# Patient Record
Sex: FEMALE | Race: WHITE | NOT HISPANIC OR LATINO | Employment: OTHER | URBAN - METROPOLITAN AREA
[De-identification: names, ages, dates, MRNs, and addresses within clinical notes are randomized per-mention and may not be internally consistent; named-entity substitution may affect disease eponyms.]

---

## 2017-08-03 DIAGNOSIS — M65.331 TRIGGER MIDDLE FINGER OF RIGHT HAND: Primary | ICD-10-CM

## 2017-08-03 DIAGNOSIS — M65.341 TRIGGER RING FINGER OF RIGHT HAND: ICD-10-CM

## 2017-08-10 ENCOUNTER — CLINICAL SUPPORT (OUTPATIENT)
Dept: REHABILITATION | Facility: HOSPITAL | Age: 42
End: 2017-08-10
Attending: SURGERY
Payer: MEDICAID

## 2017-08-10 DIAGNOSIS — M65.341 TRIGGER RING FINGER OF RIGHT HAND: ICD-10-CM

## 2017-08-10 DIAGNOSIS — M65.331 TRIGGER MIDDLE FINGER OF RIGHT HAND: ICD-10-CM

## 2017-08-10 PROCEDURE — G8987 SELF CARE CURRENT STATUS: HCPCS | Mod: CK

## 2017-08-10 PROCEDURE — 97022 WHIRLPOOL THERAPY: CPT

## 2017-08-10 PROCEDURE — G8988 SELF CARE GOAL STATUS: HCPCS | Mod: CK

## 2017-08-10 PROCEDURE — 97165 OT EVAL LOW COMPLEX 30 MIN: CPT

## 2017-08-10 PROCEDURE — 97530 THERAPEUTIC ACTIVITIES: CPT

## 2017-08-10 NOTE — PROGRESS NOTES
OCCUPATIONAL THERAPY INITIAL EVALUATION       DATE : 08/10/2017    Name: Malathi Mcpherson  Referring Physician: Jayden Crawford III,*   Allergies: Review of patient's allergies indicates:  No Known Allergies  Medical history:   Past Medical History:   Diagnosis Date    Depression     Hepatitis B      Medication:    Current Outpatient Prescriptions on File Prior to Visit   Medication Sig Dispense Refill    albuterol 90 mcg/actuation inhaler Inhale 1-2 puffs into the lungs every 6 (six) hours as needed for Wheezing. 1 Inhaler 0    busPIRone (BUSPAR) 5 MG Tab Take 5 mg by mouth 2 (two) times daily.      citalopram (CELEXA) 40 MG tablet Take 40 mg by mouth once daily.      hydrocodone-acetaminophen 5-325mg (NORCO) 5-325 mg per tablet Take 1 tablet by mouth every 4 (four) hours as needed for Pain. 10 tablet 0    ibuprofen (ADVIL,MOTRIN) 800 MG tablet Take 1 tablet (800 mg total) by mouth every 6 (six) hours as needed for Pain. 20 tablet 0     No current facility-administered medications on file prior to visit.      Diagnosis: right middle and ring finger trigger finger release    Occupational Profile level: low     Orders: Occupational Therapy evaluate and treat    Precautions stated on order: none      Evaluation Date: 8/10/17  Visit #:1    Plan of care Expiration: 8/30/17    SUBJECTIVE   Patient states: Pt reports that she has had trigger finger release surgery 2 weeks ago right middle and ring finger .  Pts goals for therapy:  Regain full motion in hand   Pain Scale: Malathi rates pain on a scale of 0-10 to be 2 at worst; 2 currently; 2 at best .  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   Patients  structured exercise routine: gripping and cleaning   Exercise routine prior to onset: normal   ADLS: Pt has a decrease ability to perform ADL's such as pain with managing clothing , holding grocery bags and cleaning     Imaging:   MRI: none          Xray: yes         Hand Dominance: right                                   OBJECTIVE     Cognitive status : : no deficits     Posture Alignment :slouched posture  Joint integrity: normal   Skin integrity: dry scar , had stitches removed earlier this  Week   Edema: minimal palm of hand     Sensation:   Light Touch: Intact       Proprioception:   Intact      Upper Extremity Range of Motion   Joint Evaluation AROM PROM    Right  Left  / Right    Shoulder flex 0-180     Shoulder Abd 0-180     Shoulder ER 0-90     Shoulder IR 0-90     Shoulder Extension 0-80     Shoulder Horizontal adduction 0-90     Elbow flex/ext 0-150 WNL WNL   Wrist flex 0-80 40 WNL   Wrist ext 0-70 35 WNL   Supination 0-80 WNL WNL   Pronation 0-80 WNL WNL   UD 30 WNl   RD 15 WNL         AROM  Index  Middle  Ring  Small   MP  -30/85 -20/85    PIp  -10/90 -15/95    DIP   0/35 0/40           DEGROOT              SCAR: dry healed       Upper Extremity Strength                                 Strength - second setting nt #,nt#,nt # : nt #,nt#,nt #                                     Treatment    Treatment time: 60  OT Evaluation Completed? Yes  Discussed Plan of Care with patient: Yes    Malathi received 15 minutes of scar massage .  Malathi received 10 minutes of fluido .    Malathi received 1 eval    Written Home Exercises Provided:   Malathi arthur good understanding of the  education provided. Patient demo good return demo of skill of exercises.     Treatment Received :        1. Pt performed and was provided with a written copy of exercises to perform as tolerated, including: fluidotherapy X 10 minutes,TGE, wrist flexion / ext AROM , educated  Use of scar massage,     2. Pt was provided educational information, including range of motion, strengthening   3. Pt educated to use ice for 10- 20 minutes to decrease swelling  and/ or pain as needed.       FOTO - Outcomes  And G Codes     FOTO survey     FOTO:   Self Care DATE SCORE GCODE MODIFIER   INITIAL 8/10/17         /100  CK   5TH /100 10TH /100     Discharge           FOTO Goal : CK modifier 8994    Interpretation of FOTO Modifiers    TEST SCORE  Modifier  Impairment Limitation Restriction    0%  CH  0 % impaired, limited or restricted    1-19%  CI  @ least 1% but less than 20% impaired, limited or restricted    20-39%  CJ  @ least 20%<40% impaired, limited or restricted    40-59%  CK  @ least 40%<60% impaired, limited or restricted    60-79%  CL  @ least 60% <80% impaired, limited or restricted    80-99%  CM  @ least 80%<100% impaired limited or restricted    100%  CN  100% impaired, limited or restricted       ASSESSMENT    Pt present to occupational therapy with an   OT diagnosis : of right middle and ring finger trigger finger release  2 weeks ago ( end of July 2017 )     CLINICAL DECISION MAKING:    Analysis of data from eval:      Problem focused assessment, 1-3 performance deficits noted trouble grasping , making a fist , managing groceries and doing laundry         History Examination Decision Making Complexity Score   Occupational Profile: 42 year old , lives at home , pt has family that can assist pt was most task                             Medical and Therapy History:   Expanded                  Performance Deficits     Physical:  Decreased ability to perform task such as feeding/ cutting ,  use of fasteners , dressing         Cognitive:   WNL        Psychosocial:    WNl        Modification of task during  evaluation        Level of complexity is low ,     based on PMHX, co morbidities , data from assessments and functional level of assistance required with task and clinical presentation directly impacting  His/ her plan of care                Pt presents with the impairments as stated below in the impairments/problems list. Pt prognosis is Good    Malathi will benefit from skilled outpatient occupational therapy to address the above stated deficits, provide pt/family education and to maximize pt's level of independence in the home and community environment.     Discussed Plan of Care with patient: Yes    Medical necessity is demonstrated by the following IMPAIRMENTS/PROBLEMS:  1. Poor posture  2. pain in  joint / limb  Right / left   3. Decreased flexibility  4. Decreased ROM  5. Decreased muscle strength  6. Inability to work       Pt's spiritual, cultural and educational needs considered and pt agreeable to plan of care and goals as stated below:     Anticipated Barriers for Occupational  therapy: anticipated none    GOALS: 6 weeks. Pt agrees with goals set.  1. Independent with HEP.  2. Report decreased to pain  with adls such as reaching overhead and into the cabinets.  3. Decreased pain to   0/10    With gripping toothbrush or water bottle   4.      5. Increased arom  for  Right middle finger and ring finger increase flexion by 10 and extension of mp and PIp by 10 degrees .     PLAN   Outpatient occupational therapy 1-  2 times weekly to include:   pt ed, hep, therapeutic exercises, neuromuscular re-education, joint mobilizations,  Iontophoresis , ultrasound and other modalities prn  Treatment Certification Period:   8/10/17     To 9/30/17               .   Cont OT for  6  weeks.     I certify the need for these services furnished under this plan of treatment and while under my Care.     _________________________________,          _________________________  Physician        Date

## 2017-08-15 ENCOUNTER — CLINICAL SUPPORT (OUTPATIENT)
Dept: REHABILITATION | Facility: HOSPITAL | Age: 42
End: 2017-08-15
Attending: SURGERY
Payer: MEDICAID

## 2017-08-15 DIAGNOSIS — M65.331 TRIGGER MIDDLE FINGER OF RIGHT HAND: Primary | ICD-10-CM

## 2017-08-15 PROCEDURE — 97022 WHIRLPOOL THERAPY: CPT

## 2017-08-15 PROCEDURE — 97035 APP MDLTY 1+ULTRASOUND EA 15: CPT

## 2017-08-15 PROCEDURE — 97110 THERAPEUTIC EXERCISES: CPT

## 2017-08-15 NOTE — PROGRESS NOTES
OCCUPATIONAL THERAPY progress       DATE : 08/15/2017    Name: Malathi Mcpherson  Referring Physician: Jayden Crawford III,*   Allergies: Review of patient's allergies indicates:  No Known Allergies  Medical history:   Past Medical History:   Diagnosis Date    Depression     Hepatitis B      Medication:    Current Outpatient Prescriptions on File Prior to Visit   Medication Sig Dispense Refill    albuterol 90 mcg/actuation inhaler Inhale 1-2 puffs into the lungs every 6 (six) hours as needed for Wheezing. 1 Inhaler 0    busPIRone (BUSPAR) 5 MG Tab Take 5 mg by mouth 2 (two) times daily.      citalopram (CELEXA) 40 MG tablet Take 40 mg by mouth once daily.      hydrocodone-acetaminophen 5-325mg (NORCO) 5-325 mg per tablet Take 1 tablet by mouth every 4 (four) hours as needed for Pain. 10 tablet 0    ibuprofen (ADVIL,MOTRIN) 800 MG tablet Take 1 tablet (800 mg total) by mouth every 6 (six) hours as needed for Pain. 20 tablet 0     No current facility-administered medications on file prior to visit.      Diagnosis: right middle and ring finger trigger finger release    Occupational Profile level: low     Orders: Occupational Therapy evaluate and treat    Precautions stated on order: none      Evaluation Date: 8/10/17  Visit #:1    Plan of care Expiration: 8/30/17    SUBJECTIVE   Patient states: Pt reports that she has had right  trigger finger release surgery 2 weeks ago right middle and ring finger .  Pts goals for therapy:  Regain full motion in hand   Pain Scale: Malathi rates pain on a scale of 0-10 to be 2 at worst; 2 currently; 2 at best .  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   Patients  structured exercise routine: gripping and cleaning   Exercise routine prior to onset: normal   ADLS: Pt has a decrease ability to perform ADL's such as pain with managing clothing , holding grocery bags and cleaning     Imaging:   MRI: none          Xray: yes         Hand Dominance: right                                   OBJECTIVE     Cognitive status : : no deficits     Posture Alignment :slouched posture  Joint integrity: normal   Skin integrity: dry scar , had stitches removed earlier this  Week   Edema: minimal palm of hand     Sensation:   Light Touch: Intact       Proprioception:   Intact      Upper Extremity Range of Motion   Joint Evaluation AROM PROM    Right  Left  / Right    Shoulder flex 0-180     Shoulder Abd 0-180     Shoulder ER 0-90     Shoulder IR 0-90     Shoulder Extension 0-80     Shoulder Horizontal adduction 0-90     Elbow flex/ext 0-150 WNL WNL   Wrist flex 0-80 40 WNL   Wrist ext 0-70 35 WNL   Supination 0-80 WNL WNL   Pronation 0-80 WNL WNL   UD 30 WNl   RD 15 WNL         AROM  Index  Middle  Ring  Small   MP  -30/85 -20/85    PIp  -10/90 -15/95    DIP   0/35 0/40           DEGROOT              SCAR: dry healed                                Treatment    Treatment time: 60  OT Evaluation Completed? Yes  Discussed Plan of Care with patient: Yes    Malathi received 15 minutes of scar massage .  Malathi received 10 minutes of fluido .    Malathi received 1 eval    Written Home Exercises Provided:   Malathi demo good understanding of the education provided. Patient demo good return demo of skill of exercises.     Treatment Received :        1. Pt performed and was provided with  a written copy of exercises to perform as tolerated, including: fluidotherapy X 10 minutes, ultrasound .8  W/cm2 X 6 minutes over scar , TGE, wrist flexion / ext AROM , educated  Use of scar massage,     2. Pt was provided educational information, including range of motion, strengthening   3. Pt educated to use ice for 10- 20 minutes to decrease swelling  and/ or pain as needed.       FOTO - Outcomes  And G Codes     FOTO survey     FOTO:   Self Care DATE SCORE GCODE MODIFIER   INITIAL 8/10/17         /100  CK   5TH /100 10TH /100     Discharge           FOTO Goal : CK modifier 8994    Interpretation of FOTO Modifiers    TEST SCORE  Modifier  Impairment Limitation Restriction    0%  CH  0 % impaired, limited or restricted    1-19%  CI  @ least 1% but less than 20% impaired, limited or restricted    20-39%  CJ  @ least 20%<40% impaired, limited or restricted    40-59%  CK  @ least 40%<60% impaired, limited or restricted    60-79%  CL  @ least 60% <80% impaired, limited or restricted    80-99%  CM  @ least 80%<100% impaired limited or restricted    100%  CN  100% impaired, limited or restricted       ASSESSMENT    Pt present to occupational therapy with an   OT diagnosis : of right middle and ring finger trigger finger release  2 weeks ago ( end of July 2017 )     CLINICAL DECISION MAKING:    Analysis of data from eval:      Problem focused assessment, 1-3 performance deficits noted trouble grasping , making a fist , managing groceries and doing laundry         History Examination Decision Making Complexity Score   Occupational Profile: 42 year old , lives at home , pt has family that can assist pt was most task                             Medical and Therapy History:   Expanded                  Performance Deficits     Physical:  Decreased ability to perform task such as feeding/ cutting , use of fasteners , dressing         Cognitive:   WNL        Psychosocial:    WNl         Modification of task during  evaluation        Level of complexity is low ,     based on PMHX, co morbidities , data from assessments and functional level of assistance required with task and clinical presentation directly impacting  His/ her plan of care                Pt presents with the impairments as stated below in the impairments/problems list. Pt prognosis is Good    Maltahi will benefit from skilled outpatient occupational therapy to address the above stated deficits, provide pt/family education and to maximize pt's level of independence in the home and community environment.     Discussed Plan of Care with patient: Yes    Medical necessity is demonstrated by the following IMPAIRMENTS/PROBLEMS:  1. Poor posture  2. pain in  joint / limb  Right / left   3. Decreased flexibility  4. Decreased ROM  5. Decreased muscle strength  6. Inability to work       Pt's spiritual, cultural and educational needs considered and pt agreeable to plan of care and goals as stated below:     Anticipated Barriers for Occupational  therapy: anticipated none    GOALS: 6 weeks. Pt agrees with goals set.  1. Independent with HEP.  2. Report decreased to pain  with adls such as reaching overhead and into the cabinets.  3. Decreased pain to   0/10    With gripping toothbrush or water bottle   4.      5. Increased arom  for  Right middle finger and ring finger increase flexion by 10 and extension of mp and PIp by 10 degrees .     PLAN   Outpatient occupational therapy 1-  2 times weekly to include:   pt ed, hep, therapeutic exercises, neuromuscular re-education, joint mobilizations,  Iontophoresis , ultrasound and other modalities prn  Treatment Certification Period:   8/10/17     To 9/30/17               .   Cont OT for  6  weeks.     I certify the need for these services furnished under this plan of treatment and while under my Care.    _________________________________,          _________________________  Physician        Date

## 2017-12-12 ENCOUNTER — DOCUMENTATION ONLY (OUTPATIENT)
Dept: REHABILITATION | Facility: HOSPITAL | Age: 42
End: 2017-12-12

## 2017-12-12 NOTE — PROGRESS NOTES
OCCUPATIONAL THERAPY progress       DATE : 12/12/2017    Name: Malathi Mcpherson  Referring Physician: No ref. provider found   Allergies: Review of patient's allergies indicates:  No Known Allergies  Medical history:   Past Medical History:   Diagnosis Date    Depression     Hepatitis B      Medication:    Current Outpatient Prescriptions on File Prior to Visit   Medication Sig Dispense Refill    albuterol 90 mcg/actuation inhaler Inhale 1-2 puffs into the lungs every 6 (six) hours as needed for Wheezing. 1 Inhaler 0    busPIRone (BUSPAR) 5 MG Tab Take 5 mg by mouth 2 (two) times daily.      citalopram (CELEXA) 40 MG tablet Take 40 mg by mouth once daily.      hydrocodone-acetaminophen 5-325mg (NORCO) 5-325 mg per tablet Take 1 tablet by mouth every 4 (four) hours as needed for Pain. 10 tablet 0    ibuprofen (ADVIL,MOTRIN) 800 MG tablet Take 1 tablet (800 mg total) by mouth every 6 (six) hours as needed for Pain. 20 tablet 0     No current facility-administered medications on file prior to visit.      Diagnosis: right middle and ring finger trigger finger release    Occupational Profile level: low     Orders: Occupational Therapy evaluate and treat    Precautions stated on order: none      Evaluation Date: 8/10/17   total Visit #: 2   last seen 8/5/17   Plan of care Expiration: 8/30/17    SUBJECTIVE   Patient states: Pt reports that she has had right  trigger finger release surgery 2 weeks ago right middle and ring finger .  Pts goals for therapy:  Regain full motion in hand   Pain Scale: Malathi rates pain on a scale of 0-10 to be 2 at worst; 2 currently; 2 at best .  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   Patients  structured exercise routine: gripping and cleaning   Exercise routine prior to onset: normal   ADLS: Pt has a decrease ability to perform ADL's such as pain with managing clothing , holding grocery bags and cleaning     Imaging:   MRI: none          Xray: yes         Hand Dominance: right                                   OBJECTIVE     Cognitive status : : no deficits     Posture Alignment :slouched posture  Joint integrity: normal   Skin integrity: dry scar , had stitches removed earlier this  Week   Edema: minimal palm of hand     Sensation:   Light Touch: Intact       Proprioception:   Intact      Upper Extremity Range of Motion   Joint Evaluation AROM PROM    Right  Left  / Right    Shoulder flex 0-180     Shoulder Abd 0-180     Shoulder ER 0-90     Shoulder IR 0-90     Shoulder Extension 0-80     Shoulder Horizontal adduction 0-90     Elbow flex/ext 0-150 WNL WNL   Wrist flex 0-80 40 WNL   Wrist ext 0-70 35 WNL   Supination 0-80 WNL WNL   Pronation 0-80 WNL WNL   UD 30 WNl   RD 15 WNL         AROM  Index  Middle  Ring  Small   MP  -30/85 -20/85    PIp  -10/90 -15/95    DIP   0/35 0/40           DEGROOT              SCAR: dry healed                                Treatment    Written Home Exercises Provided:   Malathi arthur good understanding of the education provided. Patient demo good return demo of skill of exercises.     Treatment Received :    1. Pt performed and was provided with a written copy of exercises to perform as tolerated, including: fluidotherapy X 10 minutes, ultrasound .8  W/cm2 X 6 minutes over scar , TGE, wrist flexion / ext AROM , educated  Use of  scar massage,     2. Pt was provided educational information, including range of motion, strengthening   3. Pt educated to use ice for 10- 20 minutes to decrease swelling  and/ or pain as needed.       FOTO - Outcomes  And G Codes     FOTO survey     FOTO:   Self Care DATE SCORE GCODE MODIFIER   INITIAL 8/10/17         /100  CK   5TH /100 10TH /100     Discharge      CK       FOTO Goal : CK modifier 8994    Interpretation of FOTO Modifiers    TEST SCORE  Modifier  Impairment Limitation Restriction    0%  CH  0 % impaired, limited or restricted    1-19%  CI  @ least 1% but less than 20% impaired, limited or restricted    20-39%  CJ  @ least 20%<40% impaired, limited or restricted    40-59%  CK  @ least 40%<60% impaired, limited or restricted    60-79%  CL  @ least 60% <80% impaired, limited or restricted    80-99%  CM  @ least 80%<100% impaired limited or restricted    100%  CN  100% impaired, limited or restricted       ASSESSMENT    Pt present to occupational therapy with an   OT diagnosis : of right middle and ring finger trigger finger release  2 weeks ago ( end of July 2017 )     CLINICAL DECISION MAKING:    Analysis of data from eval:      Problem focused assessment, 1-3 performance deficits noted trouble grasping , making a fist , managing groceries and doing laundry      Pt presents with the impairments as stated below in the impairments/problems list. Pt prognosis is Good    Malathi will benefit from skilled outpatient occupational therapy to address the above stated deficits, provide pt/family education and to maximize pt's level of independence in the home and community environment.     Discussed Plan of Care with patient: Yes    Medical necessity is demonstrated by the following IMPAIRMENTS/PROBLEMS:  1. Poor posture  2. pain in  joint / limb  Right / left   3. Decreased flexibility  4. Decreased ROM  5. Decreased muscle strength  6. Inability to work       Pt's spiritual,  cultural and educational needs considered and pt agreeable to plan of care and goals as stated below:     Anticipated Barriers for Occupational  therapy: anticipated none    GOALS: no goals met  1. Independent with HEP.  2. Report decreased to pain  with adls such as reaching overhead and into the cabinets.  3. Decreased pain to   0/10   With gripping toothbrush or water bottle   4.    5. Increased arom  for  Right middle finger and ring finger increase flexion by 10 and extension of mp and PIp by 10 degrees .     PLAN   PT to be d/c from OT , pt only attended 2 appts. Never scheduled any follow ups         I certify the need for these services furnished under this plan of treatment and while under my Care.    _________________________________,          _________________________  Physician        Date

## 2018-06-27 ENCOUNTER — HOSPITAL ENCOUNTER (EMERGENCY)
Facility: HOSPITAL | Age: 43
Discharge: HOME | End: 2018-06-27
Attending: EMERGENCY MEDICINE
Payer: MEDICAID

## 2018-06-27 VITALS
HEIGHT: 66 IN | BODY MASS INDEX: 27.64 KG/M2 | WEIGHT: 172 LBS | TEMPERATURE: 99 F | HEART RATE: 69 BPM | DIASTOLIC BLOOD PRESSURE: 71 MMHG | SYSTOLIC BLOOD PRESSURE: 124 MMHG | RESPIRATION RATE: 16 BRPM | OXYGEN SATURATION: 98 %

## 2018-06-27 DIAGNOSIS — M25.562 CHRONIC PAIN OF LEFT KNEE: Primary | ICD-10-CM

## 2018-06-27 DIAGNOSIS — G89.29 CHRONIC PAIN OF LEFT KNEE: Primary | ICD-10-CM

## 2018-06-27 PROCEDURE — 63600175 PHARM REV CODE 636 W HCPCS: Performed by: NURSE PRACTITIONER

## 2018-06-27 PROCEDURE — 99284 EMERGENCY DEPT VISIT MOD MDM: CPT | Mod: ,,, | Performed by: NURSE PRACTITIONER

## 2018-06-27 PROCEDURE — 99283 EMERGENCY DEPT VISIT LOW MDM: CPT | Mod: 25

## 2018-06-27 PROCEDURE — 96372 THER/PROPH/DIAG INJ SC/IM: CPT

## 2018-06-27 RX ORDER — QUETIAPINE FUMARATE 200 MG/1
150 TABLET, FILM COATED ORAL
COMMUNITY
End: 2019-02-03

## 2018-06-27 RX ORDER — KETOROLAC TROMETHAMINE 30 MG/ML
15 INJECTION, SOLUTION INTRAMUSCULAR; INTRAVENOUS
Status: COMPLETED | OUTPATIENT
Start: 2018-06-27 | End: 2018-06-27

## 2018-06-27 RX ORDER — PROMETHAZINE HYDROCHLORIDE 12.5 MG/1
25 TABLET ORAL 4 TIMES DAILY
COMMUNITY

## 2018-06-27 RX ORDER — OMEPRAZOLE 20 MG/1
20 CAPSULE, DELAYED RELEASE ORAL DAILY
COMMUNITY
End: 2019-02-03

## 2018-06-27 RX ORDER — BUPRENORPHINE AND NALOXONE 8; 2 MG/1; MG/1
FILM, SOLUBLE BUCCAL; SUBLINGUAL
COMMUNITY
End: 2019-02-03

## 2018-06-27 RX ORDER — VENLAFAXINE HYDROCHLORIDE 150 MG/1
75 CAPSULE, EXTENDED RELEASE ORAL DAILY
COMMUNITY

## 2018-06-27 RX ORDER — ATENOLOL 25 MG/1
25 TABLET ORAL DAILY
COMMUNITY

## 2018-06-27 RX ADMIN — KETOROLAC TROMETHAMINE 15 MG: 30 INJECTION, SOLUTION INTRAMUSCULAR at 01:06

## 2018-06-27 NOTE — ED PROVIDER NOTES
Encounter Date: 6/27/2018       History     Chief Complaint   Patient presents with    Knee Pain     Pt c/o left knee pain & swelling.  Onset 6 months ago but states worsened in the past week.      Patient is a 43-year-old female with medical history of depression, hepatitis-B and chronic pain presenting to the ED for left knee pain. Patient states pain has been present for over the past week but worse the last 2 days.  Patient states pain worse throughout the day specially after working all day and standing.  Patient denies any trauma or falls recently.  Patient denies any weakness, numbness or difficulty ambulating.          Review of patient's allergies indicates:  No Known Allergies  Past Medical History:   Diagnosis Date    Depression     Hepatitis B      History reviewed. No pertinent surgical history.  History reviewed. No pertinent family history.  Social History   Substance Use Topics    Smoking status: Current Every Day Smoker     Packs/day: 0.50     Types: Cigarettes    Smokeless tobacco: Never Used    Alcohol use Yes      Comment: occaisionally     Review of Systems   Constitutional: Negative for fever.   HENT: Negative for sore throat.    Respiratory: Negative for shortness of breath.    Cardiovascular: Negative for chest pain.   Gastrointestinal: Negative for nausea.   Genitourinary: Negative for dysuria.   Musculoskeletal: Positive for arthralgias and myalgias. Negative for back pain.   Skin: Negative for rash.   Neurological: Negative for weakness.   Hematological: Does not bruise/bleed easily.       Physical Exam     Initial Vitals [06/27/18 1237]   BP Pulse Resp Temp SpO2   (!) 142/61 74 16 98.6 °F (37 °C) 97 %      MAP       --         Physical Exam    Nursing note and vitals reviewed.  Constitutional: She appears well-developed and well-nourished.   HENT:   Head: Normocephalic and atraumatic.   Eyes: EOM are normal. Pupils are equal, round, and reactive to light.   Neck: Normal range of  motion.   Cardiovascular: Normal rate, regular rhythm, normal heart sounds and intact distal pulses.   Pulmonary/Chest: Breath sounds normal.   Abdominal: Soft. Bowel sounds are normal.   Musculoskeletal: Normal range of motion.        Left knee: She exhibits normal range of motion, no swelling, no effusion, no ecchymosis, no deformity, no laceration, no erythema, normal alignment, no LCL laxity, normal patellar mobility, no bony tenderness, normal meniscus and no MCL laxity. Tenderness found.   Neurological: She is alert and oriented to person, place, and time. She has normal strength. No cranial nerve deficit or sensory deficit. GCS eye subscore is 4. GCS verbal subscore is 5. GCS motor subscore is 6.   Skin: Skin is warm, dry and intact. Capillary refill takes less than 2 seconds. No abrasion and no rash noted. No cyanosis. Nails show no clubbing.   Psychiatric: She has a normal mood and affect. Her speech is normal and behavior is normal. Judgment and thought content normal. Cognition and memory are normal.         ED Course   Procedures  Labs Reviewed - No data to display       Imaging Results    None                APC / Resident Notes:   Emergent evaluation of a 44 yo female patient presenting to the ER with chief complaint of left knee pain.  Patient states she has had pain in the left knee for the last week but has been worse over the last 2 days.  Patient denies any difficulty ambulating.  Patient denies increased pain with active or passive range of motion. Strength 5/5 in the left lower extremity.  I do not feel labs or imaging her prone to the care this patient.  I will medicate and reassess. Differential diagnoses include but are not limited to Meniscal tear, ACL tear, PCL tear, MCL tear, LCL tear, patellar dislocation, patellar tendinitis, patellar tendon rupture, septic joint, bursitis, Baker's cyst, Osgood-Schlatter disease, femur fracture, tibia fracture, gout, RA, OA, others. I discussed the care  of this patient with my Supervising Physician.    Patient states feeling better after IM medications.Patient is hemodynamically stable, vital signs are normal. Discharge instructions given. Referral for Ortho given.  Pt states she will discuss care with Arthritic doctor. Return to ED precautions discussed. Follow up as directed. Pt verbalized understanding of this plan. Pt is stable for discharge.                    Clinical Impression:   The encounter diagnosis was Chronic pain of left knee.      Disposition:   Disposition: Discharged  Condition: Stable                        Dariana Field NP  06/27/18 2101

## 2018-06-27 NOTE — ED TRIAGE NOTES
Pt c/o left knee pain, started about 6 months ago. Pt states worse in the AM and swollen after working all day. Pt states very stiff and making a popping noise, kathleen when walking. Pt ambulated into facility per self without assistance. Pt denies any fall or injury that she knows of. Pt A&O x4 during triage. Pt denies any chest pain, SOB, fevers, chills, vomiting, or diarrhea. Pt states chronically nausea, prescribed phenergan.

## 2018-06-27 NOTE — ED NOTES
LOC: The patient is awake, alert, and oriented to place, time, situation. Affect is appropriate.  Speech is appropriate and clear.     APPEARANCE: Patient resting comfortably in no acute distress.  Patient is clean and well groomed.    SKIN: The skin is warm and dry; color consistent with ethnicity.  Patient has normal skin turgor and moist mucus membranes.  Skin intact; no breakdown or bruising noted.     MUSCULOSKELETAL: Patient moving upper and lower extremities without difficulty.  Denies weakness. Pt c/o left knee pain, started about 6 months ago. Pt states worse in the AM and swollen after working all day. Pt states very stiff and making a popping noise, kathleen when walking. Pt ambulated into facility per self without assistance. Left knee tender to palpitation and swollen during assessment.     RESPIRATORY: Airway is open and patent. Respirations spontaneous, even, easy, and non-labored.  Patient has a normal effort and rate.  No accessory muscle use noted. Denies cough.     CARDIAC:  Normal rhythm and rate noted.  No peripheral edema noted. No complaints of chest pain.      ABDOMEN: Soft and non tender to palpation.  No distention noted.     NEUROLOGIC: Eyes open spontaneously.  Behavior appropriate to situation.  Follows commands; facial expression symmetrical.  Purposeful motor response noted; normal sensation in all extremities.

## 2019-02-03 ENCOUNTER — HOSPITAL ENCOUNTER (EMERGENCY)
Facility: HOSPITAL | Age: 44
Discharge: HOME | End: 2019-02-03
Attending: EMERGENCY MEDICINE
Payer: MEDICAID

## 2019-02-03 VITALS
RESPIRATION RATE: 16 BRPM | BODY MASS INDEX: 27.8 KG/M2 | TEMPERATURE: 98 F | WEIGHT: 173 LBS | HEIGHT: 66 IN | SYSTOLIC BLOOD PRESSURE: 136 MMHG | OXYGEN SATURATION: 97 % | HEART RATE: 88 BPM | DIASTOLIC BLOOD PRESSURE: 70 MMHG

## 2019-02-03 DIAGNOSIS — H66.90 OTITIS MEDIA, UNSPECIFIED LATERALITY, UNSPECIFIED OTITIS MEDIA TYPE: Primary | ICD-10-CM

## 2019-02-03 PROCEDURE — 99284 PR EMERGENCY DEPT VISIT,LEVEL IV: ICD-10-PCS | Mod: ,,, | Performed by: EMERGENCY MEDICINE

## 2019-02-03 PROCEDURE — 99284 EMERGENCY DEPT VISIT MOD MDM: CPT | Mod: ,,, | Performed by: EMERGENCY MEDICINE

## 2019-02-03 PROCEDURE — 99284 EMERGENCY DEPT VISIT MOD MDM: CPT

## 2019-02-03 RX ORDER — AMOXICILLIN 875 MG/1
875 TABLET, FILM COATED ORAL 2 TIMES DAILY
Qty: 14 TABLET | Refills: 0 | Status: SHIPPED | OUTPATIENT
Start: 2019-02-03

## 2019-02-03 RX ORDER — PANTOPRAZOLE SODIUM 40 MG/1
40 FOR SUSPENSION ORAL DAILY
COMMUNITY

## 2019-02-03 RX ORDER — FLUTICASONE PROPIONATE 50 MCG
1 SPRAY, SUSPENSION (ML) NASAL 2 TIMES DAILY PRN
Qty: 15 G | Refills: 0 | Status: SHIPPED | OUTPATIENT
Start: 2019-02-03

## 2019-02-03 NOTE — ED PROVIDER NOTES
Encounter Date: 2/3/2019    SCRIBE #1 NOTE: I, Mahogany Burden, am scribing for, and in the presence of,  Dr. Ovalle. I have scribed the entire note.       History     Chief Complaint   Patient presents with    Otalgia     Time seen by provider: 10:57 AM    43 y.o. female with medical conditions including depression and Hepatitis B , who presents with complaint of otalgia. Patient states she has been experiencing head congestion for approximately 5 days duration with associated otalgia, left greater than the right. Denies fever, chills, n/v/d. No chance of pregnancy.           The history is provided by the patient.     Review of patient's allergies indicates:  No Known Allergies  Past Medical History:   Diagnosis Date    Depression     Hepatitis B      History reviewed. No pertinent surgical history.  History reviewed. No pertinent family history.  Social History     Tobacco Use    Smoking status: Current Every Day Smoker     Packs/day: 0.50     Types: Cigarettes    Smokeless tobacco: Never Used   Substance Use Topics    Alcohol use: Yes     Comment: occaisionally    Drug use: No     Review of Systems   Constitutional: Negative for chills and fever.   HENT: Positive for ear pain. Negative for rhinorrhea and sore throat.    Eyes: Negative for visual disturbance.   Respiratory: Negative for cough and shortness of breath.    Cardiovascular: Negative for chest pain.   Gastrointestinal: Negative for nausea and vomiting.   Genitourinary: Negative for dysuria and flank pain.   Musculoskeletal: Negative for back pain and gait problem.   Skin: Negative for rash.   Neurological: Negative for weakness and numbness.       Physical Exam     Initial Vitals [02/03/19 1013]   BP Pulse Resp Temp SpO2   136/70 88 16 97.8 °F (36.6 °C) 97 %      MAP       --         Physical Exam    Nursing note and vitals reviewed.  Constitutional: She appears well-developed and well-nourished. No distress.   HENT:   Head: Normocephalic  and atraumatic.   Mouth/Throat: Oropharynx is clear and moist.   Fluid behind right and left TM. Erythema to the left TM   Neck: Neck supple.   Cardiovascular: Normal rate, regular rhythm, normal heart sounds and intact distal pulses.   Pulmonary/Chest: Breath sounds normal. No respiratory distress.   Abdominal: Soft. Bowel sounds are normal. There is no tenderness. There is no rebound and no guarding.   Musculoskeletal: She exhibits no edema or tenderness.   Neurological: She is alert and oriented to person, place, and time. She has normal strength.         ED Course   Procedures  Labs Reviewed - No data to display       Imaging Results    None          Medical Decision Making:   History:   Old Medical Records: I decided to obtain old medical records.  Initial Assessment:   43 y.o.female with otitis media.  ED Management:  Will prescribe Flonase nasal spray and amoxicillin.                      Clinical Impression:   The encounter diagnosis was Otitis media, unspecified laterality, unspecified otitis media type.      Disposition:   Disposition: Discharged  Condition: Stable                        Ritchie Ovalle MD  02/03/19 2633

## 2019-08-03 ENCOUNTER — HOSPITAL ENCOUNTER (EMERGENCY)
Facility: HOSPITAL | Age: 44
Discharge: HOME | End: 2019-08-03
Attending: EMERGENCY MEDICINE
Payer: MEDICAID

## 2019-08-03 VITALS
HEIGHT: 67 IN | HEART RATE: 85 BPM | WEIGHT: 160 LBS | SYSTOLIC BLOOD PRESSURE: 136 MMHG | RESPIRATION RATE: 17 BRPM | OXYGEN SATURATION: 98 % | DIASTOLIC BLOOD PRESSURE: 68 MMHG | TEMPERATURE: 98 F | BODY MASS INDEX: 25.11 KG/M2

## 2019-08-03 DIAGNOSIS — T14.8XXA SKIN ABRASION: ICD-10-CM

## 2019-08-03 DIAGNOSIS — L02.91 ABSCESS: Primary | ICD-10-CM

## 2019-08-03 LAB
ANISOCYTOSIS BLD QL SMEAR: SLIGHT
BASOPHILS # BLD AUTO: 0.03 K/UL (ref 0–0.2)
BASOPHILS NFR BLD: 0.5 % (ref 0–1.9)
DIFFERENTIAL METHOD: ABNORMAL
EOSINOPHIL # BLD AUTO: 0.1 K/UL (ref 0–0.5)
EOSINOPHIL NFR BLD: 1.9 % (ref 0–8)
ERYTHROCYTE [DISTWIDTH] IN BLOOD BY AUTOMATED COUNT: 13 % (ref 11.5–14.5)
HCT VFR BLD AUTO: 37.8 % (ref 37–48.5)
HGB BLD-MCNC: 12.5 G/DL (ref 12–16)
IMM GRANULOCYTES # BLD AUTO: 0.02 K/UL (ref 0–0.04)
IMM GRANULOCYTES NFR BLD AUTO: 0.3 % (ref 0–0.5)
LYMPHOCYTES # BLD AUTO: 2.7 K/UL (ref 1–4.8)
LYMPHOCYTES NFR BLD: 46.2 % (ref 18–48)
MCH RBC QN AUTO: 31.4 PG (ref 27–31)
MCHC RBC AUTO-ENTMCNC: 33.1 G/DL (ref 32–36)
MCV RBC AUTO: 95 FL (ref 82–98)
MONOCYTES # BLD AUTO: 0.3 K/UL (ref 0.3–1)
MONOCYTES NFR BLD: 5.4 % (ref 4–15)
NEUTROPHILS # BLD AUTO: 2.6 K/UL (ref 1.8–7.7)
NEUTROPHILS NFR BLD: 45.7 % (ref 38–73)
NRBC BLD-RTO: 0 /100 WBC
PLATELET # BLD AUTO: 231 K/UL (ref 150–350)
PLATELET BLD QL SMEAR: ABNORMAL
PMV BLD AUTO: 9.8 FL (ref 9.2–12.9)
RBC # BLD AUTO: 3.98 M/UL (ref 4–5.4)
TOXIC GRANULES BLD QL SMEAR: PRESENT
WBC # BLD AUTO: 5.78 K/UL (ref 3.9–12.7)

## 2019-08-03 PROCEDURE — 85025 COMPLETE CBC W/AUTO DIFF WBC: CPT

## 2019-08-03 PROCEDURE — 99284 EMERGENCY DEPT VISIT MOD MDM: CPT | Mod: ,,, | Performed by: PHYSICIAN ASSISTANT

## 2019-08-03 PROCEDURE — 99284 PR EMERGENCY DEPT VISIT,LEVEL IV: ICD-10-PCS | Mod: ,,, | Performed by: PHYSICIAN ASSISTANT

## 2019-08-03 PROCEDURE — 99283 EMERGENCY DEPT VISIT LOW MDM: CPT

## 2019-08-03 PROCEDURE — 25000003 PHARM REV CODE 250: Performed by: PHYSICIAN ASSISTANT

## 2019-08-03 RX ORDER — BACITRACIN ZINC 500 UNIT/G
OINTMENT IN PACKET (EA) TOPICAL
Status: COMPLETED | OUTPATIENT
Start: 2019-08-03 | End: 2019-08-03

## 2019-08-03 RX ORDER — SULFAMETHOXAZOLE AND TRIMETHOPRIM 800; 160 MG/1; MG/1
1 TABLET ORAL
COMMUNITY
End: 2020-01-02

## 2019-08-03 RX ORDER — BACITRACIN ZINC 500 UNIT/G
OINTMENT (GRAM) TOPICAL 2 TIMES DAILY
Qty: 30 G | Refills: 0 | Status: SHIPPED | OUTPATIENT
Start: 2019-08-03

## 2019-08-03 RX ADMIN — BACITRACIN 1 EACH: 500 OINTMENT TOPICAL at 07:08

## 2019-08-03 NOTE — ED TRIAGE NOTES
"Pt presents from home with abscess on groin that began 2 weeks ago. Pt was seen at  and prescribed antibiotics but states "they are not strong enough so it got worse." Pt also reports noticing a few more bumps appear over the last few days- to her lower leg and thigh area. Pt denies any fever, chills at home.   "

## 2019-08-03 NOTE — ED NOTES
LOC: Patient name and date of birth verified for Malathi Mcpherson. The patient is awake, alert and aware of environment with an appropriate affect, the patient is oriented x 3 and speaking appropriately.   APPEARANCE: Patient resting comfortably, patient is clean and well groomed, patient's clothing is properly fastened.  SKIN: The skin is warm and dry, color consistent with ethnicity, patient has normal skin turgor and moist mucus membranes, abscess to R groin, smaller red/raised area to R thigh.   MUSCULOSKELETAL: Patient moving all extremities well, no obvious swelling or deformities noted.   RESPIRATORY: Respirations are spontaneous, patient has a normal effort and rate, no accessory muscle use noted.  CARDIAC: Patient has a normal rate, no periphreal edema noted, capillary refill < 3 seconds.  ABDOMEN: Soft and non tender to palpation, no distention noted. Bowel sounds present in all four quadrants.  NEUROLOGIC: Eyes open spontaneously, behavior appropriate to situation, follows commands, facial expression symmetrical.

## 2019-08-04 ENCOUNTER — NURSE TRIAGE (OUTPATIENT)
Dept: ADMINISTRATIVE | Facility: CLINIC | Age: 44
End: 2019-08-04

## 2019-08-04 NOTE — DISCHARGE INSTRUCTIONS
Apply Bacitracin ointment to open area of skin twice daily.  Keep the area clean and dry and wash with mild soap (hibiclens) and water.  Avoiding soaking in bath.  Continue taking Bactrim until completely finished.  Follow-up with your primary care physician.  Return to the ED for any concerning symptoms.    Our goal in the emergency department is to always give you outstanding care and exceptional service. You may receive a survey by mail or e-mail in the next week regarding your experience in our ED. We would greatly appreciate your completing and returning the survey. Your feedback provides us with a way to recognize our staff who give very good care and it helps us learn how to improve when your experience was below our aspiration of excellence.

## 2019-08-04 NOTE — TELEPHONE ENCOUNTER
Reason for Disposition   Red streak from area of infection    Protocols used: BOIL (SKIN ABSCESS)-A-  ED 8/3  Pt with recurrent skin infection/mehrdad states she finished two abx. seen yest. Seen in ED yest. more sores that came up after draining the night before. ED put pt on Bactroban. on Bactrim DS from urgent care-one dose tonight and two to take antonia.  Afeb. Offered ready respsonders- pt declined stating she will call back antonia. Pt states her mom can take her back to ED antonia. Call back with questions

## 2019-08-04 NOTE — ED PROVIDER NOTES
Encounter Date: 8/3/2019       History     Chief Complaint   Patient presents with    Abscess     has been seen at , has been on abx, in groin area but has 'new spots popping up'     Patient is a 44-year-old white female with a PMH of depression and hepatitis B who presents to the ED for urgent evaluation of an abscess.  Patient reports a lesion located at the right inguinal fold 1st noticed 2 weeks ago with spontaneous drainage. She was evaluated at an urgent care a few days and was prescribed doxycycline.  She states symptoms were still there after she finished the 5 day prescription, so urgent care called in Bactrim BID x 10 days. She is on day 8 of 10. She feels that the abscess has gotten worse as it is still draining. She endorses bloody and purulent drainage. She also reports a fever of 101 several days ago. She denies HA, chest pain, SOB, abdominal pain, dysuria, hematuria, vaginal discharge, or vaginal pain.     The history is provided by the patient.     Review of patient's allergies indicates:  No Known Allergies  Past Medical History:   Diagnosis Date    Depression     Hepatitis B      History reviewed. No pertinent surgical history.  History reviewed. No pertinent family history.  Social History     Tobacco Use    Smoking status: Current Every Day Smoker     Packs/day: 0.50     Types: Cigarettes    Smokeless tobacco: Never Used   Substance Use Topics    Alcohol use: Yes     Comment: occaisionally    Drug use: No     Review of Systems   Constitutional: Positive for fever. Negative for chills.   HENT: Negative for sore throat.    Eyes: Negative for visual disturbance.   Respiratory: Negative for shortness of breath.    Cardiovascular: Negative for chest pain.   Gastrointestinal: Negative for nausea and vomiting.   Genitourinary: Negative for dysuria and vaginal discharge.   Musculoskeletal: Negative for myalgias.   Skin:        Abscess   Neurological: Negative for light-headedness.    Psychiatric/Behavioral: Negative for dysphoric mood.       Physical Exam     Initial Vitals [08/03/19 1729]   BP Pulse Resp Temp SpO2   136/68 85 17 98.2 °F (36.8 °C) 98 %      MAP       --         Physical Exam    Nursing note and vitals reviewed.  Constitutional: She appears well-developed and well-nourished. She is not diaphoretic. No distress.   HENT:   Head: Normocephalic and atraumatic.   Mouth/Throat: Oropharynx is clear and moist. No oropharyngeal exudate.   Eyes: Conjunctivae and EOM are normal. Pupils are equal, round, and reactive to light.   Neck: Normal range of motion. Neck supple.   Cardiovascular: Normal rate, regular rhythm, normal heart sounds and intact distal pulses.   Pulmonary/Chest: Breath sounds normal. No respiratory distress. She has no wheezes. She has no rales.   Skin: Skin is warm and dry.   There is a small area of ulcerated skin noted to the right inguinal fold without surrounding erythema, swelling, or tenderness. No active drainage from the wound.    Psychiatric: She has a normal mood and affect. Thought content normal.         ED Course   Procedures  Labs Reviewed   CBC W/ AUTO DIFFERENTIAL - Abnormal; Notable for the following components:       Result Value    RBC 3.98 (*)     Mean Corpuscular Hemoglobin 31.4 (*)     All other components within normal limits          Imaging Results    None                APC / Resident Notes:   Pt presenting 2/2 abscess. Patient did not have surrounding cellulitis. No systemic complaints or vital signs to think patient is septic. Performed bedside ultrasound of the area which was without evidence of hypoechoic focal collection. There is no significant swelling, redness, or tenderness to suggest infection. Will obtain CBC.    CBC without leukocytosis, normal H&H.     Applied bacitracin ointment to open skin lesion and provided Rx. Instructed patient on appropriate wound care and advised to continue Bactrim as prescribed as I believe the  antibiotic appears to be working well. No other complaints and Neurovascularly intact.     Cautious return precautions discussed with patient with understanding. Prompt f/u with primary care physician discussed. All questions answered. Patient comfortable with plan. I have discussed patient case with my supervisory physician, who is in agreement with my assessment and plan.                     Clinical Impression:       ICD-10-CM ICD-9-CM   1. Abscess L02.91 682.9   2. Skin abrasion T14.8XXA 919.0         Disposition:   Disposition: Discharged  Condition: Stable                        Marianne Lutz PA-C  08/03/19 2029

## 2020-01-02 ENCOUNTER — HOSPITAL ENCOUNTER (EMERGENCY)
Facility: HOSPITAL | Age: 45
Discharge: HOME | End: 2020-01-02
Attending: EMERGENCY MEDICINE
Payer: MEDICAID

## 2020-01-02 VITALS
OXYGEN SATURATION: 100 % | TEMPERATURE: 98 F | HEART RATE: 72 BPM | WEIGHT: 159 LBS | HEIGHT: 67 IN | SYSTOLIC BLOOD PRESSURE: 124 MMHG | BODY MASS INDEX: 24.96 KG/M2 | RESPIRATION RATE: 18 BRPM | DIASTOLIC BLOOD PRESSURE: 59 MMHG

## 2020-01-02 DIAGNOSIS — L03.113 CELLULITIS OF RIGHT SHOULDER: ICD-10-CM

## 2020-01-02 DIAGNOSIS — L03.818 CELLULITIS OF OTHER SPECIFIED SITE: Primary | ICD-10-CM

## 2020-01-02 LAB
B-HCG UR QL: NEGATIVE
CTP QC/QA: YES

## 2020-01-02 PROCEDURE — 25000003 PHARM REV CODE 250: Performed by: PHYSICIAN ASSISTANT

## 2020-01-02 PROCEDURE — 99283 EMERGENCY DEPT VISIT LOW MDM: CPT

## 2020-01-02 PROCEDURE — 81025 URINE PREGNANCY TEST: CPT | Performed by: PHYSICIAN ASSISTANT

## 2020-01-02 PROCEDURE — 99284 PR EMERGENCY DEPT VISIT,LEVEL IV: ICD-10-PCS | Mod: ,,, | Performed by: PHYSICIAN ASSISTANT

## 2020-01-02 PROCEDURE — 99284 EMERGENCY DEPT VISIT MOD MDM: CPT | Mod: ,,, | Performed by: PHYSICIAN ASSISTANT

## 2020-01-02 RX ORDER — SULFAMETHOXAZOLE AND TRIMETHOPRIM 800; 160 MG/1; MG/1
1 TABLET ORAL 2 TIMES DAILY
Qty: 14 TABLET | Refills: 0 | Status: SHIPPED | OUTPATIENT
Start: 2020-01-02 | End: 2020-01-09

## 2020-01-02 RX ORDER — SULFAMETHOXAZOLE AND TRIMETHOPRIM 800; 160 MG/1; MG/1
1 TABLET ORAL
Status: COMPLETED | OUTPATIENT
Start: 2020-01-02 | End: 2020-01-02

## 2020-01-02 RX ORDER — BACITRACIN ZINC 500 UNIT/G
1 OINTMENT IN PACKET (EA) TOPICAL
Status: COMPLETED | OUTPATIENT
Start: 2020-01-02 | End: 2020-01-02

## 2020-01-02 RX ADMIN — BACITRACIN 1 EACH: 500 OINTMENT TOPICAL at 09:01

## 2020-01-02 RX ADMIN — SULFAMETHOXAZOLE AND TRIMETHOPRIM 1 TABLET: 800; 160 TABLET ORAL at 09:01

## 2020-01-03 NOTE — ED TRIAGE NOTES
Pt presents to ed with right shoulder infection for 2 weeks that worsened today. Denies fever.      APPEARANCE: Patient not in acute distress.  NEURO: Awake, alert, appropriate for age, condition, and situation, pupils equal, round, and reactive.   HEENT: Head symmetrical. Eyes bilateral.  Bilateral ears without drainage. Bilateral nares patent, throat clear.  CARDIAC: Regular rate and rhythm  RESPIRATORY: Airway is open and patent. Respirations are normal and spontaneous on room air.  GI/: Abdomen soft and non-distended.   NEUROVASCULAR: All extremities are warm and pink. .  MUSCULOSKELETAL: Moves all extremities.   SKIN: Warm and dry, adequate turgor, mucus membranes moist and pink; no breakdown, lesions, or ecchymosis noted.     Will continue to monitor.

## 2020-01-04 NOTE — ED PROVIDER NOTES
"Encounter Date: 1/2/2020       History     Chief Complaint   Patient presents with    Shoulder Abscess     "I tried to go to urgent care but they said it was too serious." Pt reports previous shoulder abscess that has since turned into skin infection. -fever/chills     Ms Dubon is a 43 yo female patient that presents to the ED with cc of abscess to right shoulder. Onset of symptoms was two weeks ago with worsening of symptoms today. Reports associated tenderness to site. Denies any fevers, chills, body aches. Has been applying mupirocin that she had left over from previous abscess. Was seen at  who referred her to ED for further evaluation and management.         Review of patient's allergies indicates:  No Known Allergies  Past Medical History:   Diagnosis Date    Depression     Hepatitis B      History reviewed. No pertinent surgical history.  History reviewed. No pertinent family history.  Social History     Tobacco Use    Smoking status: Current Every Day Smoker     Packs/day: 0.50     Types: Cigarettes    Smokeless tobacco: Never Used   Substance Use Topics    Alcohol use: Yes     Comment: occaisionally    Drug use: No     Review of Systems   Constitutional: Negative for chills and fever.   HENT: Negative for congestion and rhinorrhea.    Eyes: Negative for pain and visual disturbance.   Respiratory: Negative for cough and shortness of breath.    Cardiovascular: Negative for chest pain and palpitations.   Gastrointestinal: Negative for abdominal pain, diarrhea, nausea and vomiting.   Genitourinary: Negative for frequency.   Musculoskeletal: Negative for back pain and neck pain.   Skin: Positive for wound.   Neurological: Negative for dizziness, light-headedness and headaches.   Hematological: Does not bruise/bleed easily.   Psychiatric/Behavioral: Negative for confusion.       Physical Exam     Initial Vitals [01/02/20 1958]   BP Pulse Resp Temp SpO2   133/70 75 16 97.6 °F (36.4 °C) 100 %      MAP    "    --         Physical Exam    Constitutional: She appears well-developed. She is cooperative.  Non-toxic appearance. No distress.   HENT:   Head: Normocephalic and atraumatic.   Eyes: EOM are normal.   Neck: Normal range of motion. Neck supple.   Cardiovascular: Normal rate and normal heart sounds.   Pulmonary/Chest: Effort normal and breath sounds normal. No respiratory distress.   Abdominal: Soft. There is no tenderness.   Musculoskeletal: Normal range of motion.   Neurological: She is alert and oriented to person, place, and time.   Skin: Skin is warm and dry. There is erythema.   4 cm x 3 cm area of erythema to right shoulder. No induration, fluctuance, or abscess noted.          ED Course   Procedures  Labs Reviewed   POCT URINE PREGNANCY          Imaging Results    None          Medical Decision Making:   Clinical Tests:   Lab Tests: Ordered and Reviewed  ED Management:  Hemodynamically stable. Non-toxic and in no acute distress. Overall well appearing, pleasant, conversational. Symptoms consistent with cellulitis. Will d/c home with prescription of bactrim and PCP f/u. Pt verbalizes understanding and is agreeable with plan. Return instructions given.                                 Clinical Impression:       ICD-10-CM ICD-9-CM   1. Cellulitis of other specified site L03.818 682.8   2. Cellulitis of right shoulder L03.113 682.3         Disposition:   Disposition: Discharged  Condition: Stable                     Epifanio Brooke PA-C  01/04/20 0318

## 2020-03-09 RX ORDER — PROMETHAZINE HYDROCHLORIDE 12.5 MG/1
25 TABLET ORAL 4 TIMES DAILY
OUTPATIENT
Start: 2020-03-09

## 2020-03-09 NOTE — TELEPHONE ENCOUNTER
Spoke to pt and informed her that Dr. Lyons denied her refill request due to her no longer being under his care. [facility] and Dr[facility] Lyons told her that she can follow him to his new practice, asked pt if she was a former GI pt of his pt said yes. Informed pt that Dr. Lyons is no longer seeing GI pt's, he is now only seeing IBD pt's. Also informed pt that she can contact her PCP to see if they can refill the prescription. Pt said she will ask one of [facility] GI providers if they can refill the prescription since she was seen by them before. Inform,d pt that if they do not denied to refill the prescription then she can contact her PCP. Pt expressed understanding

## 2020-06-22 ENCOUNTER — NURSE TRIAGE (OUTPATIENT)
Dept: ADMINISTRATIVE | Facility: CLINIC | Age: 45
End: 2020-06-22

## 2020-06-23 NOTE — TELEPHONE ENCOUNTER
Patient states she has had a long standing ear infection, states she has been on several abx, now dizzy and having to sit down she is so dizzy, ear pain is unrelieved by pain medication, states she is very scared regarding her ear and the way she feels, advice to see a pcp within 4 hours, will have to go to ED, advice given, verb understanding,   Reason for Disposition   [1] Recently diagnosed with otitis media AND [2] currently on oral antibiotics   [1] SEVERE pain and [2] not improved 2 hours after analgesic medication (e.g., ibuprofen or acetaminophen)    Additional Information   Negative: [1] Stiff neck (unable to touch chin to chest) AND [2] fever   Negative: [1] Bony area of skull behind the ear is pink or swollen AND [2] fever   Negative: Patient sounds very sick or weak to the triager   Negative: Fever > 104 F (40 C)    Protocols used: EARACHE-A-AH, EAR - OTITIS MEDIA FOLLOW-UP CALL-A-AH

## 2020-06-25 ENCOUNTER — HOSPITAL ENCOUNTER (EMERGENCY)
Facility: HOSPITAL | Age: 45
Discharge: HOME | End: 2020-06-25
Attending: EMERGENCY MEDICINE
Payer: MEDICAID

## 2020-06-25 VITALS
RESPIRATION RATE: 16 BRPM | TEMPERATURE: 98 F | HEART RATE: 77 BPM | DIASTOLIC BLOOD PRESSURE: 76 MMHG | SYSTOLIC BLOOD PRESSURE: 138 MMHG | OXYGEN SATURATION: 100 %

## 2020-06-25 DIAGNOSIS — H60.12 CELLULITIS OF AURICLE OF LEFT EAR: Primary | ICD-10-CM

## 2020-06-25 LAB
ALBUMIN SERPL BCP-MCNC: 4.4 G/DL (ref 3.5–5.2)
ALP SERPL-CCNC: 54 U/L (ref 55–135)
ALT SERPL W/O P-5'-P-CCNC: 10 U/L (ref 10–44)
ANION GAP SERPL CALC-SCNC: 9 MMOL/L (ref 8–16)
AST SERPL-CCNC: 14 U/L (ref 10–40)
BASOPHILS # BLD AUTO: 0.04 K/UL (ref 0–0.2)
BASOPHILS NFR BLD: 0.7 % (ref 0–1.9)
BILIRUB SERPL-MCNC: 0.2 MG/DL (ref 0.1–1)
BUN SERPL-MCNC: 17 MG/DL (ref 6–20)
BUN SERPL-MCNC: 18 MG/DL (ref 6–30)
CALCIUM SERPL-MCNC: 9.6 MG/DL (ref 8.7–10.5)
CHLORIDE SERPL-SCNC: 110 MMOL/L (ref 95–110)
CHLORIDE SERPL-SCNC: 110 MMOL/L (ref 95–110)
CO2 SERPL-SCNC: 22 MMOL/L (ref 23–29)
CREAT SERPL-MCNC: 0.6 MG/DL (ref 0.5–1.4)
CREAT SERPL-MCNC: 0.7 MG/DL (ref 0.5–1.4)
DIFFERENTIAL METHOD: ABNORMAL
EOSINOPHIL # BLD AUTO: 0.1 K/UL (ref 0–0.5)
EOSINOPHIL NFR BLD: 1.4 % (ref 0–8)
ERYTHROCYTE [DISTWIDTH] IN BLOOD BY AUTOMATED COUNT: 12.9 % (ref 11.5–14.5)
EST. GFR  (AFRICAN AMERICAN): >60 ML/MIN/1.73 M^2
EST. GFR  (NON AFRICAN AMERICAN): >60 ML/MIN/1.73 M^2
GLUCOSE SERPL-MCNC: 89 MG/DL (ref 70–110)
GLUCOSE SERPL-MCNC: 90 MG/DL (ref 70–110)
HCT VFR BLD AUTO: 41.7 % (ref 37–48.5)
HCT VFR BLD CALC: 40 %PCV (ref 36–54)
HGB BLD-MCNC: 13.2 G/DL (ref 12–16)
IMM GRANULOCYTES # BLD AUTO: 0.01 K/UL (ref 0–0.04)
IMM GRANULOCYTES NFR BLD AUTO: 0.2 % (ref 0–0.5)
LACTATE SERPL-SCNC: 0.6 MMOL/L (ref 0.5–2.2)
LYMPHOCYTES # BLD AUTO: 2.5 K/UL (ref 1–4.8)
LYMPHOCYTES NFR BLD: 44.8 % (ref 18–48)
MCH RBC QN AUTO: 30.4 PG (ref 27–31)
MCHC RBC AUTO-ENTMCNC: 31.7 G/DL (ref 32–36)
MCV RBC AUTO: 96 FL (ref 82–98)
MONOCYTES # BLD AUTO: 0.3 K/UL (ref 0.3–1)
MONOCYTES NFR BLD: 5 % (ref 4–15)
NEUTROPHILS # BLD AUTO: 2.7 K/UL (ref 1.8–7.7)
NEUTROPHILS NFR BLD: 47.9 % (ref 38–73)
NRBC BLD-RTO: 0 /100 WBC
PLATELET # BLD AUTO: 220 K/UL (ref 150–350)
PMV BLD AUTO: 10.1 FL (ref 9.2–12.9)
POC IONIZED CALCIUM: 1.15 MMOL/L (ref 1.06–1.42)
POC TCO2 (MEASURED): 21 MMOL/L (ref 23–29)
POTASSIUM BLD-SCNC: 3.6 MMOL/L (ref 3.5–5.1)
POTASSIUM SERPL-SCNC: 3.7 MMOL/L (ref 3.5–5.1)
PROT SERPL-MCNC: 7.6 G/DL (ref 6–8.4)
RBC # BLD AUTO: 4.34 M/UL (ref 4–5.4)
SAMPLE: ABNORMAL
SODIUM BLD-SCNC: 142 MMOL/L (ref 136–145)
SODIUM SERPL-SCNC: 141 MMOL/L (ref 136–145)
WBC # BLD AUTO: 5.62 K/UL (ref 3.9–12.7)

## 2020-06-25 PROCEDURE — 96365 THER/PROPH/DIAG IV INF INIT: CPT

## 2020-06-25 PROCEDURE — 63600175 PHARM REV CODE 636 W HCPCS: Performed by: EMERGENCY MEDICINE

## 2020-06-25 PROCEDURE — 99285 EMERGENCY DEPT VISIT HI MDM: CPT | Mod: ,,, | Performed by: EMERGENCY MEDICINE

## 2020-06-25 PROCEDURE — 96375 TX/PRO/DX INJ NEW DRUG ADDON: CPT

## 2020-06-25 PROCEDURE — 99284 EMERGENCY DEPT VISIT MOD MDM: CPT | Mod: 25

## 2020-06-25 PROCEDURE — 85025 COMPLETE CBC W/AUTO DIFF WBC: CPT

## 2020-06-25 PROCEDURE — 96367 TX/PROPH/DG ADDL SEQ IV INF: CPT

## 2020-06-25 PROCEDURE — 83605 ASSAY OF LACTIC ACID: CPT

## 2020-06-25 PROCEDURE — 80053 COMPREHEN METABOLIC PANEL: CPT

## 2020-06-25 PROCEDURE — 99285 PR EMERGENCY DEPT VISIT,LEVEL V: ICD-10-PCS | Mod: ,,, | Performed by: EMERGENCY MEDICINE

## 2020-06-25 PROCEDURE — 25000003 PHARM REV CODE 250: Performed by: EMERGENCY MEDICINE

## 2020-06-25 PROCEDURE — 80047 BASIC METABLC PNL IONIZED CA: CPT

## 2020-06-25 PROCEDURE — 82330 ASSAY OF CALCIUM: CPT

## 2020-06-25 RX ORDER — LACTULOSE 10 G/15ML
SOLUTION ORAL; RECTAL DAILY
COMMUNITY

## 2020-06-25 RX ORDER — CLINDAMYCIN PHOSPHATE 900 MG/50ML
900 INJECTION, SOLUTION INTRAVENOUS
Status: COMPLETED | OUTPATIENT
Start: 2020-06-25 | End: 2020-06-25

## 2020-06-25 RX ORDER — QUETIAPINE FUMARATE 25 MG/1
TABLET, FILM COATED ORAL
COMMUNITY

## 2020-06-25 RX ORDER — ALBUTEROL SULFATE 0.83 MG/ML
2.5 SOLUTION RESPIRATORY (INHALATION) EVERY 6 HOURS PRN
COMMUNITY

## 2020-06-25 RX ORDER — TRAZODONE HYDROCHLORIDE 50 MG/1
50 TABLET ORAL NIGHTLY
COMMUNITY

## 2020-06-25 RX ORDER — GABAPENTIN 300 MG/1
300 CAPSULE ORAL DAILY
COMMUNITY

## 2020-06-25 RX ORDER — HYDROXYZINE HYDROCHLORIDE 50 MG/1
50 TABLET, FILM COATED ORAL DAILY
COMMUNITY

## 2020-06-25 RX ORDER — KETOROLAC TROMETHAMINE 30 MG/ML
15 INJECTION, SOLUTION INTRAMUSCULAR; INTRAVENOUS
Status: COMPLETED | OUTPATIENT
Start: 2020-06-25 | End: 2020-06-25

## 2020-06-25 RX ADMIN — CLINDAMYCIN IN 5 PERCENT DEXTROSE 900 MG: 18 INJECTION, SOLUTION INTRAVENOUS at 07:06

## 2020-06-25 RX ADMIN — KETOROLAC TROMETHAMINE 15 MG: 30 INJECTION, SOLUTION INTRAMUSCULAR at 07:06

## 2020-06-25 RX ADMIN — SODIUM CHLORIDE 1000 ML: 0.9 INJECTION, SOLUTION INTRAVENOUS at 07:06

## 2020-06-25 NOTE — ED PROVIDER NOTES
Encounter Date: 6/25/2020    SCRIBE #1 NOTE: I, Deepika Mullins, am scribing for, and in the presence of,  Dr. Brock. I have scribed the following portions of the note - Other sections scribed: EZE ESTRADA.       History     Chief Complaint   Patient presents with    Otitis Media     x6 weeks     Time patient was seen by the provider: 6:19 PM      The patient is a 45 y.o. female with no significant past medical history, who presents to the ED with a complaint of persistent ear pain for 11 weeks. The patient reports 11 weeks ago she was diagnosed in the ED for an outer ear infection. Since then she has been seeing ENT and the infectious disease doctor where she was prescribed several courses of antibiotics. She has taken and completed the courses for amoxicillin, doxycycline, cefotaxime and Cipro. She states taking the antibiotics PO have not been showing any improvement to her ear and is asking for IV antibiotics today. She denies putting anything in her ears. She notes of associated dizziness and light-headedness. Denies fever, chills, nausea, vomiting, diarrhea, neck pain. She denies any direct contact with people positive with COVID-19.    The history is provided by the patient and medical records.     Review of patient's allergies indicates:  No Known Allergies  Past Medical History:   Diagnosis Date    Acute adjustment disorder with depressed mood     Cutaneous abscess 08/08/2019    Depression     Enlarged liver 04/27/2017    Hep C w/o coma, chronic     Hepatitis B     History of mental disorder 03/22/2017    History of substance abuse      No past surgical history on file.  No family history on file.  Social History     Tobacco Use    Smoking status: Current Every Day Smoker     Packs/day: 0.50     Types: Cigarettes    Smokeless tobacco: Never Used   Substance Use Topics    Alcohol use: Yes     Comment: occaisionally    Drug use: No     Review of Systems   Constitutional: Negative for chills  and fever.   HENT: Positive for ear pain. Negative for sore throat.    Respiratory: Negative for shortness of breath.    Cardiovascular: Negative for chest pain.   Gastrointestinal: Negative for diarrhea, nausea and vomiting.   Genitourinary: Negative for dysuria.   Musculoskeletal: Negative for back pain.   Skin: Negative for rash.   Neurological: Positive for dizziness and light-headedness. Negative for weakness.   Hematological: Does not bruise/bleed easily.       Physical Exam     Initial Vitals [06/25/20 1644]   BP Pulse Resp Temp SpO2   (!) 145/75 100 15 98.1 °F (36.7 °C) 99 %      MAP       --         Physical Exam    Nursing note and vitals reviewed.    Constitutional: Well-developed. Well-nourished. Moderate emotional distress.  HENT: NCAT. OP moist. Uvula midline. No OP masses. Posterior pharynx with no erythema. No tonsillar edema. No exudate. Floor of the mouth is soft, tongue is not elevated. No rhinorrhea. TMs clear bilaterally. Mastoid process nontender bilaterally.  There is a small amount of cellulitis along the [place]imm[place] of the left ear without extension to the mastoid, no crepitus, there is no significant swelling.  EYES: No conjunctival injection or discharge.  NECK: Full ROM. Supple. No rigidity. No cervical LAD. No stridor. Brudzinskis test negative.  CARDIAC: RRR. No murmurs or rubs. Strong distal pulses.  PULM: Normal effort. Breath sounds clear bilaterally. No wheezes. No crackles.  ABD: Soft, non-tender, non-distended, normal BS. No guarding. No rebound.  : No CVA tenderness.  MS: Warm and well perfused. No edema..  NEURO: Alert. Normal gate.  SKIN: Dry. No rashes.  PSYCH: Normal judgment. Normal affect.      ED Course   Procedures  Labs Reviewed   CBC W/ AUTO DIFFERENTIAL - Abnormal; Notable for the following components:       Result Value    Mean Corpuscular Hemoglobin Conc 31.7 (*)     All other components within normal limits   COMPREHENSIVE METABOLIC PANEL - Abnormal; Notable for the  following components:    CO2 22 (*)     Alkaline Phosphatase 54 (*)     All other components within normal limits   ISTAT PROCEDURE - Abnormal; Notable for the following components:    POC TCO2 (MEASURED) 21 (*)     All other components within normal limits   LACTIC ACID, PLASMA          Imaging Results    None          Medical Decision Making:   History:   Old Medical Records: I decided to obtain old medical records.  Old Records Summarized: records from clinic visits.  Initial Assessment:   The patient is a 45 y.o. female with no significant past medical history, who presents to the ED with a complaint of persistent ear pain for 11 weeks.  Differential Diagnosis:   Differential diagnosis includes but is not limited to:  Cellulitis, otitis media, otitis externa, malignant otitis externa, abscess.  Clinical Tests:   Lab Tests: Ordered and Reviewed    Urgent evaluation of patient presenting with left ear pain.  She is afebrile vital signs are stable.  Physical exam findings remarkable for a slight amount of erythema overlying the Lutts/pinna of the left ear with concern for cellulitis.  She has been treated for otitis media as an outpatient for several weeks and now has redness on her ear.  There is no mastoid tenderness, swelling, crepitus or extension on along the back of the ear or to the mastoid process.  Do not suspect malignant otitis externa, otitis externa at this time.  I am able to visualize the ear canal and healing of her inner ear without any evidence of discharge, or worsening symptoms.  Her hearing is intact.  Labs were obtained which do not show any lactic acidosis, leukocytosis or acute abnormalities.maging absence of lab findings, physical exam findings, afebrile do not suspect acute or worsening sepsis.  Patient was given IV clindamycin in the ED to treat her cellulitis, and she has a prescription for Bactrim which was given to her by her primary care.  I instructed her to complete this Bactrim  dose and follow up with PCP for repeat evaluation if symptoms do not improve or if they worsen. Patient agreeable to discharge plan. Strict ED precautions and return instructions discussed at length and patient verbalized understanding. All questions were answered and ample time was given for questions.      Complexity:  High risk          Scribe Attestation:   Scribe #1: I performed the above scribed service and the documentation accurately describes the services I performed. I attest to the accuracy of the note.    I, Dr. Kali Brokc, personally performed the services described in this documentation. All medical record entries made by the scribe were at my direction and in my presence.  I have reviewed the chart and agree that the record reflects my personal performance and is accurate and complete.                         Clinical Impression:       ICD-10-CM ICD-9-CM   1. Cellulitis of auricle of left ear  H60.12 380.10         Disposition:   Disposition: Discharged  Condition: Stable     ED Disposition Condition    Discharge Stable        ED Prescriptions     None        Follow-up Information     Follow up With Specialties Details Why Contact Info    Adrián Hahn MD Family Medicine Schedule an appointment as soon as possible for a visit in 3 days For recheck [address] [place] [id number]  [phone]                              Kali Brock DO, FAAEM  Emergency Staff Physician   Dept of Emergency Medicine   [facility]  Spectralink: [id number]           Kali Brock DO  06/27/20 0782

## 2020-06-25 NOTE — ED NOTES
LOC: The patient is awake, alert and aware of environment with an appropriate affect, the patient is oriented x 3 and speaking appropriately.  APPEARANCE: Patient resting comfortably and in no acute distress, patient is clean and well groomed, patient's clothing is properly fastened.  SKIN: The skin is warm and dry, color consistent with ethnicity, patient has normal skin turgor and moist mucus membranes, skin intact, no breakdown or bruising noted.  MUSCULOSKELETAL: Patient moving all extremities spontaneously, no obvious swelling or deformities noted.  RESPIRATORY: Airway is open and patent, respirations are spontaneous, patient has a normal effort and rate, no accessory muscle use noted  ABDOMEN: Soft and non tender to palpation, no distention noted  NEUROLOGIC:  facial expression is symmetrical, patient moving all extremities spontaneously, normal sensation in all extremities when touched with a finger.  Follows all commands appropriately.     Patient states 11 weeks ago she was diagnosis ed with an outer ear infection that was in the cartilage, patient states she has seen an ENT and infectious disease doctor as well and has been given 6 different antibiotics and completed them all, patient was given a 7th antibiotic today from the infectious disease doctor, patient states she does not think she can tolerate anymore po antibiotic medication and wants to be given something iv, no acute distress noted, will continue to monitor

## 2020-06-25 NOTE — PROVIDER PROGRESS NOTES - EMERGENCY DEPT.
Emergency Department TeleTRIAGE Encounter Note      CHIEF COMPLAINT    Chief Complaint   Patient presents with    Otitis Media     x6 weeks       VITAL SIGNS   Initial Vitals [06/25/20 1644]   BP Pulse Resp Temp SpO2   (!) 145/75 100 15 98.1 °F (36.7 °C) 99 %      MAP       --            ALLERGIES    Review of patient's allergies indicates:  No Known Allergies    PROVIDER TRIAGE NOTE  Pt is a 45 yr old female who presents to the ED requesting IV abx.  STates she has been dealing with infection in the left ear cartilage for 10 weeks.  States she saw Dr. Lam with infectious disease today who was going to start her on oral abx, but she did not feel comfortable with this plan so she is here for a second opinion.      ORDERS  Labs Reviewed - No data to display    ED Orders (720h ago, onward)    None            Virtual Visit Note: The provider triage portion of this emergency department evaluation and documentation was performed via [employer], a HIPAA-compliant telemedicine application, in concert with a tele-presenter in the room. A face to face patient evaluation with one of my colleagues will occur once the patient is placed in an emergency department room.      DISCLAIMER: This note was prepared with [employer] voice recognition transcription software. Garbled syntax, mangled pronouns, and other bizarre constructions may be attributed to that software system.

## 2020-06-26 NOTE — DISCHARGE INSTRUCTIONS
You have cellulitis of your left ear.  You have been given a large dose of antibiotics today in the emergency department.  You will continue taking her Bactrim as previously prescribed and follow up with your primary care Monday for repeat evaluation.  If you began having fevers, chills or any worsening symptoms follow up sooner.  Your labs today did not show any signs of infection, acidosis or acute abnormalities.    Our goal in the emergency department is to always give you outstanding care and exceptional service. You may receive a survey by mail or e-mail in the next week regarding your experience in our ED. We would greatly appreciate your completing and returning the survey. Your feedback provides us with a way to recognize our staff who give very good care and it helps us learn how to improve when your experience was below our aspiration of excellence.

## 2021-01-18 ENCOUNTER — HOSPITAL ENCOUNTER (EMERGENCY)
Facility: HOSPITAL | Age: 46
Discharge: HOME | End: 2021-01-19
Attending: EMERGENCY MEDICINE
Payer: MEDICAID

## 2021-01-18 DIAGNOSIS — R19.7 DIARRHEA OF PRESUMED INFECTIOUS ORIGIN: Primary | ICD-10-CM

## 2021-01-18 DIAGNOSIS — A04.72 C. DIFFICILE DIARRHEA: ICD-10-CM

## 2021-01-18 LAB
ALBUMIN SERPL BCP-MCNC: 4.3 G/DL (ref 3.5–5.2)
ALP SERPL-CCNC: 70 U/L (ref 55–135)
ALT SERPL W/O P-5'-P-CCNC: 10 U/L (ref 10–44)
ANION GAP SERPL CALC-SCNC: 12 MMOL/L (ref 8–16)
AST SERPL-CCNC: 13 U/L (ref 10–40)
BASOPHILS # BLD AUTO: 0.05 K/UL (ref 0–0.2)
BASOPHILS NFR BLD: 0.7 % (ref 0–1.9)
BILIRUB SERPL-MCNC: 0.2 MG/DL (ref 0.1–1)
BUN SERPL-MCNC: 10 MG/DL (ref 6–20)
CALCIUM SERPL-MCNC: 10 MG/DL (ref 8.7–10.5)
CHLORIDE SERPL-SCNC: 108 MMOL/L (ref 95–110)
CO2 SERPL-SCNC: 19 MMOL/L (ref 23–29)
CREAT SERPL-MCNC: 0.8 MG/DL (ref 0.5–1.4)
DIFFERENTIAL METHOD: ABNORMAL
EOSINOPHIL # BLD AUTO: 0.2 K/UL (ref 0–0.5)
EOSINOPHIL NFR BLD: 2.2 % (ref 0–8)
ERYTHROCYTE [DISTWIDTH] IN BLOOD BY AUTOMATED COUNT: 13.2 % (ref 11.5–14.5)
EST. GFR  (AFRICAN AMERICAN): >60 ML/MIN/1.73 M^2
EST. GFR  (NON AFRICAN AMERICAN): >60 ML/MIN/1.73 M^2
GLUCOSE SERPL-MCNC: 94 MG/DL (ref 70–110)
HCT VFR BLD AUTO: 43.3 % (ref 37–48.5)
HGB BLD-MCNC: 13.8 G/DL (ref 12–16)
IMM GRANULOCYTES # BLD AUTO: 0.04 K/UL (ref 0–0.04)
IMM GRANULOCYTES NFR BLD AUTO: 0.5 % (ref 0–0.5)
LACTATE SERPL-SCNC: 0.6 MMOL/L (ref 0.5–2.2)
LYMPHOCYTES # BLD AUTO: 3 K/UL (ref 1–4.8)
LYMPHOCYTES NFR BLD: 39.9 % (ref 18–48)
MCH RBC QN AUTO: 30.9 PG (ref 27–31)
MCHC RBC AUTO-ENTMCNC: 31.9 G/DL (ref 32–36)
MCV RBC AUTO: 97 FL (ref 82–98)
MONOCYTES # BLD AUTO: 0.5 K/UL (ref 0.3–1)
MONOCYTES NFR BLD: 6.2 % (ref 4–15)
NEUTROPHILS # BLD AUTO: 3.8 K/UL (ref 1.8–7.7)
NEUTROPHILS NFR BLD: 50.5 % (ref 38–73)
NRBC BLD-RTO: 0 /100 WBC
PLATELET # BLD AUTO: 309 K/UL (ref 150–350)
PMV BLD AUTO: 9.7 FL (ref 9.2–12.9)
POTASSIUM SERPL-SCNC: 4.1 MMOL/L (ref 3.5–5.1)
PROT SERPL-MCNC: 7.9 G/DL (ref 6–8.4)
RBC # BLD AUTO: 4.47 M/UL (ref 4–5.4)
SODIUM SERPL-SCNC: 139 MMOL/L (ref 136–145)
WBC # BLD AUTO: 7.6 K/UL (ref 3.9–12.7)

## 2021-01-18 PROCEDURE — 87427 SHIGA-LIKE TOXIN AG IA: CPT | Mod: 59

## 2021-01-18 PROCEDURE — 85025 COMPLETE CBC W/AUTO DIFF WBC: CPT

## 2021-01-18 PROCEDURE — 96374 THER/PROPH/DIAG INJ IV PUSH: CPT

## 2021-01-18 PROCEDURE — 87046 STOOL CULTR AEROBIC BACT EA: CPT | Mod: 59

## 2021-01-18 PROCEDURE — 87449 NOS EACH ORGANISM AG IA: CPT

## 2021-01-18 PROCEDURE — 99284 EMERGENCY DEPT VISIT MOD MDM: CPT | Mod: 25

## 2021-01-18 PROCEDURE — 89055 LEUKOCYTE ASSESSMENT FECAL: CPT

## 2021-01-18 PROCEDURE — 96361 HYDRATE IV INFUSION ADD-ON: CPT

## 2021-01-18 PROCEDURE — 87045 FECES CULTURE AEROBIC BACT: CPT

## 2021-01-18 PROCEDURE — 25000003 PHARM REV CODE 250: Performed by: PHYSICIAN ASSISTANT

## 2021-01-18 PROCEDURE — 80053 COMPREHEN METABOLIC PANEL: CPT

## 2021-01-18 PROCEDURE — 63600175 PHARM REV CODE 636 W HCPCS: Performed by: PHYSICIAN ASSISTANT

## 2021-01-18 PROCEDURE — 83605 ASSAY OF LACTIC ACID: CPT

## 2021-01-18 PROCEDURE — 87324 CLOSTRIDIUM AG IA: CPT

## 2021-01-18 PROCEDURE — 99285 EMERGENCY DEPT VISIT HI MDM: CPT | Mod: ,,, | Performed by: PHYSICIAN ASSISTANT

## 2021-01-18 PROCEDURE — 99285 PR EMERGENCY DEPT VISIT,LEVEL V: ICD-10-PCS | Mod: ,,, | Performed by: PHYSICIAN ASSISTANT

## 2021-01-18 RX ORDER — ONDANSETRON 2 MG/ML
4 INJECTION INTRAMUSCULAR; INTRAVENOUS
Status: COMPLETED | OUTPATIENT
Start: 2021-01-18 | End: 2021-01-18

## 2021-01-18 RX ADMIN — SODIUM CHLORIDE 1000 ML: 0.9 INJECTION, SOLUTION INTRAVENOUS at 09:01

## 2021-01-18 RX ADMIN — ONDANSETRON 4 MG: 2 INJECTION INTRAMUSCULAR; INTRAVENOUS at 09:01

## 2021-01-18 RX ADMIN — SODIUM CHLORIDE, SODIUM LACTATE, POTASSIUM CHLORIDE, AND CALCIUM CHLORIDE 1000 ML: .6; .31; .03; .02 INJECTION, SOLUTION INTRAVENOUS at 10:01

## 2021-01-19 VITALS
BODY MASS INDEX: 24.8 KG/M2 | SYSTOLIC BLOOD PRESSURE: 128 MMHG | HEART RATE: 61 BPM | WEIGHT: 158 LBS | RESPIRATION RATE: 16 BRPM | TEMPERATURE: 98 F | OXYGEN SATURATION: 97 % | DIASTOLIC BLOOD PRESSURE: 82 MMHG | HEIGHT: 67 IN

## 2021-01-19 LAB
C DIFF GDH STL QL: NEGATIVE
C DIFF TOX A+B STL QL IA: NEGATIVE
WBC #/AREA STL HPF: NORMAL /[HPF]

## 2021-01-19 PROCEDURE — 25000003 PHARM REV CODE 250: Performed by: PHYSICIAN ASSISTANT

## 2021-01-19 RX ORDER — ONDANSETRON 4 MG/1
4 TABLET, FILM COATED ORAL EVERY 6 HOURS PRN
Qty: 20 TABLET | Refills: 0 | Status: SHIPPED | OUTPATIENT
Start: 2021-01-19

## 2021-01-19 RX ORDER — VANCOMYCIN HYDROCHLORIDE 125 MG/1
125 CAPSULE ORAL EVERY 6 HOURS
Qty: 40 CAPSULE | Refills: 0 | Status: SHIPPED | OUTPATIENT
Start: 2021-01-19 | End: 2021-01-29

## 2021-01-19 RX ADMIN — VANCOMYCIN HYDROCHLORIDE 125 MG: KIT at 12:01

## 2021-01-20 LAB
E COLI SXT1 STL QL IA: NEGATIVE
E COLI SXT2 STL QL IA: NEGATIVE

## 2021-01-21 LAB — BACTERIA STL CULT: NORMAL

## 2021-02-16 ENCOUNTER — HOSPITAL ENCOUNTER (EMERGENCY)
Facility: HOSPITAL | Age: 46
Discharge: HOME | End: 2021-02-16
Attending: EMERGENCY MEDICINE
Payer: MEDICAID

## 2021-02-16 VITALS
DIASTOLIC BLOOD PRESSURE: 92 MMHG | TEMPERATURE: 98 F | OXYGEN SATURATION: 98 % | RESPIRATION RATE: 16 BRPM | HEART RATE: 75 BPM | SYSTOLIC BLOOD PRESSURE: 168 MMHG

## 2021-02-16 DIAGNOSIS — L02.214 ABSCESS OF GROIN: ICD-10-CM

## 2021-02-16 DIAGNOSIS — L73.9 FOLLICULITIS: Primary | ICD-10-CM

## 2021-02-16 LAB
B-HCG UR QL: NEGATIVE
CTP QC/QA: YES

## 2021-02-16 PROCEDURE — 99284 EMERGENCY DEPT VISIT MOD MDM: CPT | Mod: ,,, | Performed by: PHYSICIAN ASSISTANT

## 2021-02-16 PROCEDURE — 99284 PR EMERGENCY DEPT VISIT,LEVEL IV: ICD-10-PCS | Mod: ,,, | Performed by: PHYSICIAN ASSISTANT

## 2021-02-16 PROCEDURE — 25000003 PHARM REV CODE 250: Performed by: PHYSICIAN ASSISTANT

## 2021-02-16 PROCEDURE — 81025 URINE PREGNANCY TEST: CPT | Performed by: PHYSICIAN ASSISTANT

## 2021-02-16 PROCEDURE — 86703 HIV-1/HIV-2 1 RESULT ANTBDY: CPT

## 2021-02-16 PROCEDURE — 99284 EMERGENCY DEPT VISIT MOD MDM: CPT | Mod: 25

## 2021-02-16 RX ORDER — CEPHALEXIN 500 MG/1
500 CAPSULE ORAL
Status: COMPLETED | OUTPATIENT
Start: 2021-02-16 | End: 2021-02-16

## 2021-02-16 RX ORDER — SULFAMETHOXAZOLE AND TRIMETHOPRIM 800; 160 MG/1; MG/1
1 TABLET ORAL 2 TIMES DAILY
Qty: 14 TABLET | Refills: 0 | Status: SHIPPED | OUTPATIENT
Start: 2021-02-16 | End: 2021-02-23

## 2021-02-16 RX ORDER — SULFAMETHOXAZOLE AND TRIMETHOPRIM 800; 160 MG/1; MG/1
1 TABLET ORAL
Status: COMPLETED | OUTPATIENT
Start: 2021-02-16 | End: 2021-02-16

## 2021-02-16 RX ORDER — CEPHALEXIN 500 MG/1
500 CAPSULE ORAL EVERY 6 HOURS
Qty: 28 CAPSULE | Refills: 0 | Status: SHIPPED | OUTPATIENT
Start: 2021-02-16 | End: 2021-02-23

## 2021-02-16 RX ORDER — MUPIROCIN 20 MG/G
OINTMENT TOPICAL 3 TIMES DAILY
Qty: 22 G | Refills: 0 | OUTPATIENT
Start: 2021-02-16 | End: 2022-05-21

## 2021-02-16 RX ADMIN — CEPHALEXIN 500 MG: 500 CAPSULE ORAL at 01:02

## 2021-02-16 RX ADMIN — SULFAMETHOXAZOLE AND TRIMETHOPRIM 1 TABLET: 800; 160 TABLET ORAL at 01:02

## 2021-02-17 LAB — HIV 1+2 AB+HIV1 P24 AG SERPL QL IA: NEGATIVE

## 2021-04-16 ENCOUNTER — PATIENT MESSAGE (OUTPATIENT)
Dept: RESEARCH | Facility: HOSPITAL | Age: 46
End: 2021-04-16

## 2021-07-06 ENCOUNTER — HOSPITAL ENCOUNTER (EMERGENCY)
Facility: OTHER | Age: 46
Discharge: LEFT BEFORE DISCHARGE | End: 2021-07-06
Payer: MEDICAID

## 2021-07-06 VITALS
DIASTOLIC BLOOD PRESSURE: 64 MMHG | WEIGHT: 166 LBS | OXYGEN SATURATION: 97 % | BODY MASS INDEX: 25.16 KG/M2 | HEIGHT: 68 IN | HEART RATE: 82 BPM | SYSTOLIC BLOOD PRESSURE: 128 MMHG | TEMPERATURE: 98 F | RESPIRATION RATE: 18 BRPM

## 2021-07-06 PROCEDURE — 99281 EMR DPT VST MAYX REQ PHY/QHP: CPT

## 2022-05-21 ENCOUNTER — HOSPITAL ENCOUNTER (EMERGENCY)
Facility: HOSPITAL | Age: 47
Discharge: HOME | End: 2022-05-21
Attending: EMERGENCY MEDICINE
Payer: MEDICAID

## 2022-05-21 VITALS
HEART RATE: 69 BPM | SYSTOLIC BLOOD PRESSURE: 129 MMHG | WEIGHT: 138 LBS | BODY MASS INDEX: 20.92 KG/M2 | TEMPERATURE: 98 F | DIASTOLIC BLOOD PRESSURE: 60 MMHG | OXYGEN SATURATION: 100 % | HEIGHT: 68 IN | RESPIRATION RATE: 17 BRPM
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
## 2022-05-21 DIAGNOSIS — T07.XXXA ABRASIONS OF MULTIPLE SITES: Primary | ICD-10-CM

## 2022-05-21 DIAGNOSIS — R06.02 SHORTNESS OF BREATH: ICD-10-CM

## 2022-05-21 PROBLEM — I87.2 PERIPHERAL VENOUS INSUFFICIENCY: Status: ACTIVE | Noted: 2017-04-27

## 2022-05-21 PROBLEM — B18.2 CHRONIC HEPATITIS C: Status: ACTIVE | Noted: 2017-03-22

## 2022-05-21 PROBLEM — F19.11 HISTORY OF SUBSTANCE ABUSE: Status: ACTIVE | Noted: 2021-08-02

## 2022-05-21 PROBLEM — H61.001: Status: ACTIVE | Noted: 2021-01-13

## 2022-05-21 PROBLEM — H10.9 CONJUNCTIVITIS: Status: ACTIVE | Noted: 2020-10-15

## 2022-05-21 PROBLEM — K21.9 GASTROESOPHAGEAL REFLUX DISEASE WITHOUT ESOPHAGITIS: Status: ACTIVE | Noted: 2020-10-29

## 2022-05-21 PROBLEM — K22.4 JACKHAMMER ESOPHAGUS: Status: ACTIVE | Noted: 2020-10-29

## 2022-05-21 PROBLEM — L73.2 HIDRADENITIS SUPPURATIVA: Status: ACTIVE | Noted: 2017-03-22

## 2022-05-21 PROBLEM — F41.9 ANXIETY: Status: ACTIVE | Noted: 2021-08-02

## 2022-05-21 LAB
ALBUMIN SERPL BCP-MCNC: 3.7 G/DL (ref 3.5–5.2)
ALP SERPL-CCNC: 55 U/L (ref 55–135)
ALT SERPL W/O P-5'-P-CCNC: 15 U/L (ref 10–44)
AMPHET+METHAMPHET UR QL: NEGATIVE
ANION GAP SERPL CALC-SCNC: 11 MMOL/L (ref 8–16)
AST SERPL-CCNC: 18 U/L (ref 10–40)
B-HCG UR QL: NEGATIVE
BARBITURATES UR QL SCN>200 NG/ML: NEGATIVE
BASOPHILS # BLD AUTO: 0.03 K/UL (ref 0–0.2)
BASOPHILS NFR BLD: 0.5 % (ref 0–1.9)
BENZODIAZ UR QL SCN>200 NG/ML: NEGATIVE
BILIRUB SERPL-MCNC: 0.4 MG/DL (ref 0.1–1)
BNP SERPL-MCNC: 43 PG/ML (ref 0–99)
BUN SERPL-MCNC: 12 MG/DL (ref 6–20)
BZE UR QL SCN: ABNORMAL
CALCIUM SERPL-MCNC: 9.1 MG/DL (ref 8.7–10.5)
CANNABINOIDS UR QL SCN: ABNORMAL
CHLORIDE SERPL-SCNC: 107 MMOL/L (ref 95–110)
CO2 SERPL-SCNC: 22 MMOL/L (ref 23–29)
CREAT SERPL-MCNC: 0.6 MG/DL (ref 0.5–1.4)
CREAT UR-MCNC: 84 MG/DL (ref 15–325)
CRP SERPL-MCNC: 6.1 MG/L (ref 0–8.2)
CTP QC/QA: YES
CTP QC/QA: YES
DIFFERENTIAL METHOD: ABNORMAL
EOSINOPHIL # BLD AUTO: 0.1 K/UL (ref 0–0.5)
EOSINOPHIL NFR BLD: 1.3 % (ref 0–8)
ERYTHROCYTE [DISTWIDTH] IN BLOOD BY AUTOMATED COUNT: 13.2 % (ref 11.5–14.5)
ERYTHROCYTE [SEDIMENTATION RATE] IN BLOOD BY WESTERGREN METHOD: 32 MM/HR (ref 0–36)
EST. GFR  (AFRICAN AMERICAN): >60 ML/MIN/1.73 M^2
EST. GFR  (NON AFRICAN AMERICAN): >60 ML/MIN/1.73 M^2
GLUCOSE SERPL-MCNC: 99 MG/DL (ref 70–110)
HCT VFR BLD AUTO: 35.3 % (ref 37–48.5)
HGB BLD-MCNC: 11.8 G/DL (ref 12–16)
IMM GRANULOCYTES # BLD AUTO: 0.01 K/UL (ref 0–0.04)
IMM GRANULOCYTES NFR BLD AUTO: 0.2 % (ref 0–0.5)
LACTATE SERPL-SCNC: 2.2 MMOL/L (ref 0.5–2.2)
LYMPHOCYTES # BLD AUTO: 1.2 K/UL (ref 1–4.8)
LYMPHOCYTES NFR BLD: 18.4 % (ref 18–48)
MCH RBC QN AUTO: 30.3 PG (ref 27–31)
MCHC RBC AUTO-ENTMCNC: 33.4 G/DL (ref 32–36)
MCV RBC AUTO: 91 FL (ref 82–98)
METHADONE UR QL SCN>300 NG/ML: NEGATIVE
MONOCYTES # BLD AUTO: 0.6 K/UL (ref 0.3–1)
MONOCYTES NFR BLD: 9.1 % (ref 4–15)
NEUTROPHILS # BLD AUTO: 4.4 K/UL (ref 1.8–7.7)
NEUTROPHILS NFR BLD: 70.5 % (ref 38–73)
NRBC BLD-RTO: 0 /100 WBC
OPIATES UR QL SCN: NEGATIVE
PCP UR QL SCN>25 NG/ML: NEGATIVE
PLATELET # BLD AUTO: 235 K/UL (ref 150–450)
PMV BLD AUTO: 10.5 FL (ref 9.2–12.9)
POTASSIUM SERPL-SCNC: 3.1 MMOL/L (ref 3.5–5.1)
PROT SERPL-MCNC: 6.7 G/DL (ref 6–8.4)
RBC # BLD AUTO: 3.9 M/UL (ref 4–5.4)
SARS-COV-2 RDRP RESP QL NAA+PROBE: NEGATIVE
SODIUM SERPL-SCNC: 140 MMOL/L (ref 136–145)
TOXICOLOGY INFORMATION: ABNORMAL
TROPONIN I SERPL DL<=0.01 NG/ML-MCNC: 0.01 NG/ML (ref 0–0.03)
WBC # BLD AUTO: 6.26 K/UL (ref 3.9–12.7)

## 2022-05-21 PROCEDURE — 80307 DRUG TEST PRSMV CHEM ANLYZR: CPT | Performed by: PHYSICIAN ASSISTANT

## 2022-05-21 PROCEDURE — 85652 RBC SED RATE AUTOMATED: CPT | Performed by: PHYSICIAN ASSISTANT

## 2022-05-21 PROCEDURE — 83605 ASSAY OF LACTIC ACID: CPT | Performed by: PHYSICIAN ASSISTANT

## 2022-05-21 PROCEDURE — 99284 EMERGENCY DEPT VISIT MOD MDM: CPT | Mod: ,,, | Performed by: PHYSICIAN ASSISTANT

## 2022-05-21 PROCEDURE — 96361 HYDRATE IV INFUSION ADD-ON: CPT

## 2022-05-21 PROCEDURE — 83880 ASSAY OF NATRIURETIC PEPTIDE: CPT | Performed by: PHYSICIAN ASSISTANT

## 2022-05-21 PROCEDURE — 86140 C-REACTIVE PROTEIN: CPT | Performed by: PHYSICIAN ASSISTANT

## 2022-05-21 PROCEDURE — 81025 URINE PREGNANCY TEST: CPT | Performed by: PHYSICIAN ASSISTANT

## 2022-05-21 PROCEDURE — 25000003 PHARM REV CODE 250: Performed by: PHYSICIAN ASSISTANT

## 2022-05-21 PROCEDURE — 80053 COMPREHEN METABOLIC PANEL: CPT | Performed by: PHYSICIAN ASSISTANT

## 2022-05-21 PROCEDURE — 63600175 PHARM REV CODE 636 W HCPCS: Performed by: PHYSICIAN ASSISTANT

## 2022-05-21 PROCEDURE — 85025 COMPLETE CBC W/AUTO DIFF WBC: CPT | Performed by: PHYSICIAN ASSISTANT

## 2022-05-21 PROCEDURE — 99284 PR EMERGENCY DEPT VISIT,LEVEL IV: ICD-10-PCS | Mod: ,,, | Performed by: PHYSICIAN ASSISTANT

## 2022-05-21 PROCEDURE — 84484 ASSAY OF TROPONIN QUANT: CPT

## 2022-05-21 PROCEDURE — 96374 THER/PROPH/DIAG INJ IV PUSH: CPT

## 2022-05-21 PROCEDURE — U0002 COVID-19 LAB TEST NON-CDC: HCPCS | Performed by: PHYSICIAN ASSISTANT

## 2022-05-21 PROCEDURE — 99284 EMERGENCY DEPT VISIT MOD MDM: CPT | Mod: 25

## 2022-05-21 PROCEDURE — 84484 ASSAY OF TROPONIN QUANT: CPT | Performed by: PHYSICIAN ASSISTANT

## 2022-05-21 RX ORDER — KETOROLAC TROMETHAMINE 10 MG/1
10 TABLET, FILM COATED ORAL EVERY 6 HOURS PRN
Qty: 20 TABLET | Refills: 0 | Status: SHIPPED | OUTPATIENT
Start: 2022-05-21 | End: 2022-05-26

## 2022-05-21 RX ORDER — ACETAMINOPHEN 500 MG
1000 TABLET ORAL
Status: COMPLETED | OUTPATIENT
Start: 2022-05-21 | End: 2022-05-21

## 2022-05-21 RX ORDER — SULFAMETHOXAZOLE AND TRIMETHOPRIM 800; 160 MG/1; MG/1
2 TABLET ORAL 2 TIMES DAILY
Qty: 28 TABLET | Refills: 0 | Status: SHIPPED | OUTPATIENT
Start: 2022-05-21 | End: 2022-05-28

## 2022-05-21 RX ORDER — DILTIAZEM HYDROCHLORIDE 240 MG/1
240 CAPSULE, COATED, EXTENDED RELEASE ORAL DAILY
COMMUNITY
Start: 2022-05-16

## 2022-05-21 RX ORDER — CETIRIZINE HYDROCHLORIDE 10 MG/1
10 TABLET ORAL DAILY
COMMUNITY
Start: 2022-05-16

## 2022-05-21 RX ORDER — HYDROXYZINE PAMOATE 25 MG/1
25 CAPSULE ORAL 3 TIMES DAILY PRN
COMMUNITY
Start: 2022-05-12

## 2022-05-21 RX ORDER — CEPHALEXIN 500 MG/1
500 CAPSULE ORAL 4 TIMES DAILY
COMMUNITY
Start: 2022-05-12

## 2022-05-21 RX ORDER — MUPIROCIN 20 MG/G
OINTMENT TOPICAL 3 TIMES DAILY
Qty: 22 G | Refills: 0 | Status: SHIPPED | OUTPATIENT
Start: 2022-05-21

## 2022-05-21 RX ORDER — KETOROLAC TROMETHAMINE 30 MG/ML
10 INJECTION, SOLUTION INTRAMUSCULAR; INTRAVENOUS
Status: COMPLETED | OUTPATIENT
Start: 2022-05-21 | End: 2022-05-21

## 2022-05-21 RX ADMIN — ACETAMINOPHEN 1000 MG: 500 TABLET ORAL at 01:05

## 2022-05-21 RX ADMIN — SODIUM CHLORIDE 1000 ML: 0.9 INJECTION, SOLUTION INTRAVENOUS at 01:05

## 2022-05-21 RX ADMIN — KETOROLAC TROMETHAMINE 10 MG: 30 INJECTION, SOLUTION INTRAMUSCULAR at 01:05

## 2022-05-21 RX ADMIN — POTASSIUM BICARBONATE 40 MEQ: 391 TABLET, EFFERVESCENT ORAL at 02:05

## 2022-05-21 NOTE — ED PROVIDER NOTES
"Encounter Date: 5/21/2022       History     Chief Complaint   Patient presents with    Insect Bite     Bedbug bites, pt also report she has been in a hotel with lice. Was at [facility] but "didn't get seen". On Keflex and has one day left. Reports immune disorder     47-year-old female with history of hepatitis-C, hepatitis-B, substance abuse, adjustment disorder, cutaneous abscesses presents for multiple abrasions over her body after being bitten by bedbugs over  two weeks ago.  She was seen at [facility] and prescribed Keflex and hydroxyzine, her itching has improved but she is now having pain and she is concerned about worsening infection.  She also reports that she started to develop some shortness of breath today which prompted her to come to the ED for evaluation.  She has some subjective fever and fatigue but did not check her temperature.  She states that she has been previously diagnosed with and immune deficiency and has poor wound healing.  She denies discharge bleeding, abscesses, chills, chest pain or other complaints.  She denies IVDU or methamphetamine or cocaine use.  Her fiance also had some bites but they are improving.  Bedbugs were already treated.        Review of patient's allergies indicates:  No Known Allergies  Past Medical History:   Diagnosis Date    Acute adjustment disorder with depressed mood     Cutaneous abscess 08/08/2019    Depression     Enlarged liver 04/27/2017    Hep C w/o coma, chronic     Hepatitis B     History of mental disorder 03/22/2017    History of substance abuse      History reviewed. No pertinent surgical history.  History reviewed. No pertinent family history.  Social History     Tobacco Use    Smoking status: Current Every Day Smoker     Packs/day: 0.50     Types: Cigarettes    Smokeless tobacco: Never Used   Substance Use Topics    Alcohol use: Yes     Comment: occaisionally    Drug use: No     Review of Systems   Constitutional: Positive for fatigue and fever. " Negative for chills.   HENT: Negative for sore throat.    Respiratory: Positive for shortness of breath. Negative for cough.    Cardiovascular: Negative for chest pain.   Gastrointestinal: Negative for nausea.   Genitourinary: Negative for dysuria.   Musculoskeletal: Negative for back pain.   Skin: Positive for rash and wound. Negative for color change and pallor.   Neurological: Negative for weakness.   Hematological: Does not bruise/bleed easily.       Physical Exam     Initial Vitals [05/21/22 1116]   BP Pulse Resp Temp SpO2   (!) 155/76 90 17 98.4 °F (36.9 °C) 100 %      MAP       --         Physical Exam    Nursing note and vitals reviewed.  Constitutional: She appears well-developed and well-nourished.   HENT:   Head: Normocephalic and atraumatic.   Eyes: EOM are normal. Pupils are equal, round, and reactive to light.   Neck: Neck supple.   Normal range of motion.  Cardiovascular: Normal rate, regular rhythm, normal heart sounds and intact distal pulses. Exam reveals no gallop and no friction rub.    No murmur heard.  Pulmonary/Chest: Breath sounds normal. No respiratory distress. She has no wheezes. She has no rhonchi. She has no rales. She exhibits no tenderness.   Musculoskeletal:         General: Normal range of motion.      Cervical back: Normal range of motion and neck supple.     Neurological: She is alert and oriented to person, place, and time.   Skin: Skin is warm and dry. Rash noted.   Multiple excoriations and abrasions on arms, face, legs.  No active bleeding, no discharge, no obvious overlying cellulitis.  Please see photos below.   Psychiatric: She has a normal mood and affect.                 ED Course   Procedures  Labs Reviewed   CBC W/ AUTO DIFFERENTIAL - Abnormal; Notable for the following components:       Result Value    RBC 3.90 (*)     Hemoglobin 11.8 (*)     Hematocrit 35.3 (*)     All other components within normal limits   COMPREHENSIVE METABOLIC PANEL - Abnormal; Notable for the  following components:    Potassium 3.1 (*)     CO2 22 (*)     All other components within normal limits   DRUG SCREEN PANEL, URINE EMERGENCY - Abnormal; Notable for the following components:    Cocaine (Metab.) Presumptive Positive (*)     THC Presumptive Positive (*)     All other components within normal limits    Narrative:     Specimen Source->Urine   LACTIC ACID, PLASMA   SEDIMENTATION RATE   C-REACTIVE PROTEIN   B-TYPE NATRIURETIC PEPTIDE   TROPONIN I   SARS-COV-2 RDRP GENE   POCT URINE PREGNANCY          Imaging Results          X-Ray Chest PA And Lateral (Final result)  Result time 05/21/22 13:46:28    Final result by Geo Ambrocio MD (05/21/22 13:46:28)                 Impression:      No evidence of acute cardiopulmonary disease.      Electronically signed by: Geo Ambrocio MD  Date:    05/21/2022  Time:    13:46             Narrative:    EXAMINATION:  XR CHEST PA AND LATERAL    CLINICAL HISTORY:  Shortness of breath    TECHNIQUE:  PA and lateral views of the chest were performed.    COMPARISON:  09/08/2015, 01/28/2015    FINDINGS:  The cardiomediastinal silhouette is normal in size and midline. Pulmonary vascularity appears within normal limits.    The lungs appear clear without confluent pulmonary parenchymal opacity.  Resolution of the left lower lobe consolidation on study from 2015.  No pleural fluid or pneumothorax.    Osseous structures appear intact.                                 Medications   sodium chloride 0.9% bolus 1,000 mL (0 mLs Intravenous Stopped 5/21/22 1406)   ketorolac injection 9.999 mg (9.999 mg Intravenous Given 5/21/22 1306)   acetaminophen tablet 1,000 mg (1,000 mg Oral Given 5/21/22 1306)   potassium bicarbonate disintegrating tablet 40 mEq (40 mEq Oral Given 5/21/22 1421)     Medical Decision Making:   History:   Old Medical Records: I decided to obtain old medical records.  Old Records Summarized: records from another hospital.       <> Summary of Records: Patient seen at  [facility] 05/12/2022 for same complaint, prescribed Keflex  Initial Assessment:   47-year-old female presenting for multiple abrasions/excoriations on her skin after bedbug bites well as some vague shortness of breath.  She is hypertensive 155/76 with otherwise normal vitals.  She has multiple abrasions on her limbs, face, chest but they do not appear overtly infected  Differential Diagnosis:   I suspect pain is due more to irritation than true infection is no obvious cellulitis  I think sepsis is unlikely, although she reports some immune deficiency but has no listing of this in her chart  I am not sure what is causing her shortness of breath, differential includes COVID-19, pneumonia  She denies IVDU, has no obvious abscesses, I think bacteremia is unlikely  Independently Interpreted Test(s):   I have ordered and independently interpreted X-rays - see summary below.       <> Summary of X-Ray Reading(s): No consolidation or pulmonary edema  Clinical Tests:   Lab Tests: Ordered and Reviewed  Radiological Study: Ordered and Reviewed  ED Management:  Check labs, give fluids, analgesics and reassess.    Workup is notable for normal inflammatory markers, no leukocytosis.  Chest x-ray without any acute findings.  Suspect that her pain is due to poor wound healing rather than superinfection.  However, given history of MRSA infections will cover with Bactrim, prescribed mupirocin and discharge with instructions to follow up with PCP and return to the ED for worsening symptoms. Stressed the importance of follow-up, strict ED return precautions given.  Patient voiced understanding and is comfortable with discharge. I discussed this patient with my supervising physician.              Attending Attestation:     Physician Attestation Statement for NP/PA:   I discussed this assessment and plan of this patient with the NP/PA, but I did not personally examine the patient. The face to face encounter was performed by the NP/PA.               ED Course as of 05/21/22 1638   Sat May 21, 2022   1340 WBC: 6.26  No leukocytosis [CC]   1340 CRP: 6.1  Inflammatory markers are not elevated [CC]   1340 Potassium(!): 3.1  Will replete [CC]   1340 BNP: 43 [CC]   1340 Troponin I: 0.010 [CC]   1340 SARS-CoV-2 RNA, Amplification, Qual: Negative [CC]   1344 Lactate, Jorge Alberto: 2.2 [CC]      ED Course User Index  [CC] Stephanie Dean PA-C             Clinical Impression:   Final diagnoses:  [R06.02] Shortness of breath  [T07.XXXA] Abrasions of multiple sites (Primary)  [W57.XXXD] Bedbug bite, subsequent encounter          ED Disposition Condition    Discharge Stable        ED Prescriptions     Medication Sig Dispense Start Date End Date Auth. Provider    sulfamethoxazole-trimethoprim 800-160mg (BACTRIM DS) 800-160 mg Tab Take 2 tablets by mouth 2 (two) times daily. for 7 days 28 tablet 5/21/2022 5/28/2022 Stephanie Dean PA-C    mupirocin (BACTROBAN) 2 % ointment Apply topically 3 (three) times daily. 22 g 5/21/2022  Stephanie Dean PA-C    ketorolac (TORADOL) 10 mg tablet Take 1 tablet (10 mg total) by mouth every 6 (six) hours as needed for Pain. 20 tablet 5/21/2022 5/26/2022 Stephanie Dean PA-C        Follow-up Information     Follow up With Specialties Details Why Contact Info    Adrián Hahn MD Family Medicine Schedule an appointment as soon as possible for a visit in 1 week  [address] [facility] [address]  [phone]      [facility] - Allergy & Dermatology Allergy, Dermatology Schedule an appointment as soon as possible for a visit  As needed [address] [facility] [address]  [phone]      [facility] - Emergency Dept Emergency Medicine Go to  If symptoms worsen [address] [facility] [address] [phone]           Stephanie Dean PA-C  05/21/22 1638       Miri Gan Jr., MD  05/22/22 7839

## 2022-05-21 NOTE — ED TRIAGE NOTES
47 y.o. female arrived to the ED via personal transport from home for c/o bedbug bites. Pt reports hx of immune deficiency disorder, was dx'd w/ bedbug bites last week and was rx'd Keflex w/ one day left. Scattered scab lesions w/ erythema noted to epidermis. Denies pruritus, fever, chills.

## 2022-05-21 NOTE — DISCHARGE INSTRUCTIONS
Diagnosis:  Multiple skin abrasions, bedbug bites    You have abrasions on your skin from your bed bug bites.  They do not actually appear to be infected but seem irritated from scratching.  I am prescribing another antibiotic medicine called Bactrim to help cover for additional bacteria.  I am also prescribing antibiotic ointment and some medicine the can take as needed for pain.  Your lab tests and chest x-ray do not show any concerning findings.    Please schedule follow-up appoint with your primary care doctor.  If your symptoms persist you may need to be seen by a dermatologist.  If you start to have any worsening symptoms, please return to the emergency department.

## 2022-05-21 NOTE — ED NOTES
"Malathi Mcpherson, a 47 y.o. female presents to the ED via personal transport from home with CC bedbug bites x1 wk. Previously rx'd Keflex w/ one day remaining on abx rx.      Patient identifiers verified verbally with pt and correct for Malathi Mcpherson.    LOC/ APPEARANCE: Pt is AAOx4, repeatedly stating "I'm just miserable with all these bites." Pt is speaking and following commands appropriately. Pt appears mildly disheveled.  SKIN: Scattered scabbed lesions w/ erythema noted to epidermis of bue, ble, face, right lower posterior, and left side of neck - no visualization of bedbug or drainage at sites. Denies pruritus.   RESPIRATORY: Airway open and patent w/ c/o mild SOB. RR even, unlabored, equal bilaterally on inspiration and expiration. Sating 100% on RA.  CARDIAC: Pt has regular HR w/ no c/o chest pain.  +2 peripheral pulses. No peripheral edema.   ABDOMEN: Soft and non-tender to palpation with no distention noted. Endorses nausea. Denies vomiting or diarrhea.   NEUROLOGIC: Eyes open spontaneously and facial expression symmetrical. Pt reports sensation present in all extremities when touched with a finger, denies any numbness or tingling.   MUSCULOSKELETAL: Ambulatory w/o assist. No visible swelling or deformities noted to extremities.  : Pt voids independently; No c/o dysuria, urinary frequency, or blood in urine.     "

## 2022-09-25 ENCOUNTER — NURSE TRIAGE (OUTPATIENT)
Dept: ADMINISTRATIVE | Facility: CLINIC | Age: 47
End: 2022-09-25
Payer: MEDICAID

## 2022-09-25 NOTE — TELEPHONE ENCOUNTER
Need to call EMS #[id number]. Have infection all over body with pus coming out. Breathing difficulty and head feels funny.  Reason for Disposition   [1] Chest pain lasts > 5 minutes AND [2] age > 44    Additional Information   Breathing difficulty   Negative: SEVERE difficulty breathing (e.g., struggling for each breath, speaks in single words)   Negative: [1] Breathing stopped AND [2] hasn't returned   Negative: Choking on something   Negative: Bluish (or gray) lips or face now   Negative: Difficult to awaken or acting confused (e.g., disoriented, slurred speech)   Negative: Passed out (i.e., lost consciousness, collapsed and was not responding)   Negative: Wheezing started suddenly after medicine, an allergic food or bee sting   Negative: Stridor   Negative: Slow, shallow and weak breathing   Negative: Sounds like a life-threatening emergency to the triager   Chest pain   Negative: Passed out (i.e., lost consciousness, collapsed and was not responding)   Negative: Shock suspected (e.g., cold/pale/clammy skin, too weak to stand, low BP, rapid pulse)   Negative: SEVERE difficulty breathing (e.g., struggling for each breath, speaks in single words)   Negative: Difficult to awaken or acting confused (e.g., disoriented, slurred speech)    Protocols used: Respiratory Multiple Symptoms - Guideline [id number]-GA, Breathing Difficulty-A-, Chest Pain-A-AH

## 2022-09-25 NOTE — TELEPHONE ENCOUNTER
Caller c/o labored breathing that is moderate to severe at this time.  Pt advised per protocol and verbalized understanding.     Reason for Disposition   [1] MODERATE difficulty breathing (e.g., speaks in phrases, SOB even at rest, pulse 100-120) AND [2] NEW-onset or WORSE than normal    Additional Information   Negative: SEVERE difficulty breathing (e.g., struggling for each breath, speaks in single words)   Negative: [1] Breathing stopped AND [2] hasn't returned   Negative: Choking on something   Negative: Bluish (or gray) lips or face now   Negative: Difficult to awaken or acting confused (e.g., disoriented, slurred speech)   Negative: Passed out (i.e., lost consciousness, collapsed and was not responding)   Negative: Wheezing started suddenly after medicine, an allergic food or bee sting   Negative: Stridor   Negative: Slow, shallow and weak breathing   Negative: Sounds like a life-threatening emergency to the triager    Protocols used: Breathing Difficulty-A-AH

## 2022-09-27 ENCOUNTER — HOSPITAL ENCOUNTER (EMERGENCY)
Facility: HOSPITAL | Age: 47
Discharge: LEFT BEFORE DISCHARGE | End: 2022-09-27
Attending: EMERGENCY MEDICINE
Payer: MEDICAID

## 2022-09-27 VITALS
RESPIRATION RATE: 18 BRPM | SYSTOLIC BLOOD PRESSURE: 170 MMHG | WEIGHT: 130 LBS | HEIGHT: 68 IN | HEART RATE: 68 BPM | DIASTOLIC BLOOD PRESSURE: 95 MMHG | TEMPERATURE: 98 F | BODY MASS INDEX: 19.7 KG/M2 | OXYGEN SATURATION: 98 %

## 2022-09-27 DIAGNOSIS — Z53.21 ELOPED FROM EMERGENCY DEPARTMENT: Primary | ICD-10-CM

## 2022-09-27 DIAGNOSIS — R06.02 SHORTNESS OF BREATH: ICD-10-CM

## 2022-09-27 DIAGNOSIS — F14.90 CRACK COCAINE USE: ICD-10-CM

## 2022-09-27 LAB
ALBUMIN SERPL BCP-MCNC: 4.2 G/DL (ref 3.5–5.2)
ALP SERPL-CCNC: 55 U/L (ref 55–135)
ALT SERPL W/O P-5'-P-CCNC: 8 U/L (ref 10–44)
ANION GAP SERPL CALC-SCNC: 9 MMOL/L (ref 8–16)
AST SERPL-CCNC: 13 U/L (ref 10–40)
B-HCG UR QL: NEGATIVE
BASOPHILS # BLD AUTO: 0.03 K/UL (ref 0–0.2)
BASOPHILS NFR BLD: 0.5 % (ref 0–1.9)
BILIRUB SERPL-MCNC: 0.3 MG/DL (ref 0.1–1)
BILIRUB UR QL STRIP: NEGATIVE
BNP SERPL-MCNC: 80 PG/ML (ref 0–99)
BUN SERPL-MCNC: 11 MG/DL (ref 6–20)
CALCIUM SERPL-MCNC: 9.4 MG/DL (ref 8.7–10.5)
CHLORIDE SERPL-SCNC: 107 MMOL/L (ref 95–110)
CLARITY UR REFRACT.AUTO: CLEAR
CO2 SERPL-SCNC: 22 MMOL/L (ref 23–29)
COLOR UR AUTO: YELLOW
CREAT SERPL-MCNC: 0.7 MG/DL (ref 0.5–1.4)
CTP QC/QA: YES
DIFFERENTIAL METHOD: ABNORMAL
EOSINOPHIL # BLD AUTO: 0.1 K/UL (ref 0–0.5)
EOSINOPHIL NFR BLD: 0.9 % (ref 0–8)
ERYTHROCYTE [DISTWIDTH] IN BLOOD BY AUTOMATED COUNT: 14.3 % (ref 11.5–14.5)
EST. GFR  (NO RACE VARIABLE): >60 ML/MIN/1.73 M^2
GLUCOSE SERPL-MCNC: 96 MG/DL (ref 70–110)
GLUCOSE UR QL STRIP: NEGATIVE
HCT VFR BLD AUTO: 36.7 % (ref 37–48.5)
HGB BLD-MCNC: 12.3 G/DL (ref 12–16)
HGB UR QL STRIP: NEGATIVE
IMM GRANULOCYTES # BLD AUTO: 0.01 K/UL (ref 0–0.04)
IMM GRANULOCYTES NFR BLD AUTO: 0.2 % (ref 0–0.5)
INFLUENZA A, MOLECULAR: NEGATIVE
INFLUENZA B, MOLECULAR: NEGATIVE
KETONES UR QL STRIP: NEGATIVE
LEUKOCYTE ESTERASE UR QL STRIP: NEGATIVE
LYMPHOCYTES # BLD AUTO: 2.1 K/UL (ref 1–4.8)
LYMPHOCYTES NFR BLD: 37.9 % (ref 18–48)
MCH RBC QN AUTO: 30.6 PG (ref 27–31)
MCHC RBC AUTO-ENTMCNC: 33.5 G/DL (ref 32–36)
MCV RBC AUTO: 91 FL (ref 82–98)
MONOCYTES # BLD AUTO: 0.3 K/UL (ref 0.3–1)
MONOCYTES NFR BLD: 5.8 % (ref 4–15)
NEUTROPHILS # BLD AUTO: 3 K/UL (ref 1.8–7.7)
NEUTROPHILS NFR BLD: 54.7 % (ref 38–73)
NITRITE UR QL STRIP: NEGATIVE
NRBC BLD-RTO: 0 /100 WBC
PH UR STRIP: 7 [PH] (ref 5–8)
PLATELET # BLD AUTO: 267 K/UL (ref 150–450)
PMV BLD AUTO: 10.5 FL (ref 9.2–12.9)
POTASSIUM SERPL-SCNC: 3.4 MMOL/L (ref 3.5–5.1)
PROT SERPL-MCNC: 7.1 G/DL (ref 6–8.4)
PROT UR QL STRIP: NEGATIVE
RBC # BLD AUTO: 4.02 M/UL (ref 4–5.4)
SARS-COV-2 RDRP RESP QL NAA+PROBE: NEGATIVE
SODIUM SERPL-SCNC: 138 MMOL/L (ref 136–145)
SP GR UR STRIP: 1.02 (ref 1–1.03)
SPECIMEN SOURCE: NORMAL
TROPONIN I SERPL DL<=0.01 NG/ML-MCNC: <0.006 NG/ML (ref 0–0.03)
URN SPEC COLLECT METH UR: NORMAL
WBC # BLD AUTO: 5.56 K/UL (ref 3.9–12.7)

## 2022-09-27 PROCEDURE — 25000003 PHARM REV CODE 250: Performed by: PHYSICIAN ASSISTANT

## 2022-09-27 PROCEDURE — 99284 EMERGENCY DEPT VISIT MOD MDM: CPT | Mod: CS,,, | Performed by: EMERGENCY MEDICINE

## 2022-09-27 PROCEDURE — 85025 COMPLETE CBC W/AUTO DIFF WBC: CPT | Performed by: EMERGENCY MEDICINE

## 2022-09-27 PROCEDURE — 80053 COMPREHEN METABOLIC PANEL: CPT | Performed by: EMERGENCY MEDICINE

## 2022-09-27 PROCEDURE — 93005 ELECTROCARDIOGRAM TRACING: CPT

## 2022-09-27 PROCEDURE — 81025 URINE PREGNANCY TEST: CPT | Performed by: PHYSICIAN ASSISTANT

## 2022-09-27 PROCEDURE — 84484 ASSAY OF TROPONIN QUANT: CPT | Performed by: EMERGENCY MEDICINE

## 2022-09-27 PROCEDURE — 81003 URINALYSIS AUTO W/O SCOPE: CPT | Performed by: EMERGENCY MEDICINE

## 2022-09-27 PROCEDURE — U0002 COVID-19 LAB TEST NON-CDC: HCPCS | Performed by: PHYSICIAN ASSISTANT

## 2022-09-27 PROCEDURE — 99285 EMERGENCY DEPT VISIT HI MDM: CPT | Mod: 25

## 2022-09-27 PROCEDURE — 83880 ASSAY OF NATRIURETIC PEPTIDE: CPT | Performed by: EMERGENCY MEDICINE

## 2022-09-27 PROCEDURE — 93010 ELECTROCARDIOGRAM REPORT: CPT | Mod: ,,, | Performed by: INTERNAL MEDICINE

## 2022-09-27 PROCEDURE — 99284 PR EMERGENCY DEPT VISIT,LEVEL IV: ICD-10-PCS | Mod: CS,,, | Performed by: EMERGENCY MEDICINE

## 2022-09-27 PROCEDURE — 93010 EKG 12-LEAD: ICD-10-PCS | Mod: ,,, | Performed by: INTERNAL MEDICINE

## 2022-09-27 PROCEDURE — 87502 INFLUENZA DNA AMP PROBE: CPT | Performed by: PHYSICIAN ASSISTANT

## 2022-09-27 RX ORDER — ACETAMINOPHEN 500 MG
1000 TABLET ORAL
Status: COMPLETED | OUTPATIENT
Start: 2022-09-27 | End: 2022-09-27

## 2022-09-27 RX ORDER — PROPARACAINE HYDROCHLORIDE 5 MG/ML
1 SOLUTION/ DROPS OPHTHALMIC
Status: COMPLETED | OUTPATIENT
Start: 2022-09-27 | End: 2022-09-27

## 2022-09-27 RX ADMIN — PROPARACAINE HYDROCHLORIDE 1 DROP: 5 SOLUTION/ DROPS OPHTHALMIC at 01:09

## 2022-09-27 RX ADMIN — ACETAMINOPHEN 1000 MG: 500 TABLET ORAL at 02:09

## 2022-09-27 NOTE — ED PROVIDER NOTES
"Encounter Date: 9/27/2022       History     Chief Complaint   Patient presents with    Multiple complaints     Infection to whole body causing me to be sob, took amoxicillin from my mom     The history is provided by the patient and medical records. No  was used.     Malathi Mcpherson is a 47 y.o. female with medical history of Anxiety, Depression, GERD, Jackhammer esophagus, Hx of HBV, Lung Nodules, Hx of Cocaine use presenting to the ED with multiple complaints.     Patient has concerns that she has a "full body infection" beginning for a few weeks. Describes symptoms as a headache, R eye pain and redness, chest pain, SOB, reduced appetite, 2 episodes of vomiting last night, constipation. Last BM was 3 days ago. Reports +flatulence. Denies blurry vision, photophobia, vision loss. Reports being stung by a wasp on the left side of her face and has been picking at the wound. Reports applying OTC ointment over the region. Reports having other skin lesions that are chronic, but they have been improving. Reports she was previously homeless and currently staying with her mom and stepfather. Feels safe at home. Reports taking Amoxicillin 500mg today. No SI, HI, Hallucinations. Reports smoking crack cocaine this morning. Last used IV heroin 6 years ago.     Review of patient's allergies indicates:  No Known Allergies  Past Medical History:   Diagnosis Date    Acute adjustment disorder with depressed mood     Cutaneous abscess 08/08/2019    Depression     Enlarged liver 04/27/2017    Hep C w/o coma, chronic     Hepatitis B     History of mental disorder 03/22/2017    History of substance abuse      History reviewed. No pertinent surgical history.  History reviewed. No pertinent family history.  Social History     Tobacco Use    Smoking status: Every Day     Packs/day: 0.50     Types: Cigarettes    Smokeless tobacco: Never   Substance Use Topics    Alcohol use: Yes     Comment: occaisionally    Drug use: " No     Review of Systems   Constitutional:  Positive for unexpected weight change. Negative for fever.   HENT:  Negative for sore throat.    Eyes:  Positive for pain and redness.   Respiratory:  Positive for shortness of breath. Negative for cough.    Cardiovascular:  Positive for chest pain.   Gastrointestinal:  Positive for vomiting. Negative for nausea.   Genitourinary:  Negative for dysuria.   Musculoskeletal:  Negative for back pain.   Skin:  Positive for wound. Negative for rash.   Neurological:  Positive for headaches. Negative for weakness.   Hematological:  Does not bruise/bleed easily.     Physical Exam     Initial Vitals [09/27/22 1206]   BP Pulse Resp Temp SpO2   (!) 176/86 93 18 98.2 °F (36.8 °C) 100 %      MAP       --         Physical Exam    Constitutional: She appears well-developed and well-nourished. She is not diaphoretic. No distress.   HENT:   Head: Normocephalic and atraumatic.   Mouth/Throat: Oropharynx is clear and moist. No oropharyngeal exudate.   Superficial abrasion to L maxillary/temporal region with dried yellow scab.   Eyes: EOM are normal. Pupils are equal, round, and reactive to light. Right conjunctiva is injected. No scleral icterus.   Visual acuity - R 20/20, L 20/20, BL 20/20  IOP R eye 26  Small epithelial defect over R upper cornea. No fluorescin uptake.   Pain completely relieved with proparacaine drops   Neck: Neck supple.   Normal range of motion.  Cardiovascular:  Normal rate and regular rhythm.           Pulmonary/Chest: Breath sounds normal. No respiratory distress. She has no wheezes.   Abdominal: Abdomen is soft. She exhibits no distension. There is no abdominal tenderness. There is no rebound.   Musculoskeletal:         General: No tenderness or edema. Normal range of motion.      Cervical back: Normal range of motion and neck supple.     Neurological: She is alert and oriented to person, place, and time. She has normal strength. No sensory deficit.   Skin: Skin is  warm and dry. No rash noted. No erythema.   Small superficial ulcerations to distal L forearm and proximal L thigh   Psychiatric: She has a normal mood and affect.     Left thigh:    Left forearm:    L maxillary region:      ED Course   Procedures  Labs Reviewed   CBC W/ AUTO DIFFERENTIAL - Abnormal; Notable for the following components:       Result Value    Hematocrit 36.7 (*)     All other components within normal limits   COMPREHENSIVE METABOLIC PANEL - Abnormal; Notable for the following components:    Potassium 3.4 (*)     CO2 22 (*)     ALT 8 (*)     All other components within normal limits    Narrative:     ADD ON TROPONIN PER SILVANA GONZALES PA-C ORDER# [id number] @    09/27/2022  13:15    INFLUENZA A & B BY MOLECULAR   URINALYSIS, REFLEX TO URINE CULTURE    Narrative:     Specimen Source->Urine   B-TYPE NATRIURETIC PEPTIDE    Narrative:     ADD ON TROPONIN PER SILVANA GONZALES PA-C ORDER# [id number] @    09/27/2022  13:15    SARS-COV-2 RNA AMPLIFICATION, QUAL   TROPONIN I   TROPONIN I    Narrative:     ADD ON TROPONIN PER SILVANA GONZALES PA-C ORDER# [id number] @    09/27/2022  13:15    POCT URINE PREGNANCY        ECG Results              EKG 12-lead (Final result)  Result time 09/27/22 15:22:05      Final result by Interface, Lab In [facility] (09/27/22 15:22:05)                   Narrative:    Test Reason : R06.02,    Vent. Rate : 084 BPM     Atrial Rate : 084 BPM     P-R Int : 142 ms          QRS Dur : 076 ms      QT Int : 378 ms       P-R-T Axes : 077 063 064 degrees     QTc Int : 446 ms    Normal sinus rhythm  Normal ECG  No previous ECGs available  Confirmed by Bronson OSEGUERA MD ([id number]) on 9/27/2022 3:21:58 PM    Referred By: [email or web address]   SELF           Confirmed By:Bronson OSEGUERA MD                                  Imaging Results              X-Ray Chest AP Portable (Final result)  Result time 09/27/22 14:56:53      Final result by Hamzah Ortze MD (09/27/22 14:56:53)                   Impression:      No acute  "cardiopulmonary finding.      Electronically signed by: Hamzah Ortez MD  Date:    09/27/2022  Time:    14:56               Narrative:    EXAMINATION:  XR CHEST AP PORTABLE    CLINICAL HISTORY:  Provided history is "shortness of breath;  ".    TECHNIQUE:  One view of the chest.    COMPARISON:  05/21/2022.    FINDINGS:  Cardiac silhouette is not enlarged.  No focal consolidation.  There is probable biapical scarring.  No sizable pleural effusion.  No pneumothorax.                                       Medications   proparacaine 0.5 % ophthalmic solution 1 drop (1 drop Both Eyes Given by Provider 9/27/22 1315)   acetaminophen tablet 1,000 mg (1,000 mg Oral Given 9/27/22 1434)     Medical Decision Making:   History:   Old Medical Records: I decided to obtain old medical records.  Old Records Summarized: records from clinic visits.  Independently Interpreted Test(s):   I have ordered and independently interpreted EKG Reading(s) - see summary below       <> Summary of EKG Reading(s): NSR 84 bpm. No STEMI  Clinical Tests:   Lab Tests: Ordered and Reviewed  Radiological Study: Ordered and Reviewed  Medical Tests: Ordered and Reviewed     APC / Resident Notes:   47 y.o. female with medical history of Anxiety, Depression, GERD, Jackhammer esophagus, Hx of HBV, Lung Nodules, Hx of Cocaine use presenting to the ED with multiple complaints including headache, skin wounds, left eye redness and pain, reduced appetite, vomiting, chest pain. Concerned her "whole body is infected." Reports smoking crack cocaine today. Recently homeless, currently living with parents. No SI, HI, Hallucinations. Displays appropriate insight to her situation and do not feel patient is gravely disabled. DDx broad and includes but not limited to substance abuse, electrolyte disturbance, dehydration, JOSEPH, conjunctivitis, corneal abrasion, uveitis, migraine, viral syndrome, cellulitis, dermatillomania, ACS, COVID, Infuenza, PNA, cardiac " arrhythmia.    Notified by nurse that patient left the ED prior to her work-up and final evaluation being complete. Labs later reviewed without significant findings. COVID and Influenza negative. CXR without large consolidation, pleural effusion, or pneumothorax on my read. Patient charted as eloped from the ED. I have discussed the care of this patient with my supervising physician.                    Clinical Impression:   Final diagnoses:  [R06.02] Shortness of breath  [Z53.21] Eloped from emergency department (Primary)  [F14.90] Crack cocaine use        ED Disposition Condition    Eloped Stable                Nito Nicholson PA-C  09/27/22 6759

## 2022-09-27 NOTE — ED NOTES
Pt became irate, yelling about the care she is receiving. Requesting RN takes her IV out. RN explained the wait times and ER process, trying to keep patient calm. IV removed. PA made aware. Pt refusing revitalizing.

## 2022-09-27 NOTE — FIRST PROVIDER EVALUATION
"Medical screening examination initiated.  I have conducted a focused provider triage encounter, findings are as follows:    Brief history of present illness:      Feels like she is going to pass out. "I have no immune system. I'm just a sick person.I have 35 infections in my whole body. "  Has been taking amoxicillin that she got from a family member.  Says that she can't breath normal because"the infection has taken over her whole body"    Pain 20/10    Vitals:    09/27/22 1206   BP: (!) 176/86   Pulse: 93   Resp: 18   Temp: 98.2 °F (36.8 °C)   TempSrc: Oral   SpO2: 100%   Weight: 59 kg (130 lb)   Height: 5' 8" (1.727 m)       Pertinent physical exam:    Facial bruising and swelling noted     Brief workup plan:  labs ordered    Preliminary workup initiated; this workup will be continued and followed by the physician or advanced practice provider that is assigned to the patient when roomed.  "

## 2022-09-27 NOTE — ED TRIAGE NOTES
"Pt states "I was homeless for two months, I just moved in with my family a week ago but I wasn't bathing or taking care of myself, and I kept scratching my skin, and now I have these blisters that are oozing puss, and I feel like I got hit by a truck because I am not eating and I feel like I want to throw up." Last smoked crack this morning. Pt adds "my fiance has HIV so I might have it too," pt denies cp, dizziness, diarrhea at this time.   "

## 2023-07-28 ENCOUNTER — HOSPITAL ENCOUNTER (EMERGENCY)
Facility: HOSPITAL | Age: 48
Discharge: HOME | End: 2023-07-28
Attending: EMERGENCY MEDICINE
Payer: MEDICAID

## 2023-07-28 ENCOUNTER — NURSE TRIAGE (OUTPATIENT)
Dept: ADMINISTRATIVE | Facility: CLINIC | Age: 48
End: 2023-07-28
Payer: MEDICAID

## 2023-07-28 VITALS
OXYGEN SATURATION: 97 % | HEART RATE: 63 BPM | DIASTOLIC BLOOD PRESSURE: 69 MMHG | RESPIRATION RATE: 18 BRPM | SYSTOLIC BLOOD PRESSURE: 115 MMHG | TEMPERATURE: 98 F

## 2023-07-28 DIAGNOSIS — R07.9 CHEST PAIN: ICD-10-CM

## 2023-07-28 LAB
ALBUMIN SERPL BCP-MCNC: 3.9 G/DL (ref 3.5–5.2)
ALP SERPL-CCNC: 52 U/L (ref 55–135)
ALT SERPL W/O P-5'-P-CCNC: 6 U/L (ref 10–44)
AMPHET+METHAMPHET UR QL: NEGATIVE
ANION GAP SERPL CALC-SCNC: 11 MMOL/L (ref 8–16)
AST SERPL-CCNC: 11 U/L (ref 10–40)
B-HCG UR QL: NEGATIVE
BARBITURATES UR QL SCN>200 NG/ML: NEGATIVE
BASOPHILS # BLD AUTO: 0.03 K/UL (ref 0–0.2)
BASOPHILS NFR BLD: 0.5 % (ref 0–1.9)
BENZODIAZ UR QL SCN>200 NG/ML: NEGATIVE
BILIRUB SERPL-MCNC: 0.2 MG/DL (ref 0.1–1)
BNP SERPL-MCNC: 24 PG/ML (ref 0–99)
BUN SERPL-MCNC: 13 MG/DL (ref 6–20)
BZE UR QL SCN: ABNORMAL
CALCIUM SERPL-MCNC: 9.3 MG/DL (ref 8.7–10.5)
CANNABINOIDS UR QL SCN: ABNORMAL
CHLORIDE SERPL-SCNC: 107 MMOL/L (ref 95–110)
CK SERPL-CCNC: 49 U/L (ref 20–180)
CO2 SERPL-SCNC: 21 MMOL/L (ref 23–29)
CREAT SERPL-MCNC: 0.6 MG/DL (ref 0.5–1.4)
CREAT UR-MCNC: 244 MG/DL (ref 15–325)
CRP SERPL-MCNC: 1.1 MG/L (ref 0–8.2)
CTP QC/QA: YES
DIFFERENTIAL METHOD: ABNORMAL
EOSINOPHIL # BLD AUTO: 0 K/UL (ref 0–0.5)
EOSINOPHIL NFR BLD: 0.6 % (ref 0–8)
ERYTHROCYTE [DISTWIDTH] IN BLOOD BY AUTOMATED COUNT: 14.4 % (ref 11.5–14.5)
ERYTHROCYTE [SEDIMENTATION RATE] IN BLOOD BY PHOTOMETRIC METHOD: 12 MM/HR (ref 0–36)
EST. GFR  (NO RACE VARIABLE): >60 ML/MIN/1.73 M^2
GLUCOSE SERPL-MCNC: 90 MG/DL (ref 70–110)
HCT VFR BLD AUTO: 37.2 % (ref 37–48.5)
HGB BLD-MCNC: 12.2 G/DL (ref 12–16)
HIV 1+2 AB+HIV1 P24 AG SERPL QL IA: NORMAL
IMM GRANULOCYTES # BLD AUTO: 0.01 K/UL (ref 0–0.04)
IMM GRANULOCYTES NFR BLD AUTO: 0.2 % (ref 0–0.5)
LACTATE SERPL-SCNC: 0.6 MMOL/L (ref 0.5–2.2)
LYMPHOCYTES # BLD AUTO: 2.3 K/UL (ref 1–4.8)
LYMPHOCYTES NFR BLD: 34.7 % (ref 18–48)
MCH RBC QN AUTO: 31 PG (ref 27–31)
MCHC RBC AUTO-ENTMCNC: 32.8 G/DL (ref 32–36)
MCV RBC AUTO: 94 FL (ref 82–98)
METHADONE UR QL SCN>300 NG/ML: NEGATIVE
MONOCYTES # BLD AUTO: 0.4 K/UL (ref 0.3–1)
MONOCYTES NFR BLD: 5.6 % (ref 4–15)
NEUTROPHILS # BLD AUTO: 3.8 K/UL (ref 1.8–7.7)
NEUTROPHILS NFR BLD: 58.4 % (ref 38–73)
NRBC BLD-RTO: 0 /100 WBC
OPIATES UR QL SCN: NEGATIVE
PCP UR QL SCN>25 NG/ML: NEGATIVE
PLATELET # BLD AUTO: 281 K/UL (ref 150–450)
PLATELET BLD QL SMEAR: ABNORMAL
PMV BLD AUTO: 10.2 FL (ref 9.2–12.9)
POC CARDIAC TROPONIN I: 0 NG/ML (ref 0–0.08)
POC CARDIAC TROPONIN I: 0 NG/ML (ref 0–0.08)
POTASSIUM SERPL-SCNC: 3.4 MMOL/L (ref 3.5–5.1)
PROT SERPL-MCNC: 6.7 G/DL (ref 6–8.4)
RBC # BLD AUTO: 3.94 M/UL (ref 4–5.4)
SAMPLE: NORMAL
SAMPLE: NORMAL
SARS-COV-2 RDRP RESP QL NAA+PROBE: NEGATIVE
SODIUM SERPL-SCNC: 139 MMOL/L (ref 136–145)
TOXICOLOGY INFORMATION: ABNORMAL
TROPONIN I SERPL DL<=0.01 NG/ML-MCNC: 0.01 NG/ML (ref 0–0.03)
TROPONIN I SERPL DL<=0.01 NG/ML-MCNC: 0.01 NG/ML (ref 0–0.03)
WBC # BLD AUTO: 6.55 K/UL (ref 3.9–12.7)

## 2023-07-28 PROCEDURE — 96374 THER/PROPH/DIAG INJ IV PUSH: CPT

## 2023-07-28 PROCEDURE — U0002 COVID-19 LAB TEST NON-CDC: HCPCS

## 2023-07-28 PROCEDURE — 85652 RBC SED RATE AUTOMATED: CPT | Performed by: EMERGENCY MEDICINE

## 2023-07-28 PROCEDURE — 86140 C-REACTIVE PROTEIN: CPT

## 2023-07-28 PROCEDURE — 80307 DRUG TEST PRSMV CHEM ANLYZR: CPT

## 2023-07-28 PROCEDURE — 84484 ASSAY OF TROPONIN QUANT: CPT | Mod: 91

## 2023-07-28 PROCEDURE — 80053 COMPREHEN METABOLIC PANEL: CPT

## 2023-07-28 PROCEDURE — 84484 ASSAY OF TROPONIN QUANT: CPT

## 2023-07-28 PROCEDURE — 83880 ASSAY OF NATRIURETIC PEPTIDE: CPT

## 2023-07-28 PROCEDURE — 63600175 PHARM REV CODE 636 W HCPCS

## 2023-07-28 PROCEDURE — 93010 ELECTROCARDIOGRAM REPORT: CPT | Mod: ,,, | Performed by: INTERNAL MEDICINE

## 2023-07-28 PROCEDURE — 99285 EMERGENCY DEPT VISIT HI MDM: CPT | Mod: 25

## 2023-07-28 PROCEDURE — 83605 ASSAY OF LACTIC ACID: CPT

## 2023-07-28 PROCEDURE — 81025 URINE PREGNANCY TEST: CPT

## 2023-07-28 PROCEDURE — 93005 ELECTROCARDIOGRAM TRACING: CPT

## 2023-07-28 PROCEDURE — 87389 HIV-1 AG W/HIV-1&-2 AB AG IA: CPT | Performed by: PHYSICIAN ASSISTANT

## 2023-07-28 PROCEDURE — 93010 EKG 12-LEAD: ICD-10-PCS | Mod: ,,, | Performed by: INTERNAL MEDICINE

## 2023-07-28 PROCEDURE — 85025 COMPLETE CBC W/AUTO DIFF WBC: CPT

## 2023-07-28 PROCEDURE — 82550 ASSAY OF CK (CPK): CPT

## 2023-07-28 PROCEDURE — 87040 BLOOD CULTURE FOR BACTERIA: CPT | Mod: 59

## 2023-07-28 RX ORDER — KETOROLAC TROMETHAMINE 30 MG/ML
15 INJECTION, SOLUTION INTRAMUSCULAR; INTRAVENOUS
Status: COMPLETED | OUTPATIENT
Start: 2023-07-28 | End: 2023-07-28

## 2023-07-28 RX ORDER — IBUPROFEN 600 MG/1
600 TABLET ORAL EVERY 6 HOURS PRN
Qty: 20 TABLET | Refills: 0 | Status: SHIPPED | OUTPATIENT
Start: 2023-07-28

## 2023-07-28 RX ORDER — DOXYCYCLINE 100 MG/1
100 CAPSULE ORAL 2 TIMES DAILY
Qty: 14 CAPSULE | Refills: 0 | Status: SHIPPED | OUTPATIENT
Start: 2023-07-28 | End: 2023-08-04

## 2023-07-28 RX ADMIN — KETOROLAC TROMETHAMINE 15 MG: 30 INJECTION, SOLUTION INTRAMUSCULAR; INTRAVENOUS at 05:07

## 2023-07-28 NOTE — TELEPHONE ENCOUNTER
Got an infection and has sores to hand, ears and feet and is SOB, dizzy and lightheaded and changes in her vision.   Reason for Disposition   SEVERE difficulty breathing (e.g., struggling for each breath, speaks in single words)    Protocols used: Respiratory Multiple Symptoms - Guideline [id number]

## 2023-07-28 NOTE — ED NOTES
Pt remains on cardiac monitor,. ( NSR) Side rails up x2, call bell in reach, bed in low position with brake engaged.  Offers nio c/o's at this time. Waiting for repeat trop to result.

## 2023-07-28 NOTE — PROVIDER PROGRESS NOTES - EMERGENCY DEPT.
ED Resident HAND-OFF NOTE:  7/28/2023 Received Sign Out    Chief Complaint   Patient presents with    Chest Pain    Shortness of Breath     Pt arrives via EMS c/o SOB and chest pain.     Malathi Mcpherson is a 48 y.o. female who presented to the ED on 7/28/2023 with chief complaint of shortness of breath, chest pain, skin wounds. I assumed care of patient from off-going ED physician team pending repeat troponin and disposition.    On my evaluation, Malathi Mcpherson appears well, hemodynamically stable and in NAD. Thus far, Malathi Mcpherson has received:  Medications   ketorolac injection 15 mg (15 mg Intravenous Given 7/28/23 0502)       Vital Signs:  /69 (Patient Position: Sitting)   Pulse 64   Temp 98.2 °F (36.8 °C) (Oral)   Resp 16   SpO2 99%   Breastfeeding No     Laboratory Studies:  Labs Reviewed   CBC W/ AUTO DIFFERENTIAL - Abnormal; Notable for the following components:       Result Value    RBC 3.94 (*)     All other components within normal limits    Narrative:     Release to patient->Immediate   COMPREHENSIVE METABOLIC PANEL - Abnormal; Notable for the following components:    Potassium 3.4 (*)     CO2 21 (*)     Alkaline Phosphatase 52 (*)     ALT 6 (*)     All other components within normal limits    Narrative:     Release to patient->Immediate   DRUG SCREEN PANEL, URINE EMERGENCY - Abnormal; Notable for the following components:    Cocaine (Metab.) Presumptive Positive (*)     THC Presumptive Positive (*)     All other components within normal limits    Narrative:     Specimen Source->Urine   CULTURE, BLOOD   CULTURE, BLOOD   HIV 1 / 2 ANTIBODY    Narrative:     ADD ON CRP PER DR MARTINA PRINCE/ORDER# [id number] @ 5:04AM    Release to patient->Immediate   TROPONIN I    Narrative:     Release to patient->Immediate   TROPONIN I    Narrative:     add on cpk per Brodie Pan MD order# [id number] 07/28/2023 @ 07:45    B-TYPE NATRIURETIC PEPTIDE    Narrative:     ADD ON CRP PER DR MACK  NIKI/ORDER# [id number] @ 5:04AM    Release to patient->Immediate   SARS-COV-2 RNA AMPLIFICATION, QUAL   TROPONIN ISTAT   LACTIC ACID, PLASMA   C-REACTIVE PROTEIN   SEDIMENTATION RATE   C-REACTIVE PROTEIN    Narrative:     ADD ON CRP PER DR MACK NIKI/ORDER# [id number] @ 5:04AM    Release to patient->Immediate   SEDIMENTATION RATE   CK   TROPONIN ISTAT   CK    Narrative:     add on cpk per Brodie Pan MD order# [id number] 07/28/2023 @ 07:45    POCT URINE PREGNANCY   POCT TROPONIN   POCT TROPONIN       MDM:  Repeat troponin negative.  On re-evaluation patient has scattered ulcers that are beginning to appear infected, will prescribe short course of doxycycline for treatment of patient's mild skin infection.  Patient also given ibuprofen for pain control.    Discussed results, diagnosis, and treatment plan with patient; advised close follow-up with PCP. Reviewed strict return precautions. Patient confirms understanding and ability to comply. Patient was given the opportunity to ask questions prior to discharge and all questions answered.      ED Disposition Condition    Discharge Stable          ED Prescriptions       Medication Sig Dispense Start Date End Date Auth. Provider    doxycycline (VIBRAMYCIN) 100 MG Cap Take 1 capsule (100 mg total) by mouth 2 (two) times daily. for 7 days 14 capsule 7/28/2023 8/4/2023 Rico Rincon MD    ibuprofen (ADVIL,MOTRIN) 600 MG tablet Take 1 tablet (600 mg total) by mouth every 6 (six) hours as needed for Pain. 20 tablet 7/28/2023 -- Rico Rincon MD          Follow-up Information       Follow up With Specialties Details Why Contact Info    Adrián Hahn MD Family Medicine   [address] [place] [id number]  [phone]      [facility] - Emergency Dept Emergency Medicine Go to  As needed, If symptoms worsen [address] [facility] [address] [phone]          Rico Rincon MD  Emergency Medicine  Resident  7/28/2023    Attending Note:  Agree with above.  Denies active IVDU. Signed out pending repeat CTNI, ESR, all negative. Doubt ACS or niecy/myocarditis. No fever, not septic. Doubt PE, no tachycardia or hypoxia. Doubt aortic pathology, intact and equal distal pulses.  On reassessment, sleeping comfortably, pain resolve.  Added CPK, WNL. Advised close f/u with PCP for additional testing, such as stress/echo as indicated, will give oral abx for her skin wounds, do not believe admission indicated.

## 2023-07-28 NOTE — ED PROVIDER NOTES
"Encounter Date: 7/28/2023       History     Chief Complaint   Patient presents with    Chest Pain    Shortness of Breath     Pt arrives via EMS c/o SOB and chest pain.     47-year-old female with history of anxiety, depression, esophageal spasm, crack/cocaine use, distant history of IV drug use is presenting to the emergency department for "infection spreading all over".  She points to ulcers on her hands, left thigh, left ear that are excruciatingly painful.  She is also complaining of chest pain that started today.  States that she has a weak immune system.  States that this has happened before and it was an infection.  She denies any cough, fevers, nausea, vomiting, lower extremity swelling.    The history is provided by the patient. No  was used.     Review of patient's allergies indicates:  No Known Allergies  Past Medical History:   Diagnosis Date    Acute adjustment disorder with depressed mood     Cutaneous abscess 08/08/2019    Depression     Enlarged liver 04/27/2017    Hep C w/o coma, chronic     Hepatitis B     History of mental disorder 03/22/2017    History of substance abuse      History reviewed. No pertinent surgical history.  History reviewed. No pertinent family history.  Social History     Tobacco Use    Smoking status: Every Day     Current packs/day: 0.50     Types: Cigarettes    Smokeless tobacco: Never   Substance Use Topics    Alcohol use: Yes     Comment: occaisionally    Drug use: No     Review of Systems   Constitutional:         See HPI       Physical Exam     Initial Vitals [07/28/23 0349]   BP Pulse Resp Temp SpO2   128/61 98 16 97.9 °F (36.6 °C) 99 %      MAP       --         Physical Exam    Nursing note and vitals reviewed.  Constitutional: She appears well-developed and well-nourished.   HENT:   Head: Normocephalic and atraumatic.   No erythema or warmth to pinna bilaterally   Eyes: Conjunctivae and EOM are normal.   Neck: Neck supple.   Normal range of " motion.  Cardiovascular:  Normal rate, regular rhythm, normal heart sounds and intact distal pulses.           Pulmonary/Chest: No respiratory distress. She has no wheezes. She has no rhonchi. She has no rales.   Abdominal: Abdomen is soft. Bowel sounds are normal. She exhibits no distension. There is no abdominal tenderness. There is no rebound and no guarding.   Musculoskeletal:         General: No tenderness or edema. Normal range of motion.      Cervical back: Normal range of motion and neck supple.     Neurological: She is alert and oriented to person, place, and time. She has normal strength.   Skin: Skin is warm and dry.   Psychiatric: She has a normal mood and affect. Thought content normal.                   ED Course   Procedures  Labs Reviewed   CBC W/ AUTO DIFFERENTIAL - Abnormal; Notable for the following components:       Result Value    RBC 3.94 (*)     All other components within normal limits    Narrative:     Release to patient->Immediate   COMPREHENSIVE METABOLIC PANEL - Abnormal; Notable for the following components:    Potassium 3.4 (*)     CO2 21 (*)     Alkaline Phosphatase 52 (*)     ALT 6 (*)     All other components within normal limits    Narrative:     Release to patient->Immediate   DRUG SCREEN PANEL, URINE EMERGENCY - Abnormal; Notable for the following components:    Cocaine (Metab.) Presumptive Positive (*)     THC Presumptive Positive (*)     All other components within normal limits    Narrative:     Specimen Source->Urine   CULTURE, BLOOD   CULTURE, BLOOD   HIV 1 / 2 ANTIBODY    Narrative:     ADD ON CRP PER DR MARTINA PRINCE/ORDER# [id number] @ 5:04AM    Release to patient->Immediate   TROPONIN I    Narrative:     Release to patient->Immediate   TROPONIN I    Narrative:     add on cpk per Brodie Pan MD order# [id number] 07/28/2023 @ 07:45    B-TYPE NATRIURETIC PEPTIDE    Narrative:     ADD ON CRP PER DR MARTINA PRINCE/ORDER# [id number] @ 5:04AM    Release to patient->Immediate    SARS-COV-2 RNA AMPLIFICATION, QUAL   TROPONIN ISTAT   LACTIC ACID, PLASMA   C-REACTIVE PROTEIN   SEDIMENTATION RATE   C-REACTIVE PROTEIN    Narrative:     ADD ON CRP PER DR MARTINA PRINCE/ORDER# [id number] @ 5:04AM    Release to patient->Immediate   SEDIMENTATION RATE   CK   TROPONIN ISTAT   CK    Narrative:     add on cpk per Brodie Pan MD order# [id number] 07/28/2023 @ 07:45    POCT URINE PREGNANCY     EKG Readings: (Independently Interpreted)   Initial Reading: No STEMI. Previous EKG: Compared with most recent EKG Rhythm: Normal Sinus Rhythm. Heart Rate: 73. ST Segments: Normal ST Segments. T Waves: Normal.     ECG Results              EKG 12-lead (Final result)  Result time 07/28/23 14:55:33      Final result by Interface, Lab In [facility] (07/28/23 14:55:33)                   Narrative:    Test Reason : R07.9,    Vent. Rate : 073 BPM     Atrial Rate : 073 BPM     P-R Int : 136 ms          QRS Dur : 086 ms      QT Int : 422 ms       P-R-T Axes : 077 066 067 degrees     QTc Int : 464 ms    Normal sinus rhythm  Normal ECG  When compared with ECG of 27-SEP-2022 12:08,  No significant change was found  Confirmed by Yvrose Pantoja MD ([id number]) on 7/28/2023 2:55:19 PM    Referred By: [email or web address]   SELF           Confirmed By:Yvrose Pantoja MD                                  Imaging Results              X-Ray Chest PA And Lateral (Final result)  Result time 07/28/23 05:24:39      Final result by Raven Woo MD (07/28/23 05:24:39)                   Impression:      No radiographic evidence of acute intra-thoracic process.      Electronically signed by: Raven Woo MD  Date:    07/28/2023  Time:    05:24               Narrative:    EXAMINATION:  XR CHEST PA AND LATERAL    CLINICAL HISTORY:  Chest Pain;    TECHNIQUE:  PA and lateral views of the chest were performed.    COMPARISON:  09/27/2022    FINDINGS:  Cardiac monitoring leads overlie the chest.  The cardiomediastinal silhouette is unchanged in size and  "configuration.  Mediastinal structures are midline.  The lungs are symmetrically expanded without evidence of confluent airspace consolidation, substantial volume of pleural fluid or pneumothorax.  Biapical pleuroparenchymal scarring present.  Osseous structures are intact.                                       Medications   ketorolac injection 15 mg (15 mg Intravenous Given 7/28/23 0502)     Medical Decision Making:   History:   Old Medical Records: I decided to obtain old medical records.  Old Records Summarized: records from previous admission(s).  Initial Assessment:   47-year-old female with history of anxiety, depression, esophageal spasm, crack/cocaine use, distant history of IV drug use is presenting to the emergency department for "infection spreading all over".  Patient has ulcers of varying stages of healing which do not appear acutely infected.  She is also complaining of chest pain.  Labs ordered.  Differential Diagnosis:   Sepsis, doubt ACS, doubt cellulitis  Clinical Tests:   Lab Tests: Ordered  Radiological Study: Ordered  Medical Tests: Ordered  ED Management:  See ED course.  Inflammatory markers, CPK pending at the time of sign-out.  To be followed by oncoming team.  Overall workup so far has been reassuring and anticipate discharge home if remaining workup is within normal limits.  Patient is stable the time of sign-out.            Attending Attestation:   Physician Attestation Statement for Resident:  As the supervising MD   Physician Attestation Statement: I have personally seen and examined this patient.   I agree with the above history.  -:   As the supervising MD I agree with the above PE.     As the supervising MD I agree with the above treatment, course, plan, and disposition.    I have reviewed and agree with the residents interpretation of the following: lab data, x-rays and EKG.  I have reviewed the following: old records at this facility.                ED Course as of 07/29/23 0610 "   Fri Jul 28, 2023   0453 WBC: 6.55  No leukocytosis [KL]   0453 Hemoglobin: 12.2  Hemoglobin within normal limits [KL]   0453 Preg Test, Ur: Negative [KL]   0453 SARS-CoV-2 RNA, Amplification, Qual: Negative [KL]   0456 Creatinine: 0.6 [KL]   0456 BUN: 13  Kidney function normal [KL]   0456 POC Cardiac Troponin I: 0.00 [KL]   0456 BP: 128/61  Hemodynamically stable [KL]   0456 Temp: 97.9 °F (36.6 °C)  Afebrile and nontoxic appearing [KL]   0500 Troponin I: 0.006  Troponin within normal limits. [KL]   0509 HIV 1/2 Ag/Ab: Non-reactive [KL]   0533 Marijuana (THC) Metabolite(!): Presumptive Positive [KL]   0533 Cocaine (Metab.)(!): Presumptive Positive [KL]   0533 CRP: 1.1 [KL]   0533 BNP: 24 [KL]   0533 Troponin I: 0.006 [KL]   0656 Chest x-ray without any acute abnormality. [KL]      ED Course User Index  [KL] Mara Lopez MD                 Clinical Impression:   Final diagnoses:  [R07.9] Chest pain        ED Disposition Condition    Discharge Stable          ED Prescriptions       Medication Sig Dispense Start Date End Date Auth. Provider    doxycycline (VIBRAMYCIN) 100 MG Cap Take 1 capsule (100 mg total) by mouth 2 (two) times daily. for 7 days 14 capsule 7/28/2023 8/4/2023 Rico Rincon MD    ibuprofen (ADVIL,MOTRIN) 600 MG tablet Take 1 tablet (600 mg total) by mouth every 6 (six) hours as needed for Pain. 20 tablet 7/28/2023 -- Rico Rincon MD          Follow-up Information       Follow up With Specialties Details Why Contact Info    Adrián Hahn MD Family Medicine   [address] [facility] [address]  [phone]      [facility] - Emergency Dept Emergency Medicine Go to  As needed, If symptoms worsen [address] [facility] [address] [phone]             Mara Lopez MD  Resident  07/29/23 0650       Isabel Meng MD  07/29/23 6642

## 2023-07-28 NOTE — DISCHARGE INSTRUCTIONS
It was a pleasure taking care of you today!     Diagnosis: Chest Pain  -chest pain  -take ibuprofen 600 mg every 8 hours as needed for pain  -return to the emergency department for chest pain, shortness of breath, passing out, inability to walk, fevers    Follow-Up Plan:  - Follow-up with primary care doctor within 3 - 5 days to discuss your recent ER visit and any additional concerns that you may have.  - Additional testing and/or evaluation as directed by your primary doctor    Return to the Emergency Department for symptoms including but not limited to: persistence or worsening of symptoms, shortness of breath or chest pain, inability to drink without vomiting, passing out/fainting/ loss of consciousness, or if you have other concerns.

## 2023-07-28 NOTE — ED NOTES
Malathi Mcpherson, a 48 y.o. female presents to the ED w/ complaint of not being able to take deep breath's, chest pain that is in left shoulder that goes up the left side of neck. Pt states having an immune deficiency/immune infection throughout her body; pt did not know stated she didn't know what it was called. Pt states having body aches all over weakness and tenderness all over body and in her hands and legs.     Triage note:  Chief Complaint   Patient presents with    Chest Pain    Shortness of Breath     Pt arrives via EMS c/o SOB and chest pain.     Review of patient's allergies indicates:  No Known Allergies  Past Medical History:   Diagnosis Date    Acute adjustment disorder with depressed mood     Cutaneous abscess 08/08/2019    Depression     Enlarged liver 04/27/2017    Hep C w/o coma, chronic     Hepatitis B     History of mental disorder 03/22/2017    History of substance abuse

## 2023-08-02 LAB
BACTERIA BLD CULT: NORMAL
BACTERIA BLD CULT: NORMAL

## 2024-06-26 ENCOUNTER — HOSPITAL ENCOUNTER (EMERGENCY)
Facility: HOSPITAL | Age: 49
Discharge: HOME | End: 2024-06-26
Attending: EMERGENCY MEDICINE
Payer: MEDICAID

## 2024-06-26 VITALS
SYSTOLIC BLOOD PRESSURE: 129 MMHG | RESPIRATION RATE: 16 BRPM | HEART RATE: 69 BPM | DIASTOLIC BLOOD PRESSURE: 71 MMHG | OXYGEN SATURATION: 97 % | TEMPERATURE: 98 F | WEIGHT: 130 LBS | BODY MASS INDEX: 19.77 KG/M2

## 2024-06-26 DIAGNOSIS — L98.9 FACIAL SKIN LESION: Primary | ICD-10-CM

## 2024-06-26 DIAGNOSIS — N30.00 ACUTE CYSTITIS WITHOUT HEMATURIA: ICD-10-CM

## 2024-06-26 LAB
ALBUMIN SERPL BCP-MCNC: 3.5 G/DL (ref 3.5–5.2)
ALLENS TEST: NORMAL
ALP SERPL-CCNC: 69 U/L (ref 55–135)
ALT SERPL W/O P-5'-P-CCNC: 15 U/L (ref 10–44)
ANION GAP SERPL CALC-SCNC: 7 MMOL/L (ref 8–16)
AST SERPL-CCNC: 22 U/L (ref 10–40)
BACTERIA #/AREA URNS AUTO: ABNORMAL /HPF
BASOPHILS # BLD AUTO: 0.06 K/UL (ref 0–0.2)
BASOPHILS NFR BLD: 1.5 % (ref 0–1.9)
BILIRUB SERPL-MCNC: 0.2 MG/DL (ref 0.1–1)
BILIRUB UR QL STRIP: NEGATIVE
BUN SERPL-MCNC: 10 MG/DL (ref 6–20)
CALCIUM SERPL-MCNC: 9.1 MG/DL (ref 8.7–10.5)
CHLORIDE SERPL-SCNC: 111 MMOL/L (ref 95–110)
CLARITY UR REFRACT.AUTO: ABNORMAL
CO2 SERPL-SCNC: 23 MMOL/L (ref 23–29)
COLOR UR AUTO: YELLOW
CREAT SERPL-MCNC: 0.7 MG/DL (ref 0.5–1.4)
CRP SERPL-MCNC: 53.4 MG/L (ref 0–8.2)
DIFFERENTIAL METHOD BLD: ABNORMAL
EOSINOPHIL # BLD AUTO: 0.1 K/UL (ref 0–0.5)
EOSINOPHIL NFR BLD: 1.8 % (ref 0–8)
ERYTHROCYTE [DISTWIDTH] IN BLOOD BY AUTOMATED COUNT: 12.8 % (ref 11.5–14.5)
ERYTHROCYTE [SEDIMENTATION RATE] IN BLOOD BY PHOTOMETRIC METHOD: 50 MM/HR (ref 0–36)
EST. GFR  (NO RACE VARIABLE): >60 ML/MIN/1.73 M^2
GLUCOSE SERPL-MCNC: 93 MG/DL (ref 70–110)
GLUCOSE UR QL STRIP: NEGATIVE
HCT VFR BLD AUTO: 45.1 % (ref 37–48.5)
HGB BLD-MCNC: 14.1 G/DL (ref 12–16)
HGB UR QL STRIP: NEGATIVE
IMM GRANULOCYTES # BLD AUTO: 0.02 K/UL (ref 0–0.04)
IMM GRANULOCYTES NFR BLD AUTO: 0.5 % (ref 0–0.5)
KETONES UR QL STRIP: ABNORMAL
LDH SERPL L TO P-CCNC: 1.94 MMOL/L (ref 0.5–2.2)
LEUKOCYTE ESTERASE UR QL STRIP: ABNORMAL
LYMPHOCYTES # BLD AUTO: 1.1 K/UL (ref 1–4.8)
LYMPHOCYTES NFR BLD: 28 % (ref 18–48)
MCH RBC QN AUTO: 32.7 PG (ref 27–31)
MCHC RBC AUTO-ENTMCNC: 31.3 G/DL (ref 32–36)
MCV RBC AUTO: 105 FL (ref 82–98)
MICROSCOPIC COMMENT: ABNORMAL
MONOCYTES # BLD AUTO: 0.3 K/UL (ref 0.3–1)
MONOCYTES NFR BLD: 6.4 % (ref 4–15)
NEUTROPHILS # BLD AUTO: 2.4 K/UL (ref 1.8–7.7)
NEUTROPHILS NFR BLD: 61.8 % (ref 38–73)
NITRITE UR QL STRIP: POSITIVE
NRBC BLD-RTO: 0 /100 WBC
PH UR STRIP: 8 [PH] (ref 5–8)
PLATELET # BLD AUTO: 239 K/UL (ref 150–450)
PMV BLD AUTO: 12.3 FL (ref 9.2–12.9)
POTASSIUM SERPL-SCNC: 4.2 MMOL/L (ref 3.5–5.1)
PROCALCITONIN SERPL IA-MCNC: 0.02 NG/ML
PROT SERPL-MCNC: 7 G/DL (ref 6–8.4)
PROT UR QL STRIP: ABNORMAL
RBC # BLD AUTO: 4.31 M/UL (ref 4–5.4)
RBC #/AREA URNS AUTO: 2 /HPF (ref 0–4)
SAMPLE: NORMAL
SITE: NORMAL
SODIUM SERPL-SCNC: 141 MMOL/L (ref 136–145)
SP GR UR STRIP: 1.03 (ref 1–1.03)
SQUAMOUS #/AREA URNS AUTO: 8 /HPF
URN SPEC COLLECT METH UR: ABNORMAL
WBC # BLD AUTO: 3.89 K/UL (ref 3.9–12.7)
WBC #/AREA URNS AUTO: 15 /HPF (ref 0–5)

## 2024-06-26 PROCEDURE — 87088 URINE BACTERIA CULTURE: CPT | Performed by: EMERGENCY MEDICINE

## 2024-06-26 PROCEDURE — 84145 PROCALCITONIN (PCT): CPT | Performed by: EMERGENCY MEDICINE

## 2024-06-26 PROCEDURE — 83605 ASSAY OF LACTIC ACID: CPT

## 2024-06-26 PROCEDURE — 99284 EMERGENCY DEPT VISIT MOD MDM: CPT | Mod: 25

## 2024-06-26 PROCEDURE — 87077 CULTURE AEROBIC IDENTIFY: CPT | Performed by: EMERGENCY MEDICINE

## 2024-06-26 PROCEDURE — 96365 THER/PROPH/DIAG IV INF INIT: CPT

## 2024-06-26 PROCEDURE — 85652 RBC SED RATE AUTOMATED: CPT | Performed by: EMERGENCY MEDICINE

## 2024-06-26 PROCEDURE — 25000003 PHARM REV CODE 250: Performed by: EMERGENCY MEDICINE

## 2024-06-26 PROCEDURE — 87186 SC STD MICRODIL/AGAR DIL: CPT | Performed by: EMERGENCY MEDICINE

## 2024-06-26 PROCEDURE — 87086 URINE CULTURE/COLONY COUNT: CPT | Performed by: EMERGENCY MEDICINE

## 2024-06-26 PROCEDURE — 99900035 HC TECH TIME PER 15 MIN (STAT)

## 2024-06-26 PROCEDURE — 87040 BLOOD CULTURE FOR BACTERIA: CPT | Mod: 59 | Performed by: EMERGENCY MEDICINE

## 2024-06-26 PROCEDURE — 80053 COMPREHEN METABOLIC PANEL: CPT | Performed by: EMERGENCY MEDICINE

## 2024-06-26 PROCEDURE — 86140 C-REACTIVE PROTEIN: CPT | Performed by: EMERGENCY MEDICINE

## 2024-06-26 PROCEDURE — 81001 URINALYSIS AUTO W/SCOPE: CPT | Performed by: EMERGENCY MEDICINE

## 2024-06-26 PROCEDURE — 63600175 PHARM REV CODE 636 W HCPCS: Performed by: EMERGENCY MEDICINE

## 2024-06-26 PROCEDURE — 85025 COMPLETE CBC W/AUTO DIFF WBC: CPT | Performed by: EMERGENCY MEDICINE

## 2024-06-26 RX ORDER — DOXYCYCLINE 100 MG/1
100 CAPSULE ORAL 2 TIMES DAILY
Qty: 14 CAPSULE | Refills: 0 | Status: SHIPPED | OUTPATIENT
Start: 2024-06-26 | End: 2024-07-03

## 2024-06-26 RX ORDER — CEPHALEXIN 500 MG/1
500 CAPSULE ORAL EVERY 8 HOURS
Qty: 20 CAPSULE | Refills: 0 | Status: SHIPPED | OUTPATIENT
Start: 2024-06-26 | End: 2024-07-03

## 2024-06-26 RX ORDER — MUPIROCIN 20 MG/G
OINTMENT TOPICAL 3 TIMES DAILY
Qty: 22 G | Refills: 2 | Status: SHIPPED | OUTPATIENT
Start: 2024-06-26 | End: 2024-07-01

## 2024-06-26 RX ADMIN — CEFTRIAXONE 1 G: 1 INJECTION, POWDER, FOR SOLUTION INTRAMUSCULAR; INTRAVENOUS at 06:06

## 2024-06-26 NOTE — ED NOTES
Nurses Note -- 4 Eyes      6/26/2024   1:11 PM      Skin assessed during: Admit      [] No Altered Skin Integrity Present    []Prevention Measures Documented      [x] Yes- Altered Skin Integrity Present or Discovered   [x] LDA Added if Not in Epic (Describe Wound)   [x] New Altered Skin Integrity was Present on Admit and Documented in LDA   [x] Wound Image Taken    Wound Care Consulted? No    Attending Nurse:  Anni Martinez RN/Staff Member:   [place]

## 2024-06-26 NOTE — DISCHARGE INSTRUCTIONS
You were seen in the emergency department for concern for an infection on your face.  We are not fully sure what the cause of the infection is.  We have given you a prescription for an antibiotic ointment.  Additionally, we noted you have a urinary tract infection.  We have given you prescription for 2 different antibiotics.  Please take them as directed.  Please follow-up with the dermatologist.  Please return for any new or worsening fevers, chills, worsening skin infections, pain, severe headache, or you become concerned in any other way.  Please follow-up with a dermatologist

## 2024-06-26 NOTE — CONSULTS
[facility] - Emergency Dept  Dermatology  Consult Note    Patient Name: Malathi Mcpherson  MRN: [id number]  Admission Date: 6/26/2024  Hospital Length of Stay: 0 days  Attending Physician: Paul Gonzales MD  Primary Care Provider: Adrián Hahn MD     Inpatient consult to Dermatology  Consult performed by: Nehal Curry MD  Consult ordered by: Paul Gonzales MD  Reason for consult: spots on face        Subjective:     Principal Problem: spots on face    HPI:  49yoF presenting to the ED for spots on face that have been present for two days. Dermatology was consulted for spots on face.  This consult was completed virtually based on photographs.     Past Medical History:   Diagnosis Date    Acute adjustment disorder with depressed mood     Cutaneous abscess 08/08/2019    Depression     Enlarged liver 04/27/2017    Hep C w/o coma, chronic     Hepatitis B     History of mental disorder 03/22/2017    History of substance abuse        History reviewed. No pertinent surgical history.  Family History    None       Tobacco Use    Smoking status: Every Day     Current packs/day: 0.50     Types: Cigarettes    Smokeless tobacco: Never   Substance and Sexual Activity    Alcohol use: Yes     Comment: occaisionally    Drug use: No    Sexual activity: Yes     Partners: Male       Review of patient's allergies indicates:  No Known Allergies    Medications:  Continuous Infusions:  Scheduled Meds:  PRN Meds:    Review of Systems  Objective:     Vital Signs (Most Recent):  Temp: 98.3 °F (36.8 °C) (06/26/24 1430)  Pulse: 68 (06/26/24 1430)  Resp: 15 (06/26/24 1430)  BP: 129/77 (06/26/24 1430)  SpO2: 95 % (06/26/24 1430) Vital Signs (24h Range):  Temp:  [98.3 °F (36.8 °C)-98.5 °F (36.9 °C)] 98.3 °F (36.8 °C)  Pulse:  [68-82] 68  Resp:  [15-17] 15  SpO2:  [95 %-97 %] 95 %  BP: (129-155)/(68-87) 129/77     Weight: 59 kg (130 lb)  Body mass index is 19.77 kg/m².     Physical Exam   Skin:     -Angulated erosions present on BL  cheeks in shape of rectangle/square. Excoration on upper back.   -No evidence of ulcers with undermined borders            Significant Labs: CBC:   Recent Labs   Lab 06/26/24  1340   WBC 3.89*   HGB 14.1   HCT 45.1        CMP:   Recent Labs   Lab 06/26/24  1340      K 4.2   *   CO2 23   GLU 93   BUN 10   CREATININE 0.7   CALCIUM 9.1   PROT 7.0   ALBUMIN 3.5   BILITOT 0.2   ALKPHOS 69   AST 22   ALT 15   ANIONGAP 7*             Assessment/Plan:   #Dermatitis   Looks secondary to external agent.   -recommend muprocin application TID until it heals   -Gentle skin care    Thank you for your interesting consult.   Nehal Curry MD  Dermatology  [facility] - Emergency Dept

## 2024-06-26 NOTE — HPI
49yoF presenting to the ED for spots on face that have been present for two days. Dermatology was consulted for spots on face.  This consult was completed virtually based on photographs.

## 2024-06-26 NOTE — ED PROVIDER NOTES
"Encounter Date: 6/26/2024       History     Chief Complaint   Patient presents with    Facial Abrasions      Pt. Reporting 2 days of a "facial rash" pt. Has three large abrasions to her face. No drainage noted.      49-year-old female with a history hep C, substance abuse presenting with rash to her face.  Patient reports a small patch started on the left side of her cheek 2 days ago with 2 additional patches starting between yesterday around noon until presentation today.  Notes mild itching and discomfort.  Denies fevers, chills, chest pain, shortness of breath.  Denies any other skin lesions.  Reports history of similar unknown skin lesions.  Denies IV drug abuse.      Review of patient's allergies indicates:  No Known Allergies  Past Medical History:   Diagnosis Date    Acute adjustment disorder with depressed mood     Cutaneous abscess 08/08/2019    Depression     Enlarged liver 04/27/2017    Hep C w/o coma, chronic     Hepatitis B     History of mental disorder 03/22/2017    History of substance abuse      History reviewed. No pertinent surgical history.  No family history on file.  Social History     Tobacco Use    Smoking status: Every Day     Current packs/day: 0.50     Types: Cigarettes    Smokeless tobacco: Never   Substance Use Topics    Alcohol use: Yes     Comment: occaisionally    Drug use: No     Review of Systems   Constitutional:  Negative for chills and fever.   Respiratory:  Negative for shortness of breath.    Cardiovascular:  Negative for chest pain.   Gastrointestinal:  Negative for nausea and vomiting.   Genitourinary:  Negative for dysuria.   Musculoskeletal:  Negative for back pain.   Skin:  Positive for rash.   Neurological:  Negative for weakness.       Physical Exam     Initial Vitals [06/26/24 1238]   BP Pulse Resp Temp SpO2   (!) 155/87 82 16 98.5 °F (36.9 °C) 97 %      MAP       --         Physical Exam    Nursing note and vitals reviewed.  Constitutional: She appears well-developed " and well-nourished. She is not diaphoretic. No distress.   HENT:   Head: Normocephalic and atraumatic.   Nose: Nose normal.   Eyes: EOM are normal. Pupils are equal, round, and reactive to light. No scleral icterus.   Neck: Neck supple.   Normal range of motion.  Cardiovascular:  Normal rate, regular rhythm, normal heart sounds and intact distal pulses.     Exam reveals no gallop and no friction rub.       No murmur heard.  Pulmonary/Chest: Breath sounds normal. No stridor. No respiratory distress. She has no wheezes. She has no rhonchi. She has no rales.   Abdominal: Abdomen is soft. Bowel sounds are normal. She exhibits no distension. There is no abdominal tenderness. There is no rebound and no guarding.   Musculoskeletal:         General: No tenderness or edema. Normal range of motion.      Cervical back: Normal range of motion and neck supple.     Neurological: She is alert and oriented to person, place, and time. No cranial nerve deficit. GCS score is 15. GCS eye subscore is 4. GCS verbal subscore is 5. GCS motor subscore is 6.   Skin: Skin is warm and dry. No rash noted.   Psychiatric: She has a normal mood and affect. Her behavior is normal.         ED Course   Procedures  Labs Reviewed   CULTURE, URINE - Abnormal; Notable for the following components:       Result Value    Urine Culture, Routine   (*)     Value: GRAM NEGATIVE ROSARIO  >100,000 cfu/ml  Identification and susceptibility pending      All other components within normal limits    Narrative:     Specimen Source->Urine   CBC W/ AUTO DIFFERENTIAL - Abnormal; Notable for the following components:    WBC 3.89 (*)      (*)     MCH 32.7 (*)     MCHC 31.3 (*)     All other components within normal limits   COMPREHENSIVE METABOLIC PANEL - Abnormal; Notable for the following components:    Chloride 111 (*)     Anion Gap 7 (*)     All other components within normal limits   URINALYSIS, REFLEX TO URINE CULTURE - Abnormal; Notable for the following  components:    Appearance, UA Hazy (*)     Protein, UA Trace (*)     Ketones, UA 1+ (*)     Nitrite, UA Positive (*)     Leukocytes, UA 1+ (*)     All other components within normal limits    Narrative:     Specimen Source->Urine   C-REACTIVE PROTEIN - Abnormal; Notable for the following components:    CRP 53.4 (*)     All other components within normal limits   SEDIMENTATION RATE - Abnormal; Notable for the following components:    Sed Rate 50 (*)     All other components within normal limits   URINALYSIS MICROSCOPIC - Abnormal; Notable for the following components:    WBC, UA 15 (*)     Bacteria Moderate (*)     All other components within normal limits    Narrative:     Specimen Source->Urine   CULTURE, BLOOD    Narrative:     Aerobic and anaerobic   CULTURE, BLOOD    Narrative:     Aerobic and anaerobic   PROCALCITONIN   SEDIMENTATION RATE   ISTAT LACTATE          Imaging Results              X-Ray Chest AP Portable (Final result)  Result time 06/26/24 14:40:43      Final result by Chloe Ho MD (06/26/24 14:40:43)                   Impression:      No acute abnormality.      Electronically signed by: Chloe Ho MD  Date:    06/26/2024  Time:    14:40               Narrative:    EXAMINATION:  XR CHEST AP PORTABLE    CLINICAL HISTORY:  Sepsis, unspecified organism    TECHNIQUE:  Single frontal view of the chest was performed.    COMPARISON:  07/28/2023    FINDINGS:  The lungs are clear, with normal appearance of pulmonary vasculature and no pleural effusion or pneumothorax.    The cardiac silhouette is normal in size. The hilar and mediastinal contours are unremarkable.    Bones are intact.                                       Medications   cefTRIAXone (Rocephin) 1 g in D5W 100 mL IVPB (MB+) (0 g Intravenous Stopped 6/26/24 1908)     Medical Decision Making  Amount and/or Complexity of Data Reviewed  Labs: ordered.  Radiology: ordered.    Risk  Prescription drug management.                           Medical Decision Making:   Initial Assessment:   49-year-old female presenting with facial rash.  On exam she is well-appearing and in no acute distress.  Rash appears to have some degree of desquamation.  Mildly yellow and red bed.  Mildly dry.  No periwound cellulitis.  Small area on back which may be early lesion versus abrasion.  Discussed case with Dermatology who believes that these wounds are somehow due to external causes or self-inflicted.  Patient denies any significant trauma or injury.  Patient notes her urine has a foul odor but denies dysuria.  Denies abdominal pain.  No fevers or other systemic signs.  Urinalysis reveals significant UTI.  Discussed with patient.  Inflammatory markers elevated though could be related to UTI.  Patient given prescription for doxy and Keflex.  Given 1 dose of ceftriaxone.  Additionally given Bactroban ointment.  Discussed return precautions and need to follow-up with dermatology.             Clinical Impression:  Final diagnoses:  [L98.9] Facial skin lesion (Primary)  [N30.00] Acute cystitis without hematuria          ED Disposition Condition    Discharge Stable          ED Prescriptions       Medication Sig Dispense Start Date End Date Auth. Provider    cephALEXin (KEFLEX) 500 MG capsule Take 1 capsule (500 mg total) by mouth every 8 (eight) hours. for 7 days 20 capsule 6/26/2024 7/3/2024 Paul Gonzales MD    mupirocin (BACTROBAN) 2 % ointment Apply topically 3 (three) times daily. for 5 days 22 g 6/26/2024 7/1/2024 Paul Gonzales MD    doxycycline (VIBRAMYCIN) 100 MG Cap Take 1 capsule (100 mg total) by mouth 2 (two) times daily. for 7 days 14 capsule 6/26/2024 7/3/2024 Paul Gonzales MD          Follow-up Information       Follow up With Specialties Details Why Contact Info    Post, Adrián MALDONADO MD Family Medicine Schedule an appointment as soon as possible for a visit   [facility] [facility] [address] [phone]      [facility] - Emergency Dept Emergency Medicine  As needed, If symptoms worsen [address]  [facility] [id number] [phone]             Paul Gonzales MD  06/27/24 5209

## 2024-06-26 NOTE — SUBJECTIVE & OBJECTIVE
Past Medical History:   Diagnosis Date    Acute adjustment disorder with depressed mood     Cutaneous abscess 08/08/2019    Depression     Enlarged liver 04/27/2017    Hep C w/o coma, chronic     Hepatitis B     History of mental disorder 03/22/2017    History of substance abuse        History reviewed. No pertinent surgical history.  Family History    None       Tobacco Use    Smoking status: Every Day     Current packs/day: 0.50     Types: Cigarettes    Smokeless tobacco: Never   Substance and Sexual Activity    Alcohol use: Yes     Comment: occaisionally    Drug use: No    Sexual activity: Yes     Partners: Male       Review of patient's allergies indicates:  No Known Allergies    Medications:  Continuous Infusions:  Scheduled Meds:  PRN Meds:    Review of Systems  Objective:     Vital Signs (Most Recent):  Temp: 98.3 °F (36.8 °C) (06/26/24 1430)  Pulse: 68 (06/26/24 1430)  Resp: 15 (06/26/24 1430)  BP: 129/77 (06/26/24 1430)  SpO2: 95 % (06/26/24 1430) Vital Signs (24h Range):  Temp:  [98.3 °F (36.8 °C)-98.5 °F (36.9 °C)] 98.3 °F (36.8 °C)  Pulse:  [68-82] 68  Resp:  [15-17] 15  SpO2:  [95 %-97 %] 95 %  BP: (129-155)/(68-87) 129/77     Weight: 59 kg (130 lb)  Body mass index is 19.77 kg/m².     Physical Exam   Skin:     -Angulated erosions present on BL cheeks in shape of rectangle/square. Excoration on upper back.   -No evidence of ulcers with undermined borders            Significant Labs: CBC:   Recent Labs   Lab 06/26/24  1340   WBC 3.89*   HGB 14.1   HCT 45.1        CMP:   Recent Labs   Lab 06/26/24  1340      K 4.2   *   CO2 23   GLU 93   BUN 10   CREATININE 0.7   CALCIUM 9.1   PROT 7.0   ALBUMIN 3.5   BILITOT 0.2   ALKPHOS 69   AST 22   ALT 15   ANIONGAP 7*

## 2024-06-26 NOTE — ED TRIAGE NOTES
Pt presents to ED with c/o facial rash x2 days. Pt endorses worsening of rash x past 2 days along with swelling around right eye. Pt endorses dizziness and nausea starting around the same time. Pt denies injury or use of new chemicals/face wash.

## 2024-07-01 LAB
BACTERIA BLD CULT: NORMAL
BACTERIA BLD CULT: NORMAL
BACTERIA UR CULT: ABNORMAL
BACTERIA UR CULT: ABNORMAL

## 2024-11-06 ENCOUNTER — HOSPITAL ENCOUNTER (INPATIENT)
Facility: HOSPITAL | Age: 49
LOS: 20 days | Discharge: NURSING, REHAB OR LONG-TERM CARE | DRG: 064 | End: 2024-11-26
Attending: STUDENT IN AN ORGANIZED HEALTH CARE EDUCATION/TRAINING PROGRAM | Admitting: PSYCHIATRY & NEUROLOGY
Payer: MEDICAID

## 2024-11-06 DIAGNOSIS — R09.02 HYPOXIA: ICD-10-CM

## 2024-11-06 DIAGNOSIS — R13.10 DYSPHAGIA, UNSPECIFIED TYPE: ICD-10-CM

## 2024-11-06 DIAGNOSIS — R94.31 QT PROLONGATION: ICD-10-CM

## 2024-11-06 DIAGNOSIS — F19.20 DRUG DEPENDENCE: ICD-10-CM

## 2024-11-06 DIAGNOSIS — Z91.89 AT RISK FOR LONG QT SYNDROME: ICD-10-CM

## 2024-11-06 DIAGNOSIS — G93.41 ENCEPHALOPATHY, METABOLIC: Primary | ICD-10-CM

## 2024-11-06 DIAGNOSIS — Z91.89 AT RISK FOR PROLONGED QT INTERVAL SYNDROME: ICD-10-CM

## 2024-11-06 DIAGNOSIS — R41.0 DELIRIUM: ICD-10-CM

## 2024-11-06 DIAGNOSIS — I63.9 STROKE: ICD-10-CM

## 2024-11-06 DIAGNOSIS — R29.818 ACUTE FOCAL NEUROLOGICAL DEFICIT: ICD-10-CM

## 2024-11-06 DIAGNOSIS — R56.9 SEIZURES: ICD-10-CM

## 2024-11-06 PROBLEM — I63.539 ACUTE ARTERIAL ISCHEMIC STROKE, MULTIFOCAL, POSTERIOR CIRCULATION: Status: ACTIVE | Noted: 2024-11-06

## 2024-11-06 PROBLEM — G93.6 CYTOTOXIC CEREBRAL EDEMA: Status: ACTIVE | Noted: 2024-11-06

## 2024-11-06 LAB
ABO + RH BLD: NORMAL
ALBUMIN SERPL BCP-MCNC: 4.4 G/DL (ref 3.5–5.2)
ALLENS TEST: ABNORMAL
ALP SERPL-CCNC: 82 U/L (ref 40–150)
ALT SERPL W/O P-5'-P-CCNC: 20 U/L (ref 10–44)
ANION GAP SERPL CALC-SCNC: 12 MMOL/L (ref 8–16)
ANION GAP SERPL CALC-SCNC: 13 MMOL/L (ref 8–16)
AST SERPL-CCNC: 33 U/L (ref 10–40)
BACTERIA #/AREA URNS AUTO: ABNORMAL /HPF
BASOPHILS # BLD AUTO: 0.03 K/UL (ref 0–0.2)
BASOPHILS NFR BLD: 0.2 % (ref 0–1.9)
BILIRUB SERPL-MCNC: 0.4 MG/DL (ref 0.1–1)
BILIRUB UR QL STRIP: NEGATIVE
BLD GP AB SCN CELLS X3 SERPL QL: NORMAL
BUN SERPL-MCNC: 18 MG/DL (ref 6–20)
BUN SERPL-MCNC: 20 MG/DL (ref 6–20)
BUN SERPL-MCNC: 22 MG/DL (ref 6–30)
CALCIUM SERPL-MCNC: 9.3 MG/DL (ref 8.7–10.5)
CALCIUM SERPL-MCNC: 9.6 MG/DL (ref 8.7–10.5)
CHLORIDE SERPL-SCNC: 109 MMOL/L (ref 95–110)
CHLORIDE SERPL-SCNC: 109 MMOL/L (ref 95–110)
CHLORIDE SERPL-SCNC: 110 MMOL/L (ref 95–110)
CHOLEST SERPL-MCNC: 239 MG/DL (ref 120–199)
CHOLEST/HDLC SERPL: 5.7 {RATIO} (ref 2–5)
CLARITY UR REFRACT.AUTO: CLEAR
CO2 SERPL-SCNC: 16 MMOL/L (ref 23–29)
CO2 SERPL-SCNC: 17 MMOL/L (ref 23–29)
COLOR UR AUTO: YELLOW
CREAT SERPL-MCNC: 0.7 MG/DL (ref 0.5–1.4)
CREAT SERPL-MCNC: 0.8 MG/DL (ref 0.5–1.4)
CREAT SERPL-MCNC: 0.9 MG/DL (ref 0.5–1.4)
CREAT SERPL-MCNC: 1 MG/DL (ref 0.5–1.4)
DIFFERENTIAL METHOD BLD: ABNORMAL
EOSINOPHIL # BLD AUTO: 0 K/UL (ref 0–0.5)
EOSINOPHIL NFR BLD: 0 % (ref 0–8)
ERYTHROCYTE [DISTWIDTH] IN BLOOD BY AUTOMATED COUNT: 13.2 % (ref 11.5–14.5)
EST. GFR  (NO RACE VARIABLE): >60 ML/MIN/1.73 M^2
EST. GFR  (NO RACE VARIABLE): >60 ML/MIN/1.73 M^2
ESTIMATED AVG GLUCOSE: 94 MG/DL (ref 68–131)
GLUCOSE SERPL-MCNC: 136 MG/DL (ref 70–110)
GLUCOSE SERPL-MCNC: 158 MG/DL (ref 70–110)
GLUCOSE SERPL-MCNC: 164 MG/DL (ref 70–110)
GLUCOSE UR QL STRIP: NEGATIVE
HBA1C MFR BLD: 4.9 % (ref 4–5.6)
HCO3 UR-SCNC: 17.3 MMOL/L (ref 24–28)
HCT VFR BLD AUTO: 42.8 % (ref 37–48.5)
HCT VFR BLD CALC: 45 %PCV (ref 36–54)
HDLC SERPL-MCNC: 42 MG/DL (ref 40–75)
HDLC SERPL: 17.6 % (ref 20–50)
HGB BLD-MCNC: 14 G/DL (ref 12–16)
HGB UR QL STRIP: ABNORMAL
HYALINE CASTS UR QL AUTO: 0 /LPF
IMM GRANULOCYTES # BLD AUTO: 0.09 K/UL (ref 0–0.04)
IMM GRANULOCYTES NFR BLD AUTO: 0.6 % (ref 0–0.5)
INR PPP: 1 (ref 0.8–1.2)
KETONES UR QL STRIP: ABNORMAL
LDLC SERPL CALC-MCNC: 178.2 MG/DL (ref 63–159)
LEUKOCYTE ESTERASE UR QL STRIP: NEGATIVE
LITHIUM SERPL-SCNC: 1.6 MMOL/L (ref 0.6–1.2)
LYMPHOCYTES # BLD AUTO: 0.7 K/UL (ref 1–4.8)
LYMPHOCYTES NFR BLD: 4.9 % (ref 18–48)
MCH RBC QN AUTO: 29.5 PG (ref 27–31)
MCHC RBC AUTO-ENTMCNC: 32.7 G/DL (ref 32–36)
MCV RBC AUTO: 90 FL (ref 82–98)
MICROSCOPIC COMMENT: ABNORMAL
MONOCYTES # BLD AUTO: 0.7 K/UL (ref 0.3–1)
MONOCYTES NFR BLD: 4.8 % (ref 4–15)
NEUTROPHILS # BLD AUTO: 13.2 K/UL (ref 1.8–7.7)
NEUTROPHILS NFR BLD: 89.5 % (ref 38–73)
NITRITE UR QL STRIP: NEGATIVE
NONHDLC SERPL-MCNC: 197 MG/DL
NRBC BLD-RTO: 0 /100 WBC
OHS QRS DURATION: 76 MS
OHS QTC CALCULATION: 528 MS
PCO2 BLDA: 33 MMHG (ref 35–45)
PH SMN: 7.33 [PH] (ref 7.35–7.45)
PH UR STRIP: 6 [PH] (ref 5–8)
PLATELET # BLD AUTO: 312 K/UL (ref 150–450)
PMV BLD AUTO: 10.1 FL (ref 9.2–12.9)
PO2 BLDA: 47 MMHG (ref 40–60)
POC BE: -9 MMOL/L
POC IONIZED CALCIUM: 1.09 MMOL/L (ref 1.06–1.42)
POC PTINR: 1.3 (ref 0.9–1.2)
POC PTWBT: 13 SEC (ref 9.7–14.3)
POC SATURATED O2: 80 % (ref 95–100)
POC TCO2 (MEASURED): 17 MMOL/L (ref 23–27)
POC TCO2: 18 MMOL/L (ref 24–29)
POCT GLUCOSE: 138 MG/DL (ref 70–110)
POTASSIUM BLD-SCNC: 3.5 MMOL/L (ref 3.5–5.1)
POTASSIUM SERPL-SCNC: 3.5 MMOL/L (ref 3.5–5.1)
POTASSIUM SERPL-SCNC: 3.6 MMOL/L (ref 3.5–5.1)
PROT SERPL-MCNC: 8.1 G/DL (ref 6–8.4)
PROT UR QL STRIP: ABNORMAL
PROTHROMBIN TIME: 10.9 SEC (ref 9–12.5)
RBC # BLD AUTO: 4.75 M/UL (ref 4–5.4)
RBC #/AREA URNS AUTO: 5 /HPF (ref 0–4)
SAMPLE: ABNORMAL
SAMPLE: NORMAL
SITE: ABNORMAL
SODIUM BLD-SCNC: 140 MMOL/L (ref 136–145)
SODIUM SERPL-SCNC: 138 MMOL/L (ref 136–145)
SODIUM SERPL-SCNC: 138 MMOL/L (ref 136–145)
SP GR UR STRIP: >1.03 (ref 1–1.03)
SPECIMEN OUTDATE: NORMAL
SQUAMOUS #/AREA URNS AUTO: 1 /HPF
TRIGL SERPL-MCNC: 94 MG/DL (ref 30–150)
TSH SERPL DL<=0.005 MIU/L-ACNC: 0.53 UIU/ML (ref 0.4–4)
URN SPEC COLLECT METH UR: ABNORMAL
WBC # BLD AUTO: 14.77 K/UL (ref 3.9–12.7)
WBC #/AREA URNS AUTO: 2 /HPF (ref 0–5)

## 2024-11-06 PROCEDURE — 25500020 PHARM REV CODE 255: Performed by: STUDENT IN AN ORGANIZED HEALTH CARE EDUCATION/TRAINING PROGRAM

## 2024-11-06 PROCEDURE — 94761 N-INVAS EAR/PLS OXIMETRY MLT: CPT | Mod: XB

## 2024-11-06 PROCEDURE — 82565 ASSAY OF CREATININE: CPT

## 2024-11-06 PROCEDURE — 25000003 PHARM REV CODE 250

## 2024-11-06 PROCEDURE — 84443 ASSAY THYROID STIM HORMONE: CPT | Performed by: STUDENT IN AN ORGANIZED HEALTH CARE EDUCATION/TRAINING PROGRAM

## 2024-11-06 PROCEDURE — 51702 INSERT TEMP BLADDER CATH: CPT

## 2024-11-06 PROCEDURE — 99291 CRITICAL CARE FIRST HOUR: CPT | Mod: ,,, | Performed by: STUDENT IN AN ORGANIZED HEALTH CARE EDUCATION/TRAINING PROGRAM

## 2024-11-06 PROCEDURE — 36415 COLL VENOUS BLD VENIPUNCTURE: CPT

## 2024-11-06 PROCEDURE — 27200966 HC CLOSED SUCTION SYSTEM

## 2024-11-06 PROCEDURE — 99900026 HC AIRWAY MAINTENANCE (STAT)

## 2024-11-06 PROCEDURE — 94002 VENT MGMT INPAT INIT DAY: CPT

## 2024-11-06 PROCEDURE — 80061 LIPID PANEL: CPT | Performed by: STUDENT IN AN ORGANIZED HEALTH CARE EDUCATION/TRAINING PROGRAM

## 2024-11-06 PROCEDURE — 63600175 PHARM REV CODE 636 W HCPCS: Mod: JZ,JG | Performed by: STUDENT IN AN ORGANIZED HEALTH CARE EDUCATION/TRAINING PROGRAM

## 2024-11-06 PROCEDURE — 25000003 PHARM REV CODE 250: Performed by: STUDENT IN AN ORGANIZED HEALTH CARE EDUCATION/TRAINING PROGRAM

## 2024-11-06 PROCEDURE — 80047 BASIC METABLC PNL IONIZED CA: CPT

## 2024-11-06 PROCEDURE — 85025 COMPLETE CBC W/AUTO DIFF WBC: CPT | Performed by: STUDENT IN AN ORGANIZED HEALTH CARE EDUCATION/TRAINING PROGRAM

## 2024-11-06 PROCEDURE — 80048 BASIC METABOLIC PNL TOTAL CA: CPT | Mod: XB

## 2024-11-06 PROCEDURE — 31500 INSERT EMERGENCY AIRWAY: CPT

## 2024-11-06 PROCEDURE — 81001 URINALYSIS AUTO W/SCOPE: CPT

## 2024-11-06 PROCEDURE — 94799 UNLISTED PULMONARY SVC/PX: CPT

## 2024-11-06 PROCEDURE — 93010 ELECTROCARDIOGRAM REPORT: CPT | Mod: ,,, | Performed by: INTERNAL MEDICINE

## 2024-11-06 PROCEDURE — 85610 PROTHROMBIN TIME: CPT

## 2024-11-06 PROCEDURE — 83036 HEMOGLOBIN GLYCOSYLATED A1C: CPT

## 2024-11-06 PROCEDURE — 80178 ASSAY OF LITHIUM: CPT

## 2024-11-06 PROCEDURE — 86901 BLOOD TYPING SEROLOGIC RH(D): CPT

## 2024-11-06 PROCEDURE — 5A1955Z RESPIRATORY VENTILATION, GREATER THAN 96 CONSECUTIVE HOURS: ICD-10-PCS | Performed by: PSYCHIATRY & NEUROLOGY

## 2024-11-06 PROCEDURE — 96374 THER/PROPH/DIAG INJ IV PUSH: CPT

## 2024-11-06 PROCEDURE — 99285 EMERGENCY DEPT VISIT HI MDM: CPT | Mod: 25

## 2024-11-06 PROCEDURE — 99900035 HC TECH TIME PER 15 MIN (STAT)

## 2024-11-06 PROCEDURE — 99291 CRITICAL CARE FIRST HOUR: CPT | Mod: ,,, | Performed by: PSYCHIATRY & NEUROLOGY

## 2024-11-06 PROCEDURE — 99284 EMERGENCY DEPT VISIT MOD MDM: CPT | Mod: ,,, | Performed by: PHYSICIAN ASSISTANT

## 2024-11-06 PROCEDURE — 80053 COMPREHEN METABOLIC PANEL: CPT | Performed by: STUDENT IN AN ORGANIZED HEALTH CARE EDUCATION/TRAINING PROGRAM

## 2024-11-06 PROCEDURE — 93005 ELECTROCARDIOGRAM TRACING: CPT

## 2024-11-06 PROCEDURE — 20000000 HC ICU ROOM

## 2024-11-06 PROCEDURE — 27100171 HC OXYGEN HIGH FLOW UP TO 24 HOURS

## 2024-11-06 PROCEDURE — 85610 PROTHROMBIN TIME: CPT | Performed by: STUDENT IN AN ORGANIZED HEALTH CARE EDUCATION/TRAINING PROGRAM

## 2024-11-06 PROCEDURE — 82803 BLOOD GASES ANY COMBINATION: CPT

## 2024-11-06 RX ORDER — BUPROPION HYDROCHLORIDE 150 MG/1
150 TABLET ORAL DAILY
Status: ON HOLD | COMMUNITY
Start: 2024-10-28 | End: 2024-11-26 | Stop reason: HOSPADM

## 2024-11-06 RX ORDER — MAGNESIUM SULFATE HEPTAHYDRATE 40 MG/ML
4 INJECTION, SOLUTION INTRAVENOUS
Status: DISCONTINUED | OUTPATIENT
Start: 2024-11-06 | End: 2024-11-07

## 2024-11-06 RX ORDER — ATORVASTATIN CALCIUM 40 MG/1
80 TABLET, FILM COATED ORAL DAILY
Status: DISCONTINUED | OUTPATIENT
Start: 2024-11-06 | End: 2024-11-07

## 2024-11-06 RX ORDER — BISACODYL 10 MG/1
10 SUPPOSITORY RECTAL DAILY PRN
Status: DISCONTINUED | OUTPATIENT
Start: 2024-11-06 | End: 2024-11-09

## 2024-11-06 RX ORDER — ROCURONIUM BROMIDE 10 MG/ML
100 INJECTION, SOLUTION INTRAVENOUS ONCE
Status: COMPLETED | OUTPATIENT
Start: 2024-11-06 | End: 2024-11-06

## 2024-11-06 RX ORDER — POTASSIUM CHLORIDE 7.45 MG/ML
40 INJECTION INTRAVENOUS
Status: DISCONTINUED | OUTPATIENT
Start: 2024-11-06 | End: 2024-11-07

## 2024-11-06 RX ORDER — FLUOXETINE 20 MG/5ML
60 SOLUTION ORAL DAILY
Status: DISCONTINUED | OUTPATIENT
Start: 2024-11-06 | End: 2024-11-07

## 2024-11-06 RX ORDER — FLUOXETINE HYDROCHLORIDE 20 MG/1
60 CAPSULE ORAL DAILY
Status: DISCONTINUED | OUTPATIENT
Start: 2024-11-06 | End: 2024-11-06

## 2024-11-06 RX ORDER — QUETIAPINE FUMARATE 200 MG/1
200 TABLET, FILM COATED ORAL NIGHTLY
COMMUNITY
Start: 2024-10-28

## 2024-11-06 RX ORDER — ASPIRIN 325 MG
325 TABLET ORAL ONCE
Status: COMPLETED | OUTPATIENT
Start: 2024-11-06 | End: 2024-11-06

## 2024-11-06 RX ORDER — MIRTAZAPINE 15 MG/1
7.5 TABLET, FILM COATED ORAL NIGHTLY
COMMUNITY

## 2024-11-06 RX ORDER — SODIUM CHLORIDE 0.9 % (FLUSH) 0.9 %
10 SYRINGE (ML) INJECTION
Status: DISCONTINUED | OUTPATIENT
Start: 2024-11-06 | End: 2024-11-26 | Stop reason: HOSPADM

## 2024-11-06 RX ORDER — FENTANYL CITRATE-0.9 % NACL/PF 10 MCG/ML
0-150 PLASTIC BAG, INJECTION (ML) INTRAVENOUS CONTINUOUS
Status: DISCONTINUED | OUTPATIENT
Start: 2024-11-06 | End: 2024-11-10

## 2024-11-06 RX ORDER — FENTANYL CITRATE 50 UG/ML
100 INJECTION, SOLUTION INTRAMUSCULAR; INTRAVENOUS
Status: DISCONTINUED | OUTPATIENT
Start: 2024-11-06 | End: 2024-11-06

## 2024-11-06 RX ORDER — LABETALOL HCL 20 MG/4 ML
SYRINGE (ML) INTRAVENOUS CODE/TRAUMA/SEDATION MEDICATION
Status: DISCONTINUED | OUTPATIENT
Start: 2024-11-06 | End: 2024-11-06

## 2024-11-06 RX ORDER — CARVEDILOL 6.25 MG/1
6.25 TABLET ORAL 2 TIMES DAILY
COMMUNITY
Start: 2024-10-28

## 2024-11-06 RX ORDER — FLUOXETINE HYDROCHLORIDE 20 MG/1
60 CAPSULE ORAL DAILY
COMMUNITY

## 2024-11-06 RX ORDER — ETOMIDATE 2 MG/ML
20 INJECTION INTRAVENOUS
Status: COMPLETED | OUTPATIENT
Start: 2024-11-06 | End: 2024-11-06

## 2024-11-06 RX ORDER — CALCIUM GLUCONATE 20 MG/ML
2 INJECTION, SOLUTION INTRAVENOUS
Status: DISCONTINUED | OUTPATIENT
Start: 2024-11-06 | End: 2024-11-07

## 2024-11-06 RX ORDER — FAMOTIDINE 20 MG/1
20 TABLET, FILM COATED ORAL 2 TIMES DAILY
Status: DISCONTINUED | OUTPATIENT
Start: 2024-11-06 | End: 2024-11-10

## 2024-11-06 RX ORDER — CALCIUM GLUCONATE 20 MG/ML
1 INJECTION, SOLUTION INTRAVENOUS
Status: DISCONTINUED | OUTPATIENT
Start: 2024-11-06 | End: 2024-11-07

## 2024-11-06 RX ORDER — POTASSIUM CHLORIDE 7.45 MG/ML
80 INJECTION INTRAVENOUS
Status: DISCONTINUED | OUTPATIENT
Start: 2024-11-06 | End: 2024-11-07

## 2024-11-06 RX ORDER — CALCIUM GLUCONATE 20 MG/ML
3 INJECTION, SOLUTION INTRAVENOUS
Status: DISCONTINUED | OUTPATIENT
Start: 2024-11-06 | End: 2024-11-07

## 2024-11-06 RX ORDER — FOLIC ACID 1 MG/1
1 TABLET ORAL DAILY
Status: DISCONTINUED | OUTPATIENT
Start: 2024-11-07 | End: 2024-11-11

## 2024-11-06 RX ORDER — LABETALOL HYDROCHLORIDE 5 MG/ML
INJECTION, SOLUTION INTRAVENOUS
Status: DISCONTINUED
Start: 2024-11-06 | End: 2024-11-06

## 2024-11-06 RX ORDER — LITHIUM CARBONATE 300 MG/1
300 CAPSULE ORAL 3 TIMES DAILY
Status: ON HOLD | COMMUNITY
End: 2024-11-26 | Stop reason: HOSPADM

## 2024-11-06 RX ORDER — NICARDIPINE HYDROCHLORIDE 0.2 MG/ML
INJECTION INTRAVENOUS
Status: DISCONTINUED
Start: 2024-11-06 | End: 2024-11-06

## 2024-11-06 RX ORDER — ASPIRIN 325 MG
325 TABLET ORAL DAILY
Status: DISCONTINUED | OUTPATIENT
Start: 2024-11-06 | End: 2024-11-06

## 2024-11-06 RX ORDER — POTASSIUM CHLORIDE 7.45 MG/ML
60 INJECTION INTRAVENOUS
Status: DISCONTINUED | OUTPATIENT
Start: 2024-11-06 | End: 2024-11-07

## 2024-11-06 RX ORDER — THIAMINE HCL 100 MG
100 TABLET ORAL DAILY
Status: DISCONTINUED | OUTPATIENT
Start: 2024-11-07 | End: 2024-11-11

## 2024-11-06 RX ORDER — MAGNESIUM SULFATE HEPTAHYDRATE 40 MG/ML
2 INJECTION, SOLUTION INTRAVENOUS
Status: DISCONTINUED | OUTPATIENT
Start: 2024-11-06 | End: 2024-11-07

## 2024-11-06 RX ADMIN — ETOMIDATE 20 MG: 2 INJECTION INTRAVENOUS at 09:11

## 2024-11-06 RX ADMIN — IOHEXOL 75 ML: 350 INJECTION, SOLUTION INTRAVENOUS at 08:11

## 2024-11-06 RX ADMIN — Medication 20 MG: at 09:11

## 2024-11-06 RX ADMIN — ROCURONIUM BROMIDE 100 MG: 10 INJECTION INTRAVENOUS at 09:11

## 2024-11-06 RX ADMIN — ATORVASTATIN CALCIUM 80 MG: 40 TABLET, FILM COATED ORAL at 03:11

## 2024-11-06 RX ADMIN — FAMOTIDINE 20 MG: 20 TABLET ORAL at 10:11

## 2024-11-06 RX ADMIN — Medication 25 MCG/HR: at 10:11

## 2024-11-06 RX ADMIN — FLUOXETINE HYDROCHLORIDE 60 MG: 20 SOLUTION ORAL at 05:11

## 2024-11-06 RX ADMIN — ASPIRIN 325 MG ORAL TABLET 325 MG: 325 PILL ORAL at 03:11

## 2024-11-06 NOTE — SUBJECTIVE & OBJECTIVE
Medications Prior to Admission   Medication Sig Dispense Refill Last Dose/Taking    albuterol (PROVENTIL) 2.5 mg /3 mL (0.083 %) nebulizer solution Take 2.5 mg by nebulization every 6 (six) hours as needed for Wheezing. Rescue       amoxicillin (AMOXIL) 875 MG tablet Take 1 tablet (875 mg total) by mouth 2 (two) times daily. 14 tablet 0     atenolol (TENORMIN) 25 MG tablet Take 25 mg by mouth once daily.       bacitracin 500 unit/gram Oint Apply topically 2 (two) times daily. 30 g 0     busPIRone (BUSPAR) 5 MG Tab Take 5 mg by mouth 2 (two) times daily.       cephALEXin (KEFLEX) 500 MG capsule Take 500 mg by mouth 4 (four) times daily.       cetirizine (ZYRTEC) 10 MG tablet Take 10 mg by mouth once daily.       diltiaZEM (CARDIZEM CD) 240 MG 24 hr capsule Take 240 mg by mouth once daily.       fluticasone (FLONASE) 50 mcg/actuation nasal spray 1 spray (50 mcg total) by Each Nare route 2 (two) times daily as needed for Rhinitis. 15 g 0     furosemide (LASIX ORAL) Take by mouth.       gabapentin (NEURONTIN) 300 MG capsule Take 300 mg by mouth once daily.       hydrOXYzine (ATARAX) 50 MG tablet Take 50 mg by mouth once daily.       hydrOXYzine pamoate (VISTARIL) 25 MG Cap Take 25 mg by mouth 3 (three) times daily as needed.       ibuprofen (ADVIL,MOTRIN) 600 MG tablet Take 1 tablet (600 mg total) by mouth every 6 (six) hours as needed for Pain. 20 tablet 0     ibuprofen (ADVIL,MOTRIN) 800 MG tablet Take 1 tablet (800 mg total) by mouth every 6 (six) hours as needed for Pain. 20 tablet 0     lactulose (CHRONULAC) 10 gram/15 mL solution Take by mouth once daily.       linaclotide (LINZESS) 145 mcg Cap capsule Take 145 mcg by mouth once daily.       mupirocin (BACTROBAN) 2 % ointment Apply topically 3 (three) times daily. 22 g 0     ondansetron (ZOFRAN) 4 MG tablet Take 1 tablet (4 mg total) by mouth every 6 (six) hours as needed for Nausea. 20 tablet 0     pantoprazole (PROTONIX) 40 mg GrPS Take 40 mg by mouth once  daily.       promethazine (PHENERGAN) 12.5 MG Tab Take 25 mg by mouth 4 (four) times daily.       QUEtiapine (SEROQUEL) 25 MG Tab Take by mouth.       SPIRONOLACTONE ORAL Take by mouth.       traZODone (DESYREL) 50 MG tablet Take 50 mg by mouth every evening.       venlafaxine (EFFEXOR XR) 150 MG Cp24 Take 75 mg by mouth once daily.          Review of patient's allergies indicates:  No Known Allergies    Past Medical History:   Diagnosis Date    Acute adjustment disorder with depressed mood     Cutaneous abscess 08/08/2019    Depression     Enlarged liver 04/27/2017    Hep C w/o coma, chronic     Hepatitis B     History of mental disorder 03/22/2017    History of substance abuse      History reviewed. No pertinent surgical history.  Family History    None       Tobacco Use    Smoking status: Every Day     Current packs/day: 0.50     Types: Cigarettes    Smokeless tobacco: Never   Substance and Sexual Activity    Alcohol use: Yes     Comment: occaisionally    Drug use: No    Sexual activity: Yes     Partners: Male     Review of Systems  Objective:     Weight: 59 kg (130 lb)  Body mass index is 19.77 kg/m².  Vital Signs (Most Recent):  Temp: 97.7 °F (36.5 °C) (11/06/24 0836)  Pulse: 80 (11/06/24 1017)  Resp: 18 (11/06/24 1017)  BP: 136/83 (11/06/24 1017)  SpO2: 100 % (11/06/24 1146) Vital Signs (24h Range):  Temp:  [97.7 °F (36.5 °C)] 97.7 °F (36.5 °C)  Pulse:  [62-88] 80  Resp:  [10-18] 18  SpO2:  [75 %-100 %] 100 %  BP: (131-173)/(75-90) 136/83                Vent Mode: A/C  Oxygen Concentration (%):  [50] 50  Resp Rate Total:  [18 br/min-31 br/min] 31 br/min  Vt Set:  [450 mL] 450 mL  PEEP/CPAP:  [5 cmH20] 5 cmH20  Mean Airway Pressure:  [8.1 cmH20-8.6 cmH20] 8.1 cmH20             NG/OG Tube 11/06/24 1000 10 Fr. Right mouth (Active)            Urethral Catheter 11/06/24 1047 Double-lumen 16 Fr. (Active)          Physical Exam     Neurosurgery Physical Exam    General: well developed, well nourished, no acute  distress  Head: normocephalic  GCS: Motor: 5/Verbal: T/Eyes: 1 GCS Total: 7 T  Cranial nerves: positive cough, gag, corneal and oculocephalic reflex  Eyes: pupils 2 mm equal minimally reactive to light.   Motor Strength: not following commands. Localizes on left, w/d RLE, no movement to stimuli in RUE  Skin: Skin is warm, dry and intact.      Significant Labs:  Recent Labs   Lab 11/06/24  0854   *      K 3.5      CO2 16*   BUN 20   CREATININE 1.0   CALCIUM 9.3     Recent Labs   Lab 11/06/24  0854 11/06/24  0855   WBC 14.77*  --    HGB 14.0  --    HCT 42.8 45     --      Recent Labs   Lab 11/06/24  0851 11/06/24  0854   INR 1.3* 1.0     Microbiology Results (last 7 days)       ** No results found for the last 168 hours. **          Recent Lab Results  (Last 5 results in the past 24 hours)        11/06/24  1155   11/06/24  0857   11/06/24  0855   11/06/24  0854   11/06/24  0853        Albumin       4.4         ALP       82         Allens Test   N/A             ALT       20         Anion Gap       13         AST       33         Baso #       0.03         Basophil %       0.2         BILIRUBIN TOTAL       0.4  Comment: For infants and newborns, interpretation of results should be based  on gestational age, weight and in agreement with clinical  observations.    Premature Infant recommended reference ranges:  Up to 24 hours.............<8.0 mg/dL  Up to 48 hours............<12.0 mg/dL  3-5 days..................<15.0 mg/dL  6-29 days.................<15.0 mg/dL           Site   Other             BUN       20         Calcium       9.3         Chloride       109         Cholesterol Total       239  Comment: The National Cholesterol Education Program (NCEP) has set the  following guidelines (reference ranges) for Cholesterol:  Optimal.....................<200 mg/dL  Borderline High.............200-239 mg/dL  High........................> or = 240 mg/dL           CO2       16         Creatinine        1.0         Differential Method       Automated         eGFR       >60.0         Eos #       0.0         Eos %       0.0         Glucose       158         Gran # (ANC)       13.2         Gran %       89.5         HDL       42  Comment: The National Cholesterol Education Program (NCEP) has set the  following guidelines (reference values) for HDL Cholesterol:  Low...............<40 mg/dL  Optimal...........>60 mg/dL           HDL/Cholesterol Ratio       17.6         Hematocrit       42.8         Hemoglobin       14.0         Immature Grans (Abs)       0.09  Comment: Mild elevation in immature granulocytes is non specific and   can be seen in a variety of conditions including stress response,   acute inflammation, trauma and pregnancy. Correlation with other   laboratory and clinical findings is essential.           Immature Granulocytes       0.6         INR       1.0  Comment: Coumadin Therapy:  2.0 - 3.0 for INR for all indicators except mechanical heart valves  and antiphospholipid syndromes which should use 2.5 - 3.5.           LDL Cholesterol       178.2  Comment: The National Cholesterol Education Program (NCEP) has set the  following guidelines (reference values) for LDL Cholesterol:  Optimal.......................<130 mg/dL  Borderline High...............130-159 mg/dL  High..........................160-189 mg/dL  Very High.....................>190 mg/dL           Lymph #       0.7         Lymph %       4.9         MCH       29.5         MCHC       32.7         MCV       90         Mono #       0.7         Mono %       4.8         MPV       10.1         Non-HDL Cholesterol       197  Comment: Risk category and Non-HDL cholesterol goals:  Coronary heart disease (CHD)or equivalent (10-year risk of CHD >20%):  Non-HDL cholesterol goal     <130 mg/dL  Two or more CHD risk factors and 10-year risk of CHD <= 20%:  Non-HDL cholesterol goal     <160 mg/dL  0 to 1 CHD risk factor:  Non-HDL cholesterol goal     <190  mg/dL           nRBC       0         Platelet Count       312         POC BE   -9             POC BUN     22           POC Chloride     110           POC Creatinine     0.9     0.8       POC Glucose     164           POC HCO3   17.3             POC Hematocrit     45           POC Ionized Calcium     1.09           POC PCO2   33.0             POC PH   7.329             POC PO2   47             POC Potassium     3.5           POC SATURATED O2   80             POC Sodium     140           POC TCO2   18             POC TCO2 (MEASURED)     17           POCT Glucose 138               Potassium       3.5         PROTEIN TOTAL       8.1         PT       10.9         RBC       4.75         RDW       13.2         Sample   VENOUS   FILIBERTO     unknown       Sodium       138         Total Cholesterol/HDL Ratio       5.7         Triglycerides       94  Comment: The National Cholesterol Education Program (NCEP) has set the  following guidelines (reference values) for triglycerides:  Normal......................<150 mg/dL  Borderline High.............150-199 mg/dL  High........................200-499 mg/dL           TSH       0.525         WBC       14.77                              All pertinent labs from the last 24 hours have been reviewed.    Significant Diagnostics:  I have reviewed all pertinent imaging results/findings within the past 24 hours.

## 2024-11-06 NOTE — HPI
49-year-old female with a history of reported polysubstance use, reported withdrawal seizures in the past, hepatitis-C, depression who presents to the ED for altered mental status. She was found lying face down in her room at home today by family members. She was last seen normal sometime last night. EMS was called and upon their arrival she was found to be largely unresponsive though would wake up to pain. Blood sugar in the field was in the 100s. Vital signs were otherwise stable. A nasal trumpet was placed in the field. She was noted to respond to painful stimulus in the left arm, left leg and right leg but would not move her right arm. She was also noted to have an abrasion/contusion to the right temporal area. GCS 11 on admission. Patient intubated shortly after arrival for respiratory distress, O2 sats 70s. CTA head/neck with large left PCA territory infarcts, no hemorrhage. Thrombus and occlusion in right vertebral artery, moderate stenosis of left vertebral artery. No cervical spine fracture. NSGY consulted to consider early suboccipital craniotomy. Patient's mother at bedside.

## 2024-11-06 NOTE — SUBJECTIVE & OBJECTIVE
Past Medical History:   Diagnosis Date    Acute adjustment disorder with depressed mood     Cutaneous abscess 08/08/2019    Depression     Enlarged liver 04/27/2017    Hep C w/o coma, chronic     Hepatitis B     History of mental disorder 03/22/2017    History of substance abuse      History reviewed. No pertinent surgical history.   No current facility-administered medications on file prior to encounter.     Current Outpatient Medications on File Prior to Encounter   Medication Sig Dispense Refill    buPROPion (WELLBUTRIN XL) 150 MG TB24 tablet Take 150 mg by mouth once daily.      carvediloL (COREG) 6.25 MG tablet Take 6.25 mg by mouth 2 (two) times daily.      QUEtiapine (SEROQUEL) 200 MG Tab Take 200 mg by mouth every evening.      albuterol (PROVENTIL) 2.5 mg /3 mL (0.083 %) nebulizer solution Take 2.5 mg by nebulization every 6 (six) hours as needed for Wheezing. Rescue      FLUoxetine 20 MG capsule Take 60 mg by mouth once daily.      fluticasone (FLONASE) 50 mcg/actuation nasal spray 1 spray (50 mcg total) by Each Nare route 2 (two) times daily as needed for Rhinitis. 15 g 0    gabapentin (NEURONTIN) 300 MG capsule Take 300 mg by mouth 3 (three) times daily.      hydrOXYzine (ATARAX) 50 MG tablet Take 50 mg by mouth 3 (three) times daily as needed for Anxiety.      hydrOXYzine pamoate (VISTARIL) 25 MG Cap Take 25 mg by mouth 3 (three) times daily as needed.      lactulose (CHRONULAC) 10 gram/15 mL solution Take by mouth once daily.      lithium (ESKALITH) 300 MG capsule Take 300 mg by mouth 3 (three) times daily.      mirtazapine (REMERON) 15 MG tablet Take 7.5 mg by mouth every evening.      [DISCONTINUED] amoxicillin (AMOXIL) 875 MG tablet Take 1 tablet (875 mg total) by mouth 2 (two) times daily. 14 tablet 0    [DISCONTINUED] atenolol (TENORMIN) 25 MG tablet Take 25 mg by mouth once daily.      [DISCONTINUED] bacitracin 500 unit/gram Oint Apply topically 2 (two) times daily. 30 g 0    [DISCONTINUED]  busPIRone (BUSPAR) 5 MG Tab Take 5 mg by mouth 2 (two) times daily.      [DISCONTINUED] cephALEXin (KEFLEX) 500 MG capsule Take 500 mg by mouth 4 (four) times daily.      [DISCONTINUED] cetirizine (ZYRTEC) 10 MG tablet Take 10 mg by mouth once daily.      [DISCONTINUED] diltiaZEM (CARDIZEM CD) 240 MG 24 hr capsule Take 240 mg by mouth once daily.      [DISCONTINUED] furosemide (LASIX ORAL) Take by mouth.      [DISCONTINUED] ibuprofen (ADVIL,MOTRIN) 600 MG tablet Take 1 tablet (600 mg total) by mouth every 6 (six) hours as needed for Pain. 20 tablet 0    [DISCONTINUED] ibuprofen (ADVIL,MOTRIN) 800 MG tablet Take 1 tablet (800 mg total) by mouth every 6 (six) hours as needed for Pain. 20 tablet 0    [DISCONTINUED] linaclotide (LINZESS) 145 mcg Cap capsule Take 145 mcg by mouth once daily.      [DISCONTINUED] mupirocin (BACTROBAN) 2 % ointment Apply topically 3 (three) times daily. 22 g 0    [DISCONTINUED] ondansetron (ZOFRAN) 4 MG tablet Take 1 tablet (4 mg total) by mouth every 6 (six) hours as needed for Nausea. 20 tablet 0    [DISCONTINUED] pantoprazole (PROTONIX) 40 mg GrPS Take 40 mg by mouth once daily.      [DISCONTINUED] promethazine (PHENERGAN) 12.5 MG Tab Take 25 mg by mouth 4 (four) times daily.      [DISCONTINUED] QUEtiapine (SEROQUEL) 25 MG Tab Take 200 mg by mouth every evening.      [DISCONTINUED] SPIRONOLACTONE ORAL Take by mouth.      [DISCONTINUED] traZODone (DESYREL) 50 MG tablet Take 50 mg by mouth every evening.      [DISCONTINUED] venlafaxine (EFFEXOR XR) 150 MG Cp24 Take 75 mg by mouth once daily.        Allergies: Patient has no known allergies.  No family history on file.  Social History     Tobacco Use    Smoking status: Every Day     Current packs/day: 0.50     Types: Cigarettes    Smokeless tobacco: Never   Substance Use Topics    Alcohol use: Yes     Comment: occaisionally    Drug use: No     Review of Systems   Unable to perform ROS: Intubated     Objective:     Vitals:    Temp: 97.8 °F  (36.6 °C)  Pulse: 67  BP: 108/65  MAP (mmHg): 81  Resp: (!) 22  SpO2: 100 %  Oxygen Concentration (%): 50  Vent Mode: A/C  Set Rate: 18 BPM  Vt Set: 450 mL  PEEP/CPAP: 5 cmH20  Peak Airway Pressure: 17 cmH20  Mean Airway Pressure: 8.1 cmH20  Plateau Pressure: 0 cmH20    Temp  Min: 97.7 °F (36.5 °C)  Max: 97.8 °F (36.6 °C)  Pulse  Min: 62  Max: 88  BP  Min: 108/65  Max: 173/78  MAP (mmHg)  Min: 81  Max: 112  Resp  Min: 10  Max: 28  SpO2  Min: 75 %  Max: 100 %  Oxygen Concentration (%)  Min: 50  Max: 50    No intake/output data recorded.            Physical Exam  Vitals and nursing note reviewed.   Constitutional:       General: She is not in acute distress.     Comments: Intubated and sedated.   HENT:      Head: Normocephalic and atraumatic.   Cardiovascular:      Rate and Rhythm: Normal rate and regular rhythm.      Heart sounds: Normal heart sounds.   Pulmonary:      Effort: No respiratory distress.      Breath sounds: Normal breath sounds.   Abdominal:      General: Abdomen is flat.      Palpations: Abdomen is soft.   Musculoskeletal:      Right lower leg: No edema.      Left lower leg: No edema.   Skin:     General: Skin is warm and dry.   Neurological:      GCS: GCS eye subscore is 1. GCS verbal subscore is 1. GCS motor subscore is 4.      Comments: Pt intubated and sedated. Does not follow any commands or track with eyes. Pt does withdraw to painful stimuli; LLE with more movement than R extremities. Minimal cough response.

## 2024-11-06 NOTE — ED PROVIDER NOTES
Encounter Date: 11/6/2024       History     Chief Complaint   Patient presents with    Seizures     Arrives via EMS after being found on ground by family this morning. Pt has hx of seizure and drug use per EMS. Responsive to pain.      49-year-old female with a history of reported polysubstance use, reported withdrawal seizures in the past, hepatitis-C, depression who presents to the ED for altered mental status.  She was found lying face down in her room at home today by family members.  She was last seen normal sometime last evening.  EMS was called and upon their arrival she was found to be largely unresponsive though would wake up to pain.  Blood sugar in the field was in the 100s.  Vital signs were otherwise stable.  A nasal trumpet was placed in the field.  She was noted to respond to painful stimulus in the left arm, left leg and right leg but would not move her right arm.  She was also noted to have an abrasion/contusion to the right temporal area.            Review of patient's allergies indicates:  No Known Allergies  Past Medical History:   Diagnosis Date    Acute adjustment disorder with depressed mood     Cutaneous abscess 08/08/2019    Depression     Enlarged liver 04/27/2017    Hep C w/o coma, chronic     Hepatitis B     History of mental disorder 03/22/2017    History of substance abuse      History reviewed. No pertinent surgical history.  No family history on file.  Social History     Tobacco Use    Smoking status: Every Day     Current packs/day: 0.50     Types: Cigarettes    Smokeless tobacco: Never   Substance Use Topics    Alcohol use: Yes     Comment: occaisionally    Drug use: No     Review of Systems   Unable to perform ROS: Mental status change       Physical Exam     Initial Vitals   BP Pulse Resp Temp SpO2   11/06/24 0836 11/06/24 0836 11/06/24 0836 11/06/24 0836 11/06/24 0837   (!) 156/90 86 12 97.7 °F (36.5 °C) 98 %      MAP       --                Physical Exam    Nursing note and  vitals reviewed.  Constitutional: She appears distressed.   Sonorous respirations.   HENT:   Head: Normocephalic.   Contusion to right temporal area.   Eyes: EOM are normal. Pupils are equal, round, and reactive to light.   Neck: Neck supple.   Normal range of motion.  Cardiovascular:  Normal rate and regular rhythm.           Pulmonary/Chest: Breath sounds normal.   Abdominal: Abdomen is soft. Bowel sounds are normal. She exhibits no distension. There is no abdominal tenderness. There is no rebound.   Musculoskeletal:         General: No tenderness or edema. Normal range of motion.      Cervical back: Normal range of motion and neck supple.     Neurological:   Opens eyes to sternal rub and painful stimulus in the left arm, leg, and right leg.  Dysconjugate gaze when she opens her eyes.  No verbal stimulus other than occasional moaning.  Moves left arm to face.  Right arm held straight, minimal movement.   Skin: Skin is warm. Capillary refill takes less than 2 seconds.         ED Course   Intubation    Date/Time: 11/6/2024 11:35 AM  Location procedure was performed: [facility] EMERGENCY DEPARTMENT    Performed by: Epifanio Franco MD  Authorized by: Epifanio Franco MD  Consent Done: Yes  Consent: Verbal consent obtained.  Consent given by: mother  Patient identity confirmed: verbally with patient  Indications: hypoxemia and airway protection  Intubation method: video-assisted  Patient status: sedated  Preoxygenation: nonrebreather mask and BVM  Sedatives: etomidate  Paralytic: rocuronium  Laryngoscope size: Mac 3 (hyperangulated)  Tube size: 7.5 mm  Tube type: cuffed  Number of attempts: 1  Cricoid pressure: no  Cords visualized: yes  Post-procedure assessment: chest rise and ETCO2 monitor  Breath sounds: clear  Cuff inflated: yes  ETT to lip: 23 cm  Tube secured with: ETT mahoney  Chest x-ray findings: endotracheal tube in appropriate position  Patient tolerance: Patient tolerated the procedure well with no  immediate complications  Technical procedures used: Patient was C-collar in place, hyper angulated blade and rigid stylet used.  Upper dentures in place, removed prior to procedure.  Lower dentures absent.  Complications: No      Critical Care    Date/Time: 11/6/2024 11:40 AM    Performed by: Epifanio Franco MD  Authorized by: Valeriy Pineda MD  Total critical care time (exclusive of procedural time) : 0 minutes  Critical care time was exclusive of separately billable procedures and treating other patients and teaching time.  Critical care was necessary to treat or prevent imminent or life-threatening deterioration of the following conditions: CNS failure or compromise and respiratory failure.  Critical care was time spent personally by me on the following activities: blood draw for specimens, development of treatment plan with patient or surrogate, discussions with consultants, discussions with primary provider, evaluation of patient's response to treatment, examination of patient, obtaining history from patient or surrogate, ordering and performing treatments and interventions, ordering and review of laboratory studies, pulse oximetry, re-evaluation of patient's condition, review of old charts and ventilator management.        Labs Reviewed   CBC W/ AUTO DIFFERENTIAL - Abnormal       Result Value    WBC 14.77 (*)     RBC 4.75      Hemoglobin 14.0      Hematocrit 42.8      MCV 90      MCH 29.5      MCHC 32.7      RDW 13.2      Platelets 312      MPV 10.1      Immature Granulocytes 0.6 (*)     Gran # (ANC) 13.2 (*)     Immature Grans (Abs) 0.09 (*)     Lymph # 0.7 (*)     Mono # 0.7      Eos # 0.0      Baso # 0.03      nRBC 0      Gran % 89.5 (*)     Lymph % 4.9 (*)     Mono % 4.8      Eosinophil % 0.0      Basophil % 0.2      Differential Method Automated     COMPREHENSIVE METABOLIC PANEL - Abnormal    Sodium 138      Potassium 3.5      Chloride 109      CO2 16 (*)     Glucose 158 (*)     BUN 20       Creatinine 1.0      Calcium 9.3      Total Protein 8.1      Albumin 4.4      Total Bilirubin 0.4      Alkaline Phosphatase 82      AST 33      ALT 20      eGFR >60.0      Anion Gap 13     LIPID PANEL - Abnormal    Cholesterol 239 (*)     Triglycerides 94      HDL 42      LDL Cholesterol 178.2 (*)     HDL/Cholesterol Ratio 17.6 (*)     Total Cholesterol/HDL Ratio 5.7 (*)     Non-HDL Cholesterol 197     ISTAT PROCEDURE - Abnormal    POC PTWBT 13.0      POC PTINR 1.3 (*)     Sample unknown     ISTAT PROCEDURE - Abnormal    POC PH 7.329 (*)     POC PCO2 33.0 (*)     POC PO2 47      POC HCO3 17.3 (*)     POC BE -9 (*)     POC SATURATED O2 80      POC TCO2 18 (*)     Sample VENOUS      Site Other      Allens Test N/A     ISTAT PROCEDURE - Abnormal    POC Glucose 164 (*)     POC BUN 22      POC Creatinine 0.9      POC Sodium 140      POC Potassium 3.5      POC Chloride 110      POC TCO2 (MEASURED) 17 (*)     POC Ionized Calcium 1.09      POC Hematocrit 45      Sample FILIBERTO     PROTIME-INR    Prothrombin Time 10.9      INR 1.0     TSH    TSH 0.525     TOXICOLOGY SCREEN, URINE, RANDOM (COMPLIANCE)   POCT GLUCOSE, HAND-HELD DEVICE   ISTAT CREATININE    POC Creatinine 0.8      Sample unknown            Imaging Results              XR NG/OG tube placement check, non-radiologist performed (Final result)  Result time 11/06/24 10:25:49      Final result by Segundo Sheth MD (11/06/24 10:25:49)                   Impression:      Please see above.      Electronically signed by: Segundo Sheth  Date:    11/06/2024  Time:    10:25               Narrative:    EXAMINATION:  XR NG/OG TUBE PLACEMENT CHECK, NON-RADIOLOGIST PERFORMED    CLINICAL HISTORY:  placement;    TECHNIQUE:  Single overhead image of the abdomen was obtained.    COMPARISON:  Radiograph abdomen 01/19/2021    FINDINGS:  Enteric tube is visualized with tip just distal to the gastroesophageal junction.  However, side port is proximal to the expected region of the  gastroesophageal junction.  Correlation and advancement advised.    Cardiomediastinal silhouette and lungs are unchanged.    Nonspecific, nonobstructive bowel gas pattern.  No free intra-abdominal air.  No pneumatosis.    Osseous structures demonstrate no evidence for acute fracture or osseous destructive lesion.    Soft tissues are unremarkable.                                       X-Ray Chest AP Portable (Final result)  Result time 11/06/24 10:26:26      Final result by Kervin Castaneda MD (11/06/24 10:26:26)                   Impression:      Further advancement of the NG tube recommended      Electronically signed by: Kervin Castaneda MD  Date:    11/06/2024  Time:    10:26               Narrative:    EXAMINATION:  XR CHEST AP PORTABLE    CLINICAL HISTORY:  Hypoxemia    TECHNIQUE:  Single frontal view of the chest was performed.    COMPARISON:  No 06/26/2024 ne    FINDINGS:  ET tube at T4 level.  NG tube in the lower esophagus and further advancement recommended.  Heart size normal.  There is slight degree of diffuse accentuation interstitial markings.  No significant airspace consolidation or pleural effusion identified.                                        CTA STROKE MULTI-PHASE (Final result)  Result time 11/06/24 09:33:04      Final result by Geo Ambrocio MD (11/06/24 09:33:04)                   Impression:      Multifocal acute posterior circulation distribution infarcts as further discussed above, particularly involving essentially the entirety of the left PCA territory.  Modest mass effect without significant hemorrhage.    Focal thrombus with severe stenosis extending from the right subclavian artery into the proximal right vertebral artery.    Focal occlusion of the proximal intracranial segment of the right vertebral artery.    Focal occlusion of the proximal left PCA.    Moderate focal stenosis at the left vertebral artery origin.    Anterior circulation appears unremarkable.    No evidence of  fracture or traumatic malalignment in the cervical spine.    Emphysematous changes and patchy ground-glass opacity in the partially visualized lung apices.    This report was flagged in Epic as abnormal.      Electronically signed by: Geo Ambrocio MD  Date:    11/06/2024  Time:    09:33               Narrative:    EXAMINATION:  CTA STROKE MULTI-PHASE; CT CERVICAL SPINE WITHOUT CONTRAST    CLINICAL HISTORY:  Neuro deficit, acute, stroke suspected;; Neck trauma, intoxicated or obtunded (Age >= 16y);    TECHNIQUE:  Axial CT images obtained throughout the region of the head and cervical spine before the administration of intravenous contrast.    CT angiogram was performed through the cervical and intracranial vasculature during the IV bolus administration of 75mL of Omnipaque 350.  Two additional phases through the intracranial vasculature via multiphase technique.    Multiplanar MPR and MIP reformats were performed.    CT source data was analyzed using artificial intelligence software for detection of a large vessel occlusions (LVO) in order to enable computer assisted triage notification and aid clinical stroke decision making.    COMPARISON:  CTA head and cervical spine 10/25/2015    FINDINGS:  Large region of hypoattenuation loss of gray-white differentiation in left occipital lobe in medial temporal lobe in keeping with an acute PCA distribution infarct.  Modest localized mass effect with some sulcal crowding.  Additional involvement of the left thalamus.  Similar appearance in the medial right occipital lobe, but smaller area of infarction.  Small left superior cerebellar infarct, also likely acute.  No evidence of recent or remote major vascular distribution infarct in the anterior circulation.  No acute parenchymal hemorrhage.  No midline shift.    The ventricles are normal in size without evidence of hydrocephalus.    No extra-axial blood or fluid collections.    The cranium appears intact.    Mastoid air  cells and paranasal sinuses are essentially clear.    The vertebral bodies are normal in height and morphology without evidence of an acute displaced fracture or focal osseous destructive process.    Normal sagittal alignment is preserved.  No spondylolisthesis.    Intervertebral disc heights are well maintained.  No evidence of a bony spinal canal stenosis.    Evaluation intraspinal contents demonstrates no evidence of mass or hematoma.    No acute abnormalities in the paraspinal soft tissue structures.    Emphysematous changes patchy ground-glass opacity in the partially visualized lung apices.      CTA:    The aortic arch demonstrates no significant stenosis at the major branch vessel origins.    There is a focal thrombus in the right subclavian artery extending into the right vertebral artery origin with severe stenosis.  Thrombus extending over proximally 1 cm.  The V1 and V2 segments of right vertebral artery are otherwise normal in caliber of slightly decreased in attenuation in comparison to the other cervical vasculature.  Abrupt occlusion of distal right vertebral artery about the level of the PICA origin approximate 2 cm segment of non enhancement.    Moderate focal stenosis at the left vertebral artery origin.  Left vertebral artery is otherwise normal in caliber.  The basilar artery is normal in caliber.    The right common carotid artery is normal in caliber.  No significant stenosis at the carotid bifurcation.The right internal carotid artery is normal in caliber.    The left common carotid artery is normal in caliber. No significant stenosis at the carotid bifurcation.The left internal carotid artery is normal in caliber.    Abrupt non enhancement in the P1 segment of the left PCA.  The proximal right PCA appears within normal limits.    The proximal MCAs and ACAs are unremarkable.    Findings were immediately discussed upon identification via telephone with the stroke service ([facility]) at  approximately 09:20.                                        CT Cervical Spine Without Contrast (Final result)  Result time 11/06/24 09:33:04      Final result by Geo Ambrocio MD (11/06/24 09:33:04)                   Impression:      Multifocal acute posterior circulation distribution infarcts as further discussed above, particularly involving essentially the entirety of the left PCA territory.  Modest mass effect without significant hemorrhage.    Focal thrombus with severe stenosis extending from the right subclavian artery into the proximal right vertebral artery.    Focal occlusion of the proximal intracranial segment of the right vertebral artery.    Focal occlusion of the proximal left PCA.    Moderate focal stenosis at the left vertebral artery origin.    Anterior circulation appears unremarkable.    No evidence of fracture or traumatic malalignment in the cervical spine.    Emphysematous changes and patchy ground-glass opacity in the partially visualized lung apices.    This report was flagged in Epic as abnormal.      Electronically signed by: Geo Ambrocio MD  Date:    11/06/2024  Time:    09:33               Narrative:    EXAMINATION:  CTA STROKE MULTI-PHASE; CT CERVICAL SPINE WITHOUT CONTRAST    CLINICAL HISTORY:  Neuro deficit, acute, stroke suspected;; Neck trauma, intoxicated or obtunded (Age >= 16y);    TECHNIQUE:  Axial CT images obtained throughout the region of the head and cervical spine before the administration of intravenous contrast.    CT angiogram was performed through the cervical and intracranial vasculature during the IV bolus administration of 75mL of Omnipaque 350.  Two additional phases through the intracranial vasculature via multiphase technique.    Multiplanar MPR and MIP reformats were performed.    CT source data was analyzed using artificial intelligence software for detection of a large vessel occlusions (LVO) in order to enable computer assisted triage notification and  aid clinical stroke decision making.    COMPARISON:  CTA head and cervical spine 10/25/2015    FINDINGS:  Large region of hypoattenuation loss of gray-white differentiation in left occipital lobe in medial temporal lobe in keeping with an acute PCA distribution infarct.  Modest localized mass effect with some sulcal crowding.  Additional involvement of the left thalamus.  Similar appearance in the medial right occipital lobe, but smaller area of infarction.  Small left superior cerebellar infarct, also likely acute.  No evidence of recent or remote major vascular distribution infarct in the anterior circulation.  No acute parenchymal hemorrhage.  No midline shift.    The ventricles are normal in size without evidence of hydrocephalus.    No extra-axial blood or fluid collections.    The cranium appears intact.    Mastoid air cells and paranasal sinuses are essentially clear.    The vertebral bodies are normal in height and morphology without evidence of an acute displaced fracture or focal osseous destructive process.    Normal sagittal alignment is preserved.  No spondylolisthesis.    Intervertebral disc heights are well maintained.  No evidence of a bony spinal canal stenosis.    Evaluation intraspinal contents demonstrates no evidence of mass or hematoma.    No acute abnormalities in the paraspinal soft tissue structures.    Emphysematous changes patchy ground-glass opacity in the partially visualized lung apices.      CTA:    The aortic arch demonstrates no significant stenosis at the major branch vessel origins.    There is a focal thrombus in the right subclavian artery extending into the right vertebral artery origin with severe stenosis.  Thrombus extending over proximally 1 cm.  The V1 and V2 segments of right vertebral artery are otherwise normal in caliber of slightly decreased in attenuation in comparison to the other cervical vasculature.  Abrupt occlusion of distal right vertebral artery about the  level of the PICA origin approximate 2 cm segment of non enhancement.    Moderate focal stenosis at the left vertebral artery origin.  Left vertebral artery is otherwise normal in caliber.  The basilar artery is normal in caliber.    The right common carotid artery is normal in caliber.  No significant stenosis at the carotid bifurcation.The right internal carotid artery is normal in caliber.    The left common carotid artery is normal in caliber. No significant stenosis at the carotid bifurcation.The left internal carotid artery is normal in caliber.    Abrupt non enhancement in the P1 segment of the left PCA.  The proximal right PCA appears within normal limits.    The proximal MCAs and ACAs are unremarkable.    Findings were immediately discussed upon identification via telephone with the stroke service ([place]) at approximately 09:20.                                       Medications   labetaloL (NORMODYNE,TRANDATE) 5 mg/mL injection (has no administration in time range)   niCARdipine (CARDENE) 40 mg/200 mL (0.2 mg/mL) infusion (  Canceled Entry 11/6/24 0915)   fentaNYL 2500 mcg in 0.9% sodium chloride 250 mL infusion premix (25 mcg/hr Intravenous New Bag 11/6/24 1013)   fentaNYL 50 mcg/mL injection 100 mcg (0 mcg Intravenous Hold 11/6/24 0945)   aspirin tablet 325 mg (has no administration in time range)   iohexoL (OMNIPAQUE 350) injection 100 mL (75 mLs Intravenous Given 11/6/24 0854)   etomidate injection 20 mg (20 mg Intravenous Given 11/6/24 0933)   rocuronium injection 100 mg (100 mg Intravenous Given 11/6/24 0933)     Medical Decision Making  49-year-old female with a history of reported polysubstance use, reported withdrawal seizures in the past, hepatitis-C, depression who presents to the ED for altered mental status.  On arrival her vital signs are stable and she was afebrile.  On physical exam she is sonorous though we opens eyes to painful stimulus, and moans.  She can not follow commands  and does not appear to be moving her right arm much or at all.  Unclear etiology of the above, however given concern for acute neurologic deficit stroke code activated.  We will additionally scan through C-spine, place in c-collar.    Patient ultimately intubated due to worsening respirations and mentation.  Her blood work revealed an elevated white count 14, no obvious infectious signs or symptoms.  As below, ultimately no ability to intervene on findings as seen on initial CT and CTA of the head and neck.  Patient remained hemodynamically stable in the ED, and was admitted to the neuro critical care service for further evaluation and treatment.    Amount and/or Complexity of Data Reviewed  Labs: ordered.  Radiology: ordered.    Risk  Prescription drug management.  Decision regarding hospitalization.               ED Course as of 11/06/24 1150   Wed Nov 06, 2024   0840 Called to ambulance triage to evaluate the patient for possible stroke alert.  She is sonorous, opens eyes intermittently spontaneously, but also to painful stimulus.  She does not move her right arm.  Response to painful stimulus in all extremities.  Contusion to right temporal area.  Given report of the patient being found down, placed in C-collar emergently.  Code stroke activated. [MB]   0853 Patient in CT. C collar applied. Stroke team at bedside. [MB]   0901 POC O2 is low normal.  PH 7.32 [MB]   0942 After returning from CT, patient initially seemed a bit more awake and alert however became hypoxic and less responsive in room.  Mother at bedside discussed need for emergent intubation, and she was agreeable with this.  While drug overdose possible given patient's history of polysubstance use in the past, and paraphernalia fell and on her person when she was brought in, her pupils are equal and reactive, and her presentation seems more likely related to her acute neurologic insults rather than a toxic ingestion/overdose. [MB]   0946 Vascular  neurology has reviewed all imaging.  Given acute neurologic deficits, and extensive clot burden, no acute intervention per vascular neurology/IR.  Patient will be admitted to neuro critical Care, Neurosurgery consultation as needed. [MB]      ED Course User Index  [MB] Epifanio Franco MD                             Clinical Impression:  Final diagnoses:  [R56.9] Seizures  [R29.818] Acute focal neurological deficit  [R09.02] Hypoxia  [R09.02] Hypoxia - check ETT and OGT placement          ED Disposition Condition    Admit Stable                Epifanio Franco MD  11/06/24 [id number]

## 2024-11-06 NOTE — ED NOTES
Patient pulse ox down to 77% RA. Patient stimulated, when waking patient pulse ox up. Patient placed on NRB, pulse ox 97%.

## 2024-11-06 NOTE — NURSING
Patient arrived to [facility] [place] [facility] [place]    Report received from: ED RNJeanne    Type of stroke/diagnosis:      Current symptoms:     Skin Assessment done: Y  Wounds noted:Pubic area abscess   *If wounds noted, was Wound Care consulted? Y  *If wounds noted, LDA placed? Y  Skin Assessment Verified by: Emi. ABDOULAYE Preston Completed? N-intubated    Patient Belongings on Admit: family has    NCC notified: MAG Desai

## 2024-11-06 NOTE — ED NOTES
Code [facility] called overhead per neuro, verbalized to maintain BP <140 and to give IV labetalol. Neuro pharmacist at bedside.

## 2024-11-06 NOTE — ED NOTES
I-STAT Chem-8+ Results:   Value Reference Range   Sodium 140 136-145 mmol/L   Potassium  3.5 3.5-5.1 mmol/L   Chloride 110  mmol/L   Ionized Calcium 1.09 1.06-1.42 mmol/L   CO2 (measured) 17 23-29 mmol/L   Glucose 164  mg/dL   BUN 22 6-30 mg/dL   Creatinine 0.9 0.5-1.4 mg/dL   Hematocrit 45 36-54%

## 2024-11-06 NOTE — PT/OT/SLP PROGRESS
Speech Language Pathology  Discharge Summary     Malathi Mcpherson  MRN: [id number]    SLP evaluation orders received and reviewed. Patient not seen today and orders discontinued secondary to Patient intubated. Patient will require new orders when appropriate.     11/6/2024

## 2024-11-06 NOTE — HPI
Pt is a 49 year old with a hx of drug use, seizure, HTN, HLD  who [place] [facility] [place] and was stroke activated on 11/6/24.    Per EMS, she was found down by her mother today morning , the time of which is not clearly known. EMS saw her around 7: 50 am ( 11/6/24) when she was very lethargic and aphasic. She moved the Left sided extremities and Right leg to certain extent , but did not move her RUE at all. Per EMS, they have seen her before in the context of seizures.  Per conversation with mom, she found her face down in the morning( not sure of the timing), not moving and not responding to her.    After coming to the hospital, her exam worsened , with no movement noted on her RUE and RLE. She is aphasic and not following commands. She desated to O2 saturation of 70'S due to which she had to be intubated.       Her LKN is 10 pm yesterday ( 11/5/24) per mother.   She is not a TNK candidate as she is out of window and strokes in her CT. Images reviewed with stroke attending Dr Morris  Case reviewed with IR,deemed not a intervention candidate because of the extensive nature of infarcts and the risk of bleeding.

## 2024-11-06 NOTE — CONSULTS
Patient seen and examined at the ED on initial stroke code   Please  see teleconsult note by Dr. Araceli Marcial that was generated as telemedicine in error.     Rachel Soto MD   Vascular Neurology Fellow- PGY5  [facility]

## 2024-11-06 NOTE — CARE UPDATE
Pt transferred from the ED intubated. Placed pt on ventilator at documented settings. No respiratory distress note. Will continue to monitor.

## 2024-11-06 NOTE — ASSESSMENT & PLAN NOTE
49F with history of polysubstance abuse presents after being found down at home. CTA revealed multifocal infarctions with a R vertebral artery occlusion and a L PCA occlusion. Patient was OOW for TNK and stroke burden was felt to be too large to pursue IR intervention. Given location of infarctions, patient's age, and extent of injury, patient is at risk for herniation. NSGY consulted and following. Will move forward with maximum medical management.    Antithrombotics for secondary stroke prevention: Antiplatelets: Aspirin: 81 mg daily  Aspirin: 325 mg daily    Statins for secondary stroke prevention and hyperlipidemia, if present:   Statins: Atorvastatin- 80 mg daily    Aggressive risk factor modification: HTN, HLD     Rehab efforts: The patient has been evaluated by a stroke team provider and the therapy needs have been fully considered based off the presenting complaints and exam findings. The following therapy evaluations are needed: PT evaluate and treat, OT evaluate and treat, SLP evaluate and treat, PM&R evaluate for appropriate placement    Diagnostics ordered/pending: CT scan of head without contrast to asses brain parenchyma, TTE to assess cardiac function/status     VTE prophylaxis: Mechanical prophylaxis: Place SCDs    BP parameters: Infarct: No intervention, SBP <220    -Admit to NCC  -q1h neuro checks  -f/u MRI pending  -EKG, CXR, TTE  -A1c, Lipid panel, TSH  -Will load with aspirin and continue daily  -holding off on plavix given possibility of decompression  -SBP<220  -NSGY and vascular neurology consulted, appreciate recs  -f/u CTH @ 2100 per NSGY  -PT/OT/SLP to evaluate and treat

## 2024-11-06 NOTE — TELEMEDICINE CONSULT
[facility] - Neuro Critical Care  Vascular Neurology  [facility]  Consult Note    Consults  Assessment/Plan:     Patient is a 49 y.o. year old female with:    Cytotoxic cerebral edema  -secondary to stroke.  -q1 hr neuro checks    Acute arterial ischemic stroke, multifocal, posterior circulation  Pt is a 49 year old with a hx of drug use, seizure, HTN, HLD  who came to [facility] ED and was stroke activated on 11/6/24.    Per EMS, she was found down by her mother today morning , the time of which is not clearly known. EMS saw her around 7: 50 am ( 11/6/24) when she was very lethargic and aphasic. She moved the Left sided extremities and Right leg to certain extent , but did not move her RUE at all. Per EMS, they have seen her before in the context of seizures.  Per conversation with mom, she found her face down in the morning( not sure of the timing), not moving and not responding to her.    After coming to the hospital, her exam worsened , with no movement noted on her RUE and RLE. She is aphasic and not following commands. She desated to O2 saturation of 70'S due to which she had to be intubated.       Her LKN is 10 pm yesterday ( 11/5/24) per mother.   She is not a TNK candidate as she is out of window and strokes in her CT. Images reviewed with stroke attending Dr Morris  Case reviewed with IR,deemed not a intervention candidate because of the extensive nature of infarcts and the risk of bleeding.  Admit to NCC for further care.    Antithrombotics for secondary stroke prevention: Antiplatelets: Aspirin: 81 mg daily. She was loaded with aspirin 325 mg today.    Statins for secondary stroke prevention and hyperlipidemia, if present:   Statins: Atorvastatin- 80 mg daily    Aggressive risk factor modification: HTN, HLD, Substance use.     Rehab efforts: The patient has been evaluated by a stroke team provider and the therapy needs have been fully considered based off the presenting complaints and exam findings.  The following therapy evaluations are needed: PT evaluate and treat, OT evaluate and treat, SLP evaluate and treat,      Diagnostics ordered/pending: MRI head without contrast to assess brain parenchyma, TTE to assess cardiac function/status     VTE prophylaxis: Heparin 5000 units SQ every 8 hours    BP parameters: Infarct: No intervention, SBP <220, BP > 160 if able.            STROKE DOCUMENTATION     Acute Stroke Times   Last Known Normal Date: 11/05/24  Last Known Normal Time: 2200  Symptom Onset Date: 11/06/24 (unsure of timing)  Symptom Onset Time:  (sometime this morning.)  Unknown Symptom Onset Time: Unknown Time  Stroke Team Called Date: 11/06/24  Stroke Team Called Time: 0843  Stroke Team Arrival Date: 11/06/24  Stroke Team Arrival Time: 0845  CT Interpretation Time: 0859  Thrombolytic Therapy Recommended: No  CTA Interpretation Time: 0902  Thrombectomy Recommended: No    NIH Scale:  1a. Level of Consciousness: 2-->Not alert, requires repeated stimulation to attend, or is obtunded and requires strong or painful stimulation to make movements (not stereotyped)  1b. LOC Questions: 2-->Answers neither question correctly  1c. LOC Commands: 2-->Performs neither task correctly  2. Best Gaze: 0-->Normal (unable to test)  3. Visual: 0-->No visual loss (unable to test)  4. Facial Palsy: 0-->Normal symmetrical movements  5a. Motor Arm, Left: 2-->Some effort against gravity, limb cannot get to or maintain (if cued) 90 (or 45) degrees, drifts down to bed, but has some effort against gravity  5b. Motor Arm, Right: 4-->No movement  6a. Motor Leg, Left: 0-->No drift, leg holds 30 degree position for full 5 secs  6b. Motor Leg, Right: 4-->No movement  7. Limb Ataxia: 0-->Absent (unable to test)  8. Sensory: 2-->Severe to total sensory loss, patient is not aware of being touched in the face, arm, and leg  9. Best Language: 0-->No aphasia, normal  10. Dysarthria: 2-->Severe dysarthria, patients speech is so slurred as to be  unintelligible in the absence of or out of proportion to any dysphasia, or is mute/anarthric  11. Extinction and Inattention (formerly Neglect): 2-->Profound miky-inattention/extinction more than 1 modality  Total (NIH Stroke Scale): 22    Modified Sweetwater Score: 0  Coral Coma Scale:    ABCD2 Score:    [id number] Score:   HAS -BLED Score:   ICH Score:   Hunt & Argueta Classification:       Thrombolysis Candidate? No, Out of window - Symptom onset > 4.5 hours, CT findings (ICH, SAH, Large core infarct)     Delays to Thrombolysis?  No    Interventional Revascularization Candidate?   Is the patient eligible for mechanical endovascular reperfusion (MYRTLE)?  No; Large core infarct on imaging     Delays to Thrombectomy? No    Hemorrhagic change of an Ischemic Stroke: Does this patient have an ischemic stroke with hemorrhagic changes? No     Subjective:     History of Present Illness:  Pt is a 49 year old with a hx of drug use, seizure, HTN, HLD  who [place] [facility] [place] was stroke activated on 11/6/24.    Per EMS, she was found down by her mother today morning , the time of which is not clearly known. EMS saw her around 7: 50 am ( 11/6/24) when she was very lethargic and aphasic. She moved the Left sided extremities and Right leg to certain extent , but did not move her RUE at all. Per EMS, they have seen her before in the context of seizures.  Per conversation with mom, she found her face down in the morning( not sure of the timing), not moving and not responding to her.    After coming to the hospital, her exam worsened , with no movement noted on her RUE and RLE. She is aphasic and not following commands. She desated to O2 saturation of 70'S due to which she had to be intubated.       Her LKN is 10 pm yesterday ( 11/5/24) per mother.   She is not a TNK candidate as she is out of window and strokes in her CT. Images reviewed with stroke attending Dr Morris  Case reviewed with IR,deemed not a intervention candidate because of  the extensive nature of infarcts and the risk of bleeding.          Past Medical History:   Diagnosis Date    Acute adjustment disorder with depressed mood     Cutaneous abscess 08/08/2019    Depression     Enlarged liver 04/27/2017    Hep C w/o coma, chronic     Hepatitis B     History of mental disorder 03/22/2017    History of substance abuse      History reviewed. No pertinent surgical history.  Social History     Tobacco Use    Smoking status: Every Day     Current packs/day: 0.50     Types: Cigarettes    Smokeless tobacco: Never   Substance Use Topics    Alcohol use: Yes     Comment: occaisionally    Drug use: No     Review of patient's allergies indicates:  No Known Allergies    Medications: I have reviewed the current medication administration record.    (Not in a hospital admission)      Review of Systems  Unable to assess as pt is aphasic.  Objective:     Vital Signs (Most Recent):  Temp: 97.7 °F (36.5 °C) (11/06/24 0836)  Pulse: 88 (11/06/24 0947)  Resp: 18 (11/06/24 0947)  BP: 131/80 (11/06/24 0947)  SpO2: 100 % (11/06/24 0947)    Vital Signs Range (Last 24H):  Temp:  [97.7 °F (36.5 °C)]   Pulse:  [62-88]   Resp:  [10-18]   BP: (131-173)/(75-90)   SpO2:  [75 %-100 %]        Physical Exam  Constitutional:       Comments:  Not alert. Intermittently opens eyes on painful stimuli.   Eyes:      Comments: Pupils minimally reactive.   Musculoskeletal:      Comments: C collar present   Skin:     General: Skin is warm.   Neurological:      Sensory: Sensory deficit present.      Motor: Weakness present.              Neurological Exam:   Limited as pt is aphasic and not following commands.    LOC: opens eyes intermittently to repeated painful stimuli.  Attention Span: poor  Language: Global aphasia  Orientation: Person, Place, Time  and unable to test as pt is aphasic.  Visual Fields: Full and Unable to test as pt not following commands.  EOM (CN III, IV, VI): Full/intact  unable to test  Pupils (CN II, III):  "unable to test.  Motor: No movements noted on RUE and RLE on repeated painful stimuli. LUE, LLE briefly moves and lifts off bed on repeated painful stimuli.      Laboratory:  CMP:   Recent Labs   Lab 11/06/24  0854   CALCIUM 9.3   ALBUMIN 4.4   PROT 8.1      K 3.5   CO2 16*      BUN 20   CREATININE 1.0   ALKPHOS 82   ALT 20   AST 33   BILITOT 0.4     CBC:   Recent Labs   Lab 11/06/24  0854 11/06/24  0855   WBC 14.77*  --    RBC 4.75  --    HGB 14.0  --    HCT 42.8 45     --    MCV 90  --    MCH 29.5  --    MCHC 32.7  --      Lipid Panel:   Recent Labs   Lab 11/06/24  0854   CHOL 239*   LDLCALC 178.2*   HDL 42   TRIG 94     Coagulation:   Recent Labs   Lab 11/06/24  0854   INR 1.0     Hgb A1C: No results for input(s): "HGBA1C" in the last 168 hours.  TSH:   Recent Labs   Lab 11/06/24  0854   TSH 0.525       Diagnostic Results:      Brain imaging:    CT head  -Large region of hypoattenuation loss of gray-white differentiation in left occipital lobe in medial temporal lobe in keeping with an acute PCA distribution infarct. Modest localized mass effect with some sulcal crowding. Additional involvement of the left thalamus. Similar appearance in the medial right occipital lobe, but smaller area of infarction. Small left superior cerebellar infarct, also likely acute. No evidence of recent or remote major vascular distribution infarct in the anterior circulation. No acute parenchymal hemorrhage. No midline shift.     -Multifocal acute posterior circulation distribution infarcts , particularly involving essentially the entirety of the left PCA territory. Modest mass effect without significant hemorrhage.     Vessel Imaging:    CTA  Focal thrombus with severe stenosis extending from the right subclavian artery into the proximal right vertebral artery.     Focal occlusion of the proximal intracranial segment of the right vertebral artery.     Focal occlusion of the proximal left PCA.     Moderate focal " stenosis at the left vertebral artery origin.     Anterior circulation appears unremarkable.     No evidence of fracture or traumatic malalignment in the cervical spine.     Emphysematous changes and patchy ground-glass opacity in the partially visualized lung apices.    Cardiac Evaluation:   TTE pending    Case staffed with Dr Morris.    Araceli Marcial MD  [facility]roke [place]  Department of Vascular Neurology   [facility] - Neuro Critical Care

## 2024-11-06 NOTE — SUBJECTIVE & OBJECTIVE
Past Medical History:   Diagnosis Date    Acute adjustment disorder with depressed mood     Cutaneous abscess 08/08/2019    Depression     Enlarged liver 04/27/2017    Hep C w/o coma, chronic     Hepatitis B     History of mental disorder 03/22/2017    History of substance abuse      History reviewed. No pertinent surgical history.  Social History     Tobacco Use    Smoking status: Every Day     Current packs/day: 0.50     Types: Cigarettes    Smokeless tobacco: Never   Substance Use Topics    Alcohol use: Yes     Comment: occaisionally    Drug use: No     Review of patient's allergies indicates:  No Known Allergies    Medications: I have reviewed the current medication administration record.    (Not in a hospital admission)      Review of Systems  Unable to assess as pt is aphasic.  Objective:     Vital Signs (Most Recent):  Temp: 97.7 °F (36.5 °C) (11/06/24 0836)  Pulse: 88 (11/06/24 0947)  Resp: 18 (11/06/24 0947)  BP: 131/80 (11/06/24 0947)  SpO2: 100 % (11/06/24 0947)    Vital Signs Range (Last 24H):  Temp:  [97.7 °F (36.5 °C)]   Pulse:  [62-88]   Resp:  [10-18]   BP: (131-173)/(75-90)   SpO2:  [75 %-100 %]        Physical Exam  Constitutional:       Comments:  Not alert. Intermittently opens eyes on painful stimuli.   Eyes:      Comments: Pupils minimally reactive.   Musculoskeletal:      Comments: C collar present   Skin:     General: Skin is warm.   Neurological:      Sensory: Sensory deficit present.      Motor: Weakness present.              Neurological Exam:   Limited as pt is aphasic and not following commands.    LOC: opens eyes intermittently to repeated painful stimuli.  Attention Span: poor  Language: Global aphasia  Orientation: Person, Place, Time  and unable to test as pt is aphasic.  Visual Fields: Full and Unable to test as pt not following commands.  EOM (CN III, IV, VI): Full/intact  unable to test  Pupils (CN II, III): unable to test.  Motor: No movements noted on RUE and RLE on  "repeated painful stimuli. LUE, LLADOLFO briefly moves and lifts off bed on repeated painful stimuli.      Laboratory:  CMP:   Recent Labs   Lab 11/06/24  0854   CALCIUM 9.3   ALBUMIN 4.4   PROT 8.1      K 3.5   CO2 16*      BUN 20   CREATININE 1.0   ALKPHOS 82   ALT 20   AST 33   BILITOT 0.4     CBC:   Recent Labs   Lab 11/06/24  0854 11/06/24  0855   WBC 14.77*  --    RBC 4.75  --    HGB 14.0  --    HCT 42.8 45     --    MCV 90  --    MCH 29.5  --    MCHC 32.7  --      Lipid Panel:   Recent Labs   Lab 11/06/24  0854   CHOL 239*   LDLCALC 178.2*   HDL 42   TRIG 94     Coagulation:   Recent Labs   Lab 11/06/24  0854   INR 1.0     Hgb A1C: No results for input(s): "HGBA1C" in the last 168 hours.  TSH:   Recent Labs   Lab 11/06/24  0854   TSH 0.525       Diagnostic Results:      Brain imaging:    CT head  -Large region of hypoattenuation loss of gray-white differentiation in left occipital lobe in medial temporal lobe in keeping with an acute PCA distribution infarct. Modest localized mass effect with some sulcal crowding. Additional involvement of the left thalamus. Similar appearance in the medial right occipital lobe, but smaller area of infarction. Small left superior cerebellar infarct, also likely acute. No evidence of recent or remote major vascular distribution infarct in the anterior circulation. No acute parenchymal hemorrhage. No midline shift.     -Multifocal acute posterior circulation distribution infarcts , particularly involving essentially the entirety of the left PCA territory. Modest mass effect without significant hemorrhage.     Vessel Imaging:    CTA  Focal thrombus with severe stenosis extending from the right subclavian artery into the proximal right vertebral artery.     Focal occlusion of the proximal intracranial segment of the right vertebral artery.     Focal occlusion of the proximal left PCA.     Moderate focal stenosis at the left vertebral artery origin.     Anterior " circulation appears unremarkable.     No evidence of fracture or traumatic malalignment in the cervical spine.     Emphysematous changes and patchy ground-glass opacity in the partially visualized lung apices.    Cardiac Evaluation:   TTE pending

## 2024-11-06 NOTE — HPI
49 year old with a hx of drug use, seizure, HTN, HLD  who came to [facility] ED and was stroke activated on 11/6/24. Pt was found down by her mother this morning at an unknown time lying face down. Per EMS, she was very lethargic and aphasic and was able to move her left sided extremities and R leg but was not moving her RUE at all; pt known to EMS due to history of seizure. Pt's exam worsened on exam with no movement in her right sided extremities and desaturated to 70s and required intubation. She was also noted to have an abrasion/contusion to the right temporal area Her last known normal states was at 10 pm yesterday -- did not receive TNK due to being out of the window and strokes noted in her CT. She was admitted to [facility] for further care. Pt's case has been reviewed by Neuro IR -- not candidate for intervention due to the extensive nature of infarcts and the risk of bleeding.    Per pt's family, patient's drugs of choice are crack, heroine and ETOH. Pt's mother reports that the patient has been regularly reporting to drug court of the past several weeks and has passed all of her drug tests during this period of time -- her last drug test was two weeks ago. Pt's family also reports that the patient had been acting strange over the past few days -- she has been sleeping more and displayed erratic behavior that leads them to believe that patient may have relapsed. Of note, a crack pipe was found on the patient's person on presentation to [facility].

## 2024-11-06 NOTE — ASSESSMENT & PLAN NOTE
Pt is a 49 year old with a hx of drug use, seizure, HTN, HLD  who came to [facility] ED and was stroke activated on 11/6/24.    Per EMS, she was found down by her mother today morning , the time of which is not clearly known. EMS saw her around 7: 50 am ( 11/6/24) when she was very lethargic and aphasic. She moved the Left sided extremities and Right leg to certain extent , but did not move her RUE at all. Per EMS, they have seen her before in the context of seizures.  Per conversation with mom, she found her face down in the morning( not sure of the timing), not moving and not responding to her.    After coming to the hospital, her exam worsened , with no movement noted on her RUE and RLE. She is aphasic and not following commands. She desated to O2 saturation of 70'S due to which she had to be intubated.       Her LKN is 10 pm yesterday ( 11/5/24) per mother.   She is not a TNK candidate as she is out of window and strokes in her CT. Images reviewed with stroke attending Dr Morris  Case reviewed with IR,deemed not a intervention candidate because of the extensive nature of infarcts and the risk of bleeding.  Admit to NCC for further care.    Antithrombotics for secondary stroke prevention: Antiplatelets: Aspirin: 81 mg daily. She was loaded with aspirin 325 mg today.    Statins for secondary stroke prevention and hyperlipidemia, if present:   Statins: Atorvastatin- 80 mg daily    Aggressive risk factor modification: HTN, HLD, Substance use.     Rehab efforts: The patient has been evaluated by a stroke team provider and the therapy needs have been fully considered based off the presenting complaints and exam findings. The following therapy evaluations are needed: PT evaluate and treat, OT evaluate and treat, SLP evaluate and treat,      Diagnostics ordered/pending: MRI head without contrast to assess brain parenchyma, TTE to assess cardiac function/status     VTE prophylaxis: Heparin 5000 units SQ every 8 hours    BP  parameters: Infarct: No intervention, SBP <220, BP > 160 if able.

## 2024-11-06 NOTE — ED NOTES
Patient comes into the emergency department by EMS with complaints of possible seizure. EMS states that Family called and said they found the patient on the floor unconscious, hx of drug use. Arrives to ED only responsive to pain and a pipe found in her bra; unsure when patient last used.      LOC: The patient is unresponsive to everything besides pain.   APPEARANCE: Patient appears comfortable and in no acute distress, patient is clean and well groomed.  SKIN: The skin is warm and dry, color consistent with ethnicity, patient has normal skin turgor and moist mucus membranes, skin intact, no breakdown or bruising noted.   MUSCULOSKELETAL: Patient is not moving all extremities spontaneously, no swelling noted.  RESPIRATORY: Airway is open and patent, respirations are spontaneous, patient has a normal effort and rate, no accessory muscle. Pt is intubated.  CARDIAC: Pt placed on cardiac monitor. Patient has a normal rate and regular rhythm, no edema noted, capillary refill < 3 seconds.   GASTRO: Soft and non tender to palpation, no distention noted.  : Pt denies any pain or frequency with urination.  NEURO: Pt doesn't open eyes spontaneously, behavior not appropriate to situation,doesn't follow commands, facial expression symmetrical, no bilateral hand grasp equal and even, no purposeful motor response noted, no sensation in all extremities when touched with a finger.

## 2024-11-06 NOTE — ASSESSMENT & PLAN NOTE
49-year-old female with a history of reported polysubstance use, reported withdrawal seizures in the past, hepatitis-C, depression who presents to the ED for altered mental status. CTA head/neck with large left PCA territory infarcts, no hemorrhage. Thrombus and occlusion in right vertebral artery, moderate stenosis of left vertebral artery. No cervical spine fracture. NSGY consulted to consider early suboccipital craniotomy.     --Patient admitted to ICU on telemetry; NCC/stroke teams are primary  -q1h neurochecks in ICU  --All labs and diagnostics reviewed  -Recommend STAT MRI brain to assess if cerebellar infarcts present. At this time no indication for craniotomy based on recent CTA head. Recommend to repeat CTH 12 hrs after initial for ongoing monitoring.  --Patient may continue anti-plt/coag medications at this time; will need full reversal prior to the OR (INR <1.4, Plt >100k)  --SBP reccs and management per primary team  --HOB >30  --Follow-up full pre-op labs (CBC/CMP/PT-INR/PTT/T&S)  --Keep NPO  --Continue maximal medical therapy and stroke work-up per primary teams  --Continue to monitor clinically, notify NSGY immediately with any changes in neuro status  --Discussed with Dr. Augustin

## 2024-11-06 NOTE — CONSULTS
[facility]  Neurosurgery  Consult Note    Inpatient consult to Neurosurgery  Consult performed by: Alannah Gaytan PA-C  Consult ordered by: Giselle Nair NP        Subjective:     Chief Complaint/Reason for Admission: acute ischemic stroke, left occipital lobe    History of Present Illness: 49-year-old female with a history of reported polysubstance use, reported withdrawal seizures in the past, hepatitis-C, depression who presents to the ED for altered mental status. She was found lying face down in her room at home today by family members. She was last seen normal sometime last night. EMS was called and upon their arrival she was found to be largely unresponsive though would wake up to pain. Blood sugar in the field was in the 100s. Vital signs were otherwise stable. A nasal trumpet was placed in the field. She was noted to respond to painful stimulus in the left arm, left leg and right leg but would not move her right arm. She was also noted to have an abrasion/contusion to the right temporal area. GCS 11 on admission. Patient intubated shortly after arrival for respiratory distress, O2 sats 70s. CTA head/neck with large left PCA territory infarcts, no hemorrhage. Thrombus and occlusion in right vertebral artery, moderate stenosis of left vertebral artery. No cervical spine fracture. NSGY consulted to consider early suboccipital craniotomy. Patient's mother at bedside.    Medications Prior to Admission   Medication Sig Dispense Refill Last Dose/Taking    albuterol (PROVENTIL) 2.5 mg /3 mL (0.083 %) nebulizer solution Take 2.5 mg by nebulization every 6 (six) hours as needed for Wheezing. Rescue       amoxicillin (AMOXIL) 875 MG tablet Take 1 tablet (875 mg total) by mouth 2 (two) times daily. 14 tablet 0     atenolol (TENORMIN) 25 MG tablet Take 25 mg by mouth once daily.       bacitracin 500 unit/gram Oint Apply topically 2 (two) times daily. 30 g 0     busPIRone (BUSPAR) 5 MG Tab Take 5 mg  by mouth 2 (two) times daily.       cephALEXin (KEFLEX) 500 MG capsule Take 500 mg by mouth 4 (four) times daily.       cetirizine (ZYRTEC) 10 MG tablet Take 10 mg by mouth once daily.       diltiaZEM (CARDIZEM CD) 240 MG 24 hr capsule Take 240 mg by mouth once daily.       fluticasone (FLONASE) 50 mcg/actuation nasal spray 1 spray (50 mcg total) by Each Nare route 2 (two) times daily as needed for Rhinitis. 15 g 0     furosemide (LASIX ORAL) Take by mouth.       gabapentin (NEURONTIN) 300 MG capsule Take 300 mg by mouth once daily.       hydrOXYzine (ATARAX) 50 MG tablet Take 50 mg by mouth once daily.       hydrOXYzine pamoate (VISTARIL) 25 MG Cap Take 25 mg by mouth 3 (three) times daily as needed.       ibuprofen (ADVIL,MOTRIN) 600 MG tablet Take 1 tablet (600 mg total) by mouth every 6 (six) hours as needed for Pain. 20 tablet 0     ibuprofen (ADVIL,MOTRIN) 800 MG tablet Take 1 tablet (800 mg total) by mouth every 6 (six) hours as needed for Pain. 20 tablet 0     lactulose (CHRONULAC) 10 gram/15 mL solution Take by mouth once daily.       linaclotide (LINZESS) 145 mcg Cap capsule Take 145 mcg by mouth once daily.       mupirocin (BACTROBAN) 2 % ointment Apply topically 3 (three) times daily. 22 g 0     ondansetron (ZOFRAN) 4 MG tablet Take 1 tablet (4 mg total) by mouth every 6 (six) hours as needed for Nausea. 20 tablet 0     pantoprazole (PROTONIX) 40 mg GrPS Take 40 mg by mouth once daily.       promethazine (PHENERGAN) 12.5 MG Tab Take 25 mg by mouth 4 (four) times daily.       QUEtiapine (SEROQUEL) 25 MG Tab Take by mouth.       SPIRONOLACTONE ORAL Take by mouth.       traZODone (DESYREL) 50 MG tablet Take 50 mg by mouth every evening.       venlafaxine (EFFEXOR XR) 150 MG Cp24 Take 75 mg by mouth once daily.          Review of patient's allergies indicates:  No Known Allergies    Past Medical History:   Diagnosis Date    Acute adjustment disorder with depressed mood     Cutaneous abscess 08/08/2019     Depression     Enlarged liver 04/27/2017    Hep C w/o coma, chronic     Hepatitis B     History of mental disorder 03/22/2017    History of substance abuse      History reviewed. No pertinent surgical history.  Family History    None       Tobacco Use    Smoking status: Every Day     Current packs/day: 0.50     Types: Cigarettes    Smokeless tobacco: Never   Substance and Sexual Activity    Alcohol use: Yes     Comment: occaisionally    Drug use: No    Sexual activity: Yes     Partners: Male     Review of Systems  Objective:     Weight: 59 kg (130 lb)  Body mass index is 19.77 kg/m².  Vital Signs (Most Recent):  Temp: 97.7 °F (36.5 °C) (11/06/24 0836)  Pulse: 80 (11/06/24 1017)  Resp: 18 (11/06/24 1017)  BP: 136/83 (11/06/24 1017)  SpO2: 100 % (11/06/24 1146) Vital Signs (24h Range):  Temp:  [97.7 °F (36.5 °C)] 97.7 °F (36.5 °C)  Pulse:  [62-88] 80  Resp:  [10-18] 18  SpO2:  [75 %-100 %] 100 %  BP: (131-173)/(75-90) 136/83                Vent Mode: A/C  Oxygen Concentration (%):  [50] 50  Resp Rate Total:  [18 br/min-31 br/min] 31 br/min  Vt Set:  [450 mL] 450 mL  PEEP/CPAP:  [5 cmH20] 5 cmH20  Mean Airway Pressure:  [8.1 cmH20-8.6 cmH20] 8.1 cmH20             NG/OG Tube 11/06/24 1000 10 Fr. Right mouth (Active)            Urethral Catheter 11/06/24 1047 Double-lumen 16 Fr. (Active)          Physical Exam     Neurosurgery Physical Exam    General: well developed, well nourished, no acute distress  Head: normocephalic  GCS: Motor: 5/Verbal: T/Eyes: 1 GCS Total: 7 T  Cranial nerves: positive cough, gag, corneal and oculocephalic reflex  Eyes: pupils 2 mm equal minimally reactive to light.   Motor Strength: not following commands. Localizes on left, w/d RLE, no movement to stimuli in RUE  Skin: Skin is warm, dry and intact.      Significant Labs:  Recent Labs   Lab 11/06/24  0854   *      K 3.5      CO2 16*   BUN 20   CREATININE 1.0   CALCIUM 9.3     Recent Labs   Lab 11/06/24  0854 11/06/24  0855    WBC 14.77*  --    HGB 14.0  --    HCT 42.8 45     --      Recent Labs   Lab 11/06/24  0851 11/06/24  0854   INR 1.3* 1.0     Microbiology Results (last 7 days)       ** No results found for the last 168 hours. **          Recent Lab Results  (Last 5 results in the past 24 hours)        11/06/24  1155   11/06/24  0857   11/06/24  0855   11/06/24  0854   11/06/24  0853        Albumin       4.4         ALP       82         Allens Test   N/A             ALT       20         Anion Gap       13         AST       33         Baso #       0.03         Basophil %       0.2         BILIRUBIN TOTAL       0.4  Comment: For infants and newborns, interpretation of results should be based  on gestational age, weight and in agreement with clinical  observations.    Premature Infant recommended reference ranges:  Up to 24 hours.............<8.0 mg/dL  Up to 48 hours............<12.0 mg/dL  3-5 days..................<15.0 mg/dL  6-29 days.................<15.0 mg/dL           Site   Other             BUN       20         Calcium       9.3         Chloride       109         Cholesterol Total       239  Comment: The National Cholesterol Education Program (NCEP) has set the  following guidelines (reference ranges) for Cholesterol:  Optimal.....................<200 mg/dL  Borderline High.............200-239 mg/dL  High........................> or = 240 mg/dL           CO2       16         Creatinine       1.0         Differential Method       Automated         eGFR       >60.0         Eos #       0.0         Eos %       0.0         Glucose       158         Gran # (ANC)       13.2         Gran %       89.5         HDL       42  Comment: The National Cholesterol Education Program (NCEP) has set the  following guidelines (reference values) for HDL Cholesterol:  Low...............<40 mg/dL  Optimal...........>60 mg/dL           HDL/Cholesterol Ratio       17.6         Hematocrit       42.8         Hemoglobin       14.0          Immature Grans (Abs)       0.09  Comment: Mild elevation in immature granulocytes is non specific and   can be seen in a variety of conditions including stress response,   acute inflammation, trauma and pregnancy. Correlation with other   laboratory and clinical findings is essential.           Immature Granulocytes       0.6         INR       1.0  Comment: Coumadin Therapy:  2.0 - 3.0 for INR for all indicators except mechanical heart valves  and antiphospholipid syndromes which should use 2.5 - 3.5.           LDL Cholesterol       178.2  Comment: The National Cholesterol Education Program (NCEP) has set the  following guidelines (reference values) for LDL Cholesterol:  Optimal.......................<130 mg/dL  Borderline High...............130-159 mg/dL  High..........................160-189 mg/dL  Very High.....................>190 mg/dL           Lymph #       0.7         Lymph %       4.9         MCH       29.5         MCHC       32.7         MCV       90         Mono #       0.7         Mono %       4.8         MPV       10.1         Non-HDL Cholesterol       197  Comment: Risk category and Non-HDL cholesterol goals:  Coronary heart disease (CHD)or equivalent (10-year risk of CHD >20%):  Non-HDL cholesterol goal     <130 mg/dL  Two or more CHD risk factors and 10-year risk of CHD <= 20%:  Non-HDL cholesterol goal     <160 mg/dL  0 to 1 CHD risk factor:  Non-HDL cholesterol goal     <190 mg/dL           nRBC       0         Platelet Count       312         POC BE   -9             POC BUN     22           POC Chloride     110           POC Creatinine     0.9     0.8       POC Glucose     164           POC HCO3   17.3             POC Hematocrit     45           POC Ionized Calcium     1.09           POC PCO2   33.0             POC PH   7.329             POC PO2   47             POC Potassium     3.5           POC SATURATED O2   80             POC Sodium     140           POC TCO2   18             POC TCO2  (MEASURED)     17           POCT Glucose 138               Potassium       3.5         PROTEIN TOTAL       8.1         PT       10.9         RBC       4.75         RDW       13.2         Sample   VENOUS   FILIBERTO     unknown       Sodium       138         Total Cholesterol/HDL Ratio       5.7         Triglycerides       94  Comment: The National Cholesterol Education Program (NCEP) has set the  following guidelines (reference values) for triglycerides:  Normal......................<150 mg/dL  Borderline High.............150-199 mg/dL  High........................200-499 mg/dL           TSH       0.525         WBC       14.77                              All pertinent labs from the last 24 hours have been reviewed.    Significant Diagnostics:  I have reviewed all pertinent imaging results/findings within the past 24 hours.  Assessment/Plan:     Acute arterial ischemic stroke, multifocal, posterior circulation  49-year-old female with a history of reported polysubstance use, reported withdrawal seizures in the past, hepatitis-C, depression who presents to the ED for altered mental status. CTA head/neck with large left PCA territory infarcts, no hemorrhage. Thrombus and occlusion in right vertebral artery, moderate stenosis of left vertebral artery. No cervical spine fracture. NSGY consulted to consider early suboccipital craniotomy.     --Patient admitted to ICU on telemetry; NCC/stroke teams are primary  -q1h neurochecks in ICU  --All labs and diagnostics reviewed  -Recommend STAT MRI brain to assess if cerebellar infarcts present. At this time no indication for craniotomy based on recent CTA head. Recommend to repeat CTH 12 hrs after initial for ongoing monitoring.  --Patient may continue anti-plt/coag medications at this time; will need full reversal prior to the OR (INR <1.4, Plt >100k)  --SBP reccs and management per primary team  --HOB >30  --Follow-up full pre-op labs (CBC/CMP/PT-INR/PTT/T&S)  --Keep  NPO  --Continue maximal medical therapy and stroke work-up per primary teams  --Continue to monitor clinically, notify NSGY immediately with any changes in neuro status  --Discussed with Dr. Augustin        Thank you for your consult. I will follow-up with patient. Please contact us if you have any additional questions.    Alannah Gaytan PA-C  Neurosurgery  [facility]

## 2024-11-07 PROBLEM — R74.8 HYPERCKEMIA: Status: ACTIVE | Noted: 2024-11-07

## 2024-11-07 PROBLEM — Z99.11 ON MECHANICALLY ASSISTED VENTILATION: Status: ACTIVE | Noted: 2024-11-07

## 2024-11-07 PROBLEM — F19.10 POLYSUBSTANCE ABUSE: Status: ACTIVE | Noted: 2024-11-07

## 2024-11-07 PROBLEM — R53.81 DEBILITY: Status: ACTIVE | Noted: 2024-11-07

## 2024-11-07 PROBLEM — Z71.89 GOALS OF CARE, COUNSELING/DISCUSSION: Status: ACTIVE | Noted: 2024-11-07

## 2024-11-07 PROBLEM — T56.891A LITHIUM TOXICITY: Status: ACTIVE | Noted: 2024-11-07

## 2024-11-07 LAB
ALBUMIN SERPL BCP-MCNC: 4 G/DL (ref 3.5–5.2)
ALLENS TEST: ABNORMAL
ALP SERPL-CCNC: 81 U/L (ref 40–150)
ALT SERPL W/O P-5'-P-CCNC: 42 U/L (ref 10–44)
AMMONIA PLAS-SCNC: 55 UMOL/L (ref 10–50)
AMPHET+METHAMPHET UR QL: NEGATIVE
ANION GAP SERPL CALC-SCNC: 10 MMOL/L (ref 8–16)
ANION GAP SERPL CALC-SCNC: 8 MMOL/L (ref 8–16)
ANION GAP SERPL CALC-SCNC: 9 MMOL/L (ref 8–16)
ANION GAP SERPL CALC-SCNC: 9 MMOL/L (ref 8–16)
AST SERPL-CCNC: 83 U/L (ref 10–40)
AV AREA BY CONTINUOUS VTI: 2.3 CM2
AV INDEX (PROSTH): 0.74
AV LVOT MEAN GRADIENT: 2 MMHG
AV LVOT PEAK GRADIENT: 4 MMHG
AV MEAN GRADIENT: 3.8 MMHG
AV PEAK GRADIENT: 7.8 MMHG
AV VALVE AREA BY VELOCITY RATIO: 2.2 CM²
AV VALVE AREA: 2.3 CM2
AV VELOCITY RATIO: 0.71
BARBITURATES UR QL SCN>200 NG/ML: NEGATIVE
BASOPHILS # BLD AUTO: 0.04 K/UL (ref 0–0.2)
BASOPHILS NFR BLD: 0.3 % (ref 0–1.9)
BENZODIAZ UR QL SCN>200 NG/ML: NEGATIVE
BILIRUB SERPL-MCNC: 0.3 MG/DL (ref 0.1–1)
BSA FOR ECHO PROCEDURE: 1.68 M2
BUN SERPL-MCNC: 16 MG/DL (ref 6–20)
BUN SERPL-MCNC: 17 MG/DL (ref 6–20)
BZE UR QL SCN: ABNORMAL
CALCIUM SERPL-MCNC: 8.7 MG/DL (ref 8.7–10.5)
CALCIUM SERPL-MCNC: 8.7 MG/DL (ref 8.7–10.5)
CALCIUM SERPL-MCNC: 9.7 MG/DL (ref 8.7–10.5)
CALCIUM SERPL-MCNC: 9.7 MG/DL (ref 8.7–10.5)
CANNABINOIDS UR QL SCN: NEGATIVE
CHLORIDE SERPL-SCNC: 109 MMOL/L (ref 95–110)
CHLORIDE SERPL-SCNC: 110 MMOL/L (ref 95–110)
CHLORIDE SERPL-SCNC: 111 MMOL/L (ref 95–110)
CHLORIDE SERPL-SCNC: 112 MMOL/L (ref 95–110)
CK SERPL-CCNC: 3539 U/L (ref 20–180)
CK SERPL-CCNC: 3915 U/L (ref 20–180)
CK SERPL-CCNC: 6224 U/L (ref 20–180)
CO2 SERPL-SCNC: 19 MMOL/L (ref 23–29)
CO2 SERPL-SCNC: 19 MMOL/L (ref 23–29)
CO2 SERPL-SCNC: 20 MMOL/L (ref 23–29)
CO2 SERPL-SCNC: 23 MMOL/L (ref 23–29)
CREAT SERPL-MCNC: 0.6 MG/DL (ref 0.5–1.4)
CREAT SERPL-MCNC: 0.6 MG/DL (ref 0.5–1.4)
CREAT SERPL-MCNC: 0.7 MG/DL (ref 0.5–1.4)
CREAT SERPL-MCNC: 0.8 MG/DL (ref 0.5–1.4)
CREAT UR-MCNC: 224 MG/DL (ref 15–325)
CV ECHO LV RWT: 0.39 CM
DELSYS: ABNORMAL
DIFFERENTIAL METHOD BLD: ABNORMAL
DOP CALC AO PEAK VEL: 1.4 M/S
DOP CALC AO VTI: 25.6 CM
DOP CALC LVOT AREA: 3.1 CM2
DOP CALC LVOT DIAMETER: 2 CM
DOP CALC LVOT PEAK VEL: 1 M/S
DOP CALC LVOT STROKE VOLUME: 59.3 CM3
DOP CALCLVOT PEAK VEL VTI: 18.9 CM
E WAVE DECELERATION TIME: 259.86 MS
E/A RATIO: 1.05
E/E' RATIO: 7.26 M/S
ECHO EF ESTIMATED: 56 %
ECHO LV POSTERIOR WALL: 0.9 CM (ref 0.6–1.1)
EOSINOPHIL # BLD AUTO: 0 K/UL (ref 0–0.5)
EOSINOPHIL NFR BLD: 0.1 % (ref 0–8)
ERYTHROCYTE [DISTWIDTH] IN BLOOD BY AUTOMATED COUNT: 13.3 % (ref 11.5–14.5)
ERYTHROCYTE [SEDIMENTATION RATE] IN BLOOD BY WESTERGREN METHOD: 18 MM/H
EST. GFR  (NO RACE VARIABLE): >60 ML/MIN/1.73 M^2
ETHANOL UR-MCNC: <10 MG/DL
FIO2: 50
FRACTIONAL SHORTENING: 30.4 % (ref 28–44)
GLOBAL LONGITUIDAL STRAIN: 18 %
GLUCOSE SERPL-MCNC: 105 MG/DL (ref 70–110)
GLUCOSE SERPL-MCNC: 105 MG/DL (ref 70–110)
GLUCOSE SERPL-MCNC: 125 MG/DL (ref 70–110)
GLUCOSE SERPL-MCNC: 128 MG/DL (ref 70–110)
HCO3 UR-SCNC: 22.8 MMOL/L (ref 24–28)
HCT VFR BLD AUTO: 42.5 % (ref 37–48.5)
HGB BLD-MCNC: 13.8 G/DL (ref 12–16)
IMM GRANULOCYTES # BLD AUTO: 0.04 K/UL (ref 0–0.04)
IMM GRANULOCYTES NFR BLD AUTO: 0.3 % (ref 0–0.5)
INTERVENTRICULAR SEPTUM: 0.8 CM (ref 0.6–1.1)
LA MAJOR: 5.14 CM
LA MINOR: 5.31 CM
LA WIDTH: 3.59 CM
LEFT ATRIUM SIZE: 3.29 CM
LEFT ATRIUM VOLUME INDEX: 30.8 ML/M2
LEFT ATRIUM VOLUME: 52.44 CM3
LEFT INTERNAL DIMENSION IN SYSTOLE: 3.2 CM (ref 2.1–4)
LEFT VENTRICLE DIASTOLIC VOLUME INDEX: 57.06 ML/M2
LEFT VENTRICLE DIASTOLIC VOLUME: 97 ML
LEFT VENTRICLE MASS INDEX: 75.1 G/M2
LEFT VENTRICLE SYSTOLIC VOLUME INDEX: 24.9 ML/M2
LEFT VENTRICLE SYSTOLIC VOLUME: 42.25 ML
LEFT VENTRICULAR INTERNAL DIMENSION IN DIASTOLE: 4.6 CM (ref 3.5–6)
LEFT VENTRICULAR MASS: 127.7 G
LV LATERAL E/E' RATIO: 5.75 M/S
LV SEPTAL E/E' RATIO: 9.86 M/S
LYMPHOCYTES # BLD AUTO: 1.7 K/UL (ref 1–4.8)
LYMPHOCYTES NFR BLD: 13.1 % (ref 18–48)
MAGNESIUM SERPL-MCNC: 2.3 MG/DL (ref 1.6–2.6)
MCH RBC QN AUTO: 29.2 PG (ref 27–31)
MCHC RBC AUTO-ENTMCNC: 32.5 G/DL (ref 32–36)
MCV RBC AUTO: 90 FL (ref 82–98)
METHADONE UR QL SCN>300 NG/ML: NEGATIVE
MODE: ABNORMAL
MONOCYTES # BLD AUTO: 1 K/UL (ref 0.3–1)
MONOCYTES NFR BLD: 7.4 % (ref 4–15)
MV PEAK A VEL: 0.66 M/S
MV PEAK E VEL: 0.69 M/S
NEUTROPHILS # BLD AUTO: 10.2 K/UL (ref 1.8–7.7)
NEUTROPHILS NFR BLD: 78.8 % (ref 38–73)
NRBC BLD-RTO: 0 /100 WBC
OHS CV RV/LV RATIO: 0.63 CM
OHS LV EJECTION FRACTION SIMPSONS BIPLANE MOD: 56 %
OPIATES UR QL SCN: NEGATIVE
PCO2 BLDA: 41.6 MMHG (ref 35–45)
PCP UR QL SCN>25 NG/ML: NEGATIVE
PEEP: 5
PH SMN: 7.35 [PH] (ref 7.35–7.45)
PHOSPHATE SERPL-MCNC: 2.7 MG/DL (ref 2.7–4.5)
PISA TR MAX VEL: 2.5 M/S
PLATELET # BLD AUTO: 334 K/UL (ref 150–450)
PMV BLD AUTO: 9.7 FL (ref 9.2–12.9)
PO2 BLDA: 146 MMHG (ref 80–100)
POC BE: -3 MMOL/L
POC SATURATED O2: 99 % (ref 95–100)
POC TCO2: 24 MMOL/L (ref 23–27)
POCT GLUCOSE: 106 MG/DL (ref 70–110)
POCT GLUCOSE: 99 MG/DL (ref 70–110)
POTASSIUM SERPL-SCNC: 3.4 MMOL/L (ref 3.5–5.1)
POTASSIUM SERPL-SCNC: 3.8 MMOL/L (ref 3.5–5.1)
POTASSIUM SERPL-SCNC: 4 MMOL/L (ref 3.5–5.1)
POTASSIUM SERPL-SCNC: 4.1 MMOL/L (ref 3.5–5.1)
PROT SERPL-MCNC: 7.6 G/DL (ref 6–8.4)
RA MAJOR: 4.95 CM
RA WIDTH: 3.36 CM
RBC # BLD AUTO: 4.72 M/UL (ref 4–5.4)
RIGHT VENTRICLE DIASTOLIC BASEL DIMENSION: 2.9 CM
SAMPLE: ABNORMAL
SINUS: 2.29 CM
SITE: ABNORMAL
SODIUM SERPL-SCNC: 139 MMOL/L (ref 136–145)
SODIUM SERPL-SCNC: 139 MMOL/L (ref 136–145)
SODIUM SERPL-SCNC: 140 MMOL/L (ref 136–145)
SODIUM SERPL-SCNC: 141 MMOL/L (ref 136–145)
STJ: 2.43 CM
TDI LATERAL: 0.12 M/S
TDI SEPTAL: 0.07 M/S
TDI: 0.1 M/S
TOXICOLOGY INFORMATION: ABNORMAL
TR MAX PG: 25 MMHG
TRICUSPID ANNULAR PLANE SYSTOLIC EXCURSION: 1.64 CM
TV PEAK GRADIENT: 25 MMHG
VT: 450
WBC # BLD AUTO: 12.95 K/UL (ref 3.9–12.7)
Z-SCORE OF LEFT VENTRICULAR DIMENSION IN END DIASTOLE: -0.27
Z-SCORE OF LEFT VENTRICULAR DIMENSION IN END SYSTOLE: 0.7

## 2024-11-07 PROCEDURE — 82550 ASSAY OF CK (CPK): CPT

## 2024-11-07 PROCEDURE — 80053 COMPREHEN METABOLIC PANEL: CPT

## 2024-11-07 PROCEDURE — 63600175 PHARM REV CODE 636 W HCPCS

## 2024-11-07 PROCEDURE — 82800 BLOOD PH: CPT

## 2024-11-07 PROCEDURE — 36600 WITHDRAWAL OF ARTERIAL BLOOD: CPT

## 2024-11-07 PROCEDURE — 99900026 HC AIRWAY MAINTENANCE (STAT)

## 2024-11-07 PROCEDURE — 25000003 PHARM REV CODE 250: Performed by: PSYCHIATRY & NEUROLOGY

## 2024-11-07 PROCEDURE — 97167 OT EVAL HIGH COMPLEX 60 MIN: CPT

## 2024-11-07 PROCEDURE — 82803 BLOOD GASES ANY COMBINATION: CPT

## 2024-11-07 PROCEDURE — 25000003 PHARM REV CODE 250: Performed by: STUDENT IN AN ORGANIZED HEALTH CARE EDUCATION/TRAINING PROGRAM

## 2024-11-07 PROCEDURE — 99291 CRITICAL CARE FIRST HOUR: CPT | Mod: ,,, | Performed by: PSYCHIATRY & NEUROLOGY

## 2024-11-07 PROCEDURE — 94003 VENT MGMT INPAT SUBQ DAY: CPT

## 2024-11-07 PROCEDURE — 27100171 HC OXYGEN HIGH FLOW UP TO 24 HOURS

## 2024-11-07 PROCEDURE — 82550 ASSAY OF CK (CPK): CPT | Mod: 91 | Performed by: PSYCHIATRY & NEUROLOGY

## 2024-11-07 PROCEDURE — 20000000 HC ICU ROOM

## 2024-11-07 PROCEDURE — 25000003 PHARM REV CODE 250

## 2024-11-07 PROCEDURE — 97112 NEUROMUSCULAR REEDUCATION: CPT

## 2024-11-07 PROCEDURE — 80048 BASIC METABOLIC PNL TOTAL CA: CPT | Mod: 91,XB

## 2024-11-07 PROCEDURE — 82550 ASSAY OF CK (CPK): CPT | Mod: 91

## 2024-11-07 PROCEDURE — 99900035 HC TECH TIME PER 15 MIN (STAT)

## 2024-11-07 PROCEDURE — 82140 ASSAY OF AMMONIA: CPT

## 2024-11-07 PROCEDURE — 94761 N-INVAS EAR/PLS OXIMETRY MLT: CPT

## 2024-11-07 PROCEDURE — 80307 DRUG TEST PRSMV CHEM ANLYZR: CPT

## 2024-11-07 PROCEDURE — 83735 ASSAY OF MAGNESIUM: CPT

## 2024-11-07 PROCEDURE — 84100 ASSAY OF PHOSPHORUS: CPT

## 2024-11-07 PROCEDURE — 85025 COMPLETE CBC W/AUTO DIFF WBC: CPT

## 2024-11-07 RX ORDER — ATORVASTATIN CALCIUM 40 MG/1
80 TABLET, FILM COATED ORAL DAILY
Status: DISCONTINUED | OUTPATIENT
Start: 2024-11-07 | End: 2024-11-11

## 2024-11-07 RX ORDER — AMOXICILLIN 250 MG
2 CAPSULE ORAL DAILY
Status: DISCONTINUED | OUTPATIENT
Start: 2024-11-07 | End: 2024-11-09

## 2024-11-07 RX ORDER — SODIUM CHLORIDE 9 MG/ML
INJECTION, SOLUTION INTRAVENOUS CONTINUOUS
Status: DISCONTINUED | OUTPATIENT
Start: 2024-11-07 | End: 2024-11-12

## 2024-11-07 RX ORDER — ENOXAPARIN SODIUM 100 MG/ML
40 INJECTION SUBCUTANEOUS EVERY 24 HOURS
Status: DISCONTINUED | OUTPATIENT
Start: 2024-11-07 | End: 2024-11-07

## 2024-11-07 RX ORDER — LANOLIN ALCOHOL/MO/W.PET/CERES
800 CREAM (GRAM) TOPICAL
Status: DISCONTINUED | OUTPATIENT
Start: 2024-11-07 | End: 2024-11-11

## 2024-11-07 RX ORDER — FLUOXETINE 20 MG/5ML
60 SOLUTION ORAL DAILY
Status: DISCONTINUED | OUTPATIENT
Start: 2024-11-07 | End: 2024-11-11

## 2024-11-07 RX ORDER — SODIUM,POTASSIUM PHOSPHATES 280-250MG
2 POWDER IN PACKET (EA) ORAL
Status: DISCONTINUED | OUTPATIENT
Start: 2024-11-07 | End: 2024-11-11

## 2024-11-07 RX ORDER — HEPARIN SODIUM 5000 [USP'U]/ML
5000 INJECTION, SOLUTION INTRAVENOUS; SUBCUTANEOUS EVERY 8 HOURS
Status: DISCONTINUED | OUTPATIENT
Start: 2024-11-07 | End: 2024-11-16

## 2024-11-07 RX ORDER — DEXMEDETOMIDINE HYDROCHLORIDE 4 UG/ML
0-1.4 INJECTION, SOLUTION INTRAVENOUS CONTINUOUS
Status: DISCONTINUED | OUTPATIENT
Start: 2024-11-07 | End: 2024-11-07

## 2024-11-07 RX ORDER — NAPROXEN SODIUM 220 MG/1
81 TABLET, FILM COATED ORAL DAILY
Status: DISCONTINUED | OUTPATIENT
Start: 2024-11-07 | End: 2024-11-11

## 2024-11-07 RX ORDER — CLOPIDOGREL BISULFATE 75 MG/1
75 TABLET ORAL DAILY
Status: DISCONTINUED | OUTPATIENT
Start: 2024-11-07 | End: 2024-11-07

## 2024-11-07 RX ORDER — CLOPIDOGREL BISULFATE 75 MG/1
75 TABLET ORAL DAILY
Status: DISCONTINUED | OUTPATIENT
Start: 2024-11-07 | End: 2024-11-11

## 2024-11-07 RX ADMIN — Medication 187.5 MCG/HR: at 09:11

## 2024-11-07 RX ADMIN — ASPIRIN 81 MG CHEWABLE TABLET 81 MG: 81 TABLET CHEWABLE at 08:11

## 2024-11-07 RX ADMIN — POTASSIUM CHLORIDE 60 MEQ: 7.46 INJECTION, SOLUTION INTRAVENOUS at 05:11

## 2024-11-07 RX ADMIN — HEPARIN SODIUM 5000 UNITS: 5000 INJECTION INTRAVENOUS; SUBCUTANEOUS at 09:11

## 2024-11-07 RX ADMIN — Medication 175 MCG/HR: at 09:11

## 2024-11-07 RX ADMIN — SODIUM CHLORIDE: 9 INJECTION, SOLUTION INTRAVENOUS at 05:11

## 2024-11-07 RX ADMIN — THERA TABS 1 TABLET: TAB at 08:11

## 2024-11-07 RX ADMIN — ATORVASTATIN CALCIUM 80 MG: 40 TABLET, FILM COATED ORAL at 08:11

## 2024-11-07 RX ADMIN — FOLIC ACID 1 MG: 1 TABLET ORAL at 08:11

## 2024-11-07 RX ADMIN — FAMOTIDINE 20 MG: 20 TABLET ORAL at 08:11

## 2024-11-07 RX ADMIN — FLUOXETINE HYDROCHLORIDE 60 MG: 20 SOLUTION ORAL at 09:11

## 2024-11-07 RX ADMIN — DEXMEDETOMIDINE HYDROCHLORIDE 0.2 MCG/KG/HR: 4 INJECTION, SOLUTION INTRAVENOUS at 05:11

## 2024-11-07 RX ADMIN — Medication 100 MG: at 08:11

## 2024-11-07 RX ADMIN — CLOPIDOGREL BISULFATE 75 MG: 75 TABLET ORAL at 11:11

## 2024-11-07 RX ADMIN — FAMOTIDINE 20 MG: 20 TABLET ORAL at 09:11

## 2024-11-07 RX ADMIN — SENNOSIDES AND DOCUSATE SODIUM 2 TABLET: 50; 8.6 TABLET ORAL at 11:11

## 2024-11-07 RX ADMIN — HEPARIN SODIUM 5000 UNITS: 5000 INJECTION INTRAVENOUS; SUBCUTANEOUS at 01:11

## 2024-11-07 NOTE — PLAN OF CARE
[facility] Care Plan    POC reviewed with Malathi Mcpherson at 0000. Patient intubated and is unable to verbalize understanding. Questions and concerns addressed. No acute events today. Pt progressing toward goals. Will continue to monitor. See below and flowsheets for full assessment and VS info.     - CTH completed  - Fentanyl gtt titrated for agitation; gtt currently @ 250mcg  - Precedex gtt started for unrelieved agitation; gtt currently @ 0.3 mcg  - NS gtt initiated and going @ 100mL/hr  - K replacing  - Roman remains in place; no issues overnight  - PIV initiated x 1  - Pt still remains agitated despite medications  - Bath given, linens changed      Is this a stroke patient? yes- Stroke booklet reviewed with patient, risk factors identified for patient and stroke booklet remains at bedside for ongoing education.     Care individualization: intubated, maintain SBP <220    Neuro:  Shailesh Coma Scale  Best Eye Response: 1-->(E1) none  Best Motor Response: 5-->(M5) localizes pain  Best Verbal Response: 1-->(V1) none  Shailesh Coma Scale Score: 7  Assessment Qualifiers: patient chemically sedated or paralyzed, patient intubated  Pupil PERRLA: yes     24 hr Temp:  [97.7 °F (36.5 °C)-98.7 °F (37.1 °C)]     CV:   Rhythm: normal sinus rhythm  BP goals:   SBP < 220  MAP > 65    Resp:      Vent Mode: A/C  Set Rate: 18 BPM  Oxygen Concentration (%): 50  Vt Set: 450 mL  PEEP/CPAP: 5 cmH20    Plan: wean to extubate    GI/:     Diet/Nutrition Received: NPO  Last Bowel Movement: 11/05/24  Voiding Characteristics: urethral catheter (bladder)    Intake/Output Summary (Last 24 hours) at 11/7/2024 0709  Last data filed at 11/7/2024 0605  Gross per 24 hour   Intake 408.55 ml   Output 775 ml   Net -366.45 ml     Unmeasured Output  Stool Occurrence: 0    Labs/Accuchecks:  Recent Labs   Lab 11/07/24 0022   WBC 12.95*   RBC 4.72   HGB 13.8   HCT 42.5         Recent Labs   Lab 11/07/24 0022 11/07/24  0533    141   K 3.4* 3.8    CO2 19* 23    109   BUN 16 16   CREATININE 0.7 0.8   ALKPHOS 81  --    ALT 42  --    AST 83*  --    BILITOT 0.3  --       Recent Labs   Lab 24  0854   INR 1.0      Recent Labs   Lab 24  0022   CPK 6224*       Electrolytes: Electrolytes replaced  Accuchecks: Q6H    Gtts:   0.9% NaCl   Intravenous Continuous 100 mL/hr at 24 Rate Verify at 24    dexmedeTOMIDine (Precedex) infusion (titrating)  0-1.4 mcg/kg/hr Intravenous Continuous 2.95 mL/hr at 24 06 0.2 mcg/kg/hr at 24    fentanyl  0-250 mcg/hr Intravenous Continuous 25 mL/hr at 2405 250 mcg/hr at 24       LDA/Wounds:    Sree Risk Assessment  Sensory Perception: 2-->very limited  Moisture: 3-->occasionally moist  Activity: 1-->bedfast  Mobility: 3-->slightly limited  Nutrition: 2-->probably inadequate  Friction and Shear: 2-->potential problem  Sree Score: 13  Is your sree score 12 or less? no          Restraints:   Restraint Order  Length of Order: Order good for next 24 hours or when removed.  Date that the current order will : 24  Time that the current order will :   Order Upon Application: Yes    [facility]

## 2024-11-07 NOTE — HOSPITAL COURSE
11/07/2024 Pt was agitated overnight and is on fentanyl. Currently intubated.On repeated painful stimuli has movements on LUE,LLE, RLE> RUE.The movement on right side is an improvement from yesterday.  11/11/2024 NAEON, remains on precedex for agitation. Extubated yesterday (11/10), tolerating extubation thus far without complication. Moving extremities spontaneously although weakness noted in RUE>>RLE. Remains globally aphasic and agitated despite max precedex infusion. Respiratory cultures + H flu, on rocephin for abx. Also on bactrim for SSTI. Home lithium restarted today, continue seoquel and fluoxetine.  11/14/2024 NAEO, patient off precedex, BL wrist and torso restraints, NG tube with feeds running. Patient able to respond to questions and follow some commands. Oriented to self only. Stable for stepdown.   11/15/2024 Pt still in restraints. Per nurse tendency to pull at tubes and things.On restraints. Oriented to self. No other complaints in the am.  11/16/2024 remains in restraints this AM, reportedly agitated overnight with concerns from nursing. Psychiatry consulted for med rec given extensive psych history on lithium and multiple different psych meds on admission med rec/recent dispense history and concerns for worsening agitation. LFTs uptrending, tylenol updated to PRN. Consider decreased dose of statin if worsening.   11/17/2024 episodes of coughing and vomiting overnight with concern for aspiration event. TF held, repeat KUB unremarkable. Worsening leukocytosis today, possibly related to aspiration event although uncertain. Will test COVID/flu/RSV, collect blood cultures. Consider addition of vancomycin especially is patient becomes febrile or clinically unstable. LFT's slightly improved, elevated INR. Acute hepatitis panel ordered, consider liver ultrasound.   11/18/24 Viral panel negative for any respiratory infection. WBC trending up to 19.25 from 17.82. Currently on Vanc and cefepime. LFT  downtrending to normal values. Plan for barium swallow study today, to assess for the continuation of NG tube feedings. Plan to likely move to hospital medicine service.   11/19/2024 Pt failed barium swallow study yesterday. IR consulted for PEG tube placement. CT abdomen ordered. Hep C antigen reactive, pending RNA quant. WBC trending down to 16. 35 from 19.25. Blood cultures NGTD.   11/20/2024 NAOE, WBC stable at 16.36, Bcx NGTD. Ordering RIP. Currently on Vanc and cefepime. Scheduled for PEG placement today. LFT improving. Pending Hep C RNA quant.   11/21/2024 NAOE, NG tube removed. PEG tube in place and functioning. WBC improving now down to 14. 28. LFT wnl. RIP negative and all other infectious panel negative. Vanc d/pardeep. Finished course of cefepime today.   11/22/2024 NAOE, Peg tube functioning. Off restrains. WBC improving. Qtc elevated to 530's. Talked to Psych team, no change in medication. Pending rehab placement.   11/23/2024 NAOE, NG tube off, off restrains. Binders on. WBC trending down. Pending rehab placement.   11/24/2024 NAOE, VSS. WBC WNL. Plt 808, . Pending rehab placement.   11/25/2024 NAOE, VSS WBC WNL Plt downtrending to 774. . Pending rehab placement. Medically stable for discharge  11/26/2024 NAOE, VSS. Patient stable and ready for discharge to inpatient rehab with vascular neurology and primary care follow up outpatient.

## 2024-11-07 NOTE — SUBJECTIVE & OBJECTIVE
Interval History:  See hospital course    Review of Systems  Unable to obtain a complete ROS due to level of consciousness.    Objective:     Vitals:  Temp: 97.5 °F (36.4 °C)  Pulse: 66  Rhythm: normal sinus rhythm  BP: 110/76  MAP (mmHg): 87  Resp: 18  SpO2: 100 %  Oxygen Concentration (%): 50  Vent Mode: A/C  Set Rate: 18 BPM  Vt Set: 450 mL  PEEP/CPAP: 5 cmH20  Peak Airway Pressure: 22 cmH20  Mean Airway Pressure: 8.9 cmH20  Plateau Pressure: 0 cmH20    Temp  Min: 97.5 °F (36.4 °C)  Max: 98.7 °F (37.1 °C)  Pulse  Min: 59  Max: 76  BP  Min: 96/53  Max: 157/72  MAP (mmHg)  Min: 69  Max: 105  Resp  Min: 18  Max: 37  SpO2  Min: 97 %  Max: 100 %  Oxygen Concentration (%)  Min: 50  Max: 50    11/06 0701 - 11/07 0700  In: 408.6 [I.V.:278.3]  Out: 775 [Urine:775]   Unmeasured Output  Stool Occurrence: 0        Physical Exam  Vitals and nursing note reviewed.   Constitutional:       General: She is not in acute distress.     Comments: Intubated and sedated.   HENT:      Head: Normocephalic and atraumatic.   Cardiovascular:      Rate and Rhythm: Normal rate and regular rhythm.      Heart sounds: Normal heart sounds.   Pulmonary:      Effort: No respiratory distress.      Breath sounds: Normal breath sounds.   Abdominal:      General: Abdomen is flat.      Palpations: Abdomen is soft.   Musculoskeletal:      Right lower leg: No edema.      Left lower leg: No edema.   Skin:     General: Skin is warm and dry.   Neurological:      GCS: GCS eye subscore is 1. GCS verbal subscore is 1. GCS motor subscore is 4.      Comments: Intubated and sedated  Does not follow commands  +L corneal, no R corneal  +cough  +gag  +OCR  PERRL    Motor:  LUE spontaneous movement, withdraws from noxious stimuli, no localization  LLE spontaneous movement, withdraws from noxious stimuli, no localization  RUE minimal spontaneous movement, withdraws from noxious stimuli  RLE spontaneous movement, withdraws from noxious stimuli       Unable to test  orientation, language, memory, judgment, insight, fund of knowledge, hearing, shoulder shrug, tongue protrusion, coordination, gait due to level of consciousness.       Medications:  Continuous0.9% NaCl, Last Rate: 100 mL/hr at 11/07/24 1601  dexmedeTOMIDine (Precedex) infusion (titrating), Last Rate: Stopped (11/07/24 1042)  fentanyl, Last Rate: 175 mcg/hr (11/07/24 1601)    Scheduledaspirin, 81 mg, Daily  atorvastatin, 80 mg, Daily  clopidogreL, 75 mg, Daily  famotidine, 20 mg, BID  FLUoxetine, 60 mg, Daily  folic acid, 1 mg, Daily  heparin (porcine), 5,000 Units, Q8H  multivitamin, 1 tablet, Daily  senna-docusate 8.6-50 mg, 2 tablet, Daily  thiamine, 100 mg, Daily    PRNbisacodyL, 10 mg, Daily PRN  fentanyl, 75 mcg, Q1H PRN  magnesium oxide, 800 mg, PRN  magnesium oxide, 800 mg, PRN  potassium bicarbonate, 35 mEq, PRN  potassium bicarbonate, 50 mEq, PRN  potassium bicarbonate, 60 mEq, PRN  potassium, sodium phosphates, 2 packet, PRN  potassium, sodium phosphates, 2 packet, PRN  potassium, sodium phosphates, 2 packet, PRN  sodium chloride 0.9%, 10 mL, PRN      Today I personally reviewed pertinent medications, lines/drains/airways, imaging, cardiology results, laboratory results, microbiology results, notably:    Diet  Diet NPO  Diet NPO

## 2024-11-07 NOTE — SUBJECTIVE & OBJECTIVE
Past Medical History:   Diagnosis Date    Acute adjustment disorder with depressed mood     Cutaneous abscess 08/08/2019    Depression     Enlarged liver 04/27/2017    Hep C w/o coma, chronic     Hepatitis B     History of mental disorder 03/22/2017    History of substance abuse      History reviewed. No pertinent surgical history.  Social History     Tobacco Use    Smoking status: Every Day     Current packs/day: 0.50     Types: Cigarettes    Smokeless tobacco: Never   Substance Use Topics    Alcohol use: Yes     Comment: occaisionally    Drug use: No     Review of patient's allergies indicates:  No Known Allergies    Medications: I have reviewed the current medication administration record.    Medications Prior to Admission   Medication Sig Dispense Refill Last Dose/Taking    buPROPion (WELLBUTRIN XL) 150 MG TB24 tablet Take 150 mg by mouth once daily.   Taking    carvediloL (COREG) 6.25 MG tablet Take 6.25 mg by mouth 2 (two) times daily.   Taking    QUEtiapine (SEROQUEL) 200 MG Tab Take 200 mg by mouth every evening.   Taking    albuterol (PROVENTIL) 2.5 mg /3 mL (0.083 %) nebulizer solution Take 2.5 mg by nebulization every 6 (six) hours as needed for Wheezing. Rescue       FLUoxetine 20 MG capsule Take 60 mg by mouth once daily.       fluticasone (FLONASE) 50 mcg/actuation nasal spray 1 spray (50 mcg total) by Each Nare route 2 (two) times daily as needed for Rhinitis. 15 g 0     gabapentin (NEURONTIN) 300 MG capsule Take 300 mg by mouth 3 (three) times daily.       hydrOXYzine (ATARAX) 50 MG tablet Take 50 mg by mouth 3 (three) times daily as needed for Anxiety.       hydrOXYzine pamoate (VISTARIL) 25 MG Cap Take 25 mg by mouth 3 (three) times daily as needed.       lactulose (CHRONULAC) 10 gram/15 mL solution Take by mouth once daily.       lithium (ESKALITH) 300 MG capsule Take 300 mg by mouth 3 (three) times daily.       mirtazapine (REMERON) 15 MG tablet Take 7.5 mg by mouth every evening.           Review of Systems  Unable to assess as pt is intubated.  Objective:     Vital Signs (Most Recent):  Temp: 98.7 °F (37.1 °C) (11/07/24 0305)  Pulse: 61 (11/07/24 0800)  Resp: 18 (11/07/24 0800)  BP: 126/60 (11/07/24 0701)  SpO2: 100 % (11/07/24 0800)    Vital Signs Range (Last 24H):  Temp:  [97.8 °F (36.6 °C)-98.7 °F (37.1 °C)]   Pulse:  [61-88]   Resp:  [10-37]   BP: (108-173)/(60-87)   SpO2:  [75 %-100 %]        Physical Exam  Constitutional:       Comments: Intubated. Minimal eye opening on painful stimuli.   Eyes:      Comments: Pupils minimally reactive.   Skin:     General: Skin is warm.   Neurological:      Motor: Weakness present.              Neurological Exam:     LOC: minimal eye opening to painful stimuli.  Language: Global aphasia  Orientation: Unable to test as pt is intubated and mental status.  Visual Fields: Unable to test as pt is intubated and mental status.  EOM (CN III, IV, VI): Unable to test as pt is intubated and mental status.  Pupils (CN II, III): Minimally reactive  Motor: LUE, LLE briefly moves and lifts off bed on repeated painful stimuli.RLE moves more compared to RUE on painful stimuli compared to yesterday.      Laboratory:  CMP:   Recent Labs   Lab 11/07/24  0022 11/07/24  0533   CALCIUM 9.7 9.7   ALBUMIN 4.0  --    PROT 7.6  --     141   K 3.4* 3.8   CO2 19* 23    109   BUN 16 16   CREATININE 0.7 0.8   ALKPHOS 81  --    ALT 42  --    AST 83*  --    BILITOT 0.3  --      CBC:   Recent Labs   Lab 11/07/24  0022   WBC 12.95*   RBC 4.72   HGB 13.8   HCT 42.5      MCV 90   MCH 29.2   MCHC 32.5     Lipid Panel:   Recent Labs   Lab 11/06/24  0854   CHOL 239*   LDLCALC 178.2*   HDL 42   TRIG 94     Coagulation:   Recent Labs   Lab 11/06/24  0854   INR 1.0     Hgb A1C:   Recent Labs   Lab 11/06/24  1412   HGBA1C 4.9     TSH:   Recent Labs   Lab 11/06/24  0854   TSH 0.525       Diagnostic Results:      Brain imaging:    CT head  -Large region of hypoattenuation loss of  gray-white differentiation in left occipital lobe in medial temporal lobe in keeping with an acute PCA distribution infarct. Modest localized mass effect with some sulcal crowding. Additional involvement of the left thalamus. Similar appearance in the medial right occipital lobe, but smaller area of infarction. Small left superior cerebellar infarct, also likely acute. No evidence of recent or remote major vascular distribution infarct in the anterior circulation. No acute parenchymal hemorrhage. No midline shift.     -Multifocal acute posterior circulation distribution infarcts , particularly involving essentially the entirety of the left PCA territory. Modest mass effect without significant hemorrhage.     MRI Brain    Multiple posterior circulation infarcts are identified with involvement of the left greater than right occipital lobes and medial left temporal lobe, thalamus, right dorsal medulla, and minimally involving the cerebellum.  No midline shift with minimal mass effect on the 3rd ventricle.     Loss of flow void within the right intracranial vertebral artery in keeping with the findings on CTA.      Vessel Imaging:    CTA  Focal thrombus with severe stenosis extending from the right subclavian artery into the proximal right vertebral artery.     Focal occlusion of the proximal intracranial segment of the right vertebral artery.     Focal occlusion of the proximal left PCA.     Moderate focal stenosis at the left vertebral artery origin.     Anterior circulation appears unremarkable.     No evidence of fracture or traumatic malalignment in the cervical spine.     Emphysematous changes and patchy ground-glass opacity in the partially visualized lung apices.    Cardiac Evaluation:   TTE pending

## 2024-11-07 NOTE — PLAN OF CARE
"[facility] Care Plan    POC reviewed with Malathi Mcpherson and family at 1400. Pt verbalized understanding. Questions and concerns addressed. No acute events today. Pt progressing toward goals. Will continue to monitor. See below and flowsheets for full assessment and VS info.     New admit from ED  MRI done  Fentanyl infusing  Roman in place      Is this a stroke patient? yes- Stroke booklet reviewed with patient and family, risk factors identified for patient and stroke booklet remains at bedside for ongoing education.     Care individualization: HOB elevated; extra pillows    Neuro:  [place] Coma Scale  Best Eye Response: 1-->(E1) none  Best Motor Response: 5-->(M5) localizes pain  Best Verbal Response: 1-->(V1) none  [place] Coma Scale Score: 7  Pupil PERRLA: yes     24 hr Temp:  [97.7 °F (36.5 °C)-98 °F (36.7 °C)]     CV:   Rhythm: normal sinus rhythm  BP goals:   SBP < 220  MAP > 65    Resp:      Vent Mode: A/C  Set Rate: 18 BPM  Oxygen Concentration (%): 50  Vt Set: 450 mL  PEEP/CPAP: 5 cmH20    Plan:  Wean to extubate    GI/:     Diet/Nutrition Received: NPO  Last Bowel Movement: 11/05/24  Voiding Characteristics: urethral catheter (bladder)    Intake/Output Summary (Last 24 hours) at 11/6/2024 1808  Last data filed at 11/6/2024 1701  Gross per 24 hour   Intake 14.5 ml   Output 400 ml   Net -385.5 ml          Labs/Accuchecks:  Recent Labs   Lab 11/06/24  0854 11/06/24  0855   WBC 14.77*  --    RBC 4.75  --    HGB 14.0  --    HCT 42.8 45     --       Recent Labs   Lab 11/06/24  0854      K 3.5   CO2 16*      BUN 20   CREATININE 1.0   ALKPHOS 82   ALT 20   AST 33   BILITOT 0.4      Recent Labs   Lab 11/06/24  0854   INR 1.0    No results for input(s): "CPK", "CPKMB", "TROPONINI", "MB" in the last 168 hours.    Electrolytes: N/A - electrolytes WDL  Accuchecks: none    Gtts:   fentanyl  0-250 mcg/hr Intravenous Continuous   Paused at 11/06/24 1659       LDA/Wounds:  Lines/Drains/Airways       " Drain  Duration                  NG/OG Tube 11/06/24 1000 10 Fr. Right mouth <1 day         Urethral Catheter 11/06/24 1047 Double-lumen 16 Fr. <1 day              Airway  Duration                  Airway - Non-Surgical 11/06/24 0934 <1 day              Peripheral Intravenous Line  Duration                  Peripheral IV - Single Lumen 11/06/24 20 G 1 in Posterior;Right Hand <1 day         Peripheral IV - Single Lumen 11/06/24 20 G Left;Posterior Hand <1 day                  Wounds: Yes  Wound care consulted: Yes

## 2024-11-07 NOTE — ASSESSMENT & PLAN NOTE
49F with history of polysubstance abuse presents after being found down at home. CTA revealed multifocal infarctions with a R vertebral artery occlusion and a L PCA occlusion. Patient was OOW for TNK and stroke burden was felt to be too large to pursue IR intervention. MRI reveals multifocal posterior circulation infarctions involving bilateral occipital lobes, medial L temporal lobe, bilateral thalami, R dorsal medulla, and the cerebellum. Given the location and extent of her strokes, she is at risk of impaired consciousness and total vision loss. Risk of herniation is felt to be lower given the moderate extent of infarcted tissue and modest infratentorial infarctions.    Antithrombotics for secondary stroke prevention: Antiplatelets: Aspirin: 81 mg daily  Clopidogrel: 75 mg daily    Statins for secondary stroke prevention and hyperlipidemia, if present:   Statins: Atorvastatin- 80 mg daily    Aggressive risk factor modification: HTN, HLD     Rehab efforts: The patient has been evaluated by a stroke team provider and the therapy needs have been fully considered based off the presenting complaints and exam findings. The following therapy evaluations are needed: PT evaluate and treat, OT evaluate and treat, SLP evaluate and treat, PM&R evaluate for appropriate placement    Diagnostics ordered/pending: None     VTE prophylaxis: Mechanical prophylaxis: Place SCDs    BP parameters: Infarct: No intervention, SBP <220    -q1h neuro checks  -EKG, CXR, TTE  -A1c, Lipid panel, TSH  -ASA 81 QD  -Plavix 75mg QD  -SBP<220  -Na>135  -NSGY and vascular neurology consulted, appreciate recs  -Next imaging planned for [facility] on 11/9 unless patient has neurological decline  -PT/OT/SLP to evaluate and treat

## 2024-11-07 NOTE — PROGRESS NOTES
[facility]  Neurosurgery  Progress Note    Subjective:     History of Present Illness: 49-year-old female with a history of reported polysubstance use, reported withdrawal seizures in the past, hepatitis-C, depression who presents to the ED for altered mental status. She was found lying face down in her room at home today by family members. She was last seen normal sometime last night. EMS was called and upon their arrival she was found to be largely unresponsive though would wake up to pain. Blood sugar in the field was in the 100s. Vital signs were otherwise stable. A nasal trumpet was placed in the field. She was noted to respond to painful stimulus in the left arm, left leg and right leg but would not move her right arm. She was also noted to have an abrasion/contusion to the right temporal area. GCS 11 on admission. Patient intubated shortly after arrival for respiratory distress, O2 sats 70s. CTA head/neck with large left PCA territory infarcts, no hemorrhage. Thrombus and occlusion in right vertebral artery, moderate stenosis of left vertebral artery. No cervical spine fracture. NSGY consulted to consider early suboccipital craniotomy. Patient's mother at bedside.    Post-Op Info:  * No surgery found *       Interval History: 11/7/24: NAEON. AFVSS. Sedated. Neuro exam grossly stable.     Medications:  Continuous Infusions:   0.9% NaCl   Intravenous Continuous 100 mL/hr at 11/07/24 0701 Rate Verify at 11/07/24 0701    dexmedeTOMIDine (Precedex) infusion (titrating)  0-1.4 mcg/kg/hr Intravenous Continuous 4.43 mL/hr at 11/07/24 0701 0.3 mcg/kg/hr at 11/07/24 0701    fentanyl  0-250 mcg/hr Intravenous Continuous 25 mL/hr at 11/07/24 0701 250 mcg/hr at 11/07/24 0701     Scheduled Meds:   aspirin  81 mg Per OG tube Daily    atorvastatin  80 mg Per OG tube Daily    famotidine  20 mg Per OG tube BID    FLUoxetine  60 mg Per OG tube Daily    folic acid  1 mg Per OG tube Daily    multivitamin  1  tablet Per OG tube Daily    thiamine  100 mg Per OG tube Daily     PRN Meds:  Current Facility-Administered Medications:     bisacodyL, 10 mg, Rectal, Daily PRN    fentanyl, 75 mcg, Intravenous, Q1H PRN    magnesium oxide, 800 mg, Per OG tube, PRN    magnesium oxide, 800 mg, Per OG tube, PRN    potassium bicarbonate, 35 mEq, Per OG tube, PRN    potassium bicarbonate, 50 mEq, Per OG tube, PRN    potassium bicarbonate, 60 mEq, Per OG tube, PRN    potassium, sodium phosphates, 2 packet, Per OG tube, PRN    potassium, sodium phosphates, 2 packet, Per OG tube, PRN    potassium, sodium phosphates, 2 packet, Per OG tube, PRN    sodium chloride 0.9%, 10 mL, Intravenous, PRN     Review of Systems  Objective:     Weight: 59 kg (130 lb)  Body mass index is 19.77 kg/m².  Vital Signs (Most Recent):  Temp: 98.7 °F (37.1 °C) (11/07/24 0305)  Pulse: 61 (11/07/24 0800)  Resp: 18 (11/07/24 0800)  BP: 126/60 (11/07/24 0701)  SpO2: 100 % (11/07/24 0800) Vital Signs (24h Range):  Temp:  [97.8 °F (36.6 °C)-98.7 °F (37.1 °C)] 98.7 °F (37.1 °C)  Pulse:  [61-88] 61  Resp:  [10-37] 18  SpO2:  [75 %-100 %] 100 %  BP: (108-173)/(60-87) 126/60     Date 11/07/24 0700 - 11/08/24 0659   Shift 8217-1681 4149-5347 7259-4369 24 Hour Total   INTAKE   I.V.(mL/kg) 120.3(2)   120.3(2)   IV Piggyback 88.2   88.2   Shift Total(mL/kg) 208.5(3.5)   208.5(3.5)   OUTPUT   Urine(mL/kg/hr) 20   20   Shift Total(mL/kg) 20(0.3)   20(0.3)   Weight (kg) 59 59 59 59              Vent Mode: A/C  Oxygen Concentration (%):  [50] 50  Resp Rate Total:  [18 br/min-31 br/min] 18 br/min  Vt Set:  [450 mL] 450 mL  PEEP/CPAP:  [5 cmH20] 5 cmH20  Mean Airway Pressure:  [7.8 ovK59-15 cmH20] 9.2 cmH20             NG/OG Tube 11/06/24 1000 10 Fr. Right mouth (Active)   Placement Check placement verified by x-ray 11/07/24 0701   Securement secured to commercial device 11/07/24 0701   Clamp Status/Tolerance clamped 11/07/24 0701   Suction Setting/Drainage Method suction at the  bedside 11/07/24 0701   Intake (mL) 30 mL 11/06/24 2205            Urethral Catheter 11/06/24 1047 Double-lumen 16 Fr. (Active)   Site Assessment Clean;Intact 11/07/24 0701   Collection Container Standard drainage bag 11/07/24 0701   Securement Method secured to top of thigh w/ adhesive device 11/07/24 0701   Catheter Care Performed yes 11/07/24 0701   Reason for Continuing Urinary Catheterization Critically ill in ICU and requiring hourly monitoring of intake/output 11/07/24 0701   Output (mL) 20 mL 11/07/24 0701          Physical Exam       Neurosurgery Physical Exam    General: well developed, well nourished, no acute distress  Head: normocephalic  GCS: Motor: 5/Verbal: T/Eyes: 3 GCS Total: 7 T  Cranial nerves: positive cough, gag, corneal (except no R corneal reflex) and oculocephalic reflex  Eyes: pupils 2 mm equal minimally reactive to light.   Motor Strength: not following commands. Spont x4 flailing legs in air  Skin: Skin is warm, dry and intact.      Significant Labs:  Recent Labs   Lab 11/06/24  1804 11/07/24  0022 11/07/24  0533   * 128* 125*    139 141   K 3.6 3.4* 3.8    110 109   CO2 17* 19* 23   BUN 18 16 16   CREATININE 0.7 0.7 0.8   CALCIUM 9.6 9.7 9.7   MG  --  2.3  --      Recent Labs   Lab 11/06/24  0854 11/06/24  0855 11/07/24  0022   WBC 14.77*  --  12.95*   HGB 14.0  --  13.8   HCT 42.8 45 42.5     --  334     Recent Labs   Lab 11/06/24  0851 11/06/24  0854   INR 1.3* 1.0     Microbiology Results (last 7 days)       ** No results found for the last 168 hours. **          All pertinent labs from the last 24 hours have been reviewed.    Significant Diagnostics:  I have reviewed all pertinent imaging results/findings within the past 24 hours.  Assessment/Plan:     Acute arterial ischemic stroke, multifocal, posterior circulation  49-year-old female with a history of reported polysubstance use, reported withdrawal seizures in the past, hepatitis-C, depression who  presents to the ED for altered mental status. CTA head/neck with large left PCA territory infarcts, no hemorrhage. Thrombus and occlusion in right vertebral artery, moderate stenosis of left vertebral artery. No cervical spine fracture. NSGY consulted to consider early suboccipital craniotomy.     Imaging:  CTA head/neck 11/6: Acute left PCA territory infarct, focal thrombus with severe stenosis from right subclavian to prox right vert, focal occlusion of prox intracranial segment right vert, focal occlusion prox L PCA, moderate focal stenosis left vert at origin.  MRI 11/6: L>R occipital infarct, punctate b/l cerebellar infarct. basal cisterns open.  CTH 11/6: multifocal posterior circulation distribution infarcts.  CT Csp 11/6: No C spine fracture.    Plan:  --Patient admitted to ICU on telemetry; NCC/stroke teams are primary  -h neurochecks in ICU  --All labs and diagnostics reviewed  --No acute surgery is indicated at this time. NSGY will sign off.  --Patient may continue anti-plt/coag medications at this time; will need full reversal prior to the OR (INR <1.4, Plt >100k)  --SBP reccs and management per primary team  --HOB >30  --Follow-up full pre-op labs (CBC/CMP/PT-INR/PTT/T&S)  --Keep NPO  --Continue maximal medical therapy and stroke work-up per primary teams  --Continue to monitor clinically, notify NSGY immediately with any changes in neuro status  --Discussed with Dr. Charli Ludwig MD  Neurosurgery  [facility] - [facility]

## 2024-11-07 NOTE — ASSESSMENT & PLAN NOTE
Pt is a 49 year old with a hx of drug use, seizure, HTN, HLD  who [facility] and was stroke activated on 11/6/24.    Per EMS, she was found down by her mother today morning , the time of which is not clearly known. EMS saw her around 7: 50 am ( 11/6/24) when she was very lethargic and aphasic. She moved the Left sided extremities and Right leg to certain extent , but did not move her RUE at all. Per EMS, they have seen her before in the context of seizures.  Per conversation with mom, she found her face down in the morning( not sure of the timing), not moving and not responding to her.    After coming to the hospital, her exam worsened , with no movement noted on her RUE and RLE. She is aphasic and not following commands. She desated to O2 saturation of 70'S due to which she had to be intubated.       Her LKN is 10 pm on 11/5/24 per mother.   She is not a TNK candidate as she is out of window and strokes in her CT. Images reviewed with stroke attending Dr Morris  Case reviewed with IR,deemed not a intervention candidate because of the extensive nature of infarcts and the risk of bleeding.  Admit to NCC for further care.  NSGY was consulted for possible early suboccipital craniotomy . No interventions from their perspective at this time.  NSGY signed off.  11/07/2024 Pt was agitated overnight and is on fentanyl. Currently intubated.On repeated painful stimuli has movements on LUE,LLE, RLE> RUE.The movement on right side is an improvement from yesterday.    Antithrombotics for secondary stroke prevention: Antiplatelets: Aspirin: 81 mg daily  Clopidogrel: 75 mg daily.   Statins for secondary stroke prevention and hyperlipidemia, if present:   Statins: Atorvastatin- 80 mg daily    Aggressive risk factor modification: HTN, HLD, Substance use.     Rehab efforts: The patient has been evaluated by a stroke team provider and the therapy needs have been fully considered based off the presenting complaints and exam findings.  The following therapy evaluations are needed: PT evaluate and treat, OT evaluate and treat, SLP evaluate and treat,      Diagnostics ordered/pending: TTE to assess cardiac function/status     VTE prophylaxis: Heparin 5000 units SQ every 8 hours    BP parameters: Infarct: No intervention, SBP <220,

## 2024-11-07 NOTE — SUBJECTIVE & OBJECTIVE
Interval History: 11/7/24: NAEON. AFVSS. Sedated. Neuro exam grossly stable.     Medications:  Continuous Infusions:   0.9% NaCl   Intravenous Continuous 100 mL/hr at 11/07/24 0701 Rate Verify at 11/07/24 0701    dexmedeTOMIDine (Precedex) infusion (titrating)  0-1.4 mcg/kg/hr Intravenous Continuous 4.43 mL/hr at 11/07/24 0701 0.3 mcg/kg/hr at 11/07/24 0701    fentanyl  0-250 mcg/hr Intravenous Continuous 25 mL/hr at 11/07/24 0701 250 mcg/hr at 11/07/24 0701     Scheduled Meds:   aspirin  81 mg Per OG tube Daily    atorvastatin  80 mg Per OG tube Daily    famotidine  20 mg Per OG tube BID    FLUoxetine  60 mg Per OG tube Daily    folic acid  1 mg Per OG tube Daily    multivitamin  1 tablet Per OG tube Daily    thiamine  100 mg Per OG tube Daily     PRN Meds:  Current Facility-Administered Medications:     bisacodyL, 10 mg, Rectal, Daily PRN    fentanyl, 75 mcg, Intravenous, Q1H PRN    magnesium oxide, 800 mg, Per OG tube, PRN    magnesium oxide, 800 mg, Per OG tube, PRN    potassium bicarbonate, 35 mEq, Per OG tube, PRN    potassium bicarbonate, 50 mEq, Per OG tube, PRN    potassium bicarbonate, 60 mEq, Per OG tube, PRN    potassium, sodium phosphates, 2 packet, Per OG tube, PRN    potassium, sodium phosphates, 2 packet, Per OG tube, PRN    potassium, sodium phosphates, 2 packet, Per OG tube, PRN    sodium chloride 0.9%, 10 mL, Intravenous, PRN     Review of Systems  Objective:     Weight: 59 kg (130 lb)  Body mass index is 19.77 kg/m².  Vital Signs (Most Recent):  Temp: 98.7 °F (37.1 °C) (11/07/24 0305)  Pulse: 61 (11/07/24 0800)  Resp: 18 (11/07/24 0800)  BP: 126/60 (11/07/24 0701)  SpO2: 100 % (11/07/24 0800) Vital Signs (24h Range):  Temp:  [97.8 °F (36.6 °C)-98.7 °F (37.1 °C)] 98.7 °F (37.1 °C)  Pulse:  [61-88] 61  Resp:  [10-37] 18  SpO2:  [75 %-100 %] 100 %  BP: (108-173)/(60-87) 126/60     Date 11/07/24 0700 - 11/08/24 0659   Shift 7074-4965 7910-2196 4769-2039 24 Hour Total   INTAKE   I.V.(mL/kg) 120.3(2)    120.3(2)   IV Piggyback 88.2   88.2   Shift Total(mL/kg) 208.5(3.5)   208.5(3.5)   OUTPUT   Urine(mL/kg/hr) 20   20   Shift Total(mL/kg) 20(0.3)   20(0.3)   Weight (kg) 59 59 59 59              Vent Mode: A/C  Oxygen Concentration (%):  [50] 50  Resp Rate Total:  [18 br/min-31 br/min] 18 br/min  Vt Set:  [450 mL] 450 mL  PEEP/CPAP:  [5 cmH20] 5 cmH20  Mean Airway Pressure:  [7.8 epJ44-83 cmH20] 9.2 cmH20             NG/OG Tube 11/06/24 1000 10 Fr. Right mouth (Active)   Placement Check placement verified by x-ray 11/07/24 0701   Securement secured to commercial device 11/07/24 0701   Clamp Status/Tolerance clamped 11/07/24 0701   Suction Setting/Drainage Method suction at the bedside 11/07/24 0701   Intake (mL) 30 mL 11/06/24 2205            Urethral Catheter 11/06/24 1047 Double-lumen 16 Fr. (Active)   Site Assessment Clean;Intact 11/07/24 0701   Collection Container Standard drainage bag 11/07/24 0701   Securement Method secured to top of thigh w/ adhesive device 11/07/24 0701   Catheter Care Performed yes 11/07/24 0701   Reason for Continuing Urinary Catheterization Critically ill in ICU and requiring hourly monitoring of intake/output 11/07/24 0701   Output (mL) 20 mL 11/07/24 0701          Physical Exam       Neurosurgery Physical Exam    General: well developed, well nourished, no acute distress  Head: normocephalic  GCS: Motor: 5/Verbal: T/Eyes: 3 GCS Total: 7 T  Cranial nerves: positive cough, gag, corneal (except no R corneal reflex) and oculocephalic reflex  Eyes: pupils 2 mm equal minimally reactive to light.   Motor Strength: not following commands. Spont x4 flailing legs in air  Skin: Skin is warm, dry and intact.      Significant Labs:  Recent Labs   Lab 11/06/24  1804 11/07/24  0022 11/07/24  0533   * 128* 125*    139 141   K 3.6 3.4* 3.8    110 109   CO2 17* 19* 23   BUN 18 16 16   CREATININE 0.7 0.7 0.8   CALCIUM 9.6 9.7 9.7   MG  --  2.3  --      Recent Labs   Lab 11/06/24  0854  11/06/24  0855 11/07/24  0022   WBC 14.77*  --  12.95*   HGB 14.0  --  13.8   HCT 42.8 45 42.5     --  334     Recent Labs   Lab 11/06/24  0851 11/06/24  0854   INR 1.3* 1.0     Microbiology Results (last 7 days)       ** No results found for the last 168 hours. **          All pertinent labs from the last 24 hours have been reviewed.    Significant Diagnostics:  I have reviewed all pertinent imaging results/findings within the past 24 hours.

## 2024-11-07 NOTE — HOSPITAL COURSE
11/07/2024 Agitated overnight, requiring uptitration of fentanyl and addition of precedex. Weaning throughout the day. Neuro exam may be slightly improved from yesterday even despite sedation with more R sided movement. However, no purposeful movement or localization of noxious stimuli. MRI yesterday with multifocal infarcts including the cerebellum, bilateral occipital lobes, and bilateral thalami (R>L). CK found to be elevated overnight, fluids running and trending levels.   11/08/2024 More alert and developing some purposeful movement by reaching towards ETT, brushing hair behind ear, and localizing to LLE noxious stimuli with LUE. CK downtrending with fluids. Monitoring decreased UOP with stable BUN/Cr. Weaning fentanyl and initiated precedex. Plan to DC fluid tomorrow if CK continues to downtrend and remove fluid. Started tube feeds today. Lithium level now low, started half-dose home lithium.  11/10/2024 NAEON. Patient following commands this morning. Extubated to NC. Patient tolerated extubation well. Remains on precedex gtt for agitation, requiring PRNs. Resp culture positive for H flu. Started rocephin.  11/11/2024 Agitated overnight requiring uptitration of precedex and multiple PRN administrations. Reintitation of lithium, adding measures for withdrawal management. Respiratory culture also growing staph aureus; MRSA screen positive. Vanc started.  11/12/2024 Tolerated wean of supplemental oxygen until having to titrate up precedex overnight. Weaning oxygen and sedation as tolerated today. Qtc prolonged to 558; giving mag IV and recheck Qtc down to 535. Will have to avoid Qtc prolonging agents for agitation control. Increasing lithium and hydroxyzine dose. Stopping fluids given adequate UOP and downtrending CK.  11/13/2024 Patient's neurological exam improving. Answering questions appropriately and following some commands. Weaning precedex and uptitrating oral medications as tolerated, given improvement in  Qtc.   11/14/2024 NAEON. Off sedation today and patient is more cooperative and interactive on exam. Qtc continues to improve (469). Adding home coreg for blood pressure management. Transitioned to cefepime given beta-lactamase positive H. Flu. Consulting addiction psych. Planning for stepdown today.

## 2024-11-07 NOTE — PT/OT/SLP EVAL
"Occupational Therapy   Evaluation    Name: Malathi Mcpherson  MRN: [id number]  Admitting Diagnosis:L CVA  Recent Surgery: * No surgery found *      Recommendations:     Discharge Recommendations:  (pending extubation)  Discharge Equipment Recommendations:  lift device, hospital bed  Barriers to discharge:  None    Assessment:     Malathi Mcpherson is a 49 y.o. female with a medical diagnosis of stroke  She presents with performance deficits affecting function: impaired functional mobility, impaired self care skills, impaired endurance, impaired sensation, visual deficits, impaired balance, decreased upper extremity function, decreased lower extremity function.      Rehab Prognosis: Fair; patient would benefit from acute skilled OT services to address these deficits and reach maximum level of function.       Plan:     Patient to be seen 2 x/week to address the above listed problems via neuromuscular re-education, therapeutic exercises, therapeutic activities, self-care/home management, cognitive retraining  Plan of Care Expires: 12/05/24  Plan of Care Reviewed with: patient    Subjective     Patient:  Nonverbal; intubated  Sister:  "She's been fighting a drug addiction for years.  She has 5 grandkids and 4 kids that are all in her life; she was happy."  Occupational Profile:  Per sister:  Patient resides in [place] with her elderly parents in one story home with one step to enter.  Patient is right handed.  PTA patient independent with ADLs, not driving.  Works: cleaning houses.  Hobbies:  sameera art, puzzles, adult coloring.      Pain/Comfort:  Pain Rating 1: 0/10  Pain Rating Post-Intervention 1: 0/10    Patients cultural, spiritual, Jain conflicts given the current situation: no    Objective:     Communicated with: Nurse prior to session.  Patient found supine with restraints, ventilator, telemetry, peripheral IV, perez catheter, blood pressure cuff, pulse ox (continuous), bed alarm upon OT entry to " room.    General Precautions: Standard, aspiration, fall, NPO  Orthopedic Precautions: N/A  Braces: N/A  Respiratory Status: Ventilator    Occupational Performance:    Bed Mobility:    Patient completed Rolling/Turning to Left with  total assistance  Patient completed Rolling/Turning to Right with total assistance    Functional Mobility/Transfers:  Dependent drawsheet transfers    Activities of Daily Living:  Feeding:  NPO    Grooming: total assistance while supine    Cognitive/Visual Perceptual:  Cognitive/Psychosocial Skills:     -       Oriented to: Daily orientation provided   -       Follows Commands/attention:unable to follow one step commands  -       Communication: nonverbal; intubated    Physical Exam:  Postural examination/scapula alignment:    -       Rounded shoulders  Upper Extremity Range of Motion:     -       Right Upper Extremity: PROM WNL  -       Left Upper Extremity: AAROM WNL    AMPAC 6 Click ADL:  AMPAC Total Score: 6    Treatment & Education:  Daily orientation provided.  Manual lymph drainage techniques performed prior to ROM.  PROM performed right UE/LE, AAROM left UE/LE one set x 10 rep in all planes of motion with stretches provided at end range; sustained stretch provided for ankle dorsiflexion.  Assistance and facilitation provided for upward rotation of the scapula during shoulder flexion and abduction to promote orientation of glenoid fossa of scapula to humeral head for prevention of post-stroke hemiplegic pain.  Patient kept eyes closed.  Patient unable to follow commands.  Patient keeping head laterally flexed and rotated to the right; positioning provided in midline.  Assisted RN with midline head positioning while pupillometer used.  Provided multi-sensory stimulation to prevent sensory deprivation and improve clinical outcomes.  Positioning provided for midline orientation with bilateral UEs elevated and heels lifted off mattress; positioning provided to prevent flexor synergy  pattern in UE (internal rotation and adduction) to decrease risk of post-stroke hemiplegic pain.   Gentle cervical rotation provided.   Family not present during the session.    Modified Rains Scale:  5/6.    The Barthel Index of Activities of Daily Living Score:  Score: 0/20 (modified scoring for Barthel index)   lower scores indicating increased disability    Patient left supine with all lines intact, call button in reach, bed alarm on, and restraints reapplied at end of session    GOALS:   Multidisciplinary Problems       Occupational Therapy Goals          Problem: Occupational Therapy    Goal Priority Disciplines Outcome Interventions   Occupational Therapy Goal     OT, PT/OT Progressing    Description: Goals set 11/7 with an expiration date, 12/5:  Patient will increase functional independence with ADLs by performing:    Patient will demonstrate rolling to the right with max assist.  Not met   Patient will demonstrate rolling to the left with max assist.   Not met  Patient will demonstrate supine -sit with max assist.   Not met  Patient will demonstrate squat pivot transfers with max assist.   Not met  Patient will demonstrate grooming while seated with max assist.   Not met  Patient will demonstrate upper body dressing with max assist while seated EOB.   Not met  Patient will demonstrate lower body dressing with max assist while seated EOB.   Not met  Patient will demonstrate toileting with max assist.   Not met  Patient's family / caregiver will demonstrate independence and safety with assisting patient with self-care skills and functional mobility.     Not met  Patient's family / caregiver will demonstrate independence with providing ROM and changes in bed positioning.   Not met                                 History:     Past Medical History:   Diagnosis Date    Acute adjustment disorder with depressed mood     Cutaneous abscess 08/08/2019    Depression     Enlarged liver 04/27/2017    Hep C w/o coma,  chronic     Hepatitis B     History of mental disorder 03/22/2017    History of substance abuse        History reviewed. No pertinent surgical history.    Time Tracking:     OT Date of Treatment: 11/07/24  OT Start Time: 0355  OT Stop Time: 0415  OT Start Time:  5:00  OT Stop Time:  5:15  OT Total Time (min): 35 min    Billable Minutes:Evaluation 25  Neuromuscular Re-education 10    11/7/2024

## 2024-11-07 NOTE — PLAN OF CARE
[facility] Care Plan    POC reviewed with Malathi Mcpherson and family at 1400. Pt verbalized understanding. Questions and concerns addressed. No acute events today. Pt progressing toward goals. Will continue to monitor. See below and flowsheets for full assessment and VS info.     Fentanyl infusing    Roman in place      Is this a stroke patient? yes- Stroke booklet reviewed with patient and family, risk factors identified for patient and stroke booklet remains at bedside for ongoing education.     Care individualization: HOB elevated; extra pillows    Neuro:  Shailesh Coma Scale  Best Eye Response: 1-->(E1) none  Best Motor Response: 5-->(M5) localizes pain  Best Verbal Response: 1-->(V1) none  [place] Coma Scale Score: 7  Assessment Qualifiers: patient chemically sedated or paralyzed, patient intubated  Pupil PERRLA: yes     24 hr Temp:  [97.5 °F (36.4 °C)-98.7 °F (37.1 °C)]     CV:   Rhythm: normal sinus rhythm  BP goals:   SBP < 220  MAP > 65    Resp:      Vent Mode: A/C  Set Rate: 18 BPM  Oxygen Concentration (%): 50  Vt Set: 450 mL  PEEP/CPAP: 5 cmH20    Plan: wean to extubate    GI/:     Diet/Nutrition Received: NPO  Last Bowel Movement: 11/05/24  Voiding Characteristics: urethral catheter (bladder)    Intake/Output Summary (Last 24 hours) at 11/7/2024 1740  Last data filed at 11/7/2024 1701  Gross per 24 hour   Intake 2185.72 ml   Output 550 ml   Net 1635.72 ml     Unmeasured Output  Stool Occurrence: 0    Labs/Accuchecks:  Recent Labs   Lab 11/07/24  0022   WBC 12.95*   RBC 4.72   HGB 13.8   HCT 42.5         Recent Labs   Lab 11/07/24  0022 11/07/24  0533 11/07/24  1327      < > 139   K 3.4*   < > 4.1   CO2 19*   < > 20*      < > 111*   BUN 16   < > 17   CREATININE 0.7   < > 0.6   ALKPHOS 81  --   --    ALT 42  --   --    AST 83*  --   --    BILITOT 0.3  --   --     < > = values in this interval not displayed.      Recent Labs   Lab 11/06/24  0854   INR 1.0      Recent Labs   Lab  11/07/24  1327   CPK 3915*       Electrolytes: Electrolytes replaced  Accuchecks: Q6H    Gtts:   0.9% NaCl   Intravenous Continuous 100 mL/hr at 11/07/24 1701 Rate Verify at 11/07/24 1701    fentanyl  0-250 mcg/hr Intravenous Continuous 17.5 mL/hr at 11/07/24 1701 175 mcg/hr at 11/07/24 1701       LDA/Wounds:  Lines/Drains/Airways       Drain  Duration                  NG/OG Tube 11/06/24 1000 10 Fr. Right mouth 1 day         Urethral Catheter 11/06/24 1047 Double-lumen 16 Fr. 1 day              Airway  Duration                  Airway - Non-Surgical 11/06/24 0934 1 day              Peripheral Intravenous Line  Duration                  Peripheral IV - Single Lumen 11/06/24 20 G 1 in Posterior;Right Hand 1 day         Peripheral IV - Single Lumen 11/07/24 0000 20 G 1 3/4 in Anterior;Right Upper Arm <1 day                  Wounds: No  Wound care consulted: No

## 2024-11-07 NOTE — ASSESSMENT & PLAN NOTE
49-year-old female with a history of reported polysubstance use, reported withdrawal seizures in the past, hepatitis-C, depression who presents to the ED for altered mental status. CTA head/neck with large left PCA territory infarcts, no hemorrhage. Thrombus and occlusion in right vertebral artery, moderate stenosis of left vertebral artery. No cervical spine fracture. NSGY consulted to consider early suboccipital craniotomy.     Imaging:  CTA head/neck 11/6: Acute left PCA territory infarct, focal thrombus with severe stenosis from right subclavian to prox right vert, focal occlusion of prox intracranial segment right vert, focal occlusion prox L PCA, moderate focal stenosis left vert at origin.  MRI 11/6: L>R occipital infarct, punctate b/l cerebellar infarct. basal cisterns open.  CTH 11/6: multifocal posterior circulation distribution infarcts.  CT Csp 11/6: No C spine fracture.    Plan:  --Patient admitted to ICU on telemetry; NCC/stroke teams are primary  -q1h neurochecks in ICU  --All labs and diagnostics reviewed  --No acute surgery is indicated at this time. NSGY will sign off.  --Patient may continue anti-plt/coag medications at this time; will need full reversal prior to the OR (INR <1.4, Plt >100k)  --SBP reccs and management per primary team  --HOB >30  --Follow-up full pre-op labs (CBC/CMP/PT-INR/PTT/T&S)  --Keep NPO  --Continue maximal medical therapy and stroke work-up per primary teams  --Continue to monitor clinically, notify NSGY immediately with any changes in neuro status  --Discussed with Dr. Augustin

## 2024-11-07 NOTE — ASSESSMENT & PLAN NOTE
Patient with long history of polysubstance abuse including crack cocaine, alcohol, and opiates  Patient was found with drug paraphernalia on person upon admission

## 2024-11-07 NOTE — ASSESSMENT & PLAN NOTE
History of depression and anxiety  Continuing home fluoxetine 80mg QD  Lithium level supratherapeutic. Will likely need a dose decrease on re initiation.  Daily lithium level

## 2024-11-07 NOTE — CONSULTS
Inpatient consult to Physical Medicine Rehab  Consult performed by: Jud Joseph NP  Consult ordered by: Terese De La Rosa DO  Reason for consult: Rehab      Consult received.     SYED Brown, FNP-C  Physical Medicine & Rehabilitation   11/07/2024

## 2024-11-07 NOTE — ASSESSMENT & PLAN NOTE
History of depression and anxiety  Continuing home fluoxetine 80mg QD  Appears to also be prescribed lithium  F/u lithium level

## 2024-11-07 NOTE — ASSESSMENT & PLAN NOTE
Advance Care Planning     Today a voluntary meeting took place: ICU    Patient Participation: Patient is unable to participate     Attendees :  Mother and daughter of patient    Staff attendees (Name and  Role): Dr. Banegas, attending. Darius Echevarria MD resident    ACP Conversation (General): Understanding of current condition discussed patient's stroke burden and likely prognosis considering location of strokes within bilateral occipital lobes and thalami. Explained that the patient is at great risk of persistent decreased consciousness as well as bilateral blindness. Discussed process of weaning sedation for extubation and that she may not be able to protect her airway, requiring eventual surgical airway. Also discussed the high likelihood that she will require PEG. Will plan to discuss further GOC at a later date after delivering this difficult news.     Code Status: Full Code    ACP Documents: None    Goals of care: The family endorses that what is most important right now is to focus on curative/life-prolongation (regardless of treatment burdens)    Accordingly, we have decided that the best plan to meet the patient's goals includes continuing with treatment      Recommendations/  Follow-up tasks: Other (specify below) continued goals of care discussions       Length of ACP   conversation in minutes: A total of 20 min was spent on advance care planning, goals of care discussion, emotional support, formulating and communicating prognosis and exploring burden/benefit of various approaches of treatment. This discussion occurred on a fully voluntary basis with the verbal consent of the patient and/or family.

## 2024-11-07 NOTE — LOPA/MORA/SWTA/AOC/AEB
[facility] ([facility])  On-Site Evaluation  [facility] Contact # [phone]        Thank you for the referral of this patient to determine suitability for organ, tissue, and eye donation.  A chart review has been conducted   [facility] findings are as follows:    ? Potential candidate for organ donation - [facility] following patient. Any changes in patients condition, discussion of withdrawing the vent or brain death exams, or family mention of donation immediately call [phone].      [facility] Representative:  Emily Abadie BS, MS           [facility] Referral Number:   [id number]             NOTE is automatically hidden from patient's chart.

## 2024-11-07 NOTE — PROGRESS NOTES
[facility] - Neuro Critical Care  Vascular Neurology  Comprehensive Stroke Center  Progress Note    Assessment/Plan:     Cytotoxic cerebral edema  -secondary to stroke.  -q1 hr neuro checks    Acute arterial ischemic stroke, multifocal, posterior circulation  Pt is a 49 year old with a hx of drug use, seizure, HTN, HLD  who came to [facility] ED and was stroke activated on 11/6/24.    Per EMS, she was found down by her mother today morning , the time of which is not clearly known. EMS saw her around 7: 50 am ( 11/6/24) when she was very lethargic and aphasic. She moved the Left sided extremities and Right leg to certain extent , but did not move her RUE at all. Per EMS, they have seen her before in the context of seizures.  Per conversation with mom, she found her face down in the morning( not sure of the timing), not moving and not responding to her.    After coming to the hospital, her exam worsened , with no movement noted on her RUE and RLE. She is aphasic and not following commands. She desated to O2 saturation of 70'S due to which she had to be intubated.       Her LKN is 10 pm on 11/5/24 per mother.   She is not a TNK candidate as she is out of window and strokes in her CT. Images reviewed with stroke attending Dr Morris  Case reviewed with IR,deemed not a intervention candidate because of the extensive nature of infarcts and the risk of bleeding.  Admit to NCC for further care.  NSGY was consulted for possible early suboccipital craniotomy . No interventions from their perspective at this time.  NSGY signed off.  11/07/2024 Pt was agitated overnight and is on fentanyl. Currently intubated.On repeated painful stimuli has movements on LUE,LLE, RLE> RUE.The movement on right side is an improvement from yesterday.    Antithrombotics for secondary stroke prevention: Antiplatelets: Aspirin: 81 mg daily  Clopidogrel: 75 mg daily.   Statins for secondary stroke prevention and hyperlipidemia, if present:   Statins: Atorvastatin-  80 mg daily    Aggressive risk factor modification: HTN, HLD, Substance use.     Rehab efforts: The patient has been evaluated by a stroke team provider and the therapy needs have been fully considered based off the presenting complaints and exam findings. The following therapy evaluations are needed: PT evaluate and treat, OT evaluate and treat, SLP evaluate and treat,      Diagnostics ordered/pending: TTE to assess cardiac function/status     VTE prophylaxis: Heparin 5000 units SQ every 8 hours    BP parameters: Infarct: No intervention, SBP <220,              11/07/2024 Pt was agitated overnight and is on fentanyl. Currently intubated.On repeated painful stimuli has movements on LUE,LLE, RLE> RUE.The movement on right side is an improvement from yesterday.    STROKE DOCUMENTATION   Acute Stroke Times   Last Known Normal Date: 11/05/24  Last Known Normal Time: 2200  Symptom Onset Date: 11/06/24 (unsure of timing)  Symptom Onset Time:  (sometime this morning.)  Unknown Symptom Onset Time: Unknown Time  Stroke Team Called Date: 11/06/24  Stroke Team Called Time: 0843  Stroke Team Arrival Date: 11/06/24  Stroke Team Arrival Time: 0845  CT Interpretation Time: 0859  Thrombolytic Therapy Recommended: No  CTA Interpretation Time: 0902  Thrombectomy Recommended: No    NIH Scale:  1a. Level of Consciousness: 2-->Not alert, requires repeated stimulation to attend, or is obtunded and requires strong or painful stimulation to make movements (not stereotyped)  1b. LOC Questions: 2-->Answers neither question correctly  1c. LOC Commands: 2-->Performs neither task correctly  2. Best Gaze: 1-->Partial gaze palsy, gaze is abnormal in one or both eyes, but forced deviation or total gaze paresis is not present  3. Visual: 0-->No visual loss  4. Facial Palsy: 0-->Normal symmetrical movements  5a. Motor Arm, Left: 2-->Some effort against gravity, limb cannot get to or maintain (if cued) 90 (or 45) degrees, drifts down to bed, but has  some effort against gravity  5b. Motor Arm, Right: 2-->Some effort against gravity, limb cannot get to or maintain (if cued) 90 (or 45) degrees, drifts down to bed, but has some effort against gravity  6a. Motor Leg, Left: 2-->Some effort against gravity, leg falls to bed by 5 secs, but has some effort against gravity  6b. Motor Leg, Right: 2-->Some effort against gravity, leg falls to bed by 5 secs, but has some effort against gravity  7. Limb Ataxia: 0-->Absent  8. Sensory: 0-->Normal, no sensory loss  9. Best Language: 3-->Mute, global aphasia, no usable speech or auditory comprehension  10. Dysarthria: (UN) Intubated or other physical barrier  11. Extinction and Inattention (formerly Neglect): 0-->No abnormality  Total (NIH Stroke Scale): 18       Modified Republic Score: 0  Chidester Coma Scale:    ABCD2 Score:    [id number] Score:   HAS -BLED Score:   ICH Score:   Hunt & Argueta Classification:      Hemorrhagic change of an Ischemic Stroke: Does this patient have an ischemic stroke with hemorrhagic changes? No           Past Medical History:   Diagnosis Date    Acute adjustment disorder with depressed mood     Cutaneous abscess 08/08/2019    Depression     Enlarged liver 04/27/2017    Hep C w/o coma, chronic     Hepatitis B     History of mental disorder 03/22/2017    History of substance abuse      History reviewed. No pertinent surgical history.  Social History     Tobacco Use    Smoking status: Every Day     Current packs/day: 0.50     Types: Cigarettes    Smokeless tobacco: Never   Substance Use Topics    Alcohol use: Yes     Comment: occaisionally    Drug use: No     Review of patient's allergies indicates:  No Known Allergies    Medications: I have reviewed the current medication administration record.    Medications Prior to Admission   Medication Sig Dispense Refill Last Dose/Taking    buPROPion (WELLBUTRIN XL) 150 MG TB24 tablet Take 150 mg by mouth once daily.   Taking    carvediloL (COREG) 6.25 MG tablet  Take 6.25 mg by mouth 2 (two) times daily.   Taking    QUEtiapine (SEROQUEL) 200 MG Tab Take 200 mg by mouth every evening.   Taking    albuterol (PROVENTIL) 2.5 mg /3 mL (0.083 %) nebulizer solution Take 2.5 mg by nebulization every 6 (six) hours as needed for Wheezing. Rescue       FLUoxetine 20 MG capsule Take 60 mg by mouth once daily.       fluticasone (FLONASE) 50 mcg/actuation nasal spray 1 spray (50 mcg total) by Each Nare route 2 (two) times daily as needed for Rhinitis. 15 g 0     gabapentin (NEURONTIN) 300 MG capsule Take 300 mg by mouth 3 (three) times daily.       hydrOXYzine (ATARAX) 50 MG tablet Take 50 mg by mouth 3 (three) times daily as needed for Anxiety.       hydrOXYzine pamoate (VISTARIL) 25 MG Cap Take 25 mg by mouth 3 (three) times daily as needed.       lactulose (CHRONULAC) 10 gram/15 mL solution Take by mouth once daily.       lithium (ESKALITH) 300 MG capsule Take 300 mg by mouth 3 (three) times daily.       mirtazapine (REMERON) 15 MG tablet Take 7.5 mg by mouth every evening.          Review of Systems  Unable to assess as pt is intubated.  Objective:     Vital Signs (Most Recent):  Temp: 98.7 °F (37.1 °C) (11/07/24 0305)  Pulse: 61 (11/07/24 0800)  Resp: 18 (11/07/24 0800)  BP: 126/60 (11/07/24 0701)  SpO2: 100 % (11/07/24 0800)    Vital Signs Range (Last 24H):  Temp:  [97.8 °F (36.6 °C)-98.7 °F (37.1 °C)]   Pulse:  [61-88]   Resp:  [10-37]   BP: (108-173)/(60-87)   SpO2:  [75 %-100 %]        Physical Exam  Constitutional:       Comments: Intubated. Minimal eye opening on painful stimuli.   Eyes:      Comments: Pupils minimally reactive.   Skin:     General: Skin is warm.   Neurological:      Motor: Weakness present.              Neurological Exam:     LOC: minimal eye opening to painful stimuli.  Language: Global aphasia  Orientation: Unable to test as pt is intubated and mental status.  Visual Fields: Unable to test as pt is intubated and mental status.  EOM (CN III, IV, VI): Unable  to test as pt is intubated and mental status.  Pupils (CN II, III): Minimally reactive  Motor: LUE, LLE briefly moves and lifts off bed on repeated painful stimuli.RLE moves more compared to RUE on painful stimuli compared to yesterday.      Laboratory:  CMP:   Recent Labs   Lab 11/07/24  0022 11/07/24  0533   CALCIUM 9.7 9.7   ALBUMIN 4.0  --    PROT 7.6  --     141   K 3.4* 3.8   CO2 19* 23    109   BUN 16 16   CREATININE 0.7 0.8   ALKPHOS 81  --    ALT 42  --    AST 83*  --    BILITOT 0.3  --      CBC:   Recent Labs   Lab 11/07/24  0022   WBC 12.95*   RBC 4.72   HGB 13.8   HCT 42.5      MCV 90   MCH 29.2   MCHC 32.5     Lipid Panel:   Recent Labs   Lab 11/06/24  0854   CHOL 239*   LDLCALC 178.2*   HDL 42   TRIG 94     Coagulation:   Recent Labs   Lab 11/06/24  0854   INR 1.0     Hgb A1C:   Recent Labs   Lab 11/06/24  1412   HGBA1C 4.9     TSH:   Recent Labs   Lab 11/06/24  0854   TSH 0.525       Diagnostic Results:      Brain imaging:    CT head  -Large region of hypoattenuation loss of gray-white differentiation in left occipital lobe in medial temporal lobe in keeping with an acute PCA distribution infarct. Modest localized mass effect with some sulcal crowding. Additional involvement of the left thalamus. Similar appearance in the medial right occipital lobe, but smaller area of infarction. Small left superior cerebellar infarct, also likely acute. No evidence of recent or remote major vascular distribution infarct in the anterior circulation. No acute parenchymal hemorrhage. No midline shift.     -Multifocal acute posterior circulation distribution infarcts , particularly involving essentially the entirety of the left PCA territory. Modest mass effect without significant hemorrhage.     MRI Brain    Multiple posterior circulation infarcts are identified with involvement of the left greater than right occipital lobes and medial left temporal lobe, thalamus, right dorsal medulla, and  minimally involving the cerebellum.  No midline shift with minimal mass effect on the 3rd ventricle.     Loss of flow void within the right intracranial vertebral artery in keeping with the findings on CTA.      Vessel Imaging:    CTA  Focal thrombus with severe stenosis extending from the right subclavian artery into the proximal right vertebral artery.     Focal occlusion of the proximal intracranial segment of the right vertebral artery.     Focal occlusion of the proximal left PCA.     Moderate focal stenosis at the left vertebral artery origin.     Anterior circulation appears unremarkable.     No evidence of fracture or traumatic malalignment in the cervical spine.     Emphysematous changes and patchy ground-glass opacity in the partially visualized lung apices.    Cardiac Evaluation:   TTE pending      Araceli Marcial MD  Comprehensive Stroke Center  Department of Vascular Neurology   [facility] - Neuro Critical Care

## 2024-11-07 NOTE — PLAN OF CARE
[facility] - Neuro Critical Care  Initial Discharge Assessment       Primary Care Provider: Adrián Hahn MD    Admission Diagnosis: Seizures [R56.9]  Hypoxia [R09.02]  Acute focal neurological deficit [R29.818]    Admission Date: 11/6/2024  Expected Discharge Date:     Transition of Care Barriers: None    Payor: MEDICAID / Plan: [employer] [facility] / Product Type: Managed Medicaid /     Extended Emergency Contact Information  Primary Emergency Contact: India Sims   [place]  Mobile Phone: [phone]  Relation: Mother    Discharge Plan A: Home with family  Discharge Plan B: Home    No Pharmacies Listed    Initial Assessment (most recent)       Adult Discharge Assessment - 11/07/24 1336          Discharge Assessment    Assessment Type Discharge Planning Assessment     Confirmed/corrected address, phone number and insurance Yes     Confirmed Demographics Correct on Facesheet     Source of Information family     Communicated LOUIE with patient/caregiver Yes     Reason For Admission No active principal problem     People in Home parent(s)     Do you expect to return to your current living situation? Yes     Do you have help at home or someone to help you manage your care at home? Yes     Who are your caregiver(s) and their phone number(s)? India Sims  [phone]     Prior to hospitilization cognitive status: Unable to Assess     Current cognitive status: Unable to Assess     Walking or Climbing Stairs Difficulty no     Dressing/Bathing Difficulty no     Home Layout Able to live on 1st floor     Equipment Currently Used at Home none     Readmission within 30 days? No     Patient currently being followed by outpatient case management? No     Do you currently have service(s) that help you manage your care at home? No     Do you take prescription medications? Yes     Do you have prescription coverage? Yes     Coverage Payor: MEDICAID - [employer] [facility]     Do you have  any problems affording any of your prescribed medications? No     Is the patient taking medications as prescribed? no     If no, which medications is patient not taking? unsure according to family     Who is going to help you get home at discharge? India Sims     How do you get to doctors appointments? family or friend will provide     Are you on dialysis? No     Do you take coumadin? No     Discharge Plan A Home with family     Discharge Plan B Home     DME Needed Upon Discharge  none     Discharge Plan discussed with: Parent(s)     Name(s) and Number(s) India Levi  [phone]     Transition of Care Barriers None        Physical Activity    On average, how many days per week do you engage in moderate to strenuous exercise (like a brisk walk)? 0 days     On average, how many minutes do you engage in exercise at this level? 0 min        Financial Resource Strain    How hard is it for you to pay for the very basics like food, housing, medical care, and heating? Not hard at all        Housing Stability    In the last 12 months, was there a time when you were not able to pay the mortgage or rent on time? No     At any time in the past 12 months, were you homeless or living in a shelter (including now)? No        Transportation Needs    Has the lack of transportation kept you from medical appointments, meetings, work or from getting things needed for daily living? No        Food Insecurity    Within the past 12 months, you worried that your food would run out before you got the money to buy more. Never true     Within the past 12 months, the food you bought just didn't last and you didn't have money to get more. Never true        Stress    Do you feel stress - tense, restless, nervous, or anxious, or unable to sleep at night because your mind is troubled all the time - these days? Patient unable to answer        Social Isolation    How often do you feel lonely or isolated from those around you?  Patient unable to  answer        Alcohol Use    Q1: How often do you have a drink containing alcohol? Patient unable to answer     Q2: How many drinks containing alcohol do you have on a typical day when you are drinking? Patient unable to answer     Q3: How often do you have six or more drinks on one occasion? Patient unable to answer        Utilities    In the past 12 months has the electric, gas, oil, or water company threatened to shut off services in your home? No        Health Literacy    How often do you need to have someone help you when you read instructions, pamphlets, or other written material from your doctor or pharmacy? Patient unable to respond        OTHER    Name(s) of People in Home Candida Sims                      The SW met with the patient mother India Sims  [phone]  at bedside. The SW placed name and contact information on the blackboard in the patient's room. Use preferred pharmacy / bedside delivery for any necessary medications at the time of discharge. The patient is independent with all ADLs. The patient is not on Dialysis or Coumadin. The Patient does not use DME or home oxygen, The patient's mother  will provide assistance to the patient upon discharge. The patient's mother  will provide transportation upon discharge. SW will continue to follow for course of hospitalization.  A discharge planning booklet was left at bedside.        Discharge Plan A and Plan B have been determined by review of patient's clinical status, future medical and therapeutic needs, and coverage/benefits for post-acute care in coordination with multidisciplinary team members.    Bobbi Finch MSW, EMILYW  [facility]  Case Management Dept.

## 2024-11-07 NOTE — ASSESSMENT & PLAN NOTE
CK elevated after being found down at home  NS @ 100cc/hr  Rechecking CK this afternoon and daily  Will consider stopping fluids when CK <5000

## 2024-11-07 NOTE — PT/OT/SLP PROGRESS
Physical Therapy      Patient Name:  Malathi Mcpherson   MRN:  [id number]    Patient not seen today secondary to Therapist assessment. Pt intubated and on high levels of sedation and still agitated despite this, not appropriate to participate in early mobility at this time. Will follow-up as appropriate.  Sarah De La O, PT

## 2024-11-07 NOTE — PLAN OF CARE
OT evaluation initiated  Problem: Occupational Therapy  Goal: Occupational Therapy Goal  Description: Goals set 11/7 with an expiration date, 12/5:  Patient will increase functional independence with ADLs by performing:    Patient will demonstrate rolling to the right with max assist.  Not met   Patient will demonstrate rolling to the left with max assist.   Not met  Patient will demonstrate supine -sit with max assist.   Not met  Patient will demonstrate squat pivot transfers with max assist.   Not met  Patient will demonstrate grooming while seated with max assist.   Not met  Patient will demonstrate upper body dressing with max assist while seated EOB.   Not met  Patient will demonstrate lower body dressing with max assist while seated EOB.   Not met  Patient will demonstrate toileting with max assist.   Not met  Patient's family / caregiver will demonstrate independence and safety with assisting patient with self-care skills and functional mobility.     Not met  Patient's family / caregiver will demonstrate independence with providing ROM and changes in bed positioning.   Not met            Outcome: Progressing

## 2024-11-07 NOTE — PROGRESS NOTES
[facility] Neuro [facility]  Neurocritical Care  Progress Note    Admit Date: 11/6/2024  Service Date: 11/07/2024  Length of Stay: 1    Subjective:     Chief Complaint: <principal problem not specified>    History of Present Illness: 49 year old with a hx of drug use, seizure, HTN, HLD  who came to [facility] [place], [place] ED and was stroke activated on 11/6/24. Pt was found down by her mother this morning at an unknown time lying face down. Per EMS, she was very lethargic and aphasic and was able to move her left sided extremities and R leg but was not moving her RUE at all; pt known to EMS due to history of seizure. Pt's exam worsened on exam with no movement in her right sided extremities and desaturated to 70s and required intubation. She was also noted to have an abrasion/contusion to the right temporal area Her last known normal states was at 10 pm yesterday -- did not receive TNK due to being out of the window and strokes noted in her CT. She was admitted to [facility] for further care. Pt's case has been reviewed by Neuro IR -- not candidate for intervention due to the extensive nature of infarcts and the risk of bleeding.    Per pt's family, patient's drugs of choice are crack, heroine and ETOH. Pt's mother reports that the patient has been regularly reporting to drug court of the past several weeks and has passed all of her drug tests during this period of time -- her last drug test was two weeks ago. Pt's family also reports that the patient had been acting strange over the past few days -- she has been sleeping more and displayed erratic behavior that leads them to believe that patient may have relapsed. Of note, a crack pipe was found on the patient's person on presentation to [facility] [place], [place].    Hospital Course: 11/07/2024 Agitated overnight, requiring uptitration of fentanyl and addition of precedex. Weaning throughout the day. Neuro exam may be slightly improved from yesterday even despite sedation with more R sided movement. However,  no purposeful movement or localization of noxious stimuli. MRI yesterday with multifocal infarcts including the cerebellum, bilateral occipital lobes, and bilateral thalami (R>L). CK found to be elevated overnight, fluids running and trending levels.     Interval History:  See hospital course    Review of Systems  Unable to obtain a complete ROS due to level of consciousness.    Objective:     Vitals:  Temp: 97.5 °F (36.4 °C)  Pulse: 66  Rhythm: normal sinus rhythm  BP: 110/76  MAP (mmHg): 87  Resp: 18  SpO2: 100 %  Oxygen Concentration (%): 50  Vent Mode: A/C  Set Rate: 18 BPM  Vt Set: 450 mL  PEEP/CPAP: 5 cmH20  Peak Airway Pressure: 22 cmH20  Mean Airway Pressure: 8.9 cmH20  Plateau Pressure: 0 cmH20    Temp  Min: 97.5 °F (36.4 °C)  Max: 98.7 °F (37.1 °C)  Pulse  Min: 59  Max: 76  BP  Min: 96/53  Max: 157/72  MAP (mmHg)  Min: 69  Max: 105  Resp  Min: 18  Max: 37  SpO2  Min: 97 %  Max: 100 %  Oxygen Concentration (%)  Min: 50  Max: 50    11/06 0701 - 11/07 0700  In: 408.6 [I.V.:278.3]  Out: 775 [Urine:775]   Unmeasured Output  Stool Occurrence: 0        Physical Exam  Vitals and nursing note reviewed.   Constitutional:       General: She is not in acute distress.     Comments: Intubated and sedated.   HENT:      Head: Normocephalic and atraumatic.   Cardiovascular:      Rate and Rhythm: Normal rate and regular rhythm.      Heart sounds: Normal heart sounds.   Pulmonary:      Effort: No respiratory distress.      Breath sounds: Normal breath sounds.   Abdominal:      General: Abdomen is flat.      Palpations: Abdomen is soft.   Musculoskeletal:      Right lower leg: No edema.      Left lower leg: No edema.   Skin:     General: Skin is warm and dry.   Neurological:      GCS: GCS eye subscore is 1. GCS verbal subscore is 1. GCS motor subscore is 4.      Comments: Intubated and sedated  Does not follow commands  +L corneal, no R corneal  +cough  +gag  +OCR  PERRL    Motor:  LUE spontaneous movement, withdraws from  noxious stimuli, no localization  LLE spontaneous movement, withdraws from noxious stimuli, no localization  RUE minimal spontaneous movement, withdraws from noxious stimuli  RLE spontaneous movement, withdraws from noxious stimuli       Unable to test orientation, language, memory, judgment, insight, fund of knowledge, hearing, shoulder shrug, tongue protrusion, coordination, gait due to level of consciousness.       Medications:  Continuous0.9% NaCl, Last Rate: 100 mL/hr at 11/07/24 1601  dexmedeTOMIDine (Precedex) infusion (titrating), Last Rate: Stopped (11/07/24 1042)  fentanyl, Last Rate: 175 mcg/hr (11/07/24 1601)    Scheduledaspirin, 81 mg, Daily  atorvastatin, 80 mg, Daily  clopidogreL, 75 mg, Daily  famotidine, 20 mg, BID  FLUoxetine, 60 mg, Daily  folic acid, 1 mg, Daily  heparin (porcine), 5,000 Units, Q8H  multivitamin, 1 tablet, Daily  senna-docusate 8.6-50 mg, 2 tablet, Daily  thiamine, 100 mg, Daily    PRNbisacodyL, 10 mg, Daily PRN  fentanyl, 75 mcg, Q1H PRN  magnesium oxide, 800 mg, PRN  magnesium oxide, 800 mg, PRN  potassium bicarbonate, 35 mEq, PRN  potassium bicarbonate, 50 mEq, PRN  potassium bicarbonate, 60 mEq, PRN  potassium, sodium phosphates, 2 packet, PRN  potassium, sodium phosphates, 2 packet, PRN  potassium, sodium phosphates, 2 packet, PRN  sodium chloride 0.9%, 10 mL, PRN      Today I personally reviewed pertinent medications, lines/drains/airways, imaging, cardiology results, laboratory results, microbiology results, notably:    Diet  Diet NPO  Diet NPO    Assessment/Plan:     Neuro  Cytotoxic cerebral edema  See primary problem    Acute arterial ischemic stroke, multifocal, posterior circulation  49F with history of polysubstance abuse presents after being found down at home. CTA revealed multifocal infarctions with a R vertebral artery occlusion and a L PCA occlusion. Patient was OOW for TNK and stroke burden was felt to be too large to pursue IR intervention. MRI reveals  multifocal posterior circulation infarctions involving bilateral occipital lobes, medial L temporal lobe, bilateral thalami, R dorsal medulla, and the cerebellum. Given the location and extent of her strokes, she is at risk of impaired consciousness and total vision loss. Risk of herniation is felt to be lower given the moderate extent of infarcted tissue and modest infratentorial infarctions.    Antithrombotics for secondary stroke prevention: Antiplatelets: Aspirin: 81 mg daily  Clopidogrel: 75 mg daily    Statins for secondary stroke prevention and hyperlipidemia, if present:   Statins: Atorvastatin- 80 mg daily    Aggressive risk factor modification: HTN, HLD     Rehab efforts: The patient has been evaluated by a stroke team provider and the therapy needs have been fully considered based off the presenting complaints and exam findings. The following therapy evaluations are needed: PT evaluate and treat, OT evaluate and treat, SLP evaluate and treat, PM&R evaluate for appropriate placement    Diagnostics ordered/pending: None     VTE prophylaxis: Mechanical prophylaxis: Place SCDs    BP parameters: Infarct: No intervention, SBP <220    -q1h neuro checks  -EKG, CXR, TTE  -A1c, Lipid panel, TSH  -ASA 81 QD  -Plavix 75mg QD  -SBP<220  -Na>135  -NSGY and vascular neurology consulted, appreciate recs  -Next imaging planned for CTH on 11/9 unless patient has neurological decline  -PT/OT/SLP to evaluate and treat      Psychiatric  Polysubstance abuse  Patient with long history of polysubstance abuse including crack cocaine, alcohol, and opiates  Patient was found with drug paraphernalia on person upon admission    Anxiety and depression  History of depression and anxiety  Continuing home fluoxetine 80mg QD  Lithium level supratherapeutic. Will likely need a dose decrease on re initiation.  Daily lithium level    Pulmonary  On mechanically assisted ventilation  Due to stroke  Weaning sedation as able  Pepcid  ppx    Palliative Care  Goals of care, counseling/discussion  Advance Care Planning    Today a voluntary meeting took place: ICU    Patient Participation: Patient is unable to participate     Attendees :  Mother and daughter of patient    Staff attendees (Name and  Role): Dr. Banegas, attending. Darius Echevarria MD resident    ACP Conversation (General): Understanding of current condition discussed patient's stroke burden and likely prognosis considering location of strokes within bilateral occipital lobes and thalami. Explained that the patient is at great risk of persistent decreased consciousness as well as bilateral blindness. Discussed process of weaning sedation for extubation and that she may not be able to protect her airway, requiring eventual surgical airway. Also discussed the high likelihood that she will require PEG. Will plan to discuss further GOC at a later date after delivering this difficult news.     Code Status: Full Code    ACP Documents: None    Goals of care: The family endorses that what is most important right now is to focus on curative/life-prolongation (regardless of treatment burdens)    Accordingly, we have decided that the best plan to meet the patient's goals includes continuing with treatment      Recommendations/  Follow-up tasks: Other (specify below) continued goals of care discussions       Length of ACP   conversation in minutes: A total of 20 min was spent on advance care planning, goals of care discussion, emotional support, formulating and communicating prognosis and exploring burden/benefit of various approaches of treatment. This discussion occurred on a fully voluntary basis with the verbal consent of the patient and/or family.         Other  Lithium toxicity  See anxiety and depression    Debility  Due to stroke  PT/OT/SLP to evaluate and treat when appropriate    HyperCKemia  CK elevated after being found down at home  NS @ 100cc/hr  Rechecking CK this afternoon and  daily  Will consider stopping fluids when CK <5000          The patient is being Prophylaxed for:  Venous Thromboembolism with: Chemical  Stress Ulcer with: H2B  Ventilator Pneumonia with: chlorhexidine oral care    Activity Orders            Turn patient starting at 11/06 1400    Elevate HOB starting at 11/06 1325    Diet NPO: NPO starting at 11/06 1325          Full Code    Darius Echevarria MD  Neurocritical Care  [facility] - Neuro Critical Care

## 2024-11-07 NOTE — H&P
[facility] - Neuro Critical Care  Neurocritical Care  History & Physical    Admit Date: 11/6/2024  Service Date: 11/06/2024  Length of Stay: 0    Subjective:     Chief Complaint: <principal problem not specified>    History of Present Illness: 49 year old with a hx of drug use, seizure, HTN, HLD  who came to [facility] [place] ED and was stroke activated on 11/6/24. Pt was found down by her mother this morning at an unknown time lying face down. Per EMS, she was very lethargic and aphasic and was able to move her left sided extremities and R leg but was not moving her RUE at all; pt known to EMS due to history of seizure. Pt's exam worsened on exam with no movement in her right sided extremities and desaturated to 70s and required intubation. She was also noted to have an abrasion/contusion to the right temporal area Her last known normal states was at 10 pm yesterday -- did not receive TNK due to being out of the window and strokes noted in her CT. She was admitted to [facility] for further care. Pt's case has been reviewed by Neuro IR -- not candidate for intervention due to the extensive nature of infarcts and the risk of bleeding.    Per pt's family, patient's drugs of choice are crack, heroine and ETOH. Pt's mother reports that the patient has been regularly reporting to drug court of the past several weeks and has passed all of her drug tests during this period of time -- her last drug test was two weeks ago. Pt's family also reports that the patient had been acting strange over the past few days -- she has been sleeping more and displayed erratic behavior that leads them to believe that patient may have relapsed. Of note, a crack pipe was found on the patient's person on presentation to [facility].      Past Medical History:   Diagnosis Date    Acute adjustment disorder with depressed mood     Cutaneous abscess 08/08/2019    Depression     Enlarged liver 04/27/2017    Hep C w/o coma, chronic     Hepatitis B     History of mental disorder  03/22/2017    History of substance abuse      History reviewed. No pertinent surgical history.   No current facility-administered medications on file prior to encounter.     Current Outpatient Medications on File Prior to Encounter   Medication Sig Dispense Refill    buPROPion (WELLBUTRIN XL) 150 MG TB24 tablet Take 150 mg by mouth once daily.      carvediloL (COREG) 6.25 MG tablet Take 6.25 mg by mouth 2 (two) times daily.      QUEtiapine (SEROQUEL) 200 MG Tab Take 200 mg by mouth every evening.      albuterol (PROVENTIL) 2.5 mg /3 mL (0.083 %) nebulizer solution Take 2.5 mg by nebulization every 6 (six) hours as needed for Wheezing. Rescue      FLUoxetine 20 MG capsule Take 60 mg by mouth once daily.      fluticasone (FLONASE) 50 mcg/actuation nasal spray 1 spray (50 mcg total) by Each Nare route 2 (two) times daily as needed for Rhinitis. 15 g 0    gabapentin (NEURONTIN) 300 MG capsule Take 300 mg by mouth 3 (three) times daily.      hydrOXYzine (ATARAX) 50 MG tablet Take 50 mg by mouth 3 (three) times daily as needed for Anxiety.      hydrOXYzine pamoate (VISTARIL) 25 MG Cap Take 25 mg by mouth 3 (three) times daily as needed.      lactulose (CHRONULAC) 10 gram/15 mL solution Take by mouth once daily.      lithium (ESKALITH) 300 MG capsule Take 300 mg by mouth 3 (three) times daily.      mirtazapine (REMERON) 15 MG tablet Take 7.5 mg by mouth every evening.      [DISCONTINUED] amoxicillin (AMOXIL) 875 MG tablet Take 1 tablet (875 mg total) by mouth 2 (two) times daily. 14 tablet 0    [DISCONTINUED] atenolol (TENORMIN) 25 MG tablet Take 25 mg by mouth once daily.      [DISCONTINUED] bacitracin 500 unit/gram Oint Apply topically 2 (two) times daily. 30 g 0    [DISCONTINUED] busPIRone (BUSPAR) 5 MG Tab Take 5 mg by mouth 2 (two) times daily.      [DISCONTINUED] cephALEXin (KEFLEX) 500 MG capsule Take 500 mg by mouth 4 (four) times daily.      [DISCONTINUED] cetirizine (ZYRTEC) 10 MG tablet Take 10 mg by mouth once  daily.      [DISCONTINUED] diltiaZEM (CARDIZEM CD) 240 MG 24 hr capsule Take 240 mg by mouth once daily.      [DISCONTINUED] furosemide (LASIX ORAL) Take by mouth.      [DISCONTINUED] ibuprofen (ADVIL,MOTRIN) 600 MG tablet Take 1 tablet (600 mg total) by mouth every 6 (six) hours as needed for Pain. 20 tablet 0    [DISCONTINUED] ibuprofen (ADVIL,MOTRIN) 800 MG tablet Take 1 tablet (800 mg total) by mouth every 6 (six) hours as needed for Pain. 20 tablet 0    [DISCONTINUED] linaclotide (LINZESS) 145 mcg Cap capsule Take 145 mcg by mouth once daily.      [DISCONTINUED] mupirocin (BACTROBAN) 2 % ointment Apply topically 3 (three) times daily. 22 g 0    [DISCONTINUED] ondansetron (ZOFRAN) 4 MG tablet Take 1 tablet (4 mg total) by mouth every 6 (six) hours as needed for Nausea. 20 tablet 0    [DISCONTINUED] pantoprazole (PROTONIX) 40 mg GrPS Take 40 mg by mouth once daily.      [DISCONTINUED] promethazine (PHENERGAN) 12.5 MG Tab Take 25 mg by mouth 4 (four) times daily.      [DISCONTINUED] QUEtiapine (SEROQUEL) 25 MG Tab Take 200 mg by mouth every evening.      [DISCONTINUED] SPIRONOLACTONE ORAL Take by mouth.      [DISCONTINUED] traZODone (DESYREL) 50 MG tablet Take 50 mg by mouth every evening.      [DISCONTINUED] venlafaxine (EFFEXOR XR) 150 MG Cp24 Take 75 mg by mouth once daily.        Allergies: Patient has no known allergies.  No family history on file.  Social History     Tobacco Use    Smoking status: Every Day     Current packs/day: 0.50     Types: Cigarettes    Smokeless tobacco: Never   Substance Use Topics    Alcohol use: Yes     Comment: occaisionally    Drug use: No     Review of Systems   Unable to perform ROS: Intubated     Objective:     Vitals:    Temp: 97.8 °F (36.6 °C)  Pulse: 67  BP: 108/65  MAP (mmHg): 81  Resp: (!) 22  SpO2: 100 %  Oxygen Concentration (%): 50  Vent Mode: A/C  Set Rate: 18 BPM  Vt Set: 450 mL  PEEP/CPAP: 5 cmH20  Peak Airway Pressure: 17 cmH20  Mean Airway Pressure: 8.1  cmH20  Plateau Pressure: 0 cmH20    Temp  Min: 97.7 °F (36.5 °C)  Max: 97.8 °F (36.6 °C)  Pulse  Min: 62  Max: 88  BP  Min: 108/65  Max: 173/78  MAP (mmHg)  Min: 81  Max: 112  Resp  Min: 10  Max: 28  SpO2  Min: 75 %  Max: 100 %  Oxygen Concentration (%)  Min: 50  Max: 50    No intake/output data recorded.            Physical Exam  Vitals and nursing note reviewed.   Constitutional:       General: She is not in acute distress.     Comments: Intubated and sedated.   HENT:      Head: Normocephalic and atraumatic.   Cardiovascular:      Rate and Rhythm: Normal rate and regular rhythm.      Heart sounds: Normal heart sounds.   Pulmonary:      Effort: No respiratory distress.      Breath sounds: Normal breath sounds.   Abdominal:      General: Abdomen is flat.      Palpations: Abdomen is soft.   Musculoskeletal:      Right lower leg: No edema.      Left lower leg: No edema.   Skin:     General: Skin is warm and dry.   Neurological:      GCS: GCS eye subscore is 1. GCS verbal subscore is 1. GCS motor subscore is 4.      Comments: Pt intubated and sedated. Does not follow any commands or track with eyes. Pt does withdraw to painful stimuli; LLE with more movement than R extremities. Minimal cough response.               Assessment/Plan:     Neuro  Cytotoxic cerebral edema  See primary problem    Acute arterial ischemic stroke, multifocal, posterior circulation  49F with history of polysubstance abuse presents after being found down at home. CTA revealed multifocal infarctions with a R vertebral artery occlusion and a L PCA occlusion. Patient was OOW for TNK and stroke burden was felt to be too large to pursue IR intervention. Given location of infarctions, patient's age, and extent of injury, patient is at risk for herniation. NSGY consulted and following. Will move forward with maximum medical management.    Antithrombotics for secondary stroke prevention: Antiplatelets: Aspirin: 81 mg daily  Aspirin: 325 mg  daily    Statins for secondary stroke prevention and hyperlipidemia, if present:   Statins: Atorvastatin- 80 mg daily    Aggressive risk factor modification: HTN, HLD     Rehab efforts: The patient has been evaluated by a stroke team provider and the therapy needs have been fully considered based off the presenting complaints and exam findings. The following therapy evaluations are needed: PT evaluate and treat, OT evaluate and treat, SLP evaluate and treat, PM&R evaluate for appropriate placement    Diagnostics ordered/pending: CT scan of head without contrast to asses brain parenchyma, TTE to assess cardiac function/status     VTE prophylaxis: Mechanical prophylaxis: Place SCDs    BP parameters: Infarct: No intervention, SBP <220    -Admit to [facility]  -q1h neuro checks  -f/u MRI pending  -EKG, CXR, TTE  -A1c, Lipid panel, TSH  -Will load with aspirin and continue daily  -holding off on plavix given possibility of decompression  -SBP<220  -NSGY and vascular neurology consulted, appreciate recs  -f/u CTH @ 2100 per NSGY  -PT/OT/SLP to evaluate and treat      Psychiatric  Depression  History of depression and anxiety  Continuing home fluoxetine 80mg QD  Appears to also be prescribed lithium  F/u lithium level          The patient is being Prophylaxed for:  Venous Thromboembolism with: None  Stress Ulcer with: H2B  Ventilator Pneumonia with: chlorhexidine oral care    Activity Orders            Turn patient starting at 11/06 1400    Elevate HOB starting at 11/06 1325    Diet NPO: NPO starting at 11/06 1325          Full Code    Darius Echevarria MD  Neurocritical Care  [facility] - Neuro Critical Care

## 2024-11-08 LAB
ALBUMIN SERPL BCP-MCNC: 3.2 G/DL (ref 3.5–5.2)
ALLENS TEST: ABNORMAL
ALP SERPL-CCNC: 71 U/L (ref 40–150)
ALT SERPL W/O P-5'-P-CCNC: 37 U/L (ref 10–44)
ANION GAP SERPL CALC-SCNC: 6 MMOL/L (ref 8–16)
ANION GAP SERPL CALC-SCNC: 8 MMOL/L (ref 8–16)
ANION GAP SERPL CALC-SCNC: 9 MMOL/L (ref 8–16)
AST SERPL-CCNC: 61 U/L (ref 10–40)
BASOPHILS # BLD AUTO: 0.03 K/UL (ref 0–0.2)
BASOPHILS NFR BLD: 0.3 % (ref 0–1.9)
BILIRUB SERPL-MCNC: 0.3 MG/DL (ref 0.1–1)
BUN SERPL-MCNC: 13 MG/DL (ref 6–20)
BUN SERPL-MCNC: 15 MG/DL (ref 6–20)
BUN SERPL-MCNC: 15 MG/DL (ref 6–20)
CALCIUM SERPL-MCNC: 8.7 MG/DL (ref 8.7–10.5)
CALCIUM SERPL-MCNC: 8.9 MG/DL (ref 8.7–10.5)
CALCIUM SERPL-MCNC: 9.4 MG/DL (ref 8.7–10.5)
CHLORIDE SERPL-SCNC: 112 MMOL/L (ref 95–110)
CK SERPL-CCNC: 2844 U/L (ref 20–180)
CO2 SERPL-SCNC: 20 MMOL/L (ref 23–29)
CO2 SERPL-SCNC: 22 MMOL/L (ref 23–29)
CO2 SERPL-SCNC: 22 MMOL/L (ref 23–29)
CREAT SERPL-MCNC: 0.6 MG/DL (ref 0.5–1.4)
CREAT SERPL-MCNC: 0.6 MG/DL (ref 0.5–1.4)
CREAT SERPL-MCNC: 0.7 MG/DL (ref 0.5–1.4)
DELSYS: ABNORMAL
DIFFERENTIAL METHOD BLD: ABNORMAL
EOSINOPHIL # BLD AUTO: 0 K/UL (ref 0–0.5)
EOSINOPHIL NFR BLD: 0.3 % (ref 0–8)
ERYTHROCYTE [DISTWIDTH] IN BLOOD BY AUTOMATED COUNT: 13.4 % (ref 11.5–14.5)
ERYTHROCYTE [SEDIMENTATION RATE] IN BLOOD BY WESTERGREN METHOD: 18 MM/H
EST. GFR  (NO RACE VARIABLE): >60 ML/MIN/1.73 M^2
FIO2: 50
GLUCOSE SERPL-MCNC: 103 MG/DL (ref 70–110)
GLUCOSE SERPL-MCNC: 118 MG/DL (ref 70–110)
GLUCOSE SERPL-MCNC: 99 MG/DL (ref 70–110)
HCO3 UR-SCNC: 22 MMOL/L (ref 24–28)
HCT VFR BLD AUTO: 38.6 % (ref 37–48.5)
HGB BLD-MCNC: 11.5 G/DL (ref 12–16)
HIV 1+2 AB+HIV1 P24 AG SERPL QL IA: NORMAL
IMM GRANULOCYTES # BLD AUTO: 0.05 K/UL (ref 0–0.04)
IMM GRANULOCYTES NFR BLD AUTO: 0.6 % (ref 0–0.5)
LITHIUM SERPL-SCNC: 0.1 MMOL/L (ref 0.6–1.2)
LYMPHOCYTES # BLD AUTO: 1.4 K/UL (ref 1–4.8)
LYMPHOCYTES NFR BLD: 15.6 % (ref 18–48)
MAGNESIUM SERPL-MCNC: 1.9 MG/DL (ref 1.6–2.6)
MCH RBC QN AUTO: 29.2 PG (ref 27–31)
MCHC RBC AUTO-ENTMCNC: 29.8 G/DL (ref 32–36)
MCV RBC AUTO: 98 FL (ref 82–98)
MODE: ABNORMAL
MONOCYTES # BLD AUTO: 0.8 K/UL (ref 0.3–1)
MONOCYTES NFR BLD: 9.3 % (ref 4–15)
NEUTROPHILS # BLD AUTO: 6.7 K/UL (ref 1.8–7.7)
NEUTROPHILS NFR BLD: 73.9 % (ref 38–73)
NRBC BLD-RTO: 0 /100 WBC
PCO2 BLDA: 40.4 MMHG (ref 35–45)
PEEP: 5
PH SMN: 7.34 [PH] (ref 7.35–7.45)
PHOSPHATE SERPL-MCNC: 1.9 MG/DL (ref 2.7–4.5)
PLATELET # BLD AUTO: 216 K/UL (ref 150–450)
PMV BLD AUTO: 10.7 FL (ref 9.2–12.9)
PO2 BLDA: 115 MMHG (ref 80–100)
POC BE: -4 MMOL/L
POC SATURATED O2: 98 % (ref 95–100)
POC TCO2: 23 MMOL/L (ref 23–27)
POCT GLUCOSE: 102 MG/DL (ref 70–110)
POCT GLUCOSE: 107 MG/DL (ref 70–110)
POCT GLUCOSE: 110 MG/DL (ref 70–110)
POCT GLUCOSE: 117 MG/DL (ref 70–110)
POTASSIUM SERPL-SCNC: 3.8 MMOL/L (ref 3.5–5.1)
POTASSIUM SERPL-SCNC: 3.8 MMOL/L (ref 3.5–5.1)
POTASSIUM SERPL-SCNC: 4.5 MMOL/L (ref 3.5–5.1)
PROT SERPL-MCNC: 6.3 G/DL (ref 6–8.4)
RBC # BLD AUTO: 3.94 M/UL (ref 4–5.4)
SAMPLE: ABNORMAL
SITE: ABNORMAL
SODIUM SERPL-SCNC: 140 MMOL/L (ref 136–145)
SODIUM SERPL-SCNC: 140 MMOL/L (ref 136–145)
SODIUM SERPL-SCNC: 143 MMOL/L (ref 136–145)
VT: 450
WBC # BLD AUTO: 9.03 K/UL (ref 3.9–12.7)

## 2024-11-08 PROCEDURE — 99900026 HC AIRWAY MAINTENANCE (STAT)

## 2024-11-08 PROCEDURE — 20000000 HC ICU ROOM

## 2024-11-08 PROCEDURE — 99291 CRITICAL CARE FIRST HOUR: CPT | Mod: ,,, | Performed by: PSYCHIATRY & NEUROLOGY

## 2024-11-08 PROCEDURE — 83735 ASSAY OF MAGNESIUM: CPT

## 2024-11-08 PROCEDURE — 25000003 PHARM REV CODE 250: Performed by: PSYCHIATRY & NEUROLOGY

## 2024-11-08 PROCEDURE — 82550 ASSAY OF CK (CPK): CPT | Performed by: PSYCHIATRY & NEUROLOGY

## 2024-11-08 PROCEDURE — 80053 COMPREHEN METABOLIC PANEL: CPT

## 2024-11-08 PROCEDURE — 99900035 HC TECH TIME PER 15 MIN (STAT)

## 2024-11-08 PROCEDURE — 51798 US URINE CAPACITY MEASURE: CPT

## 2024-11-08 PROCEDURE — 87081 CULTURE SCREEN ONLY: CPT | Performed by: PSYCHIATRY & NEUROLOGY

## 2024-11-08 PROCEDURE — 63600175 PHARM REV CODE 636 W HCPCS

## 2024-11-08 PROCEDURE — 94003 VENT MGMT INPAT SUBQ DAY: CPT

## 2024-11-08 PROCEDURE — 85025 COMPLETE CBC W/AUTO DIFF WBC: CPT

## 2024-11-08 PROCEDURE — 87389 HIV-1 AG W/HIV-1&-2 AB AG IA: CPT | Performed by: PSYCHIATRY & NEUROLOGY

## 2024-11-08 PROCEDURE — 27100171 HC OXYGEN HIGH FLOW UP TO 24 HOURS

## 2024-11-08 PROCEDURE — 84100 ASSAY OF PHOSPHORUS: CPT

## 2024-11-08 PROCEDURE — 82803 BLOOD GASES ANY COMBINATION: CPT

## 2024-11-08 PROCEDURE — 25000003 PHARM REV CODE 250

## 2024-11-08 PROCEDURE — 36600 WITHDRAWAL OF ARTERIAL BLOOD: CPT

## 2024-11-08 PROCEDURE — 25000003 PHARM REV CODE 250: Performed by: STUDENT IN AN ORGANIZED HEALTH CARE EDUCATION/TRAINING PROGRAM

## 2024-11-08 PROCEDURE — 80048 BASIC METABOLIC PNL TOTAL CA: CPT | Mod: 91,XB

## 2024-11-08 PROCEDURE — 80178 ASSAY OF LITHIUM: CPT | Performed by: PSYCHIATRY & NEUROLOGY

## 2024-11-08 PROCEDURE — 82800 BLOOD PH: CPT

## 2024-11-08 PROCEDURE — 94761 N-INVAS EAR/PLS OXIMETRY MLT: CPT

## 2024-11-08 PROCEDURE — 86631 CHLAMYDIA ANTIBODY: CPT | Performed by: PSYCHIATRY & NEUROLOGY

## 2024-11-08 RX ORDER — LITHIUM 8 MEQ/5ML
150 SOLUTION ORAL 3 TIMES DAILY
Status: DISCONTINUED | OUTPATIENT
Start: 2024-11-08 | End: 2024-11-09

## 2024-11-08 RX ORDER — PROPOFOL 10 MG/ML
0-50 INJECTION, EMULSION INTRAVENOUS CONTINUOUS
Status: DISCONTINUED | OUTPATIENT
Start: 2024-11-08 | End: 2024-11-08

## 2024-11-08 RX ORDER — POLYETHYLENE GLYCOL 3350 17 G/17G
17 POWDER, FOR SOLUTION ORAL DAILY
Status: DISCONTINUED | OUTPATIENT
Start: 2024-11-08 | End: 2024-11-09

## 2024-11-08 RX ORDER — DEXMEDETOMIDINE HYDROCHLORIDE 4 UG/ML
0-.8 INJECTION, SOLUTION INTRAVENOUS CONTINUOUS
Status: DISCONTINUED | OUTPATIENT
Start: 2024-11-08 | End: 2024-11-14

## 2024-11-08 RX ORDER — QUETIAPINE FUMARATE 25 MG/1
100 TABLET, FILM COATED ORAL NIGHTLY
Status: DISCONTINUED | OUTPATIENT
Start: 2024-11-08 | End: 2024-11-12

## 2024-11-08 RX ADMIN — DEXMEDETOMIDINE HYDROCHLORIDE 0.2 MCG/KG/HR: 4 INJECTION INTRAVENOUS at 02:11

## 2024-11-08 RX ADMIN — DEXMEDETOMIDINE HYDROCHLORIDE 0.04 MCG/KG/HR: 4 INJECTION INTRAVENOUS at 11:11

## 2024-11-08 RX ADMIN — Medication 150 MCG/HR: at 10:11

## 2024-11-08 RX ADMIN — POLYETHYLENE GLYCOL 3350 17 G: 17 POWDER, FOR SOLUTION ORAL at 09:11

## 2024-11-08 RX ADMIN — POTASSIUM & SODIUM PHOSPHATES POWDER PACK 280-160-250 MG 2 PACKET: 280-160-250 PACK at 06:11

## 2024-11-08 RX ADMIN — POTASSIUM BICARBONATE 50 MEQ: 978 TABLET, EFFERVESCENT ORAL at 09:11

## 2024-11-08 RX ADMIN — ATORVASTATIN CALCIUM 80 MG: 40 TABLET, FILM COATED ORAL at 09:11

## 2024-11-08 RX ADMIN — QUETIAPINE FUMARATE 100 MG: 25 TABLET ORAL at 09:11

## 2024-11-08 RX ADMIN — HEPARIN SODIUM 5000 UNITS: 5000 INJECTION INTRAVENOUS; SUBCUTANEOUS at 09:11

## 2024-11-08 RX ADMIN — Medication 100 MG: at 09:11

## 2024-11-08 RX ADMIN — LITHIUM 150 MG: 8 SOLUTION ORAL at 10:11

## 2024-11-08 RX ADMIN — FAMOTIDINE 20 MG: 20 TABLET ORAL at 09:11

## 2024-11-08 RX ADMIN — SENNOSIDES AND DOCUSATE SODIUM 2 TABLET: 50; 8.6 TABLET ORAL at 09:11

## 2024-11-08 RX ADMIN — FLUOXETINE HYDROCHLORIDE 60 MG: 20 SOLUTION ORAL at 09:11

## 2024-11-08 RX ADMIN — FOLIC ACID 1 MG: 1 TABLET ORAL at 09:11

## 2024-11-08 RX ADMIN — HEPARIN SODIUM 5000 UNITS: 5000 INJECTION INTRAVENOUS; SUBCUTANEOUS at 02:11

## 2024-11-08 RX ADMIN — LITHIUM 150 MG: 8 SOLUTION ORAL at 06:11

## 2024-11-08 RX ADMIN — POTASSIUM & SODIUM PHOSPHATES POWDER PACK 280-160-250 MG 2 PACKET: 280-160-250 PACK at 09:11

## 2024-11-08 RX ADMIN — CLOPIDOGREL BISULFATE 75 MG: 75 TABLET ORAL at 09:11

## 2024-11-08 RX ADMIN — THERA TABS 1 TABLET: TAB at 09:11

## 2024-11-08 RX ADMIN — HEPARIN SODIUM 5000 UNITS: 5000 INJECTION INTRAVENOUS; SUBCUTANEOUS at 06:11

## 2024-11-08 RX ADMIN — POTASSIUM & SODIUM PHOSPHATES POWDER PACK 280-160-250 MG 2 PACKET: 280-160-250 PACK at 02:11

## 2024-11-08 RX ADMIN — ASPIRIN 81 MG CHEWABLE TABLET 81 MG: 81 TABLET CHEWABLE at 09:11

## 2024-11-08 NOTE — PLAN OF CARE
[facility] Care Plan    POC reviewed with Malathi Mcpherson at 0300. Patient unable to verbalize understanding. Questions and concerns addressed. No acute events today. Pt progressing toward goals. Will continue to monitor. See below and flowsheets for full assessment and VS info.     - Fent infusing at 200 mcg/hr  - PRN fent bolus x3  - NS infusing at 50cc/hr  - Irrigated perez x2 per PA orders  - CXR completed        Is this a stroke patient? yes- Stroke booklet reviewed with patient, risk factors identified for patient and stroke booklet remains at bedside for ongoing education.     Care individualization: Extra pillows provided    Neuro:  Shailesh Coma Scale  Best Eye Response: 3-->(E3) to speech  Best Motor Response: 5-->(M5) localizes pain  Best Verbal Response: 1-->(V1) none  Ages [place] Coma Scale Score: 9  Assessment Qualifiers: patient chemically sedated or paralyzed, patient intubated  Pupil PERRLA: yes     24 hr Temp:  [97.5 °F (36.4 °C)-98.9 °F (37.2 °C)]     CV:   Rhythm: normal sinus rhythm  BP goals:   SBP < 220  MAP > 65    Resp:      Vent Mode: A/C  Set Rate: 18 BPM  Oxygen Concentration (%): 50  Vt Set: 450 mL  PEEP/CPAP: 5 cmH20    Plan: trach/PEG discussions    GI/:     Diet/Nutrition Received: NPO  Last Bowel Movement: 11/05/24  Voiding Characteristics: urethral catheter (bladder)    Intake/Output Summary (Last 24 hours) at 11/8/2024 0524  Last data filed at 11/8/2024 0401  Gross per 24 hour   Intake 2826.61 ml   Output 674 ml   Net 2152.61 ml     Unmeasured Output  Stool Occurrence: 0    Labs/Accuchecks:  Recent Labs   Lab 11/08/24  0036   WBC 9.03   RBC 3.94*   HGB 11.5*   HCT 38.6         Recent Labs   Lab 11/08/24  0036      K 3.8   CO2 20*   *   BUN 15   CREATININE 0.7   ALKPHOS 71   ALT 37   AST 61*   BILITOT 0.3      Recent Labs   Lab 11/06/24  0854   INR 1.0      Recent Labs   Lab 11/08/24  0036   CPK 2844*       Electrolytes:   Accuchecks: Q6H    Gtts:   0.9% NaCl    Intravenous Continuous 50 mL/hr at 24 020 Rate Verify at 24    fentanyl  0-250 mcg/hr Intravenous Continuous 17.5 mL/hr at 241 175 mcg/hr at 24       LDA/Wounds:    Sree Risk Assessment  Sensory Perception: 2-->very limited  Moisture: 3-->occasionally moist  Activity: 1-->bedfast  Mobility: 3-->slightly limited  Nutrition: 2-->probably inadequate  Friction and Shear: 2-->potential problem  Sree Score: 13  Is your sree score 12 or less? no          Restraints:   Restraint Order  Length of Order: Order good for next 24 hours or when removed.  Date that the current order will : 24  Time that the current order will :   Order Upon Application: Yes    [facility]

## 2024-11-08 NOTE — PT/OT/SLP PROGRESS
Physical Therapy      Patient Name:  Malathi Mcpherson   MRN:  [id number]    Patient not seen today secondary to hold for therapist assessment 2/2 high sedation levels making pt inappropriate for EOB early mobility protocol at this time. Will follow up as appropriate for PT evaluation.     11/8/2024

## 2024-11-08 NOTE — PLAN OF CARE
[facility] Care Plan  POC reviewed with Malathi Mcpherson and family at 1400. Family verbalizes understanding. Patient is intubated and sedated.  No evidence of understanding noted.  Questions and concerns addressed. No acute events today. Pt progressing toward goals. See below and flowsheets for full assessment and VS info.     Failed SBT  Agitation managed with Precedex and Fentanyl gtt  Started tube feedings  Decreased urine output      Is this a stroke patient? yes- Stroke booklet reviewed with family, risk factors identified for patient and stroke booklet remains at bedside for ongoing education.     Care individualization: na    Neuro:  Shailesh Coma Scale  Best Eye Response: 2-->(E2) to pain  Best Motor Response: 5-->(M5) localizes pain  Best Verbal Response: 1-->(V1) none  Saunemin Coma Scale Score: 8  Assessment Qualifiers: patient chemically sedated or paralyzed, patient intubated, no eye obstruction present  Pupil PERRLA: yes     24 hr Temp:  [97.5 °F (36.4 °C)-99.6 °F (37.6 °C)]     CV:   Rhythm: normal sinus rhythm  BP goals:   SBP < 220  MAP > 65    Resp:      Vent Mode: SIMV  Set Rate: 16 BPM  Oxygen Concentration (%): 40  Vt Set: 450 mL  PEEP/CPAP: 5 cmH20  Pressure Support: 8 cmH20    Plan: wean to extubate    GI/:     Diet/Nutrition Received: NPO, tube feeding  Last Bowel Movement: 11/05/24  Voiding Characteristics: ureteral catheter    Intake/Output Summary (Last 24 hours) at 11/8/2024 1741  Last data filed at 11/8/2024 1700  Gross per 24 hour   Intake 2153.74 ml   Output 765 ml   Net 1388.74 ml     Unmeasured Output  Stool Occurrence: 0    Labs/Accuchecks:  Recent Labs   Lab 11/08/24  0036   WBC 9.03   RBC 3.94*   HGB 11.5*   HCT 38.6         Recent Labs   Lab 11/08/24  0036 11/08/24  0629 11/08/24  1206      < > 143   K 3.8   < > 4.5   CO2 20*   < > 22*   *   < > 112*   BUN 15   < > 13   CREATININE 0.7   < > 0.6   ALKPHOS 71  --   --    ALT 37  --   --    AST 61*  --   --     BILITOT 0.3  --   --     < > = values in this interval not displayed.      Recent Labs   Lab 24  0854   INR 1.0      Recent Labs   Lab 24  0036   CPK 2844*       Electrolytes: Electrolytes replaced  Accuchecks: Q6H    Gtts:   0.9% NaCl   Intravenous Continuous 50 mL/hr at 24 1700 Rate Verify at 24 1700    dexmedeTOMIDine (Precedex) infusion (titrating)  0-1.4 mcg/kg/hr Intravenous Continuous 5.9 mL/hr at 24 1700 0.4 mcg/kg/hr at 24 1700    fentanyl  0-250 mcg/hr Intravenous Continuous 15 mL/hr at 24 1700 150 mcg/hr at 24 1700       LDA/Wounds:    Sree Risk Assessment  Sensory Perception: 4-->no impairment  Moisture: 4-->rarely moist  Activity: 1-->bedfast  Mobility: 1-->completely immobile  Nutrition: 1-->very poor  Friction and Shear: 2-->potential problem  Sree Score: 13    Is your sree score 12 or less? no      Restraints:   Restraint Order  Length of Order: Order good for next 24 hours or when removed.  Date that the current order will : 24  Time that the current order will :   Order Upon Application: Yes

## 2024-11-08 NOTE — CONSULTS
[facility]  Adult Nutrition  Progress Note    SUMMARY       Recommendations    -- Recommend TF to goal  Isosource 1.5 @ 45 mL/hr to provide 1080 mL total volume, 1620 kcal, 73 g protein, 190 g CHO, 825 mL free water, 16 g fiber, 108% DRI  --Free water flushes 130 mL flush q 4 hours to provide additional 780 mL free water (total 1605 mL)  --Monitor for tolerance  --RD to monitor weight, rate, tolerance  --Nursing: Please document current rate and formula in flowsheet      Goals:   1. % nutritional needs met with EN   2. Maintain weight during admission    Nutrition Goal Status: new  Communication of RD Recs: other (comment) (POC)    Assessment and Plan  Nutrition Problem  Impaired nutrient utilization     Related to (etiology):   Alcohol or drug use    Signs and Symptoms (as evidenced by):   History ETOH abuse, polysubstance abuse, B1, folic acid, MVI ordered    Interventions/Recommendations (treatment strategy):  -- Recommend TF to goal  Isosource 1.5 @ 45 mL/hr to provide 1080 mL total volume, 1620 kcal, 73 g protein, 190 g CHO, 825 mL free water, 16 g fiber, 108% DRI  --Free water flushes 130 mL flush q 4 hours to provide additional 780 mL free water (total 1605 mL)  --Monitor for tolerance  --RD to monitor weight, rate, tolerance  --Nursing: Please document current rate and formula in flowsheet    Nutrition Diagnosis Status:   New      Reason for Assessment    Reason For Assessment: consult, new tube feeding  Diagnosis: stroke/CVA, seizures, cardiac disease  General Information Comments: Consult received for TF recommendations.  Patient admitted s/p stroke at home with pmhx: hx of drug use, seizure, HTN, HLD. Patient intubated and sedated with NG tube in place, clamped, placement verified. +BM 11/5. Weight stable x 5 months. Noted small abscess right anterior pelvis.  Wound care following. No concern for malnutrition at this time  Nutrition Discharge Planning: Pending Clinical  "Course    Nutrition Risk Screen    Nutrition Risk Screen: tube feeding or parenteral nutrition    Nutrition/Diet History    Patient Reported Diet/Restrictions/Preferences: other (see comments) (BETTYE)  Spiritual, Cultural Beliefs, Adventist Practices, Values that Affect Care: other (see comments) (Unable to assess)  Food Allergies: NKFA  Factors Affecting Nutritional Intake: on mechanical ventilation, NPO, impaired cognitive status/motor control    Nutrition Related Social Determinants of Health: SDOH: Unable to assess at this time.       Anthropometrics    Temp: 97.5 °F (36.4 °C)  Height Method: Estimated  Height: 5' 8" (172.7 cm)  Height (inches): 68 in  Weight Method: Estimated  Weight: 59 kg (130 lb 1.1 oz)  Weight (lb): 130.07 lb  Ideal Body Weight (IBW), Female: 140 lb  % Ideal Body Weight, Female (lb): 92.91 %  BMI (Calculated): 19.8  BMI Grade: 18.5-24.9 - normal  Usual Body Weight (UBW), k kg  % Usual Body Weight: 100.21  % Weight Change From Usual Weight: 0 %       Lab/Procedures/Meds    Pertinent Labs Reviewed: reviewed  Pertinent Medications Reviewed: reviewed  Pertinent Medications Comments: Aspirin, atorvastatin, folic acid, MVI, bowel reg., heparin, B1, fentanyl        Estimated/Assessed Needs    Weight Used For Calorie Calculations: 59 kg (130 lb 1.1 oz)  Energy Calorie Requirements (kcal): 4939-1696 kcal (25-30 kcal/kg)  Energy Need Method: Kcal/kg  Protein Requirements: 47-59 g (0.8-1.0 g/kg)  Weight Used For Protein Calculations: 59 kg (130 lb 1.1 oz)     Estimated Fluid Requirement Method: RDA Method  RDA Method (mL): 1475         Nutrition Prescription Ordered    Current Diet Order: NPO    Evaluation of Received Nutrient/Fluid Intake       % Intake of Estimated Energy Needs: 0 - 25 %  % Meal Intake: NPO    Nutrition Risk    Level of Risk/Frequency of Follow-up: high     Monitor and Evaluation    Food and Nutrient Intake: energy intake, food and beverage intake  Food and Nutrient " Adminstration: enteral and parenteral nutrition administration, diet order  Knowledge/Beliefs/Attitudes: food and nutrition knowledge/skill  Physical Activity and Function: nutrition-related ADLs and IADLs, factors affecting access to physical activity  Anthropometric Measurements: weight change, weight, body mass index  Biochemical Data, Medical Tests and Procedures: glucose/endocrine profile, gastrointestinal profile  Nutrition-Focused Physical Findings: overall appearance     Nutrition Follow-Up     Yes

## 2024-11-08 NOTE — CONSULTS
[facility]  Wound Care    Patient Name:  Malathi Mcpherson   MRN:  [id number]  Date: 11/8/2024  Diagnosis: <principal problem not specified>    History:     Past Medical History:   Diagnosis Date    Acute adjustment disorder with depressed mood     Cutaneous abscess 08/08/2019    Depression     Enlarged liver 04/27/2017    Hep C w/o coma, chronic     Hepatitis B     History of mental disorder 03/22/2017    History of substance abuse        Social History     Socioeconomic History    Marital status: Single   Tobacco Use    Smoking status: Every Day     Current packs/day: 0.50     Types: Cigarettes    Smokeless tobacco: Never   Substance and Sexual Activity    Alcohol use: Yes     Comment: occaisionally    Drug use: No    Sexual activity: Yes     Partners: Male     Social Drivers of Health     Financial Resource Strain: Low Risk  (11/7/2024)    Overall Financial Resource Strain (CARDIA)     Difficulty of Paying Living Expenses: Not hard at all   Food Insecurity: No Food Insecurity (11/7/2024)    Hunger Vital Sign     Worried About Running Out of Food in the Last Year: Never true     Ran Out of Food in the Last Year: Never true   Transportation Needs: No Transportation Needs (11/7/2024)    TRANSPORTATION NEEDS     Transportation : No   Physical Activity: Inactive (11/7/2024)    Exercise Vital Sign     Days of Exercise per Week: 0 days     Minutes of Exercise per Session: 0 min   Stress: Patient Unable To Answer (11/7/2024)    [employer] [place] [employer] - Occupational Stress Questionnaire     Feeling of Stress : Patient unable to answer   Housing Stability: Low Risk  (11/7/2024)    Housing Stability Vital Sign     Unable to Pay for Housing in the Last Year: No     Homeless in the Last Year: No       Precautions:     Allergies as of 11/06/2024    (No Known Allergies)       WOC Assessment Details/Treatment     Pt seen for wound care consult for R groin.  Chart reviewed for this  encounter.  See flowsheets for findings.    Pt found lying in bed, OK for care at this time. R groin MADHURI, small, well defined wound appreciated to R mons pubis, creamy tan drainage appreciated in wound bed. Sharon-wound CDI. Cleansed wound w/ NS, wound bed red and moist following removal of drainage. Woun'Dres Hydrogel applied to wound bed followed by Mepilex foam dressing.    RECOMMENDATIONS:  Q2 days - R mons pubis - cleanse gently w/ Vashe, pat dry and apply Woun'Dres Hydrogel to wound bed, cover w/ Mepilex foam dressing. (Vashe and Woun'Dres can be ordered through [facility]).     11/08/24 0941   WOCN Assessment   WOCN Total Time (mins) 15   Visit Date 11/08/24   Visit Time 0941   Consult Type New   WOCN Speciality Wound   Intervention assessed;changed;applied;chart review;coordination of care;orders   Teaching on-going        Wound 11/06/24 0922 Abscess Right anterior Pelvis   Date First Assessed/Time First Assessed: 11/06/24 0922   Primary Wound Type: Abscess  Side: Right  Orientation: anterior  Location: Pelvis   Wound Image    Dressing Appearance Open to air   Drainage Amount Small   Drainage Characteristics/Odor Creamy;Tan   Appearance Pink;Red;Moist   Periwound Area Intact;Dry;Pink   Wound Edges Defined   Care Cleansed with:;Antimicrobial agent   Dressing Applied;Hydrogel;Foam   Dressing Change Due 11/10/24

## 2024-11-08 NOTE — PLAN OF CARE
Recommendations    -- Recommend TF to goal  Isosource 1.5 @ 45 mL/hr to provide 1080 mL total volume, 1620 kcal, 73 g protein, 190 g CHO, 825 mL free water, 16 g fiber, 108% DRI  --Free water flushes 130 mL flush q 4 hours to provide additional 780 mL free water (total 1605 mL)  --Monitor for tolerance  --RD to monitor weight, rate, tolerance  --Nursing: Please document current rate and formula in flowsheet      Goals:   1. % nutritional needs met with EN   2. Maintain weight during admission    Nutrition Goal Status: new  Communication of RD Recs: other (comment) (POC)

## 2024-11-08 NOTE — PLAN OF CARE
Pt is not medically ready to discharge.   SW will continue to monitor pt needs.       Discharge Plan A and Plan B have been determined by review of patient's clinical status, future medical and therapeutic needs, and coverage/benefits for post-acute care in coordination with multidisciplinary team members.    Bobbi Finch MSW, EMILYW  [facility]  Case Management Dept.

## 2024-11-08 NOTE — PROGRESS NOTES
Patient's chart was reviewed by a stroke team provider.    Acute arterial ischemic stroke, multifocal, posterior circulation  Malathi Mcpherson is a 49 y.o. female with PMHx of drug use, seizure, HTN, HLD  who presented to ED via EMS after being found down by her mother. Sstroke activated on 11/6/24. Patient LKN 10 pm on 11/5/24 per mother. After arrival, exam worsened with RSW, aphasic, and hypoxia (O2 70s), patient subsequently intubated. Patient was not eligible for TNK due to out of treatment window and ischemia on CT. Case reviewed with IR,deemed not a intervention candidate because of the extensive nature of infarcts and the risk of bleeding. MRI confirmed posterior circulation infarcts. Utox positive for cocaine. TTE with EF 55-60%, no LAE. Etiology suspected to be substance abuse.     NSGY was consulted for possible early suboccipital craniotomy; no interventions from their perspective at this time and NSGY signed off. Patient remains intubated and sedated.       Antithrombotics for secondary stroke prevention: Antiplatelets: Aspirin: 81 mg daily  Clopidogrel: 75 mg daily    Statins for secondary stroke prevention and hyperlipidemia, if present:   Statins: Atorvastatin- 80 mg daily     Aggressive risk factor modification: HTN, HLD, Substance use.     Rehab efforts: The patient has been evaluated by a stroke team provider and the therapy needs have been fully considered based off the presenting complaints and exam findings. The following therapy evaluations are needed: PT evaluate and treat, OT evaluate and treat, SLP evaluate and treat,       Diagnostics ordered/pending: TTE to assess cardiac function/status      VTE prophylaxis: Heparin 5000 units SQ every 8 hours     BP parameters: Infarct: No intervention, SBP <220,         There are no new recommendations at this time. Will continue to follow. Discussed patient with staff. Please contact stroke team for any questions or concerns.            Jud MORALES  [facility]  Vascular Neurology  [phone]

## 2024-11-09 PROBLEM — L73.9 FOLLICULITIS: Status: ACTIVE | Noted: 2024-11-09

## 2024-11-09 LAB
ALBUMIN SERPL BCP-MCNC: 3 G/DL (ref 3.5–5.2)
ALLENS TEST: ABNORMAL
ALLENS TEST: ABNORMAL
ALP SERPL-CCNC: 79 U/L (ref 40–150)
ALT SERPL W/O P-5'-P-CCNC: 35 U/L (ref 10–44)
ANION GAP SERPL CALC-SCNC: 9 MMOL/L (ref 8–16)
AST SERPL-CCNC: 49 U/L (ref 10–40)
BASOPHILS # BLD AUTO: 0.03 K/UL (ref 0–0.2)
BASOPHILS NFR BLD: 0.4 % (ref 0–1.9)
BILIRUB SERPL-MCNC: 0.3 MG/DL (ref 0.1–1)
BUN SERPL-MCNC: 15 MG/DL (ref 6–20)
CALCIUM SERPL-MCNC: 9.5 MG/DL (ref 8.7–10.5)
CHLORIDE SERPL-SCNC: 109 MMOL/L (ref 95–110)
CK SERPL-CCNC: 1452 U/L (ref 20–180)
CO2 SERPL-SCNC: 24 MMOL/L (ref 23–29)
CREAT SERPL-MCNC: 0.7 MG/DL (ref 0.5–1.4)
DELSYS: ABNORMAL
DELSYS: ABNORMAL
DIFFERENTIAL METHOD BLD: ABNORMAL
EOSINOPHIL # BLD AUTO: 0 K/UL (ref 0–0.5)
EOSINOPHIL NFR BLD: 0.3 % (ref 0–8)
ERYTHROCYTE [DISTWIDTH] IN BLOOD BY AUTOMATED COUNT: 13.6 % (ref 11.5–14.5)
ERYTHROCYTE [SEDIMENTATION RATE] IN BLOOD BY WESTERGREN METHOD: 16 MM/H
ERYTHROCYTE [SEDIMENTATION RATE] IN BLOOD BY WESTERGREN METHOD: 16 MM/H
EST. GFR  (NO RACE VARIABLE): >60 ML/MIN/1.73 M^2
FIO2: 40
FIO2: 40
FLOW: 60
FLOW: 60
GLUCOSE SERPL-MCNC: 124 MG/DL (ref 70–110)
HCO3 UR-SCNC: 28.5 MMOL/L (ref 24–28)
HCO3 UR-SCNC: 28.6 MMOL/L (ref 24–28)
HCT VFR BLD AUTO: 37.7 % (ref 37–48.5)
HCT VFR BLD CALC: 34 %PCV (ref 36–54)
HCT VFR BLD CALC: 35 %PCV (ref 36–54)
HGB BLD-MCNC: 11.5 G/DL (ref 12–16)
IMM GRANULOCYTES # BLD AUTO: 0.02 K/UL (ref 0–0.04)
IMM GRANULOCYTES NFR BLD AUTO: 0.3 % (ref 0–0.5)
LITHIUM SERPL-SCNC: 0.2 MMOL/L (ref 0.6–1.2)
LYMPHOCYTES # BLD AUTO: 1.1 K/UL (ref 1–4.8)
LYMPHOCYTES NFR BLD: 14.5 % (ref 18–48)
MAGNESIUM SERPL-MCNC: 2 MG/DL (ref 1.6–2.6)
MCH RBC QN AUTO: 29.5 PG (ref 27–31)
MCHC RBC AUTO-ENTMCNC: 30.5 G/DL (ref 32–36)
MCV RBC AUTO: 97 FL (ref 82–98)
MODE: ABNORMAL
MODE: ABNORMAL
MONOCYTES # BLD AUTO: 0.7 K/UL (ref 0.3–1)
MONOCYTES NFR BLD: 8.9 % (ref 4–15)
NEUTROPHILS # BLD AUTO: 5.9 K/UL (ref 1.8–7.7)
NEUTROPHILS NFR BLD: 75.6 % (ref 38–73)
NRBC BLD-RTO: 0 /100 WBC
PCO2 BLDA: 45.2 MMHG (ref 35–45)
PCO2 BLDA: 48.4 MMHG (ref 35–45)
PEEP: 5
PEEP: 5
PH SMN: 7.38 [PH] (ref 7.35–7.45)
PH SMN: 7.41 [PH] (ref 7.35–7.45)
PHOSPHATE SERPL-MCNC: 2.4 MG/DL (ref 2.7–4.5)
PLATELET # BLD AUTO: 208 K/UL (ref 150–450)
PMV BLD AUTO: 10.2 FL (ref 9.2–12.9)
PO2 BLDA: 37 MMHG (ref 80–100)
PO2 BLDA: 53 MMHG (ref 80–100)
POC BE: 3 MMOL/L
POC BE: 4 MMOL/L
POC IONIZED CALCIUM: 1.27 MMOL/L (ref 1.06–1.42)
POC IONIZED CALCIUM: 1.28 MMOL/L (ref 1.06–1.42)
POC SATURATED O2: 69 % (ref 95–100)
POC SATURATED O2: 87 % (ref 95–100)
POC TCO2: 30 MMOL/L (ref 23–27)
POC TCO2: 30 MMOL/L (ref 23–27)
POCT GLUCOSE: 127 MG/DL (ref 70–110)
POCT GLUCOSE: 131 MG/DL (ref 70–110)
POCT GLUCOSE: 151 MG/DL (ref 70–110)
POCT GLUCOSE: 160 MG/DL (ref 70–110)
POTASSIUM BLD-SCNC: 3.9 MMOL/L (ref 3.5–5.1)
POTASSIUM BLD-SCNC: 4.2 MMOL/L (ref 3.5–5.1)
POTASSIUM SERPL-SCNC: 4.3 MMOL/L (ref 3.5–5.1)
PROT SERPL-MCNC: 6.7 G/DL (ref 6–8.4)
PS: 8
PS: 8
RBC # BLD AUTO: 3.9 M/UL (ref 4–5.4)
SAMPLE: ABNORMAL
SAMPLE: ABNORMAL
SITE: ABNORMAL
SITE: ABNORMAL
SODIUM BLD-SCNC: 144 MMOL/L (ref 136–145)
SODIUM BLD-SCNC: 144 MMOL/L (ref 136–145)
SODIUM SERPL-SCNC: 142 MMOL/L (ref 136–145)
VT: 450
VT: 450
WBC # BLD AUTO: 7.74 K/UL (ref 3.9–12.7)

## 2024-11-09 PROCEDURE — 25000003 PHARM REV CODE 250

## 2024-11-09 PROCEDURE — 99900026 HC AIRWAY MAINTENANCE (STAT)

## 2024-11-09 PROCEDURE — 99900035 HC TECH TIME PER 15 MIN (STAT)

## 2024-11-09 PROCEDURE — 36600 WITHDRAWAL OF ARTERIAL BLOOD: CPT

## 2024-11-09 PROCEDURE — 63600175 PHARM REV CODE 636 W HCPCS

## 2024-11-09 PROCEDURE — 80178 ASSAY OF LITHIUM: CPT | Performed by: PSYCHIATRY & NEUROLOGY

## 2024-11-09 PROCEDURE — 99291 CRITICAL CARE FIRST HOUR: CPT | Mod: ,,, | Performed by: PSYCHIATRY & NEUROLOGY

## 2024-11-09 PROCEDURE — 85025 COMPLETE CBC W/AUTO DIFF WBC: CPT

## 2024-11-09 PROCEDURE — 94640 AIRWAY INHALATION TREATMENT: CPT

## 2024-11-09 PROCEDURE — 82550 ASSAY OF CK (CPK): CPT | Performed by: PSYCHIATRY & NEUROLOGY

## 2024-11-09 PROCEDURE — 27200966 HC CLOSED SUCTION SYSTEM

## 2024-11-09 PROCEDURE — 94761 N-INVAS EAR/PLS OXIMETRY MLT: CPT

## 2024-11-09 PROCEDURE — 87070 CULTURE OTHR SPECIMN AEROBIC: CPT

## 2024-11-09 PROCEDURE — 20000000 HC ICU ROOM

## 2024-11-09 PROCEDURE — 25000003 PHARM REV CODE 250: Performed by: PSYCHIATRY & NEUROLOGY

## 2024-11-09 PROCEDURE — 87186 SC STD MICRODIL/AGAR DIL: CPT

## 2024-11-09 PROCEDURE — 25000242 PHARM REV CODE 250 ALT 637 W/ HCPCS

## 2024-11-09 PROCEDURE — 82803 BLOOD GASES ANY COMBINATION: CPT

## 2024-11-09 PROCEDURE — 94003 VENT MGMT INPAT SUBQ DAY: CPT

## 2024-11-09 PROCEDURE — 87185 SC STD ENZYME DETCJ PER NZM: CPT

## 2024-11-09 PROCEDURE — 87205 SMEAR GRAM STAIN: CPT

## 2024-11-09 PROCEDURE — 80053 COMPREHEN METABOLIC PANEL: CPT

## 2024-11-09 PROCEDURE — 27100171 HC OXYGEN HIGH FLOW UP TO 24 HOURS

## 2024-11-09 PROCEDURE — 51798 US URINE CAPACITY MEASURE: CPT

## 2024-11-09 PROCEDURE — 84100 ASSAY OF PHOSPHORUS: CPT

## 2024-11-09 PROCEDURE — 87077 CULTURE AEROBIC IDENTIFY: CPT | Mod: 59

## 2024-11-09 PROCEDURE — 83735 ASSAY OF MAGNESIUM: CPT

## 2024-11-09 RX ORDER — MIDAZOLAM HYDROCHLORIDE 1 MG/ML
2 INJECTION, SOLUTION INTRAMUSCULAR; INTRAVENOUS ONCE AS NEEDED
Status: DISCONTINUED | OUTPATIENT
Start: 2024-11-09 | End: 2024-11-14

## 2024-11-09 RX ORDER — POLYETHYLENE GLYCOL 3350 17 G/17G
17 POWDER, FOR SOLUTION ORAL DAILY PRN
Status: DISCONTINUED | OUTPATIENT
Start: 2024-11-09 | End: 2024-11-11

## 2024-11-09 RX ORDER — SODIUM CHLORIDE FOR INHALATION 3 %
4 VIAL, NEBULIZER (ML) INHALATION EVERY 6 HOURS
Status: DISCONTINUED | OUTPATIENT
Start: 2024-11-09 | End: 2024-11-13

## 2024-11-09 RX ORDER — SULFAMETHOXAZOLE AND TRIMETHOPRIM 800; 160 MG/1; MG/1
1 TABLET ORAL 2 TIMES DAILY
Status: DISCONTINUED | OUTPATIENT
Start: 2024-11-09 | End: 2024-11-11

## 2024-11-09 RX ORDER — BISACODYL 10 MG/1
10 SUPPOSITORY RECTAL DAILY PRN
Status: DISCONTINUED | OUTPATIENT
Start: 2024-11-09 | End: 2024-11-13

## 2024-11-09 RX ORDER — AMOXICILLIN 250 MG
1 CAPSULE ORAL DAILY
Status: DISCONTINUED | OUTPATIENT
Start: 2024-11-09 | End: 2024-11-11

## 2024-11-09 RX ADMIN — HEPARIN SODIUM 5000 UNITS: 5000 INJECTION INTRAVENOUS; SUBCUTANEOUS at 09:11

## 2024-11-09 RX ADMIN — SENNOSIDES AND DOCUSATE SODIUM 1 TABLET: 50; 8.6 TABLET ORAL at 09:11

## 2024-11-09 RX ADMIN — Medication 100 MG: at 09:11

## 2024-11-09 RX ADMIN — Medication 125 MCG/HR: at 04:11

## 2024-11-09 RX ADMIN — FAMOTIDINE 20 MG: 20 TABLET ORAL at 09:11

## 2024-11-09 RX ADMIN — SULFAMETHOXAZOLE AND TRIMETHOPRIM 1 TABLET: 800; 160 TABLET ORAL at 12:11

## 2024-11-09 RX ADMIN — POTASSIUM & SODIUM PHOSPHATES POWDER PACK 280-160-250 MG 2 PACKET: 280-160-250 PACK at 06:11

## 2024-11-09 RX ADMIN — FLUOXETINE HYDROCHLORIDE 60 MG: 20 SOLUTION ORAL at 09:11

## 2024-11-09 RX ADMIN — FOLIC ACID 1 MG: 1 TABLET ORAL at 09:11

## 2024-11-09 RX ADMIN — CLOPIDOGREL BISULFATE 75 MG: 75 TABLET ORAL at 09:11

## 2024-11-09 RX ADMIN — HEPARIN SODIUM 5000 UNITS: 5000 INJECTION INTRAVENOUS; SUBCUTANEOUS at 06:11

## 2024-11-09 RX ADMIN — SODIUM CHLORIDE: 9 INJECTION, SOLUTION INTRAVENOUS at 12:11

## 2024-11-09 RX ADMIN — SODIUM CHLORIDE: 9 INJECTION, SOLUTION INTRAVENOUS at 05:11

## 2024-11-09 RX ADMIN — LITHIUM 150 MG: 8 SOLUTION ORAL at 09:11

## 2024-11-09 RX ADMIN — SODIUM CHLORIDE: 9 INJECTION, SOLUTION INTRAVENOUS at 04:11

## 2024-11-09 RX ADMIN — ATORVASTATIN CALCIUM 80 MG: 40 TABLET, FILM COATED ORAL at 09:11

## 2024-11-09 RX ADMIN — SODIUM CHLORIDE SOLN NEBU 3% 4 ML: 3 NEBU SOLN at 12:11

## 2024-11-09 RX ADMIN — SULFAMETHOXAZOLE AND TRIMETHOPRIM 1 TABLET: 800; 160 TABLET ORAL at 09:11

## 2024-11-09 RX ADMIN — THERA TABS 1 TABLET: TAB at 09:11

## 2024-11-09 RX ADMIN — ASPIRIN 81 MG CHEWABLE TABLET 81 MG: 81 TABLET CHEWABLE at 09:11

## 2024-11-09 RX ADMIN — SODIUM CHLORIDE 500 ML: 9 INJECTION, SOLUTION INTRAVENOUS at 11:11

## 2024-11-09 RX ADMIN — HEPARIN SODIUM 5000 UNITS: 5000 INJECTION INTRAVENOUS; SUBCUTANEOUS at 02:11

## 2024-11-09 RX ADMIN — QUETIAPINE FUMARATE 100 MG: 25 TABLET ORAL at 09:11

## 2024-11-09 RX ADMIN — SODIUM CHLORIDE SOLN NEBU 3% 4 ML: 3 NEBU SOLN at 08:11

## 2024-11-09 NOTE — ASSESSMENT & PLAN NOTE
CK elevated after being found down at home    - Continue NS @ 75cc/hr for 24 hours    - Will discontinue if patient develops oxygenation issues  - Trending CK daily

## 2024-11-09 NOTE — PROGRESS NOTES
[facility]  Neurocritical Care  Progress Note    Admit Date: 11/6/2024  Service Date: 11/08/2024  Length of Stay: 2    Subjective:     Chief Complaint: Acute arterial ischemic stroke, multifocal, posterior circulation    History of Present Illness: 49 year old with a hx of drug use, seizure, HTN, HLD  who came to [facility] ED and was stroke activated on 11/6/24. Pt was found down by her mother this morning at an unknown time lying face down. Per EMS, she was very lethargic and aphasic and was able to move her left sided extremities and R leg but was not moving her RUE at all; pt known to EMS due to history of seizure. Pt's exam worsened on exam with no movement in her right sided extremities and desaturated to 70s and required intubation. She was also noted to have an abrasion/contusion to the right temporal area Her last known normal states was at 10 pm yesterday -- did not receive TNK due to being out of the window and strokes noted in her CT. She was admitted to [facility] for further care. Pt's case has been reviewed by Neuro IR -- not candidate for intervention due to the extensive nature of infarcts and the risk of bleeding.    Per pt's family, patient's drugs of choice are crack, heroine and ETOH. Pt's mother reports that the patient has been regularly reporting to drug court of the past several weeks and has passed all of her drug tests during this period of time -- her last drug test was two weeks ago. Pt's family also reports that the patient had been acting strange over the past few days -- she has been sleeping more and displayed erratic behavior that leads them to believe that patient may have relapsed. Of note, a crack pipe was found on the patient's person on presentation to [facility].    Hospital Course: 11/07/2024 Agitated overnight, requiring uptitration of fentanyl and addition of precedex. Weaning throughout the day. Neuro exam may be slightly improved from yesterday even despite sedation with  more R sided movement. However, no purposeful movement or localization of noxious stimuli. MRI yesterday with multifocal infarcts including the cerebellum, bilateral occipital lobes, and bilateral thalami (R>L). CK found to be elevated overnight, fluids running and trending levels.   11/08/2024 More alert and developing some purposeful movement by reaching towards ETT, brushing hair behind ear, and localizing to LLE noxious stimuli with LUE. CK downtrending with fluids. Monitoring decreased UOP with stable BUN/Cr. Weaning fentanyl and initiated precedex. Plan to DC fluid tomorrow if CK continues to downtrend and remove fluid. Started tube feeds today. Lithium level now low, started half-dose home lithium.    Interval History:  See hospital course    Review of Systems  Unable to obtain a complete ROS due to level of consciousness.    Objective:     Vitals:  Temp: 98 °F (36.7 °C)  Pulse: 68  Rhythm: normal sinus rhythm  BP: (!) 147/82  MAP (mmHg): 106  Resp: 15  SpO2: 96 %  Oxygen Concentration (%): 40  Vent Mode: SIMV  Set Rate: 16 BPM  Vt Set: 450 mL  Pressure Support: 8 cmH20  PEEP/CPAP: 5 cmH20  Peak Airway Pressure: 24 cmH20  Mean Airway Pressure: 9.7 cmH20  Plateau Pressure: 0 cmH20    Temp  Min: 97.5 °F (36.4 °C)  Max: 99.6 °F (37.6 °C)  Pulse  Min: 63  Max: 80  BP  Min: 114/56  Max: 191/88  MAP (mmHg)  Min: 80  Max: 126  Resp  Min: 15  Max: 24  SpO2  Min: 95 %  Max: 100 %  Oxygen Concentration (%)  Min: 40  Max: 50    11/07 0701 - 11/08 0700  In: 2905.5 [I.V.:2336]  Out: 692 [Urine:692]   Unmeasured Output  Stool Occurrence: 0        Physical Exam  Vitals and nursing note reviewed.   Constitutional:       General: She is not in acute distress.     Comments: Intubated and sedated.   HENT:      Head: Normocephalic and atraumatic.   Cardiovascular:      Rate and Rhythm: Normal rate and regular rhythm.      Heart sounds: Normal heart sounds.   Pulmonary:      Effort: No respiratory distress.      Breath sounds:  Normal breath sounds.   Abdominal:      General: Abdomen is flat.      Palpations: Abdomen is soft.   Musculoskeletal:      Right lower leg: No edema.      Left lower leg: No edema.   Skin:     General: Skin is warm and dry.   Neurological:      GCS: GCS eye subscore is 3. GCS motor subscore is 4.      Comments: Intubated and lightly sedated  Not following commands  Some purposeful movement with localization  +L corneal, no R corneal  +cough  +gag  +OCR  PERRL    Motor:  LUE spontaneous movement, localizes noxious stimuli  LLE spontaneous movement, localizes noxious stimuli  RUE spontaneous movement, withdraws from noxious stimuli  RLE spontaneous movement, withdraws from noxious stimuli       Unable to test orientation, language, memory, judgment, insight, fund of knowledge, hearing, shoulder shrug, tongue protrusion, coordination, gait due to level of consciousness.       Medications:  Continuous0.9% NaCl, Last Rate: 50 mL/hr at 11/08/24 1700  dexmedeTOMIDine (Precedex) infusion (titrating), Last Rate: 0.4 mcg/kg/hr (11/08/24 1700)  fentanyl, Last Rate: 150 mcg/hr (11/08/24 1700)    Scheduledaspirin, 81 mg, Daily  atorvastatin, 80 mg, Daily  clopidogreL, 75 mg, Daily  famotidine, 20 mg, BID  FLUoxetine, 60 mg, Daily  folic acid, 1 mg, Daily  heparin (porcine), 5,000 Units, Q8H  lithium citrate 8 meq/5 ml, 150 mg, TID  multivitamin, 1 tablet, Daily  polyethylene glycol, 17 g, Daily  QUEtiapine, 100 mg, QHS  senna-docusate 8.6-50 mg, 2 tablet, Daily  thiamine, 100 mg, Daily    PRNbisacodyL, 10 mg, Daily PRN  fentanyl, 75 mcg, Q1H PRN  magnesium oxide, 800 mg, PRN  magnesium oxide, 800 mg, PRN  potassium bicarbonate, 35 mEq, PRN  potassium bicarbonate, 50 mEq, PRN  potassium bicarbonate, 60 mEq, PRN  potassium, sodium phosphates, 2 packet, PRN  potassium, sodium phosphates, 2 packet, PRN  potassium, sodium phosphates, 2 packet, PRN  sodium chloride 0.9%, 10 mL, PRN      Today I personally reviewed pertinent  medications, lines/drains/airways, imaging, cardiology results, laboratory results, microbiology results, notably:    Diet  Diet NPO  Diet NPO    Assessment/Plan:     Neuro  * Acute arterial ischemic stroke, multifocal, posterior circulation  49F with history of polysubstance abuse presents after being found down at home. CTA revealed multifocal infarctions with a R vertebral artery occlusion and a L PCA occlusion. Patient was OOW for TNK and stroke burden was felt to be too large to pursue IR intervention. MRI reveals multifocal posterior circulation infarctions involving bilateral occipital lobes, medial L temporal lobe, bilateral thalami, R dorsal medulla, and the cerebellum. Given the location and extent of her strokes, she is at risk of impaired consciousness and total vision loss. Risk of herniation is felt to be lower given the moderate extent of infarcted tissue and modest infratentorial infarctions. Improving exam with some purposeful movements. Entering peak swelling window. Will trend Na more closely with goal Na >140.    Antithrombotics for secondary stroke prevention: Antiplatelets: Aspirin: 81 mg daily  Clopidogrel: 75 mg daily    Statins for secondary stroke prevention and hyperlipidemia, if present:   Statins: Atorvastatin- 80 mg daily    Aggressive risk factor modification: HTN, HLD     Rehab efforts: The patient has been evaluated by a stroke team provider and the therapy needs have been fully considered based off the presenting complaints and exam findings. The following therapy evaluations are needed: PT evaluate and treat, OT evaluate and treat, SLP evaluate and treat, PM&R evaluate for appropriate placement    Diagnostics ordered/pending: None     VTE prophylaxis: Mechanical prophylaxis: Place SCDs    BP parameters: Infarct: No intervention, SBP <220    -q1h neuro checks  -BMP BID  -A1c, Lipid panel, TSH  -ASA 81 QD  -Plavix 75mg QD  -SBP<220  -Na>140  -NSGY and vascular neurology consulted,  appreciate recs  -Next imaging planned for [facility] on 11/9 unless patient has neurological decline  -PT/OT/SLP to evaluate and treat      Cytotoxic cerebral edema  See primary problem    Psychiatric  Polysubstance abuse  Patient with long history of polysubstance abuse including crack cocaine, alcohol, and opiates  Patient was found with drug paraphernalia on person upon admission  Urine tox screen positive for cocaine    Anxiety and depression  History of depression and anxiety  Continuing home fluoxetine 80mg QD  Lithium level subtherapeutic today  Daily lithium level  Started lithium 150mg TID, halved from home dose    Pulmonary  On mechanically assisted ventilation  Due to stroke  Weaning sedation as able  Pepcid ppx    Other  Lithium toxicity  RESOLVED  See anxiety and depression    Debility  Due to stroke  PT/OT/SLP to evaluate and treat when appropriate    HyperCKemia  CK elevated after being found down at home  Continue NS @ 50cc/hr  Trending CK daily          The patient is being Prophylaxed for:  Venous Thromboembolism with: Chemical  Stress Ulcer with: H2B  Ventilator Pneumonia with: chlorhexidine oral care    Activity Orders            Elevate HOB Elevate (30-45 degrees) Elevate HOB to 30 - 45 degrees during feeding unless otherwise stated starting at 11/08 1236    Turn patient starting at 11/06 1400    Elevate HOB starting at 11/06 1325    Diet NPO: NPO starting at 11/06 1325          Full Code    Darius Echevarria MD  Neurocritical Care  [facility]

## 2024-11-09 NOTE — ASSESSMENT & PLAN NOTE
49F with history of polysubstance abuse presents after being found down at home. CTA revealed multifocal infarctions with a R vertebral artery occlusion and a L PCA occlusion. Patient was OOW for TNK and stroke burden was felt to be too large to pursue IR intervention. MRI reveals multifocal posterior circulation infarctions involving bilateral occipital lobes, medial L temporal lobe, bilateral thalami, R dorsal medulla, and the cerebellum. Given the location and extent of her strokes, she is at risk of impaired consciousness and total vision loss. Risk of herniation is felt to be lower given the moderate extent of infarcted tissue and modest infratentorial infarctions. Improving exam with some purposeful movements. Entering peak swelling window. Will trend Na more closely with goal Na >140.    Antithrombotics for secondary stroke prevention: Antiplatelets: Aspirin: 81 mg daily  Clopidogrel: 75 mg daily    Statins for secondary stroke prevention and hyperlipidemia, if present:   Statins: Atorvastatin- 80 mg daily    Aggressive risk factor modification: HTN, HLD     Rehab efforts: The patient has been evaluated by a stroke team provider and the therapy needs have been fully considered based off the presenting complaints and exam findings. The following therapy evaluations are needed: PT evaluate and treat, OT evaluate and treat, SLP evaluate and treat, PM&R evaluate for appropriate placement    Diagnostics ordered/pending: None     VTE prophylaxis: Mechanical prophylaxis: Place SCDs    BP parameters: Infarct: No intervention, SBP <220    -q1h neuro checks  -BMP BID  -A1c, Lipid panel, TSH  -ASA 81 QD  -Plavix 75mg QD  -SBP<220  -Na>140  -NSGY and vascular neurology consulted, appreciate recs  -Next imaging planned for [facility] on 11/9 unless patient has neurological decline  -PT/OT/SLP to evaluate and treat

## 2024-11-09 NOTE — ASSESSMENT & PLAN NOTE
History of depression and anxiety  Continuing home fluoxetine 80mg QD  Lithium level subtherapeutic today  Daily lithium level  Started lithium 150mg TID, halved from home dose

## 2024-11-09 NOTE — PLAN OF CARE
[facility] Care Plan  POC reviewed with Malathi LUGO Ky and family at 1400. Family verbalizes understanding. Questions and concerns addressed. No acute events today. Pt progressing toward goals.  See below and flowsheets for full assessment and VS info.     Tolerated SBT x 1 hr; unable to extubate due to thick secretions    Is this a stroke patient? yes- Stroke booklet reviewed with family, risk factors identified for patient and stroke booklet remains at bedside for ongoing education.     Care individualization: na    Neuro:  Shailesh Coma Scale  Best Eye Response: 2-->(E2) to pain  Best Motor Response: 5-->(M5) localizes pain  Best Verbal Response: 1-->(V1) none  Andrews Coma Scale Score: 8  Assessment Qualifiers: patient chemically sedated or paralyzed, patient intubated, no eye obstruction present  Pupil PERRLA: yes     24 hr Temp:  [98.3 °F (36.8 °C)-99.8 °F (37.7 °C)]     CV:   Rhythm: normal sinus rhythm  BP goals:   SBP < 220  MAP > 65    Resp:      Vent Mode: Spont  Set Rate: 16 BPM  Oxygen Concentration (%): 40  Vt Set: 450 mL  PEEP/CPAP: 5 cmH20  Pressure Support: 8 cmH20    Plan: wean to extubate    GI/:     Diet/Nutrition Received: NPO, tube feeding  Last Bowel Movement: 11/05/24  Voiding Characteristics: ureteral catheter    Intake/Output Summary (Last 24 hours) at 11/9/2024 1630  Last data filed at 11/9/2024 1600  Gross per 24 hour   Intake 3246.49 ml   Output 508 ml   Net 2738.49 ml     Unmeasured Output  Urine Occurrence: 1  Stool Occurrence: 1    Labs/Accuchecks:  Recent Labs   Lab 11/09/24  0056 11/09/24  0419 11/09/24  0429   WBC 7.74  --   --    RBC 3.90*  --   --    HGB 11.5*  --   --    HCT 37.7   < > 34*     --   --     < > = values in this interval not displayed.      Recent Labs   Lab 11/09/24  0056      K 4.3   CO2 24      BUN 15   CREATININE 0.7   ALKPHOS 79   ALT 35   AST 49*   BILITOT 0.3      Recent Labs   Lab 11/06/24  0854   INR 1.0      Recent Labs   Lab  24  0056   CPK 1452*       Electrolytes: Electrolytes replaced  Accuchecks: Q6H    Gtts:   0.9% NaCl   Intravenous Continuous 75 mL/hr at 24 1614 New Bag at 24 1614    dexmedeTOMIDine (Precedex) infusion (titrating)  0-1.4 mcg/kg/hr Intravenous Continuous 1 mL/hr at 24 1600 0.0678 mcg/kg/hr at 24 1600    fentanyl  0-150 mcg/hr Intravenous Continuous 15 mL/hr at 24 1600 150 mcg/hr at 24 1600       LDA/Wounds:    Sree Risk Assessment  Sensory Perception: 4-->no impairment  Moisture: 4-->rarely moist  Activity: 1-->bedfast  Mobility: 1-->completely immobile  Nutrition: 3-->adequate  Friction and Shear: 2-->potential problem  Sree Score: 15    Is your sree score 12 or less? no    Restraints:   Restraint Order  Length of Order: Order good for next 24 hours or when removed.  Date that the current order will : 24  Time that the current order will :   Order Upon Application: Yes

## 2024-11-09 NOTE — PLAN OF CARE
[facility] Care Plan    POC reviewed with Malathi Mcpherson at 0300. Patient unable to verbalize understanding. Questions and concerns addressed. No acute events today. Pt progressing toward goals. Will continue to monitor. See below and flowsheets for full assessment and VS info.     - Phos replaced (2.4)  - CT head completed  - CXR completed   - Fent infusing at 125  - Precedex infusing at 0.8  - PRN fent bolus x1  - TF at goal 40        Is this a stroke patient? yes- Stroke booklet reviewed with patient, risk factors identified for patient and stroke booklet remains at bedside for ongoing education.     Care individualization     Neuro:  Shailesh Coma Scale  Best Eye Response: 2-->(E2) to pain  Best Motor Response: 5-->(M5) localizes pain  Best Verbal Response: 1-->(V1) none  Shailesh Coma Scale Score: 8  Assessment Qualifiers: patient chemically sedated or paralyzed, patient intubated  Pupil PERRLA: yes     24 hr Temp:  [97.5 °F (36.4 °C)-99.8 °F (37.7 °C)]     CV:   Rhythm: normal sinus rhythm  BP goals:   SBP < 220  MAP > 65    Resp:      Vent Mode: SIMV  Set Rate: 16 BPM  Oxygen Concentration (%): 40  Vt Set: 450 mL  PEEP/CPAP: 5 cmH20  Pressure Support: 8 cmH20    Plan: wean to extubate    GI/:     Diet/Nutrition Received: NPO, tube feeding  Last Bowel Movement: 11/05/24  Voiding Characteristics: urethral catheter (bladder)    Intake/Output Summary (Last 24 hours) at 11/9/2024 0702  Last data filed at 11/9/2024 0601  Gross per 24 hour   Intake 2382.5 ml   Output 571 ml   Net 1811.5 ml     Unmeasured Output  Stool Occurrence: 0    Labs/Accuchecks:  Recent Labs   Lab 11/09/24  0056 11/09/24  0419 11/09/24  0429   WBC 7.74  --   --    RBC 3.90*  --   --    HGB 11.5*  --   --    HCT 37.7   < > 34*     --   --     < > = values in this interval not displayed.      Recent Labs   Lab 11/09/24  0056      K 4.3   CO2 24      BUN 15   CREATININE 0.7   ALKPHOS 79   ALT 35   AST 49*   BILITOT 0.3       Recent Labs   Lab 24  0854   INR 1.0      Recent Labs   Lab 24  0056   CPK 1452*       Electrolytes: Electrolytes replaced  Accuchecks: Q6H    Gtts:   0.9% NaCl   Intravenous Continuous 50 mL/hr at 24 Rate Verify at 24    dexmedeTOMIDine (Precedex) infusion (titrating)  0-1.4 mcg/kg/hr Intravenous Continuous 0.8 mL/hr at 24 0.0542 mcg/kg/hr at 24    fentanyl  0-150 mcg/hr Intravenous Continuous 12.5 mL/hr at 24 125 mcg/hr at 24       LDA/Wounds:    Sree Risk Assessment  Sensory Perception: 4-->no impairment  Moisture: 4-->rarely moist  Activity: 1-->bedfast  Mobility: 1-->completely immobile  Nutrition: 2-->probably inadequate  Friction and Shear: 2-->potential problem  Sree Score: 14  Is your sree score 12 or less? no          Restraints:   Restraint Order  Length of Order: Order good for next 24 hours or when removed.  Date that the current order will : 24  Time that the current order will :   Order Upon Application: Yes    [facility]

## 2024-11-09 NOTE — ASSESSMENT & PLAN NOTE
Patient with long history of polysubstance abuse including crack cocaine, alcohol, and opiates  Patient was found with drug paraphernalia on person upon admission  Urine tox screen positive for cocaine

## 2024-11-09 NOTE — PROGRESS NOTES
[facility]  Neurocritical Care  Progress Note    Admit Date: 11/6/2024  Service Date: 11/09/2024  Length of Stay: 3    Subjective:     Chief Complaint: Acute arterial ischemic stroke, multifocal, posterior circulation    History of Present Illness: 49 year old with a hx of drug use, seizure, HTN, HLD  who came [place] [facility] [place] and was stroke activated on 11/6/24. Pt was found down by her mother this morning at an unknown time lying face down. Per EMS, she was very lethargic and aphasic and was able to move her left sided extremities and R leg but was not moving her RUE at all; pt known to EMS due to history of seizure. Pt's exam worsened on exam with no movement in her right sided extremities and desaturated to 70s and required intubation. She was also noted to have an abrasion/contusion to the right temporal area Her last known normal states was at 10 pm yesterday -- did not receive TNK due to being out of the window and strokes noted in her CT. She was admitted to [facility] for further care. Pt's case has been reviewed by Neuro IR -- not candidate for intervention due to the extensive nature of infarcts and the risk of bleeding.    Per pt's family, patient's drugs of choice are crack, heroine and ETOH. Pt's mother reports that the patient has been regularly reporting to drug court of the past several weeks and has passed all of her drug tests during this period of time -- her last drug test was two weeks ago. Pt's family also reports that the patient had been acting strange over the past few days -- she has been sleeping more and displayed erratic behavior that leads them to believe that patient may have relapsed. Of note, a crack pipe was found on the patient's person on presentation to [facility] [place].    Hospital Course: 11/07/2024 Agitated overnight, requiring uptitration of fentanyl and addition of precedex. Weaning throughout the day. Neuro exam may be slightly improved from yesterday even despite sedation with  more R sided movement. However, no purposeful movement or localization of noxious stimuli. MRI yesterday with multifocal infarcts including the cerebellum, bilateral occipital lobes, and bilateral thalami (R>L). CK found to be elevated overnight, fluids running and trending levels.   11/08/2024 More alert and developing some purposeful movement by reaching towards ETT, brushing hair behind ear, and localizing to LLE noxious stimuli with LUE. CK downtrending with fluids. Monitoring decreased UOP with stable BUN/Cr. Weaning fentanyl and initiated precedex. Plan to DC fluid tomorrow if CK continues to downtrend and remove fluid. Started tube feeds today. Lithium level now low, started half-dose home lithium.    No new subjective & objective note has been filed under this hospital service since the last note was generated.    Assessment/Plan:     Neuro  * Acute arterial ischemic stroke, multifocal, posterior circulation  49F with history of polysubstance abuse presents after being found down at home. CTA revealed multifocal infarctions with a R vertebral artery occlusion and a L PCA occlusion. Patient was OOW for TNK and stroke burden was felt to be too large to pursue IR intervention. MRI reveals multifocal posterior circulation infarctions involving bilateral occipital lobes, medial L temporal lobe, bilateral thalami, R dorsal medulla, and the cerebellum. Given the location and extent of her strokes, she is at risk of impaired consciousness and total vision loss. Risk of herniation is felt to be lower given the moderate extent of infarcted tissue and modest infratentorial infarctions. Improving exam with some purposeful movements. Entering peak swelling window. Will trend Na more closely with goal Na >140.    Antithrombotics for secondary stroke prevention: Antiplatelets: Aspirin: 81 mg daily  Clopidogrel: 75 mg daily    Statins for secondary stroke prevention and hyperlipidemia, if present:   Statins: Atorvastatin-  80 mg daily    Aggressive risk factor modification: HTN, HLD     Rehab efforts: The patient has been evaluated by a stroke team provider and the therapy needs have been fully considered based off the presenting complaints and exam findings. The following therapy evaluations are needed: PT evaluate and treat, OT evaluate and treat, SLP evaluate and treat, PM&R evaluate for appropriate placement    Diagnostics ordered/pending: None     VTE prophylaxis: Mechanical prophylaxis: Place SCDs    BP parameters: Infarct: No intervention, SBP <220    -q1h neuro checks  -BMP QD  -A1c, Lipid panel, TSH  -ASA 81 QD  -Plavix 75mg QD  -SBP<220  -Na>140  -NSGY and vascular neurology consulted, appreciate recs  -Repeat CT Head on 11/09/2024: Expected evolutionary changes of the posterior circulation infarcts with no overt hemorrhagic conversion or significant midline shift/herniation.   -PT/OT/SLP to evaluate and treat      Cytotoxic cerebral edema  See primary problem    Psychiatric  Polysubstance abuse  Patient with long history of polysubstance abuse including crack cocaine, alcohol, and opiates  Patient was found with drug paraphernalia on person upon admission  Urine tox screen positive for cocaine    Anxiety and depression  History of depression and anxiety    - Continuing home fluoxetine 80mg QD  - Lithium discontinued    Derm  Folliculitis  - Bactrim x 7 days for folliculitis noted on mons pubis  - Wound care following     Pulmonary  On mechanically assisted ventilation  Due to stroke  Weaning sedation as able  Pepcid ppx  3% nebs    Palliative Care  Goals of care, counseling/discussion  Advance Care Planning    Today a voluntary meeting took place: ICU    Patient Participation: Patient is unable to participate     Attendees :  Mother and daughter of patient    Staff attendees (Name and  Role): Dr. Banegas, attending. Darius Echevarria MD resident    ACP Conversation (General): Understanding of current condition discussed  patient's stroke burden and likely prognosis considering location of strokes within bilateral occipital lobes and thalami. Explained that the patient is at great risk of persistent decreased consciousness as well as bilateral blindness. Discussed process of weaning sedation for extubation and that she may not be able to protect her airway, requiring eventual surgical airway. Also discussed the high likelihood that she will require PEG. Will plan to discuss further GOC at a later date after delivering this difficult news.     Code Status: Full Code    ACP Documents: None    Goals of care: The family endorses that what is most important right now is to focus on curative/life-prolongation (regardless of treatment burdens)    Accordingly, we have decided that the best plan to meet the patient's goals includes continuing with treatment      Recommendations/  Follow-up tasks: Other (specify below) continued goals of care discussions       Length of ACP   conversation in minutes: A total of 20 min was spent on advance care planning, goals of care discussion, emotional support, formulating and communicating prognosis and exploring burden/benefit of various approaches of treatment. This discussion occurred on a fully voluntary basis with the verbal consent of the patient and/or family.        Other  Lithium toxicity  RESOLVED  See anxiety and depression    Debility  Due to stroke  PT/OT/SLP to evaluate and treat when appropriate    HyperCKemia  CK elevated after being found down at home    - Continue NS @ 75cc/hr for 24 hours    - Will discontinue if patient develops oxygenation issues  - Trending CK daily              The patient is being Prophylaxed for:  Venous Thromboembolism with: Mechanical or Chemical  Stress Ulcer with: H2B  Ventilator Pneumonia with: chlorhexidine oral care    Activity Orders            Elevate HOB Elevate (30-45 degrees) Elevate HOB to 30 - 45 degrees during feeding unless otherwise stated starting  at 11/08 1236    Turn patient starting at 11/06 1400    Diet NPO: NPO starting at 11/06 1325          Full Code    Terese De La Rosa, DO  Neurocritical Care  [facility] Neuro Critical [facility]

## 2024-11-09 NOTE — SUBJECTIVE & OBJECTIVE
Interval History:  See hospital course    Review of Systems  Unable to obtain a complete ROS due to level of consciousness.    Objective:     Vitals:  Temp: 98 °F (36.7 °C)  Pulse: 68  Rhythm: normal sinus rhythm  BP: (!) 147/82  MAP (mmHg): 106  Resp: 15  SpO2: 96 %  Oxygen Concentration (%): 40  Vent Mode: SIMV  Set Rate: 16 BPM  Vt Set: 450 mL  Pressure Support: 8 cmH20  PEEP/CPAP: 5 cmH20  Peak Airway Pressure: 24 cmH20  Mean Airway Pressure: 9.7 cmH20  Plateau Pressure: 0 cmH20    Temp  Min: 97.5 °F (36.4 °C)  Max: 99.6 °F (37.6 °C)  Pulse  Min: 63  Max: 80  BP  Min: 114/56  Max: 191/88  MAP (mmHg)  Min: 80  Max: 126  Resp  Min: 15  Max: 24  SpO2  Min: 95 %  Max: 100 %  Oxygen Concentration (%)  Min: 40  Max: 50    11/07 0701 - 11/08 0700  In: 2905.5 [I.V.:2336]  Out: 692 [Urine:692]   Unmeasured Output  Stool Occurrence: 0        Physical Exam  Vitals and nursing note reviewed.   Constitutional:       General: She is not in acute distress.     Comments: Intubated and sedated.   HENT:      Head: Normocephalic and atraumatic.   Cardiovascular:      Rate and Rhythm: Normal rate and regular rhythm.      Heart sounds: Normal heart sounds.   Pulmonary:      Effort: No respiratory distress.      Breath sounds: Normal breath sounds.   Abdominal:      General: Abdomen is flat.      Palpations: Abdomen is soft.   Musculoskeletal:      Right lower leg: No edema.      Left lower leg: No edema.   Skin:     General: Skin is warm and dry.   Neurological:      GCS: GCS eye subscore is 3. GCS motor subscore is 4.      Comments: Intubated and lightly sedated  Not following commands  Some purposeful movement with localization  +L corneal, no R corneal  +cough  +gag  +OCR  PERRL    Motor:  LUE spontaneous movement, localizes noxious stimuli  LLE spontaneous movement, localizes noxious stimuli  RUE spontaneous movement, withdraws from noxious stimuli  RLE spontaneous movement, withdraws from noxious stimuli       Unable to test  orientation, language, memory, judgment, insight, fund of knowledge, hearing, shoulder shrug, tongue protrusion, coordination, gait due to level of consciousness.       Medications:  Continuous0.9% NaCl, Last Rate: 50 mL/hr at 11/08/24 1700  dexmedeTOMIDine (Precedex) infusion (titrating), Last Rate: 0.4 mcg/kg/hr (11/08/24 1700)  fentanyl, Last Rate: 150 mcg/hr (11/08/24 1700)    Scheduledaspirin, 81 mg, Daily  atorvastatin, 80 mg, Daily  clopidogreL, 75 mg, Daily  famotidine, 20 mg, BID  FLUoxetine, 60 mg, Daily  folic acid, 1 mg, Daily  heparin (porcine), 5,000 Units, Q8H  lithium citrate 8 meq/5 ml, 150 mg, TID  multivitamin, 1 tablet, Daily  polyethylene glycol, 17 g, Daily  QUEtiapine, 100 mg, QHS  senna-docusate 8.6-50 mg, 2 tablet, Daily  thiamine, 100 mg, Daily    PRNbisacodyL, 10 mg, Daily PRN  fentanyl, 75 mcg, Q1H PRN  magnesium oxide, 800 mg, PRN  magnesium oxide, 800 mg, PRN  potassium bicarbonate, 35 mEq, PRN  potassium bicarbonate, 50 mEq, PRN  potassium bicarbonate, 60 mEq, PRN  potassium, sodium phosphates, 2 packet, PRN  potassium, sodium phosphates, 2 packet, PRN  potassium, sodium phosphates, 2 packet, PRN  sodium chloride 0.9%, 10 mL, PRN      Today I personally reviewed pertinent medications, lines/drains/airways, imaging, cardiology results, laboratory results, microbiology results, notably:    Diet  Diet NPO  Diet NPO

## 2024-11-09 NOTE — ASSESSMENT & PLAN NOTE
49F with history of polysubstance abuse presents after being found down at home. CTA revealed multifocal infarctions with a R vertebral artery occlusion and a L PCA occlusion. Patient was OOW for TNK and stroke burden was felt to be too large to pursue IR intervention. MRI reveals multifocal posterior circulation infarctions involving bilateral occipital lobes, medial L temporal lobe, bilateral thalami, R dorsal medulla, and the cerebellum. Given the location and extent of her strokes, she is at risk of impaired consciousness and total vision loss. Risk of herniation is felt to be lower given the moderate extent of infarcted tissue and modest infratentorial infarctions. Improving exam with some purposeful movements. Entering peak swelling window. Will trend Na more closely with goal Na >140.    Antithrombotics for secondary stroke prevention: Antiplatelets: Aspirin: 81 mg daily  Clopidogrel: 75 mg daily    Statins for secondary stroke prevention and hyperlipidemia, if present:   Statins: Atorvastatin- 80 mg daily    Aggressive risk factor modification: HTN, HLD     Rehab efforts: The patient has been evaluated by a stroke team provider and the therapy needs have been fully considered based off the presenting complaints and exam findings. The following therapy evaluations are needed: PT evaluate and treat, OT evaluate and treat, SLP evaluate and treat, PM&R evaluate for appropriate placement    Diagnostics ordered/pending: None     VTE prophylaxis: Mechanical prophylaxis: Place SCDs    BP parameters: Infarct: No intervention, SBP <220    -q1h neuro checks  -BMP QD  -A1c, Lipid panel, TSH  -ASA 81 QD  -Plavix 75mg QD  -SBP<220  -Na>140  -NSGY and vascular neurology consulted, appreciate recs  -Repeat CT Head on 11/09/2024: Expected evolutionary changes of the posterior circulation infarcts with no overt hemorrhagic conversion or significant midline shift/herniation.   -PT/OT/SLP to evaluate and treat

## 2024-11-09 NOTE — PLAN OF CARE
Patients dispo plan pending medical clearance. CM team to follow.     Discharge Plan A and Plan B have been determined by review of patient's clinical status, future medical and therapeutic needs, and coverage/benefits for post-acute care in coordination with multidisciplinary team members.    JEROME Carlisle  [facility]   Ext: [phone]

## 2024-11-10 LAB
ABO + RH BLD: NORMAL
ALBUMIN SERPL BCP-MCNC: 2.5 G/DL (ref 3.5–5.2)
ALLENS TEST: ABNORMAL
ALP SERPL-CCNC: 70 U/L (ref 40–150)
ALT SERPL W/O P-5'-P-CCNC: 31 U/L (ref 10–44)
ANION GAP SERPL CALC-SCNC: 10 MMOL/L (ref 8–16)
AST SERPL-CCNC: 35 U/L (ref 10–40)
BASOPHILS # BLD AUTO: 0.03 K/UL (ref 0–0.2)
BASOPHILS NFR BLD: 0.5 % (ref 0–1.9)
BILIRUB SERPL-MCNC: 0.2 MG/DL (ref 0.1–1)
BLD GP AB SCN CELLS X3 SERPL QL: NORMAL
BUN SERPL-MCNC: 15 MG/DL (ref 6–20)
CALCIUM SERPL-MCNC: 9.1 MG/DL (ref 8.7–10.5)
CHLORIDE SERPL-SCNC: 113 MMOL/L (ref 95–110)
CK SERPL-CCNC: 723 U/L (ref 20–180)
CO2 SERPL-SCNC: 21 MMOL/L (ref 23–29)
CREAT SERPL-MCNC: 0.6 MG/DL (ref 0.5–1.4)
DELSYS: ABNORMAL
DIFFERENTIAL METHOD BLD: ABNORMAL
EOSINOPHIL # BLD AUTO: 0.2 K/UL (ref 0–0.5)
EOSINOPHIL NFR BLD: 2.7 % (ref 0–8)
ERYTHROCYTE [DISTWIDTH] IN BLOOD BY AUTOMATED COUNT: 13.5 % (ref 11.5–14.5)
ERYTHROCYTE [SEDIMENTATION RATE] IN BLOOD BY WESTERGREN METHOD: 16 MM/H
EST. GFR  (NO RACE VARIABLE): >60 ML/MIN/1.73 M^2
FIO2: 40
GLUCOSE SERPL-MCNC: 117 MG/DL (ref 70–110)
HCO3 UR-SCNC: 29.9 MMOL/L (ref 24–28)
HCT VFR BLD AUTO: 33.4 % (ref 37–48.5)
HCT VFR BLD CALC: 30 %PCV (ref 36–54)
HGB BLD-MCNC: 10.3 G/DL (ref 12–16)
IMM GRANULOCYTES # BLD AUTO: 0.03 K/UL (ref 0–0.04)
IMM GRANULOCYTES NFR BLD AUTO: 0.5 % (ref 0–0.5)
LITHIUM SERPL-SCNC: 0.1 MMOL/L (ref 0.6–1.2)
LYMPHOCYTES # BLD AUTO: 1.2 K/UL (ref 1–4.8)
LYMPHOCYTES NFR BLD: 18.4 % (ref 18–48)
MAGNESIUM SERPL-MCNC: 2 MG/DL (ref 1.6–2.6)
MCH RBC QN AUTO: 29.3 PG (ref 27–31)
MCHC RBC AUTO-ENTMCNC: 30.8 G/DL (ref 32–36)
MCV RBC AUTO: 95 FL (ref 82–98)
MODE: ABNORMAL
MONOCYTES # BLD AUTO: 0.6 K/UL (ref 0.3–1)
MONOCYTES NFR BLD: 10 % (ref 4–15)
NEUTROPHILS # BLD AUTO: 4.4 K/UL (ref 1.8–7.7)
NEUTROPHILS NFR BLD: 67.9 % (ref 38–73)
NRBC BLD-RTO: 0 /100 WBC
PCO2 BLDA: 46.8 MMHG (ref 35–45)
PEEP: 5
PH SMN: 7.41 [PH] (ref 7.35–7.45)
PHOSPHATE SERPL-MCNC: 2.9 MG/DL (ref 2.7–4.5)
PLATELET # BLD AUTO: 210 K/UL (ref 150–450)
PMV BLD AUTO: 10.9 FL (ref 9.2–12.9)
PO2 BLDA: 96 MMHG (ref 80–100)
POC BE: 5 MMOL/L
POC SATURATED O2: 97 % (ref 95–100)
POC TCO2: 31 MMOL/L (ref 23–27)
POCT GLUCOSE: 117 MG/DL (ref 70–110)
POCT GLUCOSE: 122 MG/DL (ref 70–110)
POCT GLUCOSE: 132 MG/DL (ref 70–110)
POTASSIUM SERPL-SCNC: 4 MMOL/L (ref 3.5–5.1)
PROT SERPL-MCNC: 6 G/DL (ref 6–8.4)
RBC # BLD AUTO: 3.52 M/UL (ref 4–5.4)
SAMPLE: ABNORMAL
SITE: ABNORMAL
SODIUM SERPL-SCNC: 144 MMOL/L (ref 136–145)
SPECIMEN OUTDATE: NORMAL
VT: 450
WBC # BLD AUTO: 6.4 K/UL (ref 3.9–12.7)

## 2024-11-10 PROCEDURE — 80053 COMPREHEN METABOLIC PANEL: CPT

## 2024-11-10 PROCEDURE — 25000003 PHARM REV CODE 250

## 2024-11-10 PROCEDURE — 94640 AIRWAY INHALATION TREATMENT: CPT

## 2024-11-10 PROCEDURE — 51798 US URINE CAPACITY MEASURE: CPT

## 2024-11-10 PROCEDURE — 99291 CRITICAL CARE FIRST HOUR: CPT | Mod: ,,, | Performed by: PSYCHIATRY & NEUROLOGY

## 2024-11-10 PROCEDURE — 82550 ASSAY OF CK (CPK): CPT | Performed by: PSYCHIATRY & NEUROLOGY

## 2024-11-10 PROCEDURE — 82803 BLOOD GASES ANY COMBINATION: CPT

## 2024-11-10 PROCEDURE — 25000242 PHARM REV CODE 250 ALT 637 W/ HCPCS

## 2024-11-10 PROCEDURE — 36600 WITHDRAWAL OF ARTERIAL BLOOD: CPT

## 2024-11-10 PROCEDURE — 20000000 HC ICU ROOM

## 2024-11-10 PROCEDURE — 83735 ASSAY OF MAGNESIUM: CPT

## 2024-11-10 PROCEDURE — 99900026 HC AIRWAY MAINTENANCE (STAT)

## 2024-11-10 PROCEDURE — 31720 CLEARANCE OF AIRWAYS: CPT

## 2024-11-10 PROCEDURE — 94010 BREATHING CAPACITY TEST: CPT

## 2024-11-10 PROCEDURE — 25000003 PHARM REV CODE 250: Performed by: PSYCHIATRY & NEUROLOGY

## 2024-11-10 PROCEDURE — 27100171 HC OXYGEN HIGH FLOW UP TO 24 HOURS

## 2024-11-10 PROCEDURE — 27200966 HC CLOSED SUCTION SYSTEM

## 2024-11-10 PROCEDURE — 80178 ASSAY OF LITHIUM: CPT | Performed by: PSYCHIATRY & NEUROLOGY

## 2024-11-10 PROCEDURE — 94761 N-INVAS EAR/PLS OXIMETRY MLT: CPT

## 2024-11-10 PROCEDURE — 99900017 HC EXTUBATION W/PARAMETERS (STAT)

## 2024-11-10 PROCEDURE — 86900 BLOOD TYPING SEROLOGIC ABO: CPT

## 2024-11-10 PROCEDURE — 99900035 HC TECH TIME PER 15 MIN (STAT)

## 2024-11-10 PROCEDURE — 63600175 PHARM REV CODE 636 W HCPCS

## 2024-11-10 PROCEDURE — 85025 COMPLETE CBC W/AUTO DIFF WBC: CPT

## 2024-11-10 PROCEDURE — 84100 ASSAY OF PHOSPHORUS: CPT

## 2024-11-10 RX ORDER — CEFTRIAXONE 2 G/1
2 INJECTION, POWDER, FOR SOLUTION INTRAMUSCULAR; INTRAVENOUS
Status: DISCONTINUED | OUTPATIENT
Start: 2024-11-10 | End: 2024-11-12

## 2024-11-10 RX ORDER — LIDOCAINE 50 MG/G
1 PATCH TOPICAL
Status: DISCONTINUED | OUTPATIENT
Start: 2024-11-10 | End: 2024-11-26 | Stop reason: HOSPADM

## 2024-11-10 RX ORDER — OLANZAPINE 10 MG/2ML
5 INJECTION, POWDER, FOR SOLUTION INTRAMUSCULAR ONCE AS NEEDED
Status: COMPLETED | OUTPATIENT
Start: 2024-11-10 | End: 2024-11-11

## 2024-11-10 RX ORDER — ALBUTEROL SULFATE 2.5 MG/.5ML
2.5 SOLUTION RESPIRATORY (INHALATION) EVERY 6 HOURS PRN
Status: DISCONTINUED | OUTPATIENT
Start: 2024-11-10 | End: 2024-11-10

## 2024-11-10 RX ORDER — ACETAMINOPHEN 10 MG/ML
1000 INJECTION, SOLUTION INTRAVENOUS ONCE
OUTPATIENT
Start: 2024-11-10 | End: 2024-11-11

## 2024-11-10 RX ORDER — IPRATROPIUM BROMIDE AND ALBUTEROL SULFATE 2.5; .5 MG/3ML; MG/3ML
3 SOLUTION RESPIRATORY (INHALATION) EVERY 6 HOURS PRN
Status: DISCONTINUED | OUTPATIENT
Start: 2024-11-10 | End: 2024-11-26 | Stop reason: HOSPADM

## 2024-11-10 RX ORDER — LORAZEPAM 2 MG/ML
2 INJECTION INTRAMUSCULAR ONCE
Status: COMPLETED | OUTPATIENT
Start: 2024-11-10 | End: 2024-11-10

## 2024-11-10 RX ORDER — ACETAMINOPHEN 325 MG/1
650 TABLET ORAL EVERY 6 HOURS
Status: DISCONTINUED | OUTPATIENT
Start: 2024-11-10 | End: 2024-11-11

## 2024-11-10 RX ORDER — LORAZEPAM 2 MG/ML
INJECTION INTRAMUSCULAR
Status: COMPLETED
Start: 2024-11-10 | End: 2024-11-10

## 2024-11-10 RX ADMIN — ASPIRIN 81 MG CHEWABLE TABLET 81 MG: 81 TABLET CHEWABLE at 08:11

## 2024-11-10 RX ADMIN — FOLIC ACID 1 MG: 1 TABLET ORAL at 08:11

## 2024-11-10 RX ADMIN — SODIUM CHLORIDE SOLN NEBU 3% 4 ML: 3 NEBU SOLN at 12:11

## 2024-11-10 RX ADMIN — Medication 100 MG: at 08:11

## 2024-11-10 RX ADMIN — DEXMEDETOMIDINE HYDROCHLORIDE 0.5 MCG/KG/HR: 4 INJECTION INTRAVENOUS at 05:11

## 2024-11-10 RX ADMIN — SODIUM CHLORIDE SOLN NEBU 3% 4 ML: 3 NEBU SOLN at 08:11

## 2024-11-10 RX ADMIN — Medication 100 MCG/HR: at 01:11

## 2024-11-10 RX ADMIN — SULFAMETHOXAZOLE AND TRIMETHOPRIM 1 TABLET: 800; 160 TABLET ORAL at 08:11

## 2024-11-10 RX ADMIN — SODIUM CHLORIDE: 9 INJECTION, SOLUTION INTRAVENOUS at 08:11

## 2024-11-10 RX ADMIN — SODIUM CHLORIDE SOLN NEBU 3% 4 ML: 3 NEBU SOLN at 02:11

## 2024-11-10 RX ADMIN — DEXMEDETOMIDINE HYDROCHLORIDE 0.8 MCG/KG/HR: 4 INJECTION INTRAVENOUS at 05:11

## 2024-11-10 RX ADMIN — CLOPIDOGREL BISULFATE 75 MG: 75 TABLET ORAL at 08:11

## 2024-11-10 RX ADMIN — LIDOCAINE 5% 1 PATCH: 700 PATCH TOPICAL at 02:11

## 2024-11-10 RX ADMIN — CEFTRIAXONE 2 G: 2 INJECTION, POWDER, FOR SOLUTION INTRAMUSCULAR; INTRAVENOUS at 04:11

## 2024-11-10 RX ADMIN — FLUOXETINE HYDROCHLORIDE 60 MG: 20 SOLUTION ORAL at 08:11

## 2024-11-10 RX ADMIN — SENNOSIDES AND DOCUSATE SODIUM 1 TABLET: 50; 8.6 TABLET ORAL at 08:11

## 2024-11-10 RX ADMIN — HEPARIN SODIUM 5000 UNITS: 5000 INJECTION INTRAVENOUS; SUBCUTANEOUS at 05:11

## 2024-11-10 RX ADMIN — ATORVASTATIN CALCIUM 80 MG: 40 TABLET, FILM COATED ORAL at 08:11

## 2024-11-10 RX ADMIN — HEPARIN SODIUM 5000 UNITS: 5000 INJECTION INTRAVENOUS; SUBCUTANEOUS at 02:11

## 2024-11-10 RX ADMIN — LORAZEPAM 2 MG: 2 INJECTION INTRAMUSCULAR; INTRAVENOUS at 04:11

## 2024-11-10 RX ADMIN — SODIUM CHLORIDE SOLN NEBU 3% 4 ML: 3 NEBU SOLN at 07:11

## 2024-11-10 RX ADMIN — FAMOTIDINE 20 MG: 20 TABLET ORAL at 08:11

## 2024-11-10 RX ADMIN — SODIUM CHLORIDE: 9 INJECTION, SOLUTION INTRAVENOUS at 06:11

## 2024-11-10 RX ADMIN — THERA TABS 1 TABLET: TAB at 08:11

## 2024-11-10 NOTE — ASSESSMENT & PLAN NOTE
History of depression and anxiety    - Continuing home fluoxetine 80mg QD  - Lithium discontinued  -home seroquel 100mg qhs

## 2024-11-10 NOTE — PROGRESS NOTES
[facility] - Neuro Critical Care  Neurocritical Care  Progress Note    Admit Date: 11/6/2024  Service Date: 11/10/2024  Length of Stay: 4    Subjective:     Chief Complaint: Acute arterial ischemic stroke, multifocal, posterior circulation    History of Present Illness: 49 year old with a hx of drug use, seizure, HTN, HLD  who came to [facility] ED and was stroke activated on 11/6/24. Pt was found down by her mother this morning at an unknown time lying face down. Per EMS, she was very lethargic and aphasic and was able to move her left sided extremities and R leg but was not moving her RUE at all; pt known to EMS due to history of seizure. Pt's exam worsened on exam with no movement in her right sided extremities and desaturated to 70s and required intubation. She was also noted to have an abrasion/contusion to the right temporal area Her last known normal states was at 10 pm yesterday -- did not receive TNK due to being out of the window and strokes noted in her CT. She was admitted to [facility] for further care. Pt's case has been reviewed by Neuro IR -- not candidate for intervention due to the extensive nature of infarcts and the risk of bleeding.    Per pt's family, patient's drugs of choice are crack, heroine and ETOH. Pt's mother reports that the patient has been regularly reporting to drug court of the past several weeks and has passed all of her drug tests during this period of time -- her last drug test was two weeks ago. Pt's family also reports that the patient had been acting strange over the past few days -- she has been sleeping more and displayed erratic behavior that leads them to believe that patient may have relapsed. Of note, a crack pipe was found on the patient's person on presentation to [facility].    Hospital Course: 11/07/2024 Agitated overnight, requiring uptitration of fentanyl and addition of precedex. Weaning throughout the day. Neuro exam may be slightly improved from yesterday even despite sedation with  more R sided movement. However, no purposeful movement or localization of noxious stimuli. MRI yesterday with multifocal infarcts including the cerebellum, bilateral occipital lobes, and bilateral thalami (R>L). CK found to be elevated overnight, fluids running and trending levels.   11/08/2024 More alert and developing some purposeful movement by reaching towards ETT, brushing hair behind ear, and localizing to LLE noxious stimuli with LUE. CK downtrending with fluids. Monitoring decreased UOP with stable BUN/Cr. Weaning fentanyl and initiated precedex. Plan to DC fluid tomorrow if CK continues to downtrend and remove fluid. Started tube feeds today. Lithium level now low, started half-dose home lithium.  11/10/2024 NAEON. Patient following commands this morning. Extubated to NC. Patient tolerated extubation well. Remains on precedex gtt for agitation, requiring PRNs. Resp culture positive for H flu. Started rocephin.    Interval History:  See hospital course    Review of Systems  Unable to obtain a complete ROS due to level of consciousness.    Objective:     Vitals:  Temp: 98 °F (36.7 °C)  Pulse: 101  Rhythm: normal sinus rhythm  BP: (!) 165/131  MAP (mmHg): 138  Resp: (!) 43  SpO2: (!) 90 %  Oxygen Concentration (%): 40  Vent Mode: Spont  Set Rate: 16 BPM  Vt Set: 450 mL  Pressure Support: 8 cmH20  PEEP/CPAP: 5 cmH20  Peak Airway Pressure: 4.2 cmH20  Mean Airway Pressure: 7.7 cmH20  Plateau Pressure: 0 cmH20    Temp  Min: 98 °F (36.7 °C)  Max: 99.4 °F (37.4 °C)  Pulse  Min: 57  Max: 101  BP  Min: 86/51  Max: 186/96  MAP (mmHg)  Min: 66  Max: 138  Resp  Min: 10  Max: 43  SpO2  Min: 90 %  Max: 100 %  Oxygen Concentration (%)  Min: 40  Max: 40    11/09 0701 - 11/10 0700  In: 3700.3 [I.V.:1955]  Out: 385 [Urine:285; Drains:100]   Unmeasured Output  Urine Occurrence: 1  Stool Occurrence: 1  Pad Count: 1        Physical Exam  Vitals and nursing note reviewed.   Constitutional:       General: She is not in acute  distress.     Comments: Intubated and sedated.   HENT:      Head: Normocephalic and atraumatic.   Cardiovascular:      Rate and Rhythm: Normal rate and regular rhythm.      Heart sounds: Normal heart sounds.   Pulmonary:      Effort: No respiratory distress.      Breath sounds: Normal breath sounds.   Abdominal:      General: Abdomen is flat.      Palpations: Abdomen is soft.   Musculoskeletal:      Right lower leg: No edema.      Left lower leg: No edema.   Skin:     General: Skin is warm and dry.   Neurological:      GCS: GCS eye subscore is 3. GCS motor subscore is 4.      Comments: Intubated without sedation  Following commands and head nods appropriately to questions  Purposeful movement with localization  +L corneal, no R corneal  +cough  +gag  +OCR  PERRL    Motor:  LUE spontaneous movement, localizes noxious stimuli  LLE spontaneous movement, localizes noxious stimuli  RUE spontaneous movement, localizes from noxious stimuli  RLE spontaneous movement, localizes from noxious stimuli       Unable to test orientation, language, memory, judgment, insight, fund of knowledge, hearing, shoulder shrug, tongue protrusion, coordination, gait due to level of consciousness.       Medications:  Continuous0.9% NaCl, Last Rate: 75 mL/hr at 11/10/24 1600  dexmedeTOMIDine (Precedex) infusion (titrating), Last Rate: 0.6 mcg/kg/hr (11/10/24 1600)    Scheduledacetaminophen, 650 mg, Q6H  aspirin, 81 mg, Daily  atorvastatin, 80 mg, Daily  cefTRIAXone (Rocephin) IV (PEDS and ADULTS), 2 g, Q24H  clopidogreL, 75 mg, Daily  famotidine, 20 mg, BID  FLUoxetine, 60 mg, Daily  folic acid, 1 mg, Daily  heparin (porcine), 5,000 Units, Q8H  LIDOcaine, 1 patch, Q24H  lorazepam, 2 mg, Once  multivitamin, 1 tablet, Daily  QUEtiapine, 100 mg, QHS  senna-docusate 8.6-50 mg, 1 tablet, Daily  sodium chloride 3%, 4 mL, Q6H  sulfamethoxazole-trimethoprim 800-160mg, 1 tablet, BID  thiamine, 100 mg, Daily    PRNbisacodyL, 10 mg, Daily PRN  fentanyl,  75 mcg, Q1H PRN  magnesium oxide, 800 mg, PRN  magnesium oxide, 800 mg, PRN  midazolam, 2 mg, Once PRN  polyethylene glycol, 17 g, Daily PRN  potassium bicarbonate, 35 mEq, PRN  potassium bicarbonate, 50 mEq, PRN  potassium bicarbonate, 60 mEq, PRN  potassium, sodium phosphates, 2 packet, PRN  potassium, sodium phosphates, 2 packet, PRN  potassium, sodium phosphates, 2 packet, PRN  sodium chloride 0.9%, 10 mL, PRN      Today I personally reviewed pertinent medications, lines/drains/airways, imaging, cardiology results, laboratory results, microbiology results, notably:    Diet  Diet NPO  Diet NPO    Assessment/Plan:     Neuro  * Acute arterial ischemic stroke, multifocal, posterior circulation  49F with history of polysubstance abuse presents after being found down at home. CTA revealed multifocal infarctions with a R vertebral artery occlusion and a L PCA occlusion. Patient was OOW for TNK and stroke burden was felt to be too large to pursue IR intervention. MRI reveals multifocal posterior circulation infarctions involving bilateral occipital lobes, medial L temporal lobe, bilateral thalami, R dorsal medulla, and the cerebellum. Given the location and extent of her strokes, she is at risk of impaired consciousness and total vision loss. Risk of herniation is felt to be lower given the moderate extent of infarcted tissue and modest infratentorial infarctions. Improving exam with some purposeful movements. Off sedation, patient is following commands appropriately. Extubated to NC and patient has tolerated well.     Antithrombotics for secondary stroke prevention: Antiplatelets: Aspirin: 81 mg daily  Clopidogrel: 75 mg daily    Statins for secondary stroke prevention and hyperlipidemia, if present:   Statins: Atorvastatin- 80 mg daily    Aggressive risk factor modification: HTN, HLD     Rehab efforts: The patient has been evaluated by a stroke team provider and the therapy needs have been fully considered based off  the presenting complaints and exam findings. The following therapy evaluations are needed: PT evaluate and treat, OT evaluate and treat, SLP evaluate and treat, PM&R evaluate for appropriate placement    Diagnostics ordered/pending: None     VTE prophylaxis: Mechanical prophylaxis: Place SCDs    BP parameters: Infarct: No intervention, SBP <220    -q1h neuro checks  -BMP QD  -A1c, Lipid panel, TSH  -ASA 81 QD  -Plavix 75mg QD  -SBP<220  -Na>140  -NSGY and vascular neurology consulted, appreciate recs  -Repeat CT Head on 11/10/2024: Expected evolutionary changes of the posterior circulation infarcts with no overt hemorrhagic conversion or significant midline shift/herniation.   -PT/OT/SLP to evaluate and treat      Cytotoxic cerebral edema  See primary problem    Psychiatric  Polysubstance abuse  Patient with long history of polysubstance abuse including crack cocaine, alcohol, and opiates  Patient was found with drug paraphernalia on person upon admission  Urine tox screen positive for cocaine    Anxiety and depression  History of depression and anxiety    - Continuing home fluoxetine 80mg QD  - Lithium discontinued  -home seroquel 100mg qhs    Derm  Folliculitis  - Bactrim x 7 days for folliculitis noted on mons pubis  - Wound care following     Pulmonary  On mechanically assisted ventilation  RESOLVED  Due to stroke  Extubated 11/10  Pepcid ppx  3% nebs    Other  Lithium toxicity  RESOLVED  See anxiety and depression    Debility  Due to stroke  PT/OT/SLP to evaluate and treat when appropriate    HyperCKemia  CK elevated after being found down at home    - Continue NS @ 75cc/hr   - Will discontinue if patient develops oxygenation issues  - Trending CK daily              The patient is being Prophylaxed for:  Venous Thromboembolism with: Chemical  Stress Ulcer with: H2B  Ventilator Pneumonia with: not applicable    Activity Orders            Elevate HOB Elevate (30-45 degrees) Elevate HOB to 30 - 45 degrees during  feeding unless otherwise stated starting at 11/08 1236    Turn patient starting at 11/06 1400    Diet NPO: NPO starting at 11/06 1325          Full Code    Darius Echevarria MD  Neurocritical Care  [facility] - Neuro Critical Care

## 2024-11-10 NOTE — ASSESSMENT & PLAN NOTE
CK elevated after being found down at home    - Continue NS @ 75cc/hr   - Will discontinue if patient develops oxygenation issues  - Trending CK daily

## 2024-11-10 NOTE — PLAN OF CARE
Patients dispo plan pending medical clearance. CM team to follow.      Discharge Plan A and Plan B have been determined by review of patient's clinical status, future medical and therapeutic needs, and coverage/benefits for post-acute care in coordination with multidisciplinary team members.     JEROME Carlisle  [facility]   Ext: [phone]

## 2024-11-10 NOTE — SUBJECTIVE & OBJECTIVE
Interval History:  See hospital course    Review of Systems  Unable to obtain a complete ROS due to level of consciousness.    Objective:     Vitals:  Temp: 98 °F (36.7 °C)  Pulse: 101  Rhythm: normal sinus rhythm  BP: (!) 165/131  MAP (mmHg): 138  Resp: (!) 43  SpO2: (!) 90 %  Oxygen Concentration (%): 40  Vent Mode: Spont  Set Rate: 16 BPM  Vt Set: 450 mL  Pressure Support: 8 cmH20  PEEP/CPAP: 5 cmH20  Peak Airway Pressure: 4.2 cmH20  Mean Airway Pressure: 7.7 cmH20  Plateau Pressure: 0 cmH20    Temp  Min: 98 °F (36.7 °C)  Max: 99.4 °F (37.4 °C)  Pulse  Min: 57  Max: 101  BP  Min: 86/51  Max: 186/96  MAP (mmHg)  Min: 66  Max: 138  Resp  Min: 10  Max: 43  SpO2  Min: 90 %  Max: 100 %  Oxygen Concentration (%)  Min: 40  Max: 40    11/09 0701 - 11/10 0700  In: 3700.3 [I.V.:1955]  Out: 385 [Urine:285; Drains:100]   Unmeasured Output  Urine Occurrence: 1  Stool Occurrence: 1  Pad Count: 1        Physical Exam  Vitals and nursing note reviewed.   Constitutional:       General: She is not in acute distress.     Comments: Intubated and sedated.   HENT:      Head: Normocephalic and atraumatic.   Cardiovascular:      Rate and Rhythm: Normal rate and regular rhythm.      Heart sounds: Normal heart sounds.   Pulmonary:      Effort: No respiratory distress.      Breath sounds: Normal breath sounds.   Abdominal:      General: Abdomen is flat.      Palpations: Abdomen is soft.   Musculoskeletal:      Right lower leg: No edema.      Left lower leg: No edema.   Skin:     General: Skin is warm and dry.   Neurological:      GCS: GCS eye subscore is 3. GCS motor subscore is 4.      Comments: Intubated without sedation  Following commands and head nods appropriately to questions  Purposeful movement with localization  +L corneal, no R corneal  +cough  +gag  +OCR  PERRL    Motor:  LUE spontaneous movement, localizes noxious stimuli  LLE spontaneous movement, localizes noxious stimuli  RUE spontaneous movement, localizes from noxious  stimuli  RLE spontaneous movement, localizes from noxious stimuli       Unable to test orientation, language, memory, judgment, insight, fund of knowledge, hearing, shoulder shrug, tongue protrusion, coordination, gait due to level of consciousness.       Medications:  Continuous0.9% NaCl, Last Rate: 75 mL/hr at 11/10/24 1600  dexmedeTOMIDine (Precedex) infusion (titrating), Last Rate: 0.6 mcg/kg/hr (11/10/24 1600)    Scheduledacetaminophen, 650 mg, Q6H  aspirin, 81 mg, Daily  atorvastatin, 80 mg, Daily  cefTRIAXone (Rocephin) IV (PEDS and ADULTS), 2 g, Q24H  clopidogreL, 75 mg, Daily  famotidine, 20 mg, BID  FLUoxetine, 60 mg, Daily  folic acid, 1 mg, Daily  heparin (porcine), 5,000 Units, Q8H  LIDOcaine, 1 patch, Q24H  lorazepam, 2 mg, Once  multivitamin, 1 tablet, Daily  QUEtiapine, 100 mg, QHS  senna-docusate 8.6-50 mg, 1 tablet, Daily  sodium chloride 3%, 4 mL, Q6H  sulfamethoxazole-trimethoprim 800-160mg, 1 tablet, BID  thiamine, 100 mg, Daily    PRNbisacodyL, 10 mg, Daily PRN  fentanyl, 75 mcg, Q1H PRN  magnesium oxide, 800 mg, PRN  magnesium oxide, 800 mg, PRN  midazolam, 2 mg, Once PRN  polyethylene glycol, 17 g, Daily PRN  potassium bicarbonate, 35 mEq, PRN  potassium bicarbonate, 50 mEq, PRN  potassium bicarbonate, 60 mEq, PRN  potassium, sodium phosphates, 2 packet, PRN  potassium, sodium phosphates, 2 packet, PRN  potassium, sodium phosphates, 2 packet, PRN  sodium chloride 0.9%, 10 mL, PRN      Today I personally reviewed pertinent medications, lines/drains/airways, imaging, cardiology results, laboratory results, microbiology results, notably:    Diet  Diet NPO  Diet NPO

## 2024-11-10 NOTE — PLAN OF CARE
[facility] Care Plan    POC reviewed with Malathi Mcpherson at 0300. Patient unable to verbalize understanding. Questions and concerns addressed. No acute events today. Pt progressing toward goals. Will continue to monitor. See below and flowsheets for full assessment and VS info.     - Fent infusing at 75  - Precedex infusing at 0.4  - NS infusing at 75  - TF held @ 0600  - Fent bolus x2  - Linen changed, bath given  - Cleaned and redressed wound on R groin with vashe and hydrogel        Is this a stroke patient? yes- Stroke booklet reviewed with patient, risk factors identified for patient and stroke booklet remains at bedside for ongoing education.     Care individualization:     Neuro:  Rives Coma Scale  Best Eye Response: 2-->(E2) to pain  Best Motor Response: 5-->(M5) localizes pain  Best Verbal Response: 1-->(V1) none  Shailesh Coma Scale Score: 8  Assessment Qualifiers: patient chemically sedated or paralyzed, patient intubated  Pupil PERRLA: yes     24 hr Temp:  [98.3 °F (36.8 °C)-99.4 °F (37.4 °C)]     CV:   Rhythm: normal sinus rhythm  BP goals:   SBP < 220  MAP > 65    Resp:      Vent Mode: SIMV  Set Rate: 16 BPM  Oxygen Concentration (%): 40  Vt Set: 450 mL  PEEP/CPAP: 5 cmH20  Pressure Support: 8 cmH20    Plan: wean to extubate    GI/:     Diet/Nutrition Received: NPO, tube feeding  Last Bowel Movement: 11/09/24  Voiding Characteristics: external catheter    Intake/Output Summary (Last 24 hours) at 11/10/2024 0657  Last data filed at 11/10/2024 0601  Gross per 24 hour   Intake 3635.31 ml   Output 385 ml   Net 3250.31 ml     Unmeasured Output  Urine Occurrence: 1  Stool Occurrence: 1  Pad Count: 1    Labs/Accuchecks:  Recent Labs   Lab 11/10/24  0034 11/10/24  0459   WBC 6.40  --    RBC 3.52*  --    HGB 10.3*  --    HCT 33.4* 30*     --       Recent Labs   Lab 11/10/24  0034      K 4.0   CO2 21*   *   BUN 15   CREATININE 0.6   ALKPHOS 70   ALT 31   AST 35   BILITOT 0.2      Recent Labs    Lab 24  0854   INR 1.0      Recent Labs   Lab 11/10/24  0034   *       Electrolytes: N/A - electrolytes WDL  Accuchecks: Q6H    Gtts:   0.9% NaCl   Intravenous Continuous 75 mL/hr at 11/10/24 0635 New Bag at 11/10/24 0635    dexmedeTOMIDine (Precedex) infusion (titrating)  0-1.4 mcg/kg/hr Intravenous Continuous 7.38 mL/hr at 11/10/24 0601 0.5 mcg/kg/hr at 11/10/24 0601    fentanyl  0-150 mcg/hr Intravenous Continuous 7.5 mL/hr at 11/10/24 0601 75 mcg/hr at 11/10/24 0601       LDA/Wounds:    Sree Risk Assessment  Sensory Perception: 3-->slightly limited  Moisture: 3-->occasionally moist  Activity: 1-->bedfast  Mobility: 2-->very limited  Nutrition: 3-->adequate  Friction and Shear: 2-->potential problem  Sree Score: 14  Is your sree score 12 or less? no      Restraints:   Restraint Order  Length of Order: Order good for next 24 hours or when removed.  Date that the current order will : 11/10/24  Time that the current order will :   Order Upon Application: Yes    [facility]

## 2024-11-10 NOTE — PROGRESS NOTES
11/10/24 1643   Vital Signs   Pulse (!) (S)  131   Resp (!) (S)  55   SpO2 (!) (S)  89 %     Dinora informed of above VS and that patient is agitated and combative.  New orders given.  See MAR

## 2024-11-10 NOTE — ASSESSMENT & PLAN NOTE
49F with history of polysubstance abuse presents after being found down at home. CTA revealed multifocal infarctions with a R vertebral artery occlusion and a L PCA occlusion. Patient was OOW for TNK and stroke burden was felt to be too large to pursue IR intervention. MRI reveals multifocal posterior circulation infarctions involving bilateral occipital lobes, medial L temporal lobe, bilateral thalami, R dorsal medulla, and the cerebellum. Given the location and extent of her strokes, she is at risk of impaired consciousness and total vision loss. Risk of herniation is felt to be lower given the moderate extent of infarcted tissue and modest infratentorial infarctions. Improving exam with some purposeful movements. Off sedation, patient is following commands appropriately. Extubated to NC and patient has tolerated well.     Antithrombotics for secondary stroke prevention: Antiplatelets: Aspirin: 81 mg daily  Clopidogrel: 75 mg daily    Statins for secondary stroke prevention and hyperlipidemia, if present:   Statins: Atorvastatin- 80 mg daily    Aggressive risk factor modification: HTN, HLD     Rehab efforts: The patient has been evaluated by a stroke team provider and the therapy needs have been fully considered based off the presenting complaints and exam findings. The following therapy evaluations are needed: PT evaluate and treat, OT evaluate and treat, SLP evaluate and treat, PM&R evaluate for appropriate placement    Diagnostics ordered/pending: None     VTE prophylaxis: Mechanical prophylaxis: Place SCDs    BP parameters: Infarct: No intervention, SBP <220    -q1h neuro checks  -BMP QD  -A1c, Lipid panel, TSH  -ASA 81 QD  -Plavix 75mg QD  -SBP<220  -Na>140  -NSGY and vascular neurology consulted, appreciate recs  -Repeat CT Head on 11/10/2024: Expected evolutionary changes of the posterior circulation infarcts with no overt hemorrhagic conversion or significant midline shift/herniation.   -PT/OT/SLP to  evaluate and treat

## 2024-11-11 PROBLEM — J18.9 RIGHT UPPER LOBE PNEUMONIA: Status: ACTIVE | Noted: 2024-11-11

## 2024-11-11 LAB
ALBUMIN SERPL BCP-MCNC: 2.5 G/DL (ref 3.5–5.2)
ALP SERPL-CCNC: 69 U/L (ref 40–150)
ALT SERPL W/O P-5'-P-CCNC: 32 U/L (ref 10–44)
ANION GAP SERPL CALC-SCNC: 14 MMOL/L (ref 8–16)
AST SERPL-CCNC: 42 U/L (ref 10–40)
BACTERIA GENITAL AEROBE CULT: NORMAL
BASOPHILS # BLD AUTO: 0.03 K/UL (ref 0–0.2)
BASOPHILS NFR BLD: 0.4 % (ref 0–1.9)
BILIRUB SERPL-MCNC: 0.4 MG/DL (ref 0.1–1)
BUN SERPL-MCNC: 8 MG/DL (ref 6–20)
C PNEUM IGG TITR SER IF: ABNORMAL TITER
C PNEUM IGM TITR SER IF: ABNORMAL TITER
C PSITTACI IGG TITR SER IF: ABNORMAL TITER
C PSITTACI IGM TITR SER IF: ABNORMAL TITER
CALCIUM SERPL-MCNC: 8.9 MG/DL (ref 8.7–10.5)
CHLORIDE SERPL-SCNC: 108 MMOL/L (ref 95–110)
CK SERPL-CCNC: 827 U/L (ref 20–180)
CO2 SERPL-SCNC: 19 MMOL/L (ref 23–29)
CREAT SERPL-MCNC: 0.6 MG/DL (ref 0.5–1.4)
DIFFERENTIAL METHOD BLD: ABNORMAL
EOSINOPHIL # BLD AUTO: 0 K/UL (ref 0–0.5)
EOSINOPHIL NFR BLD: 0.4 % (ref 0–8)
ERYTHROCYTE [DISTWIDTH] IN BLOOD BY AUTOMATED COUNT: 13.3 % (ref 11.5–14.5)
EST. GFR  (NO RACE VARIABLE): >60 ML/MIN/1.73 M^2
GLUCOSE SERPL-MCNC: 109 MG/DL (ref 70–110)
HCT VFR BLD AUTO: 34.2 % (ref 37–48.5)
HGB BLD-MCNC: 10.6 G/DL (ref 12–16)
IMM GRANULOCYTES # BLD AUTO: 0.06 K/UL (ref 0–0.04)
IMM GRANULOCYTES NFR BLD AUTO: 0.7 % (ref 0–0.5)
LYMPHOCYTES # BLD AUTO: 0.8 K/UL (ref 1–4.8)
LYMPHOCYTES NFR BLD: 9.8 % (ref 18–48)
MAGNESIUM SERPL-MCNC: 1.7 MG/DL (ref 1.6–2.6)
MCH RBC QN AUTO: 29.4 PG (ref 27–31)
MCHC RBC AUTO-ENTMCNC: 31 G/DL (ref 32–36)
MCV RBC AUTO: 95 FL (ref 82–98)
MONOCYTES # BLD AUTO: 0.5 K/UL (ref 0.3–1)
MONOCYTES NFR BLD: 6 % (ref 4–15)
MRSA SCREEN BY PCR: DETECTED
NEUTROPHILS # BLD AUTO: 7 K/UL (ref 1.8–7.7)
NEUTROPHILS NFR BLD: 82.7 % (ref 38–73)
NRBC BLD-RTO: 0 /100 WBC
OHS QRS DURATION: 72 MS
OHS QRS DURATION: 74 MS
OHS QTC CALCULATION: 491 MS
OHS QTC CALCULATION: 498 MS
PHOSPHATE SERPL-MCNC: 3.4 MG/DL (ref 2.7–4.5)
PLATELET # BLD AUTO: 195 K/UL (ref 150–450)
PMV BLD AUTO: 10.9 FL (ref 9.2–12.9)
POCT GLUCOSE: 108 MG/DL (ref 70–110)
POCT GLUCOSE: 112 MG/DL (ref 70–110)
POTASSIUM SERPL-SCNC: 3.6 MMOL/L (ref 3.5–5.1)
PROT SERPL-MCNC: 6.3 G/DL (ref 6–8.4)
RBC # BLD AUTO: 3.6 M/UL (ref 4–5.4)
SODIUM SERPL-SCNC: 141 MMOL/L (ref 136–145)
WBC # BLD AUTO: 8.49 K/UL (ref 3.9–12.7)

## 2024-11-11 PROCEDURE — 63600175 PHARM REV CODE 636 W HCPCS

## 2024-11-11 PROCEDURE — 25000003 PHARM REV CODE 250: Performed by: STUDENT IN AN ORGANIZED HEALTH CARE EDUCATION/TRAINING PROGRAM

## 2024-11-11 PROCEDURE — 97535 SELF CARE MNGMENT TRAINING: CPT

## 2024-11-11 PROCEDURE — 83735 ASSAY OF MAGNESIUM: CPT

## 2024-11-11 PROCEDURE — 99291 CRITICAL CARE FIRST HOUR: CPT | Mod: ,,, | Performed by: STUDENT IN AN ORGANIZED HEALTH CARE EDUCATION/TRAINING PROGRAM

## 2024-11-11 PROCEDURE — 92610 EVALUATE SWALLOWING FUNCTION: CPT

## 2024-11-11 PROCEDURE — 93005 ELECTROCARDIOGRAM TRACING: CPT

## 2024-11-11 PROCEDURE — 84100 ASSAY OF PHOSPHORUS: CPT

## 2024-11-11 PROCEDURE — 94761 N-INVAS EAR/PLS OXIMETRY MLT: CPT

## 2024-11-11 PROCEDURE — 25000003 PHARM REV CODE 250

## 2024-11-11 PROCEDURE — 63600175 PHARM REV CODE 636 W HCPCS: Mod: JZ,JG

## 2024-11-11 PROCEDURE — 87641 MR-STAPH DNA AMP PROBE: CPT

## 2024-11-11 PROCEDURE — 27000221 HC OXYGEN, UP TO 24 HOURS

## 2024-11-11 PROCEDURE — 25000242 PHARM REV CODE 250 ALT 637 W/ HCPCS

## 2024-11-11 PROCEDURE — 97168 OT RE-EVAL EST PLAN CARE: CPT

## 2024-11-11 PROCEDURE — 99900035 HC TECH TIME PER 15 MIN (STAT)

## 2024-11-11 PROCEDURE — 20000000 HC ICU ROOM

## 2024-11-11 PROCEDURE — 25000003 PHARM REV CODE 250: Performed by: PSYCHIATRY & NEUROLOGY

## 2024-11-11 PROCEDURE — 94640 AIRWAY INHALATION TREATMENT: CPT

## 2024-11-11 PROCEDURE — 80053 COMPREHEN METABOLIC PANEL: CPT

## 2024-11-11 PROCEDURE — 63600175 PHARM REV CODE 636 W HCPCS: Performed by: STUDENT IN AN ORGANIZED HEALTH CARE EDUCATION/TRAINING PROGRAM

## 2024-11-11 PROCEDURE — 94667 MNPJ CHEST WALL 1ST: CPT

## 2024-11-11 PROCEDURE — 93010 ELECTROCARDIOGRAM REPORT: CPT | Mod: ,,, | Performed by: INTERNAL MEDICINE

## 2024-11-11 PROCEDURE — 97112 NEUROMUSCULAR REEDUCATION: CPT

## 2024-11-11 PROCEDURE — 82550 ASSAY OF CK (CPK): CPT | Performed by: PSYCHIATRY & NEUROLOGY

## 2024-11-11 PROCEDURE — 99233 SBSQ HOSP IP/OBS HIGH 50: CPT | Mod: ,,, | Performed by: PHYSICIAN ASSISTANT

## 2024-11-11 PROCEDURE — 85025 COMPLETE CBC W/AUTO DIFF WBC: CPT

## 2024-11-11 RX ORDER — AMOXICILLIN 250 MG
1 CAPSULE ORAL DAILY
Status: DISCONTINUED | OUTPATIENT
Start: 2024-11-12 | End: 2024-11-12

## 2024-11-11 RX ORDER — NAPROXEN SODIUM 220 MG/1
81 TABLET, FILM COATED ORAL DAILY
Status: DISCONTINUED | OUTPATIENT
Start: 2024-11-12 | End: 2024-11-25

## 2024-11-11 RX ORDER — HYDROXYZINE HYDROCHLORIDE 10 MG/5ML
10 SYRUP ORAL EVERY 4 HOURS PRN
Status: DISCONTINUED | OUTPATIENT
Start: 2024-11-11 | End: 2024-11-12

## 2024-11-11 RX ORDER — LANOLIN ALCOHOL/MO/W.PET/CERES
800 CREAM (GRAM) TOPICAL
Status: DISCONTINUED | OUTPATIENT
Start: 2024-11-11 | End: 2024-11-15

## 2024-11-11 RX ORDER — ATORVASTATIN CALCIUM 40 MG/1
80 TABLET, FILM COATED ORAL DAILY
Status: DISCONTINUED | OUTPATIENT
Start: 2024-11-12 | End: 2024-11-17

## 2024-11-11 RX ORDER — FOLIC ACID 1 MG/1
1 TABLET ORAL DAILY
Status: DISCONTINUED | OUTPATIENT
Start: 2024-11-12 | End: 2024-11-25

## 2024-11-11 RX ORDER — POLYETHYLENE GLYCOL 3350 17 G/17G
17 POWDER, FOR SOLUTION ORAL DAILY PRN
Status: DISCONTINUED | OUTPATIENT
Start: 2024-11-11 | End: 2024-11-12

## 2024-11-11 RX ORDER — FLUOXETINE 20 MG/5ML
60 SOLUTION ORAL DAILY
Status: DISCONTINUED | OUTPATIENT
Start: 2024-11-12 | End: 2024-11-25

## 2024-11-11 RX ORDER — THIAMINE HCL 100 MG
100 TABLET ORAL DAILY
Status: DISCONTINUED | OUTPATIENT
Start: 2024-11-12 | End: 2024-11-25

## 2024-11-11 RX ORDER — SODIUM,POTASSIUM PHOSPHATES 280-250MG
2 POWDER IN PACKET (EA) ORAL
Status: DISCONTINUED | OUTPATIENT
Start: 2024-11-11 | End: 2024-11-15

## 2024-11-11 RX ORDER — LITHIUM 8 MEQ/5ML
150 SOLUTION ORAL 3 TIMES DAILY
Status: DISCONTINUED | OUTPATIENT
Start: 2024-11-11 | End: 2024-11-12

## 2024-11-11 RX ORDER — MIDAZOLAM HYDROCHLORIDE 1 MG/ML
1 INJECTION, SOLUTION INTRAMUSCULAR; INTRAVENOUS ONCE
Status: DISCONTINUED | OUTPATIENT
Start: 2024-11-11 | End: 2024-11-11

## 2024-11-11 RX ORDER — ACETAMINOPHEN 325 MG/1
650 TABLET ORAL EVERY 6 HOURS
Status: DISCONTINUED | OUTPATIENT
Start: 2024-11-11 | End: 2024-11-16

## 2024-11-11 RX ORDER — CLOPIDOGREL BISULFATE 75 MG/1
75 TABLET ORAL DAILY
Status: DISCONTINUED | OUTPATIENT
Start: 2024-11-12 | End: 2024-11-25

## 2024-11-11 RX ORDER — SULFAMETHOXAZOLE AND TRIMETHOPRIM 800; 160 MG/1; MG/1
1 TABLET ORAL 2 TIMES DAILY
Status: DISCONTINUED | OUTPATIENT
Start: 2024-11-11 | End: 2024-11-11

## 2024-11-11 RX ADMIN — DEXMEDETOMIDINE HYDROCHLORIDE 1.1 MCG/KG/HR: 4 INJECTION INTRAVENOUS at 01:11

## 2024-11-11 RX ADMIN — QUETIAPINE FUMARATE 100 MG: 25 TABLET ORAL at 08:11

## 2024-11-11 RX ADMIN — ACETAMINOPHEN 650 MG: 325 TABLET, FILM COATED ORAL at 01:11

## 2024-11-11 RX ADMIN — DEXMEDETOMIDINE HYDROCHLORIDE 1.4 MCG/KG/HR: 4 INJECTION INTRAVENOUS at 02:11

## 2024-11-11 RX ADMIN — HEPARIN SODIUM 5000 UNITS: 5000 INJECTION INTRAVENOUS; SUBCUTANEOUS at 02:11

## 2024-11-11 RX ADMIN — ACETAMINOPHEN 650 MG: 325 TABLET, FILM COATED ORAL at 11:11

## 2024-11-11 RX ADMIN — HEPARIN SODIUM 5000 UNITS: 5000 INJECTION INTRAVENOUS; SUBCUTANEOUS at 06:11

## 2024-11-11 RX ADMIN — DEXMEDETOMIDINE HYDROCHLORIDE 1.4 MCG/KG/HR: 4 INJECTION INTRAVENOUS at 06:11

## 2024-11-11 RX ADMIN — Medication 100 MG: at 01:11

## 2024-11-11 RX ADMIN — FOLIC ACID 1 MG: 1 TABLET ORAL at 01:11

## 2024-11-11 RX ADMIN — ACETAMINOPHEN 650 MG: 325 TABLET, FILM COATED ORAL at 06:11

## 2024-11-11 RX ADMIN — SODIUM CHLORIDE: 9 INJECTION, SOLUTION INTRAVENOUS at 10:11

## 2024-11-11 RX ADMIN — HYDROXYZINE HYDROCHLORIDE 10 MG: 10 SOLUTION ORAL at 01:11

## 2024-11-11 RX ADMIN — VANCOMYCIN HYDROCHLORIDE 1250 MG: 1.25 INJECTION, POWDER, LYOPHILIZED, FOR SOLUTION INTRAVENOUS at 02:11

## 2024-11-11 RX ADMIN — LITHIUM 150 MG: 8 SOLUTION ORAL at 09:11

## 2024-11-11 RX ADMIN — HYDROXYZINE HYDROCHLORIDE 10 MG: 10 SOLUTION ORAL at 09:11

## 2024-11-11 RX ADMIN — CLOPIDOGREL BISULFATE 75 MG: 75 TABLET ORAL at 01:11

## 2024-11-11 RX ADMIN — CEFTRIAXONE 2 G: 2 INJECTION, POWDER, FOR SOLUTION INTRAMUSCULAR; INTRAVENOUS at 04:11

## 2024-11-11 RX ADMIN — DEXMEDETOMIDINE HYDROCHLORIDE 0.8 MCG/KG/HR: 4 INJECTION INTRAVENOUS at 08:11

## 2024-11-11 RX ADMIN — ATORVASTATIN CALCIUM 80 MG: 40 TABLET, FILM COATED ORAL at 01:11

## 2024-11-11 RX ADMIN — HEPARIN SODIUM 5000 UNITS: 5000 INJECTION INTRAVENOUS; SUBCUTANEOUS at 09:11

## 2024-11-11 RX ADMIN — SENNOSIDES AND DOCUSATE SODIUM 1 TABLET: 50; 8.6 TABLET ORAL at 01:11

## 2024-11-11 RX ADMIN — SODIUM CHLORIDE SOLN NEBU 3% 4 ML: 3 NEBU SOLN at 07:11

## 2024-11-11 RX ADMIN — SODIUM CHLORIDE SOLN NEBU 3% 4 ML: 3 NEBU SOLN at 01:11

## 2024-11-11 RX ADMIN — LITHIUM 150 MG: 8 SOLUTION ORAL at 05:11

## 2024-11-11 RX ADMIN — LIDOCAINE 5% 1 PATCH: 700 PATCH TOPICAL at 02:11

## 2024-11-11 RX ADMIN — ASPIRIN 81 MG CHEWABLE TABLET 81 MG: 81 TABLET CHEWABLE at 01:11

## 2024-11-11 RX ADMIN — THERA TABS 1 TABLET: TAB at 01:11

## 2024-11-11 RX ADMIN — OLANZAPINE 5 MG: 10 INJECTION, POWDER, FOR SOLUTION INTRAMUSCULAR at 12:11

## 2024-11-11 NOTE — PLAN OF CARE
[facility] Care Plan    POC reviewed with Malathi Mcpherson at 0300. Patient unable to verbalize understanding. Questions and concerns addressed. No acute events today. Pt progressing toward goals. Will continue to monitor. See below and flowsheets for full assessment and VS info.     - precedex infusing at max 1.4  - NS infusing at 75cc/hr  - PRN zyprexa x1  - NPO pending SLP   - Tmax 100.6, fans used        Is this a stroke patient? yes- Stroke booklet reviewed with patient, risk factors identified for patient and stroke booklet remains at bedside for ongoing education.     Care individualization: Extra socks provided    Neuro:  Shailesh Coma Scale  Best Eye Response: 2-->(E2) to pain  Best Motor Response: 5-->(M5) localizes pain  Best Verbal Response: 1-->(V1) none  Topping Coma Scale Score: 8  Assessment Qualifiers: patient chemically sedated or paralyzed, patient intubated  Pupil PERRLA: yes     24 hr Temp:  [98 °F (36.7 °C)-100.6 °F (38.1 °C)]     CV:   Rhythm: normal sinus rhythm  BP goals:   SBP < 220  MAP > 65    Resp:      Vent Mode: Spont  Set Rate: 16 BPM  Oxygen Concentration (%): 40  Vt Set: 450 mL  PEEP/CPAP: 5 cmH20  Pressure Support: 8 cmH20    Plan: Wean oxygen requirements     GI/:     Diet/Nutrition Received: NPO  Last Bowel Movement: 11/09/24  Voiding Characteristics: external catheter    Intake/Output Summary (Last 24 hours) at 11/11/2024 0404  Last data filed at 11/11/2024 0301  Gross per 24 hour   Intake 2353.7 ml   Output 1150 ml   Net 1203.7 ml     Unmeasured Output  Urine Occurrence: 1  Stool Occurrence: 1  Pad Count: 1    Labs/Accuchecks:  Recent Labs   Lab 11/11/24 0042   WBC 8.49   RBC 3.60*   HGB 10.6*   HCT 34.2*         Recent Labs   Lab 11/11/24 0042      K 3.6   CO2 19*      BUN 8   CREATININE 0.6   ALKPHOS 69   ALT 32   AST 42*   BILITOT 0.4      Recent Labs   Lab 11/06/24  0854   INR 1.0      Recent Labs   Lab 11/11/24 0042   *       Electrolytes:    Accuchecks: Q6H    Gtts:   0.9% NaCl   Intravenous Continuous 75 mL/hr at 24 Rate Verify at 24    dexmedeTOMIDine (Precedex) infusion (titrating)  0-1.4 mcg/kg/hr Intravenous Continuous 16.23 mL/hr at 24 1.1 mcg/kg/hr at 24       LDA/Wounds:    Sree Risk Assessment  Sensory Perception: 2-->very limited  Moisture: 4-->rarely moist  Activity: 1-->bedfast  Mobility: 3-->slightly limited  Nutrition: 2-->probably inadequate  Friction and Shear: 2-->potential problem  Sree Score: 14  Is your sree score 12 or less? no      Restraints:   Restraint Order  Length of Order: Order good for next 24 hours or when removed.  Date that the current order will : 24  Time that the current order will : 1030  Order Upon Application: Yes    [facility]

## 2024-11-11 NOTE — PROGRESS NOTES
[facility] - Neuro Critical Care  Vascular Neurology  Comprehensive Stroke Center  Progress Note    Assessment/Plan:     * Acute arterial ischemic stroke, multifocal, posterior circulation  Malathi Mcpherson is a 49 y.o. female with PMH of drug use, seizure, HTN, HLD that is admitted to [facility] for further evaluation and management of multifocal acute infarcts. She initially presented to [facility] ED 11/6 after being found lying face down at home that morning, per EMS was lethargic and aphasic and moving LUE/LLE/RLE but was not moving RUE. EMS also reported that they had seen her before in context of seizures. LKW 10pm 11/5. In ED was noted to have exam worsening, now with R sided plegia and global aphasia. NIHSS 22. Also noted to have O2 desat in 70s and ultimately was intubated for airway protection. CTA showed multifocal areas of hypodensity involving the posterior circulation suspicious for infarcts as well as focal occlusion of R vert and L PCA. Not candidate for acute stroke interventions, no TNK due to OOW and no IR due to stroke burden/risk for bleeding. She was admitted to [facility] for higher level of care. NSGY consulted on admit for edema/hemicrani watch, however no surgical interventions recommended. MRI brain confirmed multifocal areas of acute infarct involving: L>R occipital lobes, L medial temporal lobe, bilateral thalami, L hippocampus and R hippocampus tail, R dorsal medulla, bilateral cerebellar hemispheres. TTE unremarkable. Suspect etiology of stroke likely secondary to substance use, Utox + cocaine.    NAEON, remains on precedex for agitation. Extubated yesterday (11/10), tolerating extubation thus far without complication. Moving extremities spontaneously although weakness noted in RUE>>RLE. Remains globally aphasic and agitated despite max precedex infusion. Respiratory cultures + H flu, on rocephin for abx. Also on bactrim for SSTI. Home lithium restarted today, continue seoquel and  fluoxetine.    Antithrombotics for secondary stroke prevention: Antiplatelets: Aspirin: 81 mg daily  Clopidogrel: 75 mg daily.     Statins for secondary stroke prevention and hyperlipidemia, if present: Statins: Atorvastatin- 80 mg daily    Aggressive risk factor modification: HTN, HLD, Substance use.     Rehab efforts: The patient has been evaluated by a stroke team provider and the therapy needs have been fully considered based off the presenting complaints and exam findings. The following therapy evaluations are needed: PT evaluate and treat, OT evaluate and treat, SLP evaluate and treat    Diagnostics ordered/pending: None     VTE prophylaxis: Heparin 5000 units SQ every 8 hours  Mechanical prophylaxis: Place SCDs    BP parameters: Infarct: SBP goal <180          Debility  -Due to stroke  -Aggressive therapy  -PT/OT/SLP eval and treat    Polysubstance abuse  -Utox + cocaine  -Suspect etiology of stroke likely due to substance use  -Continue thiamine/folate/multivitamin supplementation    Cytotoxic cerebral edema  -Area of cerebral edema identified when reviewing brain imaging involving bilateral posterior circulation territories due to acute infarcts, there is mild mass effect associated with it that has remained stable on follow up imaging.  -Admitted to [facility] for close neuro monitoring and observation  -NSGY consulted on admit, no surgical intervention recommended and NSGY s/o  -Continue frequent q1hr neuro checks as any acute changes or worsening neuro status may signify expansion of the insult and/or area of the edema, which may require acute interventions to prevent loss of function and/or death.  -Obtain stat CTH for any new or worsening neuro changes and notify primary team immediately    Anxiety and depression  -Continue home fluoxetine  -Continue seroquel 100mg qHS  -Home lithium held on admit due to elevated serum lithium level, resumed 11/11         Hospital Course:  11/07/2024 Pt was agitated overnight  and is on fentanyl. Currently intubated.On repeated painful stimuli has movements on LUE,LLE, RLE> RUE.The movement on right side is an improvement from yesterday.  11/11/2024 KIARAEON, remains on precedex for agitation. Extubated yesterday (11/10), tolerating extubation thus far without complication. Moving extremities spontaneously although weakness noted in RUE>>RLE. Remains globally aphasic and agitated despite max precedex infusion. Respiratory cultures + H flu, on rocephin for abx. Also on bactrim for SSTI. Home lithium restarted today, continue seoquel and fluoxetine.    STROKE DOCUMENTATION   Acute Stroke Times   Last Known Normal Date: 11/05/24  Last Known Normal Time: 2200  Symptom Onset Date: 11/06/24 (unsure of timing)  Symptom Onset Time:  (sometime this morning.)  Unknown Symptom Onset Time: Unknown Time  Stroke Team Called Date: 11/06/24  Stroke Team Called Time: 0843  Stroke Team Arrival Date: 11/06/24  Stroke Team Arrival Time: 0845  CT Interpretation Time: 0859  Thrombolytic Therapy Recommended: No  CTA Interpretation Time: 0902  Thrombectomy Recommended: No    NIH Scale:  1a. Level of Consciousness: 1-->Not alert, but arousable by minor stimulation to obey, answer, or respond  1b. LOC Questions: 2-->Answers neither question correctly  1c. LOC Commands: 2-->Performs neither task correctly  2. Best Gaze: 0-->Normal  3. Visual: 3-->Bilateral hemianopia (blind including cortical blindness)  4. Facial Palsy: 1-->Minor paralysis (flattened nasolabial fold, asymmetry on smiling)  5a. Motor Arm, Left: 0-->No drift, limb holds 90 (or 45) degrees for full 10 secs  5b. Motor Arm, Right: 2-->Some effort against gravity, limb cannot get to or maintain (if cued) 90 (or 45) degrees, drifts down to bed, but has some effort against gravity  6a. Motor Leg, Left: 0-->No drift, leg holds 30 degree position for full 5 secs  6b. Motor Leg, Right: 1-->Drift, leg falls by the end of the 5-sec period but does not hit bed  7.  Limb Ataxia: 0-->Absent  8. Sensory: 0-->Normal, no sensory loss  9. Best Language: 3-->Mute, global aphasia, no usable speech or auditory comprehension  10. Dysarthria: 2-->Severe dysarthria, patients speech is so slurred as to be unintelligible in the absence of or out of proportion to any dysphasia, or is mute/anarthric  11. Extinction and Inattention (formerly Neglect): 0-->No abnormality  Total (NIH Stroke Scale): 17       Modified Yonny Score: 0  Shailesh Coma Scale:    ABCD2 Score:    [id number] Score:   HAS -BLED Score:   ICH Score:   Hunt & Argueta Classification:      Hemorrhagic change of an Ischemic Stroke: Does this patient have an ischemic stroke with hemorrhagic changes? No     Neurologic Chief Complaint: Multifocal acute infarcts    Subjective:     Interval History: Patient is seen for follow-up neurological assessment and treatment recommendations: See hospital course    HPI, Past Medical, Family, and Social History remains the same as documented in the initial encounter.     Review of Systems   Unable to perform ROS: Other (severe aphasia)     Scheduled Meds:   acetaminophen  650 mg Per NG tube Q6H    [START ON 11/12/2024] aspirin  81 mg Per NG tube Daily    [START ON 11/12/2024] atorvastatin  80 mg Per NG tube Daily    cefTRIAXone (Rocephin) IV (PEDS and ADULTS)  2 g Intravenous Q24H    [START ON 11/12/2024] clopidogreL  75 mg Per NG tube Daily    [START ON 11/12/2024] FLUoxetine  60 mg Per NG tube Daily    [START ON 11/12/2024] folic acid  1 mg Per NG tube Daily    heparin (porcine)  5,000 Units Subcutaneous Q8H    LIDOcaine  1 patch Transdermal Q24H    lithium citrate 8 meq/5 ml  150 mg Per NG tube TID    [START ON 11/12/2024] multivitamin  1 tablet Per NG tube Daily    QUEtiapine  100 mg Per NG tube QHS    [START ON 11/12/2024] senna-docusate 8.6-50 mg  1 tablet Per NG tube Daily    sodium chloride 3%  4 mL Nebulization Q6H    [START ON 11/12/2024] thiamine  100 mg Per NG tube Daily    [START ON  11/12/2024] vancomycin (VANCOCIN) IV (PEDS and ADULTS)  1,000 mg Intravenous Q12H    vancomycin (VANCOCIN) IV (PEDS and ADULTS)  20 mg/kg Intravenous Once     Continuous Infusions:   0.9% NaCl   Intravenous Continuous 75 mL/hr at 11/11/24 1401 Rate Verify at 11/11/24 1401    dexmedeTOMIDine (Precedex) infusion (titrating)  0-1.4 mcg/kg/hr Intravenous Continuous 20.65 mL/hr at 11/11/24 1404 1.4 mcg/kg/hr at 11/11/24 1404     PRN Meds:  Current Facility-Administered Medications:     albuterol-ipratropium, 3 mL, Nebulization, Q6H PRN    bisacodyL, 10 mg, Rectal, Daily PRN    hydrOXYzine, 10 mg, Per NG tube, Q4H PRN    magnesium oxide, 800 mg, Per NG tube, PRN    magnesium oxide, 800 mg, Per NG tube, PRN    midazolam, 2 mg, Intravenous, Once PRN    polyethylene glycol, 17 g, Per NG tube, Daily PRN    potassium bicarbonate, 35 mEq, Per NG tube, PRN    potassium bicarbonate, 50 mEq, Per NG tube, PRN    potassium bicarbonate, 60 mEq, Per NG tube, PRN    potassium, sodium phosphates, 2 packet, Per NG tube, PRN    potassium, sodium phosphates, 2 packet, Per NG tube, PRN    potassium, sodium phosphates, 2 packet, Per NG tube, PRN    sodium chloride 0.9%, 10 mL, Intravenous, PRN    Pharmacy to dose Vancomycin consult, , , Once **AND** vancomycin - pharmacy to dose, , Intravenous, pharmacy to manage frequency    Objective:     Vital Signs (Most Recent):  Temp: (!) 100.4 °F (38 °C) (no enteral route. pending NGT) (11/11/24 1101)  Pulse: 84 (11/11/24 1406)  Resp: (!) 39 (11/11/24 1406)  BP: (!) 165/87 (11/11/24 1406)  SpO2: (!) 92 % (11/11/24 1406)  BP Location: Right leg    Vital Signs Range (Last 24H):  Temp:  [99.2 °F (37.3 °C)-100.6 °F (38.1 °C)]   Pulse:  []   Resp:  [14-55]   BP: ()/()   SpO2:  [89 %-97 %]   BP Location: Right leg       Physical Exam  Vitals and nursing note reviewed.   Constitutional:       Appearance: She is ill-appearing.   HENT:      Head: Normocephalic.      Mouth/Throat:      Mouth:  Mucous membranes are moist.   Eyes:      Extraocular Movements: Extraocular movements intact.      Conjunctiva/sclera: Conjunctivae normal.      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   Pulmonary:      Effort: Pulmonary effort is normal.   Skin:     General: Skin is warm.   Neurological:      Mental Status: She is alert.      Comments: See neuro exam below  Does not consistently follow commands, moves all extremities spontaneously although appears weaker in RUE compared to remaining extremities, severely restless and agitated despite precedex   Psychiatric:         Attention and Perception: She is inattentive.         Behavior: Behavior is agitated.              Neurological Exam:   LOC: drowsy  Attention Span: poor  Language: Global aphasia  Articulation: Untestable due to severe aphasia   Orientation: Untestable due to severe aphasia   Visual Fields: cortical blindness  EOM (CN III, IV, VI): Full/intact  Facial Movement (CN VII): Lower facial weakness on the Right  Motor: Arm left  Normal 5/5  Leg left  Normal 5/5  Arm right  Paresis: 2/5  Leg right Paresis: 4/5  Sensation: intact to noxious stimuli    Laboratory:  CMP:   Recent Labs   Lab 11/11/24  0042   CALCIUM 8.9   ALBUMIN 2.5*   PROT 6.3      K 3.6   CO2 19*      BUN 8   CREATININE 0.6   ALKPHOS 69   ALT 32   AST 42*   BILITOT 0.4     CBC:   Recent Labs   Lab 11/11/24  0042   WBC 8.49   RBC 3.60*   HGB 10.6*   HCT 34.2*      MCV 95   MCH 29.4   MCHC 31.0*     Lipid Panel:   Recent Labs   Lab 11/06/24  0854   CHOL 239*   LDLCALC 178.2*   HDL 42   TRIG 94     Coagulation:   Recent Labs   Lab 11/06/24  0854   INR 1.0     Hgb A1C:   Recent Labs   Lab 11/06/24  1412   HGBA1C 4.9     TSH:   Recent Labs   Lab 11/06/24  0854   TSH 0.525       Diagnostic Results     Brain Imaging   CTH without contrast 11/9/2024  Impression:  Expected evolutionary changes of the posterior  circulation infarcts with no overt hemorrhagic conversion or significant midline shift/herniation.     CTH without contrast 11/6/2024  Impression:  Multifocal posterior circulation distribution infarcts as further discussed above, appears similar to the recent MRI performed earlier on the same date.     MRI brain without contrast 11/6/2024  Impression:  Multiple posterior circulation infarcts are identified with involvement of the left greater than right occipital lobes and medial left temporal lobe, thalamus, right dorsal medulla, and minimally involving the cerebellum.  No midline shift with minimal mass effect on the 3rd ventricle.  Loss of flow void within the right intracranial vertebral artery in keeping with the findings on CTA.        Vessel Imaging   CTA stroke multiphase 11/6/2024  Impression:  Multifocal acute posterior circulation distribution infarcts as further discussed above, particularly involving essentially the entirety of the left PCA territory.  Modest mass effect without significant hemorrhage.  Focal thrombus with severe stenosis extending from the right subclavian artery into the proximal right vertebral artery.  Focal occlusion of the proximal intracranial segment of the right vertebral artery.  Focal occlusion of the proximal left PCA.  Moderate focal stenosis at the left vertebral artery origin.  Anterior circulation appears unremarkable.  No evidence of fracture or traumatic malalignment in the cervical spine.  Emphysematous changes and patchy ground-glass opacity in the partially visualized lung apices.        Cardiac Imaging   TTE 11/7/2024    Left Ventricle: The left ventricle is normal in size. Normal wall thickness. There is normal systolic function with a visually estimated ejection fraction of 55 - 60%. Biplane (2D) method of discs ejection fraction is 56%. Global longitudinal strain is -18.0%. There is normal diastolic function.    Right Ventricle: Normal right ventricular cavity  size. Wall thickness is normal. Systolic function is normal.    Tricuspid Valve: There is mild regurgitation.    Pulmonary Artery: The estimated pulmonary artery systolic pressure is at least 28 mmHg.    IVC/SVC: Patient is ventilated, cannot use IVC diameter to estimate right atrial pressure.    Sarah De Jesus PA-C  [facility] Stroke Redig  Department of Vascular Neurology   [facility] - Neuro Critical Care

## 2024-11-11 NOTE — PROGRESS NOTES
Pharmacokinetic Initial Assessment: IV Vancomycin    Assessment/Plan:    - Initiate intravenous vancomycin with loading dose of 1250 mg once  - Maintenance dose 1000 mg Q12H  - Goal trough 15-20 mcg/mL  - Draw trough prior to fourth dose on 11/13 at 01:00; earlier if clinically indicated    Pharmacy will continue to follow and monitor vancomycin.    Please contact pharmacy at [email or web address] [phone] with any questions regarding this assessment.     Thank you for the consult,   Teetee Millard, PharmD, [facility]  Neurocritical Care Pharmacist  [phone]         Patient brief summary:  Malathi Mcpherson is a 49 y.o. female initiated on antimicrobial therapy with IV Vancomycin for treatment of suspected lower respiratory infection    Drug Allergies:   Review of patient's allergies indicates:  No Known Allergies    Actual Body Weight:   59 kg    Renal Function:   Estimated Creatinine Clearance: 105.6 mL/min (based on SCr of 0.6 mg/dL).,     Dialysis Method (if applicable):  N/A    CBC (last 72 hours):  Recent Labs   Lab Result Units 11/09/24  0056 11/10/24  0034 11/11/24 0042   WBC K/uL 7.74 6.40 8.49   Hemoglobin g/dL 11.5* 10.3* 10.6*   Hematocrit % 37.7 33.4* 34.2*   Platelets K/uL 208 210 195   Gran % % 75.6* 67.9 82.7*   Lymph % % 14.5* 18.4 9.8*   Mono % % 8.9 10.0 6.0   Eosinophil % % 0.3 2.7 0.4   Basophil % % 0.4 0.5 0.4   Differential Method  Automated Automated Automated       Metabolic Panel (last 72 hours):  Recent Labs   Lab Result Units 11/09/24 0056 11/10/24  0034 11/11/24  0042   Sodium mmol/L 142 144 141   Potassium mmol/L 4.3 4.0 3.6   Chloride mmol/L 109 113* 108   CO2 mmol/L 24 21* 19*   Glucose mg/dL 124* 117* 109   BUN mg/dL 15 15 8   Creatinine mg/dL 0.7 0.6 0.6   Albumin g/dL 3.0* 2.5* 2.5*   Total Bilirubin mg/dL 0.3 0.2 0.4   Alkaline Phosphatase U/L 79 70 69   AST U/L 49* 35 42*   ALT U/L 35 31 32   Magnesium mg/dL 2.0 2.0 1.7   Phosphorus mg/dL 2.4* 2.9 3.4       Drug levels (last 3 results):  No results  "for input(s): "VANCOMYCINRA", "VANCORANDOM", "VANCOMYCINPE", "VANCOPEAK", "VANCOMYCINTR", "VANCOTROUGH" in the last 72 hours.    Microbiologic Results:  Microbiology Results (last 7 days)       Procedure Component Value Units Date/Time    MRSA Screen by PCR [[id number]]  (Abnormal) Collected: 11/11/24 1105    Order Status: Completed Specimen: Nasal Swab Updated: 11/11/24 1412     MRSA SCREEN BY PCR Detected    Gonococcus culture [[id number]] Collected: 11/08/24 1701    Order Status: Completed Specimen: GC Source from Vagina Updated: 11/11/24 1033     GC Culture Only No Neisseria gonorrhoeae isolated    Narrative:      Release to patient->Immediate    Culture, Respiratory with Gram Stain [[id number]]  (Abnormal) Collected: 11/09/24 1223    Order Status: Completed Specimen: Respiratory from Endotracheal Aspirate Updated: 11/11/24 1004     Respiratory Culture No Pseudomonas isolated.      HAEMOPHILUS INFLUENZAE  Many  Beta Lactamase positive        STAPHYLOCOCCUS AUREUS  Moderate  Susceptibility pending       Gram Stain (Respiratory) <10 epithelial cells per low power field.     Gram Stain (Respiratory) Few WBC's     Gram Stain (Respiratory) Rare Gram positive cocci     Gram Stain (Respiratory) Rare Gram negative rods            "

## 2024-11-11 NOTE — ASSESSMENT & PLAN NOTE
49F with history of polysubstance abuse presents after being found down at home. CTA revealed multifocal infarctions with a R vertebral artery occlusion and a L PCA occlusion. Patient was OOW for TNK and stroke burden was felt to be too large to pursue IR intervention. MRI reveals multifocal posterior circulation infarctions involving bilateral occipital lobes, medial L temporal lobe, bilateral thalami, R dorsal medulla, and the cerebellum. Given the location and extent of her strokes, she is at risk of impaired consciousness and total vision loss. Risk of herniation is felt to be lower given the moderate extent of infarcted tissue and modest infratentorial infarctions. Improving exam with some purposeful movements. Off sedation, patient is following commands appropriately. Extubated to NC and weaning oxygen. Hospital course complicated by H.flu/staph aureus pneumonia.     Antithrombotics for secondary stroke prevention: Antiplatelets: Aspirin: 81 mg daily  Clopidogrel: 75 mg daily    Statins for secondary stroke prevention and hyperlipidemia, if present:   Statins: Atorvastatin- 80 mg daily    Aggressive risk factor modification: HTN, HLD     Rehab efforts: The patient has been evaluated by a stroke team provider and the therapy needs have been fully considered based off the presenting complaints and exam findings. The following therapy evaluations are needed: PT evaluate and treat, OT evaluate and treat, SLP evaluate and treat, PM&R evaluate for appropriate placement    Diagnostics ordered/pending: None     VTE prophylaxis: Mechanical prophylaxis: Place SCDs    BP parameters: Infarct: No intervention, SBP <220    -q1h neuro checks  -BMP QD  -A1c, Lipid panel, TSH  -ASA 81 QD  -Plavix 75mg QD  -SBP<180  -Na>140  -NSGY and vascular neurology consulted, appreciate recs  -Repeat CT Head on 11/9/24: Expected evolutionary changes of the posterior circulation infarcts with no overt hemorrhagic conversion or significant  midline shift/herniation.   -PT/OT/SLP to evaluate and treat

## 2024-11-11 NOTE — ASSESSMENT & PLAN NOTE
-Area of cerebral edema identified when reviewing brain imaging involving bilateral posterior circulation territories due to acute infarcts, there is mild mass effect associated with it that has remained stable on follow up imaging.  -Admitted to [facility] for close neuro monitoring and observation  -NSGY consulted on admit, no surgical intervention recommended and NSGY s/o  -Continue frequent q1hr neuro checks as any acute changes or worsening neuro status may signify expansion of the insult and/or area of the edema, which may require acute interventions to prevent loss of function and/or death.  -Obtain stat CTH for any new or worsening neuro changes and notify primary team immediately

## 2024-11-11 NOTE — PLAN OF CARE
Reassessment completed  Problem: Occupational Therapy  Goal: Occupational Therapy Goal  Description: Goals set 11/11 with an expiration date, 12/5:  Patient will increase functional independence with ADLs by performing:    Patient will demonstrate rolling to the right with min assist.  Not met   Patient will demonstrate rolling to the left with min assist.   Not met  Patient will demonstrate supine -sit with min assist.   Not met  Patient will demonstrate squat pivot transfers with min assist.   Not met  Patient will demonstrate grooming while seated with min assist.   Not met  Patient will demonstrate upper body dressing with min assist while seated EOB.   Not met  Patient will demonstrate lower body dressing with mod assist while seated EOB.   Not met  Patient will demonstrate toileting with mod assist.   Not met  Patient's family / caregiver will demonstrate independence and safety with assisting patient with self-care skills and functional mobility.     Not met  Patient's family / caregiver will demonstrate independence with providing ROM and changes in bed positioning.   Not met      11/11/2024 0609 by Radha Acosta LOTR  Outcome: Progressing

## 2024-11-11 NOTE — PLAN OF CARE
[facility] - Neuro Critical Care  Discharge Reassessment    Primary Care Provider: Adrián Hahn MD    Expected Discharge Date: 11/12/2024    Reassessment (most recent)       Discharge Reassessment - 11/11/24 1255          Discharge Reassessment    Assessment Type Discharge Planning Reassessment     Did the patient's condition or plan change since previous assessment? No     Discharge Plan discussed with: Parent(s)     Name(s) and Number(s) [place] mix      Communicated LOUIE with patient/caregiver Yes     Discharge Plan A Home with family     Discharge Plan B Home     DME Needed Upon Discharge  commode (P)      Transition of Care Barriers None (P)      Why the patient remains in the hospital Requires continued medical care (P)         Post-Acute Status    Discharge Delays None known at this time (P)                    SW met with Pt mother at bedside.    Pt is not medically ready to discharge.  Pt mother would like to know how she can apply for disability.     SW will provide the disability number to the pt mom.       Discharge Plan A and Plan B have been determined by review of patient's clinical status, future medical and therapeutic needs, and coverage/benefits for post-acute care in coordination with multidisciplinary team members.    Bobbi Finch MSW, LCSW  [facility]  Case Management Dept.

## 2024-11-11 NOTE — PT/OT/SLP PROGRESS
Physical Therapy      Patient Name:  Malathi Mcpherson   MRN:  [id number]    Patient not seen today secondary to RN hold upon PT attempt at 1141 as pt with continued agitation and currently maxed on precedex. Will follow up as appropriate for PT evaluation.    11/11/2024

## 2024-11-11 NOTE — PROGRESS NOTES
[facility] - Neuro Critical Care  Neurocritical Care  Progress Note    Admit Date: 11/6/2024  Service Date: 11/11/2024  Length of Stay: 5    Subjective:     Chief Complaint: Acute arterial ischemic stroke, multifocal, posterior circulation    History of Present Illness: 49 year old with a hx of drug use, seizure, HTN, HLD  who came to [facility] [place], [place] ED and was stroke activated on 11/6/24. Pt was found down by her mother this morning at an unknown time lying face down. Per EMS, she was very lethargic and aphasic and was able to move her left sided extremities and R leg but was not moving her RUE at all; pt known to EMS due to history of seizure. Pt's exam worsened on exam with no movement in her right sided extremities and desaturated to 70s and required intubation. She was also noted to have an abrasion/contusion to the right temporal area Her last known normal states was at 10 pm yesterday -- did not receive TNK due to being out of the window and strokes noted in her CT. She was admitted to [facility] for further care. Pt's case has been reviewed by Neuro IR -- not candidate for intervention due to the extensive nature of infarcts and the risk of bleeding.    Per pt's family, patient's drugs of choice are crack, heroine and ETOH. Pt's mother reports that the patient has been regularly reporting to drug court of the past several weeks and has passed all of her drug tests during this period of time -- her last drug test was two weeks ago. Pt's family also reports that the patient had been acting strange over the past few days -- she has been sleeping more and displayed erratic behavior that leads them to believe that patient may have relapsed. Of note, a crack pipe was found on the patient's person on presentation to [facility] [place], [place].    Hospital Course: 11/07/2024 Agitated overnight, requiring uptitration of fentanyl and addition of precedex. Weaning throughout the day. Neuro exam may be slightly improved from yesterday even despite sedation with  more R sided movement. However, no purposeful movement or localization of noxious stimuli. MRI yesterday with multifocal infarcts including the cerebellum, bilateral occipital lobes, and bilateral thalami (R>L). CK found to be elevated overnight, fluids running and trending levels.   11/08/2024 More alert and developing some purposeful movement by reaching towards ETT, brushing hair behind ear, and localizing to LLE noxious stimuli with LUE. CK downtrending with fluids. Monitoring decreased UOP with stable BUN/Cr. Weaning fentanyl and initiated precedex. Plan to DC fluid tomorrow if CK continues to downtrend and remove fluid. Started tube feeds today. Lithium level now low, started half-dose home lithium.  11/10/2024 NAEON. Patient following commands this morning. Extubated to NC. Patient tolerated extubation well. Remains on precedex gtt for agitation, requiring PRNs. Resp culture positive for H flu. Started rocephin.  11/11/2024 Agitated overnight requiring uptitration of precedex and multiple PRN administrations. Reintitation of lithium, adding measures for withdrawal management. Respiratory culture also growing staph aureus; MRSA screen positive. Vanc started.    Interval History:  See hospital course    Review of Systems   Unable to perform ROS: Patient nonverbal     Unable to obtain a complete ROS due to level of consciousness.    Objective:     Vitals:  Temp: (!) 100.4 °F (38 °C) (no enteral route. pending NGT)  Pulse: 84  Rhythm: normal sinus rhythm  BP: (!) 165/87  MAP (mmHg): 108  Resp: (!) 39  SpO2: (!) 92 %    Temp  Min: 99.2 °F (37.3 °C)  Max: 100.6 °F (38.1 °C)  Pulse  Min: 65  Max: 131  BP  Min: 96/54  Max: 188/90  MAP (mmHg)  Min: 71  Max: 138  Resp  Min: 14  Max: 55  SpO2  Min: 89 %  Max: 97 %    11/10 0701 - 11/11 0700  In: 2121 [I.V.:1971]  Out: 1100 [Urine:1100]   Unmeasured Output  Urine Occurrence: 1  Stool Occurrence: 1  Pad Count: 1        Physical Exam  Vitals and nursing note reviewed.    Constitutional:       General: She is not in acute distress.     Comments: Sedated   HENT:      Head: Normocephalic and atraumatic.   Cardiovascular:      Rate and Rhythm: Normal rate and regular rhythm.      Heart sounds: Normal heart sounds.   Pulmonary:      Effort: No respiratory distress.      Breath sounds: Normal breath sounds.   Abdominal:      General: Abdomen is flat.      Palpations: Abdomen is soft.   Musculoskeletal:      Right lower leg: No edema.      Left lower leg: No edema.   Skin:     General: Skin is warm and dry.   Neurological:      GCS: GCS eye subscore is 3. GCS motor subscore is 4.      Comments: Sedated on precedex gtt  Intermittently following commands  +L corneal, no R corneal  +cough  +gag  +OCR  PERRL    Motor:  Spontaneous antigravity movement throughout, less in the RUE  Withdrawal to noxious stimuli throughout         Unable to test orientation, language, memory, judgment, insight, fund of knowledge, hearing, shoulder shrug, tongue protrusion, coordination, gait due to level of consciousness.       Medications:  Continuous0.9% NaCl, Last Rate: 75 mL/hr at 11/11/24 1401  dexmedeTOMIDine (Precedex) infusion (titrating), Last Rate: 1.3 mcg/kg/hr (11/11/24 1502)    Scheduledacetaminophen, 650 mg, Q6H  [START ON 11/12/2024] aspirin, 81 mg, Daily  [START ON 11/12/2024] atorvastatin, 80 mg, Daily  cefTRIAXone (Rocephin) IV (PEDS and ADULTS), 2 g, Q24H  [START ON 11/12/2024] clopidogreL, 75 mg, Daily  [START ON 11/12/2024] FLUoxetine, 60 mg, Daily  [START ON 11/12/2024] folic acid, 1 mg, Daily  heparin (porcine), 5,000 Units, Q8H  LIDOcaine, 1 patch, Q24H  lithium citrate 8 meq/5 ml, 150 mg, TID  [START ON 11/12/2024] multivitamin, 1 tablet, Daily  QUEtiapine, 100 mg, QHS  [START ON 11/12/2024] senna-docusate 8.6-50 mg, 1 tablet, Daily  sodium chloride 3%, 4 mL, Q6H  [START ON 11/12/2024] thiamine, 100 mg, Daily  [START ON 11/12/2024] vancomycin (VANCOCIN) IV (PEDS and ADULTS), 1,000 mg,  Q12H  vancomycin (VANCOCIN) IV (PEDS and ADULTS), 20 mg/kg, Once    PRNalbuterol-ipratropium, 3 mL, Q6H PRN  bisacodyL, 10 mg, Daily PRN  hydrOXYzine, 10 mg, Q4H PRN  magnesium oxide, 800 mg, PRN  magnesium oxide, 800 mg, PRN  midazolam, 2 mg, Once PRN  polyethylene glycol, 17 g, Daily PRN  potassium bicarbonate, 35 mEq, PRN  potassium bicarbonate, 50 mEq, PRN  potassium bicarbonate, 60 mEq, PRN  potassium, sodium phosphates, 2 packet, PRN  potassium, sodium phosphates, 2 packet, PRN  potassium, sodium phosphates, 2 packet, PRN  sodium chloride 0.9%, 10 mL, PRN  vancomycin - pharmacy to dose, , pharmacy to manage frequency      Today I personally reviewed pertinent medications, lines/drains/airways, imaging, cardiology results, laboratory results, microbiology results, notably:    Diet  Diet NPO  Diet NPO    Assessment/Plan:     Neuro  * Acute arterial ischemic stroke, multifocal, posterior circulation  49F with history of polysubstance abuse presents after being found down at home. CTA revealed multifocal infarctions with a R vertebral artery occlusion and a L PCA occlusion. Patient was OOW for TNK and stroke burden was felt to be too large to pursue IR intervention. MRI reveals multifocal posterior circulation infarctions involving bilateral occipital lobes, medial L temporal lobe, bilateral thalami, R dorsal medulla, and the cerebellum. Given the location and extent of her strokes, she is at risk of impaired consciousness and total vision loss. Risk of herniation is felt to be lower given the moderate extent of infarcted tissue and modest infratentorial infarctions. Improving exam with some purposeful movements. Off sedation, patient is following commands appropriately. Extubated to NC and weaning oxygen. Hospital course complicated by H.flu/staph aureus pneumonia.     Antithrombotics for secondary stroke prevention: Antiplatelets: Aspirin: 81 mg daily  Clopidogrel: 75 mg daily    Statins for secondary stroke  prevention and hyperlipidemia, if present:   Statins: Atorvastatin- 80 mg daily    Aggressive risk factor modification: HTN, HLD     Rehab efforts: The patient has been evaluated by a stroke team provider and the therapy needs have been fully considered based off the presenting complaints and exam findings. The following therapy evaluations are needed: PT evaluate and treat, OT evaluate and treat, SLP evaluate and treat, PM&R evaluate for appropriate placement    Diagnostics ordered/pending: None     VTE prophylaxis: Mechanical prophylaxis: Place SCDs    BP parameters: Infarct: No intervention, SBP <220    -q1h neuro checks  -BMP QD  -A1c, Lipid panel, TSH  -ASA 81 QD  -Plavix 75mg QD  -SBP<180  -Na>140  -NSGY and vascular neurology consulted, appreciate recs  -Repeat CT Head on 11/9/24: Expected evolutionary changes of the posterior circulation infarcts with no overt hemorrhagic conversion or significant midline shift/herniation.   -PT/OT/SLP to evaluate and treat      Cytotoxic cerebral edema  See primary problem    Psychiatric  Polysubstance abuse  Patient with long history of polysubstance abuse including crack cocaine, alcohol, and opiates  Patient was found with drug paraphernalia on person upon admission  Urine tox screen positive for cocaine    Anxiety and depression  History of depression and anxiety    - Continuing home fluoxetine 80mg QD  - Resumed Lithium 150 TID  - home seroquel 100mg qhs  - EKG qshift    Derm  Folliculitis  folliculitis noted on mons pubis  - dc'd bactrim given coverage with rocephin/vancomycin  - Wound care following     Pulmonary  Right upper lobe pneumonia  Patient recently extubated with persistent oxygen requirement  CXR remarkable for RUL consolidation  Respiratory culture positive for H flu and now staph aureus  MRSA screen by PCR positive  Sensitivities pending  Continue rocephin / Vancomycin  Wean oxygen as tolerated  Continuous pulse oximetry    On mechanically assisted  ventilation  RESOLVED  Due to stroke  Extubated 11/10  Pepcid ppx  3% nebs    Other  Lithium toxicity  RESOLVED  See anxiety and depression    Debility  Due to stroke  PT/OT/SLP to evaluate and treat when appropriate    HyperCKemia  CK elevated after being found down at home  Trended down to 723 with small uprend into 800s    - Continue NS @ 75cc/hr   - Will discontinue if patient develops oxygenation issues  - Trending CK daily              The patient is being Prophylaxed for:  Venous Thromboembolism with: Chemical  Stress Ulcer with: Not Applicable   Ventilator Pneumonia with: not applicable    Activity Orders            Elevate HOB Elevate (30-45 degrees) Elevate HOB to 30 - 45 degrees during feeding unless otherwise stated starting at 11/08 1236    Turn patient starting at 11/06 1400    Diet NPO: NPO starting at 11/06 1325          Full Code    Darius Echevarria MD  Neurocritical Care  [facility] - Neuro Critical Care

## 2024-11-11 NOTE — SUBJECTIVE & OBJECTIVE
Neurologic Chief Complaint: Multifocal acute infarcts    Subjective:     Interval History: Patient is seen for follow-up neurological assessment and treatment recommendations: See hospital course    HPI, Past Medical, Family, and Social History remains the same as documented in the initial encounter.     Review of Systems   Unable to perform ROS: Other (severe aphasia)     Scheduled Meds:   acetaminophen  650 mg Per NG tube Q6H    [START ON 11/12/2024] aspirin  81 mg Per NG tube Daily    [START ON 11/12/2024] atorvastatin  80 mg Per NG tube Daily    cefTRIAXone (Rocephin) IV (PEDS and ADULTS)  2 g Intravenous Q24H    [START ON 11/12/2024] clopidogreL  75 mg Per NG tube Daily    [START ON 11/12/2024] FLUoxetine  60 mg Per NG tube Daily    [START ON 11/12/2024] folic acid  1 mg Per NG tube Daily    heparin (porcine)  5,000 Units Subcutaneous Q8H    LIDOcaine  1 patch Transdermal Q24H    lithium citrate 8 meq/5 ml  150 mg Per NG tube TID    [START ON 11/12/2024] multivitamin  1 tablet Per NG tube Daily    QUEtiapine  100 mg Per NG tube QHS    [START ON 11/12/2024] senna-docusate 8.6-50 mg  1 tablet Per NG tube Daily    sodium chloride 3%  4 mL Nebulization Q6H    [START ON 11/12/2024] thiamine  100 mg Per NG tube Daily    [START ON 11/12/2024] vancomycin (VANCOCIN) IV (PEDS and ADULTS)  1,000 mg Intravenous Q12H    vancomycin (VANCOCIN) IV (PEDS and ADULTS)  20 mg/kg Intravenous Once     Continuous Infusions:   0.9% NaCl   Intravenous Continuous 75 mL/hr at 11/11/24 1401 Rate Verify at 11/11/24 1401    dexmedeTOMIDine (Precedex) infusion (titrating)  0-1.4 mcg/kg/hr Intravenous Continuous 20.65 mL/hr at 11/11/24 1404 1.4 mcg/kg/hr at 11/11/24 1404     PRN Meds:  Current Facility-Administered Medications:     albuterol-ipratropium, 3 mL, Nebulization, Q6H PRN    bisacodyL, 10 mg, Rectal, Daily PRN    hydrOXYzine, 10 mg, Per NG tube, Q4H PRN    magnesium oxide, 800 mg, Per NG tube, PRN    magnesium oxide, 800 mg, Per NG  tube, PRN    midazolam, 2 mg, Intravenous, Once PRN    polyethylene glycol, 17 g, Per NG tube, Daily PRN    potassium bicarbonate, 35 mEq, Per NG tube, PRN    potassium bicarbonate, 50 mEq, Per NG tube, PRN    potassium bicarbonate, 60 mEq, Per NG tube, PRN    potassium, sodium phosphates, 2 packet, Per NG tube, PRN    potassium, sodium phosphates, 2 packet, Per NG tube, PRN    potassium, sodium phosphates, 2 packet, Per NG tube, PRN    sodium chloride 0.9%, 10 mL, Intravenous, PRN    Pharmacy to dose Vancomycin consult, , , Once **AND** vancomycin - pharmacy to dose, , Intravenous, pharmacy to manage frequency    Objective:     Vital Signs (Most Recent):  Temp: (!) 100.4 °F (38 °C) (no enteral route. pending NGT) (11/11/24 1101)  Pulse: 84 (11/11/24 1406)  Resp: (!) 39 (11/11/24 1406)  BP: (!) 165/87 (11/11/24 1406)  SpO2: (!) 92 % (11/11/24 1406)  BP Location: Right leg    Vital Signs Range (Last 24H):  Temp:  [99.2 °F (37.3 °C)-100.6 °F (38.1 °C)]   Pulse:  []   Resp:  [14-55]   BP: ()/()   SpO2:  [89 %-97 %]   BP Location: Right leg       Physical Exam  Vitals and nursing note reviewed.   Constitutional:       Appearance: She is ill-appearing.   HENT:      Head: Normocephalic.      Mouth/Throat:      Mouth: Mucous membranes are moist.   Eyes:      Extraocular Movements: Extraocular movements intact.      Conjunctiva/sclera: Conjunctivae normal.      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   Pulmonary:      Effort: Pulmonary effort is normal.   Skin:     General: Skin is warm.   Neurological:      Mental Status: She is alert.      Comments: See neuro exam below  Does not consistently follow commands, moves all extremities spontaneously although appears weaker in RUE compared to remaining extremities, severely restless and agitated despite precedex   Psychiatric:         Attention and Perception: She is inattentive.          Behavior: Behavior is agitated.              Neurological Exam:   LOC: drowsy  Attention Span: poor  Language: Global aphasia  Articulation: Untestable due to severe aphasia   Orientation: Untestable due to severe aphasia   Visual Fields: cortical blindness  EOM (CN III, IV, VI): Full/intact  Facial Movement (CN VII): Lower facial weakness on the Right  Motor: Arm left  Normal 5/5  Leg left  Normal 5/5  Arm right  Paresis: 2/5  Leg right Paresis: 4/5  Sensation: intact to noxious stimuli    Laboratory:  CMP:   Recent Labs   Lab 11/11/24  0042   CALCIUM 8.9   ALBUMIN 2.5*   PROT 6.3      K 3.6   CO2 19*      BUN 8   CREATININE 0.6   ALKPHOS 69   ALT 32   AST 42*   BILITOT 0.4     CBC:   Recent Labs   Lab 11/11/24  0042   WBC 8.49   RBC 3.60*   HGB 10.6*   HCT 34.2*      MCV 95   MCH 29.4   MCHC 31.0*     Lipid Panel:   Recent Labs   Lab 11/06/24  0854   CHOL 239*   LDLCALC 178.2*   HDL 42   TRIG 94     Coagulation:   Recent Labs   Lab 11/06/24  0854   INR 1.0     Hgb A1C:   Recent Labs   Lab 11/06/24  1412   HGBA1C 4.9     TSH:   Recent Labs   Lab 11/06/24  0854   TSH 0.525       Diagnostic Results     Brain Imaging   CTH without contrast 11/9/2024  Impression:  Expected evolutionary changes of the posterior circulation infarcts with no overt hemorrhagic conversion or significant midline shift/herniation.     CTH without contrast 11/6/2024  Impression:  Multifocal posterior circulation distribution infarcts as further discussed above, appears similar to the recent MRI performed earlier on the same date.     MRI brain without contrast 11/6/2024  Impression:  Multiple posterior circulation infarcts are identified with involvement of the left greater than right occipital lobes and medial left temporal lobe, thalamus, right dorsal medulla, and minimally involving the cerebellum.  No midline shift with minimal mass effect on the 3rd ventricle.  Loss of flow void within the right intracranial vertebral  artery in keeping with the findings on CTA.        Vessel Imaging   CTA stroke multiphase 11/6/2024  Impression:  Multifocal acute posterior circulation distribution infarcts as further discussed above, particularly involving essentially the entirety of the left PCA territory.  Modest mass effect without significant hemorrhage.  Focal thrombus with severe stenosis extending from the right subclavian artery into the proximal right vertebral artery.  Focal occlusion of the proximal intracranial segment of the right vertebral artery.  Focal occlusion of the proximal left PCA.  Moderate focal stenosis at the left vertebral artery origin.  Anterior circulation appears unremarkable.  No evidence of fracture or traumatic malalignment in the cervical spine.  Emphysematous changes and patchy ground-glass opacity in the partially visualized lung apices.        Cardiac Imaging   TTE 11/7/2024    Left Ventricle: The left ventricle is normal in size. Normal wall thickness. There is normal systolic function with a visually estimated ejection fraction of 55 - 60%. Biplane (2D) method of discs ejection fraction is 56%. Global longitudinal strain is -18.0%. There is normal diastolic function.    Right Ventricle: Normal right ventricular cavity size. Wall thickness is normal. Systolic function is normal.    Tricuspid Valve: There is mild regurgitation.    Pulmonary Artery: The estimated pulmonary artery systolic pressure is at least 28 mmHg.    IVC/SVC: Patient is ventilated, cannot use IVC diameter to estimate right atrial pressure.

## 2024-11-11 NOTE — PT/OT/SLP PROGRESS
"Occupational Therapy   Treatment    Name: Malathi Mcpherson  MRN: [id number]  Admitting Diagnosis:  Acute arterial ischemic stroke, multifocal, posterior circulation       Recommendations:     Discharge Recommendations: High Intensity Therapy  Discharge Equipment Recommendations:  bedside commode  Barriers to discharge:  None    Assessment:     Malathi Mcpherson is a 49 y.o. female with a medical diagnosis of Acute arterial ischemic stroke, multifocal, posterior circulation.  She presents with performance deficits affecting function are weakness, impaired endurance, impaired self care skills, impaired functional mobility, decreased coordination, impaired cognition, decreased upper extremity function.     Rehab Prognosis:  Good; patient would benefit from acute skilled OT services to address these deficits and reach maximum level of function.       Plan:     Patient to be seen 4 x/week to address the above listed problems via self-care/home management, therapeutic activities, therapeutic exercises, neuromuscular re-education, cognitive retraining  Plan of Care Expires: 12/05/24  Plan of Care Reviewed with: patient    Subjective     Patient: "Shavon"  Pain/Comfort:  Pain Rating 1: 0/10  Pain Rating Post-Intervention 1: 0/10    Objective:     Communicated with: Nurse prior to session.  Patient found supine with restraints, SCD, telemetry, peripheral IV, bed alarm, blood pressure cuff upon OT entry to room.    General Precautions: Standard, aspiration, fall    Orthopedic Precautions:N/A  Braces: N/A  Respiratory Status: Nasal cannula, flow 6 L/min     Occupational Performance:     Bed Mobility:    Patient completed Rolling/Turning to Left with max assistance  Patient completed Rolling/Turning to Right with moderate assistance   Deferred supine-sit 2* agitation    Functional Mobility/Transfers:  Dependent drawsheet transfers    Activities of Daily Living:  Grooming: total assistance while seated upright in bed    [facility] 6 " Click ADL: 6    Treatment & Education:  Daily orientation provided.  AAROM performed bilateral UE/LEs one set x 10 rep in all planes of motion with stretches provided at end range.  Patient opening eyes in response to auditory stimulation.  Intermittently following one step commands.  Responding with head nods.  Provided multi-sensory stimulation to prevent sensory deprivation and improve clinical outcomes.  Family not present during the session.    Patient left supine with all lines intact, call button in reach, bed alarm on, and restraints reapplied at end of session    GOALS:   Multidisciplinary Problems       Occupational Therapy Goals          Problem: Occupational Therapy    Goal Priority Disciplines Outcome Interventions   Occupational Therapy Goal     OT, PT/OT Progressing    Description: Goals set 11/11 with an expiration date, 12/5:  Patient will increase functional independence with ADLs by performing:    Patient will demonstrate rolling to the right with min assist.  Not met   Patient will demonstrate rolling to the left with min assist.   Not met  Patient will demonstrate supine -sit with min assist.   Not met  Patient will demonstrate squat pivot transfers with min assist.   Not met  Patient will demonstrate grooming while seated with min assist.   Not met  Patient will demonstrate upper body dressing with min assist while seated EOB.   Not met  Patient will demonstrate lower body dressing with mod assist while seated EOB.   Not met  Patient will demonstrate toileting with mod assist.   Not met  Patient's family / caregiver will demonstrate independence and safety with assisting patient with self-care skills and functional mobility.     Not met  Patient's family / caregiver will demonstrate independence with providing ROM and changes in bed positioning.   Not met                                 Time Tracking:     OT Date of Treatment: 11/11/24  OT Start Time: 0546  OT Stop Time: 0601  OT Total Time  (min): 15 min    Billable Minutes:Re-eval 5  Neuromuscular Re-education 10    OT/MADHURI: OT          11/11/2024

## 2024-11-11 NOTE — NURSING
No enteral route available. Patient unable to complete Preston evaluation. Will discuss in rounds for NGT placement.

## 2024-11-11 NOTE — ASSESSMENT & PLAN NOTE
History of depression and anxiety    - Continuing home fluoxetine 80mg QD  - Resumed Lithium 150 TID  - home seroquel 100mg qhs  - EKG qshift

## 2024-11-11 NOTE — PLAN OF CARE
Problem: SLP  Goal: SLP Goal  Description: Speech Language Pathology Goals  Goals expected to be met by 11/25    1. Pt will participate in ongoing swallow assessment to determine leats restrictive PO diet when appropriate.   2. Pt will complete cognitive-linguistic assessment to determine additional therapeutic needs.       Outcome: Progressing       Bedside swallow evaluation attempted. Pt with minimal levels of wakefulness to max stimuli, poor commands following and restlessness. Pt not appropriate for PO intake at this time. SLP will follow up at a later date/time when awake and participatory.

## 2024-11-11 NOTE — ASSESSMENT & PLAN NOTE
Patient recently extubated with persistent oxygen requirement  CXR remarkable for RUL consolidation  Respiratory culture positive for H flu and now staph aureus  MRSA screen by PCR positive  Sensitivities pending  Continue rocephin / Vancomycin  Wean oxygen as tolerated  Continuous pulse oximetry

## 2024-11-11 NOTE — ASSESSMENT & PLAN NOTE
-Continue home fluoxetine  -Continue seroquel 100mg qHS  -Home lithium held on admit due to elevated serum lithium level, resumed 11/11

## 2024-11-11 NOTE — ASSESSMENT & PLAN NOTE
folliculitis noted on mons pubis  - dc'd bactrim given coverage with rocephin/vancomycin  - Wound care following

## 2024-11-11 NOTE — ASSESSMENT & PLAN NOTE
CK elevated after being found down at home  Trended down to 723 with small uprend into 800s    - Continue NS @ 75cc/hr   - Will discontinue if patient develops oxygenation issues  - Trending CK daily

## 2024-11-11 NOTE — ASSESSMENT & PLAN NOTE
Malathi Mcpherson is a 49 y.o. female with PMH of drug use, seizure, HTN, HLD that is admitted to [facility] for further evaluation and management of multifocal acute infarcts. She initially presented to [facility] ED 11/6 after being found lying face down at home that morning, per EMS was lethargic and aphasic and moving LUE/LLE/RLE but was not moving RUE. EMS also reported that they had seen her before in context of seizures. LKW 10pm 11/5. In ED was noted to have exam worsening, now with R sided plegia and global aphasia. NIHSS 22. Also noted to have O2 desat in 70s and ultimately was intubated for airway protection. CTA showed multifocal areas of hypodensity involving the posterior circulation suspicious for infarcts as well as focal occlusion of R vert and L PCA. Not candidate for acute stroke interventions, no TNK due to OOW and no IR due to stroke burden/risk for bleeding. She was admitted to [facility] for higher level of care. NSGY consulted on admit for edema/hemicrani watch, however no surgical interventions recommended. MRI brain confirmed multifocal areas of acute infarct involving: L>R occipital lobes, L medial temporal lobe, bilateral thalami, L hippocampus and R hippocampus tail, R dorsal medulla, bilateral cerebellar hemispheres. TTE unremarkable. Suspect etiology of stroke likely secondary to substance use, Utox + cocaine.    NAEON, remains on precedex for agitation. Extubated yesterday (11/10), tolerating extubation thus far without complication. Moving extremities spontaneously although weakness noted in RUE>>RLE. Remains globally aphasic and agitated despite max precedex infusion. Respiratory cultures + H flu, on rocephin for abx. Also on bactrim for SSTI. Home lithium restarted today, continue seoquel and fluoxetine.    Antithrombotics for secondary stroke prevention: Antiplatelets: Aspirin: 81 mg daily  Clopidogrel: 75 mg daily.     Statins for secondary stroke prevention and hyperlipidemia, if present: Statins:  Atorvastatin- 80 mg daily    Aggressive risk factor modification: HTN, HLD, Substance use.     Rehab efforts: The patient has been evaluated by a stroke team provider and the therapy needs have been fully considered based off the presenting complaints and exam findings. The following therapy evaluations are needed: PT evaluate and treat, OT evaluate and treat, SLP evaluate and treat    Diagnostics ordered/pending: None     VTE prophylaxis: Heparin 5000 units SQ every 8 hours  Mechanical prophylaxis: Place SCDs    BP parameters: Infarct: SBP goal <180

## 2024-11-11 NOTE — SUBJECTIVE & OBJECTIVE
Interval History:  See hospital course    Review of Systems   Unable to perform ROS: Patient nonverbal     Unable to obtain a complete ROS due to level of consciousness.    Objective:     Vitals:  Temp: (!) 100.4 °F (38 °C) (no enteral route. pending NGT)  Pulse: 84  Rhythm: normal sinus rhythm  BP: (!) 165/87  MAP (mmHg): 108  Resp: (!) 39  SpO2: (!) 92 %    Temp  Min: 99.2 °F (37.3 °C)  Max: 100.6 °F (38.1 °C)  Pulse  Min: 65  Max: 131  BP  Min: 96/54  Max: 188/90  MAP (mmHg)  Min: 71  Max: 138  Resp  Min: 14  Max: 55  SpO2  Min: 89 %  Max: 97 %    11/10 0701 - 11/11 0700  In: 2121 [I.V.:1971]  Out: 1100 [Urine:1100]   Unmeasured Output  Urine Occurrence: 1  Stool Occurrence: 1  Pad Count: 1        Physical Exam  Vitals and nursing note reviewed.   Constitutional:       General: She is not in acute distress.     Comments: Sedated   HENT:      Head: Normocephalic and atraumatic.   Cardiovascular:      Rate and Rhythm: Normal rate and regular rhythm.      Heart sounds: Normal heart sounds.   Pulmonary:      Effort: No respiratory distress.      Breath sounds: Normal breath sounds.   Abdominal:      General: Abdomen is flat.      Palpations: Abdomen is soft.   Musculoskeletal:      Right lower leg: No edema.      Left lower leg: No edema.   Skin:     General: Skin is warm and dry.   Neurological:      GCS: GCS eye subscore is 3. GCS motor subscore is 4.      Comments: Sedated on precedex gtt  Intermittently following commands  +L corneal, no R corneal  +cough  +gag  +OCR  PERRL    Motor:  Spontaneous antigravity movement throughout, less in the RUE  Withdrawal to noxious stimuli throughout         Unable to test orientation, language, memory, judgment, insight, fund of knowledge, hearing, shoulder shrug, tongue protrusion, coordination, gait due to level of consciousness.       Medications:  Continuous0.9% NaCl, Last Rate: 75 mL/hr at 11/11/24 1401  dexmedeTOMIDine (Precedex) infusion (titrating), Last Rate: 1.3  mcg/kg/hr (11/11/24 1502)    Scheduledacetaminophen, 650 mg, Q6H  [START ON 11/12/2024] aspirin, 81 mg, Daily  [START ON 11/12/2024] atorvastatin, 80 mg, Daily  cefTRIAXone (Rocephin) IV (PEDS and ADULTS), 2 g, Q24H  [START ON 11/12/2024] clopidogreL, 75 mg, Daily  [START ON 11/12/2024] FLUoxetine, 60 mg, Daily  [START ON 11/12/2024] folic acid, 1 mg, Daily  heparin (porcine), 5,000 Units, Q8H  LIDOcaine, 1 patch, Q24H  lithium citrate 8 meq/5 ml, 150 mg, TID  [START ON 11/12/2024] multivitamin, 1 tablet, Daily  QUEtiapine, 100 mg, QHS  [START ON 11/12/2024] senna-docusate 8.6-50 mg, 1 tablet, Daily  sodium chloride 3%, 4 mL, Q6H  [START ON 11/12/2024] thiamine, 100 mg, Daily  [START ON 11/12/2024] vancomycin (VANCOCIN) IV (PEDS and ADULTS), 1,000 mg, Q12H  vancomycin (VANCOCIN) IV (PEDS and ADULTS), 20 mg/kg, Once    PRNalbuterol-ipratropium, 3 mL, Q6H PRN  bisacodyL, 10 mg, Daily PRN  hydrOXYzine, 10 mg, Q4H PRN  magnesium oxide, 800 mg, PRN  magnesium oxide, 800 mg, PRN  midazolam, 2 mg, Once PRN  polyethylene glycol, 17 g, Daily PRN  potassium bicarbonate, 35 mEq, PRN  potassium bicarbonate, 50 mEq, PRN  potassium bicarbonate, 60 mEq, PRN  potassium, sodium phosphates, 2 packet, PRN  potassium, sodium phosphates, 2 packet, PRN  potassium, sodium phosphates, 2 packet, PRN  sodium chloride 0.9%, 10 mL, PRN  vancomycin - pharmacy to dose, , pharmacy to manage frequency      Today I personally reviewed pertinent medications, lines/drains/airways, imaging, cardiology results, laboratory results, microbiology results, notably:    Diet  Diet NPO  Diet NPO

## 2024-11-11 NOTE — ASSESSMENT & PLAN NOTE
-Utox + cocaine  -Suspect etiology of stroke likely due to substance use  -Continue thiamine/folate/multivitamin supplementation

## 2024-11-11 NOTE — PT/OT/SLP EVAL
Speech Language Pathology Evaluation  Bedside Swallow    Patient Name:  Malathi Mcpherson   MRN:  [id number]  Admitting Diagnosis: Acute arterial ischemic stroke, multifocal, posterior circulation    Recommendations:                 General Recommendations:   Ongoing swallow assessment   Diet recommendations:  NPO, NPO   Aspiration Precautions: Alternate means of nutrition/hydration, Frequent oral care, and Strict aspiration precautions   General Precautions: Standard, aspiration, fall, NPO  Communication strategies:  none    Assessment:     Malathi Mcpherson is a 49 y.o. female with an SLP diagnosis of Dysphagia.  She presents with altered mentation and decreased JAMESON this date, not appropriate for PO trials this AM.    History:     Past Medical History:   Diagnosis Date    Acute adjustment disorder with depressed mood     Cutaneous abscess 08/08/2019    Depression     Enlarged liver 04/27/2017    Hep C w/o coma, chronic     Hepatitis B     History of mental disorder 03/22/2017    History of substance abuse      History reviewed. No pertinent surgical history.    History of Present Illness: 49 year old with a hx of drug use, seizure, HTN, HLD  who came to [facility] ED and was stroke activated on 11/6/24. Pt was found down by her mother this morning at an unknown time lying face down. Per EMS, she was very lethargic and aphasic and was able to move her left sided extremities and R leg but was not moving her RUE at all; pt known to EMS due to history of seizure. Pt's exam worsened on exam with no movement in her right sided extremities and desaturated to 70s and required intubation. She was also noted to have an abrasion/contusion to the right temporal area Her last known normal states was at 10 pm yesterday -- did not receive TNK due to being out of the window and strokes noted in her CT. She was admitted to [facility] for further care. Pt's case has been reviewed by Neuro IR -- not candidate for intervention due to the extensive  "nature of infarcts and the risk of bleeding.  Per pt's family, patient's drugs of choice are crack, heroine and ETOH. Pt's mother reports that the patient has been regularly reporting to drug court of the past several weeks and has passed all of her drug tests during this period of time -- her last drug test was two weeks ago. Pt's family also reports that the patient had been acting strange over the past few days -- she has been sleeping more and displayed erratic behavior that leads them to believe that patient may have relapsed. Of note, a crack pipe was found on the patient's person on [place] [facility]    Prior Intubation HX:  11/6-11/10    Modified Barium Swallow: none    Chest X-Rays: 11/11:"    Prior diet: unable to determine    Subjective     Spoke with RN prior to session. Pt currently on Precedex 2/2 agitation. Pt asleep, restrained in bed.     Pain/Comfort:   None reported no overt s/sx appreciated     Respiratory Status: Nasal cannula, flow 6 L/minw/ humidification     Objective:     Oral Musculature Evaluation  Oral Musculature: unable to assess due to poor participation/comprehension  Volitional Cough: hannah  Volitional Swallow: hannah  Voice Prior to PO Intake: weakr, hoarse appreciated with occasional vocalizations    Bedside Swallow Eval:   Consistencies Assessed:  Thermal stim to lips with ice chip x4    Oral Phase:   Poor oral acceptance  Unable to assess    Pharyngeal Phase:   Unable to assess    Compensatory Strategies  None    Treatment: RN at bedside to assist with repositioning patient upright, midline in bed. Once boosted further up in bed, HOB elevated and clinician provided multimodal stimuli/prompting in order to rouse the patient. Pt with no command following and no oral acceptance to thermal stimulation (ice chip to labial surface). Noted dried skin on labial surface and pt attempt to spit x 1. Pt growing more awake, though increasingly restless and agitated towards end of session with " eyes open. Pt not appropriate for PO trials at this time 2/2 mentation. SLP will follow up at a later time/date to evaluate swallowing. RN in agreement.     Session 2:  Pt seen BID this date. Followed up later this afternoon to reassess swallow function. Pt with ongoing poor JAMESON and NGT placed with tube feeds running. Family at bedside. Attempted to rouse pt with environmental adjustment, sternal rub and thermal stim to lips, though pt unable to open her eyes, follow commands or rouse to participate in PO trials. Education provided to family re: role of SLP, NPO recs,  s/s aspiration and ongoing SLP POC.  They verbalized understanding and agreement. SLP will continue to follow.    Goals:   Multidisciplinary Problems       SLP Goals          Problem: SLP    Goal Priority Disciplines Outcome   SLP Goal     SLP Progressing   Description: Speech Language Pathology Goals  Goals expected to be met by 11/25    1. Pt will participate in ongoing swallow assessment to determine leats restrictive PO diet when appropriate.   2. Pt will complete cognitive-linguistic assessment to determine additional therapeutic needs.                                Plan:     Patient to be seen:  4 x/week   Plan of Care expires:  12/11/24  Plan of Care reviewed with:  patient (RN)   SLP Follow-Up:  Yes       Discharge recommendations:    tbd  Barriers to Discharge:  Level of Skilled Assistance Needed      Time Tracking:     SLP Treatment Date:   11/11/24  Speech Start Time:  0848  Speech Stop Time:  0903     Speech Total Time (min):  15 min    Billable Minutes: Eval Swallow and Oral Function 15    11/11/2024

## 2024-11-11 NOTE — NURSING
Per MD order, okay to place NGT. NGT placed at bedside with two nurse assist. Xray ordered for placement verification, pending imaging. Once ordered to use will administer medications.

## 2024-11-12 LAB
ALBUMIN SERPL BCP-MCNC: 2.3 G/DL (ref 3.5–5.2)
ALP SERPL-CCNC: 80 U/L (ref 40–150)
ALT SERPL W/O P-5'-P-CCNC: 36 U/L (ref 10–44)
ANION GAP SERPL CALC-SCNC: 9 MMOL/L (ref 8–16)
AST SERPL-CCNC: 45 U/L (ref 10–40)
BACTERIA SPEC AEROBE CULT: ABNORMAL
BASOPHILS # BLD AUTO: 0.03 K/UL (ref 0–0.2)
BASOPHILS NFR BLD: 0.3 % (ref 0–1.9)
BILIRUB SERPL-MCNC: 0.4 MG/DL (ref 0.1–1)
BUN SERPL-MCNC: 8 MG/DL (ref 6–20)
CALCIUM SERPL-MCNC: 8.5 MG/DL (ref 8.7–10.5)
CHLORIDE SERPL-SCNC: 111 MMOL/L (ref 95–110)
CK SERPL-CCNC: 692 U/L (ref 20–180)
CO2 SERPL-SCNC: 20 MMOL/L (ref 23–29)
CREAT SERPL-MCNC: 0.6 MG/DL (ref 0.5–1.4)
DIFFERENTIAL METHOD BLD: ABNORMAL
EOSINOPHIL # BLD AUTO: 0.1 K/UL (ref 0–0.5)
EOSINOPHIL NFR BLD: 1.5 % (ref 0–8)
ERYTHROCYTE [DISTWIDTH] IN BLOOD BY AUTOMATED COUNT: 13.4 % (ref 11.5–14.5)
EST. GFR  (NO RACE VARIABLE): >60 ML/MIN/1.73 M^2
GLUCOSE SERPL-MCNC: 113 MG/DL (ref 70–110)
GRAM STN SPEC: ABNORMAL
HCT VFR BLD AUTO: 30.7 % (ref 37–48.5)
HGB BLD-MCNC: 10 G/DL (ref 12–16)
IMM GRANULOCYTES # BLD AUTO: 0.07 K/UL (ref 0–0.04)
IMM GRANULOCYTES NFR BLD AUTO: 0.8 % (ref 0–0.5)
LYMPHOCYTES # BLD AUTO: 1.2 K/UL (ref 1–4.8)
LYMPHOCYTES NFR BLD: 13.4 % (ref 18–48)
MAGNESIUM SERPL-MCNC: 1.8 MG/DL (ref 1.6–2.6)
MCH RBC QN AUTO: 29.4 PG (ref 27–31)
MCHC RBC AUTO-ENTMCNC: 32.6 G/DL (ref 32–36)
MCV RBC AUTO: 90 FL (ref 82–98)
MONOCYTES # BLD AUTO: 0.6 K/UL (ref 0.3–1)
MONOCYTES NFR BLD: 6.6 % (ref 4–15)
NEUTROPHILS # BLD AUTO: 7.1 K/UL (ref 1.8–7.7)
NEUTROPHILS NFR BLD: 77.4 % (ref 38–73)
NRBC BLD-RTO: 0 /100 WBC
OHS QRS DURATION: 74 MS
OHS QRS DURATION: 76 MS
OHS QTC CALCULATION: 535 MS
OHS QTC CALCULATION: 558 MS
PHOSPHATE SERPL-MCNC: 2.9 MG/DL (ref 2.7–4.5)
PLATELET # BLD AUTO: 167 K/UL (ref 150–450)
PMV BLD AUTO: 11.6 FL (ref 9.2–12.9)
POCT GLUCOSE: 131 MG/DL (ref 70–110)
POCT GLUCOSE: 143 MG/DL (ref 70–110)
POCT GLUCOSE: 158 MG/DL (ref 70–110)
POCT GLUCOSE: 98 MG/DL (ref 70–110)
POTASSIUM SERPL-SCNC: 3.1 MMOL/L (ref 3.5–5.1)
PROT SERPL-MCNC: 6 G/DL (ref 6–8.4)
RBC # BLD AUTO: 3.4 M/UL (ref 4–5.4)
SODIUM SERPL-SCNC: 140 MMOL/L (ref 136–145)
WBC # BLD AUTO: 9.13 K/UL (ref 3.9–12.7)

## 2024-11-12 PROCEDURE — 83735 ASSAY OF MAGNESIUM: CPT

## 2024-11-12 PROCEDURE — 25000242 PHARM REV CODE 250 ALT 637 W/ HCPCS

## 2024-11-12 PROCEDURE — 27000221 HC OXYGEN, UP TO 24 HOURS

## 2024-11-12 PROCEDURE — 63600175 PHARM REV CODE 636 W HCPCS: Performed by: STUDENT IN AN ORGANIZED HEALTH CARE EDUCATION/TRAINING PROGRAM

## 2024-11-12 PROCEDURE — 63600175 PHARM REV CODE 636 W HCPCS

## 2024-11-12 PROCEDURE — 85025 COMPLETE CBC W/AUTO DIFF WBC: CPT

## 2024-11-12 PROCEDURE — 94761 N-INVAS EAR/PLS OXIMETRY MLT: CPT

## 2024-11-12 PROCEDURE — 25000003 PHARM REV CODE 250

## 2024-11-12 PROCEDURE — 93010 ELECTROCARDIOGRAM REPORT: CPT | Mod: 59,,, | Performed by: INTERNAL MEDICINE

## 2024-11-12 PROCEDURE — 94640 AIRWAY INHALATION TREATMENT: CPT

## 2024-11-12 PROCEDURE — 80053 COMPREHEN METABOLIC PANEL: CPT

## 2024-11-12 PROCEDURE — 99291 CRITICAL CARE FIRST HOUR: CPT | Mod: ,,, | Performed by: STUDENT IN AN ORGANIZED HEALTH CARE EDUCATION/TRAINING PROGRAM

## 2024-11-12 PROCEDURE — 99900035 HC TECH TIME PER 15 MIN (STAT)

## 2024-11-12 PROCEDURE — 82550 ASSAY OF CK (CPK): CPT | Performed by: PSYCHIATRY & NEUROLOGY

## 2024-11-12 PROCEDURE — 84100 ASSAY OF PHOSPHORUS: CPT

## 2024-11-12 PROCEDURE — 0BH17EZ INSERTION OF ENDOTRACHEAL AIRWAY INTO TRACHEA, VIA NATURAL OR ARTIFICIAL OPENING: ICD-10-PCS | Performed by: PSYCHIATRY & NEUROLOGY

## 2024-11-12 PROCEDURE — 20000000 HC ICU ROOM

## 2024-11-12 PROCEDURE — 25000003 PHARM REV CODE 250: Performed by: STUDENT IN AN ORGANIZED HEALTH CARE EDUCATION/TRAINING PROGRAM

## 2024-11-12 PROCEDURE — 93005 ELECTROCARDIOGRAM TRACING: CPT

## 2024-11-12 PROCEDURE — 94668 MNPJ CHEST WALL SBSQ: CPT

## 2024-11-12 RX ORDER — HYDROXYZINE HYDROCHLORIDE 25 MG/1
25 TABLET, FILM COATED ORAL 4 TIMES DAILY PRN
Status: DISCONTINUED | OUTPATIENT
Start: 2024-11-12 | End: 2024-11-19

## 2024-11-12 RX ORDER — POLYETHYLENE GLYCOL 3350 17 G/17G
17 POWDER, FOR SOLUTION ORAL DAILY
Status: DISCONTINUED | OUTPATIENT
Start: 2024-11-12 | End: 2024-11-13

## 2024-11-12 RX ORDER — CEFAZOLIN 2 G/1
2 INJECTION, POWDER, FOR SOLUTION INTRAMUSCULAR; INTRAVENOUS
Status: DISCONTINUED | OUTPATIENT
Start: 2024-11-12 | End: 2024-11-14

## 2024-11-12 RX ORDER — AMOXICILLIN 250 MG
2 CAPSULE ORAL DAILY
Status: DISCONTINUED | OUTPATIENT
Start: 2024-11-13 | End: 2024-11-14

## 2024-11-12 RX ORDER — LITHIUM 8 MEQ/5ML
200 SOLUTION ORAL 3 TIMES DAILY
Status: DISCONTINUED | OUTPATIENT
Start: 2024-11-12 | End: 2024-11-13

## 2024-11-12 RX ORDER — MAGNESIUM SULFATE HEPTAHYDRATE 40 MG/ML
2 INJECTION, SOLUTION INTRAVENOUS ONCE
Status: COMPLETED | OUTPATIENT
Start: 2024-11-12 | End: 2024-11-12

## 2024-11-12 RX ADMIN — CLOPIDOGREL BISULFATE 75 MG: 75 TABLET ORAL at 08:11

## 2024-11-12 RX ADMIN — SODIUM CHLORIDE SOLN NEBU 3% 4 ML: 3 NEBU SOLN at 07:11

## 2024-11-12 RX ADMIN — SODIUM CHLORIDE SOLN NEBU 3% 4 ML: 3 NEBU SOLN at 12:11

## 2024-11-12 RX ADMIN — Medication 800 MG: at 05:11

## 2024-11-12 RX ADMIN — IPRATROPIUM BROMIDE AND ALBUTEROL SULFATE 3 ML: .5; 3 SOLUTION RESPIRATORY (INHALATION) at 12:11

## 2024-11-12 RX ADMIN — POLYETHYLENE GLYCOL 3350 17 G: 17 POWDER, FOR SOLUTION ORAL at 03:11

## 2024-11-12 RX ADMIN — HEPARIN SODIUM 5000 UNITS: 5000 INJECTION INTRAVENOUS; SUBCUTANEOUS at 09:11

## 2024-11-12 RX ADMIN — SENNOSIDES AND DOCUSATE SODIUM 1 TABLET: 50; 8.6 TABLET ORAL at 08:11

## 2024-11-12 RX ADMIN — HYDROXYZINE HYDROCHLORIDE 10 MG: 10 SOLUTION ORAL at 03:11

## 2024-11-12 RX ADMIN — DEXMEDETOMIDINE HYDROCHLORIDE 0.9 MCG/KG/HR: 4 INJECTION INTRAVENOUS at 04:11

## 2024-11-12 RX ADMIN — VANCOMYCIN HYDROCHLORIDE 1000 MG: 1 INJECTION, POWDER, LYOPHILIZED, FOR SOLUTION INTRAVENOUS at 02:11

## 2024-11-12 RX ADMIN — LIDOCAINE 5% 1 PATCH: 700 PATCH TOPICAL at 03:11

## 2024-11-12 RX ADMIN — HEPARIN SODIUM 5000 UNITS: 5000 INJECTION INTRAVENOUS; SUBCUTANEOUS at 05:11

## 2024-11-12 RX ADMIN — DEXMEDETOMIDINE HYDROCHLORIDE 0.8 MCG/KG/HR: 4 INJECTION INTRAVENOUS at 09:11

## 2024-11-12 RX ADMIN — ACETAMINOPHEN 650 MG: 325 TABLET, FILM COATED ORAL at 05:11

## 2024-11-12 RX ADMIN — VANCOMYCIN HYDROCHLORIDE 1000 MG: 1 INJECTION, POWDER, LYOPHILIZED, FOR SOLUTION INTRAVENOUS at 01:11

## 2024-11-12 RX ADMIN — SODIUM CHLORIDE: 9 INJECTION, SOLUTION INTRAVENOUS at 12:11

## 2024-11-12 RX ADMIN — THERA TABS 1 TABLET: TAB at 08:11

## 2024-11-12 RX ADMIN — FOLIC ACID 1 MG: 1 TABLET ORAL at 08:11

## 2024-11-12 RX ADMIN — ATORVASTATIN CALCIUM 80 MG: 40 TABLET, FILM COATED ORAL at 08:11

## 2024-11-12 RX ADMIN — SODIUM CHLORIDE SOLN NEBU 3% 4 ML: 3 NEBU SOLN at 01:11

## 2024-11-12 RX ADMIN — CEFAZOLIN 2 G: 2 INJECTION, POWDER, FOR SOLUTION INTRAMUSCULAR; INTRAVENOUS at 05:11

## 2024-11-12 RX ADMIN — Medication 100 MG: at 08:11

## 2024-11-12 RX ADMIN — LITHIUM 199.8 MG: 8 SOLUTION ORAL at 03:11

## 2024-11-12 RX ADMIN — DEXMEDETOMIDINE HYDROCHLORIDE 0.7 MCG/KG/HR: 4 INJECTION INTRAVENOUS at 12:11

## 2024-11-12 RX ADMIN — HEPARIN SODIUM 5000 UNITS: 5000 INJECTION INTRAVENOUS; SUBCUTANEOUS at 01:11

## 2024-11-12 RX ADMIN — FLUOXETINE HYDROCHLORIDE 60 MG: 20 SOLUTION ORAL at 08:11

## 2024-11-12 RX ADMIN — SODIUM CHLORIDE SOLN NEBU 3% 4 ML: 3 NEBU SOLN at 11:11

## 2024-11-12 RX ADMIN — IPRATROPIUM BROMIDE AND ALBUTEROL SULFATE 3 ML: .5; 3 SOLUTION RESPIRATORY (INHALATION) at 01:11

## 2024-11-12 RX ADMIN — IPRATROPIUM BROMIDE AND ALBUTEROL SULFATE 3 ML: .5; 3 SOLUTION RESPIRATORY (INHALATION) at 07:11

## 2024-11-12 RX ADMIN — LITHIUM 199.8 MG: 8 SOLUTION ORAL at 09:11

## 2024-11-12 RX ADMIN — ACETAMINOPHEN 650 MG: 325 TABLET, FILM COATED ORAL at 12:11

## 2024-11-12 RX ADMIN — POTASSIUM BICARBONATE 35 MEQ: 391 TABLET, EFFERVESCENT ORAL at 05:11

## 2024-11-12 RX ADMIN — MAGNESIUM SULFATE HEPTAHYDRATE 2 G: 40 INJECTION, SOLUTION INTRAVENOUS at 10:11

## 2024-11-12 RX ADMIN — ASPIRIN 81 MG CHEWABLE TABLET 81 MG: 81 TABLET CHEWABLE at 08:11

## 2024-11-12 RX ADMIN — HYDROXYZINE HYDROCHLORIDE 25 MG: 25 TABLET ORAL at 05:11

## 2024-11-12 NOTE — ASSESSMENT & PLAN NOTE
CK elevated after being found down at home  Trended down to 723 with small uprend into 800s    - Stopped maintenance fluids given improving CPK and UOP  - Will not trend CK further

## 2024-11-12 NOTE — PLAN OF CARE
[facility] Care Plan  POC reviewed with Malathi Mcpherson and family at 1400. Patient and Family verbalized understanding. Questions and concerns addressed. No acute events today. Pt progressing toward goals. Will continue to monitor. See below and flowsheets for full assessment and VS info.       Precedex gtt @ 0.8  EKG done  MRSA screen done  CIWA done  Weaned from 6L to 2L NC  NGT in R nostril placed  Feeds restarted @ 10cc goal of 45cc  Vera pending, speech and nurse unable to complete vera  Adequate UOP          Is this a stroke patient? yes- Stroke booklet reviewed with patient and family, risk factors identified for patient and stroke booklet remains at bedside for ongoing education.     Care individualization: Light sheet and quiet environment     Neuro:  [place] Coma Scale  Best Eye Response: 4-->(E4) spontaneous  Best Motor Response: 5-->(M5) localizes pain  Best Verbal Response: 4-->(V4) confused  [place] Coma Scale Score: 13  Assessment Qualifiers: patient chemically sedated or paralyzed  Pupil PERRLA: yes     24 hr Temp:  [99.2 °F (37.3 °C)-100.6 °F (38.1 °C)]     CV:   Rhythm: normal sinus rhythm  BP goals:   SBP < 220  MAP > 65    Resp:      Vent Mode: Spont  Set Rate: 16 BPM  Oxygen Concentration (%): 40  Vt Set: 450 mL  PEEP/CPAP: 5 cmH20  Pressure Support: 8 cmH20    Plan: N/A    GI/:     Diet/Nutrition Received: NPO, tube feeding  Last Bowel Movement: 11/09/24  Voiding Characteristics: external catheter    Intake/Output Summary (Last 24 hours) at 11/11/2024 1820  Last data filed at 11/11/2024 1701  Gross per 24 hour   Intake 2450.41 ml   Output 750 ml   Net 1700.41 ml     Unmeasured Output  Urine Occurrence: 1  Stool Occurrence: 1  Pad Count: 1    Labs/Accuchecks:  Recent Labs   Lab 11/11/24 0042   WBC 8.49   RBC 3.60*   HGB 10.6*   HCT 34.2*         Recent Labs   Lab 11/11/24 0042      K 3.6   CO2 19*      BUN 8   CREATININE 0.6   ALKPHOS 69   ALT 32   AST 42*   BILITOT 0.4       Recent Labs   Lab 24  0854   INR 1.0      Recent Labs   Lab 24  0042   *       Electrolytes: N/A - electrolytes WDL  Accuchecks: none    Gtts:   0.9% NaCl   Intravenous Continuous 75 mL/hr at 24 1701 Rate Verify at 24 1701    dexmedeTOMIDine (Precedex) infusion (titrating)  0-1.4 mcg/kg/hr Intravenous Continuous 11.8 mL/hr at 24 1701 0.8 mcg/kg/hr at 24 1701       LDA/Wounds:    Sree Risk Assessment  Sensory Perception: 2-->very limited  Moisture: 4-->rarely moist  Activity: 1-->bedfast  Mobility: 3-->slightly limited  Nutrition: 2-->probably inadequate  Friction and Shear: 2-->potential problem  Sree Score: 14    Is your sree score 12 or less? no            Restraints:   Restraint Order  Length of Order: Order good for next 24 hours or when removed.  Date that the current order will : 24  Time that the current order will : 1117  Order Upon Application: Yes    [facility]

## 2024-11-12 NOTE — SUBJECTIVE & OBJECTIVE
Interval History:  See hospital course    Review of Systems   Unable to perform ROS: Patient nonverbal     Unable to obtain a complete ROS due to level of consciousness.    Objective:     Vitals:  Temp: 98.5 °F (36.9 °C)  Pulse: 70  Rhythm: normal sinus rhythm  BP: (!) 101/54  MAP (mmHg): 75  Resp: (!) 28  SpO2: (!) 93 %    Temp  Min: 98.5 °F (36.9 °C)  Max: 101.4 °F (38.6 °C)  Pulse  Min: 61  Max: 100  BP  Min: 93/54  Max: 158/67  MAP (mmHg)  Min: 71  Max: 112  Resp  Min: 11  Max: 50  SpO2  Min: 91 %  Max: 99 %  Oxygen Concentration (%)  Min: 28  Max: 32    11/11 0701 - 11/12 0700  In: 3095 [I.V.:2107.6]  Out: 1150.9 [Urine:1150.9]   Unmeasured Output  Urine Occurrence: 1  Stool Occurrence: 1  Pad Count: 2        Physical Exam  Vitals and nursing note reviewed.   Constitutional:       General: She is not in acute distress.     Comments: Sedated   HENT:      Head: Normocephalic and atraumatic.   Cardiovascular:      Rate and Rhythm: Normal rate and regular rhythm.      Heart sounds: Normal heart sounds.   Pulmonary:      Effort: No respiratory distress.      Breath sounds: Normal breath sounds.      Comments: Scattered mild inspiratory wheezes  Abdominal:      General: Abdomen is flat.      Palpations: Abdomen is soft.   Musculoskeletal:      Right lower leg: No edema.      Left lower leg: No edema.   Skin:     General: Skin is warm and dry.   Neurological:      GCS: GCS eye subscore is 3. GCS motor subscore is 4.      Comments: Sedated on precedex gtt  Intermittently following commands  +L corneal, no R corneal  +OCR  PERRL  Face symmetrical    Motor:  Spontaneous antigravity movement throughout, less in the RUE  Withdrawal to noxious stimuli throughout         Unable to test orientation, language, memory, judgment, insight, fund of knowledge, hearing, shoulder shrug, tongue protrusion, coordination, gait due to level of consciousness.       Medications:  ContinuousdexmedeTOMIDine (Precedex) infusion (titrating),  Last Rate: 0.7 mcg/kg/hr (11/12/24 1258)    Scheduledacetaminophen, 650 mg, Q6H  aspirin, 81 mg, Daily  atorvastatin, 80 mg, Daily  cefTRIAXone (Rocephin) IV (PEDS and ADULTS), 2 g, Q24H  clopidogreL, 75 mg, Daily  FLUoxetine, 60 mg, Daily  folic acid, 1 mg, Daily  heparin (porcine), 5,000 Units, Q8H  LIDOcaine, 1 patch, Q24H  lithium citrate 8 meq/5 ml, 199.8 mg, TID  multivitamin, 1 tablet, Daily  polyethylene glycol, 17 g, Daily  [START ON 11/13/2024] senna-docusate 8.6-50 mg, 2 tablet, Daily  sodium chloride 3%, 4 mL, Q6H  thiamine, 100 mg, Daily  vancomycin (VANCOCIN) IV (PEDS and ADULTS), 1,000 mg, Q12H    PRNalbuterol-ipratropium, 3 mL, Q6H PRN  bisacodyL, 10 mg, Daily PRN  hydrOXYzine HCL, 25 mg, QID PRN  magnesium oxide, 800 mg, PRN  magnesium oxide, 800 mg, PRN  midazolam, 2 mg, Once PRN  potassium bicarbonate, 35 mEq, PRN  potassium bicarbonate, 50 mEq, PRN  potassium bicarbonate, 60 mEq, PRN  potassium, sodium phosphates, 2 packet, PRN  potassium, sodium phosphates, 2 packet, PRN  potassium, sodium phosphates, 2 packet, PRN  sodium chloride 0.9%, 10 mL, PRN  vancomycin - pharmacy to dose, , pharmacy to manage frequency      Today I personally reviewed pertinent medications, lines/drains/airways, imaging, cardiology results, laboratory results, microbiology results, notably:    Diet  Diet NPO  Diet NPO

## 2024-11-12 NOTE — ASSESSMENT & PLAN NOTE
History of depression and anxiety    - Continuing home fluoxetine 80mg QD  - Increased to Lithium 200 TID  - discontinued seroquel 100mg qhs given prolonged QTc  - EKG qshift

## 2024-11-12 NOTE — PLAN OF CARE
[facility] Care Plan    POC reviewed with Malathi Mcpherson and family at 0300. Patient unable to verbalize understanding. Questions and concerns addressed. No acute events today. Pt progressing toward goals. Will continue to monitor. See below and flowsheets for full assessment and VS info.     - PRN hydroxyzine given  - replacing K, and mag  - precedex @ 0.8mcg/hr  - TF @ 40ml/hr  - VSS    Is this a stroke patient? yes- Stroke booklet reviewed with patient, risk factors identified for patient and stroke booklet remains at bedside for ongoing education.     Care individualization: keep fans on pt    Neuro:  [place] Coma Scale  Best Eye Response: 4-->(E4) spontaneous  Best Motor Response: 5-->(M5) localizes pain  Best Verbal Response: 2-->(V2) incomprehensible speech  [place] Coma Scale Score: 11  Assessment Qualifiers: patient not sedated/intubated, no eye obstruction present  Pupil PERRLA: yes     24 hr Temp:  [98.6 °F (37 °C)-101.4 °F (38.6 °C)]     CV:   Rhythm: normal sinus rhythm  BP goals:   SBP < 220  MAP > 65    Resp:      Vent Mode: Spont  Set Rate: 16 BPM  Oxygen Concentration (%): 32  Vt Set: 450 mL  PEEP/CPAP: 5 cmH20  Pressure Support: 8 cmH20    Plan: N/A    GI/:     Diet/Nutrition Received: NPO, tube feeding  Last Bowel Movement: 11/09/24  Voiding Characteristics: external catheter    Intake/Output Summary (Last 24 hours) at 11/12/2024 0457  Last data filed at 11/12/2024 0302  Gross per 24 hour   Intake 1873.52 ml   Output 650.9 ml   Net 1222.62 ml     Unmeasured Output  Urine Occurrence: 1  Stool Occurrence: 1  Pad Count: 1    Labs/Accuchecks:  Recent Labs   Lab 11/12/24 0112   WBC 9.13   RBC 3.40*   HGB 10.0*   HCT 30.7*         Recent Labs   Lab 11/12/24 0112      K 3.1*   CO2 20*   *   BUN 8   CREATININE 0.6   ALKPHOS 80   ALT 36   AST 45*   BILITOT 0.4      Recent Labs   Lab 11/06/24  0854   INR 1.0      Recent Labs   Lab 11/12/24 0112   *       Electrolytes:  Electrolytes replaced  Accuchecks: Q6H    Gtts:   0.9% NaCl   Intravenous Continuous 75 mL/hr at 248 New Bag at 248    dexmedeTOMIDine (Precedex) infusion (titrating)  0-1.4 mcg/kg/hr Intravenous Continuous 13.28 mL/hr at 24 0.9 mcg/kg/hr at 24       LDA/Wounds:    Sree Risk Assessment  Sensory Perception: 2-->very limited  Moisture: 3-->occasionally moist  Activity: 1-->bedfast  Mobility: 3-->slightly limited  Nutrition: 3-->adequate  Friction and Shear: 2-->potential problem  Sree Score: 14  Is your sree score 12 or less? no          Restraints:   Restraint Order  Length of Order: Order good for next 24 hours or when removed.  Date that the current order will : 24  Time that the current order will : 302  Order Upon Application: Yes    WCTM       Problem: Infection  Goal: Absence of Infection Signs and Symptoms  Intervention: Prevent or Manage Infection  Flowsheets (Taken 2024)  Fever Reduction/Comfort Measures:   lightweight bedding   lightweight clothing  Infection Management: aseptic technique maintained     Problem: Wound  Goal: Absence of Infection Signs and Symptoms  Intervention: Prevent or Manage Infection  Flowsheets (Taken 2024)  Fever Reduction/Comfort Measures:   lightweight bedding   lightweight clothing  Infection Management: aseptic technique maintained  Goal: Optimal Wound Healing  Intervention: Promote Wound Healing  Flowsheets (Taken 2024)  Sleep/Rest Enhancement:   awakenings minimized   consistent schedule promoted   natural light exposure provided   noise level reduced   regular sleep/rest pattern promoted   relaxation techniques promoted   room darkened     Problem: Stroke, Ischemic (Includes Transient Ischemic Attack)  Goal: Optimal Cognitive Function  Intervention: Optimize Cognitive Function  Flowsheets (Taken 2024)  Reorientation Measures:   calendar in view   clock in view    reorientation provided  Sensory Stimulation Regulation:   auditory stimulation minimized   care clustered   lighting decreased   quiet environment promoted   tactile stimulation minimized     Problem: Restraint, Nonviolent  Goal: Absence of Harm or Injury  Intervention: Education  Flowsheets (Taken 11/12/2024 0806)  Discontinuation Criteria: Absence of behavior that required restraint  Criteria Explained: Yes  Patient's Response: NL  Patient / Family Notification: Patient  Patient / Family Teaching: Need for restraint

## 2024-11-12 NOTE — CONSULTS
VANCOMYCIN DOSING BY PHARMACY DISCONTINUATION NOTE    Malathi Mcpherson is a 49 y.o. female who had been consulted for vancomycin dosing.    The pharmacy consult for vancomycin dosing has been discontinued.     Vancomycin Dosing by Pharmacy Consult will sign-off. Please reconsult if necessary. Thank you for allowing us to participate in this patient's care.     Vivi Green, PharmD   [phone]

## 2024-11-12 NOTE — ASSESSMENT & PLAN NOTE
49F with history of polysubstance abuse presents after being found down at home. CTA revealed multifocal infarctions with a R vertebral artery occlusion and a L PCA occlusion. Patient was OOW for TNK and stroke burden was felt to be too large to pursue IR intervention. MRI reveals multifocal posterior circulation infarctions involving bilateral occipital lobes, medial L temporal lobe, bilateral thalami, R dorsal medulla, and the cerebellum. Given the location and extent of her strokes, she is at risk of impaired consciousness and total vision loss. Risk of herniation is felt to be lower given the moderate extent of infarcted tissue and modest infratentorial infarctions. Improving exam with some purposeful movements. Off sedation, patient is following commands appropriately. Extubated to NC and weaning oxygen. Hospital course complicated by H.flu/staph aureus pneumonia.     Antithrombotics for secondary stroke prevention: Antiplatelets: Aspirin: 81 mg daily  Clopidogrel: 75 mg daily    Statins for secondary stroke prevention and hyperlipidemia, if present:   Statins: Atorvastatin- 80 mg daily    Aggressive risk factor modification: HTN, HLD     Rehab efforts: The patient has been evaluated by a stroke team provider and the therapy needs have been fully considered based off the presenting complaints and exam findings. The following therapy evaluations are needed: PT evaluate and treat, OT evaluate and treat, SLP evaluate and treat, PM&R evaluate for appropriate placement    Diagnostics ordered/pending: None     VTE prophylaxis: Mechanical prophylaxis: Place SCDs    BP parameters: Infarct: Post Thrombolytic therapy, SBP <180    -q1h neuro checks  -BMP QD  -ASA 81 QD  -Plavix 75mg QD  -SBP<180  -Na>140  -NSGY and vascular neurology consulted, appreciate recs  -Repeat CT Head on 11/9/24: Expected evolutionary changes of the posterior circulation infarcts with no overt hemorrhagic conversion or significant midline  shift/herniation.   -PT/OT/SLP to evaluate and treat

## 2024-11-12 NOTE — PT/OT/SLP PROGRESS
Occupational Therapy      Patient Name:  Malathi Mcpherson   MRN:  [id number]    Patient not seen today secondary to agitation. Will follow-up 11/13.    11/12/2024

## 2024-11-12 NOTE — PT/OT/SLP PROGRESS
Speech Language Pathology      Malathi Mcpherson  MRN: [id number]    Patient not seen today secondary to RN HOLD x2 (AM: decreased JAMESON/altered mentation and PM: poor command following, not appropriate). SLP will follow-up next service date if/when appropriate.

## 2024-11-12 NOTE — PROGRESS NOTES
[facility]  Neurocritical Care  Progress Note    Admit Date: 11/6/2024  Service Date: 11/12/2024  Length of Stay: 6    Subjective:     Chief Complaint: Acute arterial ischemic stroke, multifocal, posterior circulation    History of Present Illness: 49 year old with a hx of drug use, seizure, HTN, HLD  who came [place] [facility] [place] and was stroke activated on 11/6/24. Pt was found down by her mother this morning at an unknown time lying face down. Per EMS, she was very lethargic and aphasic and was able to move her left sided extremities and R leg but was not moving her RUE at all; pt known to EMS due to history of seizure. Pt's exam worsened on exam with no movement in her right sided extremities and desaturated to 70s and required intubation. She was also noted to have an abrasion/contusion to the right temporal area Her last known normal states was at 10 pm yesterday -- did not receive TNK due to being out of the window and strokes noted in her CT. She was admitted to [facility] for further care. Pt's case has been reviewed by Neuro IR -- not candidate for intervention due to the extensive nature of infarcts and the risk of bleeding.    Per pt's family, patient's drugs of choice are crack, heroine and ETOH. Pt's mother reports that the patient has been regularly reporting to drug court of the past several weeks and has passed all of her drug tests during this period of time -- her last drug test was two weeks ago. Pt's family also reports that the patient had been acting strange over the past few days -- she has been sleeping more and displayed erratic behavior that leads them to believe that patient may have relapsed. Of note, a crack pipe was found on the patient's person on presentation to [facility].    Hospital Course: 11/07/2024 Agitated overnight, requiring uptitration of fentanyl and addition of precedex. Weaning throughout the day. Neuro exam may be slightly improved from yesterday even despite sedation with  more R sided movement. However, no purposeful movement or localization of noxious stimuli. MRI yesterday with multifocal infarcts including the cerebellum, bilateral occipital lobes, and bilateral thalami (R>L). CK found to be elevated overnight, fluids running and trending levels.   11/08/2024 More alert and developing some purposeful movement by reaching towards ETT, brushing hair behind ear, and localizing to LLE noxious stimuli with LUE. CK downtrending with fluids. Monitoring decreased UOP with stable BUN/Cr. Weaning fentanyl and initiated precedex. Plan to DC fluid tomorrow if CK continues to downtrend and remove fluid. Started tube feeds today. Lithium level now low, started half-dose home lithium.  11/10/2024 NAEON. Patient following commands this morning. Extubated to NC. Patient tolerated extubation well. Remains on precedex gtt for agitation, requiring PRNs. Resp culture positive for H flu. Started rocephin.  11/11/2024 Agitated overnight requiring uptitration of precedex and multiple PRN administrations. Reintitation of lithium, adding measures for withdrawal management. Respiratory culture also growing staph aureus; MRSA screen positive. Vanc started.  11/12/2024 Tolerated wean of supplemental oxygen until having to titrate up precedex overnight. Weaning oxygen and sedation as tolerated today. Qtc prolonged to 558; giving mag IV and recheck Qtc down to 535. Will have to avoid Qtc prolonging agents for agitation control. Increasing lithium and hydroxyzine dose. Stopping fluids given adequate UOP and downtrending CK.    Interval History:  See hospital course    Review of Systems   Unable to perform ROS: Patient nonverbal     Unable to obtain a complete ROS due to level of consciousness.    Objective:     Vitals:  Temp: 98.5 °F (36.9 °C)  Pulse: 70  Rhythm: normal sinus rhythm  BP: (!) 101/54  MAP (mmHg): 75  Resp: (!) 28  SpO2: (!) 93 %    Temp  Min: 98.5 °F (36.9 °C)  Max: 101.4 °F (38.6 °C)  Pulse  Min:  61  Max: 100  BP  Min: 93/54  Max: 158/67  MAP (mmHg)  Min: 71  Max: 112  Resp  Min: 11  Max: 50  SpO2  Min: 91 %  Max: 99 %  Oxygen Concentration (%)  Min: 28  Max: 32    11/11 0701 - 11/12 0700  In: 3095 [I.V.:2107.6]  Out: 1150.9 [Urine:1150.9]   Unmeasured Output  Urine Occurrence: 1  Stool Occurrence: 1  Pad Count: 2        Physical Exam  Vitals and nursing note reviewed.   Constitutional:       General: She is not in acute distress.     Comments: Sedated   HENT:      Head: Normocephalic and atraumatic.   Cardiovascular:      Rate and Rhythm: Normal rate and regular rhythm.      Heart sounds: Normal heart sounds.   Pulmonary:      Effort: No respiratory distress.      Breath sounds: Normal breath sounds.      Comments: Scattered mild inspiratory wheezes  Abdominal:      General: Abdomen is flat.      Palpations: Abdomen is soft.   Musculoskeletal:      Right lower leg: No edema.      Left lower leg: No edema.   Skin:     General: Skin is warm and dry.   Neurological:      GCS: GCS eye subscore is 3. GCS motor subscore is 4.      Comments: Sedated on precedex gtt  Intermittently following commands  +L corneal, no R corneal  +OCR  PERRL  Face symmetrical    Motor:  Spontaneous antigravity movement throughout, less in the RUE  Withdrawal to noxious stimuli throughout         Unable to test orientation, language, memory, judgment, insight, fund of knowledge, hearing, shoulder shrug, tongue protrusion, coordination, gait due to level of consciousness.       Medications:  ContinuousdexmedeTOMIDine (Precedex) infusion (titrating), Last Rate: 0.7 mcg/kg/hr (11/12/24 1258)    Scheduledacetaminophen, 650 mg, Q6H  aspirin, 81 mg, Daily  atorvastatin, 80 mg, Daily  cefTRIAXone (Rocephin) IV (PEDS and ADULTS), 2 g, Q24H  clopidogreL, 75 mg, Daily  FLUoxetine, 60 mg, Daily  folic acid, 1 mg, Daily  heparin (porcine), 5,000 Units, Q8H  LIDOcaine, 1 patch, Q24H  lithium citrate 8 meq/5 ml, 199.8 mg, TID  multivitamin, 1  tablet, Daily  polyethylene glycol, 17 g, Daily  [START ON 11/13/2024] senna-docusate 8.6-50 mg, 2 tablet, Daily  sodium chloride 3%, 4 mL, Q6H  thiamine, 100 mg, Daily  vancomycin (VANCOCIN) IV (PEDS and ADULTS), 1,000 mg, Q12H    PRNalbuterol-ipratropium, 3 mL, Q6H PRN  bisacodyL, 10 mg, Daily PRN  hydrOXYzine HCL, 25 mg, QID PRN  magnesium oxide, 800 mg, PRN  magnesium oxide, 800 mg, PRN  midazolam, 2 mg, Once PRN  potassium bicarbonate, 35 mEq, PRN  potassium bicarbonate, 50 mEq, PRN  potassium bicarbonate, 60 mEq, PRN  potassium, sodium phosphates, 2 packet, PRN  potassium, sodium phosphates, 2 packet, PRN  potassium, sodium phosphates, 2 packet, PRN  sodium chloride 0.9%, 10 mL, PRN  vancomycin - pharmacy to dose, , pharmacy to manage frequency      Today I personally reviewed pertinent medications, lines/drains/airways, imaging, cardiology results, laboratory results, microbiology results, notably:    Diet  Diet NPO  Diet NPO    Assessment/Plan:     Neuro  * Acute arterial ischemic stroke, multifocal, posterior circulation  49F with history of polysubstance abuse presents after being found down at home. CTA revealed multifocal infarctions with a R vertebral artery occlusion and a L PCA occlusion. Patient was OOW for TNK and stroke burden was felt to be too large to pursue IR intervention. MRI reveals multifocal posterior circulation infarctions involving bilateral occipital lobes, medial L temporal lobe, bilateral thalami, R dorsal medulla, and the cerebellum. Given the location and extent of her strokes, she is at risk of impaired consciousness and total vision loss. Risk of herniation is felt to be lower given the moderate extent of infarcted tissue and modest infratentorial infarctions. Improving exam with some purposeful movements. Off sedation, patient is following commands appropriately. Extubated to NC and weaning oxygen. Hospital course complicated by H.flu/staph aureus pneumonia.     Antithrombotics  for secondary stroke prevention: Antiplatelets: Aspirin: 81 mg daily  Clopidogrel: 75 mg daily    Statins for secondary stroke prevention and hyperlipidemia, if present:   Statins: Atorvastatin- 80 mg daily    Aggressive risk factor modification: HTN, HLD     Rehab efforts: The patient has been evaluated by a stroke team provider and the therapy needs have been fully considered based off the presenting complaints and exam findings. The following therapy evaluations are needed: PT evaluate and treat, OT evaluate and treat, SLP evaluate and treat, PM&R evaluate for appropriate placement    Diagnostics ordered/pending: None     VTE prophylaxis: Mechanical prophylaxis: Place SCDs    BP parameters: Infarct: Post Thrombolytic therapy, SBP <180    -q1h neuro checks  -BMP QD  -ASA 81 QD  -Plavix 75mg QD  -SBP<180  -Na>140  -NSGY and vascular neurology consulted, appreciate recs  -Repeat CT Head on 11/9/24: Expected evolutionary changes of the posterior circulation infarcts with no overt hemorrhagic conversion or significant midline shift/herniation.   -PT/OT/SLP to evaluate and treat      Cytotoxic cerebral edema  See primary problem    Psychiatric  Polysubstance abuse  Patient with long history of polysubstance abuse including crack cocaine, alcohol, and opiates  Patient was found with drug paraphernalia on person upon admission  Urine tox screen positive for cocaine  Will consider addiction psych consult when appropriate    Anxiety and depression  History of depression and anxiety    - Continuing home fluoxetine 80mg QD  - Increased to Lithium 200 TID  - discontinued seroquel 100mg qhs given prolonged QTc  - EKG qshift    Derm  Folliculitis  folliculitis noted on mons pubis  - dc'd bactrim given coverage with rocephin/vancomycin  - Wound care following     Pulmonary  Right upper lobe pneumonia  Patient recently extubated with persistent oxygen requirement  CXR remarkable for RUL consolidation  Respiratory culture positive  for H flu and now staph aureus  MRSA screen by PCR positive  Sensitivities pending  Continue rocephin / Vancomycin  Wean oxygen as tolerated  Continuous pulse oximetry    On mechanically assisted ventilation  RESOLVED  Due to stroke  Extubated 11/10  Pepcid ppx  3% nebs    Other  Lithium toxicity  RESOLVED  See anxiety and depression    Debility  Due to stroke  PT/OT/SLP to evaluate and treat when appropriate    HyperCKemia  CK elevated after being found down at home  Trended down to 723 with small uprend into 800s    - Stopped maintenance fluids given improving CPK and UOP  - Will not trend CK further              The patient is being Prophylaxed for:  Venous Thromboembolism with: Chemical  Stress Ulcer with: None  Ventilator Pneumonia with: not applicable    Activity Orders            Elevate HOB Elevate (30-45 degrees) Elevate HOB to 30 - 45 degrees during feeding unless otherwise stated starting at 11/08 1236    Turn patient starting at 11/06 1400    Diet NPO: NPO starting at 11/06 1325          Full Code    Darius Echevarria MD  Neurocritical Care  [facility] Neuro Critical Care

## 2024-11-12 NOTE — PT/OT/SLP PROGRESS
Physical Therapy    Update    Malathi Mcpherson   MRN: [id number]    Checked in with RN this afternoon re: possible PT for Ms. Childs. RN recommending holding on PT this afternoon, she remains easily agitated and not following commands. Will continue to follow along during admission, no billable units today.    Austin Kaplan, PT  11/12/2024

## 2024-11-12 NOTE — ASSESSMENT & PLAN NOTE
Patient with long history of polysubstance abuse including crack cocaine, alcohol, and opiates  Patient was found with drug paraphernalia on person upon admission  Urine tox screen positive for cocaine  Will consider addiction psych consult when appropriate

## 2024-11-13 LAB
ALBUMIN SERPL BCP-MCNC: 2.1 G/DL (ref 3.5–5.2)
ALP SERPL-CCNC: 87 U/L (ref 40–150)
ALT SERPL W/O P-5'-P-CCNC: 33 U/L (ref 10–44)
ANION GAP SERPL CALC-SCNC: 8 MMOL/L (ref 8–16)
AST SERPL-CCNC: 42 U/L (ref 10–40)
BASOPHILS # BLD AUTO: 0.05 K/UL (ref 0–0.2)
BASOPHILS NFR BLD: 0.5 % (ref 0–1.9)
BILIRUB SERPL-MCNC: 0.2 MG/DL (ref 0.1–1)
BUN SERPL-MCNC: 11 MG/DL (ref 6–20)
CALCIUM SERPL-MCNC: 8.8 MG/DL (ref 8.7–10.5)
CHLORIDE SERPL-SCNC: 113 MMOL/L (ref 95–110)
CO2 SERPL-SCNC: 22 MMOL/L (ref 23–29)
CREAT SERPL-MCNC: 0.6 MG/DL (ref 0.5–1.4)
DIFFERENTIAL METHOD BLD: ABNORMAL
EOSINOPHIL # BLD AUTO: 0.2 K/UL (ref 0–0.5)
EOSINOPHIL NFR BLD: 1.6 % (ref 0–8)
ERYTHROCYTE [DISTWIDTH] IN BLOOD BY AUTOMATED COUNT: 13.8 % (ref 11.5–14.5)
EST. GFR  (NO RACE VARIABLE): >60 ML/MIN/1.73 M^2
GLUCOSE SERPL-MCNC: 119 MG/DL (ref 70–110)
HCT VFR BLD AUTO: 32 % (ref 37–48.5)
HGB BLD-MCNC: 9.8 G/DL (ref 12–16)
IMM GRANULOCYTES # BLD AUTO: 0.1 K/UL (ref 0–0.04)
IMM GRANULOCYTES NFR BLD AUTO: 1 % (ref 0–0.5)
LITHIUM SERPL-SCNC: 0.1 MMOL/L (ref 0.6–1.2)
LYMPHOCYTES # BLD AUTO: 1.1 K/UL (ref 1–4.8)
LYMPHOCYTES NFR BLD: 10.4 % (ref 18–48)
MAGNESIUM SERPL-MCNC: 2.2 MG/DL (ref 1.6–2.6)
MCH RBC QN AUTO: 28.2 PG (ref 27–31)
MCHC RBC AUTO-ENTMCNC: 30.6 G/DL (ref 32–36)
MCV RBC AUTO: 92 FL (ref 82–98)
MONOCYTES # BLD AUTO: 0.7 K/UL (ref 0.3–1)
MONOCYTES NFR BLD: 6.7 % (ref 4–15)
NEUTROPHILS # BLD AUTO: 8.3 K/UL (ref 1.8–7.7)
NEUTROPHILS NFR BLD: 79.8 % (ref 38–73)
NRBC BLD-RTO: 0 /100 WBC
OHS QRS DURATION: 74 MS
OHS QTC CALCULATION: 483 MS
PHOSPHATE SERPL-MCNC: 3.1 MG/DL (ref 2.7–4.5)
PHOSPHATE SERPL-MCNC: 3.1 MG/DL (ref 2.7–4.5)
PLATELET # BLD AUTO: 206 K/UL (ref 150–450)
PMV BLD AUTO: 11.2 FL (ref 9.2–12.9)
POCT GLUCOSE: 131 MG/DL (ref 70–110)
POTASSIUM SERPL-SCNC: 3.7 MMOL/L (ref 3.5–5.1)
PROT SERPL-MCNC: 6.1 G/DL (ref 6–8.4)
RBC # BLD AUTO: 3.48 M/UL (ref 4–5.4)
SODIUM SERPL-SCNC: 143 MMOL/L (ref 136–145)
WBC # BLD AUTO: 10.44 K/UL (ref 3.9–12.7)

## 2024-11-13 PROCEDURE — 94668 MNPJ CHEST WALL SBSQ: CPT

## 2024-11-13 PROCEDURE — 80178 ASSAY OF LITHIUM: CPT

## 2024-11-13 PROCEDURE — 25000003 PHARM REV CODE 250

## 2024-11-13 PROCEDURE — 97535 SELF CARE MNGMENT TRAINING: CPT

## 2024-11-13 PROCEDURE — 27000221 HC OXYGEN, UP TO 24 HOURS

## 2024-11-13 PROCEDURE — 25000242 PHARM REV CODE 250 ALT 637 W/ HCPCS

## 2024-11-13 PROCEDURE — 94761 N-INVAS EAR/PLS OXIMETRY MLT: CPT

## 2024-11-13 PROCEDURE — 94640 AIRWAY INHALATION TREATMENT: CPT

## 2024-11-13 PROCEDURE — 99900035 HC TECH TIME PER 15 MIN (STAT)

## 2024-11-13 PROCEDURE — 20000000 HC ICU ROOM

## 2024-11-13 PROCEDURE — 97112 NEUROMUSCULAR REEDUCATION: CPT

## 2024-11-13 PROCEDURE — 80053 COMPREHEN METABOLIC PANEL: CPT

## 2024-11-13 PROCEDURE — 99233 SBSQ HOSP IP/OBS HIGH 50: CPT | Mod: ,,, | Performed by: STUDENT IN AN ORGANIZED HEALTH CARE EDUCATION/TRAINING PROGRAM

## 2024-11-13 PROCEDURE — 93005 ELECTROCARDIOGRAM TRACING: CPT

## 2024-11-13 PROCEDURE — 92523 SPEECH SOUND LANG COMPREHEN: CPT

## 2024-11-13 PROCEDURE — 85025 COMPLETE CBC W/AUTO DIFF WBC: CPT

## 2024-11-13 PROCEDURE — 93010 ELECTROCARDIOGRAM REPORT: CPT | Mod: ,,, | Performed by: INTERNAL MEDICINE

## 2024-11-13 PROCEDURE — 63600175 PHARM REV CODE 636 W HCPCS: Performed by: STUDENT IN AN ORGANIZED HEALTH CARE EDUCATION/TRAINING PROGRAM

## 2024-11-13 PROCEDURE — 83735 ASSAY OF MAGNESIUM: CPT

## 2024-11-13 PROCEDURE — 84100 ASSAY OF PHOSPHORUS: CPT

## 2024-11-13 PROCEDURE — 92526 ORAL FUNCTION THERAPY: CPT

## 2024-11-13 PROCEDURE — 63600175 PHARM REV CODE 636 W HCPCS

## 2024-11-13 RX ORDER — LITHIUM 8 MEQ/5ML
300 SOLUTION ORAL 3 TIMES DAILY
Status: DISCONTINUED | OUTPATIENT
Start: 2024-11-13 | End: 2024-11-25

## 2024-11-13 RX ORDER — BISACODYL 10 MG/1
10 SUPPOSITORY RECTAL DAILY
Status: DISCONTINUED | OUTPATIENT
Start: 2024-11-13 | End: 2024-11-16

## 2024-11-13 RX ORDER — QUETIAPINE FUMARATE 25 MG/1
25 TABLET, FILM COATED ORAL DAILY
Status: DISCONTINUED | OUTPATIENT
Start: 2024-11-13 | End: 2024-11-17

## 2024-11-13 RX ORDER — POLYETHYLENE GLYCOL 3350 17 G/17G
17 POWDER, FOR SOLUTION ORAL 2 TIMES DAILY
Status: DISCONTINUED | OUTPATIENT
Start: 2024-11-13 | End: 2024-11-14

## 2024-11-13 RX ORDER — QUETIAPINE FUMARATE 25 MG/1
50 TABLET, FILM COATED ORAL NIGHTLY
Status: DISCONTINUED | OUTPATIENT
Start: 2024-11-13 | End: 2024-11-17

## 2024-11-13 RX ADMIN — POTASSIUM BICARBONATE 50 MEQ: 978 TABLET, EFFERVESCENT ORAL at 05:11

## 2024-11-13 RX ADMIN — POLYETHYLENE GLYCOL 3350 17 G: 17 POWDER, FOR SOLUTION ORAL at 09:11

## 2024-11-13 RX ADMIN — ACETAMINOPHEN 650 MG: 325 TABLET, FILM COATED ORAL at 06:11

## 2024-11-13 RX ADMIN — DEXMEDETOMIDINE HYDROCHLORIDE 0.3 MCG/KG/HR: 4 INJECTION INTRAVENOUS at 09:11

## 2024-11-13 RX ADMIN — HEPARIN SODIUM 5000 UNITS: 5000 INJECTION INTRAVENOUS; SUBCUTANEOUS at 05:11

## 2024-11-13 RX ADMIN — LITHIUM 300 MG: 8 SOLUTION ORAL at 10:11

## 2024-11-13 RX ADMIN — CEFAZOLIN 2 G: 2 INJECTION, POWDER, FOR SOLUTION INTRAMUSCULAR; INTRAVENOUS at 09:11

## 2024-11-13 RX ADMIN — ASPIRIN 81 MG CHEWABLE TABLET 81 MG: 81 TABLET CHEWABLE at 10:11

## 2024-11-13 RX ADMIN — ATORVASTATIN CALCIUM 80 MG: 40 TABLET, FILM COATED ORAL at 10:11

## 2024-11-13 RX ADMIN — Medication 100 MG: at 10:11

## 2024-11-13 RX ADMIN — HYDROXYZINE HYDROCHLORIDE 25 MG: 25 TABLET ORAL at 12:11

## 2024-11-13 RX ADMIN — CLOPIDOGREL BISULFATE 75 MG: 75 TABLET ORAL at 10:11

## 2024-11-13 RX ADMIN — THERA TABS 1 TABLET: TAB at 10:11

## 2024-11-13 RX ADMIN — HYDROXYZINE HYDROCHLORIDE 25 MG: 25 TABLET ORAL at 05:11

## 2024-11-13 RX ADMIN — CEFAZOLIN 2 G: 2 INJECTION, POWDER, FOR SOLUTION INTRAMUSCULAR; INTRAVENOUS at 12:11

## 2024-11-13 RX ADMIN — QUETIAPINE FUMARATE 25 MG: 25 TABLET ORAL at 10:11

## 2024-11-13 RX ADMIN — SODIUM CHLORIDE SOLN NEBU 3% 4 ML: 3 NEBU SOLN at 07:11

## 2024-11-13 RX ADMIN — LITHIUM 300 MG: 8 SOLUTION ORAL at 03:11

## 2024-11-13 RX ADMIN — LIDOCAINE 5% 1 PATCH: 700 PATCH TOPICAL at 03:11

## 2024-11-13 RX ADMIN — QUETIAPINE FUMARATE 50 MG: 25 TABLET ORAL at 09:11

## 2024-11-13 RX ADMIN — HYDROXYZINE HYDROCHLORIDE 25 MG: 25 TABLET ORAL at 07:11

## 2024-11-13 RX ADMIN — CEFAZOLIN 2 G: 2 INJECTION, POWDER, FOR SOLUTION INTRAMUSCULAR; INTRAVENOUS at 06:11

## 2024-11-13 RX ADMIN — FOLIC ACID 1 MG: 1 TABLET ORAL at 10:11

## 2024-11-13 RX ADMIN — FLUOXETINE HYDROCHLORIDE 60 MG: 20 SOLUTION ORAL at 09:11

## 2024-11-13 RX ADMIN — HEPARIN SODIUM 5000 UNITS: 5000 INJECTION INTRAVENOUS; SUBCUTANEOUS at 03:11

## 2024-11-13 RX ADMIN — LITHIUM 199.8 MG: 8 SOLUTION ORAL at 09:11

## 2024-11-13 RX ADMIN — ACETAMINOPHEN 650 MG: 325 TABLET, FILM COATED ORAL at 12:11

## 2024-11-13 RX ADMIN — SENNOSIDES AND DOCUSATE SODIUM 2 TABLET: 50; 8.6 TABLET ORAL at 10:11

## 2024-11-13 RX ADMIN — HEPARIN SODIUM 5000 UNITS: 5000 INJECTION INTRAVENOUS; SUBCUTANEOUS at 09:11

## 2024-11-13 RX ADMIN — BISACODYL 10 MG: 10 SUPPOSITORY RECTAL at 09:11

## 2024-11-13 NOTE — PT/OT/SLP PROGRESS
Physical Therapy      Patient Name:  Malathi Mcpherson   MRN:  [id number]    Patient not seen today secondary to Therapist assessment. In AM pt having just received anxiety medication and lethargic, unable to return later for second attempt. Will follow up as appropriate.  Sarah De La O, PT

## 2024-11-13 NOTE — ASSESSMENT & PLAN NOTE
History of depression and anxiety  Qtc 483 from 558 yesterday    - Continuing home fluoxetine 80mg QD  - Increased to Lithium 300 TID  - lithium level daily (0.1 today)  - restarted seroquel 25mg each morning and 50mg each night  - EKG qshift

## 2024-11-13 NOTE — PLAN OF CARE
Pt is not medically ready to discharge.   The disability number was provided to pt  mos as requested. SW will continue to monitor pt needs.     Discharge Plan A and Plan B have been determined by review of patient's clinical status, future medical and therapeutic needs, and coverage/benefits for post-acute care in coordination with multidisciplinary team members.    Bobbi Finch MSW, EMILYW  [facility]  Case Management Dept.

## 2024-11-13 NOTE — ASSESSMENT & PLAN NOTE
49F with history of polysubstance abuse presents after being found down at home. CTA revealed multifocal infarctions with a R vertebral artery occlusion and a L PCA occlusion. Patient was OOW for TNK and stroke burden was felt to be too large to pursue IR intervention. MRI reveals multifocal posterior circulation infarctions involving bilateral occipital lobes, medial L temporal lobe, bilateral thalami, R dorsal medulla, and the cerebellum. Given the location and extent of her strokes, she is at risk of impaired consciousness and total vision loss. Risk of herniation is felt to be lower given the moderate extent of infarcted tissue and modest infratentorial infarctions. Improving exam with some purposeful movements. Off sedation, patient is following commands appropriately. Extubated to NC and weaning oxygen. Hospital course complicated by H.flu/staph aureus pneumonia.     Antithrombotics for secondary stroke prevention: Antiplatelets: Aspirin: 81 mg daily  Clopidogrel: 75 mg daily    Statins for secondary stroke prevention and hyperlipidemia, if present:   Statins: Atorvastatin- 80 mg daily    Aggressive risk factor modification: HTN, HLD     Rehab efforts: The patient has been evaluated by a stroke team provider and the therapy needs have been fully considered based off the presenting complaints and exam findings. The following therapy evaluations are needed: PT evaluate and treat, OT evaluate and treat, SLP evaluate and treat, PM&R evaluate for appropriate placement    Diagnostics ordered/pending: None     VTE prophylaxis: Mechanical prophylaxis: Place SCDs    BP parameters: Infarct: Post Thrombolytic therapy, SBP <180    -q1h neuro checks  -BMP QD  -ASA 81 QD  -Plavix 75mg QD  -SBP<160  -eunatremia  -NSGY and vascular neurology consulted, appreciate recs  -Repeat CT Head on 11/9/24: Expected evolutionary changes of the posterior circulation infarcts with no overt hemorrhagic conversion or significant midline  shift/herniation.   -PT/OT/SLP to evaluate and treat

## 2024-11-13 NOTE — PLAN OF CARE
11/13/2024      Malathi Mcpherson  [address] [place] [address]          Hospital Medicine Dept.  [facility] [address] [place] [address]  [phone] after hours  [phone] fax Malathi Mcpherson has been hospitalized at the [facility] since 11/6/2024.  Patient is unable to complete any duties at this time.         Please contact me if you have any questions.                  __________________________  Bobbi Finch LCSW  11/13/2024

## 2024-11-13 NOTE — PLAN OF CARE
[facility] Care Plan  POC reviewed with Malathi Mcpherson and family at 1400. Patient and Family verbalized understanding. Questions and concerns addressed. No acute events today. Pt progressing toward goals. Will continue to monitor. See below and flowsheets for full assessment and VS info.     Afebrile  Magnesium replaced  Weaned precedex gtt 0.8 to 0.6  EKG completed  Hydroxyzine x1  TF @ goal of 45cc  Weaned 5L NC to 4L  Failed vera        Is this a stroke patient? yes- Stroke booklet reviewed with patient and family, risk factors identified for patient and stroke booklet remains at bedside for ongoing education.     Care individualization: Fans and light weight blanket    Neuro:  Shailesh Coma Scale  Best Eye Response: 4-->(E4) spontaneous  Best Motor Response: 5-->(M5) localizes pain  Best Verbal Response: 2-->(V2) incomprehensible speech  [place] Coma Scale Score: 11  Assessment Qualifiers: patient chemically sedated or paralyzed  Pupil PERRLA: yes     24 hr Temp:  [98.5 °F (36.9 °C)-101.4 °F (38.6 °C)]     CV:   Rhythm: normal sinus rhythm  BP goals:   SBP < 220  MAP > 65    Resp:      Vent Mode: Spont  Set Rate: 16 BPM  Oxygen Concentration (%): 32  Vt Set: 450 mL  PEEP/CPAP: 5 cmH20  Pressure Support: 8 cmH20    Plan: N/A    GI/:     Diet/Nutrition Received: NPO, tube feeding  Last Bowel Movement: 11/09/24  Voiding Characteristics: external catheter    Intake/Output Summary (Last 24 hours) at 11/12/2024 1825  Last data filed at 11/12/2024 1801  Gross per 24 hour   Intake 3287.1 ml   Output 1150.9 ml   Net 2136.2 ml     Unmeasured Output  Urine Occurrence: 1  Stool Occurrence: 1  Pad Count: 2    Labs/Accuchecks:  Recent Labs   Lab 11/12/24  0112   WBC 9.13   RBC 3.40*   HGB 10.0*   HCT 30.7*         Recent Labs   Lab 11/12/24  0112      K 3.1*   CO2 20*   *   BUN 8   CREATININE 0.6   ALKPHOS 80   ALT 36   AST 45*   BILITOT 0.4      Recent Labs   Lab 11/06/24  0854   INR 1.0      Recent Labs    Lab 24  0112   *       Electrolytes: Electrolytes replaced  Accuchecks: Q6H    Gtts:   dexmedeTOMIDine (Precedex) infusion (titrating)  0-0.8 mcg/kg/hr Intravenous Continuous 8.85 mL/hr at 24 1801 0.6 mcg/kg/hr at 24 1801       LDA/Wounds:    Sree Risk Assessment  Sensory Perception: 2-->very limited  Moisture: 3-->occasionally moist  Activity: 1-->bedfast  Mobility: 3-->slightly limited  Nutrition: 3-->adequate  Friction and Shear: 2-->potential problem  Sree Score: 14    Is your sree score 12 or less? no            Restraints:   Restraint Order  Length of Order: Order good for next 24 hours or when removed.  Date that the current order will : 24  Time that the current order will :   Order Upon Application: Yes    [facility]

## 2024-11-13 NOTE — ASSESSMENT & PLAN NOTE
Patient with long history of polysubstance abuse including crack cocaine, alcohol, and opiates  Patient was found with drug paraphernalia on person upon admission  Urine tox screen positive for cocaine  Will consider addiction psych consult when patient is able consistently interact with exam

## 2024-11-13 NOTE — SUBJECTIVE & OBJECTIVE
Interval History:  See hospital course    Review of Systems   Unable to perform ROS: Patient nonverbal     Unable to obtain a complete ROS due to level of consciousness.    Objective:     Vitals:  Temp: 99 °F (37.2 °C)  Pulse: 67  Rhythm: normal sinus rhythm  BP: (!) 121/59  MAP (mmHg): 83  Resp: (!) 21  SpO2: 97 %  Oxygen Concentration (%): 36    Temp  Min: 98.1 °F (36.7 °C)  Max: 99 °F (37.2 °C)  Pulse  Min: 60  Max: 96  BP  Min: 89/52  Max: 163/70  MAP (mmHg)  Min: 65  Max: 106  Resp  Min: 13  Max: 41  SpO2  Min: 92 %  Max: 100 %  Oxygen Concentration (%)  Min: 36  Max: 36    11/12 0701 - 11/13 0700  In: 2427 [I.V.:738.9]  Out: 1075 [Urine:1050; Drains:25]   Unmeasured Output  Urine Occurrence: 1  Stool Occurrence: 1  Pad Count: 2        Physical Exam  Vitals and nursing note reviewed.   Constitutional:       General: She is not in acute distress.     Comments: Sedated   HENT:      Head: Normocephalic and atraumatic.   Cardiovascular:      Rate and Rhythm: Normal rate and regular rhythm.      Heart sounds: Normal heart sounds.   Pulmonary:      Effort: No respiratory distress.      Breath sounds: Normal breath sounds.      Comments: Scattered mild inspiratory wheezes  Abdominal:      General: Abdomen is flat.      Palpations: Abdomen is soft.   Musculoskeletal:      Right lower leg: No edema.      Left lower leg: No edema.   Skin:     General: Skin is warm and dry.   Neurological:      GCS: GCS eye subscore is 3. GCS motor subscore is 4.      Comments: Off sedation for exam  Intermittently answering questions appropriately  Intermittently following commands, L>R extremities  PERRL  Face symmetrical    Motor:  Spontaneous antigravity movement throughout, less in the RUE  Withdrawal to noxious stimuli throughout         Unable to test orientation, language, memory, judgment, insight, fund of knowledge, hearing, shoulder shrug, tongue protrusion, coordination, gait due to level of consciousness.        Medications:  ContinuousdexmedeTOMIDine (Precedex) infusion (titrating), Last Rate: 0.5 mcg/kg/hr (11/13/24 1001)    Scheduledacetaminophen, 650 mg, Q6H  aspirin, 81 mg, Daily  atorvastatin, 80 mg, Daily  bisacodyL, 10 mg, Daily  ceFAZolin (Ancef) IV (PEDS and ADULTS), 2 g, Q8H  clopidogreL, 75 mg, Daily  FLUoxetine, 60 mg, Daily  folic acid, 1 mg, Daily  heparin (porcine), 5,000 Units, Q8H  LIDOcaine, 1 patch, Q24H  lithium citrate 8 meq/5 ml, 300 mg, TID  multivitamin, 1 tablet, Daily  polyethylene glycol, 17 g, BID  QUEtiapine, 25 mg, Daily  QUEtiapine, 50 mg, QHS  senna-docusate 8.6-50 mg, 2 tablet, Daily  thiamine, 100 mg, Daily    PRNalbuterol-ipratropium, 3 mL, Q6H PRN  hydrOXYzine HCL, 25 mg, QID PRN  magnesium oxide, 800 mg, PRN  magnesium oxide, 800 mg, PRN  midazolam, 2 mg, Once PRN  potassium bicarbonate, 35 mEq, PRN  potassium bicarbonate, 50 mEq, PRN  potassium bicarbonate, 60 mEq, PRN  potassium, sodium phosphates, 2 packet, PRN  potassium, sodium phosphates, 2 packet, PRN  potassium, sodium phosphates, 2 packet, PRN  sodium chloride 0.9%, 10 mL, PRN      Today I personally reviewed pertinent medications, lines/drains/airways, imaging, cardiology results, laboratory results, microbiology results, notably:    Diet  Diet NPO  Diet NPO

## 2024-11-13 NOTE — ASSESSMENT & PLAN NOTE
folliculitis noted on mons pubis  - dc'd bactrim given coverage with otherwise indicated antibiotics  - Wound care following

## 2024-11-13 NOTE — ASSESSMENT & PLAN NOTE
RESOLVED  CK elevated after being found down at home  Trended down to 723 with small uprend into 800s    - Stopped maintenance fluids given improving CPK and UOP  - Will not trend CK further

## 2024-11-13 NOTE — PT/OT/SLP PROGRESS
"Occupational Therapy   Treatment    Name: Malathi Mcpherson  MRN: [id number]  Admitting Diagnosis:  Acute arterial ischemic stroke, multifocal, posterior circulation       Recommendations:     Discharge Recommendations: High Intensity Therapy  Discharge Equipment Recommendations:  bath bench, bedside commode  Barriers to discharge:  None    Assessment:     Malathi Mcpherson is a 49 y.o. female with a medical diagnosis of Acute arterial ischemic stroke, multifocal, posterior circulation.  She presents with performance deficits affecting function are weakness, impaired self care skills, impaired functional mobility, decreased upper extremity function, impaired balance, impaired endurance, decreased lower extremity function, impaired cognition, decreased coordination.     Rehab Prognosis:  Good; patient would benefit from acute skilled OT services to address these deficits and reach maximum level of function.       Plan:     Patient to be seen 4 x/week to address the above listed problems via neuromuscular re-education, therapeutic exercises, therapeutic activities, self-care/home management, cognitive retraining  Plan of Care Expires: 12/05/24  Plan of Care Reviewed with: patient    Subjective     Patient:  "Thank you."  "I feel so much better."  Pain/Comfort:  Pain Rating 1: 0/10  Pain Rating Post-Intervention 1: 0/10    Objective:     Communicated with: Nurse prior to session.  Patient found supine with restraints, SCD, telemetry, peripheral IV, bed alarm, pressure relief boots, PureWick, pulse ox (continuous), NG tube, blood pressure cuff, oxygen upon OT entry to room.    General Precautions: Standard, aspiration, fall, NPO    Orthopedic Precautions:N/A  Braces: N/A  Respiratory Status: Nasal cannula, flow 5 L/min     Occupational Performance:     Bed Mobility:    Patient completed Rolling/Turning to Left with  minimum assistance  Patient completed Rolling/Turning to Right with minimum assistance  Patient completed " Supine to Sit with moderate assistance  Patient completed Sit to Supine with moderate assistance     Functional Mobility/Transfers:  Patient completed Sit <> Stand Transfer with maximal assistance  with  no assistive device     Activities of Daily Living:  Grooming: maximal assistance while seated EOB  Upper Body Dressing: maximal assistance while seated EOB  Lower Body Dressing: maximal assistance while seated EOB  Patient with increased weight shift to the right while seated EOB; able to self-correct.    [facility] 6 Click ADL: 11    Treatment & Education:  Patient alert and oriented x person.  Able to follow 4/4 one step commands.  Patient attentive and interactive throughout the session.      Patient left supine with all lines intact, call button in reach, bed alarm on, and restraints reapplied at end of session    GOALS:   Multidisciplinary Problems       Occupational Therapy Goals          Problem: Occupational Therapy    Goal Priority Disciplines Outcome Interventions   Occupational Therapy Goal     OT, PT/OT Progressing    Description: Goals set 11/11 with an expiration date, 12/5:  Patient will increase functional independence with ADLs by performing:    Patient will demonstrate rolling to the right with min assist.  Not met   Patient will demonstrate rolling to the left with min assist.   Not met  Patient will demonstrate supine -sit with min assist.   Not met  Patient will demonstrate squat pivot transfers with min assist.   Not met  Patient will demonstrate grooming while seated with min assist.   Not met  Patient will demonstrate upper body dressing with min assist while seated EOB.   Not met  Patient will demonstrate lower body dressing with mod assist while seated EOB.   Not met  Patient will demonstrate toileting with mod assist.   Not met  Patient's family / caregiver will demonstrate independence and safety with assisting patient with self-care skills and functional mobility.     Not met  Patient's  family / caregiver will demonstrate independence with providing ROM and changes in bed positioning.   Not met                                 Time Tracking:     OT Date of Treatment: 11/13/24  OT Start Time: 0513  OT Stop Time: 0537  OT Total Time (min): 24 min    Billable Minutes:Self Care/Home Management 12  Neuromuscular Re-education 12    OT/MADHURI: OT          11/13/2024

## 2024-11-13 NOTE — PT/OT/SLP EVAL
Speech Language Pathology Evaluation  Cognitive/Bedside Swallow TX    Patient Name:  Malathi Mcpherson   MRN:  [id number]  Admitting Diagnosis: Acute arterial ischemic stroke, multifocal, posterior circulation    Recommendations:                  General Recommendations:  Dysphagia therapy, Speech/language therapy, and Cognitive-linguistic therapy  Diet recommendations:  NPO, NPO   Aspiration Precautions: Continue alternate means of nutrition, Feed only when awake/alert, Frequent oral care, HOB to 90 degrees, Ice chips sparingly, and Strict aspiration precautions   General Precautions: Standard, aspiration, fall, NPO  Communication strategies:  provide increased time to answer    Assessment:     Malathi Mcpherson is a 49 y.o. female with an SLP diagnosis of Dysphagia and Cognitive-Linguistic Impairment.  She presents with increased risk for aspiration 2/2 altered mentation and waxing/waning JAMESON and agitation.     History:     Past Medical History:   Diagnosis Date    Acute adjustment disorder with depressed mood     Cutaneous abscess 08/08/2019    Depression     Enlarged liver 04/27/2017    Hep C w/o coma, chronic     Hepatitis B     History of mental disorder 03/22/2017    History of substance abuse      History reviewed. No pertinent surgical history.    Subjective     Pt seen BID this date 2/2 improved JAMESON and command following this afternoon. Pt awake, slouched down in bed upon entry to room. Much more calm compared to earlier session though NGT and restraints remain ion place. Mother at bedside.     Pain/Comfort:  Pain Rating 1: 0/10  Pain Rating Post-Intervention 1: 0/10    Respiratory Status: Room air- nasal cannula removed     Objective:     Cognitive Status:    Arousal/Alertness Inconsistent responses - depending on attention to task, calmness, and distractions  Attention Alternating attention deficit   , Divided attention deficit  , Selective attention deficit  , and Sustained attention deficit     Orientation Person & Place. Pt disoriented to situation, year, day, date.   Memory recall personal information: able to ID mother at bedside and recall her name, able to recall her sister's name, though despite mod cuing and prompting, pt unable to name her 4 kids' names.   Safety awareness impaired, attempting to itch her nose/pull at her NGT throughout session.       Receptive Language:   Comprehension:      Questions Simple yes/no 67%  Commands  One step 100% min assist  Conversation appeared to demonstrated functional understanding with simplified informational context, redirection and repetitions.     Pragmatics:    Easily distractible and anxious, restless in bed throughout session requiring constant verbal reassurance from mother and clinician, inconsistent eye contact  , maintenance -fair, and topic maintenance - requires redirection and repetitions     Expressive Language:  Verbal:    Initiation wfl  Sentence formulation impaired  Conversational speech impaired  *Requires ongoing assessment       Motor Speech:  Intelligibility ~60-70%  Articulation imprecise  Phonation wfl  Initiation wfl    Voice:   Quality Hoarse  Intensity decreased     Visual-Spatial:  Hannah 2/2 fully restraints   Pt able to turn her head to attend to people on either side of her bed, though appears to have impaired vision.     Reading:   hannah      Written Expression:   Hannah 2/2 restraints     Oral Musculature Evaluation  Oral Musculature: WFL  Dentition: edentulous  Secretion Management: adequate  Mucosal Quality: adequate  Mandibular Strength and Mobility: WFL  Oral Labial Strength and Mobility: WFL  Lingual Strength and Mobility: functional protrusion, functional lateral movement  Velar Elevation: WFL  Buccal Strength and Mobility: WFL  Volitional Cough: present  Volitional Swallow: present  Voice Prior to PO Intake: hoarse    Bedside Ongoing Swallow Eval:   Consistencies Assessed:  Thin liquids ice chip x3, tsp x2  Nectar thick  liquids tsp x3, cup x2  Honey thick liquids tsp x4, cup x3, straw x3  Puree tsp x4      Oral Phase:   Anterior loss right corner of mouth  Suspected premature spillage   Decreased orolingual control  Mild oral residue    Pharyngeal Phase:   coughing/choking/wet vocal quality on tsp thin, cup sip NTL, straw sip HTL  Delayed cough/ throat clearing following puree trials  decreased hyolaryngeal excursion to palpation  delayed swallow initation    Compensatory Strategies  Lingual sweep- cued     Treatment: RN at bedside to assist with repositioning. Pt awake and significantly more calm and agreeable to PO trials with SLP this session. Pt with overt s/sx of airway compromise with all trials and continues to be at increased risk for aspiration. Recommend NPO with ongoing NGT feeds to meet nutritional needs. Educations provided re: role of SLP, alternative means of nutrition, s/sx of aspiration, aspiration precautions, oral care, swallow strategies, deficits and ongoing SLP POC. Pt and mother verbalized understanding. White board current. RN notified post session. SLP will continue to follow.     Goals:   Multidisciplinary Problems       SLP Goals          Problem: SLP    Goal Priority Disciplines Outcome   SLP Goal     SLP Progressing   Description: Speech Language Pathology Goals  Goals expected to be met by 11/25    1. Pt will participate in ongoing swallow assessment to determine leats restrictive PO diet when appropriate.   2. Pt will complete cognitive-linguistic assessment to determine additional therapeutic needs.                                Plan:     Patient to be seen:  4 x/week   Plan of Care expires:  12/11/24  Plan of Care reviewed with:  patient, mother   SLP Follow-Up:  Yes       Discharge recommendations:  Therapy Intensity Recommendations at Discharge: High Intensity Therapy   Barriers to Discharge:  Level of Skilled Assistance Needed      Time Tracking:     SLP Treatment Date:   11/13/24  Speech Start  Time:  1310  Speech Stop Time:  1346     Speech Total Time (min):  36 min    Billable Minutes: Eval 8 , Treatment Swallowing Dysfunction 5, and Self Care/Home Management Training 23 11/13/2024

## 2024-11-13 NOTE — PROGRESS NOTES
[facility] - Neuro Critical Care  Neurocritical Care  Progress Note    Admit Date: 11/6/2024  Service Date: 11/13/2024  Length of Stay: 7    Subjective:     Chief Complaint: Acute arterial ischemic stroke, multifocal, posterior circulation    History of Present Illness: 49 year old with a hx of drug use, seizure, HTN, HLD  who came to [facility] ED and was stroke activated on 11/6/24. Pt was found down by her mother this morning at an unknown time lying face down. Per EMS, she was very lethargic and aphasic and was able to move her left sided extremities and R leg but was not moving her RUE at all; pt known to EMS due to history of seizure. Pt's exam worsened on exam with no movement in her right sided extremities and desaturated to 70s and required intubation. She was also noted to have an abrasion/contusion to the right temporal area Her last known normal states was at 10 pm yesterday -- did not receive TNK due to being out of the window and strokes noted in her CT. She was admitted to [facility] for further care. Pt's case has been reviewed by Neuro IR -- not candidate for intervention due to the extensive nature of infarcts and the risk of bleeding.    Per pt's family, patient's drugs of choice are crack, heroine and ETOH. Pt's mother reports that the patient has been regularly reporting to drug court of the past several weeks and has passed all of her drug tests during this period of time -- her last drug test was two weeks ago. Pt's family also reports that the patient had been acting strange over the past few days -- she has been sleeping more and displayed erratic behavior that leads them to believe that patient may have relapsed. Of note, a crack pipe was found on the patient's person on presentation to [facility].    Hospital Course: 11/07/2024 Agitated overnight, requiring uptitration of fentanyl and addition of precedex. Weaning throughout the day. Neuro exam may be slightly improved from yesterday even despite sedation with  more R sided movement. However, no purposeful movement or localization of noxious stimuli. MRI yesterday with multifocal infarcts including the cerebellum, bilateral occipital lobes, and bilateral thalami (R>L). CK found to be elevated overnight, fluids running and trending levels.   11/08/2024 More alert and developing some purposeful movement by reaching towards ETT, brushing hair behind ear, and localizing to LLE noxious stimuli with LUE. CK downtrending with fluids. Monitoring decreased UOP with stable BUN/Cr. Weaning fentanyl and initiated precedex. Plan to DC fluid tomorrow if CK continues to downtrend and remove fluid. Started tube feeds today. Lithium level now low, started half-dose home lithium.  11/10/2024 NAEON. Patient following commands this morning. Extubated to NC. Patient tolerated extubation well. Remains on precedex gtt for agitation, requiring PRNs. Resp culture positive for H flu. Started rocephin.  11/11/2024 Agitated overnight requiring uptitration of precedex and multiple PRN administrations. Reintitation of lithium, adding measures for withdrawal management. Respiratory culture also growing staph aureus; MRSA screen positive. Vanc started.  11/12/2024 Tolerated wean of supplemental oxygen until having to titrate up precedex overnight. Weaning oxygen and sedation as tolerated today. Qtc prolonged to 558; giving mag IV and recheck Qtc down to 535. Will have to avoid Qtc prolonging agents for agitation control. Increasing lithium and hydroxyzine dose. Stopping fluids given adequate UOP and downtrending CK.  11/13/2024 Patient's neurological exam improving. Answering questions appropriately and following some commands. Weaning precedex and uptitrating oral medications as tolerated, given improvement in Qtc.     Interval History:  See hospital course    Review of Systems   Unable to perform ROS: Patient nonverbal     Unable to obtain a complete ROS due to level of consciousness.    Objective:      Vitals:  Temp: 99 °F (37.2 °C)  Pulse: 67  Rhythm: normal sinus rhythm  BP: (!) 121/59  MAP (mmHg): 83  Resp: (!) 21  SpO2: 97 %  Oxygen Concentration (%): 36    Temp  Min: 98.1 °F (36.7 °C)  Max: 99 °F (37.2 °C)  Pulse  Min: 60  Max: 96  BP  Min: 89/52  Max: 163/70  MAP (mmHg)  Min: 65  Max: 106  Resp  Min: 13  Max: 41  SpO2  Min: 92 %  Max: 100 %  Oxygen Concentration (%)  Min: 36  Max: 36    11/12 0701 - 11/13 0700  In: 2427 [I.V.:738.9]  Out: 1075 [Urine:1050; Drains:25]   Unmeasured Output  Urine Occurrence: 1  Stool Occurrence: 1  Pad Count: 2        Physical Exam  Vitals and nursing note reviewed.   Constitutional:       General: She is not in acute distress.     Comments: Sedated   HENT:      Head: Normocephalic and atraumatic.   Cardiovascular:      Rate and Rhythm: Normal rate and regular rhythm.      Heart sounds: Normal heart sounds.   Pulmonary:      Effort: No respiratory distress.      Breath sounds: Normal breath sounds.      Comments: Scattered mild inspiratory wheezes  Abdominal:      General: Abdomen is flat.      Palpations: Abdomen is soft.   Musculoskeletal:      Right lower leg: No edema.      Left lower leg: No edema.   Skin:     General: Skin is warm and dry.   Neurological:      GCS: GCS eye subscore is 3. GCS motor subscore is 4.      Comments: Off sedation for exam  Intermittently answering questions appropriately  Intermittently following commands, L>R extremities  PERRL  Face symmetrical    Motor:  Spontaneous antigravity movement throughout, less in the RUE  Withdrawal to noxious stimuli throughout         Unable to test orientation, language, memory, judgment, insight, fund of knowledge, hearing, shoulder shrug, tongue protrusion, coordination, gait due to level of consciousness.       Medications:  ContinuousdexmedeTOMIDine (Precedex) infusion (titrating), Last Rate: 0.5 mcg/kg/hr (11/13/24 1001)    Scheduledacetaminophen, 650 mg, Q6H  aspirin, 81 mg, Daily  atorvastatin, 80 mg,  Daily  bisacodyL, 10 mg, Daily  ceFAZolin (Ancef) IV (PEDS and ADULTS), 2 g, Q8H  clopidogreL, 75 mg, Daily  FLUoxetine, 60 mg, Daily  folic acid, 1 mg, Daily  heparin (porcine), 5,000 Units, Q8H  LIDOcaine, 1 patch, Q24H  lithium citrate 8 meq/5 ml, 300 mg, TID  multivitamin, 1 tablet, Daily  polyethylene glycol, 17 g, BID  QUEtiapine, 25 mg, Daily  QUEtiapine, 50 mg, QHS  senna-docusate 8.6-50 mg, 2 tablet, Daily  thiamine, 100 mg, Daily    PRNalbuterol-ipratropium, 3 mL, Q6H PRN  hydrOXYzine HCL, 25 mg, QID PRN  magnesium oxide, 800 mg, PRN  magnesium oxide, 800 mg, PRN  midazolam, 2 mg, Once PRN  potassium bicarbonate, 35 mEq, PRN  potassium bicarbonate, 50 mEq, PRN  potassium bicarbonate, 60 mEq, PRN  potassium, sodium phosphates, 2 packet, PRN  potassium, sodium phosphates, 2 packet, PRN  potassium, sodium phosphates, 2 packet, PRN  sodium chloride 0.9%, 10 mL, PRN      Today I personally reviewed pertinent medications, lines/drains/airways, imaging, cardiology results, laboratory results, microbiology results, notably:    Diet  Diet NPO  Diet NPO    Assessment/Plan:     Neuro  * Acute arterial ischemic stroke, multifocal, posterior circulation  49F with history of polysubstance abuse presents after being found down at home. CTA revealed multifocal infarctions with a R vertebral artery occlusion and a L PCA occlusion. Patient was OOW for TNK and stroke burden was felt to be too large to pursue IR intervention. MRI reveals multifocal posterior circulation infarctions involving bilateral occipital lobes, medial L temporal lobe, bilateral thalami, R dorsal medulla, and the cerebellum. Given the location and extent of her strokes, she is at risk of impaired consciousness and total vision loss. Risk of herniation is felt to be lower given the moderate extent of infarcted tissue and modest infratentorial infarctions. Improving exam with some purposeful movements. Off sedation, patient is following commands  appropriately. Extubated to NC and weaning oxygen. Hospital course complicated by H.flu/staph aureus pneumonia.     Antithrombotics for secondary stroke prevention: Antiplatelets: Aspirin: 81 mg daily  Clopidogrel: 75 mg daily    Statins for secondary stroke prevention and hyperlipidemia, if present:   Statins: Atorvastatin- 80 mg daily    Aggressive risk factor modification: HTN, HLD     Rehab efforts: The patient has been evaluated by a stroke team provider and the therapy needs have been fully considered based off the presenting complaints and exam findings. The following therapy evaluations are needed: PT evaluate and treat, OT evaluate and treat, SLP evaluate and treat, PM&R evaluate for appropriate placement    Diagnostics ordered/pending: None     VTE prophylaxis: Mechanical prophylaxis: Place SCDs    BP parameters: Infarct: Post Thrombolytic therapy, SBP <180    -q1h neuro checks  -BMP QD  -ASA 81 QD  -Plavix 75mg QD  -SBP<160  -eunatremia  -NSGY and vascular neurology consulted, appreciate recs  -Repeat CT Head on 11/9/24: Expected evolutionary changes of the posterior circulation infarcts with no overt hemorrhagic conversion or significant midline shift/herniation.   -PT/OT/SLP to evaluate and treat      Cytotoxic cerebral edema  See primary problem    Psychiatric  Polysubstance abuse  Patient with long history of polysubstance abuse including crack cocaine, alcohol, and opiates  Patient was found with drug paraphernalia on person upon admission  Urine tox screen positive for cocaine  Will consider addiction psych consult when patient is able consistently interact with exam    Anxiety and depression  History of depression and anxiety  Qtc 483 from 558 yesterday    - Continuing home fluoxetine 80mg QD  - Increased to Lithium 300 TID  - lithium level daily (0.1 today)  - restarted seroquel 25mg each morning and 50mg each night  - EKG qshift    Derm  Folliculitis  folliculitis noted on mons pubis  - dc'd  bactrim given coverage with otherwise indicated antibiotics  - Wound care following     Pulmonary  Right upper lobe pneumonia  Patient recently extubated with persistent oxygen requirement  CXR remarkable for RUL consolidation  Respiratory culture positive for H flu and now staph aureus  Sensitivities indicative of a MSSA  MRSA screen by PCR positive  Sensitivities pending  Transitioned to cefazolin 11/12  Wean oxygen as tolerated  Continuous pulse oximetry    On mechanically assisted ventilation  RESOLVED  Due to stroke  Extubated 11/10  Pepcid ppx  3% nebs    Other  Lithium toxicity  RESOLVED  See anxiety and depression    Debility  Due to stroke  PT/OT/SLP to evaluate and treat when appropriate    HyperCKemia  RESOLVED  CK elevated after being found down at home  Trended down to 723 with small uprend into 800s    - Stopped maintenance fluids given improving CPK and UOP  - Will not trend CK further              The patient is being Prophylaxed for:  Venous Thromboembolism with: Chemical  Stress Ulcer with: H2B  Ventilator Pneumonia with: not applicable    Activity Orders            Elevate HOB Elevate (30-45 degrees) Elevate HOB to 30 - 45 degrees during feeding unless otherwise stated starting at 11/08 1236    Turn patient starting at 11/06 1400    Diet NPO: NPO starting at 11/06 1325          Full Code    Darius Echevarria MD  Neurocritical Care  [facility] - Neuro Critical Care

## 2024-11-13 NOTE — PT/OT/SLP PROGRESS
Speech Language Pathology Treatment    Patient Name:  Malathi Mcpherson   MRN:  [id number]  Admitting Diagnosis: Acute arterial ischemic stroke, multifocal, posterior circulation    Recommendations:                 General Recommendations:  Ongoing swallow assessment, Speech language evaluation and Cognitive-linguistic evaluation  Diet recommendations:  NPO, Liquid Diet Level: NPO   Aspiration Precautions: Continue alternate means of nutrition, Frequent oral care, and Strict aspiration precautions   General Precautions: Standard, aspiration, fall, NPO  Communication strategies:  provide increased time to answer    Assessment:     Malathi Mcpherson is a 49 y.o. female with an SLP diagnosis of Dysphagia. Pt continues to present with high risk for aspiration 2/2 alert mentation and waxing/waning JAMESON and command following.     Subjective     Spoke with RN prior to session. Pt more awake this date though restless, anxious and agitated.  NGT and restraints in place.    Pain/Comfort:  Session 1:Pain Rating 1:  (none reported though s/sx appreciated)  Session 2: None reported or observed 0/10    Respiratory Status: Session 1: Nasal cannula, flow 4 L/min w/ humidification- pt ripped off at start of session with O2 sats dropping to low 90s.  Session 2: room air, 92-96%.    Objective:     Has the patient been evaluated by SLP for swallowing?   Yes  Keep patient NPO? Yes     Pt seen bedside for ongoing dysphagia therapy. Lights turned on, HOB elevated and pt requiring constant calming techniques. RN at bedside assisting with making sure pt didn't pull NGT as she was restless and agitated in bed pulling her NC off and reaching/batting for her NGT. Noted blood coming from both nares and incoherent spontaneous speech across session and intermittent command following. Pt edentulous with dry oral mucosa. Once upright and somewhat calm, able to elicit volitional voicing and cough. Pt demonstrating adequate oral acceptance to thermal  stimulation to labial surface with small ice chip x 2. She demonstrated immediate overt cough response and O2 desaturation from , low 90s to high 80s. Additional PO trials deferred 2/2 altered mentation and persistent agitation despite max attempt to redirect and calm patient. Continue NPO with NGT feeds at this time with frequent oral care. White board current. SLP will continue to follow.    Goals:   Multidisciplinary Problems       SLP Goals          Problem: SLP    Goal Priority Disciplines Outcome   SLP Goal     SLP Progressing   Description: Speech Language Pathology Goals  Goals expected to be met by 11/25    1. Pt will participate in ongoing swallow assessment to determine leats restrictive PO diet when appropriate.   2. Pt will complete cognitive-linguistic assessment to determine additional therapeutic needs.                                Plan:     Patient to be seen:  4 x/week   Plan of Care expires:  12/11/24  Plan of Care reviewed with:  patient (RN)   SLP Follow-Up:  Yes       Discharge recommendations:  High Intensity Therapy   Barriers to Discharge:  Level of Skilled Assistance Needed      Time Tracking:     SLP Treatment Date:   11/13/24  Speech Start Time:  0936  Speech Stop Time:  0946     Speech Total Time (min):  10 min    Billable Minutes: Treatment Swallowing Dysfunction 10    11/13/2024

## 2024-11-13 NOTE — PLAN OF CARE
CLAUDY met with pt mother at bedside.   Pt is not medically ready to discharge.   Pt mother indicated that pt will need a letter with prognosis / diagnosis sent to her  Xin Grimes  ( [email or web address].   SW will continue to follow pt needs.         Discharge Plan A and Plan B have been determined by review of patient's clinical status, future medical and therapeutic needs, and coverage/benefits for post-acute care in coordination with multidisciplinary team members.    Bobbi Finch MSW, EMILYW  [facility]  Case Management Dept.

## 2024-11-13 NOTE — PLAN OF CARE
[facility] Care Plan    POC reviewed with Malathi Mcpherson and family at 0300. Patient verbalized understanding. Questions and concerns addressed. No acute events today. Pt progressing toward goals. Will continue to monitor. See below and flowsheets for full assessment and VS info.     Precedex at 0.6  K replaced  IV replaced  Patient attentive. Anxious but redirectable      Is this a stroke patient? yes- Stroke booklet reviewed with patient, risk factors identified for patient and stroke booklet remains at bedside for ongoing education.     Care individualization: n/a    Neuro:  Shailesh Coma Scale  Best Eye Response: 4-->(E4) spontaneous  Best Motor Response: 5-->(M5) localizes pain  Best Verbal Response: 3-->(V3) inappropriate words  Shailesh Coma Scale Score: 12  Assessment Qualifiers: patient chemically sedated or paralyzed  Pupil PERRLA: yes     24 hr Temp:  [98.1 °F (36.7 °C)-98.7 °F (37.1 °C)]     CV:   Rhythm: normal sinus rhythm  BP goals:   SBP < 220  MAP > 65    Resp:      Vent Mode: Spont  Set Rate: 16 BPM  Oxygen Concentration (%): 32  Vt Set: 450 mL  PEEP/CPAP: 5 cmH20  Pressure Support: 8 cmH20    Plan: N/A    GI/:     Diet/Nutrition Received: NPO, tube feeding  Last Bowel Movement: 11/09/24  Voiding Characteristics: external catheter    Intake/Output Summary (Last 24 hours) at 11/13/2024 0502  Last data filed at 11/13/2024 0301  Gross per 24 hour   Intake 3555.71 ml   Output 1575 ml   Net 1980.71 ml     Unmeasured Output  Urine Occurrence: 1  Stool Occurrence: 1  Pad Count: 2    Labs/Accuchecks:  Recent Labs   Lab 11/13/24  0030   WBC 10.44   RBC 3.48*   HGB 9.8*   HCT 32.0*         Recent Labs   Lab 11/13/24  0030      K 3.7   CO2 22*   *   BUN 11   CREATININE 0.6   ALKPHOS 87   ALT 33   AST 42*   BILITOT 0.2      Recent Labs   Lab 11/06/24  0854   INR 1.0      Recent Labs   Lab 11/12/24  0112   *       Electrolytes: Electrolytes replaced  Accuchecks: Q6H    Gtts:    dexmedeTOMIDine (Precedex) infusion (titrating)  0-0.8 mcg/kg/hr Intravenous Continuous 8.85 mL/hr at 24 025 0.6 mcg/kg/hr at 24 025       LDA/Wounds:    Sree Risk Assessment  Sensory Perception: 2-->very limited  Moisture: 3-->occasionally moist  Activity: 1-->bedfast  Mobility: 3-->slightly limited  Nutrition: 3-->adequate  Friction and Shear: 2-->potential problem  Sree Score: 14  Is your sree score 12 or less? no          Restraints:   Restraint Order  Length of Order: Order good for next 24 hours or when removed.  Date that the current order will : 24  Time that the current order will :   Order Upon Application: Yes    [facility]     Problem: Adult Inpatient Plan of Care  Goal: Plan of Care Review  Outcome: Progressing  Goal: Patient-Specific Goal (Individualized)  Outcome: Progressing  Goal: Absence of Hospital-Acquired Illness or Injury  Outcome: Progressing  Goal: Optimal Comfort and Wellbeing  Outcome: Progressing  Goal: Readiness for Transition of Care  Outcome: Progressing     Problem: Infection  Goal: Absence of Infection Signs and Symptoms  Outcome: Progressing     Problem: Wound  Goal: Optimal Coping  Outcome: Progressing  Goal: Optimal Functional Ability  Outcome: Progressing  Goal: Absence of Infection Signs and Symptoms  Outcome: Progressing  Goal: Improved Oral Intake  Outcome: Progressing  Goal: Optimal Pain Control and Function  Outcome: Progressing  Goal: Skin Health and Integrity  Outcome: Progressing  Goal: Optimal Wound Healing  Outcome: Progressing     Problem: Stroke, Ischemic (Includes Transient Ischemic Attack)  Goal: Optimal Coping  Outcome: Progressing  Goal: Effective Bowel Elimination  Outcome: Progressing  Goal: Optimal Cerebral Tissue Perfusion  Outcome: Progressing  Goal: Optimal Cognitive Function  Outcome: Progressing  Goal: Improved Communication Skills  Outcome: Progressing  Goal: Optimal Functional Ability  Outcome: Progressing  Goal:  Optimal Nutrition Intake  Outcome: Progressing  Goal: Effective Oxygenation and Ventilation  Outcome: Progressing  Goal: Improved Sensorimotor Function  Outcome: Progressing  Goal: Safe and Effective Swallow  Outcome: Progressing  Goal: Effective Urinary Elimination  Outcome: Progressing     Problem: Mechanical Ventilation Invasive  Goal: Effective Communication  Outcome: Progressing  Goal: Optimal Device Function  Outcome: Progressing  Goal: Mechanical Ventilation Liberation  Outcome: Progressing  Goal: Optimal Nutrition Delivery  Outcome: Progressing  Goal: Absence of Device-Related Skin and Tissue Injury  Outcome: Progressing  Goal: Absence of Ventilator-Induced Lung Injury  Outcome: Progressing     Problem: Artificial Airway  Goal: Effective Communication  Outcome: Progressing  Goal: Optimal Device Function  Outcome: Progressing  Goal: Absence of Device-Related Skin or Tissue Injury  Outcome: Progressing     Problem: Fall Injury Risk  Goal: Absence of Fall and Fall-Related Injury  Outcome: Progressing     Problem: Restraint, Nonviolent  Goal: Absence of Harm or Injury  Outcome: Progressing     Problem: Skin Injury Risk Increased  Goal: Skin Health and Integrity  Outcome: Progressing     Problem: Pneumonia  Goal: Fluid Balance  Outcome: Progressing  Goal: Resolution of Infection Signs and Symptoms  Outcome: Progressing  Goal: Effective Oxygenation and Ventilation  Outcome: Progressing

## 2024-11-13 NOTE — ASSESSMENT & PLAN NOTE
Patient recently extubated with persistent oxygen requirement  CXR remarkable for RUL consolidation  Respiratory culture positive for H flu and now staph aureus  Sensitivities indicative of a MSSA  MRSA screen by PCR positive  Sensitivities pending  Transitioned to cefazolin 11/12  Wean oxygen as tolerated  Continuous pulse oximetry

## 2024-11-14 PROBLEM — I10 HTN (HYPERTENSION): Status: ACTIVE | Noted: 2024-11-14

## 2024-11-14 LAB
ALBUMIN SERPL BCP-MCNC: 2.4 G/DL (ref 3.5–5.2)
ALP SERPL-CCNC: 97 U/L (ref 40–150)
ALT SERPL W/O P-5'-P-CCNC: 30 U/L (ref 10–44)
ANION GAP SERPL CALC-SCNC: 11 MMOL/L (ref 8–16)
AST SERPL-CCNC: 46 U/L (ref 10–40)
BASOPHILS # BLD AUTO: 0.06 K/UL (ref 0–0.2)
BASOPHILS NFR BLD: 0.5 % (ref 0–1.9)
BILIRUB SERPL-MCNC: 0.3 MG/DL (ref 0.1–1)
BUN SERPL-MCNC: 9 MG/DL (ref 6–20)
CALCIUM SERPL-MCNC: 9.8 MG/DL (ref 8.7–10.5)
CHLORIDE SERPL-SCNC: 109 MMOL/L (ref 95–110)
CO2 SERPL-SCNC: 22 MMOL/L (ref 23–29)
CREAT SERPL-MCNC: 0.7 MG/DL (ref 0.5–1.4)
DIFFERENTIAL METHOD BLD: ABNORMAL
EOSINOPHIL # BLD AUTO: 0.2 K/UL (ref 0–0.5)
EOSINOPHIL NFR BLD: 1.8 % (ref 0–8)
ERYTHROCYTE [DISTWIDTH] IN BLOOD BY AUTOMATED COUNT: 14.2 % (ref 11.5–14.5)
EST. GFR  (NO RACE VARIABLE): >60 ML/MIN/1.73 M^2
GLUCOSE SERPL-MCNC: 116 MG/DL (ref 70–110)
HCT VFR BLD AUTO: 33.4 % (ref 37–48.5)
HGB BLD-MCNC: 10.9 G/DL (ref 12–16)
IMM GRANULOCYTES # BLD AUTO: 0.24 K/UL (ref 0–0.04)
IMM GRANULOCYTES NFR BLD AUTO: 2.1 % (ref 0–0.5)
LYMPHOCYTES # BLD AUTO: 1.7 K/UL (ref 1–4.8)
LYMPHOCYTES NFR BLD: 14.8 % (ref 18–48)
MAGNESIUM SERPL-MCNC: 2.2 MG/DL (ref 1.6–2.6)
MCH RBC QN AUTO: 29.4 PG (ref 27–31)
MCHC RBC AUTO-ENTMCNC: 32.6 G/DL (ref 32–36)
MCV RBC AUTO: 90 FL (ref 82–98)
MONOCYTES # BLD AUTO: 0.7 K/UL (ref 0.3–1)
MONOCYTES NFR BLD: 6.6 % (ref 4–15)
NEUTROPHILS # BLD AUTO: 8.3 K/UL (ref 1.8–7.7)
NEUTROPHILS NFR BLD: 74.2 % (ref 38–73)
NRBC BLD-RTO: 0 /100 WBC
OHS QRS DURATION: 76 MS
OHS QTC CALCULATION: 469 MS
PHOSPHATE SERPL-MCNC: 3.4 MG/DL (ref 2.7–4.5)
PLATELET # BLD AUTO: 244 K/UL (ref 150–450)
PMV BLD AUTO: 11.5 FL (ref 9.2–12.9)
POTASSIUM SERPL-SCNC: 4 MMOL/L (ref 3.5–5.1)
PROT SERPL-MCNC: 7.4 G/DL (ref 6–8.4)
RBC # BLD AUTO: 3.71 M/UL (ref 4–5.4)
SODIUM SERPL-SCNC: 142 MMOL/L (ref 136–145)
WBC # BLD AUTO: 11.24 K/UL (ref 3.9–12.7)

## 2024-11-14 PROCEDURE — 93005 ELECTROCARDIOGRAM TRACING: CPT

## 2024-11-14 PROCEDURE — 97530 THERAPEUTIC ACTIVITIES: CPT

## 2024-11-14 PROCEDURE — 97535 SELF CARE MNGMENT TRAINING: CPT

## 2024-11-14 PROCEDURE — 94761 N-INVAS EAR/PLS OXIMETRY MLT: CPT

## 2024-11-14 PROCEDURE — 97112 NEUROMUSCULAR REEDUCATION: CPT

## 2024-11-14 PROCEDURE — 97162 PT EVAL MOD COMPLEX 30 MIN: CPT

## 2024-11-14 PROCEDURE — 85025 COMPLETE CBC W/AUTO DIFF WBC: CPT

## 2024-11-14 PROCEDURE — 99233 SBSQ HOSP IP/OBS HIGH 50: CPT | Mod: ,,, | Performed by: STUDENT IN AN ORGANIZED HEALTH CARE EDUCATION/TRAINING PROGRAM

## 2024-11-14 PROCEDURE — 63600175 PHARM REV CODE 636 W HCPCS

## 2024-11-14 PROCEDURE — 25000003 PHARM REV CODE 250: Performed by: STUDENT IN AN ORGANIZED HEALTH CARE EDUCATION/TRAINING PROGRAM

## 2024-11-14 PROCEDURE — 94668 MNPJ CHEST WALL SBSQ: CPT

## 2024-11-14 PROCEDURE — 83735 ASSAY OF MAGNESIUM: CPT

## 2024-11-14 PROCEDURE — 25000003 PHARM REV CODE 250

## 2024-11-14 PROCEDURE — 93010 ELECTROCARDIOGRAM REPORT: CPT | Mod: ,,, | Performed by: INTERNAL MEDICINE

## 2024-11-14 PROCEDURE — 99900035 HC TECH TIME PER 15 MIN (STAT)

## 2024-11-14 PROCEDURE — 92526 ORAL FUNCTION THERAPY: CPT

## 2024-11-14 PROCEDURE — 11000001 HC ACUTE MED/SURG PRIVATE ROOM

## 2024-11-14 PROCEDURE — 63600175 PHARM REV CODE 636 W HCPCS: Performed by: STUDENT IN AN ORGANIZED HEALTH CARE EDUCATION/TRAINING PROGRAM

## 2024-11-14 PROCEDURE — 80053 COMPREHEN METABOLIC PANEL: CPT

## 2024-11-14 PROCEDURE — 84100 ASSAY OF PHOSPHORUS: CPT

## 2024-11-14 PROCEDURE — 99222 1ST HOSP IP/OBS MODERATE 55: CPT | Mod: AF,HB,, | Performed by: PSYCHIATRY & NEUROLOGY

## 2024-11-14 PROCEDURE — 99900026 HC AIRWAY MAINTENANCE (STAT)

## 2024-11-14 RX ORDER — POLYETHYLENE GLYCOL 3350 17 G/17G
17 POWDER, FOR SOLUTION ORAL 2 TIMES DAILY PRN
Status: DISCONTINUED | OUTPATIENT
Start: 2024-11-15 | End: 2024-11-25

## 2024-11-14 RX ORDER — CEFEPIME HYDROCHLORIDE 2 G/1
2 INJECTION, POWDER, FOR SOLUTION INTRAVENOUS
Status: COMPLETED | OUTPATIENT
Start: 2024-11-14 | End: 2024-11-21

## 2024-11-14 RX ORDER — CARVEDILOL 6.25 MG/1
6.25 TABLET ORAL 2 TIMES DAILY
Status: DISCONTINUED | OUTPATIENT
Start: 2024-11-14 | End: 2024-11-25

## 2024-11-14 RX ORDER — HYDROMORPHONE HYDROCHLORIDE 1 MG/ML
0.5 INJECTION, SOLUTION INTRAMUSCULAR; INTRAVENOUS; SUBCUTANEOUS ONCE
Status: DISCONTINUED | OUTPATIENT
Start: 2024-11-14 | End: 2024-11-14

## 2024-11-14 RX ORDER — MIRTAZAPINE 7.5 MG/1
7.5 TABLET, FILM COATED ORAL NIGHTLY
Status: DISCONTINUED | OUTPATIENT
Start: 2024-11-14 | End: 2024-11-25

## 2024-11-14 RX ORDER — AMOXICILLIN 250 MG
1 CAPSULE ORAL DAILY
Status: DISCONTINUED | OUTPATIENT
Start: 2024-11-15 | End: 2024-11-25

## 2024-11-14 RX ADMIN — CEFAZOLIN 2 G: 2 INJECTION, POWDER, FOR SOLUTION INTRAMUSCULAR; INTRAVENOUS at 08:11

## 2024-11-14 RX ADMIN — THERA TABS 1 TABLET: TAB at 08:11

## 2024-11-14 RX ADMIN — HEPARIN SODIUM 5000 UNITS: 5000 INJECTION INTRAVENOUS; SUBCUTANEOUS at 03:11

## 2024-11-14 RX ADMIN — ACETAMINOPHEN 650 MG: 325 TABLET, FILM COATED ORAL at 06:11

## 2024-11-14 RX ADMIN — CEFAZOLIN 2 G: 2 INJECTION, POWDER, FOR SOLUTION INTRAMUSCULAR; INTRAVENOUS at 12:11

## 2024-11-14 RX ADMIN — ACETAMINOPHEN 650 MG: 325 TABLET, FILM COATED ORAL at 01:11

## 2024-11-14 RX ADMIN — LITHIUM 300 MG: 8 SOLUTION ORAL at 08:11

## 2024-11-14 RX ADMIN — CEFEPIME 2 G: 2 INJECTION, POWDER, FOR SOLUTION INTRAVENOUS at 05:11

## 2024-11-14 RX ADMIN — ACETAMINOPHEN 650 MG: 325 TABLET, FILM COATED ORAL at 05:11

## 2024-11-14 RX ADMIN — ACETAMINOPHEN 650 MG: 325 TABLET, FILM COATED ORAL at 12:11

## 2024-11-14 RX ADMIN — Medication 100 MG: at 08:11

## 2024-11-14 RX ADMIN — SENNOSIDES AND DOCUSATE SODIUM 2 TABLET: 50; 8.6 TABLET ORAL at 08:11

## 2024-11-14 RX ADMIN — FOLIC ACID 1 MG: 1 TABLET ORAL at 08:11

## 2024-11-14 RX ADMIN — CARVEDILOL 6.25 MG: 6.25 TABLET, FILM COATED ORAL at 10:11

## 2024-11-14 RX ADMIN — ACETAMINOPHEN 650 MG: 325 TABLET, FILM COATED ORAL at 11:11

## 2024-11-14 RX ADMIN — FLUOXETINE HYDROCHLORIDE 60 MG: 20 SOLUTION ORAL at 08:11

## 2024-11-14 RX ADMIN — MIRTAZAPINE 7.5 MG: 7.5 TABLET, FILM COATED ORAL at 08:11

## 2024-11-14 RX ADMIN — CEFEPIME 2 G: 2 INJECTION, POWDER, FOR SOLUTION INTRAVENOUS at 11:11

## 2024-11-14 RX ADMIN — CARVEDILOL 6.25 MG: 6.25 TABLET, FILM COATED ORAL at 08:11

## 2024-11-14 RX ADMIN — LITHIUM 300 MG: 8 SOLUTION ORAL at 09:11

## 2024-11-14 RX ADMIN — CEFEPIME 2 G: 2 INJECTION, POWDER, FOR SOLUTION INTRAVENOUS at 09:11

## 2024-11-14 RX ADMIN — HEPARIN SODIUM 5000 UNITS: 5000 INJECTION INTRAVENOUS; SUBCUTANEOUS at 10:11

## 2024-11-14 RX ADMIN — QUETIAPINE FUMARATE 25 MG: 25 TABLET ORAL at 08:11

## 2024-11-14 RX ADMIN — BISACODYL 10 MG: 10 SUPPOSITORY RECTAL at 08:11

## 2024-11-14 RX ADMIN — HYDROXYZINE HYDROCHLORIDE 25 MG: 25 TABLET ORAL at 08:11

## 2024-11-14 RX ADMIN — LIDOCAINE 5% 1 PATCH: 700 PATCH TOPICAL at 02:11

## 2024-11-14 RX ADMIN — Medication 1 ENEMA: at 10:11

## 2024-11-14 RX ADMIN — CLOPIDOGREL BISULFATE 75 MG: 75 TABLET ORAL at 08:11

## 2024-11-14 RX ADMIN — HEPARIN SODIUM 5000 UNITS: 5000 INJECTION INTRAVENOUS; SUBCUTANEOUS at 06:11

## 2024-11-14 RX ADMIN — ASPIRIN 81 MG CHEWABLE TABLET 81 MG: 81 TABLET CHEWABLE at 08:11

## 2024-11-14 RX ADMIN — POLYETHYLENE GLYCOL 3350 17 G: 17 POWDER, FOR SOLUTION ORAL at 08:11

## 2024-11-14 RX ADMIN — QUETIAPINE FUMARATE 50 MG: 25 TABLET ORAL at 08:11

## 2024-11-14 RX ADMIN — ATORVASTATIN CALCIUM 80 MG: 40 TABLET, FILM COATED ORAL at 08:11

## 2024-11-14 NOTE — SUBJECTIVE & OBJECTIVE
Neurologic Chief Complaint: acute arterial ischemic stroke, multifocal, posterior circulation     Subjective:     Interval History: Patient is seen for follow-up neurological assessment and treatment recommendations: See hospital course.     HPI, Past Medical, Family, and Social History remains the same as documented in the initial encounter.     Review of Systems  Scheduled Meds:   acetaminophen  650 mg Per NG tube Q6H    aspirin  81 mg Per NG tube Daily    atorvastatin  80 mg Per NG tube Daily    bisacodyL  10 mg Rectal Daily    carvediloL  6.25 mg Per NG tube BID    ceFEPime IV (PEDS and ADULTS)  2 g Intravenous Q8H    clopidogreL  75 mg Per NG tube Daily    FLUoxetine  60 mg Per NG tube Daily    folic acid  1 mg Per NG tube Daily    heparin (porcine)  5,000 Units Subcutaneous Q8H    LIDOcaine  1 patch Transdermal Q24H    lithium citrate 8 meq/5 ml  300 mg Per NG tube TID    mirtazapine  7.5 mg Per NG tube QHS    multivitamin  1 tablet Per NG tube Daily    polyethylene glycol  17 g Per NG tube BID    QUEtiapine  25 mg Per NG tube Daily    QUEtiapine  50 mg Per NG tube QHS    senna-docusate 8.6-50 mg  2 tablet Per NG tube Daily    sodium phosphates  1 enema Rectal Once    thiamine  100 mg Per NG tube Daily     Continuous Infusions:  PRN Meds:  Current Facility-Administered Medications:     albuterol-ipratropium, 3 mL, Nebulization, Q6H PRN    hydrOXYzine HCL, 25 mg, Per NG tube, QID PRN    magnesium oxide, 800 mg, Per NG tube, PRN    magnesium oxide, 800 mg, Per NG tube, PRN    potassium bicarbonate, 35 mEq, Per NG tube, PRN    potassium bicarbonate, 50 mEq, Per NG tube, PRN    potassium bicarbonate, 60 mEq, Per NG tube, PRN    potassium, sodium phosphates, 2 packet, Per NG tube, PRN    potassium, sodium phosphates, 2 packet, Per NG tube, PRN    potassium, sodium phosphates, 2 packet, Per NG tube, PRN    sodium chloride 0.9%, 10 mL, Intravenous, PRN    Objective:     Vital Signs (Most Recent):  Temp: 99.3 °F (37.4  °C) (11/14/24 0300)  Pulse: 86 (11/14/24 0600)  Resp: (!) 30 (11/14/24 0600)  BP: (!) 160/70 (11/14/24 0600)  SpO2: (!) 91 % (11/14/24 0600)  BP Location: Right leg    Vital Signs Range (Last 24H):  Temp:  [99.3 °F (37.4 °C)-100.1 °F (37.8 °C)]   Pulse:  [70-92]   Resp:  [20-44]   BP: (101-178)/()   SpO2:  [88 %-93 %]   BP Location: Right leg      Physical Exam  Vitals and nursing note reviewed.   Constitutional:       General: Grunting sounds, patient said she could breathe and was not in distress and not sure why she was making that sound     Comments: Off precedex  HENT:      Head: Normocephalic and atraumatic.   Musculoskeletal:      Right lower leg: No edema.      Left lower leg: No edema.   Skin:     General: Skin is warm and dry.     Neurological Exam:   LOC: alert  Attention Span: poor  Language: No aphasia  Articulation: Dysarthria  Motor: Leg left  Normal 5/5  Leg right Normal 5/5    Laboratory:  CMP:   Recent Labs   Lab 11/14/24  0035   CALCIUM 9.8   ALBUMIN 2.4*   PROT 7.4      K 4.0   CO2 22*      BUN 9   CREATININE 0.7   ALKPHOS 97   ALT 30   AST 46*   BILITOT 0.3     CBC:   Recent Labs   Lab 11/14/24  0035   WBC 11.24   RBC 3.71*   HGB 10.9*   HCT 33.4*      MCV 90   MCH 29.4   MCHC 32.6       Diagnostic Results     Brain Imaging / Vessel Imaging   Results for orders placed or performed during the hospital encounter of 11/06/24 (from the past 2160 hours)   CTA STROKE MULTI-PHASE    Narrative    EXAMINATION:  CTA STROKE MULTI-PHASE; CT CERVICAL SPINE WITHOUT CONTRAST    CLINICAL HISTORY:  Neuro deficit, acute, stroke suspected;; Neck trauma, intoxicated or obtunded (Age >= 16y);    TECHNIQUE:  Axial CT images obtained throughout the region of the head and cervical spine before the administration of intravenous contrast.    CT angiogram was performed through the cervical and intracranial vasculature during the IV bolus administration of 75mL of Omnipaque 350.  Two additional  phases through the intracranial vasculature via multiphase technique.    Multiplanar MPR and MIP reformats were performed.    CT source data was analyzed using artificial intelligence software for detection of a large vessel occlusions (LVO) in order to enable computer assisted triage notification and aid clinical stroke decision making.    COMPARISON:  CTA head and cervical spine 10/25/2015    FINDINGS:  Large region of hypoattenuation loss of gray-white differentiation in left occipital lobe in medial temporal lobe in keeping with an acute PCA distribution infarct.  Modest localized mass effect with some sulcal crowding.  Additional involvement of the left thalamus.  Similar appearance in the medial right occipital lobe, but smaller area of infarction.  Small left superior cerebellar infarct, also likely acute.  No evidence of recent or remote major vascular distribution infarct in the anterior circulation.  No acute parenchymal hemorrhage.  No midline shift.    The ventricles are normal in size without evidence of hydrocephalus.    No extra-axial blood or fluid collections.    The cranium appears intact.    Mastoid air cells and paranasal sinuses are essentially clear.    The vertebral bodies are normal in height and morphology without evidence of an acute displaced fracture or focal osseous destructive process.    Normal sagittal alignment is preserved.  No spondylolisthesis.    Intervertebral disc heights are well maintained.  No evidence of a bony spinal canal stenosis.    Evaluation intraspinal contents demonstrates no evidence of mass or hematoma.    No acute abnormalities in the paraspinal soft tissue structures.    Emphysematous changes patchy ground-glass opacity in the partially visualized lung apices.      CTA:    The aortic arch demonstrates no significant stenosis at the major branch vessel origins.    There is a focal thrombus in the right subclavian artery extending into the right vertebral artery  origin with severe stenosis.  Thrombus extending over proximally 1 cm.  The V1 and V2 segments of right vertebral artery are otherwise normal in caliber of slightly decreased in attenuation in comparison to the other cervical vasculature.  Abrupt occlusion of distal right vertebral artery about the level of the PICA origin approximate 2 cm segment of non enhancement.    Moderate focal stenosis at the left vertebral artery origin.  Left vertebral artery is otherwise normal in caliber.  The basilar artery is normal in caliber.    The right common carotid artery is normal in caliber.  No significant stenosis at the carotid bifurcation.The right internal carotid artery is normal in caliber.    The left common carotid artery is normal in caliber. No significant stenosis at the carotid bifurcation.The left internal carotid artery is normal in caliber.    Abrupt non enhancement in the P1 segment of the left PCA.  The proximal right PCA appears within normal limits.    The proximal MCAs and ACAs are unremarkable.    Findings were immediately discussed upon identification via telephone with the stroke service ([facility]) at approximately 09:20.      Impression    Multifocal acute posterior circulation distribution infarcts as further discussed above, particularly involving essentially the entirety of the left PCA territory.  Modest mass effect without significant hemorrhage.    Focal thrombus with severe stenosis extending from the right subclavian artery into the proximal right vertebral artery.    Focal occlusion of the proximal intracranial segment of the right vertebral artery.    Focal occlusion of the proximal left PCA.    Moderate focal stenosis at the left vertebral artery origin.    Anterior circulation appears unremarkable.    No evidence of fracture or traumatic malalignment in the cervical spine.    Emphysematous changes and patchy ground-glass opacity in the partially visualized lung apices.    This report  was flagged in Epic as abnormal.      Electronically signed by: Geo Ambrocio MD  Date:    11/06/2024  Time:    09:33   CT Cervical Spine Without Contrast    Narrative    EXAMINATION:  CTA STROKE MULTI-PHASE; CT CERVICAL SPINE WITHOUT CONTRAST    CLINICAL HISTORY:  Neuro deficit, acute, stroke suspected;; Neck trauma, intoxicated or obtunded (Age >= 16y);    TECHNIQUE:  Axial CT images obtained throughout the region of the head and cervical spine before the administration of intravenous contrast.    CT angiogram was performed through the cervical and intracranial vasculature during the IV bolus administration of 75mL of Omnipaque 350.  Two additional phases through the intracranial vasculature via multiphase technique.    Multiplanar MPR and MIP reformats were performed.    CT source data was analyzed using artificial intelligence software for detection of a large vessel occlusions (LVO) in order to enable computer assisted triage notification and aid clinical stroke decision making.    COMPARISON:  CTA head and cervical spine 10/25/2015    FINDINGS:  Large region of hypoattenuation loss of gray-white differentiation in left occipital lobe in medial temporal lobe in keeping with an acute PCA distribution infarct.  Modest localized mass effect with some sulcal crowding.  Additional involvement of the left thalamus.  Similar appearance in the medial right occipital lobe, but smaller area of infarction.  Small left superior cerebellar infarct, also likely acute.  No evidence of recent or remote major vascular distribution infarct in the anterior circulation.  No acute parenchymal hemorrhage.  No midline shift.    The ventricles are normal in size without evidence of hydrocephalus.    No extra-axial blood or fluid collections.    The cranium appears intact.    Mastoid air cells and paranasal sinuses are essentially clear.    The vertebral bodies are normal in height and morphology without evidence of an acute displaced  fracture or focal osseous destructive process.    Normal sagittal alignment is preserved.  No spondylolisthesis.    Intervertebral disc heights are well maintained.  No evidence of a bony spinal canal stenosis.    Evaluation intraspinal contents demonstrates no evidence of mass or hematoma.    No acute abnormalities in the paraspinal soft tissue structures.    Emphysematous changes patchy ground-glass opacity in the partially visualized lung apices.      CTA:    The aortic arch demonstrates no significant stenosis at the major branch vessel origins.    There is a focal thrombus in the right subclavian artery extending into the right vertebral artery origin with severe stenosis.  Thrombus extending over proximally 1 cm.  The V1 and V2 segments of right vertebral artery are otherwise normal in caliber of slightly decreased in attenuation in comparison to the other cervical vasculature.  Abrupt occlusion of distal right vertebral artery about the level of the PICA origin approximate 2 cm segment of non enhancement.    Moderate focal stenosis at the left vertebral artery origin.  Left vertebral artery is otherwise normal in caliber.  The basilar artery is normal in caliber.    The right common carotid artery is normal in caliber.  No significant stenosis at the carotid bifurcation.The right internal carotid artery is normal in caliber.    The left common carotid artery is normal in caliber. No significant stenosis at the carotid bifurcation.The left internal carotid artery is normal in caliber.    Abrupt non enhancement in the P1 segment of the left PCA.  The proximal right PCA appears within normal limits.    The proximal MCAs and ACAs are unremarkable.    Findings were immediately discussed upon identification via telephone with the stroke service ([place]) at approximately 09:20.      Impression    Multifocal acute posterior circulation distribution infarcts as further discussed above, particularly involving  essentially the entirety of the left PCA territory.  Modest mass effect without significant hemorrhage.    Focal thrombus with severe stenosis extending from the right subclavian artery into the proximal right vertebral artery.    Focal occlusion of the proximal intracranial segment of the right vertebral artery.    Focal occlusion of the proximal left PCA.    Moderate focal stenosis at the left vertebral artery origin.    Anterior circulation appears unremarkable.    No evidence of fracture or traumatic malalignment in the cervical spine.    Emphysematous changes and patchy ground-glass opacity in the partially visualized lung apices.    This report was flagged in Epic as abnormal.      Electronically signed by: Geo Ambrocio MD  Date:    11/06/2024  Time:    09:33   CT Head Without Contrast    Narrative    EXAMINATION:  CT HEAD WITHOUT CONTRAST    CLINICAL HISTORY:  Stroke, follow up;    TECHNIQUE:  Low dose axial images were obtained through the head.  Coronal and sagittal reformations were also performed. Contrast was not administered.    COMPARISON:  CT, MRI 11/06/2024    FINDINGS:  Evolving posterior circulation infarcts are identified as described on recent MR imaging again most notable within the left greater than right occipital lobes extending to the medial left temporal lobe and bilateral thalamus.  No hemorrhagic conversion with potentially minimal stable petechial hemorrhage within the medial left thalamus.  No midline shift or herniation with mild mass effect on the 3rd ventricle again noted.    No new acute territorial infarct.    The visualized sinuses and mastoid air cells are clear.      Impression    Expected evolutionary changes of the posterior circulation infarcts with no overt hemorrhagic conversion or significant midline shift/herniation.      Electronically signed by: Adrián Hobbs  Date:    11/09/2024  Time:    08:38      Results for orders placed or performed during the hospital  encounter of 11/06/24 (from the past 2160 hours)   MRI Brain Without Contrast    Impression    Multiple posterior circulation infarcts are identified with involvement of the left greater than right occipital lobes and medial left temporal lobe, thalamus, right dorsal medulla, and minimally involving the cerebellum.  No midline shift with minimal mass effect on the 3rd ventricle.    Loss of flow void within the right intracranial vertebral artery in keeping with the findings on CTA.      Electronically signed by: Adrián Hobbs  Date:    11/06/2024  Time:    17:40      Cardiac Imaging   Results for orders placed during the hospital encounter of 11/06/24    Echo Saline Bubble? Yes; Ultrasound enhancing contrast? Yes    Interpretation Summary    Left Ventricle: The left ventricle is normal in size. Normal wall thickness. There is normal systolic function with a visually estimated ejection fraction of 55 - 60%. Biplane (2D) method of discs ejection fraction is 56%. Global longitudinal strain is -18.0%. There is normal diastolic function.    Right Ventricle: Normal right ventricular cavity size. Wall thickness is normal. Systolic function is normal.    Tricuspid Valve: There is mild regurgitation.    Pulmonary Artery: The estimated pulmonary artery systolic pressure is at least 28 mmHg.    IVC/SVC: Patient is ventilated, cannot use IVC diameter to estimate right atrial pressure.

## 2024-11-14 NOTE — PLAN OF CARE
[facility]  Discharge Reassessment    Primary Care Provider: Adrián Hahn MD    Expected Discharge Date: 11/17/2024    Reassessment (most recent)       Discharge Reassessment - 11/14/24 1536          Discharge Reassessment    Assessment Type Discharge Planning Reassessment     Did the patient's condition or plan change since previous assessment? Yes     Discharge Plan discussed with: Parent(s)     Name(s) and Number(s) India Sims [id number]     Communicated LOUIE with patient/caregiver Yes     Discharge Plan A Rehab     Discharge Plan B Home with family     DME Needed Upon Discharge  bedside commode;bath bench (P)      Transition of Care Barriers None (P)      Why the patient remains in the hospital Requires continued medical care (P)         Post-Acute Status    Discharge Delays None known at this time (P)                    Pt is not medically ready to discharge.   SW will continue to monitor pt needs.       Discharge Plan A and Plan B have been determined by review of patient's clinical status, future medical and therapeutic needs, and coverage/benefits for post-acute care in coordination with multidisciplinary team members.    Bobbi Finch MSW, LCSW  [facility]  Case Management Dept.

## 2024-11-14 NOTE — PT/OT/SLP PROGRESS
Speech Language Pathology Treatment    Patient Name:  Malathi Mcpherson   MRN:  [id number]  Admitting Diagnosis: Acute arterial ischemic stroke, multifocal, posterior circulation    Recommendations:                 General Recommendations:  Ongoing swallow assessment, Speech/language therapy and Cognitive-linguistic therapy  Diet recommendations:  NPO, Liquid Diet Level: NPO   Aspiration Precautions: Continue alternate means of nutrition, Frequent oral care, and Strict aspiration precautions   General Precautions: Standard, aspiration, NPO, fall  Communication strategies:  provide increased time to answer    Assessment:     Malathi Mcpherson is a 49 y.o. female with an SLP diagnosis of Dysphagia and Cognitive-Linguistic Impairment. Pt continues to present with high risk for aspiration 2/2 poor attention and overt s/sx across all trials at bedside. SLP will continue to follow ton monitor for appropriateness for MBSS prior to diet advancement.     Subjective     Spoke with RN prior to session. Pt awake, calm and redirectable this date. NGT and restraints in place. Mother at bedside.     Pain/Comfort:  Session 1:Pain Rating 1: 0/10  Pain Rating Post-Intervention 1: 0/10  Session 2: None reported or observed 0/10    Respiratory Status: Session 1: Room air, SpO2 90-92%    Objective:     Has the patient been evaluated by SLP for swallowing?   Yes  Keep patient NPO? Yes     Pt seen bedside for ongoing diagnostic dysphagia therapy. Environmental adjustments made, HOB elevated and pt repositioned to be upright, midline in bed prior to PO trials. Feed assist provided with trials of ice chips x 2, tsp thin water x 1, tsp NTL x 3, tsp HTL x 3, cup sips of HTL x 5 and 1/2 tsp puree x 3. Pt demonstrated immediate cough response with tsp trial of thin therefore transitioned to NTL. She demonstrated immediate cough with initial sip of NTL, improved with head turn Right, though to requiring clinician to physically turn her head to  keep in that position for 3rd tsp trial. She tolerated 4/5 tsp trials of HTL from tsp without any overt s/sx of airway compromise with head turn right and 1/3 tsp pf puree, though noted delayed coughing with remaining cup trials of HTL and puree. Pt unable to repeat or recall strategies with teach back method or independently implement. Recommend she remain NPO at this time with NGT to meet nutrition/hydration needs. SLP will continue to follow to monitor for appropriateness for objective assessment prior to diet advancement.   Pt AAO x person and place with min assist. Pt with ongoing right sided facial weakness with mild asymmetry present, though able to demonstrate functional movement with oral motor movements. Pt with adequate volitional cough and swallow with ongoing hoarse vocal quality noted. She was able to follow commands and answer questions throughout session with occasional repetitions. Noted right sided visual impairments and inattention as well as poor sustained attention and carryover of strategies across trials requiring frequent multimodal prompting and cuing across all trials.  Pt easily distracted and would continue to benefit from low stim environment during therapy sessions. Education provided re: role of SLP, diet recs, swallow precs, s/s aspiration and POC.  Pt and mother verbalized understanding though would continue to benefit from ongoing reinforcement and education. White board current. SLP will continue to follow.    Goals:   Multidisciplinary Problems       SLP Goals          Problem: SLP    Goal Priority Disciplines Outcome   SLP Goal     SLP Progressing   Description: Speech Language Pathology Goals  Goals expected to be met by 11/25    1. Pt will participate in ongoing swallow assessment to determine leats restrictive PO diet when appropriate.   2. Pt will complete cognitive-linguistic assessment to determine additional therapeutic needs.                                Plan:      Patient to be seen:  4 x/week   Plan of Care expires:  12/11/24  Plan of Care reviewed with:  patient, mother   SLP Follow-Up:  Yes       Discharge recommendations:  High Intensity Therapy   Barriers to Discharge:  Level of Skilled Assistance Needed      Time Tracking:     SLP Treatment Date:   11/14/24  Speech Start Time:  1014  Speech Stop Time:  1031     Speech Total Time (min):  17 min    Billable Minutes: Treatment Swallowing Dysfunction 9 and Self Care/Home Management Training 8    11/14/2024

## 2024-11-14 NOTE — PLAN OF CARE
Goals remain appropriate.  Problem: Occupational Therapy  Goal: Occupational Therapy Goal  Description: Goals set 11/11 with an expiration date, 12/5:  Patient will increase functional independence with ADLs by performing:    Patient will demonstrate rolling to the right with min assist.  Not met   Patient will demonstrate rolling to the left with min assist.   Not met  Patient will demonstrate supine -sit with min assist.   Not met  Patient will demonstrate squat pivot transfers with min assist.   Not met  Patient will demonstrate grooming while seated with min assist.   Not met  Patient will demonstrate upper body dressing with min assist while seated EOB.   Not met  Patient will demonstrate lower body dressing with mod assist while seated EOB.   Not met  Patient will demonstrate toileting with mod assist.   Not met  Patient's family / caregiver will demonstrate independence and safety with assisting patient with self-care skills and functional mobility.     Not met  Patient's family / caregiver will demonstrate independence with providing ROM and changes in bed positioning.   Not met            Outcome: Progressing

## 2024-11-14 NOTE — ASSESSMENT & PLAN NOTE
Patient recently extubated with persistent oxygen requirement  CXR remarkable for RUL consolidation  Respiratory culture positive for H flu and now staph aureus  Sensitivities indicative of a MSSA  MRSA screen by PCR positive  Sensitivities pending  Transitioned to cefazolin 11/12  Given H. Flu culture is beta lactamase positive, will transition to cefepime 11/14 for 5 more days  Wean oxygen as tolerated  Continuous pulse oximetry

## 2024-11-14 NOTE — PT/OT/SLP EVAL
Physical Therapy  Evaluation and Treatment    Patient Name:  Malathi Mcpherson   MRN:  [id number]    Recent Surgery: * No surgery found *      Therapy tech utilized during this treatment due to patient complexity and physical assistance required to ensure safe mobilization     Recommendations:     Discharge Recommendations:    High intensity therapy  Discharge Equipment Recommendations: bedside commode, bath bench   Barriers to discharge: Increased level of assist and Inaccessible home    Highest Level of Mobility: Supine to sit  Assistance Required: Mod(A)    Assessment:     Malathi Mcpherson is a 49 y.o. female admitted with a medical diagnosis of Acute arterial ischemic stroke, multifocal, posterior circulation. She presents with the following impairments/functional limitations:  weakness, gait instability, impaired endurance, impaired balance, impaired self care skills, impaired functional mobility, decreased safety awareness, decreased lower extremity function, decreased upper extremity function, impaired coordination, impaired cognition    Pt met with HOB elevated, family present and agreeable to PT session. Pt is A&Ox1 at time of eval, so family provides hx. Pt's mother states her PLOF is (I) with functional mobility and ADLs using no DME. Currently, pt requires mod(A) for supine to sit and mod(A) for sitting balance at EOB. Once sitting EOB, pt expressed dizziness. Writing therapist attempted to cycle a manual BP, however pt self returned to supine prior to measurement. Once pt self returned to supine, it was noted that she was soiled with BM. Writing therapist assisted with pericare and linen change. Unable to perform a formal MMT today however ROM against gravity observed in B LEs. Pt followed less than 50% of verbal commands today and is highly impulsive/distractible. She is a high fall risk at this time.    Pt would benefit from continued skilled acute PT 4x/wk to address above listed functional deficits,  "provide patient/caregiver education, reduce fall risk, and maximize (I) and safety with functional mobility.    Rehab Prognosis: Good; patient would benefit from acute skilled PT services to address these deficits and reach maximum level of function.      Plan:     During this hospitalization, patient to be seen 4 x/week to address the identified rehab impairments via gait training, therapeutic activities, therapeutic exercises, neuromuscular re-education and progress toward the following goals:    Plan of Care Expires:  12/14/24    This plan of care has been discussed with the patient/caregiver, who was included in its development and is in agreement with the identified goals and treatment plan.     Subjective     Communicated with RN prior to session.  Patient agreeable to participate.     Chief Complaint: Acute arterial ischemic stroke- posterior circulation  Patient/Family Comments/goals: "I'm dizzy as hell"    Pain/Comfort:  Pain Rating 1: 0/10  Pain Rating Post-Intervention 1: 0/10    Patients cultural, spiritual, Church conflicts given the current situation: no    Patient's living environment is as follows:  Living Environment: Pt lives with her elderly parents in [facility] with 1 MEHRDAD. Bathroom set-up: tub/shower combo  Prior Level of Function: independent with mobility and ADLs  DME used: none  DME owned (not currently used): none  Upon discharge, patient will have assistance from: Family as able    Objective:     Patient found HOB elevated with restraints, telemetry, peripheral IV, bed alarm, blood pressure cuff, PureWick, pulse ox (continuous), NG tube. Roll belt, R wrist restraint, L mitten restraint upon PT entry to room.    General Precautions: Standard, fall   Orthopedic Precautions:N/A   Braces: N/A   BP (!) 185/77   Pulse 86   Temp 99.3 °F (37.4 °C) (Axillary)   Resp 18   Ht 5' 8" (1.727 m)   Wt 59 kg (130 lb 1.1 oz)   SpO2 (!) 89%   Breastfeeding No   BMI 19.78 kg/m²   Oxygen Device:  room " air      Exams:    Cognition:  Patient is oriented to Person  Follows one-step commands less than 50% of time  Insight to deficits/safety awareness: impaired, pt highly distractible and resistive at times    Postural examination/scapula alignment: Rounded shoulder and Head forward    Lower Extremity Range of Motion:  Right Lower Extremity: WNL  Left Lower Extremity: WNL    Lower Extremity Strength  BETTYE formally due to poor sitting tolerance at EOB and poor command following  ROM against gravity observed in B LEs     Sensation:   Light touch sensation: Intact BLEs    Functional Mobility:    Bed Mobility:  Supine to Sit: Moderate Assistance on L side of bed  Sit to Supine: Minimal Assistance  Pt self returned to supine but needed min(A) for B LE management  Rolling L: Minimal Assistance  Rolling R: Minimal Assistance  Scooting anteriorly to EOB to plant feet on floor: Moderate Assistance    Transfers:   Sit to Stand Transfer: Activity did not occur - pt self returned to supine    Balance:  Static Sit:   Mod Assist at EOB  Posterior lean    Therapeutic Activities/Exercises     Patient assisted with functional mobility as noted above  Pt assisted with rolling L and R for pericare 2/2 loose BM  Discussed at length benefits of PT as well as d/c recommendations. Pt's family agreeable  Patient educated on the importance of early mobility, OOB to prevent functional decline during hospital stay  Patient was instructed to utilize staff assistance for mobility/transfers.  Patient is appropriate to transfer with dependence to medichair via drawsheet and RN/PCT assist  Patient educated on PT POC and role of PT in acute care  White board updated to include patient's safest level of mobility with staff assistance, RN also updated    AM-PAC 6 CLICK MOBILITY  Turning over in bed (including adjusting bedclothes, sheets and blankets)?: 3  Sitting down on and standing up from a chair with arms (e.g., wheelchair, bedside commode, etc.):  1  Moving from lying on back to sitting on the side of the bed?: 2  Moving to and from a bed to a chair (including a wheelchair)?: 1  Need to walk in hospital room?: 1  Climbing 3-5 steps with a railing?: 1  Basic Mobility Total Score: 9      Patient left  R sidelying  with all lines intact, call button in reach, bed alarm on, and RN present to give enema. Restraints left untied and pt with RN at bedside      History/Goals:     PAST MEDICAL HISTORY:  Past Medical History:   Diagnosis Date    Acute adjustment disorder with depressed mood     Cutaneous abscess 2019    Depression     Enlarged liver 2017    Hep C w/o coma, chronic     Hepatitis B     History of mental disorder 2017    History of substance abuse        History reviewed. No pertinent surgical history.    GOALS:   Multidisciplinary Problems       Physical Therapy Goals          Problem: Physical Therapy    Goal Priority Disciplines Outcome Interventions   Physical Therapy Goal     PT, PT/OT Progressing    Description: Goals to be met by: 24     Patient will increase functional independence with mobility by performin. Supine to sit with Contact Guard Assistance  2. Sit to supine with Contact Guard Assistance  3. Sit to stand transfer with Minimal Assistance  4. Bed to chair transfer with Minimal Assistance using LRAD as needed  5. Gait  x 50 feet with Minimal Assistance using LRAD as needed.   6. Lower extremity exercise program x10 reps per handout, with assistance as needed    DME Justifications (see above for complete DME recommendations)    Bedside Commode- Patient has a mobility limitation that significantly impairs their ability to participate in one or more mobility related activities of daily living, including toileting. This deficit can be resolved by using a bedside commode. Patient demonstrates mobility limitations that will cause them to be confined to one room at home without bathroom access for up to 30 days.  Using a bedside commode will greatly improve the patient's ability to participate in MRADLs.     Hospital Bed ·       Patient requires a hospital bed for positioning of the body in ways that are not feasible with an ordinary bed. The patient requires special positioning for pain relief, limited mobility, and/or being unable to independently make changes in body position without the use of a hospital bed. Pillows and wedges will not be adequate for resolving these positional issues.                         Time Tracking:     PT Received On: 11/14/24  PT Start Time: 1049     PT Stop Time: 1115  PT Total Time (min): 26 min     Billable Minutes: Evaluation 10 and Therapeutic Activity 16      Paula Rankin, PT  11/14/2024  Pager# [phone]

## 2024-11-14 NOTE — ASSESSMENT & PLAN NOTE
Patient with long history of polysubstance abuse including crack cocaine, alcohol, and opiates  Patient was found with drug paraphernalia on person upon admission  Urine tox screen positive for cocaine  Addiction psychiatry consulted, appreciate recommendations

## 2024-11-14 NOTE — PATIENT INSTRUCTIONS
REFERRAL RECOMMENDATIONS FOR SUBSTANCE ABUSE & MENTAL HEALTH      IN CASE OF SUICIDAL THINKING, call the National Suicide Hotline Number: 988    988 Suicide & Crisis Lifeline: 983 , 3-317-993-ZRKN (8891)  https://988[employer].Artabase       SUBSTANCE ABUSE:     [facility] (formerly known as the ABU)  [x] [phone], Option 2  [x] [address] [facility], [place] [address]  [x] https://www.ochsner.org/services/[facility]sner-recovery-program  [x] The [facility] delivers comprehensive and collaborative treatment for alcohol and substance use disorders.  Excellent program for working professionals or anyone else seeking recovery.  [x] Requires insurance approval prior to starting program, call number above for more information.  [x] Intensive Outpatient Rehabilitation Program - M-F 9am-3pm - daily groups with psychologists and social workers, sessions with MDs 3x per week   [x] Ambulatory detox and dual diagnosis available      SUBOXONE:     NOTE: some Suboxone clinics require their clients to participate in a structured program (such as an IOP) in order to be prescribed Suboxone.  Some clinics have a long waiting list.  Most of these clinics do not accept walk-in clients, so call first to to learn what must be done to get started on Suboxone.    [facility] - [phone] (can do Sublocade)  [address] [facility], [place] [address]    [address]  [phone]    [facility] - [phone] (can do Sublocade)  [address] [place] [address]    [facility]  [address] [place]  [phone]     [facility] (very short waiting list, may accept some walk-in's but call first if possible)  [address], [place] [address]  [phone]; [phone]    [facility]   [address] [place]    [phone]    [facility] (can usually be seen within a week but is cash only  for appointment)  [address] [facility] ([facility])  [address] [place], [place]  [phone]    [facility] ([facility])  [address] [facility] [address]  [phone]    [place], [place]:    [facility] - [address] [place] [place] [address] - Tel: [phone]    Epifanio Mikey - [address] [place] [place] [address] - Tel: [phone]    Ang Garcia - [address] [place] [address] - Tel: [phone]    Kervin Hall - [address] [place] [address] - Tel: [phone]    Lv Feliciano - [address] [place] [address] - Tel: [phone]    [place], [place]:     Dr. Estefany Carpio and Dr. Prateek Parra - [address] [place], [place] - Tel: [phone]    Dr. Jeanne Wong - [address] [facility], [place] - Tel: [phone]    Dr. Lee Ramos - Tel: [phone]    Dr. Herbert Long - [facility] - [phone]      METHADONE:     [facility] (the only methadone clinic in the [facility], has two locations)  [x] [place] - [address] [place] [address], [phone]  [x] [facility] [place] [address], [phone]      12 STEP PROGRAMS (and similar):     Alcoholics Anonymous (local)  [x] [phone]  [x] www.[place]or[place].org for schedules for in-person and online meetings  [x] There are AA meetings throughout the day all over [facility]  [x] AA costs nothing to attend; they pass a basket for donations but this is not required    Narcotics Anonymous  [x] [phone]  [x] www.noana.org  [x] There are NA meetings throughout the day all over town  [x] NA costs nothing to attend; they pass a basket for donations but this is not required    Alcoholics Anonymous Online Intergroup (national)  [x] www.aa-intergroup.org  [x] Good resource for large, nation-wide meetings  [x] Can also attend smaller, local meetings in other cities  [x] Countless meetings all day and all night  [x] AA costs nothing to attend; they pass a basket for donations  but this is not required    Flying Sober - 24/7 zoom meetings for women and coed - sign on anytime, anywhere!  [email or web address] [email or web address]-igbjiseg/    Online Intergroup of AA - 121 Open AA Denali Meeting - 24/7 zoom meetings  https://aa-intergroup.org/meetings/    LOOKING FOR AN ALTERNATIVE TO 12 STEP PROGRAMS - check out:  SMART Recovery: https://www.smartrecovery.org/about-us  Shimon Recovery: https://recoverydharma.org      DETOX UNITS (USUALLY 5-7 DAYS):     [facility]: [address], [place]  [phone], call first to ensure bed availability    [facility]: [address] [facility] [address] [facility]  [phone], Option 1, call first to ensure bed availability    [facility]: [facility] Yanni Kothari, [facility]  [phone] (intake by appointment only)    [facility]: [address], [facility]  [phone]      INTENSIVE OUTPATIENT PROGRAMS:     [facility] (formerly known as the ABU)  [x] [phone], Option 2  [x] [address] [facility] 4th Floor, [facility] [address]  [x] https://www.[facility].org/services/[facility]-recovery-program  [x] [facility] delivers comprehensive and collaborative treatment for alcohol and substance use disorders.  Excellent program for working professionals or anyone else seeking recovery.  [x] Requires insurance approval prior to starting program, call number above for more information.  [x] Intensive Outpatient Rehabilitation Program - M-F 9am-3pm - daily groups with psychologists and social workers, sessions with MDs 3x per week   [x] Ambulatory detox and dual diagnosis available    [facility] Intensive [facility]patient Program  [x] [phone]  [x] [address] [facility] (the clinic not [facility])  [x] Call number above for more info and to check insurance requirements    [facility]  [address] [place] [address] [phone]    [facility]:  [address] [facility]  [address]?, [place], [place] [address]?, [phone]  [address] [facility]?, [place], [place] [address]?, [phone]    RESIDENTIAL REHABS (USUALLY 28 DAYS):     [facility]: [address] [place], [phone]    [facility]: [address] , [facility]  [phone] (intake by appointment only)    [facility] (men only) [address], [place], [phone]    [facility] (Female only) [address] [place], [phone]    [facility]: [address], [place], men's program [phone], women's program [phone]    [facility]: [address], [place], [phone]    [facility]: [address], [place], [place], [phone]    [facility]: Men only, [phone], [address] [facility] Ranjith Humphreys [facility]: [address], [place], [place], [phone]    [facility]: [facility] [place], [place],  [phone]  New Location: [address] [place] [address] [address], [place] [address], [phone]    [facility]:   ?[address] [facility]. [address]?[place], [place] [address]?[phone]    [place]: [address] [place] [address], [phone]    [place]: [place], [place], [phone]     [facility]: [place], [phone]    [facility]: [place], [phone]    [facility]: [place], [place], [phone]    [place]: [place], [phone]    [facility]: [address] [facility], [place] [address], [phone]    COMMUNITY ADDICTION CLINICS:     [facility]: [address] [facility], [address] [place] -[phone] -or- [address] [place] [address]    [facility]: [address] [place]  [address]     [facility]: Clinics [phone]; Crisis [phone]    [facility]: [address] [place] [address]    [facility]: [address] [place] [address]    [facility]: [address] [place] [address],    [facility] Center: 2nd Floor [address] [place] [address]    [facility]: [address], [place] [address]    [facility], [address], [place] [address]    [facility]: [address] [facility], [place] [phone]  (serves youth 16-23 years old)    [facility]: [facility]/[place] [phone]    [facility]: [address] [facility], [place] [phone]    [facility]: [address] [facility] [phone]    [facility]: [address] [place], [place] [address]10 [facility], [address], [phone]     [facility]: [address] [facility], [phone], [phone]    RESOURCES IN OTHER [facility]:     [facility]: [address] [facility], [phone], [phone]    [facility]: [address] [facility], [address], [phone]    [facility], [address] [place] [address], [phone]    [facility] Behavior[facility] Health: [address] [facility], [phone]    [facility] Health, [address] [facility], [phone] ([facility])    [facility], [address] [facility], [phone] ([facility])    [facility], [address], [place], [phone] ([facility], [facility])    [facility], [address] , [place], [phone] ([facility])    [facility] Hotline [phone], [phone]    [facility], [address] [facility], [facility] [place], [address] [facility], [address], [place] [facility], [address] [facility]  "[address] [facility], [address] [facility]. [address]., [place] [facility], [address], [place]    [facility], [address] [facility], [address]35[facility] [phone]    [facility], [address] [facility] [phone]    [facility] Addiction Recovery, [address] [facility] [phone]    [facility], [address] [facility], [phone]      Welsh SPEAKING (en español):     Información de la reunión de Alcohólicos Anónimos  [facility], 10:00 am  Habla español  Esta reunión está abierta y cualquiera puede asistir.    Thai speaking Alcoholics anonymous meetings:  El "Pankaj [place] AA Skype" es un pankaj on line de Alcohólicos Anónimos en [facility]. El pankaj es ruy, gratuito y virtual a través de Skype Audio. El pankaj funciona mediante sandoval llamada grupal de voz, por lo que no se utiliza la videollamada, ni se pueden deysi las imágenes o rostros de los participantes. Hace wolf años y medio abrimos el primer Pankaj de AA por Skype en [place], sara actualmente asisten personas desde [place], [place], [place], [place], [place], [place], [place], [place], [place], [place], [place] y [place], entre otros.    El pankaj es muy útil para los alcohólicos que residen en lugares donde no se celebran reuniones de AA, o residen en lugares donde las reuniones de AA son un número limitado de días a la semana, o para aquellos compañeros que se hayan de viaje o que, por cualquier motivo, se hayan convalecientes y no pueden desplazarse. Todos los días nos reuniones a las 21:00 (hora española)    Podéis obtener más información sobre el pankaj y bri sesiones en la página web [email or web address]      MENTAL HEALTH:     [facility] - Outpatient Clinic  [phone]    [facility] - General " Psychiatry Intensive Outpatient Program  [facility] (formerly known as the BMU)  [phone], option 3    [facility]  Department of Psychiatry - Dual Diagnosis Intensive Outpatient Program  [facility] (formerly known as the ABU)  [phone], option 2      COMMUNITY MENTAL HEALTH CENTERS:     [facility]  ([facility])  Serves [facility].  Serves uninsured patients & those with Medicaid.  Main location: [address] [place] [id number] [phone]  Walk-in's available during regular business hours.  24/7 Crisis Line: [phone]    [facility]  (aka [facility])  Serves [facility].  Serves uninsured patients, those with Medicaid and some private plans.  Walk-in's available during regular business hours.  Primary care services available as well.  [facility]: [address] [facility] [place] [address];  [phone]  [place]: [address] [place] [address];  [phone]  24/7 Crisis Line: [phone]    [facility]  Serves uninsured patients & those with Medicaid, call for more info.  Primary care, pediatrics, HIV treatment, and dentistry services available as well.  Three locations.  [phone]    [facility]?Corporate Office  Serves patients with Medicaid, Medicare, and private insurance  [address]  [place],?[place] [address] [phone]    [facility]  Serves uninsured on a sliding scale, as well as Medicaid, Medicare, and private plans.  Eight locations around the [facility] area.  [phone]    [facility]  Serves uninsured patients & those with Medicaid, private insurances.  Primary care available as well.  [phone]  [address] [place]  [address]    [facility] Outpatient Psychiatry  Serves veterans who were honorably discharged.  [address] [place] [address]  [phone]  24/7 Veterans Crisis Line: [phone] (Press 1)    If you have private insurance and need to find a specialist, please contact your insurance network to request a list of providers covered by your benefits.      MENTAL HEALTH/ADDICTIVE DISORDERS:     AA ([phone]), NA ([phone])   National Suicide Prevention Lifeline- Call 1-280.346.1960 Available 24 hours everyday  [facility] [phone]; Crisis Line [phone] - Call for options A-F:  Intensive Outpatient Treatment/ Day programs   [facility], please contact   [facility], please contact [phone] or [phone] to speak with an admissions counselor.  [facility] (Methadone Maintenance)   [address] [place] [address], [phone]  [address] [place] [address] [phone]  [facility], [address] [place] [address], [phone]  [facility] [phone]  [place] [facility] please contact [phone]  [facility], [address] [facility], 4th [place] [address] Phone: [phone]   Acer  Urban Office: [place] [address], [phone]  [place] Office: [address] [facility], [address], [place] [address], [phone]  [place] Office: [address] [facility] [place] [address] [phone]    Outpatient Substance Abuse Treatment   [facility] (Methadone Maintenance)   [address] [place] [address], [phone]  [address] [place] [address] [phone]  [facility], [address] [place] [address], [phone]  Acer  Urban Office: [address] [place], [place] [address], [phone]  [place] Office: [address] [facility] [address], [place] [address],  [phone]  [place] Office: [address] [place] [address] [phone]  [facility], [address] [place] [address] [phone]   [facility], [address] [facility], [address], [phone]  [facility] for [facility] Recover, [address] [place], [place] [phone]    Residential Substance Abuse Treatment   [facility] [address] [facility], [phone] or [phone]  [facility], [address] [facility], [phone]  [facility] (men only) [address] [place] [address], [phone]  Women at the [facility] (women only) [address] [place] [address] [phone]  [facility], [address] [place] [address] [phone]  [facility] (women only), intakes at 4150 [facility], [phone]  [facility] (7-day program, $100, [address] [place], [phone], [phone], [phone])  [facility] (Men only, [phone]), [address], Ranjith (Vets*/Non-Vets)  [facility] (Men only, $400/month program fee) [address] [facility], [phone]  [facility] (Women over age 39 only), [address], [phone]    Out of Area:    [place], [address] Count includes the [facility], [place], [place] ([phone])  [facility] Recovery Program (men only), [address] [facility] [place] [address], [phone]  [facility], [address] [place], [place] [phone])  [place], [address] [place] [address] Dr. Dubon, [place] ([phone])  [facility] Addiction [facility], [address] [facility], [phone]  [facility] (Women only, has to have mental illness, can be homeless or substance abuser), [phone]        DOMESTIC VIOLENCE RESOURCES:     Advocacy  [facility] ([facility])  [address] [address]    [facility] ? [phone]  Services provided: emergency shelter, individual advocacy,  information and referrals, group support, children's program, medical advocacy, forensic medical exams, primary care, legal assistance, counseling, safety planning, and caregiver support    [facility] HEALING AND EMPOWERMENT [place]  Confidential location  [facility] ? [phone]  Services provided: short term emergency shelter, all services provided are free of charge    [facility] CENTERS FOR COMMUNITY ADVOCACY  Multiple locations in [facility], [facility], [place], and [facility] ([place], [place], and [place])    REJITORITOMISHA ? [phone]  Services provided: emergency shelter, individual advocacy, information and referrals, group support, children's program, medical advocacy, legal assistance in obtaining restraining orders, counseling, safety planning, and caregiver support    [facility]   Emergency Shelter   [phone]  Emergency Services ,Legal and Financial Assistance Services ,Housing Services ,Support Services     [facility]   [phone]  Emergency Services ,Counseling Services , Housing Services ,Support Services ,Children's Services     WOMEN WITH A VISION  [address] [place] [address]  WWAV ? [phone]  Services provided: advocacy, health education and supportive services, specializing in free healing services for marginalized groups, including LGBTQ individuals and sex workers    SEXUAL TRAUMA AWARENESS AND RESPONSE (STAR)  [address] [place] [address]    STAR ? [phone]  Services provided: individual advocacy, information and referrals, group support, medical advocacy, legal assistance, counseling, and safety planning for survivors of sexual assault    [facility]  [address] [place] [address]  [facility] ? [phone]  Services provided: free forensic medical exams for sexual assault and domestic violence, which can be performed up to 5 days  after an incident. It is not necessary to make a police report to receive a forensic medical exam    Legal  [facility]  [address], [place] [address]  Project SAVE ? [phone] [phone]  Services provided: free emergency legal representation for survivors of doemstic violence residing in [facility]. Legal services may include temporary restraining orders, temporary child support, custody, and use of property    [facility] LEGAL SERVICES (SLLS)  [address] [facility] [facility], [place] [address]  SLLS ? [phone]  Services provided: free legal representation for survivors of domestic violence residing in [facility]. Legal services may include temporary child support, custody, and divorce      HOTLINES:     [facility] DOMESTIC VIOLENCE HOTLINE  [phone]    Services provided: free and confidential hotline for victims and survivors of domestic violence. All calls will be routed to a domestic violence service provided in the victim or survivor's area    NATIONAL HUMAN TRAFFICKING HOTLINE  (697) 962-3194    Services provided: national anti-trafficking hotline serving victims and survivors of human trafficking. Provides information about local resources, and access to safe space to report tips, seek services, and ask for help    VIA LINK  211 or (683) [phone]    Service provided: counselors can provide crisis counseling. Counselors can also provide information and referrals to programs which can help with needs such as food, shelter, medical care, financial assistance, mental health services, substance abuse treatment, senior services, childcare, and more      HOMELESS SHELTERS:      Homeless shelters  [facility]  Emergency shelter for individuals and families  [address]  [phone]  [facility]  Emergency shelter for men only  Meals daily 6am, 2pm, & 6pm  Clothing, case management M-F by appointment (ID/job/housing/legal assistance), mail  [address]    [phone]  Lamona Laurens  Emergency shelter for men  [address] [facility]  [phone]  Emergency shelter for women  [address]   [phone]  Breakfast & lunch daily, dinner M-F  Case management, job counseling services   [facility]  Emergency shelter for teens and young adults up to 20yo  [address]   [phone]  [place] [facility]  Emergency shelter for women over 19yo and their kids  [address] [place] [address]  [phone]  [facility]  Day program, meals M-F 1PM (arrive early)  Showers, laundry, hygiene kits, showers, phones, , notary services, case management, ID assistance  [address]   [phone] M-F 8am-2:30pm  [employer]  Day program  MTW 7:30am-3:30pm,  8:30am-3:30pm  Crisis intervention, employment assistance, food/clothing, hygiene kits, bus tokens, mail  [address] [facility] [address]  [phone]  [facility]  Mobile outreach for homeless persons in [facility]  [phone]  [facility]  Primary healthcare, case management, dental services, TB placement  Call ahead  [address] [facility] 2[facility]  [phone]  [place] [facility]  Connects homeless people with their loved ones in other cities by providing transportation costs   [phone]      [place] RESOURCES:     [place] [facility] Mental Health Crisis Response Team:    Region 12 ([place], [place], [place], and [facility]) ([facility])  [phone]      Outpatient Mental Health & Addiction Clinic Resources for both [facility] and [facility]:    [facility] Mental Healthcare Resources  Website: www.mhr.org  Main Number: 759-321-5392    [facility] ([facility])  [address] ([address] [place] [address]  [phone]    [facility] ([facility])  [address] ([address] [facility]) [facility]  [place] [address]  [phone]    [facility]  [address] ([address]) [place] [address]  [phone]    [facility]  [address] ([address] [place] [address]) [place] [address]  [phone]      Addiction Treatment Resources for both [facility] and [facility]:    [facility] (Detox, Residential, PHP, IOP, and Aftercare Programs)  [address] [place] [address]  [phone]    [facility] (Residential, IOP, Transitional Living, and Aftercare Programs)  [address] [place] [address]  [phone]    [facility] (Inpatient, Residential, Detox, IOP, Outpatient, and Aftercare Programs)  [address] [place] [address] [place] [address]  [phone] or toll free at [phone]      Outpatient Mental Health Psychotherapy Resources for both [facility] and [facility]:    Elizabeth Tate, \[facility] [place] [facility]\""W  [address]  [place] [address]  [phone]  Specialties: Depression, Anxiety, and Life Transitions    Tg Lugo, PhD  [address] [place] [address] 90  [address]  [place] [address]  [phone]  Specialties: Testing and Evaluation, Education and Learning Disabilities, and ADHD    Mary Frost, [facility] [address] [place] [address], [place] [address]  [phone]  Specialties: Obsessive-Compulsive (OCD), Depression, and Relationship Issues    Zoë Rawls LPC [address] [facility] [place] [address]  [phone]  Specialties: Trauma & PTSD, Mood Disorders, and Anxiety    Zoë Lobato, PhD, \[facility] [place] [facility]\""W  [facility] [address] [place] [address]  [phone]  Specialties: Family Conflict, Child, and Relationship Issues    Tiana Rock, [facility] [place] [address] [phone]  Specialties: Anxiety, Depression, and Anger Management        IN CASE OF SUICIDAL THINKING,  call the National Suicide Hotline Number: 988    988 Suicide & Crisis Lifeline: 988 , 1-351-622-TALK 8255)  Provides 24/7, free and confidential support for people in distress, prevention and crisis resources for you or your loved ones, and best practices for professionals.    Call, text or chat.  https://[employer].org

## 2024-11-14 NOTE — CONSULTS
"  [facility]   DEPARTMENT OF PSYCHIATRY     IDENTIFIERS & DEMOGRAPHICS:     ENCOUNTER: initial    -- PATIENT IDENTIFIERS: Malathi Mcpherson  [id number]  1975  49 y.o.  female  -- LOCATION OF PATIENT: ICU    -- MODE OF ARRIVAL: ambulance  -- PRESENT WITH PATIENT DURING SESSION: family/friend(s)  -- SOURCES OF INFORMATION: PATIENT, family/friend(s)  -- ENCOUNTER PROVIDER: Curt Greenwood MD        PRESENTATION:   History of Present Illness   **  OVERVIEW OF THE HPI:    49 year old with a hx of drug use, seizure, HTN, HLD  who [facility] [place] [facility] and was stroke activated on 11/6/24. Pt was found down by her mother this morning at an unknown time lying face down. Per EMS, she was very lethargic and aphasic and was able to move her left sided extremities and R leg but was not moving her RUE at all.    SUBJECTIVE/CURRENT FINDINGS:    On interview, patient is dysarthric. She is able to answer close-ended questions with "yes/no", but is largely unable to topically answer more open ended questions that require more detailed responses. Often her responses to questions did not pertain to the question asked (though her speech made it difficult to completely understand). She is partially oriented (knows she is in a medical facility in [place]), but she is unable to describe why she is in the hospital and is disoriented to date. She is aware that her mother is at the bedside.     When asked what happened prior to her admission she states that she doesn't know. She denied using when asked. She denies current depression or anxiety.      Per Collateral:    Patient's mother was at beside. She states that the patient has a history of substance use (crack cocaine, heroin, alcohol), bipolar disorder, and anxiety. She states that the patient has had a long history of substance abuse and has attended many inpatient treatment facilities ([facility], [place]). Most recently the patient was [facility] approximately 6 weeks prior to admission. Her mother states that those 6 weeks the patient was doing really well and was testing negative on her urine screens. The week prior to her hospitalization the patient relapsed on crack cocaine and was found down by her mother a couple of days later.     Asked about the patient's history of bipolar disorder, the patient's mother states that she has had periods where she doesn't sleep, talks fast and is irritable and erratic. It is unclear if these periods have ever occurred in the context of sobriety. She has had depressive episodes and has attempted suicide via overdose many years ago. Prior to this hospitalization, the patient was having difficulty sleeping and would often not sleep at night and sleep all day instead.     The patient was living with her parents prior to hospitalization. Her mother expresses concern about their ability to care for Ms. Childs depending on her functional recovery.     REVIEW OF SYSTEMS:     Y   Sleep Disturbance/Disruption   N   Appetite/Weight Change   N   Alterations in Energy Level   Y   Impaired Focus/Concentration   N   Depressive Symptomatology   N   Excessive Anxiety/Worry   N   Dysregulated Mood/Behavior   N   Manic Symptomatology   N   Psychosis   N   Trauma-Related    Regarding the current presentation, no other significant issues or complaints are voiced or known at this time.      ADD-ON PSYCHOTHERAPY:     ADD-ON THERAPY     HISTORY:   Patient Information   **  >> SOURCES: collateral  >> PATIENT PARTICIPATION: no  unable       PSYCH  SUBSTANCE  FAMILY  SOCIAL  MEDICAL     Y   Previous/Pre-Existing Psychiatric Diagnoses  Bipolar disorder, anxiety   Y   Past Psychotropic Trials  Wellbutrin, lithium, Prozac, Effexor, Remeron, Seroquel   Y   Current Psychiatric Provider  [facility]   Y   Hx of Outpatient Psychiatric Treatment   Y   Hx of  Psychiatric Hospitalization  Remote history of one non-substance use related admission   Y   Hx of Suicidal Ideation/Threats   Y   Hx of Suicide Attempts/Gestures  Remote history of attempted overdose   N   Hx of Homicidal Ideation/Threats   N   Hx of Homicidal Behavior   N   Hx of Non-Suicidal Self-Injurious Behavior   N   Hx of Perpetrated Violence   N   Documented Hx of Malingering   Y   Hx of Bipolar Diathesis   Y   Hx of Depression   Y   Hx of Anxiety   Y   Hx of Insomnia     Y   Hx of Formal ASHLYN Treatment  +inpatient/residential   Y   Hx of Rehab   Y   Recent Alcohol Consumption   Y   Hx of Nicotine Use  current   Y   Hx of Alcohol Misuse/Abuse   Y   Hx of Illicit Drug Misuse/Abuse  Crack cocaine, heroin   N   Hx of Prescription Drug Misuse/Abuse     N   Family Psychiatric History   +   Family Hx of Suicide  no      U   Hx of Trauma   U   Hx of Abuse     N   Developmental Delay/Disability   N   GED/High School Diploma   N   Post-Secondary Education   Y   Currently Employed   N   Currently on Disability   Y   Functions Independently   Y   Domiciled   Y   Intact Support System   Y   Heterosexual/Cisgender   N   Currently in a Relationship   N   Ever    N   Ever /   Y   Children/Dependents     Y   Ever Charged/Convicted   Y   Prior Charges   Y   Current Probation/[place]/Diversion     Y   Hx of Seizures    >> EHR (EPIC): reviewed/reconciled      The patient's past medical history has been reviewed and updated as appropriate within the electronic health record system.  See PROBLEM LIST & HISTORY for details.    Scheduled and PRN Medications: The electronic chart was reviewed and updated as appropriate.  See MEDCARD for details.    Allergies:  Patient has no known allergies.      EXAMINATION:   Objective   **  VITALS:  BP (!) 185/77    "Pulse 86   Temp 99.3 °F (37.4 °C) (Axillary)   Resp 18   Ht 5' 8" (1.727 m)   Wt 59 kg (130 lb 1.1 oz)   SpO2 (!) 89%   Breastfeeding No   BMI 19.78 kg/m²     MENTAL STATUS EXAMINATION:  Appearance: appears stated age, normal weight  appropriately dressed, in no apparent distress, well-appearing    Behavior & Attitude: participative, under adequate behavioral control, able to redirect, appropriate eye contact  calm, engaged, agreeable, cooperative    Movements & Motor Activity: +weakness  no psychomotor agitation, no psychomotor retardation, no spasticity, no rigidity, no tics, no tremor, no akathisia, no dyskinesia, no ataxia, no parkinsonism    Speech & Language: spontaneous, +dysarthric, slurring    Mood: good  Affect: euthymic  appropriate given the situation/context    Thought Process & Associations: tangential, scattered    Thought Content & Perceptions: no delusions, no paranoid ideation, no ideas of reference, no grandiosity, no hyperreligiosity, no hallucinations, no responding to internal stimuli    Sensorium: awake, confused  CAM-ICU negative    Orientation: oriented to person, oriented to place, not oriented to year, not oriented to month  not oriented to time, not oriented to situation    Recent & Remote Memory: intact (remote)  impaired (recent)    Attention & Concentration: moderate impairment, inattentive to conversation, easily distracted  impaired, unable to perform [facility] of Knowledge: impaired    Insight: limited  demonstrates sufficient awareness of condition/situation    Judgment: limited  heeds instructions/advice        RISK & REGULATORY:   Impression   **   RISK PARAMETERS:     N   Suicidal Ideation/Threats   N   Suicide Attempts/Gestures   N   Homicidal Ideation/Threats   N   Homicidal Behavior   N   Non-Suicidal Self-Injurious Behavior   N   Perpetrated Violence     NOTE: RISK PARAMETERS are current to the encounter/episode "  NOT inclusive of past history.       FIREARMS & WEAPONS:     N   Ready Access to Firearms     SAFETY SCREENINGS:    -- PROTECTIVE FACTORS: IDENTIFIED       - SPECIFIC FACTORS IDENTIFIED: accessible support, loving attachments    -- RISK FACTORS: IDENTIFIED     - SPECIFIC MODIFIABLE FACTORS IDENTIFIED: substance abuse     - SPECIFIC NON-MODIFIABLE FACTORS IDENTIFIED: hx suicide attempt, hx psych tx     MEDICAL DECISION MAKING:     - DIAGNOSTICS: a diagnostic psychiatric evaluation was performed and responsiveness to treatment was assessed  ability/capacity to respond to treatment: minimal     REGULATORY:    -- : REVIEWED  no discrepancies or irregularities are noted      INFORMED CONSENT & SHARED DECISION MAKING are the hallmark and bedrock of good clinical care, and as such have been employed and obtained, respectively, to the degree possible.  Discussed, to the extent possible, diagnosis, risks and benefits of proposed treatment (e.g., medication, therapy) vs alternative treatments vs no treatment, potential side effects of these treatments, and the inherent unpredictability of treatment.  Resources have been provided via the patient instructions in the AVS to supplement, augment, and reinforce discussions, counseling, and/or interventions.       - ABILITY TO UNDERSTAND, PARTICIPATE & ENGAGE: minimal     - AGREEABLE TO TREATMENT (consent/assent): unable to obtain due to the current context/circumstances     - RELIABILITY/ACCURACY: the patient is deemed to be a well-meaning but unreliable and factually inaccurate historian      WARNINGS & PRECAUTIONS:  >> In cases of emergencies (e.g. SI/HI resulting in danger to self or others, functioning deteriorating to the level of grave disability), call 911 or 988, or present to the emergency department for immediate assistance.    >> Individuals should not operate a motor vehicle or heavy machinery if effects of medications or underlying symptoms/condition impair  the ability to do so safely.    >> FULLY comply with ANY/ALL medication as prescribed/instructed and report ANY/ALL suspected adverse effects to appropriate health care providers.      ASSESSMENT & PLAN:   Assessment & Plan   **  DIAGNOSES & PROBLEMS:       1.  Stimulant use disorder, severe    2.  H/o Bipolar disorder    PSYCHOTROPIC REGIMEN:   (C)=Continue as prescribed  (A)=Adjust as noted  (I)=Iniitate  (D)=Discontinue      1.  Lithium 300 mg TID (C)    2.  Prozac 60 mg daily (C)    3.  Seroquel 25 mg AM, 50 mg PM (C)    4.  Remeron 7.5 mg nightly (C)    Would recommend checking lithium level 11/18 (i.e., after 5 days on current dose)    -- ASSESSMENT (synthesis  analysis):     Ms. Naz Mcpherson is a 49 y.o. female with polysubstance use disorder (crack cocaine, heroin, alcohol) and bipolar disorder who is admitted for large bilateral CVA. Patient most recently in drug rehab court prior to hospitalization and relapsed on crack cocaine in the week prior to admission. The most appropriate level of addiction treatment will be largely influenced by her functional recovery, which will impact her ability to participate in treatment.     -- PLAN (goals  recommentations):        - WITH REASONABLE MEDICAL CERTAINTY, based on history, chart review, available collateral information, and a present-state examination:   -- psychiatric hospitalization is not indicated    * PEC is NOT INDICATED - rescind if in place     >> prescription drug management (i.e., the review, recommendation, or consideration without recommendation of medications) was employed during the encounter    -- the importance of full compliance with any prescribed medication     -- the proper protocol and/or steps to take if questions or problems arise  >> specifically, follow lithium level and QTc  >> DEFER further adjustments to PSYCHOTROPIC regimen at this time  >> CONTINUE PRN medication for acute AGITATION while patient is in house; if needed, titrate  dose as tolerated, utilizing least restrictive measures that are safe and effective  >> DEFER management of NON-PSYCHIATRIC medication(s) to primary and/or specialist provider(s)    >> advised to abstain from alcohol and illicit drug use  >> counseled on full abstinence from alcohol and substances of abuse (illicit and prescription), as well as maintenance of a recovery lifestyle  >> harm reduction techniques discussed, as warranted, to mitigate risk from problematic behaviors  >> serial laboratory testing (e.g. PETH, serum ethanol, urine toxicology) recommended and/or planned to provide accountability, as well as guide and refine treatment moving forward  >> importance of lifelong, active engagement in 12 step (or equivalent) mutual self-help program(s) was stressed/reinforced, including regular meeting attendance and acquisition/maintenance of a sponsor  >> education and/or resources discussed and/or provided for various addiction recovery and rehabilitation options  >> case discussed with staff psychiatrist  >> will sign off at this time, thank you    See below for pertinent laboratory and radiographic findings.    Curt Greenwood MD, PhD  Addiction Psychiatry, PGY-2     CHART REVIEW: available documentation has been reviewed, and pertinent elements of the chart have been incorporated into this evaluation where appropriate.       KEY & LINKS:        Y  = yes/endorses     N  = no/denies     U  = unknown/unable to assess    ADHD   AIMS   AUDIT   AUDIT-C   C-SSRS (Screen)   C-SSRS (Short)   C-SSRS (Full)   DAST   DAST-10   ANGELIC-7   MoCA   PCL-5   PHQ-9   ASHLYN   YMRS        DIAGNOSTIC TESTING:   Results   **   Glu 116 (H)  11/14/2024  Li 0.1 (L)  11/13/2024  TSH 0.525  11/6/2024    HgA1c 4.9  11/6/2024  VPA *   *   FT4 *   *    Na 142  11/14/2024  CLZ *   *  WBC 11.24  11/14/2024    Cr 0.7  11/14/2024  ANC 8.3; 74.2 (H);  (H)  11/14/2024   Hgb 10.9 (L)  11/14/2024     BUN 9  11/14/2024  Trop  I 0.009  7/28/2023  HCT 33.4 (L)  11/14/2024     GFR >60.0  11/14/2024    (H)  11/12/2024    11/14/2024     Alb 2.4 (L)  11/14/2024   PRL *   *  B12 *   *     T Bili 0.3  11/14/2024  Chol 239 (H)  11/6/2024  B9 *   *    ALP 97  11/14/2024  TGs 94  11/6/2024  B1 *   *    AST 46 (H)  11/14/2024  HDL 42  11/6/2024  Vit D *   *     ALT 30  11/14/2024  .2 (H)  11/6/2024  HIV Non-reactive  11/8/2024     INR 1.0  11/6/2024  Francia *   *   Hep C *   *    GGT *   *  Lip *   *  RPR *   *    MCV 90  11/14/2024   NH4 55 (H)  11/7/2024  UPT Negative  7/28/2023      PETH *   *  THC Negative  11/7/2024    ETOH *   *  NAOMY Presumptive Positive (A)  11/7/2024    EtG *   *  AMP Negative  11/7/2024    ALC <10  11/7/2024  OPI Negative  11/7/2024    BZO Negative  11/7/2024  MTD Negative  11/7/2024     BAR Negative  11/7/2024  BUP *   *    PCP Negative  11/7/2024  FEN *   *     Results for orders placed or performed during the hospital encounter of 11/06/24   EKG 12-lead    Collection Time: 11/14/24  9:16 AM   Result Value Ref Range    QRS Duration 76 ms    [facility] QTC Calculation 469 ms    Narrative    Test Reason : Z91.89,    Vent. Rate :  78 BPM     Atrial Rate :  78 BPM     P-R Int :  92 ms          QRS Dur :  76 ms      QT Int : 412 ms       P-R-T Axes :  47  36  39 degrees    QTcB Int : 469 ms    Sinus rhythm with short VA  Otherwise normal ECG  When compared with ECG of 13-Nov-2024 08:38,  VA interval has decreased  T wave amplitude has increased in Lateral leads  Confirmed by Bronson Lucia ([id number]) on 11/14/2024 9:25:27 AM    Referred By: AAAREFERRAL SELF           Confirmed By: Bronson Lucia     Results for orders placed or performed during the hospital encounter of 11/06/24   MRI Brain Without Contrast    Narrative    EXAMINATION:  MRI BRAIN WITHOUT CONTRAST    CLINICAL HISTORY:  Stroke, follow up;    TECHNIQUE:  Multiplanar multisequence MR imaging of the brain was performed  without contrast.    COMPARISON:  CT 11/06/2024    FINDINGS:  Multiple areas of restricted diffusion consistent with acute infarcts within the posterior circulation are identified.  There is involvement of the left greater than right occipital lobes and left medial temporal lobe (PCA territory), bilateral thalamus, left hippocampus and right hippocampal tail, right dorsal medulla, and tiny bilateral cerebellar infarcts.  Trace petechial hemorrhage left medial temporal lobe.  No overt hemorrhagic conversion.    No midline shift or herniation with minimal mass effect on the 3rd ventricle.    No hydrocephalus.    Loss of the intracranial right vertebral artery flow void in keeping with the findings on the recent CTA.      Impression    Multiple posterior circulation infarcts are identified with involvement of the left greater than right occipital lobes and medial left temporal lobe, thalamus, right dorsal medulla, and minimally involving the cerebellum.  No midline shift with minimal mass effect on the 3rd ventricle.    Loss of flow void within the right intracranial vertebral artery in keeping with the findings on CTA.      Electronically signed by: Adrián Hobbs  Date:    11/06/2024  Time:    17:40   CT Head Without Contrast    Narrative    EXAMINATION:  CT HEAD WITHOUT CONTRAST    CLINICAL HISTORY:  Stroke, follow up;    TECHNIQUE:  Low dose axial images were obtained through the head.  Coronal and sagittal reformations were also performed. Contrast was not administered.    COMPARISON:  CT, MRI 11/06/2024    FINDINGS:  Evolving posterior circulation infarcts are identified as described on recent MR imaging again most notable within the left greater than right occipital lobes extending to the medial left temporal lobe and bilateral thalamus.  No hemorrhagic conversion with potentially minimal stable petechial hemorrhage within the medial left thalamus.  No midline shift or herniation with mild mass effect on the 3rd  ventricle again noted.    No new acute territorial infarct.    The visualized sinuses and mastoid air cells are clear.      Impression    Expected evolutionary changes of the posterior circulation infarcts with no overt hemorrhagic conversion or significant midline shift/herniation.      Electronically signed by: Adrián Hobbs  Date:    11/09/2024  Time:    08:38      Past Medical History:   Diagnosis Date    Acute adjustment disorder with depressed mood     Cutaneous abscess 08/08/2019    Depression     Enlarged liver 04/27/2017    Hep C w/o coma, chronic     Hepatitis B     History of mental disorder 03/22/2017    History of substance abuse         Inpatient consult to Psychiatry  Consult performed by: Curt Greenwood MD  Consult ordered by: Terese De La Rosa DO  Reason for consult: Addiction psych, here with large stroke after relapse        [facility] NEUROSCIENCE CRITICAL*

## 2024-11-14 NOTE — PLAN OF CARE
Recommendations    TF recs: Isosource 1.5 @ 45 mL/hr to provide 1080 mL total volume, 1620 kcal, 73 g protein, 190 g CHO, 825 mL free water, 16 g fiber, 108% DRI   Free water flushes 130 mL flush q 4 hours to provide additional 780 mL free water (total 1605 mL) or flush per MD.   RD to monitor and follow up.    Goals: Meet % EEN/EPN by RD follow up.  Nutrition Goal Status: goal met  Communication of RD Recs:  (POC)

## 2024-11-14 NOTE — PT/OT/SLP PROGRESS
"Occupational Therapy   Treatment    Name: Malathi Mcpherson  MRN: [id number]  Admitting Diagnosis:  Acute arterial ischemic stroke, multifocal, posterior circulation       Recommendations:     Discharge Recommendations: High Intensity Therapy  Discharge Equipment Recommendations:  bedside commode, bath bench  Barriers to discharge:  None    Assessment:     Malathi Mcpherson is a 49 y.o. female with a medical diagnosis of Acute arterial ischemic stroke, multifocal, posterior circulation.  She presents with performance deficits affecting function are weakness, impaired endurance, impaired self care skills, impaired functional mobility, impaired balance, decreased coordination, impaired cognition, decreased upper extremity function, decreased lower extremity function.     Rehab Prognosis:  Good; patient would benefit from acute skilled OT services to address these deficits and reach maximum level of function.       Plan:     Patient to be seen 4 x/week to address the above listed problems via self-care/home management, therapeutic activities, therapeutic exercises, neuromuscular re-education, cognitive retraining  Plan of Care Expires: 12/05/24  Plan of Care Reviewed with: patient    Subjective   Patient:  "I'm at home."  Pain/Comfort:  Pain Rating 1: 0/10  Pain Rating Post-Intervention 1: 0/10    Objective:     Communicated with: Nurse prior to session.  Patient found supine with restraints, SCD, telemetry, peripheral IV, bed alarm, pressure relief boots, PureWick, pulse ox (continuous), NG tube, blood pressure cuff upon OT entry to room.    General Precautions: Standard, aspiration, fall, NPO    Orthopedic Precautions:N/A  Braces: N/A  Respiratory Status: Room air     Occupational Performance:     Bed Mobility:    Patient completed Rolling/Turning to Left with  minimum assistance  Patient completed Rolling/Turning to Right with minimum assistance  Patient completed Supine to Sit with minimum assistance  Patient " completed Sit to Supine with moderate assistance     Functional Mobility/Transfers:  Patient completed Sit <> Stand Transfer with maximal assistance  with  no assistive device     Activities of Daily Living:  Lower Body Dressing: maximal assistance while seated EOB    [facility] 6 Click ADL: 11    Treatment & Education:  Patient alert and oriented x person.  Able to follow 4/4 one step commands.  Patient attentive and interactive throughout the session.  Able to sequence 7/7 days of the week and 2/12 months of the year.    Patient left supine with all lines intact, call button in reach, and bed alarm on    GOALS:   Multidisciplinary Problems       Occupational Therapy Goals          Problem: Occupational Therapy    Goal Priority Disciplines Outcome Interventions   Occupational Therapy Goal     OT, PT/OT Progressing    Description: Goals set 11/11 with an expiration date, 12/5:  Patient will increase functional independence with ADLs by performing:    Patient will demonstrate rolling to the right with min assist.  Not met   Patient will demonstrate rolling to the left with min assist.   Not met  Patient will demonstrate supine -sit with min assist.   Not met  Patient will demonstrate squat pivot transfers with min assist.   Not met  Patient will demonstrate grooming while seated with min assist.   Not met  Patient will demonstrate upper body dressing with min assist while seated EOB.   Not met  Patient will demonstrate lower body dressing with mod assist while seated EOB.   Not met  Patient will demonstrate toileting with mod assist.   Not met  Patient's family / caregiver will demonstrate independence and safety with assisting patient with self-care skills and functional mobility.     Not met  Patient's family / caregiver will demonstrate independence with providing ROM and changes in bed positioning.   Not met                                 Time Tracking:     OT Date of Treatment: 11/14/24  OT Start Time: 0410  OT Stop  Time: 0435  OT Total Time (min): 25 min    Billable Minutes:Self Care/Home Management 15  Neuromuscular Re-education 10    OT/MADHURI: OT          11/14/2024

## 2024-11-14 NOTE — NURSING TRANSFER
Nursing Transfer Note      11/14/2024   5:32 PM    Nurse giving handoff:ABDOULAYE De Leon   Nurse receiving handoff:ABDOULAYE Patel    Reason patient is being transferred: monitoring    Transfer To: NPU    Transfer via bed    Transfer with tele monitor    Transported by ABDOULAYE De Leon (Nurse)    Transfer Vital Signs:  Blood Pressure:177/100  Heart Rate:85  O2:95  Temperature:97.8  Respirations:20    Telemetry: Telemetry     Order for Tele Monitor? Yes    Medicines sent: yes, Fluoxetine      Patient belongings transferred with patient: Yes    Chart send with patient: Yes    Notified: daughter    Patient reassessed at: 1650 (date, time)    Upon arrival to floor: patient oriented to room

## 2024-11-14 NOTE — SUBJECTIVE & OBJECTIVE
Interval History:  See hospital course    Review of Systems   Unable to perform ROS: Patient nonverbal   Constitutional:  Negative for chills and fever.   Respiratory:  Negative for shortness of breath.    Cardiovascular:  Negative for chest pain and palpitations.     Unable to obtain a complete ROS due to level of consciousness.    Objective:     Vitals:  Temp: 98.4 °F (36.9 °C)  Pulse: 85  Rhythm: normal sinus rhythm  BP: (!) 181/88  MAP (mmHg): 127  Resp: (!) 21  SpO2: (!) 93 %    Temp  Min: 98.4 °F (36.9 °C)  Max: 99.8 °F (37.7 °C)  Pulse  Min: 70  Max: 99  BP  Min: 110/73  Max: 185/77  MAP (mmHg)  Min: 85  Max: 127  Resp  Min: 18  Max: 45  SpO2  Min: 89 %  Max: 94 %    11/13 0701 - 11/14 0700  In: 975.5 [I.V.:80.5]  Out: 800 [Urine:800]   Unmeasured Output  Urine Occurrence: 1  Stool Occurrence: 1  Pad Count: 2        Physical Exam  Vitals and nursing note reviewed.   Constitutional:       General: She is not in acute distress.     Appearance: She is ill-appearing.      Comments: Sedated   HENT:      Head: Normocephalic and atraumatic.      Mouth/Throat:      Mouth: Mucous membranes are dry.      Pharynx: Oropharynx is clear.   Eyes:      Extraocular Movements: Extraocular movements intact.      Pupils: Pupils are equal, round, and reactive to light.   Cardiovascular:      Rate and Rhythm: Normal rate and regular rhythm.      Heart sounds: Normal heart sounds.   Pulmonary:      Effort: Pulmonary effort is normal. No respiratory distress.      Breath sounds: Normal breath sounds.   Abdominal:      General: Abdomen is flat. Bowel sounds are normal. There is no distension.      Palpations: Abdomen is soft.   Musculoskeletal:      Right lower leg: No edema.      Left lower leg: No edema.   Skin:     General: Skin is warm and dry.   Neurological:      GCS: GCS eye subscore is 3. GCS motor subscore is 4.      Comments: Off sedation   Answering questions appropriately  More interactive with exam  Intermittently  following commands  PERRL  Face symmetrical  Tongue midline    Motor:  Spontaneous antigravity movement throughout, less in the RUE  Withdrawal to noxious stimuli throughout         Unable to test orientation, language, memory, judgment, insight, fund of knowledge, hearing, shoulder shrug, tongue protrusion, coordination, gait due to level of consciousness.       Medications:  Continuous   Scheduledacetaminophen, 650 mg, Q6H  aspirin, 81 mg, Daily  atorvastatin, 80 mg, Daily  bisacodyL, 10 mg, Daily  carvediloL, 6.25 mg, BID  ceFEPime IV (PEDS and ADULTS), 2 g, Q8H  clopidogreL, 75 mg, Daily  FLUoxetine, 60 mg, Daily  folic acid, 1 mg, Daily  heparin (porcine), 5,000 Units, Q8H  LIDOcaine, 1 patch, Q24H  lithium citrate 8 meq/5 ml, 300 mg, TID  mirtazapine, 7.5 mg, QHS  multivitamin, 1 tablet, Daily  polyethylene glycol, 17 g, BID  QUEtiapine, 25 mg, Daily  QUEtiapine, 50 mg, QHS  senna-docusate 8.6-50 mg, 2 tablet, Daily  thiamine, 100 mg, Daily    PRNalbuterol-ipratropium, 3 mL, Q6H PRN  hydrOXYzine HCL, 25 mg, QID PRN  magnesium oxide, 800 mg, PRN  magnesium oxide, 800 mg, PRN  potassium bicarbonate, 35 mEq, PRN  potassium bicarbonate, 50 mEq, PRN  potassium bicarbonate, 60 mEq, PRN  potassium, sodium phosphates, 2 packet, PRN  potassium, sodium phosphates, 2 packet, PRN  potassium, sodium phosphates, 2 packet, PRN  sodium chloride 0.9%, 10 mL, PRN      Today I personally reviewed pertinent medications, lines/drains/airways, imaging, cardiology results, laboratory results, microbiology results, notably:    Diet  Diet NPO  Diet NPO

## 2024-11-14 NOTE — PLAN OF CARE
POC established and functional mobility goals were created to help pt return to PLOF. Will be reassessed as appropriate to measure pt progress.    Problem: Physical Therapy  Goal: Physical Therapy Goal  Description: Goals to be met by: 24     Patient will increase functional independence with mobility by performin. Supine to sit with Contact Guard Assistance  2. Sit to supine with Contact Guard Assistance  3. Sit to stand transfer with Minimal Assistance  4. Bed to chair transfer with Minimal Assistance using LRAD as needed  5. Gait  x 50 feet with Minimal Assistance using LRAD as needed.   6. Lower extremity exercise program x10 reps per handout, with assistance as needed    DME Justifications (see above for complete DME recommendations)    Bedside Commode- Patient has a mobility limitation that significantly impairs their ability to participate in one or more mobility related activities of daily living, including toileting. This deficit can be resolved by using a bedside commode. Patient demonstrates mobility limitations that will cause them to be confined to one room at home without bathroom access for up to 30 days. Using a bedside commode will greatly improve the patient's ability to participate in MRADLs.     Hospital Bed ·   Patient requires a hospital bed for positioning of the body in ways that are not feasible with an ordinary bed. The patient requires special positioning for pain relief, limited mobility, and/or being unable to independently make changes in body position without the use of a hospital bed. Pillows and wedges will not be adequate for resolving these positional issues.    Outcome: Progressing

## 2024-11-14 NOTE — PROGRESS NOTES
"[facility]  Adult Nutrition  Progress Note    SUMMARY       Recommendations    TF recs: Isosource 1.5 @ 45 mL/hr to provide 1080 mL total volume, 1620 kcal, 73 g protein, 190 g CHO, 825 mL free water, 16 g fiber, 108% DRI   Free water flushes 130 mL flush q 4 hours to provide additional 780 mL free water (total 1605 mL) or flush per MD.   RD to monitor and follow up.    Goals: Meet % EEN/EPN by RD follow up.  Nutrition Goal Status: goal met  Communication of RD Recs:  (POC)    Assessment and Plan    Nutrition Problem  Impaired nutrient utilization      Related to (etiology):   Alcohol or drug use     Signs and Symptoms (as evidenced by):   History ETOH abuse, polysubstance abuse, B1, folic acid, MVI ordered     Interventions/Recommendations (treatment strategy):  Collab of care w/ other providers  EN    Nutrition Diagnosis Status:   Continues         Reason for Assessment    Reason For Assessment: RD follow-up  Diagnosis: stroke/CVA, seizures, cardiac disease  General Information Comments: Pt extubated 11/10. Current TF of Isosource 1.5 at goal rate of 45 ml/hr. No nausea, vomiting, or TF discomfort noted. LBM 11/9.  Nutrition Discharge Planning: Pending Clinical Course    Nutrition Related Social Determinants of Health: SDOH: Unable to assess at this time.       Nutrition Risk Screen    Nutrition Risk Screen: tube feeding or parenteral nutrition, difficulty chewing/swallowing    Nutrition/Diet History    Patient Reported Diet/Restrictions/Preferences: other (see comments) (BETTYE)  Spiritual, Cultural Beliefs, Synagogue Practices, Values that Affect Care: no  Food Allergies: NKFA  Factors Affecting Nutritional Intake: impaired cognitive status/motor control, NPO    Anthropometrics    Temp: 99.8 °F (37.7 °C)  Height Method: Estimated  Height: 5' 8" (172.7 cm)  Height (inches): 68 in  Weight Method: Bed Scale  Weight: 59 kg (130 lb 1.1 oz)  Weight (lb): 130.07 lb  Ideal Body Weight (IBW), " Female: 140 lb  % Ideal Body Weight, Female (lb): 92.91 %  BMI (Calculated): 19.8  BMI Grade: 18.5-24.9 - normal  Usual Body Weight (UBW), k kg  % Usual Body Weight: 100.21  % Weight Change From Usual Weight: 0 %       Lab/Procedures/Meds    Pertinent Labs Reviewed: reviewed  Pertinent Labs Comments: H/H 9.8/32.0, glucose 119, albumin 2.1, AST 42  Pertinent Medications Reviewed: reviewed  Pertinent Medications Comments: + 11,787.9 net I/O        Estimated/Assessed Needs    Weight Used For Calorie Calculations: 59 kg (130 lb 1.1 oz)  Energy Calorie Requirements (kcal): 6011-2720 kcal (25-30 kcal/kg)  Energy Need Method: Kcal/kg  Protein Requirements: 59-71 (1.0-1.2 g/kg ABW)  Weight Used For Protein Calculations: 59 kg (130 lb 1.1 oz)     Estimated Fluid Requirement Method: RDA Method  RDA Method (mL): 1475         Nutrition Prescription Ordered    Current Diet Order: NPO  Current Nutrition Support Formula Ordered: Isosource 1.5  Current Nutrition Support Rate Ordered: 45 (ml)  Current Nutrition Support Frequency Ordered: Continuous    Evaluation of Received Nutrient/Fluid Intake    Enteral Calories (kcal): 1620  Enteral Protein (gm): 73  Enteral (Free Water) Fluid (mL): 825  % Kcal Needs: 99  % Protein Needs: 112  % Intake of Estimated Energy Needs: 75 - 100 %  % Meal Intake: NPO    Nutrition Risk    Level of Risk/Frequency of Follow-up:  (1x/week)     Monitor and Evaluation    Food and Nutrient Intake: enteral nutrition intake  Food and Nutrient Adminstration: enteral and parenteral nutrition administration  Knowledge/Beliefs/Attitudes: food and nutrition knowledge/skill  Physical Activity and Function: nutrition-related ADLs and IADLs, factors affecting access to physical activity  Anthropometric Measurements: weight change, weight, body mass index  Biochemical Data, Medical Tests and Procedures: glucose/endocrine profile, gastrointestinal profile  Nutrition-Focused Physical Findings: overall appearance      Nutrition Follow-Up    RD Follow-up?: Yes    Sabrina Castillo, Registration Eligible, Provisional LDN

## 2024-11-14 NOTE — ASSESSMENT & PLAN NOTE
Malathi Mcpherson is a 49 y.o. female with PMH of drug use, seizure, HTN, HLD that is admitted to [facility] for further evaluation and management of multifocal acute infarcts. She initially presented to [facility] ED 11/6 after being found lying face down at home that morning, per EMS was lethargic and aphasic and moving LUE/LLE/RLE but was not moving RUE. EMS also reported that they had seen her before in context of seizures. LKW 10pm 11/5. In ED was noted to have exam worsening, now with R sided plegia and global aphasia. NIHSS 22. Also noted to have O2 desat in 70s and ultimately was intubated for airway protection. CTA showed multifocal areas of hypodensity involving the posterior circulation suspicious for infarcts as well as focal occlusion of R vert and L PCA. Not candidate for acute stroke interventions, no TNK due to OOW and no IR due to stroke burden/risk for bleeding. She was admitted to [facility] for higher level of care. NSGY consulted on admit for edema/hemicrani watch, however no surgical interventions recommended. MRI brain confirmed multifocal areas of acute infarct involving: L>R occipital lobes, L medial temporal lobe, bilateral thalami, L hippocampus and R hippocampus tail, R dorsal medulla, bilateral cerebellar hemispheres. TTE unremarkable. Suspect etiology of stroke likely secondary to substance use, Utox + cocaine.    NAEO, patient off precedex, BL wrist and torso restraints, NG tube with feeds running. Patient able to respond to questions and follow some commands. Oriented to self only. Stable for stepdown.     Antithrombotics for secondary stroke prevention: Antiplatelets: Aspirin: 81 mg daily  Clopidogrel: 75 mg daily.     Statins for secondary stroke prevention and hyperlipidemia, if present: Statins: Atorvastatin- 80 mg daily    Aggressive risk factor modification: HTN, HLD, Substance use.     Rehab efforts: The patient has been evaluated by a stroke team provider and the therapy needs have been fully  considered based off the presenting complaints and exam findings. The following therapy evaluations are needed: PT evaluate and treat, OT evaluate and treat, SLP evaluate and treat    Diagnostics ordered/pending: None     VTE prophylaxis: Heparin 5000 units SQ every 8 hours  Mechanical prophylaxis: Place SCDs    BP parameters: Infarct: SBP goal <180

## 2024-11-14 NOTE — PROGRESS NOTES
[facility]  Vascular Neurology  Comprehensive Stroke Center  Progress Note    Assessment/Plan:     * Acute arterial ischemic stroke, multifocal, posterior circulation  Malathi Mcpherson is a 49 y.o. female with PMH of drug use, seizure, HTN, HLD that is admitted to [facility] [place] for further evaluation and management of multifocal acute infarcts. She initially presented to [facility] [place] ED 11/6 after being found lying face down at home that morning, per EMS was lethargic and aphasic and moving LUE/LLE/RLE but was not moving RUE. EMS also reported that they had seen her before in context of seizures. LKW 10pm 11/5. In ED was noted to have exam worsening, now with R sided plegia and global aphasia. NIHSS 22. Also noted to have O2 desat in 70s and ultimately was intubated for airway protection. CTA showed multifocal areas of hypodensity involving the posterior circulation suspicious for infarcts as well as focal occlusion of R vert and L PCA. Not candidate for acute stroke interventions, no TNK due to OOW and no IR due to stroke burden/risk for bleeding. She was admitted to [facility] for higher level of care. NSGY consulted on admit for edema/hemicrani watch, however no surgical interventions recommended. MRI brain confirmed multifocal areas of acute infarct involving: L>R occipital lobes, L medial temporal lobe, bilateral thalami, L hippocampus and R hippocampus tail, R dorsal medulla, bilateral cerebellar hemispheres. TTE unremarkable. Suspect etiology of stroke likely secondary to substance use, Utox + cocaine.    NAEO, patient off precedex, BL wrist and torso restraints, NG tube with feeds running. Patient able to respond to questions and follow some commands. Oriented to self only. Stable for stepdown.     Antithrombotics for secondary stroke prevention: Antiplatelets: Aspirin: 81 mg daily  Clopidogrel: 75 mg daily.     Statins for secondary stroke prevention and hyperlipidemia, if present: Statins: Atorvastatin- 80 mg  daily    Aggressive risk factor modification: HTN, HLD, Substance use.     Rehab efforts: The patient has been evaluated by a stroke team provider and the therapy needs have been fully considered based off the presenting complaints and exam findings. The following therapy evaluations are needed: PT evaluate and treat, OT evaluate and treat, SLP evaluate and treat    Diagnostics ordered/pending: None     VTE prophylaxis: Heparin 5000 units SQ every 8 hours  Mechanical prophylaxis: Place SCDs    BP parameters: Infarct: SBP goal <180          Debility  -Due to stroke  -Aggressive therapy  -PT/OT/SLP eval and treat    Polysubstance abuse  -Utox + cocaine  -Suspect etiology of stroke likely due to substance use  -Continue thiamine/folate/multivitamin supplementation    Cytotoxic cerebral edema  -Area of cerebral edema identified when reviewing brain imaging involving bilateral posterior circulation territories due to acute infarcts, there is mild mass effect associated with it that has remained stable on follow up imaging.  -Admitted to [facility] for close neuro monitoring and observation  -NSGY consulted on admit, no surgical intervention recommended and NSGY s/o  -Continue frequent q1hr neuro checks as any acute changes or worsening neuro status may signify expansion of the insult and/or area of the edema, which may require acute interventions to prevent loss of function and/or death.  -Obtain stat CTH for any new or worsening neuro changes and notify primary team immediately    Anxiety and depression  -Continue home fluoxetine  -Continue seroquel 100mg qHS  -Home lithium held on admit due to elevated serum lithium level, resumed 11/11 11/07/2024 Pt was agitated overnight and is on fentanyl. Currently intubated.On repeated painful stimuli has movements on LUE,LLE, RLE> RUE.The movement on right side is an improvement from yesterday.  11/11/2024 NAEON, remains on precedex for agitation. Extubated yesterday (11/10),  tolerating extubation thus far without complication. Moving extremities spontaneously although weakness noted in RUE>>RLE. Remains globally aphasic and agitated despite max precedex infusion. Respiratory cultures + H flu, on rocephin for abx. Also on bactrim for SSTI. Home lithium restarted today, continue seoquel and fluoxetine.  11/14/2024 NAEO, patient off precedex, BL wrist and torso restraints, NG tube with feeds running. Patient able to respond to questions and follow some commands. Oriented to self only. Stable for stepdown.     STROKE DOCUMENTATION   Acute Stroke Times   Last Known Normal Date: 11/05/24  Last Known Normal Time: 2200  Symptom Onset Date: 11/06/24 (unsure of timing)  Symptom Onset Time:  (sometime this morning.)  Unknown Symptom Onset Time: Unknown Time  Stroke Team Called Date: 11/06/24  Stroke Team Called Time: 0843  Stroke Team Arrival Date: 11/06/24  Stroke Team Arrival Time: 0845  CT Interpretation Time: 0859  Thrombolytic Therapy Recommended: No  CTA Interpretation Time: 0902  Thrombectomy Recommended: No    NIH Scale:  1a. Level of Consciousness: 0-->Alert, keenly responsive  1b. LOC Questions: 2-->Answers neither question correctly  1c. LOC Commands: 1-->Performs one task correctly  2. Best Gaze: 0-->Normal  3. Visual: 0-->No visual loss  4. Facial Palsy: 0-->Normal symmetrical movements  5a. Motor Arm, Left: 4-->No movement (in restraints)  5b. Motor Arm, Right: 4-->No movement (in restraints)  6a. Motor Leg, Left: 0-->No drift, leg holds 30 degree position for full 5 secs  6b. Motor Leg, Right: 0-->No drift, leg holds 30 degree position for full 5 secs  7. Limb Ataxia: 0-->Absent  8. Sensory: 0-->Normal, no sensory loss  9. Best Language: 1-->Mild-to-moderate aphasia, some obvious loss of fluency or facility of comprehension, without significant limitation on ideas expressed or form of expression. Reduction of speech and/or comprehension, however, makes conversation. . . (see row  details)  10. Dysarthria: 1-->Mild-to-moderate dysarthria, patient slurs at least some words and, at worst, can be understood with some difficulty  11. Extinction and Inattention (formerly Neglect): 0-->No abnormality  Total (NIH Stroke Scale): 13       Modified Rowan Score: 0  [place] Coma Scale:    ABCD2 Score:    [id number] Score:   HAS -BLED Score:   ICH Score:   Hunt & Argueta Classification:      Hemorrhagic change of an Ischemic Stroke: Does this patient have an ischemic stroke with hemorrhagic changes? No     Neurologic Chief Complaint: acute arterial ischemic stroke, multifocal, posterior circulation     Subjective:     Interval History: Patient is seen for follow-up neurological assessment and treatment recommendations: See hospital course.     HPI, Past Medical, Family, and Social History remains the same as documented in the initial encounter.     Review of Systems  Scheduled Meds:   acetaminophen  650 mg Per NG tube Q6H    aspirin  81 mg Per NG tube Daily    atorvastatin  80 mg Per NG tube Daily    bisacodyL  10 mg Rectal Daily    carvediloL  6.25 mg Per NG tube BID    ceFEPime IV (PEDS and ADULTS)  2 g Intravenous Q8H    clopidogreL  75 mg Per NG tube Daily    FLUoxetine  60 mg Per NG tube Daily    folic acid  1 mg Per NG tube Daily    heparin (porcine)  5,000 Units Subcutaneous Q8H    LIDOcaine  1 patch Transdermal Q24H    lithium citrate 8 meq/5 ml  300 mg Per NG tube TID    mirtazapine  7.5 mg Per NG tube QHS    multivitamin  1 tablet Per NG tube Daily    polyethylene glycol  17 g Per NG tube BID    QUEtiapine  25 mg Per NG tube Daily    QUEtiapine  50 mg Per NG tube QHS    senna-docusate 8.6-50 mg  2 tablet Per NG tube Daily    sodium phosphates  1 enema Rectal Once    thiamine  100 mg Per NG tube Daily     Continuous Infusions:  PRN Meds:  Current Facility-Administered Medications:     albuterol-ipratropium, 3 mL, Nebulization, Q6H PRN    hydrOXYzine HCL, 25 mg, Per NG tube, QID PRN    magnesium  oxide, 800 mg, Per NG tube, PRN    magnesium oxide, 800 mg, Per NG tube, PRN    potassium bicarbonate, 35 mEq, Per NG tube, PRN    potassium bicarbonate, 50 mEq, Per NG tube, PRN    potassium bicarbonate, 60 mEq, Per NG tube, PRN    potassium, sodium phosphates, 2 packet, Per NG tube, PRN    potassium, sodium phosphates, 2 packet, Per NG tube, PRN    potassium, sodium phosphates, 2 packet, Per NG tube, PRN    sodium chloride 0.9%, 10 mL, Intravenous, PRN    Objective:     Vital Signs (Most Recent):  Temp: 99.3 °F (37.4 °C) (11/14/24 0300)  Pulse: 86 (11/14/24 0600)  Resp: (!) 30 (11/14/24 0600)  BP: (!) 160/70 (11/14/24 0600)  SpO2: (!) 91 % (11/14/24 0600)  BP Location: Right leg    Vital Signs Range (Last 24H):  Temp:  [99.3 °F (37.4 °C)-100.1 °F (37.8 °C)]   Pulse:  [70-92]   Resp:  [20-44]   BP: (101-178)/()   SpO2:  [88 %-93 %]   BP Location: Right leg      Physical Exam  Vitals and nursing note reviewed.   Constitutional:       General: Grunting sounds, patient said she could breathe and was not in distress and not sure why she was making that sound     Comments: Off precedex  HENT:      Head: Normocephalic and atraumatic.   Musculoskeletal:      Right lower leg: No edema.      Left lower leg: No edema.   Skin:     General: Skin is warm and dry.     Neurological Exam:   LOC: alert  Attention Span: poor  Language: No aphasia  Articulation: Dysarthria  Motor: Leg left  Normal 5/5  Leg right Normal 5/5    Laboratory:  CMP:   Recent Labs   Lab 11/14/24  0035   CALCIUM 9.8   ALBUMIN 2.4*   PROT 7.4      K 4.0   CO2 22*      BUN 9   CREATININE 0.7   ALKPHOS 97   ALT 30   AST 46*   BILITOT 0.3     CBC:   Recent Labs   Lab 11/14/24  0035   WBC 11.24   RBC 3.71*   HGB 10.9*   HCT 33.4*      MCV 90   MCH 29.4   MCHC 32.6       Diagnostic Results     Brain Imaging / Vessel Imaging   Results for orders placed or performed during the hospital encounter of 11/06/24 (from the past 2160 hours)   CTA  STROKE MULTI-PHASE    Narrative    EXAMINATION:  CTA STROKE MULTI-PHASE; CT CERVICAL SPINE WITHOUT CONTRAST    CLINICAL HISTORY:  Neuro deficit, acute, stroke suspected;; Neck trauma, intoxicated or obtunded (Age >= 16y);    TECHNIQUE:  Axial CT images obtained throughout the region of the head and cervical spine before the administration of intravenous contrast.    CT angiogram was performed through the cervical and intracranial vasculature during the IV bolus administration of 75mL of Omnipaque 350.  Two additional phases through the intracranial vasculature via multiphase technique.    Multiplanar MPR and MIP reformats were performed.    CT source data was analyzed using artificial intelligence software for detection of a large vessel occlusions (LVO) in order to enable computer assisted triage notification and aid clinical stroke decision making.    COMPARISON:  CTA head and cervical spine 10/25/2015    FINDINGS:  Large region of hypoattenuation loss of gray-white differentiation in left occipital lobe in medial temporal lobe in keeping with an acute PCA distribution infarct.  Modest localized mass effect with some sulcal crowding.  Additional involvement of the left thalamus.  Similar appearance in the medial right occipital lobe, but smaller area of infarction.  Small left superior cerebellar infarct, also likely acute.  No evidence of recent or remote major vascular distribution infarct in the anterior circulation.  No acute parenchymal hemorrhage.  No midline shift.    The ventricles are normal in size without evidence of hydrocephalus.    No extra-axial blood or fluid collections.    The cranium appears intact.    Mastoid air cells and paranasal sinuses are essentially clear.    The vertebral bodies are normal in height and morphology without evidence of an acute displaced fracture or focal osseous destructive process.    Normal sagittal alignment is preserved.  No spondylolisthesis.    Intervertebral disc  heights are well maintained.  No evidence of a bony spinal canal stenosis.    Evaluation intraspinal contents demonstrates no evidence of mass or hematoma.    No acute abnormalities in the paraspinal soft tissue structures.    Emphysematous changes patchy ground-glass opacity in the partially visualized lung apices.      CTA:    The aortic arch demonstrates no significant stenosis at the major branch vessel origins.    There is a focal thrombus in the right subclavian artery extending into the right vertebral artery origin with severe stenosis.  Thrombus extending over proximally 1 cm.  The V1 and V2 segments of right vertebral artery are otherwise normal in caliber of slightly decreased in attenuation in comparison to the other cervical vasculature.  Abrupt occlusion of distal right vertebral artery about the level of the PICA origin approximate 2 cm segment of non enhancement.    Moderate focal stenosis at the left vertebral artery origin.  Left vertebral artery is otherwise normal in caliber.  The basilar artery is normal in caliber.    The right common carotid artery is normal in caliber.  No significant stenosis at the carotid bifurcation.The right internal carotid artery is normal in caliber.    The left common carotid artery is normal in caliber. No significant stenosis at the carotid bifurcation.The left internal carotid artery is normal in caliber.    Abrupt non enhancement in the P1 segment of the left PCA.  The proximal right PCA appears within normal limits.    The proximal MCAs and ACAs are unremarkable.    Findings were immediately discussed upon identification via telephone with the stroke service ([facility]) at approximately 09:20.      Impression    Multifocal acute posterior circulation distribution infarcts as further discussed above, particularly involving essentially the entirety of the left PCA territory.  Modest mass effect without significant hemorrhage.    Focal thrombus with severe  stenosis extending from the right subclavian artery into the proximal right vertebral artery.    Focal occlusion of the proximal intracranial segment of the right vertebral artery.    Focal occlusion of the proximal left PCA.    Moderate focal stenosis at the left vertebral artery origin.    Anterior circulation appears unremarkable.    No evidence of fracture or traumatic malalignment in the cervical spine.    Emphysematous changes and patchy ground-glass opacity in the partially visualized lung apices.    This report was flagged in Epic as abnormal.      Electronically signed by: Geo Ambrocio MD  Date:    11/06/2024  Time:    09:33   CT Cervical Spine Without Contrast    Narrative    EXAMINATION:  CTA STROKE MULTI-PHASE; CT CERVICAL SPINE WITHOUT CONTRAST    CLINICAL HISTORY:  Neuro deficit, acute, stroke suspected;; Neck trauma, intoxicated or obtunded (Age >= 16y);    TECHNIQUE:  Axial CT images obtained throughout the region of the head and cervical spine before the administration of intravenous contrast.    CT angiogram was performed through the cervical and intracranial vasculature during the IV bolus administration of 75mL of Omnipaque 350.  Two additional phases through the intracranial vasculature via multiphase technique.    Multiplanar MPR and MIP reformats were performed.    CT source data was analyzed using artificial intelligence software for detection of a large vessel occlusions (LVO) in order to enable computer assisted triage notification and aid clinical stroke decision making.    COMPARISON:  CTA head and cervical spine 10/25/2015    FINDINGS:  Large region of hypoattenuation loss of gray-white differentiation in left occipital lobe in medial temporal lobe in keeping with an acute PCA distribution infarct.  Modest localized mass effect with some sulcal crowding.  Additional involvement of the left thalamus.  Similar appearance in the medial right occipital lobe, but smaller area of infarction.   Small left superior cerebellar infarct, also likely acute.  No evidence of recent or remote major vascular distribution infarct in the anterior circulation.  No acute parenchymal hemorrhage.  No midline shift.    The ventricles are normal in size without evidence of hydrocephalus.    No extra-axial blood or fluid collections.    The cranium appears intact.    Mastoid air cells and paranasal sinuses are essentially clear.    The vertebral bodies are normal in height and morphology without evidence of an acute displaced fracture or focal osseous destructive process.    Normal sagittal alignment is preserved.  No spondylolisthesis.    Intervertebral disc heights are well maintained.  No evidence of a bony spinal canal stenosis.    Evaluation intraspinal contents demonstrates no evidence of mass or hematoma.    No acute abnormalities in the paraspinal soft tissue structures.    Emphysematous changes patchy ground-glass opacity in the partially visualized lung apices.      CTA:    The aortic arch demonstrates no significant stenosis at the major branch vessel origins.    There is a focal thrombus in the right subclavian artery extending into the right vertebral artery origin with severe stenosis.  Thrombus extending over proximally 1 cm.  The V1 and V2 segments of right vertebral artery are otherwise normal in caliber of slightly decreased in attenuation in comparison to the other cervical vasculature.  Abrupt occlusion of distal right vertebral artery about the level of the PICA origin approximate 2 cm segment of non enhancement.    Moderate focal stenosis at the left vertebral artery origin.  Left vertebral artery is otherwise normal in caliber.  The basilar artery is normal in caliber.    The right common carotid artery is normal in caliber.  No significant stenosis at the carotid bifurcation.The right internal carotid artery is normal in caliber.    The left common carotid artery is normal in caliber. No significant  stenosis at the carotid bifurcation.The left internal carotid artery is normal in caliber.    Abrupt non enhancement in the P1 segment of the left PCA.  The proximal right PCA appears within normal limits.    The proximal MCAs and ACAs are unremarkable.    Findings were immediately discussed upon identification via telephone with the stroke service ([place]) at approximately 09:20.      Impression    Multifocal acute posterior circulation distribution infarcts as further discussed above, particularly involving essentially the entirety of the left PCA territory.  Modest mass effect without significant hemorrhage.    Focal thrombus with severe stenosis extending from the right subclavian artery into the proximal right vertebral artery.    Focal occlusion of the proximal intracranial segment of the right vertebral artery.    Focal occlusion of the proximal left PCA.    Moderate focal stenosis at the left vertebral artery origin.    Anterior circulation appears unremarkable.    No evidence of fracture or traumatic malalignment in the cervical spine.    Emphysematous changes and patchy ground-glass opacity in the partially visualized lung apices.    This report was flagged in Epic as abnormal.      Electronically signed by: Geo Ambrocio MD  Date:    11/06/2024  Time:    09:33   CT Head Without Contrast    Narrative    EXAMINATION:  CT HEAD WITHOUT CONTRAST    CLINICAL HISTORY:  Stroke, follow up;    TECHNIQUE:  Low dose axial images were obtained through the head.  Coronal and sagittal reformations were also performed. Contrast was not administered.    COMPARISON:  CT, MRI 11/06/2024    FINDINGS:  Evolving posterior circulation infarcts are identified as described on recent MR imaging again most notable within the left greater than right occipital lobes extending to the medial left temporal lobe and bilateral thalamus.  No hemorrhagic conversion with potentially minimal stable petechial hemorrhage within the  medial left thalamus.  No midline shift or herniation with mild mass effect on the 3rd ventricle again noted.    No new acute territorial infarct.    The visualized sinuses and mastoid air cells are clear.      Impression    Expected evolutionary changes of the posterior circulation infarcts with no overt hemorrhagic conversion or significant midline shift/herniation.      Electronically signed by: Adrián Hobbs  Date:    11/09/2024  Time:    08:38      Results for orders placed or performed during the hospital encounter of 11/06/24 (from the past 2160 hours)   MRI Brain Without Contrast    Impression    Multiple posterior circulation infarcts are identified with involvement of the left greater than right occipital lobes and medial left temporal lobe, thalamus, right dorsal medulla, and minimally involving the cerebellum.  No midline shift with minimal mass effect on the 3rd ventricle.    Loss of flow void within the right intracranial vertebral artery in keeping with the findings on CTA.      Electronically signed by: Adráin Hobbs  Date:    11/06/2024  Time:    17:40      Cardiac Imaging   Results for orders placed during the hospital encounter of 11/06/24    Echo Saline Bubble? Yes; Ultrasound enhancing contrast? Yes    Interpretation Summary    Left Ventricle: The left ventricle is normal in size. Normal wall thickness. There is normal systolic function with a visually estimated ejection fraction of 55 - 60%. Biplane (2D) method of discs ejection fraction is 56%. Global longitudinal strain is -18.0%. There is normal diastolic function.    Right Ventricle: Normal right ventricular cavity size. Wall thickness is normal. Systolic function is normal.    Tricuspid Valve: There is mild regurgitation.    Pulmonary Artery: The estimated pulmonary artery systolic pressure is at least 28 mmHg.    IVC/SVC: Patient is ventilated, cannot use IVC diameter to estimate right atrial pressure.     Jaun Ward MD  Comprehensive  Stroke Center  Department of Vascular Neurology   [facility]

## 2024-11-14 NOTE — PLAN OF CARE
"[facility] Care Plan  POC reviewed with Malathi Mcpherson and family at 1400. Patient and Family verbalized understanding. Questions and concerns addressed. No acute events today. Pt progressing toward goals. Will continue to monitor. See below and flowsheets for full assessment and VS info.       Precedex gtt @0.4  Hydroxyzine x2   Linen changed  Bath completed      Is this a stroke patient? yes- Stroke booklet reviewed with patient and family, risk factors identified for patient and stroke booklet remains at bedside for ongoing education.     Care individualization: Fans    Neuro:  Shailesh Coma Scale  Best Eye Response: 4-->(E4) spontaneous  Best Motor Response: 5-->(M5) localizes pain  Best Verbal Response: 3-->(V3) inappropriate words  Shailesh Coma Scale Score: 12  Assessment Qualifiers: patient chemically sedated or paralyzed  Pupil PERRLA: yes     24 hr Temp:  [98.1 °F (36.7 °C)-100.1 °F (37.8 °C)]     CV:   Rhythm: normal sinus rhythm  BP goals:   SBP < 220  MAP > 65    Resp:      Vent Mode: Spont  Set Rate: 16 BPM  Oxygen Concentration (%): 36  Vt Set: 450 mL  PEEP/CPAP: 5 cmH20  Pressure Support: 8 cmH20    Plan: N/A    GI/:     Diet/Nutrition Received: NPO, tube feeding  Last Bowel Movement: 11/09/24  Voiding Characteristics: external catheter    Intake/Output Summary (Last 24 hours) at 11/13/2024 1932  Last data filed at 11/13/2024 1701  Gross per 24 hour   Intake 1595.91 ml   Output 1425 ml   Net 170.91 ml     Unmeasured Output  Urine Occurrence: 1  Stool Occurrence: 1  Pad Count: 2    Labs/Accuchecks:  Recent Labs   Lab 11/13/24  0030   WBC 10.44   RBC 3.48*   HGB 9.8*   HCT 32.0*         Recent Labs   Lab 11/13/24  0030      K 3.7   CO2 22*   *   BUN 11   CREATININE 0.6   ALKPHOS 87   ALT 33   AST 42*   BILITOT 0.2    No results for input(s): "PROTIME", "INR", "APTT", "HEPANTIXA" in the last 168 hours.   Recent Labs   Lab 11/12/24  0112   *       Electrolytes: N/A - electrolytes " WDL  Accuchecks: none    Gtts:   dexmedeTOMIDine (Precedex) infusion (titrating)  0-0.8 mcg/kg/hr Intravenous Continuous 5.9 mL/hr at 24 170 0.4 mcg/kg/hr at 24 170       LDA/Wounds:    Sree Risk Assessment  Sensory Perception: 2-->very limited  Moisture: 3-->occasionally moist  Activity: 1-->bedfast  Mobility: 3-->slightly limited  Nutrition: 3-->adequate  Friction and Shear: 2-->potential problem  Sree Score: 14    Is your sree score 12 or less? no            Restraints:   Restraint Order  Length of Order: Order good for next 24 hours or when removed.  Date that the current order will : 24  Time that the current order will :   Order Upon Application: Yes    [facility]

## 2024-11-14 NOTE — ASSESSMENT & PLAN NOTE
49F with history of polysubstance abuse presents after being found down at home. CTA revealed multifocal infarctions with a R vertebral artery occlusion and a L PCA occlusion. Patient was OOW for TNK and stroke burden was felt to be too large to pursue IR intervention. MRI reveals multifocal posterior circulation infarctions involving bilateral occipital lobes, medial L temporal lobe, bilateral thalami, R dorsal medulla, and the cerebellum. Given the location and extent of her strokes, she is at risk of impaired consciousness and total vision loss. Risk of herniation is felt to be lower given the moderate extent of infarcted tissue and modest infratentorial infarctions. Improving exam with some purposeful movements. Off sedation, patient is following commands appropriately. Extubated to NC and weaning oxygen. Hospital course complicated by H.flu/MSSA pneumonia.    Antithrombotics for secondary stroke prevention: Antiplatelets: Aspirin: 81 mg daily  Clopidogrel: 75 mg daily    Statins for secondary stroke prevention and hyperlipidemia, if present:   Statins: Atorvastatin- 80 mg daily    Aggressive risk factor modification: HTN, HLD     Rehab efforts: The patient has been evaluated by a stroke team provider and the therapy needs have been fully considered based off the presenting complaints and exam findings. The following therapy evaluations are needed: PT evaluate and treat, OT evaluate and treat, SLP evaluate and treat, PM&R evaluate for appropriate placement    Diagnostics ordered/pending: None     VTE prophylaxis: Mechanical prophylaxis: Place SCDs    BP parameters: Infarct: SBP <160    -q1h neuro checks  -BMP QD  -ASA 81 QD  -Plavix 75mg QD  -SBP<160  -eunatremia  -NSGY and vascular neurology consulted, appreciate recs  -Repeat CT Head on 11/9/24: Expected evolutionary changes of the posterior circulation infarcts with no overt hemorrhagic conversion or significant midline shift/herniation.   -PT/OT/SLP to  evaluate and treat

## 2024-11-14 NOTE — ASSESSMENT & PLAN NOTE
Patient with history of HTN on coreg 6.25mg BID at home  SBP ranging in 140s-180s    SBP goal <160  Resuming home carvedilol 6.25mg BID per NGT   Vtama Pregnancy And Lactation Text: It is unknown if this medication can cause problems during pregnancy and breastfeeding.

## 2024-11-14 NOTE — PROGRESS NOTES
[facility]  Neurocritical Care  Progress Note    Admit Date: 11/6/2024  Service Date: 11/14/2024  Length of Stay: 8    Subjective:     Chief Complaint: Acute arterial ischemic stroke, multifocal, posterior circulation    History of Present Illness: 49 year old with a hx of drug use, seizure, HTN, HLD  [place] [facility] [place] and was stroke activated on 11/6/24. Pt was found down by her mother this morning at an unknown time lying face down. Per EMS, she was very lethargic and aphasic and was able to move her left sided extremities and R leg but was not moving her RUE at all; pt known to EMS due to history of seizure. Pt's exam worsened on exam with no movement in her right sided extremities and desaturated to 70s and required intubation. She was also noted to have an abrasion/contusion to the right temporal area Her last known normal states was at 10 pm yesterday -- did not receive TNK due to being out of the window and strokes noted in her CT. She was admitted to [facility] for further care. Pt's case has been reviewed by Neuro IR -- not candidate for intervention due to the extensive nature of infarcts and the risk of bleeding.    Per pt's family, patient's drugs of choice are crack, heroine and ETOH. Pt's mother reports that the patient has been regularly reporting to drug court of the past several weeks and has passed all of her drug tests during this period of time -- her last drug test was two weeks ago. Pt's family also reports that the patient had been acting strange over the past few days -- she has been sleeping more and displayed erratic behavior that leads them to believe that patient may have relapsed. Of note, a crack pipe was found on the patient's person on [place] [facility]    Hospital Course: 11/07/2024 Agitated overnight, requiring uptitration of fentanyl and addition of precedex. Weaning throughout the day. Neuro exam may be slightly improved from yesterday even despite sedation with  more R sided movement. However, no purposeful movement or localization of noxious stimuli. MRI yesterday with multifocal infarcts including the cerebellum, bilateral occipital lobes, and bilateral thalami (R>L). CK found to be elevated overnight, fluids running and trending levels.   11/08/2024 More alert and developing some purposeful movement by reaching towards ETT, brushing hair behind ear, and localizing to LLE noxious stimuli with LUE. CK downtrending with fluids. Monitoring decreased UOP with stable BUN/Cr. Weaning fentanyl and initiated precedex. Plan to DC fluid tomorrow if CK continues to downtrend and remove fluid. Started tube feeds today. Lithium level now low, started half-dose home lithium.  11/10/2024 NAEON. Patient following commands this morning. Extubated to NC. Patient tolerated extubation well. Remains on precedex gtt for agitation, requiring PRNs. Resp culture positive for H flu. Started rocephin.  11/11/2024 Agitated overnight requiring uptitration of precedex and multiple PRN administrations. Reintitation of lithium, adding measures for withdrawal management. Respiratory culture also growing staph aureus; MRSA screen positive. Vanc started.  11/12/2024 Tolerated wean of supplemental oxygen until having to titrate up precedex overnight. Weaning oxygen and sedation as tolerated today. Qtc prolonged to 558; giving mag IV and recheck Qtc down to 535. Will have to avoid Qtc prolonging agents for agitation control. Increasing lithium and hydroxyzine dose. Stopping fluids given adequate UOP and downtrending CK.  11/13/2024 Patient's neurological exam improving. Answering questions appropriately and following some commands. Weaning precedex and uptitrating oral medications as tolerated, given improvement in Qtc.   11/14/2024 NAEON. Off sedation today and patient is more cooperative and interactive on exam. Qtc continues to improve (469). Adding home coreg for blood pressure management. Transitioned  to cefepime given beta-lactamase positive H. Flu. Consulting addiction psych. Planning for stepdown today.     Interval History:  See hospital course    Review of Systems   Unable to perform ROS: Patient nonverbal   Constitutional:  Negative for chills and fever.   Respiratory:  Negative for shortness of breath.    Cardiovascular:  Negative for chest pain and palpitations.     Unable to obtain a complete ROS due to level of consciousness.    Objective:     Vitals:  Temp: 98.4 °F (36.9 °C)  Pulse: 85  Rhythm: normal sinus rhythm  BP: (!) 181/88  MAP (mmHg): 127  Resp: (!) 21  SpO2: (!) 93 %    Temp  Min: 98.4 °F (36.9 °C)  Max: 99.8 °F (37.7 °C)  Pulse  Min: 70  Max: 99  BP  Min: 110/73  Max: 185/77  MAP (mmHg)  Min: 85  Max: 127  Resp  Min: 18  Max: 45  SpO2  Min: 89 %  Max: 94 %    11/13 0701 - 11/14 0700  In: 975.5 [I.V.:80.5]  Out: 800 [Urine:800]   Unmeasured Output  Urine Occurrence: 1  Stool Occurrence: 1  Pad Count: 2        Physical Exam  Vitals and nursing note reviewed.   Constitutional:       General: She is not in acute distress.     Appearance: She is ill-appearing.      Comments: Sedated   HENT:      Head: Normocephalic and atraumatic.      Mouth/Throat:      Mouth: Mucous membranes are dry.      Pharynx: Oropharynx is clear.   Eyes:      Extraocular Movements: Extraocular movements intact.      Pupils: Pupils are equal, round, and reactive to light.   Cardiovascular:      Rate and Rhythm: Normal rate and regular rhythm.      Heart sounds: Normal heart sounds.   Pulmonary:      Effort: Pulmonary effort is normal. No respiratory distress.      Breath sounds: Normal breath sounds.   Abdominal:      General: Abdomen is flat. Bowel sounds are normal. There is no distension.      Palpations: Abdomen is soft.   Musculoskeletal:      Right lower leg: No edema.      Left lower leg: No edema.   Skin:     General: Skin is warm and dry.   Neurological:      GCS: GCS eye subscore is 3. GCS motor subscore is 4.       Comments: Off sedation   Answering questions appropriately  More interactive with exam  Intermittently following commands  PERRL  Face symmetrical  Tongue midline    Motor:  Spontaneous antigravity movement throughout, less in the RUE  Withdrawal to noxious stimuli throughout         Unable to test orientation, language, memory, judgment, insight, fund of knowledge, hearing, shoulder shrug, tongue protrusion, coordination, gait due to level of consciousness.       Medications:  Continuous   Scheduledacetaminophen, 650 mg, Q6H  aspirin, 81 mg, Daily  atorvastatin, 80 mg, Daily  bisacodyL, 10 mg, Daily  carvediloL, 6.25 mg, BID  ceFEPime IV (PEDS and ADULTS), 2 g, Q8H  clopidogreL, 75 mg, Daily  FLUoxetine, 60 mg, Daily  folic acid, 1 mg, Daily  heparin (porcine), 5,000 Units, Q8H  LIDOcaine, 1 patch, Q24H  lithium citrate 8 meq/5 ml, 300 mg, TID  mirtazapine, 7.5 mg, QHS  multivitamin, 1 tablet, Daily  polyethylene glycol, 17 g, BID  QUEtiapine, 25 mg, Daily  QUEtiapine, 50 mg, QHS  senna-docusate 8.6-50 mg, 2 tablet, Daily  thiamine, 100 mg, Daily    PRNalbuterol-ipratropium, 3 mL, Q6H PRN  hydrOXYzine HCL, 25 mg, QID PRN  magnesium oxide, 800 mg, PRN  magnesium oxide, 800 mg, PRN  potassium bicarbonate, 35 mEq, PRN  potassium bicarbonate, 50 mEq, PRN  potassium bicarbonate, 60 mEq, PRN  potassium, sodium phosphates, 2 packet, PRN  potassium, sodium phosphates, 2 packet, PRN  potassium, sodium phosphates, 2 packet, PRN  sodium chloride 0.9%, 10 mL, PRN      Today I personally reviewed pertinent medications, lines/drains/airways, imaging, cardiology results, laboratory results, microbiology results, notably:    Diet  Diet NPO  Diet NPO    Assessment/Plan:     Neuro  * Acute arterial ischemic stroke, multifocal, posterior circulation  49F with history of polysubstance abuse presents after being found down at home. CTA revealed multifocal infarctions with a R vertebral artery occlusion and a L PCA occlusion. Patient  was OOW for TNK and stroke burden was felt to be too large to pursue IR intervention. MRI reveals multifocal posterior circulation infarctions involving bilateral occipital lobes, medial L temporal lobe, bilateral thalami, R dorsal medulla, and the cerebellum. Given the location and extent of her strokes, she is at risk of impaired consciousness and total vision loss. Risk of herniation is felt to be lower given the moderate extent of infarcted tissue and modest infratentorial infarctions. Improving exam with some purposeful movements. Off sedation, patient is following commands appropriately. Extubated to NC and weaning oxygen. Hospital course complicated by H.flu/MSSA pneumonia.    Antithrombotics for secondary stroke prevention: Antiplatelets: Aspirin: 81 mg daily  Clopidogrel: 75 mg daily    Statins for secondary stroke prevention and hyperlipidemia, if present:   Statins: Atorvastatin- 80 mg daily    Aggressive risk factor modification: HTN, HLD     Rehab efforts: The patient has been evaluated by a stroke team provider and the therapy needs have been fully considered based off the presenting complaints and exam findings. The following therapy evaluations are needed: PT evaluate and treat, OT evaluate and treat, SLP evaluate and treat, PM&R evaluate for appropriate placement    Diagnostics ordered/pending: None     VTE prophylaxis: Mechanical prophylaxis: Place SCDs    BP parameters: Infarct: SBP <160    -q1h neuro checks  -BMP QD  -ASA 81 QD  -Plavix 75mg QD  -SBP<160  -eunatremia  -NSGY and vascular neurology consulted, appreciate recs  -Repeat CT Head on 11/9/24: Expected evolutionary changes of the posterior circulation infarcts with no overt hemorrhagic conversion or significant midline shift/herniation.   -PT/OT/SLP to evaluate and treat      Cytotoxic cerebral edema  See primary problem    Psychiatric  Polysubstance abuse  Patient with long history of polysubstance abuse including crack cocaine, alcohol,  and opiates  Patient was found with drug paraphernalia on person upon admission  Urine tox screen positive for cocaine  Addiction psychiatry consulted, appreciate recommendations    Anxiety and depression  History of depression and anxiety  Qtc 469 11/14    - Continuing home fluoxetine 80mg QD  - Continue Lithium 300 TID  - Lithium level pending for Monday 11/18 to check when steady state is achieved  - continue seroquel 25mg each morning and 50mg each night  - EKG qshift    Derm  Folliculitis  folliculitis noted on mons pubis  - dc'd bactrim given coverage with otherwise indicated antibiotics  - Wound care following     Pulmonary  Right upper lobe pneumonia  Patient recently extubated with persistent oxygen requirement  CXR remarkable for RUL consolidation  Respiratory culture positive for H flu and now staph aureus  Sensitivities indicative of a MSSA  MRSA screen by PCR positive  Sensitivities pending  Transitioned to cefazolin 11/12  Given H. Flu culture is beta lactamase positive, will transition to cefepime 11/14 for 5 more days  Wean oxygen as tolerated  Continuous pulse oximetry    On mechanically assisted ventilation  RESOLVED  Due to stroke  Extubated 11/10  Pepcid ppx  3% nebs    Cardiac/Vascular  HTN (hypertension)  Patient with history of HTN on coreg 6.25mg BID at home  SBP ranging in 140s-180s    SBP goal <160  Resuming home carvedilol 6.25mg BID per NGT    Other  Lithium toxicity  RESOLVED  See anxiety and depression    Debility  Due to stroke  PT/OT/SLP to evaluate and treat when appropriate    HyperCKemia  RESOLVED  CK elevated after being found down at home  Trended down to 723 with small uprend into 800s    - Stopped maintenance fluids given improving CPK and UOP  - Will not trend CK further              The patient is being Prophylaxed for:  Venous Thromboembolism with: Chemical  Stress Ulcer with: H2B  Ventilator Pneumonia with: not applicable    Activity Orders            Elevate HOB Elevate  (30-45 degrees) Elevate HOB to 30 - 45 degrees during feeding unless otherwise stated starting at 11/08 1236    Turn patient starting at 11/06 1400    Diet NPO: NPO starting at 11/06 1325          Full Code    Darius Echevarria MD  Neurocritical Care  [facility]

## 2024-11-14 NOTE — NURSING
Patient transferred to unit VS taken and charted , skin assessment done , bed in lowest position bed alarm on

## 2024-11-14 NOTE — ASSESSMENT & PLAN NOTE
History of depression and anxiety  Qtc 469 11/14    - Continuing home fluoxetine 80mg QD  - Continue Lithium 300 TID  - Lithium level pending for Monday 11/18 to check when steady state is achieved  - continue seroquel 25mg each morning and 50mg each night  - EKG qshift

## 2024-11-14 NOTE — PLAN OF CARE
"[facility] Care Plan    POC reviewed with Malathi Mcpherson and family at 0300. Patient verbalized understanding. Questions and concerns addressed. No acute events today. Pt progressing toward goals. Will continue to monitor. See below and flowsheets for full assessment and VS info.             Is this a stroke patient? yes- Stroke booklet reviewed with patient, risk factors identified for patient and stroke booklet remains at bedside for ongoing education.     Care individualization:     Neuro:  Boyers Coma Scale  Best Eye Response: 4-->(E4) spontaneous  Best Motor Response: 5-->(M5) localizes pain  Best Verbal Response: 2-->(V2) incomprehensible speech  Boyers Coma Scale Score: 11  Assessment Qualifiers: patient chemically sedated or paralyzed  Pupil PERRLA: yes     24 hr Temp:  [99 °F (37.2 °C)-100.1 °F (37.8 °C)]     CV:   Rhythm: normal sinus rhythm  BP goals:   SBP < 220  MAP > 65    Resp:      Vent Mode: Spont  Set Rate: 16 BPM  Oxygen Concentration (%): 36  Vt Set: 450 mL  PEEP/CPAP: 5 cmH20  Pressure Support: 8 cmH20    Plan: N/A    GI/:     Diet/Nutrition Received: NPO, tube feeding  Last Bowel Movement: 11/09/24  Voiding Characteristics: external catheter    Intake/Output Summary (Last 24 hours) at 11/14/2024 0602  Last data filed at 11/13/2024 1701  Gross per 24 hour   Intake 975.46 ml   Output 800 ml   Net 175.46 ml     Unmeasured Output  Urine Occurrence: 1  Stool Occurrence: 1  Pad Count: 2    Labs/Accuchecks:  Recent Labs   Lab 11/14/24  0035   WBC 11.24   RBC 3.71*   HGB 10.9*   HCT 33.4*         Recent Labs   Lab 11/14/24  0035      K 4.0   CO2 22*      BUN 9   CREATININE 0.7   ALKPHOS 97   ALT 30   AST 46*   BILITOT 0.3    No results for input(s): "PROTIME", "INR", "APTT", "HEPANTIXA" in the last 168 hours.   Recent Labs   Lab 11/12/24  0112   *       Electrolytes: N/A - electrolytes WDL  Accuchecks: none    Gtts:   dexmedeTOMIDine (Precedex) infusion (titrating)  0-0.8 " mcg/kg/hr Intravenous Continuous 4.43 mL/hr at 24 0.3 mcg/kg/hr at 24       LDA/Wounds:    Sree Risk Assessment  Sensory Perception: 2-->very limited  Moisture: 3-->occasionally moist  Activity: 1-->bedfast  Mobility: 3-->slightly limited  Nutrition: 3-->adequate  Friction and Shear: 2-->potential problem  Sree Score: 14  Is your sree score 12 or less? no          Restraints:   Restraint Order  Length of Order: Order good for next 24 hours or when removed.  Date that the current order will : 24  Time that the current order will :   Order Upon Application: Yes    [facility]

## 2024-11-15 PROBLEM — R20.0 SENSORY LOSS: Status: ACTIVE | Noted: 2024-11-15

## 2024-11-15 PROBLEM — E87.6 HYPOKALEMIA: Status: ACTIVE | Noted: 2024-11-15

## 2024-11-15 PROBLEM — J96.01 ACUTE HYPOXEMIC RESPIRATORY FAILURE: Status: ACTIVE | Noted: 2024-11-15

## 2024-11-15 PROBLEM — R47.01 APHASIA: Status: ACTIVE | Noted: 2024-11-15

## 2024-11-15 PROBLEM — G93.5 BRAIN COMPRESSION: Status: ACTIVE | Noted: 2024-11-15

## 2024-11-15 PROBLEM — F19.20 DRUG DEPENDENCE: Status: ACTIVE | Noted: 2024-11-15

## 2024-11-15 PROBLEM — E83.42 HYPOMAGNESEMIA: Status: ACTIVE | Noted: 2024-11-15

## 2024-11-15 PROBLEM — R41.0 DELIRIUM: Status: ACTIVE | Noted: 2024-11-15

## 2024-11-15 PROBLEM — D68.59 HYPERCOAGULABLE STATE: Status: ACTIVE | Noted: 2024-11-15

## 2024-11-15 PROBLEM — F17.200 TOBACCO DEPENDENCY: Status: ACTIVE | Noted: 2024-11-15

## 2024-11-15 PROBLEM — G81.90 HEMIPLEGIA: Status: ACTIVE | Noted: 2024-11-15

## 2024-11-15 PROBLEM — Z99.11 ON MECHANICALLY ASSISTED VENTILATION: Status: RESOLVED | Noted: 2024-11-07 | Resolved: 2024-11-15

## 2024-11-15 PROBLEM — Z74.09 REDUCED MOBILITY: Status: ACTIVE | Noted: 2024-11-15

## 2024-11-15 PROBLEM — G93.41 ENCEPHALOPATHY, METABOLIC: Status: ACTIVE | Noted: 2024-11-15

## 2024-11-15 PROBLEM — R45.1 AGITATION: Status: ACTIVE | Noted: 2024-11-15

## 2024-11-15 LAB
ALBUMIN SERPL BCP-MCNC: 2.5 G/DL (ref 3.5–5.2)
ALP SERPL-CCNC: 108 U/L (ref 40–150)
ALT SERPL W/O P-5'-P-CCNC: 41 U/L (ref 10–44)
ANION GAP SERPL CALC-SCNC: 13 MMOL/L (ref 8–16)
AST SERPL-CCNC: 57 U/L (ref 10–40)
BASOPHILS # BLD AUTO: 0.11 K/UL (ref 0–0.2)
BASOPHILS NFR BLD: 0.9 % (ref 0–1.9)
BILIRUB SERPL-MCNC: 0.2 MG/DL (ref 0.1–1)
BUN SERPL-MCNC: 13 MG/DL (ref 6–20)
CALCIUM SERPL-MCNC: 9.9 MG/DL (ref 8.7–10.5)
CHLORIDE SERPL-SCNC: 105 MMOL/L (ref 95–110)
CO2 SERPL-SCNC: 20 MMOL/L (ref 23–29)
CREAT SERPL-MCNC: 0.7 MG/DL (ref 0.5–1.4)
DIFFERENTIAL METHOD BLD: ABNORMAL
EOSINOPHIL # BLD AUTO: 0.2 K/UL (ref 0–0.5)
EOSINOPHIL NFR BLD: 1.9 % (ref 0–8)
ERYTHROCYTE [DISTWIDTH] IN BLOOD BY AUTOMATED COUNT: 14.1 % (ref 11.5–14.5)
EST. GFR  (NO RACE VARIABLE): >60 ML/MIN/1.73 M^2
GLUCOSE SERPL-MCNC: 120 MG/DL (ref 70–110)
HCT VFR BLD AUTO: 35.7 % (ref 37–48.5)
HGB BLD-MCNC: 11.4 G/DL (ref 12–16)
IMM GRANULOCYTES # BLD AUTO: 0.33 K/UL (ref 0–0.04)
IMM GRANULOCYTES NFR BLD AUTO: 2.8 % (ref 0–0.5)
LYMPHOCYTES # BLD AUTO: 2 K/UL (ref 1–4.8)
LYMPHOCYTES NFR BLD: 16.5 % (ref 18–48)
MAGNESIUM SERPL-MCNC: 2.4 MG/DL (ref 1.6–2.6)
MCH RBC QN AUTO: 28.8 PG (ref 27–31)
MCHC RBC AUTO-ENTMCNC: 31.9 G/DL (ref 32–36)
MCV RBC AUTO: 90 FL (ref 82–98)
MONOCYTES # BLD AUTO: 0.7 K/UL (ref 0.3–1)
MONOCYTES NFR BLD: 6 % (ref 4–15)
NEUTROPHILS # BLD AUTO: 8.6 K/UL (ref 1.8–7.7)
NEUTROPHILS NFR BLD: 71.9 % (ref 38–73)
NRBC BLD-RTO: 0 /100 WBC
OHS QRS DURATION: 66 MS
OHS QRS DURATION: 68 MS
OHS QTC CALCULATION: 510 MS
OHS QTC CALCULATION: 512 MS
PLATELET # BLD AUTO: 317 K/UL (ref 150–450)
PMV BLD AUTO: 11.6 FL (ref 9.2–12.9)
POTASSIUM SERPL-SCNC: 4 MMOL/L (ref 3.5–5.1)
PROT SERPL-MCNC: 7.6 G/DL (ref 6–8.4)
RBC # BLD AUTO: 3.96 M/UL (ref 4–5.4)
SODIUM SERPL-SCNC: 138 MMOL/L (ref 136–145)
WBC # BLD AUTO: 11.97 K/UL (ref 3.9–12.7)

## 2024-11-15 PROCEDURE — 25000003 PHARM REV CODE 250: Performed by: NURSE PRACTITIONER

## 2024-11-15 PROCEDURE — 80053 COMPREHEN METABOLIC PANEL: CPT

## 2024-11-15 PROCEDURE — 36415 COLL VENOUS BLD VENIPUNCTURE: CPT

## 2024-11-15 PROCEDURE — 83735 ASSAY OF MAGNESIUM: CPT

## 2024-11-15 PROCEDURE — 94761 N-INVAS EAR/PLS OXIMETRY MLT: CPT

## 2024-11-15 PROCEDURE — 25000003 PHARM REV CODE 250

## 2024-11-15 PROCEDURE — 99900035 HC TECH TIME PER 15 MIN (STAT)

## 2024-11-15 PROCEDURE — 92526 ORAL FUNCTION THERAPY: CPT

## 2024-11-15 PROCEDURE — 93010 ELECTROCARDIOGRAM REPORT: CPT | Mod: ,,, | Performed by: INTERNAL MEDICINE

## 2024-11-15 PROCEDURE — 94668 MNPJ CHEST WALL SBSQ: CPT

## 2024-11-15 PROCEDURE — 63600175 PHARM REV CODE 636 W HCPCS

## 2024-11-15 PROCEDURE — 97112 NEUROMUSCULAR REEDUCATION: CPT

## 2024-11-15 PROCEDURE — 99222 1ST HOSP IP/OBS MODERATE 55: CPT | Mod: ,,, | Performed by: NURSE PRACTITIONER

## 2024-11-15 PROCEDURE — 85025 COMPLETE CBC W/AUTO DIFF WBC: CPT

## 2024-11-15 PROCEDURE — 25000003 PHARM REV CODE 250: Performed by: PHYSICIAN ASSISTANT

## 2024-11-15 PROCEDURE — 99233 SBSQ HOSP IP/OBS HIGH 50: CPT | Mod: ,,, | Performed by: STUDENT IN AN ORGANIZED HEALTH CARE EDUCATION/TRAINING PROGRAM

## 2024-11-15 PROCEDURE — 11000001 HC ACUTE MED/SURG PRIVATE ROOM

## 2024-11-15 PROCEDURE — 97535 SELF CARE MNGMENT TRAINING: CPT

## 2024-11-15 PROCEDURE — 93005 ELECTROCARDIOGRAM TRACING: CPT

## 2024-11-15 RX ORDER — MUPIROCIN 20 MG/G
OINTMENT TOPICAL 2 TIMES DAILY
Status: COMPLETED | OUTPATIENT
Start: 2024-11-15 | End: 2024-11-19

## 2024-11-15 RX ORDER — TALC
6 POWDER (GRAM) TOPICAL NIGHTLY
Status: DISCONTINUED | OUTPATIENT
Start: 2024-11-15 | End: 2024-11-25

## 2024-11-15 RX ADMIN — Medication 6 MG: at 08:11

## 2024-11-15 RX ADMIN — THERA TABS 1 TABLET: TAB at 09:11

## 2024-11-15 RX ADMIN — FOLIC ACID 1 MG: 1 TABLET ORAL at 09:11

## 2024-11-15 RX ADMIN — CLOPIDOGREL BISULFATE 75 MG: 75 TABLET ORAL at 09:11

## 2024-11-15 RX ADMIN — CARVEDILOL 6.25 MG: 6.25 TABLET, FILM COATED ORAL at 08:11

## 2024-11-15 RX ADMIN — FLUOXETINE HYDROCHLORIDE 60 MG: 20 SOLUTION ORAL at 10:11

## 2024-11-15 RX ADMIN — MUPIROCIN: 20 OINTMENT TOPICAL at 11:11

## 2024-11-15 RX ADMIN — ASPIRIN 81 MG CHEWABLE TABLET 81 MG: 81 TABLET CHEWABLE at 09:11

## 2024-11-15 RX ADMIN — CARVEDILOL 6.25 MG: 6.25 TABLET, FILM COATED ORAL at 09:11

## 2024-11-15 RX ADMIN — Medication 100 MG: at 09:11

## 2024-11-15 RX ADMIN — HEPARIN SODIUM 5000 UNITS: 5000 INJECTION INTRAVENOUS; SUBCUTANEOUS at 06:11

## 2024-11-15 RX ADMIN — CEFEPIME 2 G: 2 INJECTION, POWDER, FOR SOLUTION INTRAVENOUS at 05:11

## 2024-11-15 RX ADMIN — LIDOCAINE 5% 1 PATCH: 700 PATCH TOPICAL at 01:11

## 2024-11-15 RX ADMIN — HYDROXYZINE HYDROCHLORIDE 25 MG: 25 TABLET ORAL at 08:11

## 2024-11-15 RX ADMIN — MIRTAZAPINE 7.5 MG: 7.5 TABLET, FILM COATED ORAL at 08:11

## 2024-11-15 RX ADMIN — ATORVASTATIN CALCIUM 80 MG: 40 TABLET, FILM COATED ORAL at 09:11

## 2024-11-15 RX ADMIN — HEPARIN SODIUM 5000 UNITS: 5000 INJECTION INTRAVENOUS; SUBCUTANEOUS at 09:11

## 2024-11-15 RX ADMIN — ACETAMINOPHEN 650 MG: 325 TABLET, FILM COATED ORAL at 11:11

## 2024-11-15 RX ADMIN — ACETAMINOPHEN 650 MG: 325 TABLET, FILM COATED ORAL at 06:11

## 2024-11-15 RX ADMIN — HEPARIN SODIUM 5000 UNITS: 5000 INJECTION INTRAVENOUS; SUBCUTANEOUS at 01:11

## 2024-11-15 RX ADMIN — ACETAMINOPHEN 650 MG: 325 TABLET, FILM COATED ORAL at 01:11

## 2024-11-15 RX ADMIN — LITHIUM 300 MG: 8 SOLUTION ORAL at 09:11

## 2024-11-15 RX ADMIN — LITHIUM 300 MG: 8 SOLUTION ORAL at 01:11

## 2024-11-15 RX ADMIN — QUETIAPINE FUMARATE 25 MG: 25 TABLET ORAL at 09:11

## 2024-11-15 RX ADMIN — CEFEPIME 2 G: 2 INJECTION, POWDER, FOR SOLUTION INTRAVENOUS at 09:11

## 2024-11-15 RX ADMIN — MUPIROCIN: 20 OINTMENT TOPICAL at 08:11

## 2024-11-15 RX ADMIN — QUETIAPINE FUMARATE 50 MG: 25 TABLET ORAL at 08:11

## 2024-11-15 RX ADMIN — ACETAMINOPHEN 650 MG: 325 TABLET, FILM COATED ORAL at 05:11

## 2024-11-15 NOTE — HPI
Per chart review,  Pt is a 49 year old female with past medical history of drug use, seizure, HTN, HLD. Pt was brought to ED by mother after being found down.On admit,CTH showed  Multifocal posterior circulation distribution infarcts. MRI brain showed Multiple posterior circulation infarcts are identified with involvement of the left greater than right occipital lobes and medial left temporal lobe, thalamus, right dorsal medulla, and minimally involving the cerebellum.  No midline shift with minimal mass effect on the 3rd ventricle.  Loss of flow void within the right intracranial vertebral artery. Pt's hospital course was complicated by agitation requiring fentanyl and addition of precedex. Pt was intubated for airway protection. Pt was extubated on 11/10/2024. CK was elevated. There was a concern for rhabdomyolysis . Pt's mental status improved and pt eventually weaned off of sedation. PM &R was consulted to evaluate pt for post acute placement.     Functional History: Patient lives  with elderly parents  in a single  story home with one step  to enter.  Prior to admission, Pt was I.  DME: None.

## 2024-11-15 NOTE — SUBJECTIVE & OBJECTIVE
Neurologic Chief Complaint: acute arterial ischemic stroke, multifocal, posterior circulation     Subjective:     Interval History: Patient is seen for follow-up neurological assessment and treatment recommendations: See hospital course.     HPI, Past Medical, Family, and Social History remains the same as documented in the initial encounter.     Review of Systems  Scheduled Meds:   acetaminophen  650 mg Per NG tube Q6H    aspirin  81 mg Per NG tube Daily    atorvastatin  80 mg Per NG tube Daily    bisacodyL  10 mg Rectal Daily    carvediloL  6.25 mg Per NG tube BID    ceFEPime IV (PEDS and ADULTS)  2 g Intravenous Q8H    clopidogreL  75 mg Per NG tube Daily    FLUoxetine  60 mg Per NG tube Daily    folic acid  1 mg Per NG tube Daily    heparin (porcine)  5,000 Units Subcutaneous Q8H    LIDOcaine  1 patch Transdermal Q24H    lithium citrate 8 meq/5 ml  300 mg Per NG tube TID    mirtazapine  7.5 mg Per NG tube QHS    multivitamin  1 tablet Per NG tube Daily    QUEtiapine  25 mg Per NG tube Daily    QUEtiapine  50 mg Per NG tube QHS    senna-docusate 8.6-50 mg  1 tablet Per NG tube Daily    thiamine  100 mg Per NG tube Daily     Continuous Infusions:  PRN Meds:  Current Facility-Administered Medications:     albuterol-ipratropium, 3 mL, Nebulization, Q6H PRN    hydrOXYzine HCL, 25 mg, Per NG tube, QID PRN    magnesium oxide, 800 mg, Per NG tube, PRN    magnesium oxide, 800 mg, Per NG tube, PRN    polyethylene glycol, 17 g, Per NG tube, BID PRN    potassium bicarbonate, 35 mEq, Per NG tube, PRN    potassium bicarbonate, 50 mEq, Per NG tube, PRN    potassium bicarbonate, 60 mEq, Per NG tube, PRN    potassium, sodium phosphates, 2 packet, Per NG tube, PRN    potassium, sodium phosphates, 2 packet, Per NG tube, PRN    potassium, sodium phosphates, 2 packet, Per NG tube, PRN    sodium chloride 0.9%, 10 mL, Intravenous, PRN    Objective:     Vital Signs (Most Recent):  Temp: 98.3 °F (36.8 °C) (11/15/24 0414)  Pulse: 94  "(11/15/24 0414)  Resp: 20 (11/15/24 0414)  BP: (!) 145/73 (11/15/24 0414)  SpO2: 96 % (11/15/24 0414)  BP Location: Right arm    Vital Signs Range (Last 24H):  Temp:  [97.8 °F (36.6 °C)-98.6 °F (37 °C)]   Pulse:  []   Resp:  [16-45]   BP: (130-185)/()   SpO2:  [90 %-97 %]   BP Location: Right arm      Physical Exam  Vitals and nursing note reviewed.   Constitutional:       General: Pt not very cooperative on exam. Able to follow few commands like squeezing hand on left hand.     Comments: On restraints   HENT:      Head: Normocephalic and atraumatic.   Musculoskeletal:      Right lower leg: No edema.      Left lower leg: No edema.   Skin:     General: Skin is warm and dry.     Neurological Exam:     LOC: alert  Attention Span: poor  Language:  aphasia present  Articulation: Dysarthria present  Motor: Leg left  Normal 5/5  Leg right Normal 5/5  LUE > more movements than RUE.    Laboratory:  CMP:   No results for input(s): "GLUCOSE", "CALCIUM", "ALBUMIN", "PROT", "NA", "K", "CO2", "CL", "BUN", "CREATININE", "ALKPHOS", "ALT", "AST", "BILITOT" in the last 24 hours.    CBC:   Recent Labs   Lab 11/14/24  0035   WBC 11.24   RBC 3.71*   HGB 10.9*   HCT 33.4*      MCV 90   MCH 29.4   MCHC 32.6       Diagnostic Results     Brain Imaging / Vessel Imaging   Results for orders placed or performed during the hospital encounter of 11/06/24 (from the past 2160 hours)   CTA STROKE MULTI-PHASE    Narrative    EXAMINATION:  CTA STROKE MULTI-PHASE; CT CERVICAL SPINE WITHOUT CONTRAST    CLINICAL HISTORY:  Neuro deficit, acute, stroke suspected;; Neck trauma, intoxicated or obtunded (Age >= 16y);    TECHNIQUE:  Axial CT images obtained throughout the region of the head and cervical spine before the administration of intravenous contrast.    CT angiogram was performed through the cervical and intracranial vasculature during the IV bolus administration of 75mL of Omnipaque 350.  Two additional phases through the " intracranial vasculature via multiphase technique.    Multiplanar MPR and MIP reformats were performed.    CT source data was analyzed using artificial intelligence software for detection of a large vessel occlusions (LVO) in order to enable computer assisted triage notification and aid clinical stroke decision making.    COMPARISON:  CTA head and cervical spine 10/25/2015    FINDINGS:  Large region of hypoattenuation loss of gray-white differentiation in left occipital lobe in medial temporal lobe in keeping with an acute PCA distribution infarct.  Modest localized mass effect with some sulcal crowding.  Additional involvement of the left thalamus.  Similar appearance in the medial right occipital lobe, but smaller area of infarction.  Small left superior cerebellar infarct, also likely acute.  No evidence of recent or remote major vascular distribution infarct in the anterior circulation.  No acute parenchymal hemorrhage.  No midline shift.    The ventricles are normal in size without evidence of hydrocephalus.    No extra-axial blood or fluid collections.    The cranium appears intact.    Mastoid air cells and paranasal sinuses are essentially clear.    The vertebral bodies are normal in height and morphology without evidence of an acute displaced fracture or focal osseous destructive process.    Normal sagittal alignment is preserved.  No spondylolisthesis.    Intervertebral disc heights are well maintained.  No evidence of a bony spinal canal stenosis.    Evaluation intraspinal contents demonstrates no evidence of mass or hematoma.    No acute abnormalities in the paraspinal soft tissue structures.    Emphysematous changes patchy ground-glass opacity in the partially visualized lung apices.      CTA:    The aortic arch demonstrates no significant stenosis at the major branch vessel origins.    There is a focal thrombus in the right subclavian artery extending into the right vertebral artery origin with severe  stenosis.  Thrombus extending over proximally 1 cm.  The V1 and V2 segments of right vertebral artery are otherwise normal in caliber of slightly decreased in attenuation in comparison to the other cervical vasculature.  Abrupt occlusion of distal right vertebral artery about the level of the PICA origin approximate 2 cm segment of non enhancement.    Moderate focal stenosis at the left vertebral artery origin.  Left vertebral artery is otherwise normal in caliber.  The basilar artery is normal in caliber.    The right common carotid artery is normal in caliber.  No significant stenosis at the carotid bifurcation.The right internal carotid artery is normal in caliber.    The left common carotid artery is normal in caliber. No significant stenosis at the carotid bifurcation.The left internal carotid artery is normal in caliber.    Abrupt non enhancement in the P1 segment of the left PCA.  The proximal right PCA appears within normal limits.    The proximal MCAs and ACAs are unremarkable.    Findings were immediately discussed upon identification via telephone with the stroke service ([facility]) at approximately 09:20.      Impression    Multifocal acute posterior circulation distribution infarcts as further discussed above, particularly involving essentially the entirety of the left PCA territory.  Modest mass effect without significant hemorrhage.    Focal thrombus with severe stenosis extending from the right subclavian artery into the proximal right vertebral artery.    Focal occlusion of the proximal intracranial segment of the right vertebral artery.    Focal occlusion of the proximal left PCA.    Moderate focal stenosis at the left vertebral artery origin.    Anterior circulation appears unremarkable.    No evidence of fracture or traumatic malalignment in the cervical spine.    Emphysematous changes and patchy ground-glass opacity in the partially visualized lung apices.    This report was flagged in  Epic as abnormal.      Electronically signed by: Geo Ambrocio MD  Date:    11/06/2024  Time:    09:33   CT Cervical Spine Without Contrast    Narrative    EXAMINATION:  CTA STROKE MULTI-PHASE; CT CERVICAL SPINE WITHOUT CONTRAST    CLINICAL HISTORY:  Neuro deficit, acute, stroke suspected;; Neck trauma, intoxicated or obtunded (Age >= 16y);    TECHNIQUE:  Axial CT images obtained throughout the region of the head and cervical spine before the administration of intravenous contrast.    CT angiogram was performed through the cervical and intracranial vasculature during the IV bolus administration of 75mL of Omnipaque 350.  Two additional phases through the intracranial vasculature via multiphase technique.    Multiplanar MPR and MIP reformats were performed.    CT source data was analyzed using artificial intelligence software for detection of a large vessel occlusions (LVO) in order to enable computer assisted triage notification and aid clinical stroke decision making.    COMPARISON:  CTA head and cervical spine 10/25/2015    FINDINGS:  Large region of hypoattenuation loss of gray-white differentiation in left occipital lobe in medial temporal lobe in keeping with an acute PCA distribution infarct.  Modest localized mass effect with some sulcal crowding.  Additional involvement of the left thalamus.  Similar appearance in the medial right occipital lobe, but smaller area of infarction.  Small left superior cerebellar infarct, also likely acute.  No evidence of recent or remote major vascular distribution infarct in the anterior circulation.  No acute parenchymal hemorrhage.  No midline shift.    The ventricles are normal in size without evidence of hydrocephalus.    No extra-axial blood or fluid collections.    The cranium appears intact.    Mastoid air cells and paranasal sinuses are essentially clear.    The vertebral bodies are normal in height and morphology without evidence of an acute displaced fracture or  focal osseous destructive process.    Normal sagittal alignment is preserved.  No spondylolisthesis.    Intervertebral disc heights are well maintained.  No evidence of a bony spinal canal stenosis.    Evaluation intraspinal contents demonstrates no evidence of mass or hematoma.    No acute abnormalities in the paraspinal soft tissue structures.    Emphysematous changes patchy ground-glass opacity in the partially visualized lung apices.      CTA:    The aortic arch demonstrates no significant stenosis at the major branch vessel origins.    There is a focal thrombus in the right subclavian artery extending into the right vertebral artery origin with severe stenosis.  Thrombus extending over proximally 1 cm.  The V1 and V2 segments of right vertebral artery are otherwise normal in caliber of slightly decreased in attenuation in comparison to the other cervical vasculature.  Abrupt occlusion of distal right vertebral artery about the level of the PICA origin approximate 2 cm segment of non enhancement.    Moderate focal stenosis at the left vertebral artery origin.  Left vertebral artery is otherwise normal in caliber.  The basilar artery is normal in caliber.    The right common carotid artery is normal in caliber.  No significant stenosis at the carotid bifurcation.The right internal carotid artery is normal in caliber.    The left common carotid artery is normal in caliber. No significant stenosis at the carotid bifurcation.The left internal carotid artery is normal in caliber.    Abrupt non enhancement in the P1 segment of the left PCA.  The proximal right PCA appears within normal limits.    The proximal MCAs and ACAs are unremarkable.    Findings were immediately discussed upon identification via telephone with the stroke service ([facility]) at approximately 09:20.      Impression    Multifocal acute posterior circulation distribution infarcts as further discussed above, particularly involving essentially  the entirety of the left PCA territory.  Modest mass effect without significant hemorrhage.    Focal thrombus with severe stenosis extending from the right subclavian artery into the proximal right vertebral artery.    Focal occlusion of the proximal intracranial segment of the right vertebral artery.    Focal occlusion of the proximal left PCA.    Moderate focal stenosis at the left vertebral artery origin.    Anterior circulation appears unremarkable.    No evidence of fracture or traumatic malalignment in the cervical spine.    Emphysematous changes and patchy ground-glass opacity in the partially visualized lung apices.    This report was flagged in Epic as abnormal.      Electronically signed by: Geo Ambrocio MD  Date:    11/06/2024  Time:    09:33   CT Head Without Contrast    Narrative    EXAMINATION:  CT HEAD WITHOUT CONTRAST    CLINICAL HISTORY:  Stroke, follow up;    TECHNIQUE:  Low dose axial images were obtained through the head.  Coronal and sagittal reformations were also performed. Contrast was not administered.    COMPARISON:  CT, MRI 11/06/2024    FINDINGS:  Evolving posterior circulation infarcts are identified as described on recent MR imaging again most notable within the left greater than right occipital lobes extending to the medial left temporal lobe and bilateral thalamus.  No hemorrhagic conversion with potentially minimal stable petechial hemorrhage within the medial left thalamus.  No midline shift or herniation with mild mass effect on the 3rd ventricle again noted.    No new acute territorial infarct.    The visualized sinuses and mastoid air cells are clear.      Impression    Expected evolutionary changes of the posterior circulation infarcts with no overt hemorrhagic conversion or significant midline shift/herniation.      Electronically signed by: Adrián Hobbs  Date:    11/09/2024  Time:    08:38      Results for orders placed or performed during the hospital encounter of  11/06/24 (from the past 2160 hours)   MRI Brain Without Contrast    Impression    Multiple posterior circulation infarcts are identified with involvement of the left greater than right occipital lobes and medial left temporal lobe, thalamus, right dorsal medulla, and minimally involving the cerebellum.  No midline shift with minimal mass effect on the 3rd ventricle.    Loss of flow void within the right intracranial vertebral artery in keeping with the findings on CTA.      Electronically signed by: Adrián Hobbs  Date:    11/06/2024  Time:    17:40      Cardiac Imaging   Results for orders placed during the hospital encounter of 11/06/24    Echo Saline Bubble? Yes; Ultrasound enhancing contrast? Yes    Interpretation Summary    Left Ventricle: The left ventricle is normal in size. Normal wall thickness. There is normal systolic function with a visually estimated ejection fraction of 55 - 60%. Biplane (2D) method of discs ejection fraction is 56%. Global longitudinal strain is -18.0%. There is normal diastolic function.    Right Ventricle: Normal right ventricular cavity size. Wall thickness is normal. Systolic function is normal.    Tricuspid Valve: There is mild regurgitation.    Pulmonary Artery: The estimated pulmonary artery systolic pressure is at least 28 mmHg.    IVC/SVC: Patient is ventilated, cannot use IVC diameter to estimate right atrial pressure.   Physical Exam

## 2024-11-15 NOTE — CONSULTS
[facility] ([facility])  Physical Medicine & Rehab  Consult Note    Patient Name: Malathi Mcpherson  MRN: [id number]  Admission Date: 11/6/2024  Hospital Length of Stay: 9 days  Attending Physician: Emily Recinos MD   Consults  Subjective:     Principal Problem: Acute arterial ischemic stroke, multifocal, posterior circulation    HPI: Per chart review,  Pt is a 49 year old female with past medical history of drug use, seizure, HTN, HLD. Pt was brought to ED by mother after being found down.On admit,CTH showed  Multifocal posterior circulation distribution infarcts. MRI brain showed Multiple posterior circulation infarcts are identified with involvement of the left greater than right occipital lobes and medial left temporal lobe, thalamus, right dorsal medulla, and minimally involving the cerebellum.  No midline shift with minimal mass effect on the 3rd ventricle.  Loss of flow void within the right intracranial vertebral artery. Pt's hospital course was complicated by agitation requiring fentanyl and addition of precedex. Pt was intubated for airway protection. Pt was extubated on 11/10/2024. CK was elevated. There was a concern for rhabdomyolysis . Pt's mental status improved and pt eventually weaned off of sedation. PM &R was consulted to evaluate pt for post acute placement.     Functional History: Patient lives  with elderly parents  in a single  story home with one step  to enter.  Prior to admission, Pt was I.  DME: None.      Hospital Course: PT- 11/14    Functional Mobility:     Bed Mobility:  Supine to Sit: Moderate Assistance on L side of bed  Sit to Supine: Minimal Assistance  Pt self returned to supine but needed min(A) for B LE management  Rolling L: Minimal Assistance  Rolling R: Minimal Assistance  Scooting anteriorly to EOB to plant feet on floor: Moderate Assistance     Transfers:   Sit to Stand Transfer: Activity did not occur - pt self returned to supine     Balance:  Static Sit:   Mod  Assist at EOB    Past Medical History:   Diagnosis Date    Acute adjustment disorder with depressed mood     Cutaneous abscess 08/08/2019    Depression     Enlarged liver 04/27/2017    Hep C w/o coma, chronic     Hepatitis B     History of mental disorder 03/22/2017    History of substance abuse      History reviewed. No pertinent surgical history.  Review of patient's allergies indicates:  No Known Allergies    Scheduled Medications:    acetaminophen  650 mg Per NG tube Q6H    aspirin  81 mg Per NG tube Daily    atorvastatin  80 mg Per NG tube Daily    bisacodyL  10 mg Rectal Daily    carvediloL  6.25 mg Per NG tube BID    ceFEPime IV (PEDS and ADULTS)  2 g Intravenous Q8H    clopidogreL  75 mg Per NG tube Daily    FLUoxetine  60 mg Per NG tube Daily    folic acid  1 mg Per NG tube Daily    heparin (porcine)  5,000 Units Subcutaneous Q8H    LIDOcaine  1 patch Transdermal Q24H    lithium citrate 8 meq/5 ml  300 mg Per NG tube TID    mirtazapine  7.5 mg Per NG tube QHS    multivitamin  1 tablet Per NG tube Daily    mupirocin   Nasal BID    QUEtiapine  25 mg Per NG tube Daily    QUEtiapine  50 mg Per NG tube QHS    senna-docusate 8.6-50 mg  1 tablet Per NG tube Daily    thiamine  100 mg Per NG tube Daily       PRN Medications:   Current Facility-Administered Medications:     albuterol-ipratropium, 3 mL, Nebulization, Q6H PRN    hydrOXYzine HCL, 25 mg, Per NG tube, QID PRN    magnesium oxide, 800 mg, Per NG tube, PRN    magnesium oxide, 800 mg, Per NG tube, PRN    polyethylene glycol, 17 g, Per NG tube, BID PRN    potassium bicarbonate, 35 mEq, Per NG tube, PRN    potassium bicarbonate, 50 mEq, Per NG tube, PRN    potassium bicarbonate, 60 mEq, Per NG tube, PRN    potassium, sodium phosphates, 2 packet, Per NG tube, PRN    potassium, sodium phosphates, 2 packet, Per NG tube, PRN    potassium, sodium phosphates, 2 packet, Per NG tube, PRN    sodium chloride 0.9%, 10 mL, Intravenous, PRN    Family History    None        Tobacco Use    Smoking status: Every Day     Current packs/day: 0.50     Types: Cigarettes    Smokeless tobacco: Never   Substance and Sexual Activity    Alcohol use: Yes     Comment: occaisionally    Drug use: No    Sexual activity: Yes     Partners: Male     Review of Systems   Unable to perform ROS: Other (Incomprehensible speech)     Objective:     Vital Signs (Most Recent):  Temp: 99.9 °F (37.7 °C) (11/15/24 1109)  Pulse: 88 (11/15/24 1505)  Resp: 16 (11/15/24 1109)  BP: (!) 167/80 (11/15/24 1109)  SpO2: 98 % (11/15/24 1109)    Vital Signs (24h Range):  Temp:  [97.8 °F (36.6 °C)-99.9 °F (37.7 °C)] 99.9 °F (37.7 °C)  Pulse:  [] 88  Resp:  [16-20] 16  SpO2:  [95 %-98 %] 98 %  BP: (145-177)/() 167/80     Body mass index is 19.78 kg/m².     Physical Exam  Vitals and nursing note reviewed.   Constitutional:       General: She is sleeping.      Appearance: She is ill-appearing.   Pulmonary:      Effort: Pulmonary effort is normal. No respiratory distress.   Abdominal:      General: There is no distension.      Palpations: Abdomen is soft.   Musculoskeletal:      Cervical back: Normal range of motion and neck supple.      Comments: Right sided weakness.    Skin:     Capillary Refill: Capillary refill takes 2 to 3 seconds.   Neurological:      General: No focal deficit present.      GCS: GCS eye subscore is 3. GCS verbal subscore is 3. GCS motor subscore is 5.      Cranial Nerves: Dysarthria present.      Motor: Weakness present.      Coordination: Coordination abnormal. Finger-Nose-Finger Test abnormal and Heel to Vo Test abnormal.      Gait: Gait abnormal.   Psychiatric:         Behavior: Behavior is slowed and withdrawn.         Cognition and Memory: Cognition is impaired. Memory is impaired.          NEUROLOGICAL EXAMINATION:     GAIT AND COORDINATION      Coordination   Finger to nose coordination: abnormal      Diagnostic Results: Labs: Reviewed  Assessment/Plan:     * Acute arterial ischemic  stroke, multifocal, posterior circulation  -CTA showed multifocal areas of hypodensity involving the posterior circulation suspicious for infarcts as well as focal occlusion of R vert and L PCA.   -No TNK or IR intervention.  -Found to have Utox pos for cocaine.   -PT/OT rec for high intensity therapies. Will need SLP as well  -NPO and had NGT. Will need MBSS.  -In restraints. Will need to be off to pursue rehab.     Encephalopathy, metabolic  -Requiring restraints currently. Monitor.     Aphasia  -Will need aggressive SLP.     Reduced mobility  See hospital course for functional status.    Recommendations  -  Encourage mobility, OOB in chair, and early ambulation as appropriate  -  PT/OT evaluate and treat  -  Pain management  -  DVT prophylaxis if appropriate   -  Monitor for and prevent skin breakdown and pressure ulcers  Early mobility, repositioning/weight shifting every 20-30 minutes when sitting, turn patient every 2 hours, proper mattress/overlay and chair cushioning, pressure relief/heel protector boots     Right upper lobe pneumonia  -Completing Cefepime per chart review.  -Monitor resp status. Appears stable.     Polysubstance abuse  -Hx of Cocaine, Heroine, ETOH use.   -Addiction psych following.     PM&R Recommendation:     At this time, the PM&R team has reviewed this patient's ongoing medical case including inpatient diagnosis, medical history, clinical examination, labs, vitals, current social and functional history. We will continue to follow pt for a potential rehab candidate pending medical stability. Will need MBSS and needs to be off NGT. Will need to be off restraints with mental status improved. Will continue to follow for now. Spoke to mother at bedside.        Thank you for your consult.     Chapis Escamilla NP  Department of Physical Medicine & Rehab  [facility] ([facility]

## 2024-11-15 NOTE — ASSESSMENT & PLAN NOTE
-CTA showed multifocal areas of hypodensity involving the posterior circulation suspicious for infarcts as well as focal occlusion of R vert and L PCA.   -No TNK or IR intervention.  -Found to have Utox pos for cocaine.   -PT/OT rec for high intensity therapies. Will need SLP as well  -NPO and had NGT. Will need MBSS.  -In restraints. Will need to be off to pursue rehab.

## 2024-11-15 NOTE — ASSESSMENT & PLAN NOTE
-Continue home fluoxetine  -Continue seroquel 25 mg in the am and 50mg qHS  -Home lithium held on admit due to elevated serum lithium level, resumed 11/11

## 2024-11-15 NOTE — CARE UPDATE
Unit [facility] Care Support Interaction      I have reviewed the chart of Malathi Mcpherson who is hospitalized for Acute arterial ischemic stroke, multifocal, posterior circulation. The patient is currently located in the following unit: [facility]        I have assisted the primary physician in management of the following:      MRSA Decolonization - Mupirocin ordered and CHG ordered         Mari Markham NP  Unit Based [facility]

## 2024-11-15 NOTE — PT/OT/SLP PROGRESS
"Speech Language Pathology Treatment    Patient Name:  Malathi Mcpherson   MRN:  [id number]  Admitting Diagnosis: Acute arterial ischemic stroke, multifocal, posterior circulation    Recommendations:                 General Recommendations:  Dysphagia therapy, Speech/language therapy, Cognitive-linguistic therapy, and Modified barium swallow study  Diet recommendations:  NPO, Liquid Diet Level: NPO   Aspiration Precautions: Continue alternate means of nutrition and Strict aspiration precautions   General Precautions: Standard, aspiration, fall  Communication strategies:  provide increased time to answer    Assessment:     Malathi Mcpherson is a 49 y.o. female with an SLP diagnosis of Aphasia, Dysphagia, and Cognitive-Linguistic Impairment.    Subjective     "THANK YOU" per pt  Patient goals: did not state     Pain/Comfort:  Pain Rating 1: 0/10  Pain Rating Post-Intervention 1: 0/10    Respiratory Status: Room air    Objective:     Has the patient been evaluated by SLP for swallowing?   Yes  Keep patient NPO? Yes   Current Respiratory Status:        Pt. Seen at bedside and hob was raised to 90 degree angle.  NG tube was in place.  Pt. P roduced fair cough and throat clear on command.  Vocal intensity was adequate with slightly wet vocal quality and mild dysphonia.  Verbalizations elicited were limited in number with slightly wet vocal quality.  Pt. Was given teaspoon of water x2 as well as teaspoon of nectar thick water x2.  Ms. Mcpherson demonstrate strong cough following 2nd trial of water with no coughing noted on nectar thick trials x2.  Recommend that continue alternate means of nutrition and mod barium swallow study be ordered for Monday, 11/18 if okay with md      Goals:   Multidisciplinary Problems       SLP Goals          Problem: SLP    Goal Priority Disciplines Outcome   SLP Goal     SLP Progressing   Description: Speech Language Pathology Goals  Goals expected to be met by 11/25    1. Pt will participate in " ongoing swallow assessment to determine leats restrictive PO diet when appropriate.   2. Pt will complete cognitive-linguistic assessment to determine additional therapeutic needs.                                  Plan:     Patient to be seen:  4 x/week   Plan of Care expires:  12/11/24  Plan of Care reviewed with:  patient   SLP Follow-Up:  Yes       Discharge recommendations:  High Intensity Therapy   Barriers to Discharge:   means of nutrition    Time Tracking:     SLP Treatment Date:   11/15/24  Speech Start Time:  0730  Speech Stop Time:  0742     Speech Total Time (min):  12 min    Billable Minutes: Treatment Swallowing Dysfunction 12    11/15/2024

## 2024-11-15 NOTE — SUBJECTIVE & OBJECTIVE
Past Medical History:   Diagnosis Date    Acute adjustment disorder with depressed mood     Cutaneous abscess 08/08/2019    Depression     Enlarged liver 04/27/2017    Hep C w/o coma, chronic     Hepatitis B     History of mental disorder 03/22/2017    History of substance abuse      History reviewed. No pertinent surgical history.  Review of patient's allergies indicates:  No Known Allergies    Scheduled Medications:    acetaminophen  650 mg Per NG tube Q6H    aspirin  81 mg Per NG tube Daily    atorvastatin  80 mg Per NG tube Daily    bisacodyL  10 mg Rectal Daily    carvediloL  6.25 mg Per NG tube BID    ceFEPime IV (PEDS and ADULTS)  2 g Intravenous Q8H    clopidogreL  75 mg Per NG tube Daily    FLUoxetine  60 mg Per NG tube Daily    folic acid  1 mg Per NG tube Daily    heparin (porcine)  5,000 Units Subcutaneous Q8H    LIDOcaine  1 patch Transdermal Q24H    lithium citrate 8 meq/5 ml  300 mg Per NG tube TID    mirtazapine  7.5 mg Per NG tube QHS    multivitamin  1 tablet Per NG tube Daily    mupirocin   Nasal BID    QUEtiapine  25 mg Per NG tube Daily    QUEtiapine  50 mg Per NG tube QHS    senna-docusate 8.6-50 mg  1 tablet Per NG tube Daily    thiamine  100 mg Per NG tube Daily       PRN Medications:   Current Facility-Administered Medications:     albuterol-ipratropium, 3 mL, Nebulization, Q6H PRN    hydrOXYzine HCL, 25 mg, Per NG tube, QID PRN    magnesium oxide, 800 mg, Per NG tube, PRN    magnesium oxide, 800 mg, Per NG tube, PRN    polyethylene glycol, 17 g, Per NG tube, BID PRN    potassium bicarbonate, 35 mEq, Per NG tube, PRN    potassium bicarbonate, 50 mEq, Per NG tube, PRN    potassium bicarbonate, 60 mEq, Per NG tube, PRN    potassium, sodium phosphates, 2 packet, Per NG tube, PRN    potassium, sodium phosphates, 2 packet, Per NG tube, PRN    potassium, sodium phosphates, 2 packet, Per NG tube, PRN    sodium chloride 0.9%, 10 mL, Intravenous, PRN    Family History    None       Tobacco Use     Smoking status: Every Day     Current packs/day: 0.50     Types: Cigarettes    Smokeless tobacco: Never   Substance and Sexual Activity    Alcohol use: Yes     Comment: occaisionally    Drug use: No    Sexual activity: Yes     Partners: Male     Review of Systems   Unable to perform ROS: Other (Incomprehensible speech)     Objective:     Vital Signs (Most Recent):  Temp: 99.9 °F (37.7 °C) (11/15/24 1109)  Pulse: 88 (11/15/24 1505)  Resp: 16 (11/15/24 1109)  BP: (!) 167/80 (11/15/24 1109)  SpO2: 98 % (11/15/24 1109)    Vital Signs (24h Range):  Temp:  [97.8 °F (36.6 °C)-99.9 °F (37.7 °C)] 99.9 °F (37.7 °C)  Pulse:  [] 88  Resp:  [16-20] 16  SpO2:  [95 %-98 %] 98 %  BP: (145-177)/() 167/80     Body mass index is 19.78 kg/m².     Physical Exam  Vitals and nursing note reviewed.   Constitutional:       General: She is sleeping.      Appearance: She is ill-appearing.   Pulmonary:      Effort: Pulmonary effort is normal. No respiratory distress.   Abdominal:      General: There is no distension.      Palpations: Abdomen is soft.   Musculoskeletal:      Cervical back: Normal range of motion and neck supple.      Comments: Right sided weakness.    Skin:     Capillary Refill: Capillary refill takes 2 to 3 seconds.   Neurological:      General: No focal deficit present.      GCS: GCS eye subscore is 3. GCS verbal subscore is 3. GCS motor subscore is 5.      Cranial Nerves: Dysarthria present.      Motor: Weakness present.      Coordination: Coordination abnormal. Finger-Nose-Finger Test abnormal and Heel to Vo Test abnormal.      Gait: Gait abnormal.   Psychiatric:         Behavior: Behavior is slowed and withdrawn.         Cognition and Memory: Cognition is impaired. Memory is impaired.          NEUROLOGICAL EXAMINATION:     GAIT AND COORDINATION      Coordination   Finger to nose coordination: abnormal      Diagnostic Results: Labs: Reviewed

## 2024-11-15 NOTE — ASSESSMENT & PLAN NOTE
-Area of cerebral edema identified when reviewing brain imaging involving bilateral posterior circulation territories due to acute infarcts, there is mild mass effect associated with it that has remained stable on follow up imaging.  -Admitted to [facility] for close neuro monitoring and observation  -NSGY consulted on admit, no surgical intervention recommended and NSGY s/o  - Stepped down to floor  -Continue frequent q4hr neuro checks as any acute changes or worsening neuro status .  -Obtain stat CTH for any new or worsening neuro changes and notify primary team immediately

## 2024-11-15 NOTE — ASSESSMENT & PLAN NOTE
Malathi Mcpherson is a 49 y.o. female with PMH of drug use, seizure, HTN, HLD that is admitted to [facility] for further evaluation and management of multifocal acute infarcts. She initially presented to [facility] ED 11/6 after being found lying face down at home that morning, per EMS was lethargic and aphasic and moving LUE/LLE/RLE but was not moving RUE. EMS also reported that they had seen her before in context of seizures. LKW 10pm 11/5. In ED was noted to have exam worsening, now with R sided plegia and global aphasia. NIHSS 22. Also noted to have O2 desat in 70s and ultimately was intubated for airway protection. CTA showed multifocal areas of hypodensity involving the posterior circulation suspicious for infarcts as well as focal occlusion of R vert and L PCA. Not candidate for acute stroke interventions, no TNK due to OOW and no IR due to stroke burden/risk for bleeding. She was admitted to [facility] for higher level of care. NSGY consulted on admit for edema/hemicrani watch, however no surgical interventions recommended. MRI brain confirmed multifocal areas of acute infarct involving: L>R occipital lobes, L medial temporal lobe, bilateral thalami, L hippocampus and R hippocampus tail, R dorsal medulla, bilateral cerebellar hemispheres. TTE unremarkable. Suspect etiology of stroke likely secondary to substance use, Utox + cocaine.    NAEO, patient off precedex, BL wrist and torso restraints, NG tube with feeds running. Patient able to respond to questions and follow some commands. Oriented to self only. Pt was stepped down to floor.  Pt on restraints as she tries to pull things.     Antithrombotics for secondary stroke prevention: Antiplatelets: Aspirin: 81 mg daily  Clopidogrel: 75 mg daily.     Statins for secondary stroke prevention and hyperlipidemia, if present: Statins: Atorvastatin- 80 mg daily    Aggressive risk factor modification: HTN, HLD, Substance use.     Rehab efforts: The patient has been evaluated by a stroke  team provider and the therapy needs have been fully considered based off the presenting complaints and exam findings. The following therapy evaluations are needed: PT evaluate and treat, OT evaluate and treat, SLP evaluate and treat High intensity therapy recommended.Modified barium swallow test to be done on Monday ( 11/18) per speech.    Diagnostics ordered/pending: Other: Modified barium swallow    VTE prophylaxis: Heparin 5000 units SQ every 8 hours  Mechanical prophylaxis: Place SCDs    BP parameters: Infarct: SBP goal <180

## 2024-11-15 NOTE — PT/OT/SLP PROGRESS
"Occupational Therapy   Treatment    Name: Malathi Mcpherson  MRN: [id number]  Admitting Diagnosis:  Acute arterial ischemic stroke, multifocal, posterior circulation       Recommendations:     Discharge Recommendations: High Intensity Therapy  Discharge Equipment Recommendations:  bath bench, bedside commode  Barriers to discharge:  None    Assessment:     Malathi Mcpherson is a 49 y.o. female with a medical diagnosis of Acute arterial ischemic stroke, multifocal, posterior circulation.  She presents with performance deficits affecting function are impaired endurance, gait instability, decreased upper extremity function, decreased lower extremity function, impaired balance, impaired self care skills, impaired functional mobility, impaired cognition, decreased coordination, decreased safety awareness.     Rehab Prognosis:  Good; patient would benefit from acute skilled OT services to address these deficits and reach maximum level of function.       Plan:     Patient to be seen 4 x/week to address the above listed problems via neuromuscular re-education, therapeutic exercises, therapeutic activities, self-care/home management, cognitive retraining  Plan of Care Expires: 12/05/24  Plan of Care Reviewed with: patient    Subjective     Patient:  "I appreciate everything you do.  I feel a lot better."  Pain/Comfort:  Pain Rating 1: 0/10  Pain Rating Post-Intervention 1: 0/10    Objective:     Communicated with: Nurse prior to session.  Patient found supine with SCD, bed alarm, restraints, NG tube, peripheral IV, telemetry, pulse ox (continuous), PureWick upon OT entry to room.    General Precautions: Standard, fall, aspiration    Orthopedic Precautions:N/A  Braces: N/A  Respiratory Status: Room air     Occupational Performance:     Bed Mobility:    Patient completed Rolling/Turning to Left with  minimum assistance  Patient completed Rolling/Turning to Right with minimum assistance  Patient completed Supine to Sit with " minimum assistance  Patient completed Sit to Supine with moderate assistance     Functional Mobility/Transfers:  Patient completed Sit <> Stand Transfer with maximal assistance  with  no assistive device   Max assist with side steps along the EOB    Activities of Daily Living:  Upper Body Dressing: maximal assistance while seated EOB  Lower Body Dressing: maximal assistance while seated EOB    [facility] 6 Click ADL: 11    Treatment & Education:  Patient alert and oriented x person.  Able to follow 4/4 one step commands.  Patient interactive throughout the session.     Patient left supine with all lines intact, call button in reach, bed alarm on, and restraints reapplied at end of session    GOALS:   Multidisciplinary Problems       Occupational Therapy Goals          Problem: Occupational Therapy    Goal Priority Disciplines Outcome Interventions   Occupational Therapy Goal     OT, PT/OT Progressing    Description: Goals set 11/11 with an expiration date, 12/5:  Patient will increase functional independence with ADLs by performing:    Patient will demonstrate rolling to the right with min assist.  Not met   Patient will demonstrate rolling to the left with min assist.   Not met  Patient will demonstrate supine -sit with min assist.   Not met  Patient will demonstrate squat pivot transfers with min assist.   Not met  Patient will demonstrate grooming while seated with min assist.   Not met  Patient will demonstrate upper body dressing with min assist while seated EOB.   Not met  Patient will demonstrate lower body dressing with mod assist while seated EOB.   Not met  Patient will demonstrate toileting with mod assist.   Not met  Patient's family / caregiver will demonstrate independence and safety with assisting patient with self-care skills and functional mobility.     Not met  Patient's family / caregiver will demonstrate independence with providing ROM and changes in bed positioning.   Not met                                  Time Tracking:     OT Date of Treatment: 11/15/24  OT Start Time: 0558  OT Stop Time: 0625  OT Total Time (min): 27 min    Billable Minutes:Self Care/Home Management 13  Neuromuscular Re-education 14    OT/MADHURI: OT          11/15/2024

## 2024-11-15 NOTE — PROGRESS NOTES
Neno Conley - Neurosurgery ([facility])  Vascular Neurology  Comprehensive Stroke Center  Progress Note    Assessment/Plan:     * Acute arterial ischemic stroke, multifocal, posterior circulation  Malathi Mcpherson is a 49 y.o. female with PMH of drug use, seizure, HTN, HLD that is admitted to [facility] for further evaluation and management of multifocal acute infarcts. She initially presented to [facility] ED 11/6 after being found lying face down at home that morning, per EMS was lethargic and aphasic and moving LUE/LLE/RLE but was not moving RUE. EMS also reported that they had seen her before in context of seizures. LKW 10pm 11/5. In ED was noted to have exam worsening, now with R sided plegia and global aphasia. NIHSS 22. Also noted to have O2 desat in 70s and ultimately was intubated for airway protection. CTA showed multifocal areas of hypodensity involving the posterior circulation suspicious for infarcts as well as focal occlusion of R vert and L PCA. Not candidate for acute stroke interventions, no TNK due to OOW and no IR due to stroke burden/risk for bleeding. She was admitted to [facility] for higher level of care. NSGY consulted on admit for edema/hemicrani watch, however no surgical interventions recommended. MRI brain confirmed multifocal areas of acute infarct involving: L>R occipital lobes, L medial temporal lobe, bilateral thalami, L hippocampus and R hippocampus tail, R dorsal medulla, bilateral cerebellar hemispheres. TTE unremarkable. Suspect etiology of stroke likely secondary to substance use, Utox + cocaine.    NAEO, patient off precedex, BL wrist and torso restraints, NG tube with feeds running. Patient able to respond to questions and follow some commands. Oriented to self only. Pt was stepped down to floor.  Pt on restraints as she tries to pull things.     Antithrombotics for secondary stroke prevention: Antiplatelets: Aspirin: 81 mg daily  Clopidogrel: 75 mg daily.     Statins for secondary stroke prevention  and hyperlipidemia, if present: Statins: Atorvastatin- 80 mg daily    Aggressive risk factor modification: HTN, HLD, Substance use.     Rehab efforts: The patient has been evaluated by a stroke team provider and the therapy needs have been fully considered based off the presenting complaints and exam findings. The following therapy evaluations are needed: PT evaluate and treat, OT evaluate and treat, SLP evaluate and treat High intensity therapy recommended.Modified barium swallow test to be done on Monday ( 11/18) per speech.    Diagnostics ordered/pending: Other: Modified barium swallow    VTE prophylaxis: Heparin 5000 units SQ every 8 hours  Mechanical prophylaxis: Place SCDs    BP parameters: Infarct: SBP goal <180          Debility  -Due to stroke  -Aggressive therapy  -PT/OT/SLP eval and treat    Polysubstance abuse  -Utox + cocaine  -Suspect etiology of stroke likely due to substance use  -Continue thiamine/folate/multivitamin supplementation    Cytotoxic cerebral edema  -Area of cerebral edema identified when reviewing brain imaging involving bilateral posterior circulation territories due to acute infarcts, there is mild mass effect associated with it that has remained stable on follow up imaging.  -Admitted to [facility] for close neuro monitoring and observation  -NSGY consulted on admit, no surgical intervention recommended and NSGY s/o  - Stepped down to floor  -Continue frequent q4hr neuro checks as any acute changes or worsening neuro status .  -Obtain stat CTH for any new or worsening neuro changes and notify primary team immediately    Anxiety and depression  -Continue home fluoxetine  -Continue seroquel 25 mg in the am and 50mg qHS  -Home lithium held on admit due to elevated serum lithium level, resumed 11/11 11/07/2024 Pt was agitated overnight and is on fentanyl. Currently intubated.On repeated painful stimuli has movements on LUE,LLE, RLE> RUE.The movement on right side is an improvement from  yesterday.  11/11/2024 NAEON, remains on precedex for agitation. Extubated yesterday (11/10), tolerating extubation thus far without complication. Moving extremities spontaneously although weakness noted in RUE>>RLE. Remains globally aphasic and agitated despite max precedex infusion. Respiratory cultures + H flu, on rocephin for abx. Also on bactrim for SSTI. Home lithium restarted today, continue seoquel and fluoxetine.  11/14/2024 NAEO, patient off precedex, BL wrist and torso restraints, NG tube with feeds running. Patient able to respond to questions and follow some commands. Oriented to self only. Stable for stepdown.   11/15/2024 Pt still in restraints. Per nurse tendency to pull at tubes and things.On restraints. Oriented to self. No other complaints in the am.      STROKE DOCUMENTATION   Acute Stroke Times   Last Known Normal Date: 11/05/24  Last Known Normal Time: 2200  Symptom Onset Date: 11/06/24 (unsure of timing)  Symptom Onset Time:  (sometime this morning.)  Unknown Symptom Onset Time: Unknown Time  Stroke Team Called Date: 11/06/24  Stroke Team Called Time: 0843  Stroke Team Arrival Date: 11/06/24  Stroke Team Arrival Time: 0845  CT Interpretation Time: 0859  Thrombolytic Therapy Recommended: No  CTA Interpretation Time: 0902  Thrombectomy Recommended: No    NIH Scale:  1a. Level of Consciousness: 0-->Alert, keenly responsive  1b. LOC Questions: 2-->Answers neither question correctly  1c. LOC Commands: 1-->Performs one task correctly  2. Best Gaze: 0-->Normal  3. Visual: 0-->No visual loss  4. Facial Palsy: 0-->Normal symmetrical movements  5a. Motor Arm, Left: 0-->No drift, limb holds 90 (or 45) degrees for full 10 secs (( in restraints))  5b. Motor Arm, Right: 2-->Some effort against gravity, limb cannot get to or maintain (if cued) 90 (or 45) degrees, drifts down to bed, but has some effort against gravity (( in restraints))  6a. Motor Leg, Left: 0-->No drift, leg holds 30 degree position for  full 5 secs  6b. Motor Leg, Right: 0-->No drift, leg holds 30 degree position for full 5 secs  7. Limb Ataxia: 0-->Absent  8. Sensory: 0-->Normal, no sensory loss  9. Best Language: 1-->Mild-to-moderate aphasia, some obvious loss of fluency or facility of comprehension, without significant limitation on ideas expressed or form of expression. Reduction of speech and/or comprehension, however, makes conversation. . . (see row details)  10. Dysarthria: 1-->Mild-to-moderate dysarthria, patient slurs at least some words and, at worst, can be understood with some difficulty  11. Extinction and Inattention (formerly Neglect): 0-->No abnormality  Total (NIH Stroke Scale): 7       Modified Yonny Score: 0  [place] Coma Scale:    ABCD2 Score:    [id number] Score:   HAS -BLED Score:   ICH Score:   Hunt & Argueta Classification:      Hemorrhagic change of an Ischemic Stroke: Does this patient have an ischemic stroke with hemorrhagic changes? No     Neurologic Chief Complaint: acute arterial ischemic stroke, multifocal, posterior circulation     Subjective:     Interval History: Patient is seen for follow-up neurological assessment and treatment recommendations: See hospital course.     HPI, Past Medical, Family, and Social History remains the same as documented in the initial encounter.     Review of Systems  Scheduled Meds:   acetaminophen  650 mg Per NG tube Q6H    aspirin  81 mg Per NG tube Daily    atorvastatin  80 mg Per NG tube Daily    bisacodyL  10 mg Rectal Daily    carvediloL  6.25 mg Per NG tube BID    ceFEPime IV (PEDS and ADULTS)  2 g Intravenous Q8H    clopidogreL  75 mg Per NG tube Daily    FLUoxetine  60 mg Per NG tube Daily    folic acid  1 mg Per NG tube Daily    heparin (porcine)  5,000 Units Subcutaneous Q8H    LIDOcaine  1 patch Transdermal Q24H    lithium citrate 8 meq/5 ml  300 mg Per NG tube TID    mirtazapine  7.5 mg Per NG tube QHS    multivitamin  1 tablet Per NG tube Daily    QUEtiapine  25 mg Per NG  "tube Daily    QUEtiapine  50 mg Per NG tube QHS    senna-docusate 8.6-50 mg  1 tablet Per NG tube Daily    thiamine  100 mg Per NG tube Daily     Continuous Infusions:  PRN Meds:  Current Facility-Administered Medications:     albuterol-ipratropium, 3 mL, Nebulization, Q6H PRN    hydrOXYzine HCL, 25 mg, Per NG tube, QID PRN    magnesium oxide, 800 mg, Per NG tube, PRN    magnesium oxide, 800 mg, Per NG tube, PRN    polyethylene glycol, 17 g, Per NG tube, BID PRN    potassium bicarbonate, 35 mEq, Per NG tube, PRN    potassium bicarbonate, 50 mEq, Per NG tube, PRN    potassium bicarbonate, 60 mEq, Per NG tube, PRN    potassium, sodium phosphates, 2 packet, Per NG tube, PRN    potassium, sodium phosphates, 2 packet, Per NG tube, PRN    potassium, sodium phosphates, 2 packet, Per NG tube, PRN    sodium chloride 0.9%, 10 mL, Intravenous, PRN    Objective:     Vital Signs (Most Recent):  Temp: 98.3 °F (36.8 °C) (11/15/24 0414)  Pulse: 94 (11/15/24 0414)  Resp: 20 (11/15/24 0414)  BP: (!) 145/73 (11/15/24 0414)  SpO2: 96 % (11/15/24 0414)  BP Location: Right arm    Vital Signs Range (Last 24H):  Temp:  [97.8 °F (36.6 °C)-98.6 °F (37 °C)]   Pulse:  []   Resp:  [16-45]   BP: (130-185)/()   SpO2:  [90 %-97 %]   BP Location: Right arm      Physical Exam  Vitals and nursing note reviewed.   Constitutional:       General: Pt not very cooperative on exam. Able to follow few commands like squeezing hand on left hand.     Comments: On restraints   HENT:      Head: Normocephalic and atraumatic.   Musculoskeletal:      Right lower leg: No edema.      Left lower leg: No edema.   Skin:     General: Skin is warm and dry.     Neurological Exam:     LOC: alert  Attention Span: poor  Language:  aphasia present  Articulation: Dysarthria present  Motor: Leg left  Normal 5/5  Leg right Normal 5/5  LUE > more movements than RUE.    Laboratory:  CMP:   No results for input(s): "GLUCOSE", "CALCIUM", "ALBUMIN", "PROT", "NA", "K", " ""CO2", "CL", "BUN", "CREATININE", "ALKPHOS", "ALT", "AST", "BILITOT" in the last 24 hours.    CBC:   Recent Labs   Lab 11/14/24  0035   WBC 11.24   RBC 3.71*   HGB 10.9*   HCT 33.4*      MCV 90   MCH 29.4   MCHC 32.6       Diagnostic Results     Brain Imaging / Vessel Imaging   Results for orders placed or performed during the hospital encounter of 11/06/24 (from the past 2160 hours)   CTA STROKE MULTI-PHASE    Narrative    EXAMINATION:  CTA STROKE MULTI-PHASE; CT CERVICAL SPINE WITHOUT CONTRAST    CLINICAL HISTORY:  Neuro deficit, acute, stroke suspected;; Neck trauma, intoxicated or obtunded (Age >= 16y);    TECHNIQUE:  Axial CT images obtained throughout the region of the head and cervical spine before the administration of intravenous contrast.    CT angiogram was performed through the cervical and intracranial vasculature during the IV bolus administration of 75mL of Omnipaque 350.  Two additional phases through the intracranial vasculature via multiphase technique.    Multiplanar MPR and MIP reformats were performed.    CT source data was analyzed using artificial intelligence software for detection of a large vessel occlusions (LVO) in order to enable computer assisted triage notification and aid clinical stroke decision making.    COMPARISON:  CTA head and cervical spine 10/25/2015    FINDINGS:  Large region of hypoattenuation loss of gray-white differentiation in left occipital lobe in medial temporal lobe in keeping with an acute PCA distribution infarct.  Modest localized mass effect with some sulcal crowding.  Additional involvement of the left thalamus.  Similar appearance in the medial right occipital lobe, but smaller area of infarction.  Small left superior cerebellar infarct, also likely acute.  No evidence of recent or remote major vascular distribution infarct in the anterior circulation.  No acute parenchymal hemorrhage.  No midline shift.    The ventricles are normal in size without " evidence of hydrocephalus.    No extra-axial blood or fluid collections.    The cranium appears intact.    Mastoid air cells and paranasal sinuses are essentially clear.    The vertebral bodies are normal in height and morphology without evidence of an acute displaced fracture or focal osseous destructive process.    Normal sagittal alignment is preserved.  No spondylolisthesis.    Intervertebral disc heights are well maintained.  No evidence of a bony spinal canal stenosis.    Evaluation intraspinal contents demonstrates no evidence of mass or hematoma.    No acute abnormalities in the paraspinal soft tissue structures.    Emphysematous changes patchy ground-glass opacity in the partially visualized lung apices.      CTA:    The aortic arch demonstrates no significant stenosis at the major branch vessel origins.    There is a focal thrombus in the right subclavian artery extending into the right vertebral artery origin with severe stenosis.  Thrombus extending over proximally 1 cm.  The V1 and V2 segments of right vertebral artery are otherwise normal in caliber of slightly decreased in attenuation in comparison to the other cervical vasculature.  Abrupt occlusion of distal right vertebral artery about the level of the PICA origin approximate 2 cm segment of non enhancement.    Moderate focal stenosis at the left vertebral artery origin.  Left vertebral artery is otherwise normal in caliber.  The basilar artery is normal in caliber.    The right common carotid artery is normal in caliber.  No significant stenosis at the carotid bifurcation.The right internal carotid artery is normal in caliber.    The left common carotid artery is normal in caliber. No significant stenosis at the carotid bifurcation.The left internal carotid artery is normal in caliber.    Abrupt non enhancement in the P1 segment of the left PCA.  The proximal right PCA appears within normal limits.    The proximal MCAs and ACAs are  unremarkable.    Findings were immediately discussed upon identification via telephone with the stroke service ([facility]) at approximately 09:20.      Impression    Multifocal acute posterior circulation distribution infarcts as further discussed above, particularly involving essentially the entirety of the left PCA territory.  Modest mass effect without significant hemorrhage.    Focal thrombus with severe stenosis extending from the right subclavian artery into the proximal right vertebral artery.    Focal occlusion of the proximal intracranial segment of the right vertebral artery.    Focal occlusion of the proximal left PCA.    Moderate focal stenosis at the left vertebral artery origin.    Anterior circulation appears unremarkable.    No evidence of fracture or traumatic malalignment in the cervical spine.    Emphysematous changes and patchy ground-glass opacity in the partially visualized lung apices.    This report was flagged in Epic as abnormal.      Electronically signed by: Geo Ambrocio MD  Date:    11/06/2024  Time:    09:33   CT Cervical Spine Without Contrast    Narrative    EXAMINATION:  CTA STROKE MULTI-PHASE; CT CERVICAL SPINE WITHOUT CONTRAST    CLINICAL HISTORY:  Neuro deficit, acute, stroke suspected;; Neck trauma, intoxicated or obtunded (Age >= 16y);    TECHNIQUE:  Axial CT images obtained throughout the region of the head and cervical spine before the administration of intravenous contrast.    CT angiogram was performed through the cervical and intracranial vasculature during the IV bolus administration of 75mL of Omnipaque 350.  Two additional phases through the intracranial vasculature via multiphase technique.    Multiplanar MPR and MIP reformats were performed.    CT source data was analyzed using artificial intelligence software for detection of a large vessel occlusions (LVO) in order to enable computer assisted triage notification and aid clinical stroke decision  making.    COMPARISON:  CTA head and cervical spine 10/25/2015    FINDINGS:  Large region of hypoattenuation loss of gray-white differentiation in left occipital lobe in medial temporal lobe in keeping with an acute PCA distribution infarct.  Modest localized mass effect with some sulcal crowding.  Additional involvement of the left thalamus.  Similar appearance in the medial right occipital lobe, but smaller area of infarction.  Small left superior cerebellar infarct, also likely acute.  No evidence of recent or remote major vascular distribution infarct in the anterior circulation.  No acute parenchymal hemorrhage.  No midline shift.    The ventricles are normal in size without evidence of hydrocephalus.    No extra-axial blood or fluid collections.    The cranium appears intact.    Mastoid air cells and paranasal sinuses are essentially clear.    The vertebral bodies are normal in height and morphology without evidence of an acute displaced fracture or focal osseous destructive process.    Normal sagittal alignment is preserved.  No spondylolisthesis.    Intervertebral disc heights are well maintained.  No evidence of a bony spinal canal stenosis.    Evaluation intraspinal contents demonstrates no evidence of mass or hematoma.    No acute abnormalities in the paraspinal soft tissue structures.    Emphysematous changes patchy ground-glass opacity in the partially visualized lung apices.      CTA:    The aortic arch demonstrates no significant stenosis at the major branch vessel origins.    There is a focal thrombus in the right subclavian artery extending into the right vertebral artery origin with severe stenosis.  Thrombus extending over proximally 1 cm.  The V1 and V2 segments of right vertebral artery are otherwise normal in caliber of slightly decreased in attenuation in comparison to the other cervical vasculature.  Abrupt occlusion of distal right vertebral artery about the level of the PICA origin  approximate 2 cm segment of non enhancement.    Moderate focal stenosis at the left vertebral artery origin.  Left vertebral artery is otherwise normal in caliber.  The basilar artery is normal in caliber.    The right common carotid artery is normal in caliber.  No significant stenosis at the carotid bifurcation.The right internal carotid artery is normal in caliber.    The left common carotid artery is normal in caliber. No significant stenosis at the carotid bifurcation.The left internal carotid artery is normal in caliber.    Abrupt non enhancement in the P1 segment of the left PCA.  The proximal right PCA appears within normal limits.    The proximal MCAs and ACAs are unremarkable.    Findings were immediately discussed upon identification via telephone with the stroke service ([place]) at approximately 09:20.      Impression    Multifocal acute posterior circulation distribution infarcts as further discussed above, particularly involving essentially the entirety of the left PCA territory.  Modest mass effect without significant hemorrhage.    Focal thrombus with severe stenosis extending from the right subclavian artery into the proximal right vertebral artery.    Focal occlusion of the proximal intracranial segment of the right vertebral artery.    Focal occlusion of the proximal left PCA.    Moderate focal stenosis at the left vertebral artery origin.    Anterior circulation appears unremarkable.    No evidence of fracture or traumatic malalignment in the cervical spine.    Emphysematous changes and patchy ground-glass opacity in the partially visualized lung apices.    This report was flagged in Epic as abnormal.      Electronically signed by: Geo Ambrocio MD  Date:    11/06/2024  Time:    09:33   CT Head Without Contrast    Narrative    EXAMINATION:  CT HEAD WITHOUT CONTRAST    CLINICAL HISTORY:  Stroke, follow up;    TECHNIQUE:  Low dose axial images were obtained through the head.  Coronal and  sagittal reformations were also performed. Contrast was not administered.    COMPARISON:  CT, MRI 11/06/2024    FINDINGS:  Evolving posterior circulation infarcts are identified as described on recent MR imaging again most notable within the left greater than right occipital lobes extending to the medial left temporal lobe and bilateral thalamus.  No hemorrhagic conversion with potentially minimal stable petechial hemorrhage within the medial left thalamus.  No midline shift or herniation with mild mass effect on the 3rd ventricle again noted.    No new acute territorial infarct.    The visualized sinuses and mastoid air cells are clear.      Impression    Expected evolutionary changes of the posterior circulation infarcts with no overt hemorrhagic conversion or significant midline shift/herniation.      Electronically signed by: Adrián Hobbs  Date:    11/09/2024  Time:    08:38      Results for orders placed or performed during the hospital encounter of 11/06/24 (from the past 2160 hours)   MRI Brain Without Contrast    Impression    Multiple posterior circulation infarcts are identified with involvement of the left greater than right occipital lobes and medial left temporal lobe, thalamus, right dorsal medulla, and minimally involving the cerebellum.  No midline shift with minimal mass effect on the 3rd ventricle.    Loss of flow void within the right intracranial vertebral artery in keeping with the findings on CTA.      Electronically signed by: Adrián Hobbs  Date:    11/06/2024  Time:    17:40      Cardiac Imaging   Results for orders placed during the hospital encounter of 11/06/24    Echo Saline Bubble? Yes; Ultrasound enhancing contrast? Yes    Interpretation Summary    Left Ventricle: The left ventricle is normal in size. Normal wall thickness. There is normal systolic function with a visually estimated ejection fraction of 55 - 60%. Biplane (2D) method of discs ejection fraction is 56%. Global  longitudinal strain is -18.0%. There is normal diastolic function.    Right Ventricle: Normal right ventricular cavity size. Wall thickness is normal. Systolic function is normal.    Tricuspid Valve: There is mild regurgitation.    Pulmonary Artery: The estimated pulmonary artery systolic pressure is at least 28 mmHg.    IVC/SVC: Patient is ventilated, cannot use IVC diameter to estimate right atrial pressure.   Physical Exam    Case staffed with Dr Grisel Marcial MD  [facility]  Department of Vascular Neurology   [facility])

## 2024-11-15 NOTE — CARE UPDATE
I have reviewed the chart of Malathi Mcpherson who is hospitalized for the following:    Active Hospital Problems    Diagnosis    *Acute arterial ischemic stroke, multifocal, posterior circulation    Tobacco dependency    Agitation    Hypokalemia     K 3.1 on 11/112  Replace  Monitor with daily cmp      Drug dependence     Uds + cocaine on admission  Yuhaaviatam on cessation      Body mass index (BMI) less than 19     underweight      Brain compression     mass effect on the 3rd ventricle   Required close monitoring in the icu with q1h neurochecks, and repeat imaging       Hypercoagulable state     hypercoagulopathy from cocaine use.   Yuhaaviatam on cessation      Hemiplegia     Therapy to evaluate and treat       Reduced mobility     Therapy to evaluate and treat       Sensory loss    Aphasia     Therapy to evaluate and treat       Acute hypoxemic respiratory failure     On admission noted to have O2 desat in 70s and ultimately was intubated and placed on ventilator       Encephalopathy, metabolic     AMS requiring restraints  Found to have Pneumonia and SSTI  Required antibiotics      Delirium    Hypomagnesemia     Replaced in the icu  Monitor with labs      HTN (hypertension)    Right upper lobe pneumonia    Folliculitis    HyperCKemia    Polysubstance abuse    Debility    Goals of care, counseling/discussion    Lithium toxicity    Cytotoxic cerebral edema    Anxiety and depression        Mari Markham NP  [facility]

## 2024-11-15 NOTE — PROGRESS NOTES
Pt seen for wound care f/u- right groin area. Pt groggy/sleeping in bed and intermittently awake. NG tube in place. Pt's mom at bedside; discussed wound care plan with mom. Pt's wound is smaller and appears to be healing; no current s/s infection, no drainage. Applied Calmoseptine barrier cream to wound for protection and to assist with autolytically debriding the thin layer of fibrin on the wound. Wound care supplies at bedside. Will adjust wound care orders.       RYAN Simmons, RN, [facility] Wound/Ostomy  11/15/24     11/15/24 1150   WOCN Assessment   WOCN Total Time (mins) 30   Visit Date 11/15/24   Visit Time 1150   Consult Type Follow Up   WOCN Speciality Wound   Wound other  (abscess)   Number of Wounds 1   Intervention assessed;applied;changed;chart review;coordination of care   Teaching on-going        Wound 11/06/24 0922 Abscess Right anterior Pelvis   Date First Assessed/Time First Assessed: 11/06/24 0922   Primary Wound Type: Abscess  Side: Right  Orientation: anterior  Location: Pelvis   Wound Image    Dressing Appearance Open to air;No dressing   Drainage Amount None   Drainage Characteristics/Odor No odor   Appearance Tan;Fibrin;Dry;Pink;Epithelialization   Periwound Area Intact;Dry   Wound Edges Defined   Wound Length (cm) 0.4 cm   Wound Width (cm) 0.4 cm   Wound Depth (cm) 0.1 cm   Wound Volume (cm^3) 0.016 cm^3   Wound Surface Area (cm^2) 0.16 cm^2   Care Cleansed with:;Wound cleanser   Dressing Applied;Other (comment)  (calmoseptine)

## 2024-11-15 NOTE — PT/OT/SLP PROGRESS
"Physical Therapy Treatment    Patient Name:  Malathi Mcpherson   MRN:  [id number]    Recommendations:     Discharge Recommendations: High Intensity Therapy  Discharge Equipment Recommendations: bedside commode, bath bench  Barriers to discharge: None    Assessment:     Malathi Mcpherson is a 49 y.o. female admitted with a medical diagnosis of Acute arterial ischemic stroke, multifocal, posterior circulation.  She presents with the following impairments/functional limitations: weakness, gait instability, impaired endurance, impaired balance, impaired self care skills, impaired functional mobility, decreased safety awareness, decreased lower extremity function, decreased upper extremity function, impaired coordination, impaired cognition. Patient a&0 x1 to self only, slightly impulsive and followed ~50% of commands throughout session. She required heavy assistance to transfer to sitting and EOB, reporting dizziness and was slightly less alert than when supine. BP dropped slightly (PCT recorded 167/80 mmHg in supine at start of session, PT obtained 129/78 mmHg in sitting). Patient unable to perform OOB activity with PT this date. Additional mobility will continue to be assessed as able in subsequent sessions. Patient reports that she is very motivated to make progress.      Rehab Prognosis: Good; patient would benefit from acute skilled PT services to address these deficits and reach maximum level of function.    Recent Surgery: * No surgery found *      Plan:     During this hospitalization, patient to be seen 4 x/week to address the identified rehab impairments via gait training, therapeutic activities, therapeutic exercises, neuromuscular re-education and progress toward the following goals:    Plan of Care Expires:  12/14/24    Subjective     Chief Complaint: mobility level  Patient/Family Comments/goals: "I just want to be better for everyone". "What happened?". "I'm trying my best". Patient becoming slightly " emotional towards end of session.   Pain/Comfort:  Pain Rating 1: 0/10  Pain Rating Post-Intervention 1: 0/10      Objective:     Communicated with RN prior to session.  Patient found HOB elevated with SCD, bed alarm, restraints, NG tube, telemetry, PureWick upon PT entry to room. Mom in room.    General Precautions: Standard, fall, aspiration  Orthopedic Precautions: N/A  Braces: N/A  Respiratory Status: Room air     Cognition:   Orientation: x1, self only   Affect: pleasant, cooperative, and confused  Alertness: eyes open 100 % of session  Insight: poor insight into deficits and decreased safety awareness  Attention: requires redirection to task slightly impulsive   Command Following: patient follows 50 % of 1-step commands    Functional Mobility:  Bed Mobility:     Rolling Left:  moderate assistance  Additional rolling completed for pericare at end of session  Rolling Right: moderate assistance  Additional rolling completed for pericare at end of session  Scooting: total assistance  Supine to Sit: total assistance  Sit to Supine: total assistance  Balance:   Sitting: maxA to modA at EOB, multidirectional lean      AM-PAC 6 CLICK MOBILITY  Turning over in bed (including adjusting bedclothes, sheets and blankets)?: 3  Sitting down on and standing up from a chair with arms (e.g., wheelchair, bedside commode, etc.): 1  Moving from lying on back to sitting on the side of the bed?: 2  Moving to and from a bed to a chair (including a wheelchair)?: 1  Need to walk in hospital room?: 1  Climbing 3-5 steps with a railing?: 1  Basic Mobility Total Score: 9       Treatment & Education:  EOB balance with cues to engage core mm to decrease assistance needed and increase safety in sitting.   Active reorientation provided throughout session with cues for person/place/situation/date with patient assessed for accuracy and carryover of information.    Cues during rolling and bed mobility for UE reaching and positioning on bed  railings, LE management with rolling technique, cues for visual attention to UE for initiation. Assistance as above.    Mobility performed with assistance from rehab tech, under direct supervision and direction of therapist.     Patient left HOB elevated with all lines intact, call button in reach, restraints reapplied at end of session, and mom present.    GOALS:   Multidisciplinary Problems       Physical Therapy Goals          Problem: Physical Therapy    Goal Priority Disciplines Outcome Interventions   Physical Therapy Goal     PT, PT/OT Progressing    Description: Goals to be met by: 24     Patient will increase functional independence with mobility by performin. Supine to sit with Contact Guard Assistance  2. Sit to supine with Contact Guard Assistance  3. Sit to stand transfer with Minimal Assistance  4. Bed to chair transfer with Minimal Assistance using LRAD as needed  5. Gait  x 50 feet with Minimal Assistance using LRAD as needed.   6. Lower extremity exercise program x10 reps per handout, with assistance as needed    DME Justifications (see above for complete DME recommendations)    Bedside Commode- Patient has a mobility limitation that significantly impairs their ability to participate in one or more mobility related activities of daily living, including toileting. This deficit can be resolved by using a bedside commode. Patient demonstrates mobility limitations that will cause them to be confined to one room at home without bathroom access for up to 30 days. Using a bedside commode will greatly improve the patient's ability to participate in MRADLs.     Hospital Bed ·       Patient requires a hospital bed for positioning of the body in ways that are not feasible with an ordinary bed. The patient requires special positioning for pain relief, limited mobility, and/or being unable to independently make changes in body position without the use of a hospital bed. Pillows and wedges will not  be adequate for resolving these positional issues.                         Time Tracking:     PT Received On: 11/15/24  PT Start Time: 1105     PT Stop Time: 1138  PT Total Time (min): 33 min     Billable Minutes: Neuromuscular Re-education 33 minutes    Treatment Type: Treatment  PT/PTA: PT     Number of PTA visits since last PT visit: 0     11/15/2024

## 2024-11-15 NOTE — PLAN OF CARE
Problem: Stroke, Ischemic (Includes Transient Ischemic Attack)  Goal: Improved Communication Skills  Outcome: Progressing     Problem: Stroke, Ischemic (Includes Transient Ischemic Attack)  Goal: Optimal Cognitive Function  Outcome: Progressing   Plan of care discussed. Addressed questions and concerns.NG tube in place, will cont to care.

## 2024-11-15 NOTE — PLAN OF CARE
11/15/24 1521   Post-Acute Status   Post-Acute Authorization Placement   Post-Acute Placement Status Pending medical clearance/testing   Discharge Plan   Discharge Plan A Rehab     Met with patient's Mom [place] to review discharge recommendation of Rehab and is agreeable to plan    Patient/family provided list of facilities in-network with patient's payor plan. Providers that are owned, operated, or affiliated with [facility] are included on the list.     Notified that referral sent to below listed facilities from in-network list based on proximity to home/family support:    [facility]  2.    [facility]  3.     [facility]  4.  5. (can send more than 5)    Patient/family instructed to identify preference.    Preferred Facility: (if more than 1, listed in order of descending preference)  [facility]    If an additional preferred facility not listed above is identified, additional referral to be sent. If above facilities unable to accept, will send additional referrals to in-network providers.     Discharge Plan A and Plan B have been determined by review of patient's clinical status, future medical and therapeutic needs, and coverage/benefits for post-acute care in coordination with multidisciplinary team members.    Bernadine Gilman, RADHA  [facility]  [phone]

## 2024-11-15 NOTE — PLAN OF CARE
CLAUDY emailed a letter to the pt  Xin salas.  SW will continue to monitor pt needs.       Discharge Plan A and Plan B have been determined by review of patient's clinical status, future medical and therapeutic needs, and coverage/benefits for post-acute care in coordination with multidisciplinary team members.    Bobbi Finch MSW, LCSW  [facility]  Case Management Dept.

## 2024-11-15 NOTE — PLAN OF CARE
Problem: Adult Inpatient Plan of Care  Goal: Plan of Care Review  Outcome: Progressing  Goal: Patient-Specific Goal (Individualized)  Outcome: Progressing  Goal: Optimal Comfort and Wellbeing  Outcome: Progressing     Problem: Wound  Goal: Skin Health and Integrity  Outcome: Progressing

## 2024-11-15 NOTE — HOSPITAL COURSE
PT-11/25    Functional Mobility:  Bed Mobility:     Rolling Left:  total assistance  Rolling Right: total assistance  Scooting: total assistance  Supine to Sit: total assistance and of 2 persons  Sit to Supine: total assistance and of 2 persons  Balance:   Seated: maxA      OT-11/26      Bed Mobility:    Patient completed Rolling/Turning to Right with minimum assistance  Patient completed Supine to Sit with maximal assistance  Patient completed Sit to Supine with maximal assistance      Activities of Daily Living:  Upper Body Dressing: maximal assistance while seated EOB  Lower Body Dressing: total assistance while seate      PT- 11/22    Functional Mobility:  Bed Mobility:     Rolling Left:  total assistance  Rolling Right: total assistance  Scooting: total assistance  Supine to Sit: total assistance and of 2 persons  Sit to Supine: total assistance and of 2 persons  Balance:   Seated: maxA         OT- 11/22    Bed Mobility:    Patient completed Rolling/Turning to Right with maximal assistance  Patient completed Supine to Sit with maximal assistance  Patient completed Sit to Supine with maximal assistance      Functional Mobility/Transfers:  NT 2* lethargy     Activities of Daily Living:  Feeding:  NPO    Lower Body Dressing: total assistance while seated donning socks         PT- 11/20    Functional Mobility:     Bed Mobility:   Supine to Sit: total assistance of 2 persons;   Sit to supine: totalA fo 2 persons     Sitting Balance at Edge of Bed:  Assistance Level Required: Maximum Assistance and Total Assistance  Time: 10 min    OT- 11/20    Bed Mobility:    Patient completed Rolling/Turning to Left with  maximal assistance  Patient completed Rolling/Turning to Right with maximal assistance  Patient completed Supine to Sit with total assistance  Patient completed Sit to Supine with total assistance      Activities of Daily Living:  Grooming: total assistance while seated EOB        PT- 11/14    Functional  Mobility:     Bed Mobility:  Supine to Sit: Moderate Assistance on L side of bed  Sit to Supine: Minimal Assistance  Pt self returned to supine but needed min(A) for B LE management  Rolling L: Minimal Assistance  Rolling R: Minimal Assistance  Scooting anteriorly to EOB to plant feet on floor: Moderate Assistance     Transfers:   Sit to Stand Transfer: Activity did not occur - pt self returned to supine     Balance:  Static Sit:   Mod Assist at EOB

## 2024-11-16 PROBLEM — R74.01 TRANSAMINITIS: Status: ACTIVE | Noted: 2024-11-16

## 2024-11-16 LAB
ALBUMIN SERPL BCP-MCNC: 2.7 G/DL (ref 3.5–5.2)
ALP SERPL-CCNC: 101 U/L (ref 40–150)
ALT SERPL W/O P-5'-P-CCNC: 58 U/L (ref 10–44)
ANION GAP SERPL CALC-SCNC: 12 MMOL/L (ref 8–16)
AST SERPL-CCNC: 61 U/L (ref 10–40)
BASOPHILS # BLD AUTO: 0.12 K/UL (ref 0–0.2)
BASOPHILS NFR BLD: 0.9 % (ref 0–1.9)
BILIRUB SERPL-MCNC: 0.3 MG/DL (ref 0.1–1)
BUN SERPL-MCNC: 15 MG/DL (ref 6–20)
CALCIUM SERPL-MCNC: 10 MG/DL (ref 8.7–10.5)
CHLORIDE SERPL-SCNC: 104 MMOL/L (ref 95–110)
CO2 SERPL-SCNC: 21 MMOL/L (ref 23–29)
CREAT SERPL-MCNC: 0.6 MG/DL (ref 0.5–1.4)
DIFFERENTIAL METHOD BLD: ABNORMAL
EOSINOPHIL # BLD AUTO: 0.3 K/UL (ref 0–0.5)
EOSINOPHIL NFR BLD: 2.2 % (ref 0–8)
ERYTHROCYTE [DISTWIDTH] IN BLOOD BY AUTOMATED COUNT: 14.4 % (ref 11.5–14.5)
EST. GFR  (NO RACE VARIABLE): >60 ML/MIN/1.73 M^2
GLUCOSE SERPL-MCNC: 116 MG/DL (ref 70–110)
HCT VFR BLD AUTO: 40 % (ref 37–48.5)
HGB BLD-MCNC: 12.9 G/DL (ref 12–16)
IMM GRANULOCYTES # BLD AUTO: 0.56 K/UL (ref 0–0.04)
IMM GRANULOCYTES NFR BLD AUTO: 4 % (ref 0–0.5)
LYMPHOCYTES # BLD AUTO: 2.5 K/UL (ref 1–4.8)
LYMPHOCYTES NFR BLD: 17.7 % (ref 18–48)
MAGNESIUM SERPL-MCNC: 2.3 MG/DL (ref 1.6–2.6)
MCH RBC QN AUTO: 28.9 PG (ref 27–31)
MCHC RBC AUTO-ENTMCNC: 32.3 G/DL (ref 32–36)
MCV RBC AUTO: 90 FL (ref 82–98)
MONOCYTES # BLD AUTO: 0.9 K/UL (ref 0.3–1)
MONOCYTES NFR BLD: 6.2 % (ref 4–15)
NEUTROPHILS # BLD AUTO: 9.7 K/UL (ref 1.8–7.7)
NEUTROPHILS NFR BLD: 69 % (ref 38–73)
NRBC BLD-RTO: 0 /100 WBC
PHOSPHATE SERPL-MCNC: 3.6 MG/DL (ref 2.7–4.5)
PLATELET # BLD AUTO: 344 K/UL (ref 150–450)
PLATELET BLD QL SMEAR: ABNORMAL
PMV BLD AUTO: 12.1 FL (ref 9.2–12.9)
POCT GLUCOSE: 122 MG/DL (ref 70–110)
POTASSIUM SERPL-SCNC: 4.4 MMOL/L (ref 3.5–5.1)
PROT SERPL-MCNC: 8 G/DL (ref 6–8.4)
RBC # BLD AUTO: 4.46 M/UL (ref 4–5.4)
SODIUM SERPL-SCNC: 137 MMOL/L (ref 136–145)
WBC # BLD AUTO: 14.02 K/UL (ref 3.9–12.7)

## 2024-11-16 PROCEDURE — 85025 COMPLETE CBC W/AUTO DIFF WBC: CPT

## 2024-11-16 PROCEDURE — 25000003 PHARM REV CODE 250: Performed by: NURSE PRACTITIONER

## 2024-11-16 PROCEDURE — 80053 COMPREHEN METABOLIC PANEL: CPT

## 2024-11-16 PROCEDURE — 93005 ELECTROCARDIOGRAM TRACING: CPT

## 2024-11-16 PROCEDURE — 63600175 PHARM REV CODE 636 W HCPCS

## 2024-11-16 PROCEDURE — 25000003 PHARM REV CODE 250: Performed by: PHYSICIAN ASSISTANT

## 2024-11-16 PROCEDURE — 25000003 PHARM REV CODE 250

## 2024-11-16 PROCEDURE — 84100 ASSAY OF PHOSPHORUS: CPT

## 2024-11-16 PROCEDURE — 83735 ASSAY OF MAGNESIUM: CPT

## 2024-11-16 PROCEDURE — 11000001 HC ACUTE MED/SURG PRIVATE ROOM

## 2024-11-16 PROCEDURE — 99233 SBSQ HOSP IP/OBS HIGH 50: CPT | Mod: ,,, | Performed by: STUDENT IN AN ORGANIZED HEALTH CARE EDUCATION/TRAINING PROGRAM

## 2024-11-16 PROCEDURE — 93010 ELECTROCARDIOGRAM REPORT: CPT | Mod: ,,, | Performed by: INTERNAL MEDICINE

## 2024-11-16 PROCEDURE — 36415 COLL VENOUS BLD VENIPUNCTURE: CPT

## 2024-11-16 RX ORDER — ACETAMINOPHEN 500 MG
1000 TABLET ORAL EVERY 8 HOURS
Status: DISCONTINUED | OUTPATIENT
Start: 2024-11-16 | End: 2024-11-16

## 2024-11-16 RX ORDER — ACETAMINOPHEN 500 MG
1000 TABLET ORAL 2 TIMES DAILY PRN
Status: DISCONTINUED | OUTPATIENT
Start: 2024-11-16 | End: 2024-11-25

## 2024-11-16 RX ORDER — ENOXAPARIN SODIUM 100 MG/ML
40 INJECTION SUBCUTANEOUS EVERY 24 HOURS
Status: DISCONTINUED | OUTPATIENT
Start: 2024-11-16 | End: 2024-11-26 | Stop reason: HOSPADM

## 2024-11-16 RX ORDER — ACETAMINOPHEN 500 MG
1000 TABLET ORAL 2 TIMES DAILY
Status: DISCONTINUED | OUTPATIENT
Start: 2024-11-16 | End: 2024-11-16

## 2024-11-16 RX ADMIN — FLUOXETINE HYDROCHLORIDE 60 MG: 20 SOLUTION ORAL at 10:11

## 2024-11-16 RX ADMIN — ASPIRIN 81 MG CHEWABLE TABLET 81 MG: 81 TABLET CHEWABLE at 10:11

## 2024-11-16 RX ADMIN — Medication 6 MG: at 08:11

## 2024-11-16 RX ADMIN — ACETAMINOPHEN 650 MG: 325 TABLET, FILM COATED ORAL at 05:11

## 2024-11-16 RX ADMIN — MUPIROCIN: 20 OINTMENT TOPICAL at 08:11

## 2024-11-16 RX ADMIN — MIRTAZAPINE 7.5 MG: 7.5 TABLET, FILM COATED ORAL at 08:11

## 2024-11-16 RX ADMIN — CLOPIDOGREL BISULFATE 75 MG: 75 TABLET ORAL at 10:11

## 2024-11-16 RX ADMIN — MUSCLE RUB CREAM: 100; 150 CREAM TOPICAL at 04:11

## 2024-11-16 RX ADMIN — MUPIROCIN: 20 OINTMENT TOPICAL at 10:11

## 2024-11-16 RX ADMIN — ACETAMINOPHEN 1000 MG: 500 TABLET ORAL at 10:11

## 2024-11-16 RX ADMIN — THERA TABS 1 TABLET: TAB at 10:11

## 2024-11-16 RX ADMIN — CARVEDILOL 6.25 MG: 6.25 TABLET, FILM COATED ORAL at 10:11

## 2024-11-16 RX ADMIN — LITHIUM 300 MG: 8 SOLUTION ORAL at 08:11

## 2024-11-16 RX ADMIN — MUSCLE RUB CREAM: 100; 150 CREAM TOPICAL at 08:11

## 2024-11-16 RX ADMIN — QUETIAPINE FUMARATE 50 MG: 25 TABLET ORAL at 08:11

## 2024-11-16 RX ADMIN — FOLIC ACID 1 MG: 1 TABLET ORAL at 10:11

## 2024-11-16 RX ADMIN — CEFEPIME 2 G: 2 INJECTION, POWDER, FOR SOLUTION INTRAVENOUS at 04:11

## 2024-11-16 RX ADMIN — HEPARIN SODIUM 5000 UNITS: 5000 INJECTION INTRAVENOUS; SUBCUTANEOUS at 05:11

## 2024-11-16 RX ADMIN — CEFEPIME 2 G: 2 INJECTION, POWDER, FOR SOLUTION INTRAVENOUS at 01:11

## 2024-11-16 RX ADMIN — CEFEPIME 2 G: 2 INJECTION, POWDER, FOR SOLUTION INTRAVENOUS at 10:11

## 2024-11-16 RX ADMIN — ATORVASTATIN CALCIUM 80 MG: 40 TABLET, FILM COATED ORAL at 10:11

## 2024-11-16 RX ADMIN — CARVEDILOL 6.25 MG: 6.25 TABLET, FILM COATED ORAL at 08:11

## 2024-11-16 RX ADMIN — Medication 100 MG: at 10:11

## 2024-11-16 RX ADMIN — ENOXAPARIN SODIUM 40 MG: 40 INJECTION SUBCUTANEOUS at 04:11

## 2024-11-16 RX ADMIN — LITHIUM 300 MG: 8 SOLUTION ORAL at 04:11

## 2024-11-16 RX ADMIN — LIDOCAINE 5% 1 PATCH: 700 PATCH TOPICAL at 04:11

## 2024-11-16 RX ADMIN — QUETIAPINE FUMARATE 25 MG: 25 TABLET ORAL at 10:11

## 2024-11-16 RX ADMIN — LITHIUM 300 MG: 8 SOLUTION ORAL at 10:11

## 2024-11-16 NOTE — ASSESSMENT & PLAN NOTE
LFTs uptrending, tylenol updated to PRN. Consider decreased dose of statin if worsening.     Lab Results   Component Value Date    ALT 58 (H) 11/16/2024    AST 61 (H) 11/16/2024    ALKPHOS 101 11/16/2024    BILITOT 0.3 11/16/2024     Monitor daily CMP, PT/INR

## 2024-11-16 NOTE — ASSESSMENT & PLAN NOTE
-Continue home fluoxetine  -Continue seroquel 25 mg in the am and 50mg qHS  -Home lithium held on admit due to elevated serum lithium level, resumed 11/11  -Psychiatry consulted, appreciate recs

## 2024-11-16 NOTE — ASSESSMENT & PLAN NOTE
Patients blood pressure range in the last 24 hours was: BP  Min: 86/51  Max: 191/88. The patient's inpatient anti-hypertensive regimen is listed below:    Current Antihypertensives  , 2 times daily, Oral  carvediloL tablet 6.25 mg, 2 times daily, Per NG tube    - BP is controlled, no changes needed to their regimen  -SBP goal < 180

## 2024-11-16 NOTE — CONSULTS
"CONSULTATION LIAISON PSYCHIATRY PROGRESS NOTE    Patient Name: Malathi Mcpherson  MRN: [id number]  Patient Class: IP- Inpatient  Admission Date: 11/6/2024  Attending Physician: Emily Recinos MD      SUBJECTIVE:   49 y.o. female with polysubstance use disorder (crack cocaine, heroin, alcohol) and bipolar disorder who is admitted for large bilateral CVA. Patient most recently in drug rehab court prior to hospitalization and relapsed on crack cocaine in the week prior to admission. She is admitted to the hospital for further evaluation and management of multifocal acute infarcts.    Recently evaluated by psychiatry on 11/14/24 for her stimulant use disorder and history of bipolar disorder. Psychiatry re-consulted for "Worsening agitation, extensive psych hx (bipolar?) on lithium, need med recs for agitation."     Today, patient was observed resting in bed with family at her bedside when greeted for interview. Speech is notably dysarthric. She is in 2-point soft restraints to her BUE and has hands in mittens. States she doesn't know where she is. Becomes irritable and states, "I don't want medicine" when attempted to engage in interview. Confirms that she takes home lithium, prozac, mirtazapine, and seroquel. Requests for interviewer to leave so she can "go to sleep and go home." Family at the bedside report no previous concerns of confusion. States patient was "normal" prior to hospitalization.        OBJECTIVE:    Mental Status Exam:  General Appearance: dressed in hospital garb, disheveled, ill-appearing, NG tube to R nare.   Behavior: reluctant to participate, minimal responses, agitated, fidgety  Involuntary Movements and Motor Activity: no tics, no tremors, no akathisia, no dystonia, no evidence of tardive dyskinesia  Gait and Station: unable to assess - patient lying down or seated  Speech and Language: dysarthric, mumbling, responds to questions minimally/briefly  Mood: "fine"  Affect: irritable  Thought Process " and Associations: Unable to Assess  Perceptual Disturbances: denies hallucinations  Thought Content and Perceptions:: no suicidal or homicidal ideation, no auditory or visual hallucinations, no paranoid ideation, no ideas of reference, no evidence of delusions or psychosis  Sensorium and Orientation: oriented to person only  Recent and Remote Memory: impaired  Attention and Concentration: impaired, inattentive to conversation, easily distractible  Fund of Knowledge: impaired  Insight: limited  Judgment: limited    CAM ICU positive? Not formally assessed      ASSESSMENT & RECOMMENDATIONS   Delirium due to multiple etiologies [multiple acute infarcts, debility, pneumonia ] (F05)  Cocaine use disorder, severe  Hx of Bipolar Disorder     Scheduled Medication(s):  Continue lithium 300 mg TID, Prozac 60 mg daily, Seroquel 25 mg in the AM and 50 mg in the PM, Remeron 7.5 mg nightly.     PRN Medication(s):  PRN Seroquel 25 mg BID for non-redirectable agitation.      Other Recommendations (labs, imaging, further consults, etc.):  Most recent EKG on 11/15/24 with QTC of 512; please obtain repeat EKG in the setting of QT prolongation and agitation despite current psychotropic regimen.   Would recommend checking lithium level on 11/18/24, after 5 days on current dosage.     Delirium Behavior Management  PLEASE utilize PRN meds first for agitation. Minimize use of PHYSICAL restraints OR have periods of being out of physical restraints if possible.  Keep window shades open and room lit during day and room dim at night in order to promote normal sleep-wake cycles  Encourage family at bedside. [place] patient often to situation, location, date.  Continue to Limit or Discontinue use of Narcotics, Benzos and Anti-cholinergic medications as they may worsen delirium.  Continue medical workup for causative etiology of Delirium.     Risk Assessment / Legal Status  Patient does not meet criteria for PEC or involuntary inpatient psychiatric  admission at this time. Recommend to rescind PEC if one was placed. Patient is not currently an imminent danger to self or others and is not gravely disabled due to a psychiatric illness.    Follow-up:  Will follow-up while in house.    Disposition:   Defer to primary team.    Please contact ON CALL psychiatry service (24/7) for any acute issues that may arise.    Dr. Zelalem Obrien MD    Psychiatry, PGY-2  [facility]  11/16/2024 4:43 PM    --------------------------------------------------------------------------------------------------------------------------------------------------------------------------------------------------------------------------------------    CONTINUED OBJECTIVE clinical data & findings reviewed and noted for above decision making    Current Medications:   Scheduled Meds:    aspirin  81 mg Per NG tube Daily    atorvastatin  80 mg Per NG tube Daily    carvediloL  6.25 mg Per NG tube BID    ceFEPime IV (PEDS and ADULTS)  2 g Intravenous Q8H    clopidogreL  75 mg Per NG tube Daily    enoxparin  40 mg Subcutaneous Q24H (prophylaxis, 1700)    FLUoxetine  60 mg Per NG tube Daily    folic acid  1 mg Per NG tube Daily    LIDOcaine  1 patch Transdermal Q24H    lithium citrate 8 meq/5 ml  300 mg Per NG tube TID    melatonin  6 mg Per NG tube Nightly    methyl salicylate-menthol 15-10%   Topical (Top) TID    mirtazapine  7.5 mg Per NG tube QHS    multivitamin  1 tablet Per NG tube Daily    mupirocin   Nasal BID    QUEtiapine  25 mg Per NG tube Daily    QUEtiapine  50 mg Per NG tube QHS    senna-docusate 8.6-50 mg  1 tablet Per NG tube Daily    thiamine  100 mg Per NG tube Daily     PRN Meds:   Current Facility-Administered Medications:     acetaminophen, 1,000 mg, Per NG tube, BID PRN    albuterol-ipratropium, 3 mL, Nebulization, Q6H PRN    hydrOXYzine HCL, 25 mg, Per NG tube, QID PRN    polyethylene glycol, 17 g, Per NG tube, BID PRN    sodium chloride 0.9%, 10 mL,  Intravenous, PRN    Allergies:   Review of patient's allergies indicates:  No Known Allergies    Vitals  Vitals:    11/16/24 1612   BP: (!) 162/95   Pulse: 88   Resp: 18   Temp: 98.2 °F (36.8 °C)       Labs/Imaging/Studies:  Recent Results (from the past 24 hours)   Comprehensive metabolic panel    Collection Time: 11/16/24  5:15 AM   Result Value Ref Range    Sodium 137 136 - 145 mmol/L    Potassium 4.4 3.5 - 5.1 mmol/L    Chloride 104 95 - 110 mmol/L    CO2 21 (L) 23 - 29 mmol/L    Glucose 116 (H) 70 - 110 mg/dL    BUN 15 6 - 20 mg/dL    Creatinine 0.6 0.5 - 1.4 mg/dL    Calcium 10.0 8.7 - 10.5 mg/dL    Total Protein 8.0 6.0 - 8.4 g/dL    Albumin 2.7 (L) 3.5 - 5.2 g/dL    Total Bilirubin 0.3 0.1 - 1.0 mg/dL    Alkaline Phosphatase 101 40 - 150 U/L    AST 61 (H) 10 - 40 U/L    ALT 58 (H) 10 - 44 U/L    eGFR >60.0 >60 mL/min/1.73 m^2    Anion Gap 12 8 - 16 mmol/L   Magnesium    Collection Time: 11/16/24  5:15 AM   Result Value Ref Range    Magnesium 2.3 1.6 - 2.6 mg/dL   Phosphorus    Collection Time: 11/16/24  5:15 AM   Result Value Ref Range    Phosphorus 3.6 2.7 - 4.5 mg/dL   CBC auto differential    Collection Time: 11/16/24  5:15 AM   Result Value Ref Range    WBC 14.02 (H) 3.90 - 12.70 K/uL    RBC 4.46 4.00 - 5.40 M/uL    Hemoglobin 12.9 12.0 - 16.0 g/dL    Hematocrit 40.0 37.0 - 48.5 %    MCV 90 82 - 98 fL    MCH 28.9 27.0 - 31.0 pg    MCHC 32.3 32.0 - 36.0 g/dL    RDW 14.4 11.5 - 14.5 %    Platelets 344 150 - 450 K/uL    MPV 12.1 9.2 - 12.9 fL    Immature Granulocytes 4.0 (H) 0.0 - 0.5 %    Gran # (ANC) 9.7 (H) 1.8 - 7.7 K/uL    Immature Grans (Abs) 0.56 (H) 0.00 - 0.04 K/uL    Lymph # 2.5 1.0 - 4.8 K/uL    Mono # 0.9 0.3 - 1.0 K/uL    Eos # 0.3 0.0 - 0.5 K/uL    Baso # 0.12 0.00 - 0.20 K/uL    nRBC 0 0 /100 WBC    Gran % 69.0 38.0 - 73.0 %    Lymph % 17.7 (L) 18.0 - 48.0 %    Mono % 6.2 4.0 - 15.0 %    Eosinophil % 2.2 0.0 - 8.0 %    Basophil % 0.9 0.0 - 1.9 %    Platelet Estimate Appears normal      Differential Method Automated      Imaging Results              MRI Brain Without Contrast (Final result)  Result time 11/06/24 17:40:17      Final result by Adrián Hobbs MD (11/06/24 17:40:17)                   Impression:      Multiple posterior circulation infarcts are identified with involvement of the left greater than right occipital lobes and medial left temporal lobe, thalamus, right dorsal medulla, and minimally involving the cerebellum.  No midline shift with minimal mass effect on the 3rd ventricle.    Loss of flow void within the right intracranial vertebral artery in keeping with the findings on CTA.      Electronically signed by: Adrián Hobbs  Date:    11/06/2024  Time:    17:40               Narrative:    EXAMINATION:  MRI BRAIN WITHOUT CONTRAST    CLINICAL HISTORY:  Stroke, follow up;    TECHNIQUE:  Multiplanar multisequence MR imaging of the brain was performed without contrast.    COMPARISON:  CT 11/06/2024    FINDINGS:  Multiple areas of restricted diffusion consistent with acute infarcts within the posterior circulation are identified.  There is involvement of the left greater than right occipital lobes and left medial temporal lobe (PCA territory), bilateral thalamus, left hippocampus and right hippocampal tail, right dorsal medulla, and tiny bilateral cerebellar infarcts.  Trace petechial hemorrhage left medial temporal lobe.  No overt hemorrhagic conversion.    No midline shift or herniation with minimal mass effect on the 3rd ventricle.    No hydrocephalus.    Loss of the intracranial right vertebral artery flow void in keeping with the findings on the recent CTA.                                       XR NG/OG tube placement check, non-radiologist performed (Final result)  Result time 11/06/24 10:25:49      Final result by Segundo Sheth MD (11/06/24 10:25:49)                   Impression:      Please see above.      Electronically signed by: Segundo  Domenic  Date:    11/06/2024  Time:    10:25               Narrative:    EXAMINATION:  XR NG/OG TUBE PLACEMENT CHECK, NON-RADIOLOGIST PERFORMED    CLINICAL HISTORY:  placement;    TECHNIQUE:  Single overhead image of the abdomen was obtained.    COMPARISON:  Radiograph abdomen 01/19/2021    FINDINGS:  Enteric tube is visualized with tip just distal to the gastroesophageal junction.  However, side port is proximal to the expected region of the gastroesophageal junction.  Correlation and advancement advised.    Cardiomediastinal silhouette and lungs are unchanged.    Nonspecific, nonobstructive bowel gas pattern.  No free intra-abdominal air.  No pneumatosis.    Osseous structures demonstrate no evidence for acute fracture or osseous destructive lesion.    Soft tissues are unremarkable.                                       X-Ray Chest AP Portable (Final result)  Result time 11/06/24 10:26:26      Final result by Kervin Castaneda MD (11/06/24 10:26:26)                   Impression:      Further advancement of the NG tube recommended      Electronically signed by: Kervin Castaneda MD  Date:    11/06/2024  Time:    10:26               Narrative:    EXAMINATION:  XR CHEST AP PORTABLE    CLINICAL HISTORY:  Hypoxemia    TECHNIQUE:  Single frontal view of the chest was performed.    COMPARISON:  No 06/26/2024 ne    FINDINGS:  ET tube at T4 level.  NG tube in the lower esophagus and further advancement recommended.  Heart size normal.  There is slight degree of diffuse accentuation interstitial markings.  No significant airspace consolidation or pleural effusion identified.                                        CTA STROKE MULTI-PHASE (Final result)  Result time 11/06/24 09:33:04      Final result by Geo Ambrocio MD (11/06/24 09:33:04)                   Impression:      Multifocal acute posterior circulation distribution infarcts as further discussed above, particularly involving essentially the entirety of the left PCA  territory.  Modest mass effect without significant hemorrhage.    Focal thrombus with severe stenosis extending from the right subclavian artery into the proximal right vertebral artery.    Focal occlusion of the proximal intracranial segment of the right vertebral artery.    Focal occlusion of the proximal left PCA.    Moderate focal stenosis at the left vertebral artery origin.    Anterior circulation appears unremarkable.    No evidence of fracture or traumatic malalignment in the cervical spine.    Emphysematous changes and patchy ground-glass opacity in the partially visualized lung apices.    This report was flagged in Epic as abnormal.      Electronically signed by: Geo Ambrocio MD  Date:    11/06/2024  Time:    09:33               Narrative:    EXAMINATION:  CTA STROKE MULTI-PHASE; CT CERVICAL SPINE WITHOUT CONTRAST    CLINICAL HISTORY:  Neuro deficit, acute, stroke suspected;; Neck trauma, intoxicated or obtunded (Age >= 16y);    TECHNIQUE:  Axial CT images obtained throughout the region of the head and cervical spine before the administration of intravenous contrast.    CT angiogram was performed through the cervical and intracranial vasculature during the IV bolus administration of 75mL of Omnipaque 350.  Two additional phases through the intracranial vasculature via multiphase technique.    Multiplanar MPR and MIP reformats were performed.    CT source data was analyzed using artificial intelligence software for detection of a large vessel occlusions (LVO) in order to enable computer assisted triage notification and aid clinical stroke decision making.    COMPARISON:  CTA head and cervical spine 10/25/2015    FINDINGS:  Large region of hypoattenuation loss of gray-white differentiation in left occipital lobe in medial temporal lobe in keeping with an acute PCA distribution infarct.  Modest localized mass effect with some sulcal crowding.  Additional involvement of the left thalamus.  Similar appearance  in the medial right occipital lobe, but smaller area of infarction.  Small left superior cerebellar infarct, also likely acute.  No evidence of recent or remote major vascular distribution infarct in the anterior circulation.  No acute parenchymal hemorrhage.  No midline shift.    The ventricles are normal in size without evidence of hydrocephalus.    No extra-axial blood or fluid collections.    The cranium appears intact.    Mastoid air cells and paranasal sinuses are essentially clear.    The vertebral bodies are normal in height and morphology without evidence of an acute displaced fracture or focal osseous destructive process.    Normal sagittal alignment is preserved.  No spondylolisthesis.    Intervertebral disc heights are well maintained.  No evidence of a bony spinal canal stenosis.    Evaluation intraspinal contents demonstrates no evidence of mass or hematoma.    No acute abnormalities in the paraspinal soft tissue structures.    Emphysematous changes patchy ground-glass opacity in the partially visualized lung apices.      CTA:    The aortic arch demonstrates no significant stenosis at the major branch vessel origins.    There is a focal thrombus in the right subclavian artery extending into the right vertebral artery origin with severe stenosis.  Thrombus extending over proximally 1 cm.  The V1 and V2 segments of right vertebral artery are otherwise normal in caliber of slightly decreased in attenuation in comparison to the other cervical vasculature.  Abrupt occlusion of distal right vertebral artery about the level of the PICA origin approximate 2 cm segment of non enhancement.    Moderate focal stenosis at the left vertebral artery origin.  Left vertebral artery is otherwise normal in caliber.  The basilar artery is normal in caliber.    The right common carotid artery is normal in caliber.  No significant stenosis at the carotid bifurcation.The right internal carotid artery is normal in  caliber.    The left common carotid artery is normal in caliber. No significant stenosis at the carotid bifurcation.The left internal carotid artery is normal in caliber.    Abrupt non enhancement in the P1 segment of the left PCA.  The proximal right PCA appears within normal limits.    The proximal MCAs and ACAs are unremarkable.    Findings were immediately discussed upon identification via telephone with the stroke service ([place]) at approximately 09:20.                                        CT Cervical Spine Without Contrast (Final result)  Result time 11/06/24 09:33:04      Final result by Geo Ambrocio MD (11/06/24 09:33:04)                   Impression:      Multifocal acute posterior circulation distribution infarcts as further discussed above, particularly involving essentially the entirety of the left PCA territory.  Modest mass effect without significant hemorrhage.    Focal thrombus with severe stenosis extending from the right subclavian artery into the proximal right vertebral artery.    Focal occlusion of the proximal intracranial segment of the right vertebral artery.    Focal occlusion of the proximal left PCA.    Moderate focal stenosis at the left vertebral artery origin.    Anterior circulation appears unremarkable.    No evidence of fracture or traumatic malalignment in the cervical spine.    Emphysematous changes and patchy ground-glass opacity in the partially visualized lung apices.    This report was flagged in Epic as abnormal.      Electronically signed by: Geo Ambrocio MD  Date:    11/06/2024  Time:    09:33               Narrative:    EXAMINATION:  CTA STROKE MULTI-PHASE; CT CERVICAL SPINE WITHOUT CONTRAST    CLINICAL HISTORY:  Neuro deficit, acute, stroke suspected;; Neck trauma, intoxicated or obtunded (Age >= 16y);    TECHNIQUE:  Axial CT images obtained throughout the region of the head and cervical spine before the administration of intravenous contrast.    CT  angiogram was performed through the cervical and intracranial vasculature during the IV bolus administration of 75mL of Omnipaque 350.  Two additional phases through the intracranial vasculature via multiphase technique.    Multiplanar MPR and MIP reformats were performed.    CT source data was analyzed using artificial intelligence software for detection of a large vessel occlusions (LVO) in order to enable computer assisted triage notification and aid clinical stroke decision making.    COMPARISON:  CTA head and cervical spine 10/25/2015    FINDINGS:  Large region of hypoattenuation loss of gray-white differentiation in left occipital lobe in medial temporal lobe in keeping with an acute PCA distribution infarct.  Modest localized mass effect with some sulcal crowding.  Additional involvement of the left thalamus.  Similar appearance in the medial right occipital lobe, but smaller area of infarction.  Small left superior cerebellar infarct, also likely acute.  No evidence of recent or remote major vascular distribution infarct in the anterior circulation.  No acute parenchymal hemorrhage.  No midline shift.    The ventricles are normal in size without evidence of hydrocephalus.    No extra-axial blood or fluid collections.    The cranium appears intact.    Mastoid air cells and paranasal sinuses are essentially clear.    The vertebral bodies are normal in height and morphology without evidence of an acute displaced fracture or focal osseous destructive process.    Normal sagittal alignment is preserved.  No spondylolisthesis.    Intervertebral disc heights are well maintained.  No evidence of a bony spinal canal stenosis.    Evaluation intraspinal contents demonstrates no evidence of mass or hematoma.    No acute abnormalities in the paraspinal soft tissue structures.    Emphysematous changes patchy ground-glass opacity in the partially visualized lung apices.      CTA:    The aortic arch demonstrates no  significant stenosis at the major branch vessel origins.    There is a focal thrombus in the right subclavian artery extending into the right vertebral artery origin with severe stenosis.  Thrombus extending over proximally 1 cm.  The V1 and V2 segments of right vertebral artery are otherwise normal in caliber of slightly decreased in attenuation in comparison to the other cervical vasculature.  Abrupt occlusion of distal right vertebral artery about the level of the PICA origin approximate 2 cm segment of non enhancement.    Moderate focal stenosis at the left vertebral artery origin.  Left vertebral artery is otherwise normal in caliber.  The basilar artery is normal in caliber.    The right common carotid artery is normal in caliber.  No significant stenosis at the carotid bifurcation.The right internal carotid artery is normal in caliber.    The left common carotid artery is normal in caliber. No significant stenosis at the carotid bifurcation.The left internal carotid artery is normal in caliber.    Abrupt non enhancement in the P1 segment of the left PCA.  The proximal right PCA appears within normal limits.    The proximal MCAs and ACAs are unremarkable.    Findings were immediately discussed upon identification via telephone with the stroke service ([place]) at approximately 09:20.

## 2024-11-16 NOTE — PROGRESS NOTES
Neno Conley - Neurosurgery ([facility])  Vascular Neurology  Comprehensive Stroke Center  Progress Note    Assessment/Plan:     * Acute arterial ischemic stroke, multifocal, posterior circulation  Malathi Mcpherson is a 49 y.o. female with PMH of drug use, seizure, HTN, HLD that is admitted to [facility] for further evaluation and management of multifocal acute infarcts. She initially presented to [facility] ED 11/6 after being found lying face down at home that morning, per EMS was lethargic and aphasic and moving LUE/LLE/RLE but was not moving RUE. EMS also reported that they had seen her before in context of seizures. LKW 10pm 11/5. In ED was noted to have exam worsening, now with R sided plegia and global aphasia. NIHSS 22. Also noted to have O2 desat in 70s and ultimately was intubated for airway protection. CTA showed multifocal areas of hypodensity involving the posterior circulation suspicious for infarcts as well as focal occlusion of R vert and L PCA. Not candidate for acute stroke interventions, no TNK due to OOW and no IR due to stroke burden/risk for bleeding. She was admitted to [facility] for higher level of care. NSGY consulted on admit for edema/hemicrani watch, however no surgical interventions recommended. MRI brain confirmed multifocal areas of acute infarct involving: L>R occipital lobes, L medial temporal lobe, bilateral thalami, L hippocampus and R hippocampus tail, R dorsal medulla, bilateral cerebellar hemispheres. TTE unremarkable. Suspect etiology of stroke likely secondary to substance use, Utox + cocaine.    Remains in restraints this AM, reportedly agitated overnight with concerns from nursing. Psychiatry consulted for med rec given extensive psych history on lithium and multiple different psych meds on admission med rec/recent dispense history and concerns for worsening agitation. LFTs uptrending, tylenol updated to PRN. Consider decreased dose of statin if worsening.     Antithrombotics for secondary stroke  prevention: Antiplatelets: Aspirin: 81 mg daily  Clopidogrel: 75 mg daily.     Statins for secondary stroke prevention and hyperlipidemia, if present: Statins: Atorvastatin- 80 mg daily    Aggressive risk factor modification: HTN, HLD, Substance use.     Rehab efforts: The patient has been evaluated by a stroke team provider and the therapy needs have been fully considered based off the presenting complaints and exam findings. The following therapy evaluations are needed: PT evaluate and treat, OT evaluate and treat, SLP evaluate and treat. High intensity therapy recommended. Modified barium swallow test scheduled Monday (11/18) per speech.    Diagnostics ordered/pending: Other: Modified barium swallow    VTE prophylaxis: Enoxaparin 40 mg SQ every 24 hours  Mechanical prophylaxis: Place SCDs    BP parameters: Infarct: SBP goal <180      Transaminitis  LFTs uptrending, tylenol updated to PRN. Consider decreased dose of statin if worsening.     Lab Results   Component Value Date    ALT 58 (H) 11/16/2024    AST 61 (H) 11/16/2024    ALKPHOS 101 11/16/2024    BILITOT 0.3 11/16/2024     Monitor daily CMP, PT/INR    Agitation  -see anxiety and depression    HTN (hypertension)  Patients blood pressure range in the last 24 hours was: BP  Min: 86/51  Max: 191/88. The patient's inpatient anti-hypertensive regimen is listed below:    Current Antihypertensives  , 2 times daily, Oral  carvediloL tablet 6.25 mg, 2 times daily, Per NG tube    - BP is controlled, no changes needed to their regimen  -SBP goal < 180      Right upper lobe pneumonia  Extubated 11/10 in [facility] with persistent oxygen requirement. CXR remarkable for RUL consolidation. Respiratory culture positive for H flu and MSSA. MRSA screen by PCR positive. Was transitioned to cefazolin 11/12 then cefepime 11/14 after sensitivities c/w h flu beta lactamase positive, planned for total 5 day course (EED 11/19)     -wean oxygen as tolerated, stable on RA  -continue cefepime  (EED 11/19)     Debility  -Due to stroke  -Aggressive therapy  -PT/OT/SLP eval and treat    Polysubstance abuse  -Utox + cocaine  -Suspect etiology of stroke likely due to substance use  -Continue thiamine/folate/multivitamin supplementation    Cytotoxic cerebral edema  -Area of cerebral edema identified when reviewing brain imaging involving bilateral posterior circulation territories due to acute infarcts, there is mild mass effect associated with it that has remained stable on follow up imaging.  -Admitted to [facility] for close neuro monitoring and observation  -NSGY consulted on admit, no surgical intervention recommended and NSGY s/o  - Stepped down to floor  -Continue frequent q4hr neuro checks as any acute changes or worsening neuro status .  -Obtain stat CTH for any new or worsening neuro changes and notify primary team immediately    Anxiety and depression  -Continue home fluoxetine  -Continue seroquel 25 mg in the am and 50mg qHS  -Home lithium held on admit due to elevated serum lithium level, resumed 11/11  -Psychiatry consulted, appreciate recs         11/07/2024 Pt was agitated overnight and is on fentanyl. Currently intubated.On repeated painful stimuli has movements on LUE,LLE, RLE> RUE.The movement on right side is an improvement from yesterday.  11/11/2024 NAEON, remains on precedex for agitation. Extubated yesterday (11/10), tolerating extubation thus far without complication. Moving extremities spontaneously although weakness noted in RUE>>RLE. Remains globally aphasic and agitated despite max precedex infusion. Respiratory cultures + H flu, on rocephin for abx. Also on bactrim for SSTI. Home lithium restarted today, continue seoquel and fluoxetine.  11/14/2024 NAEO, patient off precedex, BL wrist and torso restraints, NG tube with feeds running. Patient able to respond to questions and follow some commands. Oriented to self only. Stable for stepdown.   11/15/2024 Pt still in restraints. Per nurse  tendency to pull at tubes and things.On restraints. Oriented to self. No other complaints in the am.  11/16/2024 remains in restraints this AM, reportedly agitated overnight with concerns from nursing. Psychiatry consulted for med rec given extensive psych history on lithium and multiple different psych meds on admission med rec/recent dispense history and concerns for worsening agitation. LFTs uptrending, tylenol updated to PRN. Consider decreased dose of statin if worsening.       STROKE DOCUMENTATION   Acute Stroke Times   Last Known Normal Date: 11/05/24  Last Known Normal Time: 2200  Symptom Onset Date: 11/06/24 (unsure of timing)  Symptom Onset Time:  (sometime this morning.)  Unknown Symptom Onset Time: Unknown Time  Stroke Team Called Date: 11/06/24  Stroke Team Called Time: 0843  Stroke Team Arrival Date: 11/06/24  Stroke Team Arrival Time: 0845  CT Interpretation Time: 0859  Thrombolytic Therapy Recommended: No  CTA Interpretation Time: 0902  Thrombectomy Recommended: No    NIH Scale:  1a. Level of Consciousness: 0-->Alert, keenly responsive  1b. LOC Questions: 2-->Answers neither question correctly  1c. LOC Commands: 1-->Performs one task correctly  2. Best Gaze: 0-->Normal  3. Visual: 0-->No visual loss  4. Facial Palsy: 0-->Normal symmetrical movements  5a. Motor Arm, Left: 0-->No drift, limb holds 90 (or 45) degrees for full 10 secs (in restraints)  5b. Motor Arm, Right: 0-->No drift, limb holds 90 (or 45) degrees for full 10 secs (in restraints)  6a. Motor Leg, Left: 0-->No drift, leg holds 30 degree position for full 5 secs  6b. Motor Leg, Right: 0-->No drift, leg holds 30 degree position for full 5 secs  7. Limb Ataxia: 0-->Absent  8. Sensory: 0-->Normal, no sensory loss  9. Best Language: 1-->Mild-to-moderate aphasia, some obvious loss of fluency or facility of comprehension, without significant limitation on ideas expressed or form of expression. Reduction of speech and/or comprehension, however,  makes conversation. . . (see row details)  10. Dysarthria: 1-->Mild-to-moderate dysarthria, patient slurs at least some words and, at worst, can be understood with some difficulty  11. Extinction and Inattention (formerly Neglect): 0-->No abnormality  Total (NIH Stroke Scale): 5       Modified Baltimore Score: 0  Shailesh Coma Scale:    ABCD2 Score:    [id number] Score:   HAS -BLED Score:   ICH Score:   Hunt & Argueta Classification:      Hemorrhagic change of an Ischemic Stroke: Does this patient have an ischemic stroke with hemorrhagic changes? No     Neurologic Chief Complaint: acute arterial ischemic stroke, multifocal, posterior circulation     Subjective:     Interval History: Patient is seen for follow-up neurological assessment and treatment recommendations: see hospital course.     HPI, Past Medical, Family, and Social History remains the same as documented in the initial encounter.     Review of Systems   Unable to perform ROS: Other     Scheduled Meds:   aspirin  81 mg Per NG tube Daily    atorvastatin  80 mg Per NG tube Daily    carvediloL  6.25 mg Per NG tube BID    ceFEPime IV (PEDS and ADULTS)  2 g Intravenous Q8H    clopidogreL  75 mg Per NG tube Daily    enoxparin  40 mg Subcutaneous Q24H (prophylaxis, 1700)    FLUoxetine  60 mg Per NG tube Daily    folic acid  1 mg Per NG tube Daily    LIDOcaine  1 patch Transdermal Q24H    lithium citrate 8 meq/5 ml  300 mg Per NG tube TID    melatonin  6 mg Per NG tube Nightly    methyl salicylate-menthol 15-10%   Topical (Top) TID    mirtazapine  7.5 mg Per NG tube QHS    multivitamin  1 tablet Per NG tube Daily    mupirocin   Nasal BID    QUEtiapine  25 mg Per NG tube Daily    QUEtiapine  50 mg Per NG tube QHS    senna-docusate 8.6-50 mg  1 tablet Per NG tube Daily    thiamine  100 mg Per NG tube Daily     Continuous Infusions:  PRN Meds:  Current Facility-Administered Medications:     acetaminophen, 1,000 mg, Per NG tube, BID PRN    albuterol-ipratropium, 3 mL,  Nebulization, Q6H PRN    hydrOXYzine HCL, 25 mg, Per NG tube, QID PRN    polyethylene glycol, 17 g, Per NG tube, BID PRN    sodium chloride 0.9%, 10 mL, Intravenous, PRN    Objective:     Vital Signs (Most Recent):  Temp: 99.1 °F (37.3 °C) (11/16/24 0806)  Pulse: 86 (11/16/24 0806)  Resp: 20 (11/16/24 0806)  BP: (!) 178/95 (11/16/24 0806)  SpO2: 97 % (11/16/24 0806)  BP Location: Right arm    Vital Signs Range (Last 24H):  Temp:  [97.6 °F (36.4 °C)-99.1 °F (37.3 °C)]   Pulse:  [86-97]   Resp:  [16-20]   BP: (129-178)/(80-96)   SpO2:  [92 %-97 %]   BP Location: Right arm    Physical Exam  Vitals and nursing note reviewed.   Constitutional:       General: She is not in acute distress.     Appearance: She is not ill-appearing.   HENT:      Head: Normocephalic and atraumatic.   Eyes:      General: No scleral icterus.     Conjunctiva/sclera: Conjunctivae normal.      Pupils: Pupils are equal, round, and reactive to light.   Cardiovascular:      Rate and Rhythm: Normal rate and regular rhythm.      Pulses: Normal pulses.   Pulmonary:      Effort: Pulmonary effort is normal. No respiratory distress.      Breath sounds: No wheezing.   Abdominal:      General: Abdomen is flat.   Musculoskeletal:         General: No swelling or tenderness.      Right lower leg: No edema.      Left lower leg: No edema.      Comments: Upper extremity restraints   Skin:     General: Skin is warm and dry.      Coloration: Skin is not jaundiced.   Neurological:      Mental Status: She is alert.       Neurological Exam:   LOC: alert  Attention Span: poor  Language:  aphasia present  Articulation: Dysarthria present  Motor: Leg left  Normal 5/5  Leg right Normal 5/5  LUE > more movements than RUE.    Laboratory:  CMP:   Recent Labs   Lab 11/16/24 0515   CALCIUM 10.0   ALBUMIN 2.7*   PROT 8.0      K 4.4   CO2 21*      BUN 15   CREATININE 0.6   ALKPHOS 101   ALT 58*   AST 61*   BILITOT 0.3       CBC:   Recent Labs   Lab 11/16/24  0515    WBC 14.02*   RBC 4.46   HGB 12.9   HCT 40.0      MCV 90   MCH 28.9   MCHC 32.3       Diagnostic Results     Brain Imaging / Vessel Imaging   Results for orders placed or performed during the hospital encounter of 11/06/24 (from the past 2160 hours)   CTA STROKE MULTI-PHASE    Narrative    EXAMINATION:  CTA STROKE MULTI-PHASE; CT CERVICAL SPINE WITHOUT CONTRAST    CLINICAL HISTORY:  Neuro deficit, acute, stroke suspected;; Neck trauma, intoxicated or obtunded (Age >= 16y);    TECHNIQUE:  Axial CT images obtained throughout the region of the head and cervical spine before the administration of intravenous contrast.    CT angiogram was performed through the cervical and intracranial vasculature during the IV bolus administration of 75mL of Omnipaque 350.  Two additional phases through the intracranial vasculature via multiphase technique.    Multiplanar MPR and MIP reformats were performed.    CT source data was analyzed using artificial intelligence software for detection of a large vessel occlusions (LVO) in order to enable computer assisted triage notification and aid clinical stroke decision making.    COMPARISON:  CTA head and cervical spine 10/25/2015    FINDINGS:  Large region of hypoattenuation loss of gray-white differentiation in left occipital lobe in medial temporal lobe in keeping with an acute PCA distribution infarct.  Modest localized mass effect with some sulcal crowding.  Additional involvement of the left thalamus.  Similar appearance in the medial right occipital lobe, but smaller area of infarction.  Small left superior cerebellar infarct, also likely acute.  No evidence of recent or remote major vascular distribution infarct in the anterior circulation.  No acute parenchymal hemorrhage.  No midline shift.    The ventricles are normal in size without evidence of hydrocephalus.    No extra-axial blood or fluid collections.    The cranium appears intact.    Mastoid air cells and paranasal  sinuses are essentially clear.    The vertebral bodies are normal in height and morphology without evidence of an acute displaced fracture or focal osseous destructive process.    Normal sagittal alignment is preserved.  No spondylolisthesis.    Intervertebral disc heights are well maintained.  No evidence of a bony spinal canal stenosis.    Evaluation intraspinal contents demonstrates no evidence of mass or hematoma.    No acute abnormalities in the paraspinal soft tissue structures.    Emphysematous changes patchy ground-glass opacity in the partially visualized lung apices.      CTA:    The aortic arch demonstrates no significant stenosis at the major branch vessel origins.    There is a focal thrombus in the right subclavian artery extending into the right vertebral artery origin with severe stenosis.  Thrombus extending over proximally 1 cm.  The V1 and V2 segments of right vertebral artery are otherwise normal in caliber of slightly decreased in attenuation in comparison to the other cervical vasculature.  Abrupt occlusion of distal right vertebral artery about the level of the PICA origin approximate 2 cm segment of non enhancement.    Moderate focal stenosis at the left vertebral artery origin.  Left vertebral artery is otherwise normal in caliber.  The basilar artery is normal in caliber.    The right common carotid artery is normal in caliber.  No significant stenosis at the carotid bifurcation.The right internal carotid artery is normal in caliber.    The left common carotid artery is normal in caliber. No significant stenosis at the carotid bifurcation.The left internal carotid artery is normal in caliber.    Abrupt non enhancement in the P1 segment of the left PCA.  The proximal right PCA appears within normal limits.    The proximal MCAs and ACAs are unremarkable.    Findings were immediately discussed upon identification via telephone with the stroke service ([facility]) at approximately 09:20.       Impression    Multifocal acute posterior circulation distribution infarcts as further discussed above, particularly involving essentially the entirety of the left PCA territory.  Modest mass effect without significant hemorrhage.    Focal thrombus with severe stenosis extending from the right subclavian artery into the proximal right vertebral artery.    Focal occlusion of the proximal intracranial segment of the right vertebral artery.    Focal occlusion of the proximal left PCA.    Moderate focal stenosis at the left vertebral artery origin.    Anterior circulation appears unremarkable.    No evidence of fracture or traumatic malalignment in the cervical spine.    Emphysematous changes and patchy ground-glass opacity in the partially visualized lung apices.    This report was flagged in Epic as abnormal.      Electronically signed by: Geo Ambrocio MD  Date:    11/06/2024  Time:    09:33   CT Cervical Spine Without Contrast    Narrative    EXAMINATION:  CTA STROKE MULTI-PHASE; CT CERVICAL SPINE WITHOUT CONTRAST    CLINICAL HISTORY:  Neuro deficit, acute, stroke suspected;; Neck trauma, intoxicated or obtunded (Age >= 16y);    TECHNIQUE:  Axial CT images obtained throughout the region of the head and cervical spine before the administration of intravenous contrast.    CT angiogram was performed through the cervical and intracranial vasculature during the IV bolus administration of 75mL of Omnipaque 350.  Two additional phases through the intracranial vasculature via multiphase technique.    Multiplanar MPR and MIP reformats were performed.    CT source data was analyzed using artificial intelligence software for detection of a large vessel occlusions (LVO) in order to enable computer assisted triage notification and aid clinical stroke decision making.    COMPARISON:  CTA head and cervical spine 10/25/2015    FINDINGS:  Large region of hypoattenuation loss of gray-white differentiation in left occipital lobe in  medial temporal lobe in keeping with an acute PCA distribution infarct.  Modest localized mass effect with some sulcal crowding.  Additional involvement of the left thalamus.  Similar appearance in the medial right occipital lobe, but smaller area of infarction.  Small left superior cerebellar infarct, also likely acute.  No evidence of recent or remote major vascular distribution infarct in the anterior circulation.  No acute parenchymal hemorrhage.  No midline shift.    The ventricles are normal in size without evidence of hydrocephalus.    No extra-axial blood or fluid collections.    The cranium appears intact.    Mastoid air cells and paranasal sinuses are essentially clear.    The vertebral bodies are normal in height and morphology without evidence of an acute displaced fracture or focal osseous destructive process.    Normal sagittal alignment is preserved.  No spondylolisthesis.    Intervertebral disc heights are well maintained.  No evidence of a bony spinal canal stenosis.    Evaluation intraspinal contents demonstrates no evidence of mass or hematoma.    No acute abnormalities in the paraspinal soft tissue structures.    Emphysematous changes patchy ground-glass opacity in the partially visualized lung apices.      CTA:    The aortic arch demonstrates no significant stenosis at the major branch vessel origins.    There is a focal thrombus in the right subclavian artery extending into the right vertebral artery origin with severe stenosis.  Thrombus extending over proximally 1 cm.  The V1 and V2 segments of right vertebral artery are otherwise normal in caliber of slightly decreased in attenuation in comparison to the other cervical vasculature.  Abrupt occlusion of distal right vertebral artery about the level of the PICA origin approximate 2 cm segment of non enhancement.    Moderate focal stenosis at the left vertebral artery origin.  Left vertebral artery is otherwise normal in caliber.  The basilar  artery is normal in caliber.    The right common carotid artery is normal in caliber.  No significant stenosis at the carotid bifurcation.The right internal carotid artery is normal in caliber.    The left common carotid artery is normal in caliber. No significant stenosis at the carotid bifurcation.The left internal carotid artery is normal in caliber.    Abrupt non enhancement in the P1 segment of the left PCA.  The proximal right PCA appears within normal limits.    The proximal MCAs and ACAs are unremarkable.    Findings were immediately discussed upon identification via telephone with the stroke service ([facility]) at approximately 09:20.      Impression    Multifocal acute posterior circulation distribution infarcts as further discussed above, particularly involving essentially the entirety of the left PCA territory.  Modest mass effect without significant hemorrhage.    Focal thrombus with severe stenosis extending from the right subclavian artery into the proximal right vertebral artery.    Focal occlusion of the proximal intracranial segment of the right vertebral artery.    Focal occlusion of the proximal left PCA.    Moderate focal stenosis at the left vertebral artery origin.    Anterior circulation appears unremarkable.    No evidence of fracture or traumatic malalignment in the cervical spine.    Emphysematous changes and patchy ground-glass opacity in the partially visualized lung apices.    This report was flagged in Epic as abnormal.      Electronically signed by: Geo Ambrocio MD  Date:    11/06/2024  Time:    09:33   CT Head Without Contrast    Narrative    EXAMINATION:  CT HEAD WITHOUT CONTRAST    CLINICAL HISTORY:  Stroke, follow up;    TECHNIQUE:  Low dose axial images were obtained through the head.  Coronal and sagittal reformations were also performed. Contrast was not administered.    COMPARISON:  CT, MRI 11/06/2024    FINDINGS:  Evolving posterior circulation infarcts are identified  as described on recent MR imaging again most notable within the left greater than right occipital lobes extending to the medial left temporal lobe and bilateral thalamus.  No hemorrhagic conversion with potentially minimal stable petechial hemorrhage within the medial left thalamus.  No midline shift or herniation with mild mass effect on the 3rd ventricle again noted.    No new acute territorial infarct.    The visualized sinuses and mastoid air cells are clear.      Impression    Expected evolutionary changes of the posterior circulation infarcts with no overt hemorrhagic conversion or significant midline shift/herniation.      Electronically signed by: Adrián Hobbs  Date:    11/09/2024  Time:    08:38      Results for orders placed or performed during the hospital encounter of 11/06/24 (from the past 2160 hours)   MRI Brain Without Contrast    Impression    Multiple posterior circulation infarcts are identified with involvement of the left greater than right occipital lobes and medial left temporal lobe, thalamus, right dorsal medulla, and minimally involving the cerebellum.  No midline shift with minimal mass effect on the 3rd ventricle.    Loss of flow void within the right intracranial vertebral artery in keeping with the findings on CTA.      Electronically signed by: Adrián Hobbs  Date:    11/06/2024  Time:    17:40      Cardiac Imaging   Results for orders placed during the hospital encounter of 11/06/24    Echo Saline Bubble? Yes; Ultrasound enhancing contrast? Yes    Interpretation Summary    Left Ventricle: The left ventricle is normal in size. Normal wall thickness. There is normal systolic function with a visually estimated ejection fraction of 55 - 60%. Biplane (2D) method of discs ejection fraction is 56%. Global longitudinal strain is -18.0%. There is normal diastolic function.    Right Ventricle: Normal right ventricular cavity size. Wall thickness is normal. Systolic function is normal.     Tricuspid Valve: There is mild regurgitation.    Pulmonary Artery: The estimated pulmonary artery systolic pressure is at least 28 mmHg.    IVC/SVC: Patient is ventilated, cannot use IVC diameter to estimate right atrial pressure.       Sandy Nichols MD  [facility] St[facility]  Department of Vascular Neurology   [facility])

## 2024-11-16 NOTE — PLAN OF CARE
Problem: Adult Inpatient Plan of Care  Goal: Plan of Care Review  Outcome: Progressing  Goal: Patient-Specific Goal (Individualized)  Outcome: Progressing  Goal: Optimal Comfort and Wellbeing  Outcome: Progressing  Goal: Readiness for Transition of Care  Outcome: Progressing     Problem: Adult Inpatient Plan of Care  Goal: Plan of Care Review  Outcome: Progressing     Problem: Adult Inpatient Plan of Care  Goal: Patient-Specific Goal (Individualized)  Outcome: Progressing     Problem: Adult Inpatient Plan of Care  Goal: Optimal Comfort and Wellbeing  Outcome: Progressing     Problem: Adult Inpatient Plan of Care  Goal: Readiness for Transition of Care  Outcome: Progressing     Problem: Wound  Goal: Optimal Coping  Outcome: Progressing     Problem: Stroke, Ischemic (Includes Transient Ischemic Attack)  Goal: Improved Communication Skills  Outcome: Progressing  Goal: Optimal Functional Ability  Outcome: Progressing     Problem: Stroke, Ischemic (Includes Transient Ischemic Attack)  Goal: Improved Communication Skills  Outcome: Progressing     Problem: Stroke, Ischemic (Includes Transient Ischemic Attack)  Goal: Optimal Functional Ability  Outcome: Progressing       POC reviewed with patient and her daughter at the bedside. Patients daughter verbalized understanding. Precautions maintained. Patient noted to be restless with poor night time sleep despite routine Melatonin. Tube feedings held due to patient continually sliding down in bed, requiring frequent redirection. Team made aware.  NGT remains to right nare.  Patient remains in bilateral wrist restraints to prevent patient from removing NGT.Cardiac monitoring ongoing. Vital signs all shift. See flow sheet. Bed low and locked with bed alarm activated.  Tele sitter at the beside. POC ongoing.

## 2024-11-16 NOTE — ASSESSMENT & PLAN NOTE
-Area of cerebral edema identified when reviewing brain imaging involving bilateral posterior circulation territories due to acute infarcts, there is mild mass effect associated with it that has remained stable on follow up imaging.  -Admitted to [facility] for close neuro monitoring and observation  -NSGY consulted on admit, no surgical intervention recommended and NSGY s/o  - Stepped down to floor  -Continue frequent q4hr neuro checks as any acute changes or worsening neuro status .  -Obtain stat CTH for any new or worsening neuro changes and notify primary team immediately

## 2024-11-16 NOTE — ASSESSMENT & PLAN NOTE
Extubated 11/10 in NCC with persistent oxygen requirement. CXR remarkable for RUL consolidation. Respiratory culture positive for H flu and MSSA. MRSA screen by PCR positive. Was transitioned to cefazolin 11/12 then cefepime 11/14 after sensitivities c/w h flu beta lactamase positive, planned for total 5 day course (EED 11/19)     -wean oxygen as tolerated, stable on RA  -continue cefepime (EED 11/19)

## 2024-11-16 NOTE — ASSESSMENT & PLAN NOTE
Malathi Mcpherson is a 49 y.o. female with PMH of drug use, seizure, HTN, HLD that is [place] [facility] [place] and management of multifocal acute infarcts. She initially presented [place] [facility] [place] 11/6 after being found lying face down at home that morning, per EMS was lethargic and aphasic and moving LUE/LLE/RLE but was not moving RUE. EMS also reported that they had seen her before in context of seizures. LKW 10pm 11/5. In ED was noted to have exam worsening, now with R sided plegia and global aphasia. NIHSS 22. Also noted to have O2 desat in 70s and ultimately was intubated for airway protection. CTA showed multifocal areas of hypodensity involving the posterior circulation suspicious for infarcts as well as focal occlusion of R vert and L PCA. Not candidate for acute stroke interventions, no TNK due to OOW and no IR due to stroke burden/risk for bleeding. She was admitted to [facility] for higher level of care. NSGY consulted on admit for edema/hemicrani watch, however no surgical interventions recommended. MRI brain confirmed multifocal areas of acute infarct involving: L>R occipital lobes, L medial temporal lobe, bilateral thalami, L hippocampus and R hippocampus tail, R dorsal medulla, bilateral cerebellar hemispheres. TTE unremarkable. Suspect etiology of stroke likely secondary to substance use, Utox + cocaine.    Remains in restraints this AM, reportedly agitated overnight with concerns from nursing. Psychiatry consulted for med rec given extensive psych history on lithium and multiple different psych meds on admission med rec/recent dispense history and concerns for worsening agitation. LFTs uptrending, tylenol updated to PRN. Consider decreased dose of statin if worsening.     Antithrombotics for secondary stroke prevention: Antiplatelets: Aspirin: 81 mg daily  Clopidogrel: 75 mg daily.     Statins for secondary stroke prevention and hyperlipidemia, if present: Statins: Atorvastatin- 80 mg  daily    Aggressive risk factor modification: HTN, HLD, Substance use.     Rehab efforts: The patient has been evaluated by a stroke team provider and the therapy needs have been fully considered based off the presenting complaints and exam findings. The following therapy evaluations are needed: PT evaluate and treat, OT evaluate and treat, SLP evaluate and treat. High intensity therapy recommended. Modified barium swallow test scheduled Monday (11/18) per speech.    Diagnostics ordered/pending: Other: Modified barium swallow    VTE prophylaxis: Enoxaparin 40 mg SQ every 24 hours  Mechanical prophylaxis: Place SCDs    BP parameters: Infarct: SBP goal <180

## 2024-11-16 NOTE — SUBJECTIVE & OBJECTIVE
Neurologic Chief Complaint: acute arterial ischemic stroke, multifocal, posterior circulation     Subjective:     Interval History: Patient is seen for follow-up neurological assessment and treatment recommendations: see hospital course.     HPI, Past Medical, Family, and Social History remains the same as documented in the initial encounter.     Review of Systems   Unable to perform ROS: Other     Scheduled Meds:   aspirin  81 mg Per NG tube Daily    atorvastatin  80 mg Per NG tube Daily    carvediloL  6.25 mg Per NG tube BID    ceFEPime IV (PEDS and ADULTS)  2 g Intravenous Q8H    clopidogreL  75 mg Per NG tube Daily    enoxparin  40 mg Subcutaneous Q24H (prophylaxis, 1700)    FLUoxetine  60 mg Per NG tube Daily    folic acid  1 mg Per NG tube Daily    LIDOcaine  1 patch Transdermal Q24H    lithium citrate 8 meq/5 ml  300 mg Per NG tube TID    melatonin  6 mg Per NG tube Nightly    methyl salicylate-menthol 15-10%   Topical (Top) TID    mirtazapine  7.5 mg Per NG tube QHS    multivitamin  1 tablet Per NG tube Daily    mupirocin   Nasal BID    QUEtiapine  25 mg Per NG tube Daily    QUEtiapine  50 mg Per NG tube QHS    senna-docusate 8.6-50 mg  1 tablet Per NG tube Daily    thiamine  100 mg Per NG tube Daily     Continuous Infusions:  PRN Meds:  Current Facility-Administered Medications:     acetaminophen, 1,000 mg, Per NG tube, BID PRN    albuterol-ipratropium, 3 mL, Nebulization, Q6H PRN    hydrOXYzine HCL, 25 mg, Per NG tube, QID PRN    polyethylene glycol, 17 g, Per NG tube, BID PRN    sodium chloride 0.9%, 10 mL, Intravenous, PRN    Objective:     Vital Signs (Most Recent):  Temp: 99.1 °F (37.3 °C) (11/16/24 0806)  Pulse: 86 (11/16/24 0806)  Resp: 20 (11/16/24 0806)  BP: (!) 178/95 (11/16/24 0806)  SpO2: 97 % (11/16/24 0806)  BP Location: Right arm    Vital Signs Range (Last 24H):  Temp:  [97.6 °F (36.4 °C)-99.1 °F (37.3 °C)]   Pulse:  [86-97]   Resp:  [16-20]   BP: (129-178)/(80-96)   SpO2:  [92 %-97 %]   BP  Location: Right arm    Physical Exam  Vitals and nursing note reviewed.   Constitutional:       General: She is not in acute distress.     Appearance: She is not ill-appearing.   HENT:      Head: Normocephalic and atraumatic.   Eyes:      General: No scleral icterus.     Conjunctiva/sclera: Conjunctivae normal.      Pupils: Pupils are equal, round, and reactive to light.   Cardiovascular:      Rate and Rhythm: Normal rate and regular rhythm.      Pulses: Normal pulses.   Pulmonary:      Effort: Pulmonary effort is normal. No respiratory distress.      Breath sounds: No wheezing.   Abdominal:      General: Abdomen is flat.   Musculoskeletal:         General: No swelling or tenderness.      Right lower leg: No edema.      Left lower leg: No edema.      Comments: Upper extremity restraints   Skin:     General: Skin is warm and dry.      Coloration: Skin is not jaundiced.   Neurological:      Mental Status: She is alert.       Neurological Exam:   LOC: alert  Attention Span: poor  Language:  aphasia present  Articulation: Dysarthria present  Motor: Leg left  Normal 5/5  Leg right Normal 5/5  LUE > more movements than RUE.    Laboratory:  CMP:   Recent Labs   Lab 11/16/24  0515   CALCIUM 10.0   ALBUMIN 2.7*   PROT 8.0      K 4.4   CO2 21*      BUN 15   CREATININE 0.6   ALKPHOS 101   ALT 58*   AST 61*   BILITOT 0.3       CBC:   Recent Labs   Lab 11/16/24  0515   WBC 14.02*   RBC 4.46   HGB 12.9   HCT 40.0      MCV 90   MCH 28.9   MCHC 32.3       Diagnostic Results     Brain Imaging / Vessel Imaging   Results for orders placed or performed during the hospital encounter of 11/06/24 (from the past 2160 hours)   CTA STROKE MULTI-PHASE    Narrative    EXAMINATION:  CTA STROKE MULTI-PHASE; CT CERVICAL SPINE WITHOUT CONTRAST    CLINICAL HISTORY:  Neuro deficit, acute, stroke suspected;; Neck trauma, intoxicated or obtunded (Age >= 16y);    TECHNIQUE:  Axial CT images obtained throughout the region of the head  and cervical spine before the administration of intravenous contrast.    CT angiogram was performed through the cervical and intracranial vasculature during the IV bolus administration of 75mL of Omnipaque 350.  Two additional phases through the intracranial vasculature via multiphase technique.    Multiplanar MPR and MIP reformats were performed.    CT source data was analyzed using artificial intelligence software for detection of a large vessel occlusions (LVO) in order to enable computer assisted triage notification and aid clinical stroke decision making.    COMPARISON:  CTA head and cervical spine 10/25/2015    FINDINGS:  Large region of hypoattenuation loss of gray-white differentiation in left occipital lobe in medial temporal lobe in keeping with an acute PCA distribution infarct.  Modest localized mass effect with some sulcal crowding.  Additional involvement of the left thalamus.  Similar appearance in the medial right occipital lobe, but smaller area of infarction.  Small left superior cerebellar infarct, also likely acute.  No evidence of recent or remote major vascular distribution infarct in the anterior circulation.  No acute parenchymal hemorrhage.  No midline shift.    The ventricles are normal in size without evidence of hydrocephalus.    No extra-axial blood or fluid collections.    The cranium appears intact.    Mastoid air cells and paranasal sinuses are essentially clear.    The vertebral bodies are normal in height and morphology without evidence of an acute displaced fracture or focal osseous destructive process.    Normal sagittal alignment is preserved.  No spondylolisthesis.    Intervertebral disc heights are well maintained.  No evidence of a bony spinal canal stenosis.    Evaluation intraspinal contents demonstrates no evidence of mass or hematoma.    No acute abnormalities in the paraspinal soft tissue structures.    Emphysematous changes patchy ground-glass opacity in the partially  visualized lung apices.      CTA:    The aortic arch demonstrates no significant stenosis at the major branch vessel origins.    There is a focal thrombus in the right subclavian artery extending into the right vertebral artery origin with severe stenosis.  Thrombus extending over proximally 1 cm.  The V1 and V2 segments of right vertebral artery are otherwise normal in caliber of slightly decreased in attenuation in comparison to the other cervical vasculature.  Abrupt occlusion of distal right vertebral artery about the level of the PICA origin approximate 2 cm segment of non enhancement.    Moderate focal stenosis at the left vertebral artery origin.  Left vertebral artery is otherwise normal in caliber.  The basilar artery is normal in caliber.    The right common carotid artery is normal in caliber.  No significant stenosis at the carotid bifurcation.The right internal carotid artery is normal in caliber.    The left common carotid artery is normal in caliber. No significant stenosis at the carotid bifurcation.The left internal carotid artery is normal in caliber.    Abrupt non enhancement in the P1 segment of the left PCA.  The proximal right PCA appears within normal limits.    The proximal MCAs and ACAs are unremarkable.    Findings were immediately discussed upon identification via telephone with the stroke service ([place]) at approximately 09:20.      Impression    Multifocal acute posterior circulation distribution infarcts as further discussed above, particularly involving essentially the entirety of the left PCA territory.  Modest mass effect without significant hemorrhage.    Focal thrombus with severe stenosis extending from the right subclavian artery into the proximal right vertebral artery.    Focal occlusion of the proximal intracranial segment of the right vertebral artery.    Focal occlusion of the proximal left PCA.    Moderate focal stenosis at the left vertebral artery  origin.    Anterior circulation appears unremarkable.    No evidence of fracture or traumatic malalignment in the cervical spine.    Emphysematous changes and patchy ground-glass opacity in the partially visualized lung apices.    This report was flagged in Epic as abnormal.      Electronically signed by: Geo Ambrocio MD  Date:    11/06/2024  Time:    09:33   CT Cervical Spine Without Contrast    Narrative    EXAMINATION:  CTA STROKE MULTI-PHASE; CT CERVICAL SPINE WITHOUT CONTRAST    CLINICAL HISTORY:  Neuro deficit, acute, stroke suspected;; Neck trauma, intoxicated or obtunded (Age >= 16y);    TECHNIQUE:  Axial CT images obtained throughout the region of the head and cervical spine before the administration of intravenous contrast.    CT angiogram was performed through the cervical and intracranial vasculature during the IV bolus administration of 75mL of Omnipaque 350.  Two additional phases through the intracranial vasculature via multiphase technique.    Multiplanar MPR and MIP reformats were performed.    CT source data was analyzed using artificial intelligence software for detection of a large vessel occlusions (LVO) in order to enable computer assisted triage notification and aid clinical stroke decision making.    COMPARISON:  CTA head and cervical spine 10/25/2015    FINDINGS:  Large region of hypoattenuation loss of gray-white differentiation in left occipital lobe in medial temporal lobe in keeping with an acute PCA distribution infarct.  Modest localized mass effect with some sulcal crowding.  Additional involvement of the left thalamus.  Similar appearance in the medial right occipital lobe, but smaller area of infarction.  Small left superior cerebellar infarct, also likely acute.  No evidence of recent or remote major vascular distribution infarct in the anterior circulation.  No acute parenchymal hemorrhage.  No midline shift.    The ventricles are normal in size without evidence of  hydrocephalus.    No extra-axial blood or fluid collections.    The cranium appears intact.    Mastoid air cells and paranasal sinuses are essentially clear.    The vertebral bodies are normal in height and morphology without evidence of an acute displaced fracture or focal osseous destructive process.    Normal sagittal alignment is preserved.  No spondylolisthesis.    Intervertebral disc heights are well maintained.  No evidence of a bony spinal canal stenosis.    Evaluation intraspinal contents demonstrates no evidence of mass or hematoma.    No acute abnormalities in the paraspinal soft tissue structures.    Emphysematous changes patchy ground-glass opacity in the partially visualized lung apices.      CTA:    The aortic arch demonstrates no significant stenosis at the major branch vessel origins.    There is a focal thrombus in the right subclavian artery extending into the right vertebral artery origin with severe stenosis.  Thrombus extending over proximally 1 cm.  The V1 and V2 segments of right vertebral artery are otherwise normal in caliber of slightly decreased in attenuation in comparison to the other cervical vasculature.  Abrupt occlusion of distal right vertebral artery about the level of the PICA origin approximate 2 cm segment of non enhancement.    Moderate focal stenosis at the left vertebral artery origin.  Left vertebral artery is otherwise normal in caliber.  The basilar artery is normal in caliber.    The right common carotid artery is normal in caliber.  No significant stenosis at the carotid bifurcation.The right internal carotid artery is normal in caliber.    The left common carotid artery is normal in caliber. No significant stenosis at the carotid bifurcation.The left internal carotid artery is normal in caliber.    Abrupt non enhancement in the P1 segment of the left PCA.  The proximal right PCA appears within normal limits.    The proximal MCAs and ACAs are unremarkable.    Findings  were immediately discussed upon identification via telephone with the stroke service ([place]) at approximately 09:20.      Impression    Multifocal acute posterior circulation distribution infarcts as further discussed above, particularly involving essentially the entirety of the left PCA territory.  Modest mass effect without significant hemorrhage.    Focal thrombus with severe stenosis extending from the right subclavian artery into the proximal right vertebral artery.    Focal occlusion of the proximal intracranial segment of the right vertebral artery.    Focal occlusion of the proximal left PCA.    Moderate focal stenosis at the left vertebral artery origin.    Anterior circulation appears unremarkable.    No evidence of fracture or traumatic malalignment in the cervical spine.    Emphysematous changes and patchy ground-glass opacity in the partially visualized lung apices.    This report was flagged in Epic as abnormal.      Electronically signed by: Geo Ambrocio MD  Date:    11/06/2024  Time:    09:33   CT Head Without Contrast    Narrative    EXAMINATION:  CT HEAD WITHOUT CONTRAST    CLINICAL HISTORY:  Stroke, follow up;    TECHNIQUE:  Low dose axial images were obtained through the head.  Coronal and sagittal reformations were also performed. Contrast was not administered.    COMPARISON:  CT, MRI 11/06/2024    FINDINGS:  Evolving posterior circulation infarcts are identified as described on recent MR imaging again most notable within the left greater than right occipital lobes extending to the medial left temporal lobe and bilateral thalamus.  No hemorrhagic conversion with potentially minimal stable petechial hemorrhage within the medial left thalamus.  No midline shift or herniation with mild mass effect on the 3rd ventricle again noted.    No new acute territorial infarct.    The visualized sinuses and mastoid air cells are clear.      Impression    Expected evolutionary changes of the  posterior circulation infarcts with no overt hemorrhagic conversion or significant midline shift/herniation.      Electronically signed by: Adrián Hobbs  Date:    11/09/2024  Time:    08:38      Results for orders placed or performed during the hospital encounter of 11/06/24 (from the past 2160 hours)   MRI Brain Without Contrast    Impression    Multiple posterior circulation infarcts are identified with involvement of the left greater than right occipital lobes and medial left temporal lobe, thalamus, right dorsal medulla, and minimally involving the cerebellum.  No midline shift with minimal mass effect on the 3rd ventricle.    Loss of flow void within the right intracranial vertebral artery in keeping with the findings on CTA.      Electronically signed by: Adrián Hobbs  Date:    11/06/2024  Time:    17:40      Cardiac Imaging   Results for orders placed during the hospital encounter of 11/06/24    Echo Saline Bubble? Yes; Ultrasound enhancing contrast? Yes    Interpretation Summary    Left Ventricle: The left ventricle is normal in size. Normal wall thickness. There is normal systolic function with a visually estimated ejection fraction of 55 - 60%. Biplane (2D) method of discs ejection fraction is 56%. Global longitudinal strain is -18.0%. There is normal diastolic function.    Right Ventricle: Normal right ventricular cavity size. Wall thickness is normal. Systolic function is normal.    Tricuspid Valve: There is mild regurgitation.    Pulmonary Artery: The estimated pulmonary artery systolic pressure is at least 28 mmHg.    IVC/SVC: Patient is ventilated, cannot use IVC diameter to estimate right atrial pressure.

## 2024-11-17 LAB
ALBUMIN SERPL BCP-MCNC: 2.9 G/DL (ref 3.5–5.2)
ALP SERPL-CCNC: 96 U/L (ref 40–150)
ALT SERPL W/O P-5'-P-CCNC: 55 U/L (ref 10–44)
ANION GAP SERPL CALC-SCNC: 13 MMOL/L (ref 8–16)
AST SERPL-CCNC: 47 U/L (ref 10–40)
BASOPHILS # BLD AUTO: 0.13 K/UL (ref 0–0.2)
BASOPHILS NFR BLD: 0.7 % (ref 0–1.9)
BILIRUB SERPL-MCNC: 0.4 MG/DL (ref 0.1–1)
BUN SERPL-MCNC: 22 MG/DL (ref 6–20)
CALCIUM SERPL-MCNC: 9.6 MG/DL (ref 8.7–10.5)
CHLORIDE SERPL-SCNC: 103 MMOL/L (ref 95–110)
CO2 SERPL-SCNC: 18 MMOL/L (ref 23–29)
CREAT SERPL-MCNC: 0.7 MG/DL (ref 0.5–1.4)
DIFFERENTIAL METHOD BLD: ABNORMAL
EOSINOPHIL # BLD AUTO: 0.2 K/UL (ref 0–0.5)
EOSINOPHIL NFR BLD: 1.1 % (ref 0–8)
ERYTHROCYTE [DISTWIDTH] IN BLOOD BY AUTOMATED COUNT: 14 % (ref 11.5–14.5)
EST. GFR  (NO RACE VARIABLE): >60 ML/MIN/1.73 M^2
GLUCOSE SERPL-MCNC: 118 MG/DL (ref 70–110)
HAV IGM SERPL QL IA: ABNORMAL
HBV CORE IGM SERPL QL IA: ABNORMAL
HBV SURFACE AG SERPL QL IA: ABNORMAL
HCT VFR BLD AUTO: 39.5 % (ref 37–48.5)
HCV AB SERPL QL IA: REACTIVE
HGB BLD-MCNC: 12.7 G/DL (ref 12–16)
IMM GRANULOCYTES # BLD AUTO: 0.53 K/UL (ref 0–0.04)
IMM GRANULOCYTES NFR BLD AUTO: 3 % (ref 0–0.5)
INFLUENZA A, MOLECULAR: NOT DETECTED
INFLUENZA B, MOLECULAR: NOT DETECTED
INR PPP: 1.5 (ref 0.8–1.2)
LYMPHOCYTES # BLD AUTO: 2.2 K/UL (ref 1–4.8)
LYMPHOCYTES NFR BLD: 12.1 % (ref 18–48)
MAGNESIUM SERPL-MCNC: 2.3 MG/DL (ref 1.6–2.6)
MCH RBC QN AUTO: 28.7 PG (ref 27–31)
MCHC RBC AUTO-ENTMCNC: 32.2 G/DL (ref 32–36)
MCV RBC AUTO: 89 FL (ref 82–98)
MONOCYTES # BLD AUTO: 0.9 K/UL (ref 0.3–1)
MONOCYTES NFR BLD: 4.9 % (ref 4–15)
NEUTROPHILS # BLD AUTO: 13.9 K/UL (ref 1.8–7.7)
NEUTROPHILS NFR BLD: 78.2 % (ref 38–73)
NRBC BLD-RTO: 0 /100 WBC
OHS QRS DURATION: 68 MS
OHS QRS DURATION: 70 MS
OHS QTC CALCULATION: 472 MS
OHS QTC CALCULATION: 476 MS
PHOSPHATE SERPL-MCNC: 3.3 MG/DL (ref 2.7–4.5)
PLATELET # BLD AUTO: 436 K/UL (ref 150–450)
PMV BLD AUTO: 11.4 FL (ref 9.2–12.9)
POTASSIUM SERPL-SCNC: 4 MMOL/L (ref 3.5–5.1)
PROT SERPL-MCNC: 8 G/DL (ref 6–8.4)
PROTHROMBIN TIME: 15.7 SEC (ref 9–12.5)
RBC # BLD AUTO: 4.43 M/UL (ref 4–5.4)
RSV AG BY MOLECULAR METHOD: NOT DETECTED
SARS-COV-2 RNA RESP QL NAA+PROBE: NOT DETECTED
SODIUM SERPL-SCNC: 134 MMOL/L (ref 136–145)
WBC # BLD AUTO: 17.82 K/UL (ref 3.9–12.7)

## 2024-11-17 PROCEDURE — 99232 SBSQ HOSP IP/OBS MODERATE 35: CPT | Mod: AF,HB,, | Performed by: STUDENT IN AN ORGANIZED HEALTH CARE EDUCATION/TRAINING PROGRAM

## 2024-11-17 PROCEDURE — 99900035 HC TECH TIME PER 15 MIN (STAT)

## 2024-11-17 PROCEDURE — 85025 COMPLETE CBC W/AUTO DIFF WBC: CPT

## 2024-11-17 PROCEDURE — 63600175 PHARM REV CODE 636 W HCPCS

## 2024-11-17 PROCEDURE — 87040 BLOOD CULTURE FOR BACTERIA: CPT | Mod: 59

## 2024-11-17 PROCEDURE — 85610 PROTHROMBIN TIME: CPT

## 2024-11-17 PROCEDURE — 94668 MNPJ CHEST WALL SBSQ: CPT

## 2024-11-17 PROCEDURE — 25000003 PHARM REV CODE 250

## 2024-11-17 PROCEDURE — 11000001 HC ACUTE MED/SURG PRIVATE ROOM

## 2024-11-17 PROCEDURE — 0241U SARS-COV2 (COVID) WITH FLU/RSV BY PCR: CPT

## 2024-11-17 PROCEDURE — 25000003 PHARM REV CODE 250: Performed by: PHYSICIAN ASSISTANT

## 2024-11-17 PROCEDURE — 83735 ASSAY OF MAGNESIUM: CPT

## 2024-11-17 PROCEDURE — 99233 SBSQ HOSP IP/OBS HIGH 50: CPT | Mod: ,,, | Performed by: STUDENT IN AN ORGANIZED HEALTH CARE EDUCATION/TRAINING PROGRAM

## 2024-11-17 PROCEDURE — 93010 ELECTROCARDIOGRAM REPORT: CPT | Mod: ,,, | Performed by: INTERNAL MEDICINE

## 2024-11-17 PROCEDURE — 84100 ASSAY OF PHOSPHORUS: CPT

## 2024-11-17 PROCEDURE — 25000003 PHARM REV CODE 250: Performed by: STUDENT IN AN ORGANIZED HEALTH CARE EDUCATION/TRAINING PROGRAM

## 2024-11-17 PROCEDURE — 93005 ELECTROCARDIOGRAM TRACING: CPT

## 2024-11-17 PROCEDURE — 63600175 PHARM REV CODE 636 W HCPCS: Performed by: STUDENT IN AN ORGANIZED HEALTH CARE EDUCATION/TRAINING PROGRAM

## 2024-11-17 PROCEDURE — 36415 COLL VENOUS BLD VENIPUNCTURE: CPT

## 2024-11-17 PROCEDURE — 80074 ACUTE HEPATITIS PANEL: CPT

## 2024-11-17 PROCEDURE — 80053 COMPREHEN METABOLIC PANEL: CPT

## 2024-11-17 RX ORDER — QUETIAPINE FUMARATE 25 MG/1
25 TABLET, FILM COATED ORAL 2 TIMES DAILY PRN
Status: DISCONTINUED | OUTPATIENT
Start: 2024-11-17 | End: 2024-11-25

## 2024-11-17 RX ORDER — QUETIAPINE FUMARATE 25 MG/1
50 TABLET, FILM COATED ORAL DAILY
Status: DISCONTINUED | OUTPATIENT
Start: 2024-11-18 | End: 2024-11-25

## 2024-11-17 RX ORDER — ATORVASTATIN CALCIUM 40 MG/1
40 TABLET, FILM COATED ORAL DAILY
Status: DISCONTINUED | OUTPATIENT
Start: 2024-11-17 | End: 2024-11-25

## 2024-11-17 RX ORDER — QUETIAPINE FUMARATE 25 MG/1
100 TABLET, FILM COATED ORAL NIGHTLY
Status: DISCONTINUED | OUTPATIENT
Start: 2024-11-17 | End: 2024-11-25

## 2024-11-17 RX ADMIN — MIRTAZAPINE 7.5 MG: 7.5 TABLET, FILM COATED ORAL at 08:11

## 2024-11-17 RX ADMIN — MUPIROCIN: 20 OINTMENT TOPICAL at 10:11

## 2024-11-17 RX ADMIN — LITHIUM 300 MG: 8 SOLUTION ORAL at 10:11

## 2024-11-17 RX ADMIN — MUSCLE RUB CREAM: 100; 150 CREAM TOPICAL at 03:11

## 2024-11-17 RX ADMIN — MUSCLE RUB CREAM: 100; 150 CREAM TOPICAL at 08:11

## 2024-11-17 RX ADMIN — LITHIUM 300 MG: 8 SOLUTION ORAL at 02:11

## 2024-11-17 RX ADMIN — ASPIRIN 81 MG CHEWABLE TABLET 81 MG: 81 TABLET CHEWABLE at 10:11

## 2024-11-17 RX ADMIN — QUETIAPINE FUMARATE 25 MG: 25 TABLET ORAL at 02:11

## 2024-11-17 RX ADMIN — ATORVASTATIN CALCIUM 40 MG: 40 TABLET, FILM COATED ORAL at 10:11

## 2024-11-17 RX ADMIN — CLOPIDOGREL BISULFATE 75 MG: 75 TABLET ORAL at 10:11

## 2024-11-17 RX ADMIN — VANCOMYCIN HYDROCHLORIDE 1250 MG: 1.25 INJECTION, POWDER, LYOPHILIZED, FOR SOLUTION INTRAVENOUS at 04:11

## 2024-11-17 RX ADMIN — CARVEDILOL 6.25 MG: 6.25 TABLET, FILM COATED ORAL at 08:11

## 2024-11-17 RX ADMIN — CEFEPIME 2 G: 2 INJECTION, POWDER, FOR SOLUTION INTRAVENOUS at 12:11

## 2024-11-17 RX ADMIN — Medication 6 MG: at 08:11

## 2024-11-17 RX ADMIN — MUPIROCIN: 20 OINTMENT TOPICAL at 08:11

## 2024-11-17 RX ADMIN — FOLIC ACID 1 MG: 1 TABLET ORAL at 10:11

## 2024-11-17 RX ADMIN — QUETIAPINE FUMARATE 100 MG: 100 TABLET ORAL at 08:11

## 2024-11-17 RX ADMIN — CEFEPIME 2 G: 2 INJECTION, POWDER, FOR SOLUTION INTRAVENOUS at 10:11

## 2024-11-17 RX ADMIN — THERA TABS 1 TABLET: TAB at 10:11

## 2024-11-17 RX ADMIN — HYDROXYZINE HYDROCHLORIDE 25 MG: 25 TABLET ORAL at 12:11

## 2024-11-17 RX ADMIN — CEFEPIME 2 G: 2 INJECTION, POWDER, FOR SOLUTION INTRAVENOUS at 05:11

## 2024-11-17 RX ADMIN — LITHIUM 300 MG: 8 SOLUTION ORAL at 08:11

## 2024-11-17 RX ADMIN — LIDOCAINE 5% 1 PATCH: 700 PATCH TOPICAL at 02:11

## 2024-11-17 RX ADMIN — FLUOXETINE HYDROCHLORIDE 60 MG: 20 SOLUTION ORAL at 10:11

## 2024-11-17 RX ADMIN — Medication 100 MG: at 10:11

## 2024-11-17 RX ADMIN — MUSCLE RUB CREAM: 100; 150 CREAM TOPICAL at 10:11

## 2024-11-17 RX ADMIN — QUETIAPINE FUMARATE 25 MG: 25 TABLET ORAL at 10:11

## 2024-11-17 RX ADMIN — ENOXAPARIN SODIUM 40 MG: 40 INJECTION SUBCUTANEOUS at 05:11

## 2024-11-17 RX ADMIN — CARVEDILOL 6.25 MG: 6.25 TABLET, FILM COATED ORAL at 10:11

## 2024-11-17 RX ADMIN — ACETAMINOPHEN 1000 MG: 500 TABLET ORAL at 12:11

## 2024-11-17 NOTE — ASSESSMENT & PLAN NOTE
Extubated 11/10 in NCC with persistent oxygen requirement. CXR remarkable for RUL consolidation. Respiratory culture positive for H flu and MSSA. MRSA screen by PCR positive. Was transitioned to cefazolin 11/12 then cefepime 11/14 after sensitivities c/w h flu beta lactamase positive. Extended course even persistent or worsening leukocytosis, plan for total 7 day course (EED 11/21)     -wean oxygen as tolerated, stable on RA  -continue cefepime (EED 11/21)

## 2024-11-17 NOTE — PLAN OF CARE
Problem: Adult Inpatient Plan of Care  Goal: Plan of Care Review  Outcome: Progressing     Problem: Adult Inpatient Plan of Care  Goal: Absence of Hospital-Acquired Illness or Injury  Outcome: Progressing     Problem: Adult Inpatient Plan of Care  Goal: Optimal Comfort and Wellbeing  Outcome: Progressing   Review POC; patient confused and agitated this shift; MD consulted Psych for recs; no med changes this shift; TF remains on hold due to restless in bed and sliding down and risk of aspiration with NGT with continuous TF.  Patient did rest @ /b/12:10 and  14:30

## 2024-11-17 NOTE — PROGRESS NOTES
Neno Conley - Neurosurgery ([facility])  Vascular Neurology  Comprehensive Stroke Center  Progress Note    Assessment/Plan:     * Acute arterial ischemic stroke, multifocal, posterior circulation  Malathi Mcpherson is a 49 y.o. female with PMH of drug use, seizure, HTN, HLD that is admitted to [facility] for further evaluation and management of multifocal acute infarcts. She initially presented to [facility] ED 11/6 after being found lying face down at home that morning, per EMS was lethargic and aphasic and moving LUE/LLE/RLE but was not moving RUE. EMS also reported that they had seen her before in context of seizures. LKW 10pm 11/5. In ED was noted to have exam worsening, now with R sided plegia and global aphasia. NIHSS 22. Also noted to have O2 desat in 70s and ultimately was intubated for airway protection. CTA showed multifocal areas of hypodensity involving the posterior circulation suspicious for infarcts as well as focal occlusion of R vert and L PCA. Not candidate for acute stroke interventions, no TNK due to OOW and no IR due to stroke burden/risk for bleeding. She was admitted to [facility] for higher level of care. NSGY consulted on admit for edema/hemicrani watch, however no surgical interventions recommended. MRI brain confirmed multifocal areas of acute infarct involving: L>R occipital lobes, L medial temporal lobe, bilateral thalami, L hippocampus and R hippocampus tail, R dorsal medulla, bilateral cerebellar hemispheres. TTE unremarkable. Suspect etiology of stroke likely secondary to substance use, Utox + cocaine.    Interval hx: Episodes of coughing and vomiting overnight with concern for aspiration event. TF held, repeat KUB unremarkable. Worsening leukocytosis today, possibly related to aspiration event although uncertain. Will test COVID/flu/RSV, collect blood cultures. Consider addition of vancomycin especially is patient becomes febrile or clinically unstable. LFT's slightly improved, elevated INR. Acute hepatitis  panel ordered, consider liver ultrasound.     Antithrombotics for secondary stroke prevention: Antiplatelets: Aspirin: 81 mg daily  Clopidogrel: 75 mg daily.     Statins for secondary stroke prevention and hyperlipidemia, if present: Statins: Atorvastatin- 40 mg daily    Aggressive risk factor modification: HTN, HLD, Substance use.     Rehab efforts: The patient has been evaluated by a stroke team provider and the therapy needs have been fully considered based off the presenting complaints and exam findings. The following therapy evaluations are needed: PT evaluate and treat, OT evaluate and treat, SLP evaluate and treat. High intensity therapy recommended. Modified barium swallow test scheduled Monday (11/18) per speech.    Diagnostics ordered/pending: Other: Modified barium swallow    VTE prophylaxis: Enoxaparin 40 mg SQ every 24 hours  Mechanical prophylaxis: Place SCDs    BP parameters: Infarct: SBP goal <180      Transaminitis  LFTs uptrending, tylenol updated to PRN.   Decreased dose of statin to atorvastatin 40 mg 11/17 given elevated INR 1.5.   Consider liver ultrasound, f/u acute hepatitis panel    Lab Results   Component Value Date    ALT 55 (H) 11/17/2024    AST 47 (H) 11/17/2024    ALKPHOS 96 11/17/2024    BILITOT 0.4 11/17/2024     Monitor daily CMP, PT/INR    Agitation  -see anxiety and depression    HTN (hypertension)  Patients blood pressure range in the last 24 hours was: BP  Min: 86/51  Max: 212/97. The patient's inpatient anti-hypertensive regimen is listed below:    Current Antihypertensives  , 2 times daily, Oral  carvediloL tablet 6.25 mg, 2 times daily, Per NG tube    - BP is controlled, no changes needed to their regimen  -SBP goal < 180      Right upper lobe pneumonia  Extubated 11/10 in [facility] with persistent oxygen requirement. CXR remarkable for RUL consolidation. Respiratory culture positive for H flu and MSSA. MRSA screen by PCR positive. Was transitioned to cefazolin 11/12 then cefepime  11/14 after sensitivities c/w h flu beta lactamase positive. Extended course even persistent or worsening leukocytosis, plan for total 7 day course (EED 11/21)     -wean oxygen as tolerated, stable on RA  -continue cefepime (EED 11/21)     Debility  -Due to stroke  -Aggressive therapy  -PT/OT/SLP eval and treat    Polysubstance abuse  -Utox + cocaine  -Suspect etiology of stroke likely due to substance use  -Continue thiamine/folate/multivitamin supplementation  -Addiction psych consulted early on admission    Cytotoxic cerebral edema  -Area of cerebral edema identified when reviewing brain imaging involving bilateral posterior circulation territories due to acute infarcts, there is mild mass effect associated with it that has remained stable on follow up imaging.  -Admitted to [facility] for close neuro monitoring and observation  -NSGY consulted on admit, no surgical intervention recommended and NSGY s/o  - Stepped down to floor  -Continue frequent q4hr neuro checks as any acute changes or worsening neuro status .  -Obtain stat CTH for any new or worsening neuro changes and notify primary team immediately    Anxiety and depression  -Psychiatry consulted, appreciate recs  Continue lithium 300 mg TID (resumed 11/13), Prozac 60 mg daily, Seroquel 25 mg in the AM and 50 mg in the PM, Remeron 7.5 mg nightly.   PRN Seroquel 25 mg BID for non-redirectable agitation.    -daily EKG to monitor Qtc  -f/u serum lithium level on 11/18/24 11/07/2024 Pt was agitated overnight and is on fentanyl. Currently intubated.On repeated painful stimuli has movements on LUE,LLE, RLE> RUE.The movement on right side is an improvement from yesterday.  11/11/2024 NAEON, remains on precedex for agitation. Extubated yesterday (11/10), tolerating extubation thus far without complication. Moving extremities spontaneously although weakness noted in RUE>>RLE. Remains globally aphasic and agitated despite max precedex infusion. Respiratory cultures  + H flu, on rocephin for abx. Also on bactrim for SSTI. Home lithium restarted today, continue seoquel and fluoxetine.  11/14/2024 NAEO, patient off precedex, BL wrist and torso restraints, NG tube with feeds running. Patient able to respond to questions and follow some commands. Oriented to self only. Stable for stepdown.   11/15/2024 Pt still in restraints. Per nurse tendency to pull at tubes and things.On restraints. Oriented to self. No other complaints in the am.  11/16/2024 remains in restraints this AM, reportedly agitated overnight with concerns from nursing. Psychiatry consulted for med rec given extensive psych history on lithium and multiple different psych meds on admission med rec/recent dispense history and concerns for worsening agitation. LFTs uptrending, tylenol updated to PRN. Consider decreased dose of statin if worsening.   11/17/2024 episodes of coughing and vomiting overnight with concern for aspiration event. TF held, repeat KUB unremarkable. Worsening leukocytosis today, possibly related to aspiration event although uncertain. Will test COVID/flu/RSV, collect blood cultures. Consider addition of vancomycin especially is patient becomes febrile or clinically unstable. LFT's slightly improved, elevated INR. Acute hepatitis panel ordered, consider liver ultrasound.       STROKE DOCUMENTATION   Acute Stroke Times   Last Known Normal Date: 11/05/24  Last Known Normal Time: 2200  Symptom Onset Date: 11/06/24 (unsure of timing)  Symptom Onset Time:  (sometime this morning.)  Unknown Symptom Onset Time: Unknown Time  Stroke Team Called Date: 11/06/24  Stroke Team Called Time: 0843  Stroke Team Arrival Date: 11/06/24  Stroke Team Arrival Time: 0845  CT Interpretation Time: 0859  Thrombolytic Therapy Recommended: No  CTA Interpretation Time: 0902  Thrombectomy Recommended: No    NIH Scale:  1a. Level of Consciousness: 0-->Alert, keenly responsive  1b. LOC Questions: 2-->Answers neither question  correctly  1c. LOC Commands: 1-->Performs one task correctly  2. Best Gaze: 0-->Normal  3. Visual: 0-->No visual loss  4. Facial Palsy: 0-->Normal symmetrical movements  5a. Motor Arm, Left: 0-->No drift, limb holds 90 (or 45) degrees for full 10 secs (in restraints)  5b. Motor Arm, Right: 0-->No drift, limb holds 90 (or 45) degrees for full 10 secs (in restraints)  6a. Motor Leg, Left: 0-->No drift, leg holds 30 degree position for full 5 secs  6b. Motor Leg, Right: 0-->No drift, leg holds 30 degree position for full 5 secs  7. Limb Ataxia: 0-->Absent  8. Sensory: 0-->Normal, no sensory loss  9. Best Language: 1-->Mild-to-moderate aphasia, some obvious loss of fluency or facility of comprehension, without significant limitation on ideas expressed or form of expression. Reduction of speech and/or comprehension, however, makes conversation. . . (see row details)  10. Dysarthria: 1-->Mild-to-moderate dysarthria, patient slurs at least some words and, at worst, can be understood with some difficulty  11. Extinction and Inattention (formerly Neglect): 0-->No abnormality  Total (NIH Stroke Scale): 5       Modified Yonny Score: 0  Shailesh Coma Scale:    ABCD2 Score:    [id number] Score:   HAS -BLED Score:   ICH Score:   Hunt & Argueta Classification:      Hemorrhagic change of an Ischemic Stroke: Does this patient have an ischemic stroke with hemorrhagic changes? No    Neurologic Chief Complaint: acute arterial ischemic stroke, multifocal, posterior circulation     Subjective:     Interval History: Patient is seen for follow-up neurological assessment and treatment recommendations: see hospital course.     HPI, Past Medical, Family, and Social History remains the same as documented in the initial encounter.     Review of Systems   Unable to perform ROS: Other     Scheduled Meds:   aspirin  81 mg Per NG tube Daily    atorvastatin  40 mg Per NG tube Daily    carvediloL  6.25 mg Per NG tube BID    ceFEPime IV (PEDS and  ADULTS)  2 g Intravenous Q8H    clopidogreL  75 mg Per NG tube Daily    enoxparin  40 mg Subcutaneous Q24H (prophylaxis, 1700)    FLUoxetine  60 mg Per NG tube Daily    folic acid  1 mg Per NG tube Daily    LIDOcaine  1 patch Transdermal Q24H    lithium citrate 8 meq/5 ml  300 mg Per NG tube TID    melatonin  6 mg Per NG tube Nightly    methyl salicylate-menthol 15-10%   Topical (Top) TID    mirtazapine  7.5 mg Per NG tube QHS    multivitamin  1 tablet Per NG tube Daily    mupirocin   Nasal BID    QUEtiapine  25 mg Per NG tube Daily    QUEtiapine  50 mg Per NG tube QHS    senna-docusate 8.6-50 mg  1 tablet Per NG tube Daily    thiamine  100 mg Per NG tube Daily     Continuous Infusions:  PRN Meds:  Current Facility-Administered Medications:     acetaminophen, 1,000 mg, Per NG tube, BID PRN    albuterol-ipratropium, 3 mL, Nebulization, Q6H PRN    hydrOXYzine HCL, 25 mg, Per NG tube, QID PRN    polyethylene glycol, 17 g, Per NG tube, BID PRN    QUEtiapine, 25 mg, Per NG tube, BID PRN    sodium chloride 0.9%, 10 mL, Intravenous, PRN    Objective:     Vital Signs (Most Recent):  Temp: 98.8 °F (37.1 °C) (11/17/24 0845)  Pulse: 97 (11/17/24 1047)  Resp: 18 (11/17/24 0330)  BP: (!) 177/89 (11/17/24 0845)  SpO2: 96 % (11/17/24 0845)  BP Location: Right arm    Vital Signs Range (Last 24H):  Temp:  [97.6 °F (36.4 °C)-99.3 °F (37.4 °C)]   Pulse:  []   Resp:  [16-18]   BP: (130-212)/(85-99)   SpO2:  [94 %-96 %]   BP Location: Right arm    Physical Exam  Vitals and nursing note reviewed.   Constitutional:       General: She is not in acute distress.     Appearance: She is not ill-appearing.      Comments: Cough & congestion noted on exam.    HENT:      Head: Normocephalic and atraumatic.      Nose:      Comments: NG tube in place to R nare  Eyes:      General: Visual field deficit present. No scleral icterus.     Extraocular Movements: Extraocular movements intact.      Conjunctiva/sclera: Conjunctivae normal.      Pupils:  Pupils are equal, round, and reactive to light.   Cardiovascular:      Rate and Rhythm: Normal rate and regular rhythm.      Pulses: Normal pulses.   Pulmonary:      Effort: Pulmonary effort is normal. No respiratory distress.      Breath sounds: No wheezing.   Abdominal:      General: Abdomen is flat. There is no distension.   Musculoskeletal:         General: No swelling or tenderness.      Right lower leg: No edema.      Left lower leg: No edema.      Comments: Upper extremity restraints   Skin:     General: Skin is warm and dry.      Coloration: Skin is not jaundiced.   Neurological:      Mental Status: She is alert.      Cranial Nerves: Dysarthria present.       Neurological Exam:    LOC: alert  Attention Span: poor  Language:  aphasia present  Articulation: Dysarthria present  Motor: Leg left  Normal 5/5  Leg right Normal 5/5  LUE > more movements than RUE.    Laboratory:  CMP:   Recent Labs   Lab 11/17/24  0530   CALCIUM 9.6   ALBUMIN 2.9*   PROT 8.0   *   K 4.0   CO2 18*      BUN 22*   CREATININE 0.7   ALKPHOS 96   ALT 55*   AST 47*   BILITOT 0.4       CBC:   Recent Labs   Lab 11/17/24  0530   WBC 17.82*   RBC 4.43   HGB 12.7   HCT 39.5      MCV 89   MCH 28.7   MCHC 32.2       Diagnostic Results     Brain Imaging / Vessel Imaging   Results for orders placed or performed during the hospital encounter of 11/06/24 (from the past 2160 hours)   CTA STROKE MULTI-PHASE    Narrative    EXAMINATION:  CTA STROKE MULTI-PHASE; CT CERVICAL SPINE WITHOUT CONTRAST    CLINICAL HISTORY:  Neuro deficit, acute, stroke suspected;; Neck trauma, intoxicated or obtunded (Age >= 16y);    TECHNIQUE:  Axial CT images obtained throughout the region of the head and cervical spine before the administration of intravenous contrast.    CT angiogram was performed through the cervical and intracranial vasculature during the IV bolus administration of 75mL of Omnipaque 350.  Two additional phases through the intracranial  vasculature via multiphase technique.    Multiplanar MPR and MIP reformats were performed.    CT source data was analyzed using artificial intelligence software for detection of a large vessel occlusions (LVO) in order to enable computer assisted triage notification and aid clinical stroke decision making.    COMPARISON:  CTA head and cervical spine 10/25/2015    FINDINGS:  Large region of hypoattenuation loss of gray-white differentiation in left occipital lobe in medial temporal lobe in keeping with an acute PCA distribution infarct.  Modest localized mass effect with some sulcal crowding.  Additional involvement of the left thalamus.  Similar appearance in the medial right occipital lobe, but smaller area of infarction.  Small left superior cerebellar infarct, also likely acute.  No evidence of recent or remote major vascular distribution infarct in the anterior circulation.  No acute parenchymal hemorrhage.  No midline shift.    The ventricles are normal in size without evidence of hydrocephalus.    No extra-axial blood or fluid collections.    The cranium appears intact.    Mastoid air cells and paranasal sinuses are essentially clear.    The vertebral bodies are normal in height and morphology without evidence of an acute displaced fracture or focal osseous destructive process.    Normal sagittal alignment is preserved.  No spondylolisthesis.    Intervertebral disc heights are well maintained.  No evidence of a bony spinal canal stenosis.    Evaluation intraspinal contents demonstrates no evidence of mass or hematoma.    No acute abnormalities in the paraspinal soft tissue structures.    Emphysematous changes patchy ground-glass opacity in the partially visualized lung apices.      CTA:    The aortic arch demonstrates no significant stenosis at the major branch vessel origins.    There is a focal thrombus in the right subclavian artery extending into the right vertebral artery origin with severe stenosis.   Thrombus extending over proximally 1 cm.  The V1 and V2 segments of right vertebral artery are otherwise normal in caliber of slightly decreased in attenuation in comparison to the other cervical vasculature.  Abrupt occlusion of distal right vertebral artery about the level of the PICA origin approximate 2 cm segment of non enhancement.    Moderate focal stenosis at the left vertebral artery origin.  Left vertebral artery is otherwise normal in caliber.  The basilar artery is normal in caliber.    The right common carotid artery is normal in caliber.  No significant stenosis at the carotid bifurcation.The right internal carotid artery is normal in caliber.    The left common carotid artery is normal in caliber. No significant stenosis at the carotid bifurcation.The left internal carotid artery is normal in caliber.    Abrupt non enhancement in the P1 segment of the left PCA.  The proximal right PCA appears within normal limits.    The proximal MCAs and ACAs are unremarkable.    Findings were immediately discussed upon identification via telephone with the stroke service ([facility]) at approximately 09:20.      Impression    Multifocal acute posterior circulation distribution infarcts as further discussed above, particularly involving essentially the entirety of the left PCA territory.  Modest mass effect without significant hemorrhage.    Focal thrombus with severe stenosis extending from the right subclavian artery into the proximal right vertebral artery.    Focal occlusion of the proximal intracranial segment of the right vertebral artery.    Focal occlusion of the proximal left PCA.    Moderate focal stenosis at the left vertebral artery origin.    Anterior circulation appears unremarkable.    No evidence of fracture or traumatic malalignment in the cervical spine.    Emphysematous changes and patchy ground-glass opacity in the partially visualized lung apices.    This report was flagged in Epic as  abnormal.      Electronically signed by: Geo Ambrocio MD  Date:    11/06/2024  Time:    09:33   CT Cervical Spine Without Contrast    Narrative    EXAMINATION:  CTA STROKE MULTI-PHASE; CT CERVICAL SPINE WITHOUT CONTRAST    CLINICAL HISTORY:  Neuro deficit, acute, stroke suspected;; Neck trauma, intoxicated or obtunded (Age >= 16y);    TECHNIQUE:  Axial CT images obtained throughout the region of the head and cervical spine before the administration of intravenous contrast.    CT angiogram was performed through the cervical and intracranial vasculature during the IV bolus administration of 75mL of Omnipaque 350.  Two additional phases through the intracranial vasculature via multiphase technique.    Multiplanar MPR and MIP reformats were performed.    CT source data was analyzed using artificial intelligence software for detection of a large vessel occlusions (LVO) in order to enable computer assisted triage notification and aid clinical stroke decision making.    COMPARISON:  CTA head and cervical spine 10/25/2015    FINDINGS:  Large region of hypoattenuation loss of gray-white differentiation in left occipital lobe in medial temporal lobe in keeping with an acute PCA distribution infarct.  Modest localized mass effect with some sulcal crowding.  Additional involvement of the left thalamus.  Similar appearance in the medial right occipital lobe, but smaller area of infarction.  Small left superior cerebellar infarct, also likely acute.  No evidence of recent or remote major vascular distribution infarct in the anterior circulation.  No acute parenchymal hemorrhage.  No midline shift.    The ventricles are normal in size without evidence of hydrocephalus.    No extra-axial blood or fluid collections.    The cranium appears intact.    Mastoid air cells and paranasal sinuses are essentially clear.    The vertebral bodies are normal in height and morphology without evidence of an acute displaced fracture or focal  osseous destructive process.    Normal sagittal alignment is preserved.  No spondylolisthesis.    Intervertebral disc heights are well maintained.  No evidence of a bony spinal canal stenosis.    Evaluation intraspinal contents demonstrates no evidence of mass or hematoma.    No acute abnormalities in the paraspinal soft tissue structures.    Emphysematous changes patchy ground-glass opacity in the partially visualized lung apices.      CTA:    The aortic arch demonstrates no significant stenosis at the major branch vessel origins.    There is a focal thrombus in the right subclavian artery extending into the right vertebral artery origin with severe stenosis.  Thrombus extending over proximally 1 cm.  The V1 and V2 segments of right vertebral artery are otherwise normal in caliber of slightly decreased in attenuation in comparison to the other cervical vasculature.  Abrupt occlusion of distal right vertebral artery about the level of the PICA origin approximate 2 cm segment of non enhancement.    Moderate focal stenosis at the left vertebral artery origin.  Left vertebral artery is otherwise normal in caliber.  The basilar artery is normal in caliber.    The right common carotid artery is normal in caliber.  No significant stenosis at the carotid bifurcation.The right internal carotid artery is normal in caliber.    The left common carotid artery is normal in caliber. No significant stenosis at the carotid bifurcation.The left internal carotid artery is normal in caliber.    Abrupt non enhancement in the P1 segment of the left PCA.  The proximal right PCA appears within normal limits.    The proximal MCAs and ACAs are unremarkable.    Findings were immediately discussed upon identification via telephone with the stroke service ([place]) at approximately 09:20.      Impression    Multifocal acute posterior circulation distribution infarcts as further discussed above, particularly involving essentially the  entirety of the left PCA territory.  Modest mass effect without significant hemorrhage.    Focal thrombus with severe stenosis extending from the right subclavian artery into the proximal right vertebral artery.    Focal occlusion of the proximal intracranial segment of the right vertebral artery.    Focal occlusion of the proximal left PCA.    Moderate focal stenosis at the left vertebral artery origin.    Anterior circulation appears unremarkable.    No evidence of fracture or traumatic malalignment in the cervical spine.    Emphysematous changes and patchy ground-glass opacity in the partially visualized lung apices.    This report was flagged in Epic as abnormal.      Electronically signed by: Geo Ambrocio MD  Date:    11/06/2024  Time:    09:33   CT Head Without Contrast    Narrative    EXAMINATION:  CT HEAD WITHOUT CONTRAST    CLINICAL HISTORY:  Stroke, follow up;    TECHNIQUE:  Low dose axial images were obtained through the head.  Coronal and sagittal reformations were also performed. Contrast was not administered.    COMPARISON:  CT, MRI 11/06/2024    FINDINGS:  Evolving posterior circulation infarcts are identified as described on recent MR imaging again most notable within the left greater than right occipital lobes extending to the medial left temporal lobe and bilateral thalamus.  No hemorrhagic conversion with potentially minimal stable petechial hemorrhage within the medial left thalamus.  No midline shift or herniation with mild mass effect on the 3rd ventricle again noted.    No new acute territorial infarct.    The visualized sinuses and mastoid air cells are clear.      Impression    Expected evolutionary changes of the posterior circulation infarcts with no overt hemorrhagic conversion or significant midline shift/herniation.      Electronically signed by: Adrián Hobbs  Date:    11/09/2024  Time:    08:38      Results for orders placed or performed during the hospital encounter of 11/06/24  (from the past 2160 hours)   MRI Brain Without Contrast    Impression    Multiple posterior circulation infarcts are identified with involvement of the left greater than right occipital lobes and medial left temporal lobe, thalamus, right dorsal medulla, and minimally involving the cerebellum.  No midline shift with minimal mass effect on the 3rd ventricle.    Loss of flow void within the right intracranial vertebral artery in keeping with the findings on CTA.      Electronically signed by: Adrián oHbbs  Date:    11/06/2024  Time:    17:40      Cardiac Imaging   Results for orders placed during the hospital encounter of 11/06/24    Echo Saline Bubble? Yes; Ultrasound enhancing contrast? Yes    Interpretation Summary    Left Ventricle: The left ventricle is normal in size. Normal wall thickness. There is normal systolic function with a visually estimated ejection fraction of 55 - 60%. Biplane (2D) method of discs ejection fraction is 56%. Global longitudinal strain is -18.0%. There is normal diastolic function.    Right Ventricle: Normal right ventricular cavity size. Wall thickness is normal. Systolic function is normal.    Tricuspid Valve: There is mild regurgitation.    Pulmonary Artery: The estimated pulmonary artery systolic pressure is at least 28 mmHg.    IVC/SVC: Patient is ventilated, cannot use IVC diameter to estimate right atrial pressure.         Sandy Nichols MD  [facility]  Department of Vascular Neurology   [facility] [place])

## 2024-11-17 NOTE — NURSING
MD at bedside; instruction to restart TF per NGT at trickle feed rate for 6 hours and then to increase back up to goal rate.

## 2024-11-17 NOTE — ASSESSMENT & PLAN NOTE
Malathi Mcpherson is a 49 y.o. female with PMH of drug use, seizure, HTN, HLD that is admitted to [facility] for further evaluation and management of multifocal acute infarcts. She initially presented to [facility] ED 11/6 after being found lying face down at home that morning, per EMS was lethargic and aphasic and moving LUE/LLE/RLE but was not moving RUE. EMS also reported that they had seen her before in context of seizures. LKW 10pm 11/5. In ED was noted to have exam worsening, now with R sided plegia and global aphasia. NIHSS 22. Also noted to have O2 desat in 70s and ultimately was intubated for airway protection. CTA showed multifocal areas of hypodensity involving the posterior circulation suspicious for infarcts as well as focal occlusion of R vert and L PCA. Not candidate for acute stroke interventions, no TNK due to OOW and no IR due to stroke burden/risk for bleeding. She was admitted to [facility] for higher level of care. NSGY consulted on admit for edema/hemicrani watch, however no surgical interventions recommended. MRI brain confirmed multifocal areas of acute infarct involving: L>R occipital lobes, L medial temporal lobe, bilateral thalami, L hippocampus and R hippocampus tail, R dorsal medulla, bilateral cerebellar hemispheres. TTE unremarkable. Suspect etiology of stroke likely secondary to substance use, Utox + cocaine.    Interval hx: Episodes of coughing and vomiting overnight with concern for aspiration event. TF held, repeat KUB unremarkable. Worsening leukocytosis today, possibly related to aspiration event although uncertain. Will test COVID/flu/RSV, collect blood cultures. Consider addition of vancomycin especially is patient becomes febrile or clinically unstable. LFT's slightly improved, elevated INR. Acute hepatitis panel ordered, consider liver ultrasound.     Antithrombotics for secondary stroke prevention: Antiplatelets: Aspirin: 81 mg daily  Clopidogrel: 75 mg daily.     Statins for secondary stroke  prevention and hyperlipidemia, if present: Statins: Atorvastatin- 40 mg daily    Aggressive risk factor modification: HTN, HLD, Substance use.     Rehab efforts: The patient has been evaluated by a stroke team provider and the therapy needs have been fully considered based off the presenting complaints and exam findings. The following therapy evaluations are needed: PT evaluate and treat, OT evaluate and treat, SLP evaluate and treat. High intensity therapy recommended. Modified barium swallow test scheduled Monday (11/18) per speech.    Diagnostics ordered/pending: Other: Modified barium swallow    VTE prophylaxis: Enoxaparin 40 mg SQ every 24 hours  Mechanical prophylaxis: Place SCDs    BP parameters: Infarct: SBP goal <180

## 2024-11-17 NOTE — ASSESSMENT & PLAN NOTE
-Psychiatry consulted, appreciate recs  Continue lithium 300 mg TID (resumed 11/13), Prozac 60 mg daily, Seroquel 25 mg in the AM and 50 mg in the PM, Remeron 7.5 mg nightly.   PRN Seroquel 25 mg BID for non-redirectable agitation.    -daily EKG to monitor Qtc  -f/u serum lithium level on 11/18/24

## 2024-11-17 NOTE — ASSESSMENT & PLAN NOTE
-Area of cerebral edema identified when reviewing brain imaging involving bilateral posterior circulation territories due to acute infarcts, there is mild mass effect associated with it that has remained stable on follow up imaging.  -Admitted to [facility] for close neuro monitoring and observation  -NSGY consulted on admit, no surgical intervention recommended and NSGY s/o  - Stepped down to floor  -Continue frequent q4hr neuro checks as any acute changes or worsening neuro status .  -Obtain stat CTH for any new or worsening neuro changes and notify primary team immediately

## 2024-11-17 NOTE — PROGRESS NOTES
"CONSULTATION LIAISON PSYCHIATRY PROGRESS NOTE    Patient Name: Malathi Mcpherson  MRN: [id number]  Patient Class: IP- Inpatient  Admission Date: 11/6/2024  Attending Physician: Emily Recinos MD      SUBJECTIVE:   49 y.o. female with polysubstance use disorder (crack cocaine, heroin, alcohol) and bipolar disorder who is admitted for large bilateral CVA. Patient most recently in drug rehab court prior to hospitalization and relapsed on crack cocaine in the week prior to admission. She is admitted to the hospital for further evaluation and management of multifocal acute infarcts.     Recently evaluated by psychiatry on 11/14/24 for her stimulant use disorder and history of bipolar disorder. Psychiatry re-consulted for "Worsening agitation, extensive psych hx (bipolar?) on lithium, need med recs for agitation."     Interval:  Per nursing, no behavioral issues overnight and did not require further behavioral PRNs. Nib546 on most recent EKG. Received PRN Hydroxyzine for insomnia.    Approached patient at bedside, appearing disoriented and minimally able to respond appropriately at times, still in 2-pt restraints and attempting to remove them during interview. Also with daughter and mom present at bedside, who notes pt seemingly with better night since one day prior. Was able to sleep better last night. Pt able to state name and year. Unable to engage further on interview. Mom and daughter report unsure of recent psych med regimen, however endorses when pt has been compliant she has been stable on these meds. Discussed delirium precautions with family and will consider changes to medication regimen.    OBJECTIVE:    Mental Status Exam:  General Appearance:  dressed in hospital garb, disheveled, ill-appearing, NG tube to R nare.   Behavior:  reluctant to participate, minimal responses, agitated, fidgety  Involuntary Movements and Motor Activity: no abnormal involuntary movements noted; no tics, no tremors, no akathisia, no " "dystonia, no evidence of tardive dyskinesia; no psychomotor agitation or retardation  Gait and Station: unable to assess - patient lying down or seated  Speech and Language:  dysarthric, mumbling, responds to questions minimally/briefly  Mood: "fine"  Affect: irritable  Thought Process and Associations: difficult to assess due to poverty of thought  Perceptual Disturbances: denies hallucinations  Thought Content and Perceptions:: no suicidal or homicidal ideation, no auditory or visual hallucinations, no paranoid ideation, no ideas of reference, no evidence of delusions or psychosis  Sensorium and Orientation: oriented to person only, oriented to year  Recent and Remote Memory: impaired  Attention and Concentration: impaired, inattentive to conversation  Fund of Knowledge: impaired  Insight: limited  Judgment: limited    CAM ICU positive? Not formally assessed      ASSESSMENT & RECOMMENDATIONS   Delirium due to multiple etiologies [multiple acute infarcts, debility, pneumonia ] (F05)  Cocaine use disorder, severe  Hx of Bipolar Disorder      Scheduled Medication(s):  Continue lithium 300 mg TID, Prozac 60 mg daily, Remeron 7.5 mg nightly.   Can increase Seroquel 50 mg in the AM and 100 mg in the PM      PRN Medication(s):  PRN Seroquel 25 mg BID for non-redirectable agitation.       Other Recommendations (labs, imaging, further consults, etc.):  Most recent EKG on 11/16/24 with QTC of 476; recommend daily EKG in the setting of QT prolongation and agitation despite current psychotropic regimen.   Would recommend checking lithium level on 11/18/24, after 5 days on current dosage.      Delirium Behavior Management  PLEASE utilize PRN meds first for agitation. Minimize use of PHYSICAL restraints OR have periods of being out of physical restraints if possible.  Keep window shades open and room lit during day and room dim at night in order to promote normal sleep-wake cycles  Encourage family at bedside. [place] patient " often to situation, location, date.  Continue to Limit or Discontinue use of Narcotics, Benzos and Anti-cholinergic medications as they may worsen delirium.  Continue medical workup for causative etiology of Delirium.      Risk Assessment / Legal Status  Patient does not meet criteria for PEC or involuntary inpatient psychiatric admission at this time. Recommend to rescind PEC if one was placed. Patient is not currently an imminent danger to self or others and is not gravely disabled due to a psychiatric illness.     Follow-up:  Will follow-up while in house.     Disposition:   Defer to primary team.    Please contact ON CALL psychiatry service (24/7) for any acute issues that may arise.    Trae Singh MD  [facility] PGY-2   [facility]    --------------------------------------------------------------------------------------------------------------------------------------------------------------------------------------------------------------------------------------    CONTINUED OBJECTIVE clinical data & findings reviewed and noted for above decision making    Current Medications:   Scheduled Meds:    aspirin  81 mg Per NG tube Daily    atorvastatin  40 mg Per NG tube Daily    carvediloL  6.25 mg Per NG tube BID    ceFEPime IV (PEDS and ADULTS)  2 g Intravenous Q8H    clopidogreL  75 mg Per NG tube Daily    enoxparin  40 mg Subcutaneous Q24H (prophylaxis, 1700)    FLUoxetine  60 mg Per NG tube Daily    folic acid  1 mg Per NG tube Daily    LIDOcaine  1 patch Transdermal Q24H    lithium citrate 8 meq/5 ml  300 mg Per NG tube TID    melatonin  6 mg Per NG tube Nightly    methyl salicylate-menthol 15-10%   Topical (Top) TID    mirtazapine  7.5 mg Per NG tube QHS    multivitamin  1 tablet Per NG tube Daily    mupirocin   Nasal BID    QUEtiapine  25 mg Per NG tube Daily    QUEtiapine  50 mg Per NG tube QHS    senna-docusate 8.6-50 mg  1 tablet Per NG tube Daily    thiamine  100 mg Per  NG tube Daily    vancomycin (VANCOCIN) IV (PEDS and ADULTS)  1,250 mg Intravenous Once    [START ON 11/18/2024] vancomycin (VANCOCIN) IV (PEDS and ADULTS)  750 mg Intravenous Q12H     PRN Meds:   Current Facility-Administered Medications:     acetaminophen, 1,000 mg, Per NG tube, BID PRN    albuterol-ipratropium, 3 mL, Nebulization, Q6H PRN    hydrOXYzine HCL, 25 mg, Per NG tube, QID PRN    polyethylene glycol, 17 g, Per NG tube, BID PRN    QUEtiapine, 25 mg, Per NG tube, BID PRN    sodium chloride 0.9%, 10 mL, Intravenous, PRN    Pharmacy to dose Vancomycin consult, , , Once **AND** vancomycin - pharmacy to dose, , Intravenous, pharmacy to manage frequency    Allergies:   Review of patient's allergies indicates:  No Known Allergies    Vitals  Vitals:    11/17/24 1530   BP: (!) 135/99   Pulse: 97   Resp: 18   Temp: 97.5 °F (36.4 °C)       Labs/Imaging/Studies:  Recent Results (from the past 24 hours)   POCT glucose    Collection Time: 11/16/24  8:44 PM   Result Value Ref Range    POCT Glucose 122 (H) 70 - 110 mg/dL   Comprehensive metabolic panel    Collection Time: 11/17/24  5:30 AM   Result Value Ref Range    Sodium 134 (L) 136 - 145 mmol/L    Potassium 4.0 3.5 - 5.1 mmol/L    Chloride 103 95 - 110 mmol/L    CO2 18 (L) 23 - 29 mmol/L    Glucose 118 (H) 70 - 110 mg/dL    BUN 22 (H) 6 - 20 mg/dL    Creatinine 0.7 0.5 - 1.4 mg/dL    Calcium 9.6 8.7 - 10.5 mg/dL    Total Protein 8.0 6.0 - 8.4 g/dL    Albumin 2.9 (L) 3.5 - 5.2 g/dL    Total Bilirubin 0.4 0.1 - 1.0 mg/dL    Alkaline Phosphatase 96 40 - 150 U/L    AST 47 (H) 10 - 40 U/L    ALT 55 (H) 10 - 44 U/L    eGFR >60.0 >60 mL/min/1.73 m^2    Anion Gap 13 8 - 16 mmol/L   Magnesium    Collection Time: 11/17/24  5:30 AM   Result Value Ref Range    Magnesium 2.3 1.6 - 2.6 mg/dL   Phosphorus    Collection Time: 11/17/24  5:30 AM   Result Value Ref Range    Phosphorus 3.3 2.7 - 4.5 mg/dL   CBC auto differential    Collection Time: 11/17/24  5:30 AM   Result Value Ref  Range    WBC 17.82 (H) 3.90 - 12.70 K/uL    RBC 4.43 4.00 - 5.40 M/uL    Hemoglobin 12.7 12.0 - 16.0 g/dL    Hematocrit 39.5 37.0 - 48.5 %    MCV 89 82 - 98 fL    MCH 28.7 27.0 - 31.0 pg    MCHC 32.2 32.0 - 36.0 g/dL    RDW 14.0 11.5 - 14.5 %    Platelets 436 150 - 450 K/uL    MPV 11.4 9.2 - 12.9 fL    Immature Granulocytes 3.0 (H) 0.0 - 0.5 %    Gran # (ANC) 13.9 (H) 1.8 - 7.7 K/uL    Immature Grans (Abs) 0.53 (H) 0.00 - 0.04 K/uL    Lymph # 2.2 1.0 - 4.8 K/uL    Mono # 0.9 0.3 - 1.0 K/uL    Eos # 0.2 0.0 - 0.5 K/uL    Baso # 0.13 0.00 - 0.20 K/uL    nRBC 0 0 /100 WBC    Gran % 78.2 (H) 38.0 - 73.0 %    Lymph % 12.1 (L) 18.0 - 48.0 %    Mono % 4.9 4.0 - 15.0 %    Eosinophil % 1.1 0.0 - 8.0 %    Basophil % 0.7 0.0 - 1.9 %    Differential Method Automated    Protime-INR    Collection Time: 11/17/24  5:30 AM   Result Value Ref Range    Prothrombin Time 15.7 (H) 9.0 - 12.5 sec    INR 1.5 (H) 0.8 - 1.2   EKG 12-lead    Collection Time: 11/17/24  5:51 AM   Result Value Ref Range    QRS Duration 70 ms    [facility] QTC Calculation 476 ms   SARS-Cov2 (COVID) with FLU/RSV by PCR    Collection Time: 11/17/24 10:03 AM   Result Value Ref Range    SARS-CoV2 (COVID-19) Qualitative PCR Not Detected Not Detected    Influenza A, Molecular Not Detected Not Detected    Influenza B, Molecular Not Detected Not Detected    RSV Ag by Molecular Method Not Detected Not Detected     Imaging Results              MRI Brain Without Contrast (Final result)  Result time 11/06/24 17:40:17      Final result by Adrián Hobbs MD (11/06/24 17:40:17)                   Impression:      Multiple posterior circulation infarcts are identified with involvement of the left greater than right occipital lobes and medial left temporal lobe, thalamus, right dorsal medulla, and minimally involving the cerebellum.  No midline shift with minimal mass effect on the 3rd ventricle.    Loss of flow void within the right intracranial vertebral artery in keeping with the  findings on CTA.      Electronically signed by: Adrián Hobbs  Date:    11/06/2024  Time:    17:40               Narrative:    EXAMINATION:  MRI BRAIN WITHOUT CONTRAST    CLINICAL HISTORY:  Stroke, follow up;    TECHNIQUE:  Multiplanar multisequence MR imaging of the brain was performed without contrast.    COMPARISON:  CT 11/06/2024    FINDINGS:  Multiple areas of restricted diffusion consistent with acute infarcts within the posterior circulation are identified.  There is involvement of the left greater than right occipital lobes and left medial temporal lobe (PCA territory), bilateral thalamus, left hippocampus and right hippocampal tail, right dorsal medulla, and tiny bilateral cerebellar infarcts.  Trace petechial hemorrhage left medial temporal lobe.  No overt hemorrhagic conversion.    No midline shift or herniation with minimal mass effect on the 3rd ventricle.    No hydrocephalus.    Loss of the intracranial right vertebral artery flow void in keeping with the findings on the recent CTA.                                       XR NG/OG tube placement check, non-radiologist performed (Final result)  Result time 11/06/24 10:25:49      Final result by Segundo Sheth MD (11/06/24 10:25:49)                   Impression:      Please see above.      Electronically signed by: Segundo Sheth  Date:    11/06/2024  Time:    10:25               Narrative:    EXAMINATION:  XR NG/OG TUBE PLACEMENT CHECK, NON-RADIOLOGIST PERFORMED    CLINICAL HISTORY:  placement;    TECHNIQUE:  Single overhead image of the abdomen was obtained.    COMPARISON:  Radiograph abdomen 01/19/2021    FINDINGS:  Enteric tube is visualized with tip just distal to the gastroesophageal junction.  However, side port is proximal to the expected region of the gastroesophageal junction.  Correlation and advancement advised.    Cardiomediastinal silhouette and lungs are unchanged.    Nonspecific, nonobstructive bowel gas pattern.  No free intra-abdominal air.   No pneumatosis.    Osseous structures demonstrate no evidence for acute fracture or osseous destructive lesion.    Soft tissues are unremarkable.                                       X-Ray Chest AP Portable (Final result)  Result time 11/06/24 10:26:26      Final result by Kervin Castaneda MD (11/06/24 10:26:26)                   Impression:      Further advancement of the NG tube recommended      Electronically signed by: Kervin Castaneda MD  Date:    11/06/2024  Time:    10:26               Narrative:    EXAMINATION:  XR CHEST AP PORTABLE    CLINICAL HISTORY:  Hypoxemia    TECHNIQUE:  Single frontal view of the chest was performed.    COMPARISON:  No 06/26/2024 ne    FINDINGS:  ET tube at T4 level.  NG tube in the lower esophagus and further advancement recommended.  Heart size normal.  There is slight degree of diffuse accentuation interstitial markings.  No significant airspace consolidation or pleural effusion identified.                                        CTA STROKE MULTI-PHASE (Final result)  Result time 11/06/24 09:33:04      Final result by Geo Ambrocio MD (11/06/24 09:33:04)                   Impression:      Multifocal acute posterior circulation distribution infarcts as further discussed above, particularly involving essentially the entirety of the left PCA territory.  Modest mass effect without significant hemorrhage.    Focal thrombus with severe stenosis extending from the right subclavian artery into the proximal right vertebral artery.    Focal occlusion of the proximal intracranial segment of the right vertebral artery.    Focal occlusion of the proximal left PCA.    Moderate focal stenosis at the left vertebral artery origin.    Anterior circulation appears unremarkable.    No evidence of fracture or traumatic malalignment in the cervical spine.    Emphysematous changes and patchy ground-glass opacity in the partially visualized lung apices.    This report was flagged in Epic as  abnormal.      Electronically signed by: Geo Ambrocio MD  Date:    11/06/2024  Time:    09:33               Narrative:    EXAMINATION:  CTA STROKE MULTI-PHASE; CT CERVICAL SPINE WITHOUT CONTRAST    CLINICAL HISTORY:  Neuro deficit, acute, stroke suspected;; Neck trauma, intoxicated or obtunded (Age >= 16y);    TECHNIQUE:  Axial CT images obtained throughout the region of the head and cervical spine before the administration of intravenous contrast.    CT angiogram was performed through the cervical and intracranial vasculature during the IV bolus administration of 75mL of Omnipaque 350.  Two additional phases through the intracranial vasculature via multiphase technique.    Multiplanar MPR and MIP reformats were performed.    CT source data was analyzed using artificial intelligence software for detection of a large vessel occlusions (LVO) in order to enable computer assisted triage notification and aid clinical stroke decision making.    COMPARISON:  CTA head and cervical spine 10/25/2015    FINDINGS:  Large region of hypoattenuation loss of gray-white differentiation in left occipital lobe in medial temporal lobe in keeping with an acute PCA distribution infarct.  Modest localized mass effect with some sulcal crowding.  Additional involvement of the left thalamus.  Similar appearance in the medial right occipital lobe, but smaller area of infarction.  Small left superior cerebellar infarct, also likely acute.  No evidence of recent or remote major vascular distribution infarct in the anterior circulation.  No acute parenchymal hemorrhage.  No midline shift.    The ventricles are normal in size without evidence of hydrocephalus.    No extra-axial blood or fluid collections.    The cranium appears intact.    Mastoid air cells and paranasal sinuses are essentially clear.    The vertebral bodies are normal in height and morphology without evidence of an acute displaced fracture or focal osseous destructive  process.    Normal sagittal alignment is preserved.  No spondylolisthesis.    Intervertebral disc heights are well maintained.  No evidence of a bony spinal canal stenosis.    Evaluation intraspinal contents demonstrates no evidence of mass or hematoma.    No acute abnormalities in the paraspinal soft tissue structures.    Emphysematous changes patchy ground-glass opacity in the partially visualized lung apices.      CTA:    The aortic arch demonstrates no significant stenosis at the major branch vessel origins.    There is a focal thrombus in the right subclavian artery extending into the right vertebral artery origin with severe stenosis.  Thrombus extending over proximally 1 cm.  The V1 and V2 segments of right vertebral artery are otherwise normal in caliber of slightly decreased in attenuation in comparison to the other cervical vasculature.  Abrupt occlusion of distal right vertebral artery about the level of the PICA origin approximate 2 cm segment of non enhancement.    Moderate focal stenosis at the left vertebral artery origin.  Left vertebral artery is otherwise normal in caliber.  The basilar artery is normal in caliber.    The right common carotid artery is normal in caliber.  No significant stenosis at the carotid bifurcation.The right internal carotid artery is normal in caliber.    The left common carotid artery is normal in caliber. No significant stenosis at the carotid bifurcation.The left internal carotid artery is normal in caliber.    Abrupt non enhancement in the P1 segment of the left PCA.  The proximal right PCA appears within normal limits.    The proximal MCAs and ACAs are unremarkable.    Findings were immediately discussed upon identification via telephone with the stroke service ([place]) at approximately 09:20.                                        CT Cervical Spine Without Contrast (Final result)  Result time 11/06/24 09:33:04      Final result by Geo Ambrocio MD  (11/06/24 09:33:04)                   Impression:      Multifocal acute posterior circulation distribution infarcts as further discussed above, particularly involving essentially the entirety of the left PCA territory.  Modest mass effect without significant hemorrhage.    Focal thrombus with severe stenosis extending from the right subclavian artery into the proximal right vertebral artery.    Focal occlusion of the proximal intracranial segment of the right vertebral artery.    Focal occlusion of the proximal left PCA.    Moderate focal stenosis at the left vertebral artery origin.    Anterior circulation appears unremarkable.    No evidence of fracture or traumatic malalignment in the cervical spine.    Emphysematous changes and patchy ground-glass opacity in the partially visualized lung apices.    This report was flagged in Epic as abnormal.      Electronically signed by: Geo Ambrocio MD  Date:    11/06/2024  Time:    09:33               Narrative:    EXAMINATION:  CTA STROKE MULTI-PHASE; CT CERVICAL SPINE WITHOUT CONTRAST    CLINICAL HISTORY:  Neuro deficit, acute, stroke suspected;; Neck trauma, intoxicated or obtunded (Age >= 16y);    TECHNIQUE:  Axial CT images obtained throughout the region of the head and cervical spine before the administration of intravenous contrast.    CT angiogram was performed through the cervical and intracranial vasculature during the IV bolus administration of 75mL of Omnipaque 350.  Two additional phases through the intracranial vasculature via multiphase technique.    Multiplanar MPR and MIP reformats were performed.    CT source data was analyzed using artificial intelligence software for detection of a large vessel occlusions (LVO) in order to enable computer assisted triage notification and aid clinical stroke decision making.    COMPARISON:  CTA head and cervical spine 10/25/2015    FINDINGS:  Large region of hypoattenuation loss of gray-white differentiation in left  occipital lobe in medial temporal lobe in keeping with an acute PCA distribution infarct.  Modest localized mass effect with some sulcal crowding.  Additional involvement of the left thalamus.  Similar appearance in the medial right occipital lobe, but smaller area of infarction.  Small left superior cerebellar infarct, also likely acute.  No evidence of recent or remote major vascular distribution infarct in the anterior circulation.  No acute parenchymal hemorrhage.  No midline shift.    The ventricles are normal in size without evidence of hydrocephalus.    No extra-axial blood or fluid collections.    The cranium appears intact.    Mastoid air cells and paranasal sinuses are essentially clear.    The vertebral bodies are normal in height and morphology without evidence of an acute displaced fracture or focal osseous destructive process.    Normal sagittal alignment is preserved.  No spondylolisthesis.    Intervertebral disc heights are well maintained.  No evidence of a bony spinal canal stenosis.    Evaluation intraspinal contents demonstrates no evidence of mass or hematoma.    No acute abnormalities in the paraspinal soft tissue structures.    Emphysematous changes patchy ground-glass opacity in the partially visualized lung apices.      CTA:    The aortic arch demonstrates no significant stenosis at the major branch vessel origins.    There is a focal thrombus in the right subclavian artery extending into the right vertebral artery origin with severe stenosis.  Thrombus extending over proximally 1 cm.  The V1 and V2 segments of right vertebral artery are otherwise normal in caliber of slightly decreased in attenuation in comparison to the other cervical vasculature.  Abrupt occlusion of distal right vertebral artery about the level of the PICA origin approximate 2 cm segment of non enhancement.    Moderate focal stenosis at the left vertebral artery origin.  Left vertebral artery is otherwise normal in  caliber.  The basilar artery is normal in caliber.    The right common carotid artery is normal in caliber.  No significant stenosis at the carotid bifurcation.The right internal carotid artery is normal in caliber.    The left common carotid artery is normal in caliber. No significant stenosis at the carotid bifurcation.The left internal carotid artery is normal in caliber.    Abrupt non enhancement in the P1 segment of the left PCA.  The proximal right PCA appears within normal limits.    The proximal MCAs and ACAs are unremarkable.    Findings were immediately discussed upon identification via telephone with the stroke service ([place]) at approximately 09:20.

## 2024-11-17 NOTE — ASSESSMENT & PLAN NOTE
-Utox + cocaine  -Suspect etiology of stroke likely due to substance use  -Continue thiamine/folate/multivitamin supplementation  -Addiction psych consulted early on admission

## 2024-11-17 NOTE — ASSESSMENT & PLAN NOTE
LFTs uptrending, tylenol updated to PRN.   Decreased dose of statin to atorvastatin 40 mg 11/17 given elevated INR 1.5.   Consider liver ultrasound, f/u acute hepatitis panel    Lab Results   Component Value Date    ALT 55 (H) 11/17/2024    AST 47 (H) 11/17/2024    ALKPHOS 96 11/17/2024    BILITOT 0.4 11/17/2024     Monitor daily CMP, PT/INR

## 2024-11-17 NOTE — PROGRESS NOTES
Pharmacokinetic Initial Assessment: IV Vancomycin    Assessment/Plan:    Initiate intravenous vancomycin with loading dose of 1250 mg once followed by a maintenance dose of vancomycin 750mg IV every 12 hours  Desired empiric serum trough concentration is 15 to 20 mcg/mL  Draw vancomycin trough level 60 min prior to fourth dose on 11/19 at approximately 0100  Pharmacy will continue to follow and monitor vancomycin.      Thank you for allowing pharmacy participate in this patient's care.     Kandy Alexander, PharmD  Clinical Pharmacist, [facility] Team  [email or web address]  office [phone]    Patient brief summary:  Malathi Mcpherson is a 49 y.o. female initiated on antimicrobial therapy with IV Vancomycin for treatment of suspected sepsis    Drug Allergies:   Review of patient's allergies indicates:  No Known Allergies    Actual Body Weight:   59 kg    Renal Function:   Estimated Creatinine Clearance: 90.5 mL/min (based on SCr of 0.7 mg/dL).,     Dialysis Method (if applicable):  N/A    CBC (last 72 hours):  Recent Labs   Lab Result Units 11/15/24  0734 11/16/24  0515 11/17/24  0530   WBC K/uL 11.97 14.02* 17.82*   Hemoglobin g/dL 11.4* 12.9 12.7   Hematocrit % 35.7* 40.0 39.5   Platelets K/uL 317 344 436   Gran % % 71.9 69.0 78.2*   Lymph % % 16.5* 17.7* 12.1*   Mono % % 6.0 6.2 4.9   Eosinophil % % 1.9 2.2 1.1   Basophil % % 0.9 0.9 0.7   Differential Method  Automated Automated Automated       Metabolic Panel (last 72 hours):  Recent Labs   Lab Result Units 11/15/24  0734 11/16/24  0515 11/17/24  0530   Sodium mmol/L 138 137 134*   Potassium mmol/L 4.0 4.4 4.0   Chloride mmol/L 105 104 103   CO2 mmol/L 20* 21* 18*   Glucose mg/dL 120* 116* 118*   BUN mg/dL 13 15 22*   Creatinine mg/dL 0.7 0.6 0.7   Albumin g/dL 2.5* 2.7* 2.9*   Total Bilirubin mg/dL 0.2 0.3 0.4   Alkaline Phosphatase U/L 108 101 96   AST U/L 57* 61* 47*   ALT U/L 41 58* 55*   Magnesium mg/dL 2.4 2.3 2.3   Phosphorus mg/dL  --  3.6 3.3  "      Drug levels (last 3 results):  No results for input(s): "VANCOMYCINRA", "VANCORANDOM", "VANCOMYCINPE", "VANCOPEAK", "VANCOMYCINTR", "VANCOTROUGH" in the last 72 hours.    Microbiologic Results:  Microbiology Results (last 7 days)       Procedure Component Value Units Date/Time    Blood culture [[id number]]     Order Status: Sent Specimen: Blood     Blood culture [[id number]]     Order Status: Sent Specimen: Blood     Culture, Respiratory with Gram Stain [[id number]]  (Abnormal)  (Susceptibility) Collected: 11/09/24 1223    Order Status: Completed Specimen: Respiratory from Endotracheal Aspirate Updated: 11/12/24 1150     Respiratory Culture No Pseudomonas isolated.      HAEMOPHILUS INFLUENZAE  Many  Beta Lactamase positive        STAPHYLOCOCCUS AUREUS  Moderate       Gram Stain (Respiratory) <10 epithelial cells per low power field.     Gram Stain (Respiratory) Few WBC's     Gram Stain (Respiratory) Rare Gram positive cocci     Gram Stain (Respiratory) Rare Gram negative rods    MRSA Screen by PCR [[id number]]  (Abnormal) Collected: 11/11/24 1105    Order Status: Completed Specimen: Nasal Swab Updated: 11/11/24 1412     MRSA SCREEN BY PCR Detected    Gonococcus culture [[id number]] Collected: 11/08/24 1701    Order Status: Completed Specimen: GC Source from Vagina Updated: 11/11/24 1033     GC Culture Only No Neisseria gonorrhoeae isolated    Narrative:      Release to patient->Immediate            "

## 2024-11-17 NOTE — ASSESSMENT & PLAN NOTE
Patients blood pressure range in the last 24 hours was: BP  Min: 86/51  Max: 212/97. The patient's inpatient anti-hypertensive regimen is listed below:    Current Antihypertensives  , 2 times daily, Oral  carvediloL tablet 6.25 mg, 2 times daily, Per NG tube    - BP is controlled, no changes needed to their regimen  -SBP goal < 180

## 2024-11-17 NOTE — SUBJECTIVE & OBJECTIVE
Neurologic Chief Complaint: acute arterial ischemic stroke, multifocal, posterior circulation     Subjective:     Interval History: Patient is seen for follow-up neurological assessment and treatment recommendations: see hospital course.     HPI, Past Medical, Family, and Social History remains the same as documented in the initial encounter.     Review of Systems   Unable to perform ROS: Other     Scheduled Meds:   aspirin  81 mg Per NG tube Daily    atorvastatin  40 mg Per NG tube Daily    carvediloL  6.25 mg Per NG tube BID    ceFEPime IV (PEDS and ADULTS)  2 g Intravenous Q8H    clopidogreL  75 mg Per NG tube Daily    enoxparin  40 mg Subcutaneous Q24H (prophylaxis, 1700)    FLUoxetine  60 mg Per NG tube Daily    folic acid  1 mg Per NG tube Daily    LIDOcaine  1 patch Transdermal Q24H    lithium citrate 8 meq/5 ml  300 mg Per NG tube TID    melatonin  6 mg Per NG tube Nightly    methyl salicylate-menthol 15-10%   Topical (Top) TID    mirtazapine  7.5 mg Per NG tube QHS    multivitamin  1 tablet Per NG tube Daily    mupirocin   Nasal BID    QUEtiapine  25 mg Per NG tube Daily    QUEtiapine  50 mg Per NG tube QHS    senna-docusate 8.6-50 mg  1 tablet Per NG tube Daily    thiamine  100 mg Per NG tube Daily     Continuous Infusions:  PRN Meds:  Current Facility-Administered Medications:     acetaminophen, 1,000 mg, Per NG tube, BID PRN    albuterol-ipratropium, 3 mL, Nebulization, Q6H PRN    hydrOXYzine HCL, 25 mg, Per NG tube, QID PRN    polyethylene glycol, 17 g, Per NG tube, BID PRN    QUEtiapine, 25 mg, Per NG tube, BID PRN    sodium chloride 0.9%, 10 mL, Intravenous, PRN    Objective:     Vital Signs (Most Recent):  Temp: 98.8 °F (37.1 °C) (11/17/24 0845)  Pulse: 97 (11/17/24 1047)  Resp: 18 (11/17/24 0330)  BP: (!) 177/89 (11/17/24 0845)  SpO2: 96 % (11/17/24 0845)  BP Location: Right arm    Vital Signs Range (Last 24H):  Temp:  [97.6 °F (36.4 °C)-99.3 °F (37.4 °C)]   Pulse:  []   Resp:  [16-18]   BP:  (130-212)/(85-99)   SpO2:  [94 %-96 %]   BP Location: Right arm    Physical Exam  Vitals and nursing note reviewed.   Constitutional:       General: She is not in acute distress.     Appearance: She is not ill-appearing.      Comments: Cough & congestion noted on exam.    HENT:      Head: Normocephalic and atraumatic.      Nose:      Comments: NG tube in place to R nare  Eyes:      General: Visual field deficit present. No scleral icterus.     Extraocular Movements: Extraocular movements intact.      Conjunctiva/sclera: Conjunctivae normal.      Pupils: Pupils are equal, round, and reactive to light.   Cardiovascular:      Rate and Rhythm: Normal rate and regular rhythm.      Pulses: Normal pulses.   Pulmonary:      Effort: Pulmonary effort is normal. No respiratory distress.      Breath sounds: No wheezing.   Abdominal:      General: Abdomen is flat. There is no distension.   Musculoskeletal:         General: No swelling or tenderness.      Right lower leg: No edema.      Left lower leg: No edema.      Comments: Upper extremity restraints   Skin:     General: Skin is warm and dry.      Coloration: Skin is not jaundiced.   Neurological:      Mental Status: She is alert.      Cranial Nerves: Dysarthria present.       Neurological Exam:    LOC: alert  Attention Span: poor  Language:  aphasia present  Articulation: Dysarthria present  Motor: Leg left  Normal 5/5  Leg right Normal 5/5  LUE > more movements than RUE.    Laboratory:  CMP:   Recent Labs   Lab 11/17/24  0530   CALCIUM 9.6   ALBUMIN 2.9*   PROT 8.0   *   K 4.0   CO2 18*      BUN 22*   CREATININE 0.7   ALKPHOS 96   ALT 55*   AST 47*   BILITOT 0.4       CBC:   Recent Labs   Lab 11/17/24  0530   WBC 17.82*   RBC 4.43   HGB 12.7   HCT 39.5      MCV 89   MCH 28.7   MCHC 32.2       Diagnostic Results     Brain Imaging / Vessel Imaging   Results for orders placed or performed during the hospital encounter of 11/06/24 (from the past 2160 hours)    CTA STROKE MULTI-PHASE    Narrative    EXAMINATION:  CTA STROKE MULTI-PHASE; CT CERVICAL SPINE WITHOUT CONTRAST    CLINICAL HISTORY:  Neuro deficit, acute, stroke suspected;; Neck trauma, intoxicated or obtunded (Age >= 16y);    TECHNIQUE:  Axial CT images obtained throughout the region of the head and cervical spine before the administration of intravenous contrast.    CT angiogram was performed through the cervical and intracranial vasculature during the IV bolus administration of 75mL of Omnipaque 350.  Two additional phases through the intracranial vasculature via multiphase technique.    Multiplanar MPR and MIP reformats were performed.    CT source data was analyzed using artificial intelligence software for detection of a large vessel occlusions (LVO) in order to enable computer assisted triage notification and aid clinical stroke decision making.    COMPARISON:  CTA head and cervical spine 10/25/2015    FINDINGS:  Large region of hypoattenuation loss of gray-white differentiation in left occipital lobe in medial temporal lobe in keeping with an acute PCA distribution infarct.  Modest localized mass effect with some sulcal crowding.  Additional involvement of the left thalamus.  Similar appearance in the medial right occipital lobe, but smaller area of infarction.  Small left superior cerebellar infarct, also likely acute.  No evidence of recent or remote major vascular distribution infarct in the anterior circulation.  No acute parenchymal hemorrhage.  No midline shift.    The ventricles are normal in size without evidence of hydrocephalus.    No extra-axial blood or fluid collections.    The cranium appears intact.    Mastoid air cells and paranasal sinuses are essentially clear.    The vertebral bodies are normal in height and morphology without evidence of an acute displaced fracture or focal osseous destructive process.    Normal sagittal alignment is preserved.  No spondylolisthesis.    Intervertebral  disc heights are well maintained.  No evidence of a bony spinal canal stenosis.    Evaluation intraspinal contents demonstrates no evidence of mass or hematoma.    No acute abnormalities in the paraspinal soft tissue structures.    Emphysematous changes patchy ground-glass opacity in the partially visualized lung apices.      CTA:    The aortic arch demonstrates no significant stenosis at the major branch vessel origins.    There is a focal thrombus in the right subclavian artery extending into the right vertebral artery origin with severe stenosis.  Thrombus extending over proximally 1 cm.  The V1 and V2 segments of right vertebral artery are otherwise normal in caliber of slightly decreased in attenuation in comparison to the other cervical vasculature.  Abrupt occlusion of distal right vertebral artery about the level of the PICA origin approximate 2 cm segment of non enhancement.    Moderate focal stenosis at the left vertebral artery origin.  Left vertebral artery is otherwise normal in caliber.  The basilar artery is normal in caliber.    The right common carotid artery is normal in caliber.  No significant stenosis at the carotid bifurcation.The right internal carotid artery is normal in caliber.    The left common carotid artery is normal in caliber. No significant stenosis at the carotid bifurcation.The left internal carotid artery is normal in caliber.    Abrupt non enhancement in the P1 segment of the left PCA.  The proximal right PCA appears within normal limits.    The proximal MCAs and ACAs are unremarkable.    Findings were immediately discussed upon identification via telephone with the stroke service ([facility]) at approximately 09:20.      Impression    Multifocal acute posterior circulation distribution infarcts as further discussed above, particularly involving essentially the entirety of the left PCA territory.  Modest mass effect without significant hemorrhage.    Focal thrombus with  severe stenosis extending from the right subclavian artery into the proximal right vertebral artery.    Focal occlusion of the proximal intracranial segment of the right vertebral artery.    Focal occlusion of the proximal left PCA.    Moderate focal stenosis at the left vertebral artery origin.    Anterior circulation appears unremarkable.    No evidence of fracture or traumatic malalignment in the cervical spine.    Emphysematous changes and patchy ground-glass opacity in the partially visualized lung apices.    This report was flagged in Epic as abnormal.      Electronically signed by: Geo Ambrocio MD  Date:    11/06/2024  Time:    09:33   CT Cervical Spine Without Contrast    Narrative    EXAMINATION:  CTA STROKE MULTI-PHASE; CT CERVICAL SPINE WITHOUT CONTRAST    CLINICAL HISTORY:  Neuro deficit, acute, stroke suspected;; Neck trauma, intoxicated or obtunded (Age >= 16y);    TECHNIQUE:  Axial CT images obtained throughout the region of the head and cervical spine before the administration of intravenous contrast.    CT angiogram was performed through the cervical and intracranial vasculature during the IV bolus administration of 75mL of Omnipaque 350.  Two additional phases through the intracranial vasculature via multiphase technique.    Multiplanar MPR and MIP reformats were performed.    CT source data was analyzed using artificial intelligence software for detection of a large vessel occlusions (LVO) in order to enable computer assisted triage notification and aid clinical stroke decision making.    COMPARISON:  CTA head and cervical spine 10/25/2015    FINDINGS:  Large region of hypoattenuation loss of gray-white differentiation in left occipital lobe in medial temporal lobe in keeping with an acute PCA distribution infarct.  Modest localized mass effect with some sulcal crowding.  Additional involvement of the left thalamus.  Similar appearance in the medial right occipital lobe, but smaller area of  infarction.  Small left superior cerebellar infarct, also likely acute.  No evidence of recent or remote major vascular distribution infarct in the anterior circulation.  No acute parenchymal hemorrhage.  No midline shift.    The ventricles are normal in size without evidence of hydrocephalus.    No extra-axial blood or fluid collections.    The cranium appears intact.    Mastoid air cells and paranasal sinuses are essentially clear.    The vertebral bodies are normal in height and morphology without evidence of an acute displaced fracture or focal osseous destructive process.    Normal sagittal alignment is preserved.  No spondylolisthesis.    Intervertebral disc heights are well maintained.  No evidence of a bony spinal canal stenosis.    Evaluation intraspinal contents demonstrates no evidence of mass or hematoma.    No acute abnormalities in the paraspinal soft tissue structures.    Emphysematous changes patchy ground-glass opacity in the partially visualized lung apices.      CTA:    The aortic arch demonstrates no significant stenosis at the major branch vessel origins.    There is a focal thrombus in the right subclavian artery extending into the right vertebral artery origin with severe stenosis.  Thrombus extending over proximally 1 cm.  The V1 and V2 segments of right vertebral artery are otherwise normal in caliber of slightly decreased in attenuation in comparison to the other cervical vasculature.  Abrupt occlusion of distal right vertebral artery about the level of the PICA origin approximate 2 cm segment of non enhancement.    Moderate focal stenosis at the left vertebral artery origin.  Left vertebral artery is otherwise normal in caliber.  The basilar artery is normal in caliber.    The right common carotid artery is normal in caliber.  No significant stenosis at the carotid bifurcation.The right internal carotid artery is normal in caliber.    The left common carotid artery is normal in caliber. No  significant stenosis at the carotid bifurcation.The left internal carotid artery is normal in caliber.    Abrupt non enhancement in the P1 segment of the left PCA.  The proximal right PCA appears within normal limits.    The proximal MCAs and ACAs are unremarkable.    Findings were immediately discussed upon identification via telephone with the stroke service ([place]) at approximately 09:20.      Impression    Multifocal acute posterior circulation distribution infarcts as further discussed above, particularly involving essentially the entirety of the left PCA territory.  Modest mass effect without significant hemorrhage.    Focal thrombus with severe stenosis extending from the right subclavian artery into the proximal right vertebral artery.    Focal occlusion of the proximal intracranial segment of the right vertebral artery.    Focal occlusion of the proximal left PCA.    Moderate focal stenosis at the left vertebral artery origin.    Anterior circulation appears unremarkable.    No evidence of fracture or traumatic malalignment in the cervical spine.    Emphysematous changes and patchy ground-glass opacity in the partially visualized lung apices.    This report was flagged in Epic as abnormal.      Electronically signed by: Geo Ambrocio MD  Date:    11/06/2024  Time:    09:33   CT Head Without Contrast    Narrative    EXAMINATION:  CT HEAD WITHOUT CONTRAST    CLINICAL HISTORY:  Stroke, follow up;    TECHNIQUE:  Low dose axial images were obtained through the head.  Coronal and sagittal reformations were also performed. Contrast was not administered.    COMPARISON:  CT, MRI 11/06/2024    FINDINGS:  Evolving posterior circulation infarcts are identified as described on recent MR imaging again most notable within the left greater than right occipital lobes extending to the medial left temporal lobe and bilateral thalamus.  No hemorrhagic conversion with potentially minimal stable petechial hemorrhage  within the medial left thalamus.  No midline shift or herniation with mild mass effect on the 3rd ventricle again noted.    No new acute territorial infarct.    The visualized sinuses and mastoid air cells are clear.      Impression    Expected evolutionary changes of the posterior circulation infarcts with no overt hemorrhagic conversion or significant midline shift/herniation.      Electronically signed by: Adrián Hobbs  Date:    11/09/2024  Time:    08:38      Results for orders placed or performed during the hospital encounter of 11/06/24 (from the past 2160 hours)   MRI Brain Without Contrast    Impression    Multiple posterior circulation infarcts are identified with involvement of the left greater than right occipital lobes and medial left temporal lobe, thalamus, right dorsal medulla, and minimally involving the cerebellum.  No midline shift with minimal mass effect on the 3rd ventricle.    Loss of flow void within the right intracranial vertebral artery in keeping with the findings on CTA.      Electronically signed by: Adrián Hobbs  Date:    11/06/2024  Time:    17:40      Cardiac Imaging   Results for orders placed during the hospital encounter of 11/06/24    Echo Saline Bubble? Yes; Ultrasound enhancing contrast? Yes    Interpretation Summary    Left Ventricle: The left ventricle is normal in size. Normal wall thickness. There is normal systolic function with a visually estimated ejection fraction of 55 - 60%. Biplane (2D) method of discs ejection fraction is 56%. Global longitudinal strain is -18.0%. There is normal diastolic function.    Right Ventricle: Normal right ventricular cavity size. Wall thickness is normal. Systolic function is normal.    Tricuspid Valve: There is mild regurgitation.    Pulmonary Artery: The estimated pulmonary artery systolic pressure is at least 28 mmHg.    IVC/SVC: Patient is ventilated, cannot use IVC diameter to estimate right atrial pressure.

## 2024-11-17 NOTE — PLAN OF CARE
Problem: Adult Inpatient Plan of Care  Goal: Plan of Care Review  Outcome: Progressing     Problem: Stroke, Ischemic (Includes Transient Ischemic Attack)  Goal: Optimal Coping  Outcome: Progressing  Goal: Optimal Functional Ability  Outcome: Progressing     Problem: Fall Injury Risk  Goal: Absence of Fall and Fall-Related Injury  Outcome: Progressing     Problem: Restraint, Nonviolent  Goal: Absence of Harm or Injury  Outcome: Progressing   POC reviewed and updated with the patient. Questions regarding POC were encouraged and addressed with the patient.JOSHUA. Patient is AO X 1 at this time. Fall/safety precautions maintained, no signs of injury noted during shift. Upon exiting room, patient's bed locked in low position, side rails up x 3, bed alarm set, with call light within reach. Instructed patient to call staff for assistance, verbalized understanding.  No acute signs of distress noted at this time.

## 2024-11-18 LAB
ALBUMIN SERPL BCP-MCNC: 3.1 G/DL (ref 3.5–5.2)
ALP SERPL-CCNC: 91 U/L (ref 40–150)
ALT SERPL W/O P-5'-P-CCNC: 52 U/L (ref 10–44)
ANION GAP SERPL CALC-SCNC: 10 MMOL/L (ref 8–16)
AST SERPL-CCNC: 38 U/L (ref 10–40)
BASOPHILS # BLD AUTO: 0.15 K/UL (ref 0–0.2)
BASOPHILS NFR BLD: 0.8 % (ref 0–1.9)
BILIRUB SERPL-MCNC: 0.3 MG/DL (ref 0.1–1)
BUN SERPL-MCNC: 21 MG/DL (ref 6–20)
CALCIUM SERPL-MCNC: 9.8 MG/DL (ref 8.7–10.5)
CHLORIDE SERPL-SCNC: 104 MMOL/L (ref 95–110)
CO2 SERPL-SCNC: 18 MMOL/L (ref 23–29)
CREAT SERPL-MCNC: 0.7 MG/DL (ref 0.5–1.4)
DIFFERENTIAL METHOD BLD: ABNORMAL
EOSINOPHIL # BLD AUTO: 0.2 K/UL (ref 0–0.5)
EOSINOPHIL NFR BLD: 1.2 % (ref 0–8)
ERYTHROCYTE [DISTWIDTH] IN BLOOD BY AUTOMATED COUNT: 13.8 % (ref 11.5–14.5)
EST. GFR  (NO RACE VARIABLE): >60 ML/MIN/1.73 M^2
GLUCOSE SERPL-MCNC: 122 MG/DL (ref 70–110)
HCT VFR BLD AUTO: 39.2 % (ref 37–48.5)
HGB BLD-MCNC: 13 G/DL (ref 12–16)
IMM GRANULOCYTES # BLD AUTO: 0.51 K/UL (ref 0–0.04)
IMM GRANULOCYTES NFR BLD AUTO: 2.6 % (ref 0–0.5)
INR PPP: 1.1 (ref 0.8–1.2)
LYMPHOCYTES # BLD AUTO: 1.9 K/UL (ref 1–4.8)
LYMPHOCYTES NFR BLD: 9.7 % (ref 18–48)
MAGNESIUM SERPL-MCNC: 2.3 MG/DL (ref 1.6–2.6)
MCH RBC QN AUTO: 29.3 PG (ref 27–31)
MCHC RBC AUTO-ENTMCNC: 33.2 G/DL (ref 32–36)
MCV RBC AUTO: 89 FL (ref 82–98)
MONOCYTES # BLD AUTO: 1.2 K/UL (ref 0.3–1)
MONOCYTES NFR BLD: 6.3 % (ref 4–15)
NEUTROPHILS # BLD AUTO: 15.3 K/UL (ref 1.8–7.7)
NEUTROPHILS NFR BLD: 79.4 % (ref 38–73)
NRBC BLD-RTO: 0 /100 WBC
OHS QRS DURATION: 72 MS
OHS QTC CALCULATION: 478 MS
PHOSPHATE SERPL-MCNC: 2.9 MG/DL (ref 2.7–4.5)
PLATELET # BLD AUTO: 426 K/UL (ref 150–450)
PMV BLD AUTO: 10.8 FL (ref 9.2–12.9)
POCT GLUCOSE: 138 MG/DL (ref 70–110)
POTASSIUM SERPL-SCNC: 4.1 MMOL/L (ref 3.5–5.1)
PROT SERPL-MCNC: 7.8 G/DL (ref 6–8.4)
PROTHROMBIN TIME: 12.4 SEC (ref 9–12.5)
RBC # BLD AUTO: 4.43 M/UL (ref 4–5.4)
SODIUM SERPL-SCNC: 132 MMOL/L (ref 136–145)
WBC # BLD AUTO: 19.25 K/UL (ref 3.9–12.7)

## 2024-11-18 PROCEDURE — 97530 THERAPEUTIC ACTIVITIES: CPT | Mod: CQ

## 2024-11-18 PROCEDURE — 25000003 PHARM REV CODE 250

## 2024-11-18 PROCEDURE — 93005 ELECTROCARDIOGRAM TRACING: CPT

## 2024-11-18 PROCEDURE — 97535 SELF CARE MNGMENT TRAINING: CPT

## 2024-11-18 PROCEDURE — 84100 ASSAY OF PHOSPHORUS: CPT

## 2024-11-18 PROCEDURE — 63600175 PHARM REV CODE 636 W HCPCS

## 2024-11-18 PROCEDURE — 36415 COLL VENOUS BLD VENIPUNCTURE: CPT

## 2024-11-18 PROCEDURE — 25000003 PHARM REV CODE 250: Performed by: STUDENT IN AN ORGANIZED HEALTH CARE EDUCATION/TRAINING PROGRAM

## 2024-11-18 PROCEDURE — 11000001 HC ACUTE MED/SURG PRIVATE ROOM

## 2024-11-18 PROCEDURE — 25000003 PHARM REV CODE 250: Performed by: PHYSICIAN ASSISTANT

## 2024-11-18 PROCEDURE — 90792 PSYCH DIAG EVAL W/MED SRVCS: CPT | Mod: AF,HB,, | Performed by: STUDENT IN AN ORGANIZED HEALTH CARE EDUCATION/TRAINING PROGRAM

## 2024-11-18 PROCEDURE — 97112 NEUROMUSCULAR REEDUCATION: CPT

## 2024-11-18 PROCEDURE — BD1BYZZ FLUOROSCOPY OF MOUTH/OROPHARYNX USING OTHER CONTRAST: ICD-10-PCS | Performed by: PSYCHIATRY & NEUROLOGY

## 2024-11-18 PROCEDURE — 99233 SBSQ HOSP IP/OBS HIGH 50: CPT | Mod: ,,, | Performed by: STUDENT IN AN ORGANIZED HEALTH CARE EDUCATION/TRAINING PROGRAM

## 2024-11-18 PROCEDURE — 92611 MOTION FLUOROSCOPY/SWALLOW: CPT

## 2024-11-18 PROCEDURE — 63600175 PHARM REV CODE 636 W HCPCS: Performed by: STUDENT IN AN ORGANIZED HEALTH CARE EDUCATION/TRAINING PROGRAM

## 2024-11-18 PROCEDURE — 93010 ELECTROCARDIOGRAM REPORT: CPT | Mod: ,,, | Performed by: STUDENT IN AN ORGANIZED HEALTH CARE EDUCATION/TRAINING PROGRAM

## 2024-11-18 PROCEDURE — 80053 COMPREHEN METABOLIC PANEL: CPT

## 2024-11-18 PROCEDURE — 83735 ASSAY OF MAGNESIUM: CPT

## 2024-11-18 PROCEDURE — 97110 THERAPEUTIC EXERCISES: CPT | Mod: CQ

## 2024-11-18 PROCEDURE — 85610 PROTHROMBIN TIME: CPT

## 2024-11-18 PROCEDURE — 85025 COMPLETE CBC W/AUTO DIFF WBC: CPT

## 2024-11-18 PROCEDURE — 94761 N-INVAS EAR/PLS OXIMETRY MLT: CPT

## 2024-11-18 RX ADMIN — Medication 6 MG: at 08:11

## 2024-11-18 RX ADMIN — ENOXAPARIN SODIUM 40 MG: 40 INJECTION SUBCUTANEOUS at 06:11

## 2024-11-18 RX ADMIN — MUSCLE RUB CREAM: 100; 150 CREAM TOPICAL at 08:11

## 2024-11-18 RX ADMIN — THERA TABS 1 TABLET: TAB at 09:11

## 2024-11-18 RX ADMIN — CEFEPIME 2 G: 2 INJECTION, POWDER, FOR SOLUTION INTRAVENOUS at 09:11

## 2024-11-18 RX ADMIN — CEFEPIME 2 G: 2 INJECTION, POWDER, FOR SOLUTION INTRAVENOUS at 06:11

## 2024-11-18 RX ADMIN — MUSCLE RUB CREAM: 100; 150 CREAM TOPICAL at 03:11

## 2024-11-18 RX ADMIN — CEFEPIME 2 G: 2 INJECTION, POWDER, FOR SOLUTION INTRAVENOUS at 01:11

## 2024-11-18 RX ADMIN — QUETIAPINE FUMARATE 100 MG: 100 TABLET ORAL at 08:11

## 2024-11-18 RX ADMIN — QUETIAPINE FUMARATE 50 MG: 25 TABLET ORAL at 09:11

## 2024-11-18 RX ADMIN — CARVEDILOL 6.25 MG: 6.25 TABLET, FILM COATED ORAL at 09:11

## 2024-11-18 RX ADMIN — MUPIROCIN: 20 OINTMENT TOPICAL at 08:11

## 2024-11-18 RX ADMIN — MUSCLE RUB CREAM: 100; 150 CREAM TOPICAL at 09:11

## 2024-11-18 RX ADMIN — ATORVASTATIN CALCIUM 40 MG: 40 TABLET, FILM COATED ORAL at 09:11

## 2024-11-18 RX ADMIN — LITHIUM 300 MG: 8 SOLUTION ORAL at 08:11

## 2024-11-18 RX ADMIN — SENNOSIDES AND DOCUSATE SODIUM 1 TABLET: 50; 8.6 TABLET ORAL at 09:11

## 2024-11-18 RX ADMIN — LITHIUM 300 MG: 8 SOLUTION ORAL at 03:11

## 2024-11-18 RX ADMIN — ASPIRIN 81 MG CHEWABLE TABLET 81 MG: 81 TABLET CHEWABLE at 09:11

## 2024-11-18 RX ADMIN — VANCOMYCIN HYDROCHLORIDE 750 MG: 750 INJECTION, POWDER, LYOPHILIZED, FOR SOLUTION INTRAVENOUS at 01:11

## 2024-11-18 RX ADMIN — CARVEDILOL 6.25 MG: 6.25 TABLET, FILM COATED ORAL at 08:11

## 2024-11-18 RX ADMIN — LIDOCAINE 5% 1 PATCH: 700 PATCH TOPICAL at 03:11

## 2024-11-18 RX ADMIN — VANCOMYCIN HYDROCHLORIDE 750 MG: 750 INJECTION, POWDER, LYOPHILIZED, FOR SOLUTION INTRAVENOUS at 03:11

## 2024-11-18 RX ADMIN — MIRTAZAPINE 7.5 MG: 7.5 TABLET, FILM COATED ORAL at 08:11

## 2024-11-18 RX ADMIN — MUPIROCIN: 20 OINTMENT TOPICAL at 09:11

## 2024-11-18 RX ADMIN — FOLIC ACID 1 MG: 1 TABLET ORAL at 09:11

## 2024-11-18 RX ADMIN — FLUOXETINE HYDROCHLORIDE 60 MG: 20 SOLUTION ORAL at 09:11

## 2024-11-18 RX ADMIN — Medication 100 MG: at 09:11

## 2024-11-18 RX ADMIN — LITHIUM 300 MG: 8 SOLUTION ORAL at 09:11

## 2024-11-18 RX ADMIN — CLOPIDOGREL BISULFATE 75 MG: 75 TABLET ORAL at 09:11

## 2024-11-18 NOTE — PT/OT/SLP PROGRESS
"Physical Therapy Treatment    Patient Name:  Malathi Mcpherson   MRN:  [id number]    Recommendations:     Discharge Recommendations: High Intensity Therapy  Discharge Equipment Recommendations: bedside commode, bath bench  Barriers to discharge: Inaccessible home and Decreased caregiver support    Assessment:     Malathi Mcpherson is a 49 y.o. female admitted with a medical diagnosis of Acute arterial ischemic stroke, multifocal, posterior circulation.  She presents with the following impairments/functional limitations: weakness, impaired endurance, impaired self care skills, impaired functional mobility, gait instability, impaired balance, impaired cognition, decreased lower extremity function, decreased upper extremity function, decreased safety awareness, abnormal tone, impaired coordination, impaired fine motor, impaired cardiopulmonary response to activity . Patient appeared incoherent and rest less at times, with limited postural control while sitting at EOB, requiring min assistance to maintain her balance.    Rehab Prognosis: Fair; patient would benefit from acute skilled PT services to address these deficits and reach maximum level of function.    Recent Surgery: * No surgery found *      Plan:     During this hospitalization, patient to be seen 4 x/week to address the identified rehab impairments via gait training, therapeutic activities, therapeutic exercises, neuromuscular re-education and progress toward the following goals:    Plan of Care Expires:  12/14/24    Subjective     Chief Complaint: N/A  Patient/Family Comments/goals: Mother " For her daughter to recover from the stroke"  Pain/Comfort:  Pain Rating 1: 0/10  Pain Rating Post-Intervention 1: 0/10      Objective:     Communicated with NSG prior to session.  Patient found HOB elevated with bed alarm, telemetry, restraints, SCD, NG tube, PureWick upon PT entry to room.     General Precautions: Standard, aspiration, fall  Orthopedic Precautions: " N/A  Braces: N/A  Respiratory Status: Room air     Functional Mobility:  Bed Mobility:     Rolling Right: total assistance  Scooting: total assistance  Supine to Sit: total assistance  Sit to Supine: total assistance  Balance: poor sitting at EOB.      AM-PAC 6 CLICK MOBILITY  Turning over in bed (including adjusting bedclothes, sheets and blankets)?: 3  Sitting down on and standing up from a chair with arms (e.g., wheelchair, bedside commode, etc.): 1  Moving from lying on back to sitting on the side of the bed?: 2  Moving to and from a bed to a chair (including a wheelchair)?: 1  Need to walk in hospital room?: 1  Climbing 3-5 steps with a railing?: 1  Basic Mobility Total Score: 9       Treatment & Education:  Doffed/Donned SCDs. Patient sat at EOB x 12 minutes with min assistance and occasional cues to reorient. Scoot pivot to HOB with total assistance. Repositioned in bed with total assistance. B LE PROM x 30 reps on all available planes of motion. Placed a wedge on the L side.    Patient left HOB elevated with all lines intact, call button in reach, bed alarm on, and mother present..    GOALS:   Multidisciplinary Problems       Physical Therapy Goals          Problem: Physical Therapy    Goal Priority Disciplines Outcome Interventions   Physical Therapy Goal     PT, PT/OT Progressing    Description: Goals to be met by: 24     Patient will increase functional independence with mobility by performin. Supine to sit with Contact Guard Assistance  2. Sit to supine with Contact Guard Assistance  3. Sit to stand transfer with Minimal Assistance  4. Bed to chair transfer with Minimal Assistance using LRAD as needed  5. Gait  x 50 feet with Minimal Assistance using LRAD as needed.   6. Lower extremity exercise program x10 reps per handout, with assistance as needed    DME Justifications (see above for complete DME recommendations)    Bedside Commode- Patient has a mobility limitation that significantly  impairs their ability to participate in one or more mobility related activities of daily living, including toileting. This deficit can be resolved by using a bedside commode. Patient demonstrates mobility limitations that will cause them to be confined to one room at home without bathroom access for up to 30 days. Using a bedside commode will greatly improve the patient's ability to participate in MRADLs.     Hospital Bed ·       Patient requires a hospital bed for positioning of the body in ways that are not feasible with an ordinary bed. The patient requires special positioning for pain relief, limited mobility, and/or being unable to independently make changes in body position without the use of a hospital bed. Pillows and wedges will not be adequate for resolving these positional issues.                         Time Tracking:     PT Received On: 11/18/24  PT Start Time: 1006     PT Stop Time: 1031  PT Total Time (min): 25 min     Billable Minutes: Therapeutic Activity 15 and Therapeutic Exercise 10    Treatment Type: Treatment  PT/PTA: PTA     Number of PTA visits since last PT visit: 1 11/18/2024

## 2024-11-18 NOTE — ASSESSMENT & PLAN NOTE
-Psychiatry consulted, appreciate recs  Continue lithium 300 mg TID (resumed 11/13), Prozac 60 mg daily, Seroquel 25 mg in the AM and 50 mg in the PM, Remeron 7.5 mg nightly.   PRN Seroquel 25 mg BID for non-redirectable agitation.    -daily EKG to monitor Qtc  - Pending serum lithium level on 11/18/24

## 2024-11-18 NOTE — SUBJECTIVE & OBJECTIVE
Neurologic Chief Complaint: acute arterial ischemic stroke, multifocal, posterior circulation     Subjective:     Interval History: Patient is seen for follow-up neurological assessment and treatment recommendations:     HPI, Past Medical, Family, and Social History remains the same as documented in the initial encounter.     Review of Systems   Unable to perform ROS: Mental status change     Scheduled Meds:   aspirin  81 mg Per NG tube Daily    atorvastatin  40 mg Per NG tube Daily    carvediloL  6.25 mg Per NG tube BID    ceFEPime IV (PEDS and ADULTS)  2 g Intravenous Q8H    clopidogreL  75 mg Per NG tube Daily    enoxparin  40 mg Subcutaneous Q24H (prophylaxis, 1700)    FLUoxetine  60 mg Per NG tube Daily    folic acid  1 mg Per NG tube Daily    LIDOcaine  1 patch Transdermal Q24H    lithium citrate 8 meq/5 ml  300 mg Per NG tube TID    melatonin  6 mg Per NG tube Nightly    methyl salicylate-menthol 15-10%   Topical (Top) TID    mirtazapine  7.5 mg Per NG tube QHS    multivitamin  1 tablet Per NG tube Daily    mupirocin   Nasal BID    QUEtiapine  100 mg Per NG tube QHS    QUEtiapine  50 mg Per NG tube Daily    senna-docusate 8.6-50 mg  1 tablet Per NG tube Daily    thiamine  100 mg Per NG tube Daily    vancomycin (VANCOCIN) IV (PEDS and ADULTS)  750 mg Intravenous Q12H     Continuous Infusions:  PRN Meds:  Current Facility-Administered Medications:     acetaminophen, 1,000 mg, Per NG tube, BID PRN    albuterol-ipratropium, 3 mL, Nebulization, Q6H PRN    hydrOXYzine HCL, 25 mg, Per NG tube, QID PRN    polyethylene glycol, 17 g, Per NG tube, BID PRN    QUEtiapine, 25 mg, Per NG tube, BID PRN    sodium chloride 0.9%, 10 mL, Intravenous, PRN    Pharmacy to dose Vancomycin consult, , , Once **AND** vancomycin - pharmacy to dose, , Intravenous, pharmacy to manage frequency    Objective:     Vital Signs (Most Recent):  Temp: 99.8 °F (37.7 °C) (11/18/24 0730)  Pulse: 96 (11/18/24 0730)  Resp: 18 (11/18/24 0730)  BP: (!)  167/104 (11/18/24 0730)  SpO2: 97 % (11/18/24 0730)  BP Location: Right arm    Vital Signs Range (Last 24H):  Temp:  [97.5 °F (36.4 °C)-99.8 °F (37.7 °C)]   Pulse:  []   Resp:  [16-18]   BP: (130-184)/()   SpO2:  [94 %-97 %]   BP Location: Right arm       Physical Exam  Vitals and nursing note reviewed.   Constitutional:       General: She is sleeping.      Appearance: She is ill-appearing.   Pulmonary:      Effort: Pulmonary effort is normal. No respiratory distress.   Abdominal:      General: There is no distension.      Palpations: Abdomen is soft.   Musculoskeletal:      Cervical back: Normal range of motion and neck supple.      Comments: Right sided weakness.    Skin:     Capillary Refill: Capillary refill takes 2 to 3 seconds.   Neurological:      General: No focal deficit present.      GCS: GCS eye subscore is 3. GCS verbal subscore is 3. GCS motor subscore is 5.      Cranial Nerves: Dysarthria present.      Motor: Weakness present.      Coordination: Coordination abnormal. Finger-Nose-Finger Test abnormal and Heel to Vo Test abnormal.      Gait: Gait abnormal.   Psychiatric:         Behavior: Behavior is slowed and withdrawn.         Cognition and Memory: Cognition is impaired. Memory is impaired.              Neurological Exam:   LOC: alert    Laboratory:  CMP:   Recent Labs   Lab 11/18/24  0534   CALCIUM 9.8   ALBUMIN 3.1*   PROT 7.8   *   K 4.1   CO2 18*      BUN 21*   CREATININE 0.7   ALKPHOS 91   ALT 52*   AST 38   BILITOT 0.3     CBC:   Recent Labs   Lab 11/18/24  0534   WBC 19.25*   RBC 4.43   HGB 13.0   HCT 39.2      MCV 89   MCH 29.3   MCHC 33.2       Diagnostic Results     Brain Imaging / Vessel Imaging       Results for orders placed or performed during the hospital encounter of 11/06/24 (from the past 2160 hours)   CTA STROKE MULTI-PHASE     Narrative     EXAMINATION:  CTA STROKE MULTI-PHASE; CT CERVICAL SPINE WITHOUT CONTRAST     CLINICAL HISTORY:  Neuro  deficit, acute, stroke suspected;; Neck trauma, intoxicated or obtunded (Age >= 16y);     TECHNIQUE:  Axial CT images obtained throughout the region of the head and cervical spine before the administration of intravenous contrast.     CT angiogram was performed through the cervical and intracranial vasculature during the IV bolus administration of 75mL of Omnipaque 350.  Two additional phases through the intracranial vasculature via multiphase technique.     Multiplanar MPR and MIP reformats were performed.     CT source data was analyzed using artificial intelligence software for detection of a large vessel occlusions (LVO) in order to enable computer assisted triage notification and aid clinical stroke decision making.     COMPARISON:  CTA head and cervical spine 10/25/2015     FINDINGS:  Large region of hypoattenuation loss of gray-white differentiation in left occipital lobe in medial temporal lobe in keeping with an acute PCA distribution infarct.  Modest localized mass effect with some sulcal crowding.  Additional involvement of the left thalamus.  Similar appearance in the medial right occipital lobe, but smaller area of infarction.  Small left superior cerebellar infarct, also likely acute.  No evidence of recent or remote major vascular distribution infarct in the anterior circulation.  No acute parenchymal hemorrhage.  No midline shift.     The ventricles are normal in size without evidence of hydrocephalus.     No extra-axial blood or fluid collections.     The cranium appears intact.     Mastoid air cells and paranasal sinuses are essentially clear.     The vertebral bodies are normal in height and morphology without evidence of an acute displaced fracture or focal osseous destructive process.     Normal sagittal alignment is preserved.  No spondylolisthesis.     Intervertebral disc heights are well maintained.  No evidence of a bony spinal canal stenosis.     Evaluation intraspinal contents demonstrates  no evidence of mass or hematoma.     No acute abnormalities in the paraspinal soft tissue structures.     Emphysematous changes patchy ground-glass opacity in the partially visualized lung apices.        CTA:     The aortic arch demonstrates no significant stenosis at the major branch vessel origins.     There is a focal thrombus in the right subclavian artery extending into the right vertebral artery origin with severe stenosis.  Thrombus extending over proximally 1 cm.  The V1 and V2 segments of right vertebral artery are otherwise normal in caliber of slightly decreased in attenuation in comparison to the other cervical vasculature.  Abrupt occlusion of distal right vertebral artery about the level of the PICA origin approximate 2 cm segment of non enhancement.     Moderate focal stenosis at the left vertebral artery origin.  Left vertebral artery is otherwise normal in caliber.  The basilar artery is normal in caliber.     The right common carotid artery is normal in caliber.  No significant stenosis at the carotid bifurcation.The right internal carotid artery is normal in caliber.     The left common carotid artery is normal in caliber. No significant stenosis at the carotid bifurcation.The left internal carotid artery is normal in caliber.     Abrupt non enhancement in the P1 segment of the left PCA.  The proximal right PCA appears within normal limits.     The proximal MCAs and ACAs are unremarkable.     Findings were immediately discussed upon identification via telephone with the stroke service ([facility]) at approximately 09:20.        Impression     Multifocal acute posterior circulation distribution infarcts as further discussed above, particularly involving essentially the entirety of the left PCA territory.  Modest mass effect without significant hemorrhage.     Focal thrombus with severe stenosis extending from the right subclavian artery into the proximal right vertebral artery.     Focal  occlusion of the proximal intracranial segment of the right vertebral artery.     Focal occlusion of the proximal left PCA.     Moderate focal stenosis at the left vertebral artery origin.     Anterior circulation appears unremarkable.     No evidence of fracture or traumatic malalignment in the cervical spine.     Emphysematous changes and patchy ground-glass opacity in the partially visualized lung apices.     This report was flagged in Epic as abnormal.        Electronically signed by:Geo Ambrocio MD  Date:                                                11/06/2024  Time:                                                09:33   CT Cervical Spine Without Contrast     Narrative     EXAMINATION:  CTA STROKE MULTI-PHASE; CT CERVICAL SPINE WITHOUT CONTRAST     CLINICAL HISTORY:  Neuro deficit, acute, stroke suspected;; Neck trauma, intoxicated or obtunded (Age >= 16y);     TECHNIQUE:  Axial CT images obtained throughout the region of the head and cervical spine before the administration of intravenous contrast.     CT angiogram was performed through the cervical and intracranial vasculature during the IV bolus administration of 75mL of Omnipaque 350.  Two additional phases through the intracranial vasculature via multiphase technique.     Multiplanar MPR and MIP reformats were performed.     CT source data was analyzed using artificial intelligence software for detection of a large vessel occlusions (LVO) in order to enable computer assisted triage notification and aid clinical stroke decision making.     COMPARISON:  CTA head and cervical spine 10/25/2015     FINDINGS:  Large region of hypoattenuation loss of gray-white differentiation in left occipital lobe in medial temporal lobe in keeping with an acute PCA distribution infarct.  Modest localized mass effect with some sulcal crowding.  Additional involvement of the left thalamus.  Similar appearance in the medial right occipital lobe, but smaller area of  infarction.  Small left superior cerebellar infarct, also likely acute.  No evidence of recent or remote major vascular distribution infarct in the anterior circulation.  No acute parenchymal hemorrhage.  No midline shift.     The ventricles are normal in size without evidence of hydrocephalus.     No extra-axial blood or fluid collections.     The cranium appears intact.     Mastoid air cells and paranasal sinuses are essentially clear.     The vertebral bodies are normal in height and morphology without evidence of an acute displaced fracture or focal osseous destructive process.     Normal sagittal alignment is preserved.  No spondylolisthesis.     Intervertebral disc heights are well maintained.  No evidence of a bony spinal canal stenosis.     Evaluation intraspinal contents demonstrates no evidence of mass or hematoma.     No acute abnormalities in the paraspinal soft tissue structures.     Emphysematous changes patchy ground-glass opacity in the partially visualized lung apices.        CTA:     The aortic arch demonstrates no significant stenosis at the major branch vessel origins.     There is a focal thrombus in the right subclavian artery extending into the right vertebral artery origin with severe stenosis.  Thrombus extending over proximally 1 cm.  The V1 and V2 segments of right vertebral artery are otherwise normal in caliber of slightly decreased in attenuation in comparison to the other cervical vasculature.  Abrupt occlusion of distal right vertebral artery about the level of the PICA origin approximate 2 cm segment of non enhancement.     Moderate focal stenosis at the left vertebral artery origin.  Left vertebral artery is otherwise normal in caliber.  The basilar artery is normal in caliber.     The right common carotid artery is normal in caliber.  No significant stenosis at the carotid bifurcation.The right internal carotid artery is normal in caliber.     The left common carotid artery is  normal in caliber. No significant stenosis at the carotid bifurcation.The left internal carotid artery is normal in caliber.     Abrupt non enhancement in the P1 segment of the left PCA.  The proximal right PCA appears within normal limits.     The proximal MCAs and ACAs are unremarkable.     Findings were immediately discussed upon identification via telephone with the stroke service ([facility]) at approximately 09:20.        Impression     Multifocal acute posterior circulation distribution infarcts as further discussed above, particularly involving essentially the entirety of the left PCA territory.  Modest mass effect without significant hemorrhage.     Focal thrombus with severe stenosis extending from the right subclavian artery into the proximal right vertebral artery.     Focal occlusion of the proximal intracranial segment of the right vertebral artery.     Focal occlusion of the proximal left PCA.     Moderate focal stenosis at the left vertebral artery origin.     Anterior circulation appears unremarkable.     No evidence of fracture or traumatic malalignment in the cervical spine.     Emphysematous changes and patchy ground-glass opacity in the partially visualized lung apices.     This report was flagged in Epic as abnormal.        Electronically signed by:Geo Ambrocio MD  Date:                                                11/06/2024  Time:                                                09:33   CT Head Without Contrast     Narrative     EXAMINATION:  CT HEAD WITHOUT CONTRAST     CLINICAL HISTORY:  Stroke, follow up;     TECHNIQUE:  Low dose axial images were obtained through the head.  Coronal and sagittal reformations were also performed. Contrast was not administered.     COMPARISON:  CT, MRI 11/06/2024     FINDINGS:  Evolving posterior circulation infarcts are identified as described on recent MR imaging again most notable within the left greater than right occipital lobes extending to the  medial left temporal lobe and bilateral thalamus.  No hemorrhagic conversion with potentially minimal stable petechial hemorrhage within the medial left thalamus.  No midline shift or herniation with mild mass effect on the 3rd ventricle again noted.     No new acute territorial infarct.     The visualized sinuses and mastoid air cells are clear.        Impression     Expected evolutionary changes of the posterior circulation infarcts with no overt hemorrhagic conversion or significant midline shift/herniation.        Electronically signed by:Adrián Hobbs  Date:                                                11/09/2024  Time:                                                08:38          Results for orders placed or performed during the hospital encounter of 11/06/24 (from the past 2160 hours)   MRI Brain Without Contrast     Impression     Multiple posterior circulation infarcts are identified with involvement of the left greater than right occipital lobes and medial left temporal lobe, thalamus, right dorsal medulla, and minimally involving the cerebellum.  No midline shift with minimal mass effect on the 3rd ventricle.     Loss of flow void within the right intracranial vertebral artery in keeping with the findings on CTA.        Electronically signed by:Adrián Hobbs  Date:                                                11/06/2024  Time:                                                17:40      Cardiac Imaging   Results for orders placed during the hospital encounter of 11/06/24     Echo Saline Bubble? Yes; Ultrasound enhancing contrast? Yes     Interpretation Summary    Left Ventricle: The left ventricle is normal in size. Normal wall thickness. There is normal systolic function with a visually estimated ejection fraction of 55 - 60%. Biplane (2D) method of discs ejection fraction is 56%. Global longitudinal strain is -18.0%. There is normal diastolic function.    Right Ventricle: Normal right ventricular  cavity size. Wall thickness is normal. Systolic function is normal.    Tricuspid Valve: There is mild regurgitation.    Pulmonary Artery: The estimated pulmonary artery systolic pressure is at least 28 mmHg.    IVC/SVC: Patient is ventilated, cannot use IVC diameter to estimate right atrial pressure.

## 2024-11-18 NOTE — PROGRESS NOTES
"CONSULTATION LIAISON PSYCHIATRY PROGRESS NOTE     Patient Name: Malathi Mcpherson  MRN: [id number]  Patient Class: IP- Inpatient  Admission Date: 11/6/2024  Attending Physician: Emily Recinos MD        SUBJECTIVE:   Malathi Mcpherson is a 49 y.o. female with past psychiatric history of polysubstance use disorder (crack cocaine, heroin, alcohol) and bipolar disorder & past pertinent medical history of seizure, HTN, and HLD presents to the ED/admitted to the hospital for large BL CVA. Patient most recently in drug rehab court prior to hospitalization and relapsed on crack cocaine in the week prior to admission. She is admitted to hospital for further evaluation and management of multifocal acute infarcts.      Recently evaluated by psychiatry on 11/14/24 for her stimulant use disorder and history of bipolar disorder. Psychiatry re-consulted for "Worsening agitation, extensive psych hx (bipolar?) on lithium, need med recs for agitation."      Today, patient is sitting up in bed with 2-pt restraints. Patient appears disoriented, restless and fidgety, and is minimally responsive. Patient is oriented to person but not to time or place. Patient's mother is at bedside. She reports patient is less agitated today. When we asked the patient's mother if the patient expressed suicidal ideation, she said yesterday the patient said "I don't want to live like this". Patient received PRN Hydroxyzine 25 mg last night for insomnia. Discussed delirium precautions with patient and her mother. Upon interviewing the patient with the attending later in the day the patient is confused and mumbles incoherently.            OBJECTIVE:     Mental Status Exam:  General Appearance: well-nourished, dressed in hospital garb, disheveled, NG tube to R nare  Behavior: unable to participate, minimal responses, agitated, restless and fidgety, poor eye contact  Involuntary Movements and Motor Activity: no abnormal involuntary movements noted; no tics, no " "tremors, no akathisia, no dystonia, no evidence of tardive dyskinesia; no psychomotor agitation or retardation  Gait and Station: unable to assess - patient lying down or seated  Speech and Language: mumbling, responds to questions minimally/briefly, dysarthric  Mood: "fine"  Affect: irritable  Thought Process and Associations: Unable to Assess  Perceptual Disturbances:  BETTYE  Thought Content and Perceptions:: Unable to Assess  Sensorium and Orientation: oriented to person only  Recent and Remote Memory: Unable to  Formally Assess  Attention and Concentration: Unable to Formally Assess  Fund of Knowledge: Unable to Formally Assess  Insight:  BETTYE due to patient's mental state  Judgment:  BETTYE  due to patient's mental state     CAM ICU positive? Did not assesss        ASSESSMENT & RECOMMENDATIONS   Delirium due to multiple etiologies [multiple acute infarcts, debility, pneumonia ] (F05)  Cocaine use disorder, severe  Hx of Bipolar Disorder      Scheduled Medication(s):  Continue lithium 300 mg TID, Prozac 60 mg daily, Seroquel 50 mg in the AM and 100 mg in the PM, Remeron 7.5 mg nightly.      PRN Medication(s):  PRN Seroquel 25 mg BID for non-redirectable agitation.       Other Recommendations (labs, imaging, further consults, etc.):  Most recent EKG on 11/16/24 with QTC of 476; please obtain repeat EKG in the setting of QT prolongation and agitation despite current psychotropic regimen.        Delirium Behavior Management  PLEASE utilize PRN meds first for agitation. Minimize use of PHYSICAL restraints OR have periods of being out of physical restraints if possible.  Keep window shades open and room lit during day and room dim at night in order to promote normal sleep-wake cycles  Encourage family at bedside. [place] patient often to situation, location, date.  Continue to Limit or Discontinue use of Narcotics, Benzos and Anti-cholinergic medications as they may worsen delirium.  Continue medical workup for causative " etiology of Delirium.      Risk Assessment / Legal Status  Patient does not meet criteria for PEC or involuntary inpatient psychiatric admission at this time. Recommend to rescind PEC if one was placed. Patient is not currently an imminent danger to self or others and is not gravely disabled due to a psychiatric illness.     Follow-up:  Will follow-up while in house.    Disposition:   Defer to primary team.     Please contact ON CALL psychiatry service (24/7) for any acute issues that may arise.     Christiana Adams, Student Doctor  Patient interviewed and examined by myself and medical student. Note originally drafted by medical student and submitted with my own revisions and additions. Note as submitted is true to my independent evaluation, examination, and plan for this patient.    William Sistrunk, MD   Psychiatry  [facility]  11/18/2024 8:42 AM

## 2024-11-18 NOTE — PT/OT/SLP PROGRESS
"Occupational Therapy   Treatment    Name: Malathi Mcpherson  MRN: [id number]  Admitting Diagnosis:  Acute arterial ischemic stroke, multifocal, posterior circulation       Recommendations:     Discharge Recommendations: High Intensity Therapy  Discharge Equipment Recommendations:  bath bench, bedside commode  Barriers to discharge:  None    Assessment:     Malathi Mcpherson is a 49 y.o. female with a medical diagnosis of Acute arterial ischemic stroke, multifocal, posterior circulation.  She presents with performance deficits affecting function are impaired endurance, gait instability, impaired balance, impaired sensation, impaired self care skills, impaired functional mobility, decreased coordination, impaired cognition, decreased upper extremity function, decreased ROM, impaired coordination.     Rehab Prognosis:  Good; patient would benefit from acute skilled OT services to address these deficits and reach maximum level of function.       Plan:     Patient to be seen 4 x/week to address the above listed problems via self-care/home management, neuromuscular re-education, cognitive retraining, therapeutic activities, therapeutic exercises  Plan of Care Expires: 12/05/24  Plan of Care Reviewed with: patient    Subjective   Patient:  "Naz."  Pain/Comfort:  Pain Rating 1: 0/10  Pain Rating Post-Intervention 1: 0/10    Objective:     Communicated with: Nurse prior to session.  Patient found supine with SCD, bed alarm, restraints, NG tube, telemetry, PureWick, peripheral IV (Avasys camera monitoring) upon OT entry to room.    General Precautions: Standard, aspiration, fall    Orthopedic Precautions:N/A  Braces: N/A  Respiratory Status: Room air     Occupational Performance:     Bed Mobility:    Patient completed Rolling/Turning to Left with  minimum assistance  Patient completed Rolling/Turning to Right with minimum assistance  Patient completed Supine to Sit with minimal assistance    Functional " Mobility/Transfers:  Patient completed Sit <> Stand Transfer with maximal assistance  with  no assistive device     Activities of Daily Living:  Deferred 2* lethargic and inattentive    [facility] 6 Click ADL: 11    Treatment & Education:  Patient oriented x person only.  Lethargic and inattentive during session.  Able to follow one step commands.  Family not present.    Patient left supine with all lines intact, call button in reach, bed alarm on, and restraints reapplied at end of session    GOALS:   Multidisciplinary Problems       Occupational Therapy Goals          Problem: Occupational Therapy    Goal Priority Disciplines Outcome Interventions   Occupational Therapy Goal     OT, PT/OT Progressing    Description: Goals set 11/11 with an expiration date, 12/5:  Patient will increase functional independence with ADLs by performing:    Patient will demonstrate rolling to the right with min assist.  Not met   Patient will demonstrate rolling to the left with min assist.   Not met  Patient will demonstrate supine -sit with min assist.   Not met  Patient will demonstrate squat pivot transfers with min assist.   Not met  Patient will demonstrate grooming while seated with min assist.   Not met  Patient will demonstrate upper body dressing with min assist while seated EOB.   Not met  Patient will demonstrate lower body dressing with mod assist while seated EOB.   Not met  Patient will demonstrate toileting with mod assist.   Not met  Patient's family / caregiver will demonstrate independence and safety with assisting patient with self-care skills and functional mobility.     Not met  Patient's family / caregiver will demonstrate independence with providing ROM and changes in bed positioning.   Not met                                 Time Tracking:     OT Date of Treatment: 11/18/24  OT Start Time: 0634  OT Stop Time: 0650  OT Total Time (min): 16 min    Billable Minutes:Neuromuscular Re-education 16    OT/MADHURI: OT           11/18/2024

## 2024-11-18 NOTE — MEDICAL/APP STUDENT
"CONSULTATION LIAISON PSYCHIATRY PROGRESS NOTE    Patient Name: Malathi Mcpherson  MRN: [id number]  Patient Class: IP- Inpatient  Admission Date: 11/6/2024  Attending Physician: Emily Recinos MD      SUBJECTIVE:   Malathi Mcpherson is a 49 y.o. female with past psychiatric history of polysubstance use disorder (crack cocaine, heroin, alcohol) and bipolar disorder & past pertinent medical history of seizure, HTN, and HLD presents to the ED/admitted to the hospital for large BL CVA. Patient most recently in drug rehab court prior to hospitalization and relapsed on crack cocaine in the week prior to admission. She is admitted to hospital for further evaluation and management of multifocal acute infarcts.     Recently evaluated by psychiatry on 11/14/24 for her stimulant use disorder and history of bipolar disorder. Psychiatry re-consulted for "Worsening agitation, extensive psych hx (bipolar?) on lithium, need med recs for agitation."     Today, patient is sitting up in bed with 2-pt restraints. Patient appears disoriented, restless and fidgety, and is minimally responsive. Patient is oriented to person but not to time or place. Patient's mother is at bedside. She reports patient is less agitated today. When we asked the patient's mother if the patient expressed suicidal ideation, she said yesterday the patient said "I don't want to live like this". Patient received PRN Hydroxyzine 25 mg last night for insomnia. Discussed delirium precautions with patient and her mother. Upon interviewing the patient with the attending later in the day the patient is confused and mumbles incoherently.          OBJECTIVE:    Mental Status Exam:  General Appearance: well-nourished, dressed in hospital garb, disheveled, NG tube to R nare  Behavior: unable to participate, minimal responses, agitated, restless and fidgety, poor eye contact  Involuntary Movements and Motor Activity: no abnormal involuntary movements noted; no tics, no " "tremors, no akathisia, no dystonia, no evidence of tardive dyskinesia; no psychomotor agitation or retardation  Gait and Station: unable to assess - patient lying down or seated  Speech and Language: mumbling, responds to questions minimally/briefly, dysarthric  Mood: "fine"  Affect: irritable  Thought Process and Associations: Unable to Assess  Perceptual Disturbances:  BETTYE  Thought Content and Perceptions:: Unable to Assess  Sensorium and Orientation: oriented to person only  Recent and Remote Memory: Unable to  Formally Assess  Attention and Concentration: Unable to Formally Assess  Fund of Knowledge: Unable to Formally Assess  Insight:  BETTYE due to patient's mental state  Judgment:  BETTYE  due to patient's mental state    CAM ICU positive? Did not assesss      ASSESSMENT & RECOMMENDATIONS   Delirium due to multiple etiologies [multiple acute infarcts, debility, pneumonia ] (F05)  Cocaine use disorder, severe  Hx of Bipolar Disorder     Scheduled Medication(s):  Continue lithium 300 mg TID, Prozac 60 mg daily, Seroquel 50 mg in the AM and 100 mg in the PM, Remeron 7.5 mg nightly.      PRN Medication(s):  PRN Seroquel 25 mg BID for non-redirectable agitation.       Other Recommendations (labs, imaging, further consults, etc.):  Most recent EKG on 11/16/24 with QTC of 476; please obtain repeat EKG in the setting of QT prolongation and agitation despite current psychotropic regimen.      Delirium Behavior Management  PLEASE utilize PRN meds first for agitation. Minimize use of PHYSICAL restraints OR have periods of being out of physical restraints if possible.  Keep window shades open and room lit during day and room dim at night in order to promote normal sleep-wake cycles  Encourage family at bedside. [place] patient often to situation, location, date.  Continue to Limit or Discontinue use of Narcotics, Benzos and Anti-cholinergic medications as they may worsen delirium.  Continue medical workup for causative etiology " of Delirium.     Risk Assessment / Legal Status  Patient does not meet criteria for PEC or involuntary inpatient psychiatric admission at this time. Recommend to rescind PEC if one was placed. Patient is not currently an imminent danger to self or others and is not gravely disabled due to a psychiatric illness.    Follow-up:  Will follow-up while in house.    Disposition:   Defer to primary team.    Please contact ON CALL psychiatry service (24/7) for any acute issues that may arise.    Christiana Adams, Student Doctor  CL Psychiatry  [facility]  11/18/2024 8:42 AM        --------------------------------------------------------------------------------------------------------------------------------------------------------------------------------------------------------------------------------------    CONTINUED OBJECTIVE clinical data & findings reviewed and noted for above decision making    Current Medications:   Scheduled Meds:    aspirin  81 mg Per NG tube Daily    atorvastatin  40 mg Per NG tube Daily    carvediloL  6.25 mg Per NG tube BID    ceFEPime IV (PEDS and ADULTS)  2 g Intravenous Q8H    clopidogreL  75 mg Per NG tube Daily    enoxparin  40 mg Subcutaneous Q24H (prophylaxis, 1700)    FLUoxetine  60 mg Per NG tube Daily    folic acid  1 mg Per NG tube Daily    LIDOcaine  1 patch Transdermal Q24H    lithium citrate 8 meq/5 ml  300 mg Per NG tube TID    melatonin  6 mg Per NG tube Nightly    methyl salicylate-menthol 15-10%   Topical (Top) TID    mirtazapine  7.5 mg Per NG tube QHS    multivitamin  1 tablet Per NG tube Daily    mupirocin   Nasal BID    QUEtiapine  100 mg Per NG tube QHS    QUEtiapine  50 mg Per NG tube Daily    senna-docusate 8.6-50 mg  1 tablet Per NG tube Daily    thiamine  100 mg Per NG tube Daily    vancomycin (VANCOCIN) IV (PEDS and ADULTS)  750 mg Intravenous Q12H     PRN Meds:   Current Facility-Administered Medications:     acetaminophen, 1,000 mg, Per NG tube, BID  PRN    albuterol-ipratropium, 3 mL, Nebulization, Q6H PRN    hydrOXYzine HCL, 25 mg, Per NG tube, QID PRN    polyethylene glycol, 17 g, Per NG tube, BID PRN    QUEtiapine, 25 mg, Per NG tube, BID PRN    sodium chloride 0.9%, 10 mL, Intravenous, PRN    Pharmacy to dose Vancomycin consult, , , Once **AND** vancomycin - pharmacy to dose, , Intravenous, pharmacy to manage frequency    Allergies:   Review of patient's allergies indicates:  No Known Allergies    Vitals  Vitals:    11/18/24 0730   BP: (!) 167/104   Pulse: 96   Resp: 18   Temp: 99.8 °F (37.7 °C)       Labs/Imaging/Studies:  Recent Results (from the past 24 hours)   SARS-Cov2 (COVID) with FLU/RSV by PCR    Collection Time: 11/17/24 10:03 AM   Result Value Ref Range    SARS-CoV2 (COVID-19) Qualitative PCR Not Detected Not Detected    Influenza A, Molecular Not Detected Not Detected    Influenza B, Molecular Not Detected Not Detected    RSV Ag by Molecular Method Not Detected Not Detected   Blood culture    Collection Time: 11/17/24  4:03 PM    Specimen: Peripheral, Hand, Left; Blood   Result Value Ref Range    Blood Culture, Routine No Growth to date    Hepatitis panel, acute    Collection Time: 11/17/24  6:46 PM   Result Value Ref Range    Hepatitis B Surface Ag Non-reactive Non-reactive    Hep B C IgM Non-reactive Non-reactive    Hep A IgM Non-reactive Non-reactive    Hepatitis C Ab Reactive (A) Non-reactive   Blood culture    Collection Time: 11/17/24  6:46 PM    Specimen: Peripheral, Hand, Right; Blood   Result Value Ref Range    Blood Culture, Routine No Growth to date    Comprehensive metabolic panel    Collection Time: 11/18/24  5:34 AM   Result Value Ref Range    Sodium 132 (L) 136 - 145 mmol/L    Potassium 4.1 3.5 - 5.1 mmol/L    Chloride 104 95 - 110 mmol/L    CO2 18 (L) 23 - 29 mmol/L    Glucose 122 (H) 70 - 110 mg/dL    BUN 21 (H) 6 - 20 mg/dL    Creatinine 0.7 0.5 - 1.4 mg/dL    Calcium 9.8 8.7 - 10.5 mg/dL    Total Protein 7.8 6.0 - 8.4 g/dL     Albumin 3.1 (L) 3.5 - 5.2 g/dL    Total Bilirubin 0.3 0.1 - 1.0 mg/dL    Alkaline Phosphatase 91 40 - 150 U/L    AST 38 10 - 40 U/L    ALT 52 (H) 10 - 44 U/L    eGFR >60.0 >60 mL/min/1.73 m^2    Anion Gap 10 8 - 16 mmol/L   Magnesium    Collection Time: 11/18/24  5:34 AM   Result Value Ref Range    Magnesium 2.3 1.6 - 2.6 mg/dL   Phosphorus    Collection Time: 11/18/24  5:34 AM   Result Value Ref Range    Phosphorus 2.9 2.7 - 4.5 mg/dL   CBC auto differential    Collection Time: 11/18/24  5:34 AM   Result Value Ref Range    WBC 19.25 (H) 3.90 - 12.70 K/uL    RBC 4.43 4.00 - 5.40 M/uL    Hemoglobin 13.0 12.0 - 16.0 g/dL    Hematocrit 39.2 37.0 - 48.5 %    MCV 89 82 - 98 fL    MCH 29.3 27.0 - 31.0 pg    MCHC 33.2 32.0 - 36.0 g/dL    RDW 13.8 11.5 - 14.5 %    Platelets 426 150 - 450 K/uL    MPV 10.8 9.2 - 12.9 fL    Immature Granulocytes 2.6 (H) 0.0 - 0.5 %    Gran # (ANC) 15.3 (H) 1.8 - 7.7 K/uL    Immature Grans (Abs) 0.51 (H) 0.00 - 0.04 K/uL    Lymph # 1.9 1.0 - 4.8 K/uL    Mono # 1.2 (H) 0.3 - 1.0 K/uL    Eos # 0.2 0.0 - 0.5 K/uL    Baso # 0.15 0.00 - 0.20 K/uL    nRBC 0 0 /100 WBC    Gran % 79.4 (H) 38.0 - 73.0 %    Lymph % 9.7 (L) 18.0 - 48.0 %    Mono % 6.3 4.0 - 15.0 %    Eosinophil % 1.2 0.0 - 8.0 %    Basophil % 0.8 0.0 - 1.9 %    Differential Method Automated    Protime-INR    Collection Time: 11/18/24  5:34 AM   Result Value Ref Range    Prothrombin Time 12.4 9.0 - 12.5 sec    INR 1.1 0.8 - 1.2   POCT glucose    Collection Time: 11/18/24  7:31 AM   Result Value Ref Range    POCT Glucose 138 (H) 70 - 110 mg/dL     Imaging Results              MRI Brain Without Contrast (Final result)  Result time 11/06/24 17:40:17      Final result by Adrián Hobbs MD (11/06/24 17:40:17)                   Impression:      Multiple posterior circulation infarcts are identified with involvement of the left greater than right occipital lobes and medial left temporal lobe, thalamus, right dorsal medulla, and minimally  involving the cerebellum.  No midline shift with minimal mass effect on the 3rd ventricle.    Loss of flow void within the right intracranial vertebral artery in keeping with the findings on CTA.      Electronically signed by: Adrián Hobbs  Date:    11/06/2024  Time:    17:40               Narrative:    EXAMINATION:  MRI BRAIN WITHOUT CONTRAST    CLINICAL HISTORY:  Stroke, follow up;    TECHNIQUE:  Multiplanar multisequence MR imaging of the brain was performed without contrast.    COMPARISON:  CT 11/06/2024    FINDINGS:  Multiple areas of restricted diffusion consistent with acute infarcts within the posterior circulation are identified.  There is involvement of the left greater than right occipital lobes and left medial temporal lobe (PCA territory), bilateral thalamus, left hippocampus and right hippocampal tail, right dorsal medulla, and tiny bilateral cerebellar infarcts.  Trace petechial hemorrhage left medial temporal lobe.  No overt hemorrhagic conversion.    No midline shift or herniation with minimal mass effect on the 3rd ventricle.    No hydrocephalus.    Loss of the intracranial right vertebral artery flow void in keeping with the findings on the recent CTA.                                       XR NG/OG tube placement check, non-radiologist performed (Final result)  Result time 11/06/24 10:25:49      Final result by Segundo Sheth MD (11/06/24 10:25:49)                   Impression:      Please see above.      Electronically signed by: Segundo Sheth  Date:    11/06/2024  Time:    10:25               Narrative:    EXAMINATION:  XR NG/OG TUBE PLACEMENT CHECK, NON-RADIOLOGIST PERFORMED    CLINICAL HISTORY:  placement;    TECHNIQUE:  Single overhead image of the abdomen was obtained.    COMPARISON:  Radiograph abdomen 01/19/2021    FINDINGS:  Enteric tube is visualized with tip just distal to the gastroesophageal junction.  However, side port is proximal to the expected region of the gastroesophageal  junction.  Correlation and advancement advised.    Cardiomediastinal silhouette and lungs are unchanged.    Nonspecific, nonobstructive bowel gas pattern.  No free intra-abdominal air.  No pneumatosis.    Osseous structures demonstrate no evidence for acute fracture or osseous destructive lesion.    Soft tissues are unremarkable.                                       X-Ray Chest AP Portable (Final result)  Result time 11/06/24 10:26:26      Final result by Kervin Castaneda MD (11/06/24 10:26:26)                   Impression:      Further advancement of the NG tube recommended      Electronically signed by: Kervin Castaneda MD  Date:    11/06/2024  Time:    10:26               Narrative:    EXAMINATION:  XR CHEST AP PORTABLE    CLINICAL HISTORY:  Hypoxemia    TECHNIQUE:  Single frontal view of the chest was performed.    COMPARISON:  No 06/26/2024 ne    FINDINGS:  ET tube at T4 level.  NG tube in the lower esophagus and further advancement recommended.  Heart size normal.  There is slight degree of diffuse accentuation interstitial markings.  No significant airspace consolidation or pleural effusion identified.                                        CTA STROKE MULTI-PHASE (Final result)  Result time 11/06/24 09:33:04      Final result by Geo Ambrocio MD (11/06/24 09:33:04)                   Impression:      Multifocal acute posterior circulation distribution infarcts as further discussed above, particularly involving essentially the entirety of the left PCA territory.  Modest mass effect without significant hemorrhage.    Focal thrombus with severe stenosis extending from the right subclavian artery into the proximal right vertebral artery.    Focal occlusion of the proximal intracranial segment of the right vertebral artery.    Focal occlusion of the proximal left PCA.    Moderate focal stenosis at the left vertebral artery origin.    Anterior circulation appears unremarkable.    No evidence of fracture or  traumatic malalignment in the cervical spine.    Emphysematous changes and patchy ground-glass opacity in the partially visualized lung apices.    This report was flagged in Epic as abnormal.      Electronically signed by: Geo Ambrocio MD  Date:    11/06/2024  Time:    09:33               Narrative:    EXAMINATION:  CTA STROKE MULTI-PHASE; CT CERVICAL SPINE WITHOUT CONTRAST    CLINICAL HISTORY:  Neuro deficit, acute, stroke suspected;; Neck trauma, intoxicated or obtunded (Age >= 16y);    TECHNIQUE:  Axial CT images obtained throughout the region of the head and cervical spine before the administration of intravenous contrast.    CT angiogram was performed through the cervical and intracranial vasculature during the IV bolus administration of 75mL of Omnipaque 350.  Two additional phases through the intracranial vasculature via multiphase technique.    Multiplanar MPR and MIP reformats were performed.    CT source data was analyzed using artificial intelligence software for detection of a large vessel occlusions (LVO) in order to enable computer assisted triage notification and aid clinical stroke decision making.    COMPARISON:  CTA head and cervical spine 10/25/2015    FINDINGS:  Large region of hypoattenuation loss of gray-white differentiation in left occipital lobe in medial temporal lobe in keeping with an acute PCA distribution infarct.  Modest localized mass effect with some sulcal crowding.  Additional involvement of the left thalamus.  Similar appearance in the medial right occipital lobe, but smaller area of infarction.  Small left superior cerebellar infarct, also likely acute.  No evidence of recent or remote major vascular distribution infarct in the anterior circulation.  No acute parenchymal hemorrhage.  No midline shift.    The ventricles are normal in size without evidence of hydrocephalus.    No extra-axial blood or fluid collections.    The cranium appears intact.    Mastoid air cells and  paranasal sinuses are essentially clear.    The vertebral bodies are normal in height and morphology without evidence of an acute displaced fracture or focal osseous destructive process.    Normal sagittal alignment is preserved.  No spondylolisthesis.    Intervertebral disc heights are well maintained.  No evidence of a bony spinal canal stenosis.    Evaluation intraspinal contents demonstrates no evidence of mass or hematoma.    No acute abnormalities in the paraspinal soft tissue structures.    Emphysematous changes patchy ground-glass opacity in the partially visualized lung apices.      CTA:    The aortic arch demonstrates no significant stenosis at the major branch vessel origins.    There is a focal thrombus in the right subclavian artery extending into the right vertebral artery origin with severe stenosis.  Thrombus extending over proximally 1 cm.  The V1 and V2 segments of right vertebral artery are otherwise normal in caliber of slightly decreased in attenuation in comparison to the other cervical vasculature.  Abrupt occlusion of distal right vertebral artery about the level of the PICA origin approximate 2 cm segment of non enhancement.    Moderate focal stenosis at the left vertebral artery origin.  Left vertebral artery is otherwise normal in caliber.  The basilar artery is normal in caliber.    The right common carotid artery is normal in caliber.  No significant stenosis at the carotid bifurcation.The right internal carotid artery is normal in caliber.    The left common carotid artery is normal in caliber. No significant stenosis at the carotid bifurcation.The left internal carotid artery is normal in caliber.    Abrupt non enhancement in the P1 segment of the left PCA.  The proximal right PCA appears within normal limits.    The proximal MCAs and ACAs are unremarkable.    Findings were immediately discussed upon identification via telephone with the stroke service ([place]) at  approximately 09:20.                                        CT Cervical Spine Without Contrast (Final result)  Result time 11/06/24 09:33:04      Final result by Geo Ambrocio MD (11/06/24 09:33:04)                   Impression:      Multifocal acute posterior circulation distribution infarcts as further discussed above, particularly involving essentially the entirety of the left PCA territory.  Modest mass effect without significant hemorrhage.    Focal thrombus with severe stenosis extending from the right subclavian artery into the proximal right vertebral artery.    Focal occlusion of the proximal intracranial segment of the right vertebral artery.    Focal occlusion of the proximal left PCA.    Moderate focal stenosis at the left vertebral artery origin.    Anterior circulation appears unremarkable.    No evidence of fracture or traumatic malalignment in the cervical spine.    Emphysematous changes and patchy ground-glass opacity in the partially visualized lung apices.    This report was flagged in Epic as abnormal.      Electronically signed by: Geo Ambrocio MD  Date:    11/06/2024  Time:    09:33               Narrative:    EXAMINATION:  CTA STROKE MULTI-PHASE; CT CERVICAL SPINE WITHOUT CONTRAST    CLINICAL HISTORY:  Neuro deficit, acute, stroke suspected;; Neck trauma, intoxicated or obtunded (Age >= 16y);    TECHNIQUE:  Axial CT images obtained throughout the region of the head and cervical spine before the administration of intravenous contrast.    CT angiogram was performed through the cervical and intracranial vasculature during the IV bolus administration of 75mL of Omnipaque 350.  Two additional phases through the intracranial vasculature via multiphase technique.    Multiplanar MPR and MIP reformats were performed.    CT source data was analyzed using artificial intelligence software for detection of a large vessel occlusions (LVO) in order to enable computer assisted triage notification and  aid clinical stroke decision making.    COMPARISON:  CTA head and cervical spine 10/25/2015    FINDINGS:  Large region of hypoattenuation loss of gray-white differentiation in left occipital lobe in medial temporal lobe in keeping with an acute PCA distribution infarct.  Modest localized mass effect with some sulcal crowding.  Additional involvement of the left thalamus.  Similar appearance in the medial right occipital lobe, but smaller area of infarction.  Small left superior cerebellar infarct, also likely acute.  No evidence of recent or remote major vascular distribution infarct in the anterior circulation.  No acute parenchymal hemorrhage.  No midline shift.    The ventricles are normal in size without evidence of hydrocephalus.    No extra-axial blood or fluid collections.    The cranium appears intact.    Mastoid air cells and paranasal sinuses are essentially clear.    The vertebral bodies are normal in height and morphology without evidence of an acute displaced fracture or focal osseous destructive process.    Normal sagittal alignment is preserved.  No spondylolisthesis.    Intervertebral disc heights are well maintained.  No evidence of a bony spinal canal stenosis.    Evaluation intraspinal contents demonstrates no evidence of mass or hematoma.    No acute abnormalities in the paraspinal soft tissue structures.    Emphysematous changes patchy ground-glass opacity in the partially visualized lung apices.      CTA:    The aortic arch demonstrates no significant stenosis at the major branch vessel origins.    There is a focal thrombus in the right subclavian artery extending into the right vertebral artery origin with severe stenosis.  Thrombus extending over proximally 1 cm.  The V1 and V2 segments of right vertebral artery are otherwise normal in caliber of slightly decreased in attenuation in comparison to the other cervical vasculature.  Abrupt occlusion of distal right vertebral artery about the  level of the PICA origin approximate 2 cm segment of non enhancement.    Moderate focal stenosis at the left vertebral artery origin.  Left vertebral artery is otherwise normal in caliber.  The basilar artery is normal in caliber.    The right common carotid artery is normal in caliber.  No significant stenosis at the carotid bifurcation.The right internal carotid artery is normal in caliber.    The left common carotid artery is normal in caliber. No significant stenosis at the carotid bifurcation.The left internal carotid artery is normal in caliber.    Abrupt non enhancement in the P1 segment of the left PCA.  The proximal right PCA appears within normal limits.    The proximal MCAs and ACAs are unremarkable.    Findings were immediately discussed upon identification via telephone with the stroke service ([place]) at approximately 09:20.

## 2024-11-18 NOTE — ASSESSMENT & PLAN NOTE
Extubated 11/10 in [facility] with persistent oxygen requirement. CXR remarkable for RUL consolidation. Respiratory culture positive for H flu and MSSA. MRSA screen by PCR positive. Was transitioned to cefazolin 11/12 then cefepime 11/14 after sensitivities c/w h flu beta lactamase positive. Extended course even persistent or worsening leukocytosis, plan for total 7 day course (EED 11/21). 11/18/24 worsening leukocytosis with WBC up to 19.25 from 17.82. Viral panel negative.     -wean oxygen as tolerated, stable on RA  -continue cefepime (EED 11/21)   - Started on vanc  - F/U CBC  - F/U blood cultures

## 2024-11-18 NOTE — ASSESSMENT & PLAN NOTE
LFTs uptrending, tylenol updated to PRN.   Decreased dose of statin to atorvastatin 40 mg 11/17 given elevated INR 1.5.   Consider liver ultrasound, f/u acute hepatitis panel    Lab Results   Component Value Date    ALT 52 (H) 11/18/2024    AST 38 11/18/2024    ALKPHOS 91 11/18/2024    BILITOT 0.3 11/18/2024     Monitor daily CMP, PT/INR

## 2024-11-18 NOTE — PROGRESS NOTES
Neno Conley - Neurosurgery ([facility])  Vascular Neurology  Comprehensive Stroke Center  Progress Note    Assessment/Plan:     * Acute arterial ischemic stroke, multifocal, posterior circulation  Malathi Mcpherson is a 49 y.o. female with PMH of drug use, seizure, HTN, HLD that is [place] [facility] [place] and management of multifocal acute infarcts. She initially presented to [facility] [place] 11/6 after being found lying face down at home that morning, per EMS was lethargic and aphasic and moving LUE/LLE/RLE but was not moving RUE. EMS also reported that they had seen her before in context of seizures. LKW 10pm 11/5. In ED was noted to have exam worsening, now with R sided plegia and global aphasia. NIHSS 22. Also noted to have O2 desat in 70s and ultimately was intubated for airway protection. CTA showed multifocal areas of hypodensity involving the posterior circulation suspicious for infarcts as well as focal occlusion of R vert and L PCA. Not candidate for acute stroke interventions, no TNK due to OOW and no IR due to stroke burden/risk for bleeding. She was admitted to [facility] for higher level of care. NSGY consulted on admit for edema/hemicrani watch, however no surgical interventions recommended. MRI brain confirmed multifocal areas of acute infarct involving: L>R occipital lobes, L medial temporal lobe, bilateral thalami, L hippocampus and R hippocampus tail, R dorsal medulla, bilateral cerebellar hemispheres. TTE unremarkable. Suspect etiology of stroke likely secondary to substance use, Utox + cocaine.    Interval hx: Episodes of coughing and vomiting overnight with concern for aspiration event. TF held, repeat KUB unremarkable. Worsening leukocytosis today, possibly related to aspiration event although uncertain. Will test COVID/flu/RSV, collect blood cultures. Consider addition of vancomycin especially is patient becomes febrile or clinically unstable. LFT's slightly improved, elevated INR. Acute hepatitis  panel ordered, consider liver ultrasound.     Antithrombotics for secondary stroke prevention: Antiplatelets: Aspirin: 81 mg daily  Clopidogrel: 75 mg daily.     Statins for secondary stroke prevention and hyperlipidemia, if present: Statins: Atorvastatin- 40 mg daily    Aggressive risk factor modification: HTN, HLD, Substance use.     Rehab efforts: The patient has been evaluated by a stroke team provider and the therapy needs have been fully considered based off the presenting complaints and exam findings. The following therapy evaluations are needed: PT evaluate and treat, OT evaluate and treat, SLP evaluate and treat. High intensity therapy recommended. Modified barium swallow test scheduled Monday (11/18) per speech.    Diagnostics ordered/pending: Other: Modified barium swallow    VTE prophylaxis: Enoxaparin 40 mg SQ every 24 hours  Mechanical prophylaxis: Place SCDs    BP parameters: Infarct: SBP goal <180      Transaminitis  LFTs uptrending, tylenol updated to PRN.   Decreased dose of statin to atorvastatin 40 mg 11/17 given elevated INR 1.5.   Consider liver ultrasound, f/u acute hepatitis panel    Lab Results   Component Value Date    ALT 52 (H) 11/18/2024    AST 38 11/18/2024    ALKPHOS 91 11/18/2024    BILITOT 0.3 11/18/2024     Monitor daily CMP, PT/INR    Agitation  -see anxiety and depression    HTN (hypertension)  Patients blood pressure range in the last 24 hours was: BP  Min: 86/51  Max: 212/97. The patient's inpatient anti-hypertensive regimen is listed below:    Current Antihypertensives  , 2 times daily, Oral  carvediloL tablet 6.25 mg, 2 times daily, Per NG tube    - BP is controlled, no changes needed to their regimen  -SBP goal < 180      Right upper lobe pneumonia  Extubated 11/10 in [facility] with persistent oxygen requirement. CXR remarkable for RUL consolidation. Respiratory culture positive for H flu and MSSA. MRSA screen by PCR positive. Was transitioned to cefazolin 11/12 then cefepime 11/14  after sensitivities c/w h flu beta lactamase positive. Extended course even persistent or worsening leukocytosis, plan for total 7 day course (EED 11/21). 11/18/24 worsening leukocytosis with WBC up to 19.25 from 17.82. Viral panel negative.     -wean oxygen as tolerated, stable on RA  -continue cefepime (EED 11/21)   - Started on vanc  - F/U CBC  - F/U blood cultures     Debility  -Due to stroke  -Aggressive therapy  -PT/OT/SLP eval and treat    Polysubstance abuse  -Utox + cocaine  -Suspect etiology of stroke likely due to substance use  -Continue thiamine/folate/multivitamin supplementation  -Addiction psych consulted early on admission    Cytotoxic cerebral edema  -Area of cerebral edema identified when reviewing brain imaging involving bilateral posterior circulation territories due to acute infarcts, there is mild mass effect associated with it that has remained stable on follow up imaging.  -Admitted to [facility] for close neuro monitoring and observation  -NSGY consulted on admit, no surgical intervention recommended and NSGY s/o  - Stepped down to floor  -Continue frequent q4hr neuro checks as any acute changes or worsening neuro status .  -Obtain stat CTH for any new or worsening neuro changes and notify primary team immediately    Anxiety and depression  -Psychiatry consulted, appreciate recs  Continue lithium 300 mg TID (resumed 11/13), Prozac 60 mg daily, Seroquel 25 mg in the AM and 50 mg in the PM, Remeron 7.5 mg nightly.   PRN Seroquel 25 mg BID for non-redirectable agitation.    -daily EKG to monitor Qtc  - Pending serum lithium level on 11/18/24 11/07/2024 Pt was agitated overnight and is on fentanyl. Currently intubated.On repeated painful stimuli has movements on LUE,LLE, RLE> RUE.The movement on right side is an improvement from yesterday.  11/11/2024 NAEON, remains on precedex for agitation. Extubated yesterday (11/10), tolerating extubation thus far without complication. Moving extremities  spontaneously although weakness noted in RUE>>RLE. Remains globally aphasic and agitated despite max precedex infusion. Respiratory cultures + H flu, on rocephin for abx. Also on bactrim for SSTI. Home lithium restarted today, continue seoquel and fluoxetine.  11/14/2024 NAEO, patient off precedex, BL wrist and torso restraints, NG tube with feeds running. Patient able to respond to questions and follow some commands. Oriented to self only. Stable for stepdown.   11/15/2024 Pt still in restraints. Per nurse tendency to pull at tubes and things.On restraints. Oriented to self. No other complaints in the am.  11/16/2024 remains in restraints this AM, reportedly agitated overnight with concerns from nursing. Psychiatry consulted for med rec given extensive psych history on lithium and multiple different psych meds on admission med rec/recent dispense history and concerns for worsening agitation. LFTs uptrending, tylenol updated to PRN. Consider decreased dose of statin if worsening.   11/17/2024 episodes of coughing and vomiting overnight with concern for aspiration event. TF held, repeat KUB unremarkable. Worsening leukocytosis today, possibly related to aspiration event although uncertain. Will test COVID/flu/RSV, collect blood cultures. Consider addition of vancomycin especially is patient becomes febrile or clinically unstable. LFT's slightly improved, elevated INR. Acute hepatitis panel ordered, consider liver ultrasound.   11/18/24 Viral panel negative for any respiratory infection. WBC trending up to 19.25 from 17.82. Currently on Vanc and cefepime. LFT downtrending to normal values. Plan for barium swallow study today, to assess for the continuation of NG tube feedings. Plan to likely move to hospital medicine service.     STROKE DOCUMENTATION   Acute Stroke Times   Last Known Normal Date: 11/05/24  Last Known Normal Time: 2200  Symptom Onset Date: 11/06/24 (unsure of timing)  Symptom Onset Time:  (sometime  this morning.)  Unknown Symptom Onset Time: Unknown Time  Stroke Team Called Date: 11/06/24  Stroke Team Called Time: 0843  Stroke Team Arrival Date: 11/06/24  Stroke Team Arrival Time: 0845  CT Interpretation Time: 0859  Thrombolytic Therapy Recommended: No  CTA Interpretation Time: 0902  Thrombectomy Recommended: No    NIH Scale:          Modified Mohave Score: 0  Shailesh Coma Scale:    ABCD2 Score:    [id number] Score:   HAS -BLED Score:   ICH Score:   Hunt & Argueta Classification:      Hemorrhagic change of an Ischemic Stroke: Does this patient have an ischemic stroke with hemorrhagic changes? No     Neurologic Chief Complaint: acute arterial ischemic stroke, multifocal, posterior circulation     Subjective:     Interval History: Patient is seen for follow-up neurological assessment and treatment recommendations:     HPI, Past Medical, Family, and Social History remains the same as documented in the initial encounter.     Review of Systems   Unable to perform ROS: Mental status change     Scheduled Meds:   aspirin  81 mg Per NG tube Daily    atorvastatin  40 mg Per NG tube Daily    carvediloL  6.25 mg Per NG tube BID    ceFEPime IV (PEDS and ADULTS)  2 g Intravenous Q8H    clopidogreL  75 mg Per NG tube Daily    enoxparin  40 mg Subcutaneous Q24H (prophylaxis, 1700)    FLUoxetine  60 mg Per NG tube Daily    folic acid  1 mg Per NG tube Daily    LIDOcaine  1 patch Transdermal Q24H    lithium citrate 8 meq/5 ml  300 mg Per NG tube TID    melatonin  6 mg Per NG tube Nightly    methyl salicylate-menthol 15-10%   Topical (Top) TID    mirtazapine  7.5 mg Per NG tube QHS    multivitamin  1 tablet Per NG tube Daily    mupirocin   Nasal BID    QUEtiapine  100 mg Per NG tube QHS    QUEtiapine  50 mg Per NG tube Daily    senna-docusate 8.6-50 mg  1 tablet Per NG tube Daily    thiamine  100 mg Per NG tube Daily    vancomycin (VANCOCIN) IV (PEDS and ADULTS)  750 mg Intravenous Q12H     Continuous Infusions:  PRN  Meds:  Current Facility-Administered Medications:     acetaminophen, 1,000 mg, Per NG tube, BID PRN    albuterol-ipratropium, 3 mL, Nebulization, Q6H PRN    hydrOXYzine HCL, 25 mg, Per NG tube, QID PRN    polyethylene glycol, 17 g, Per NG tube, BID PRN    QUEtiapine, 25 mg, Per NG tube, BID PRN    sodium chloride 0.9%, 10 mL, Intravenous, PRN    Pharmacy to dose Vancomycin consult, , , Once **AND** vancomycin - pharmacy to dose, , Intravenous, pharmacy to manage frequency    Objective:     Vital Signs (Most Recent):  Temp: 99.8 °F (37.7 °C) (11/18/24 0730)  Pulse: 96 (11/18/24 0730)  Resp: 18 (11/18/24 0730)  BP: (!) 167/104 (11/18/24 0730)  SpO2: 97 % (11/18/24 0730)  BP Location: Right arm    Vital Signs Range (Last 24H):  Temp:  [97.5 °F (36.4 °C)-99.8 °F (37.7 °C)]   Pulse:  []   Resp:  [16-18]   BP: (130-184)/()   SpO2:  [94 %-97 %]   BP Location: Right arm       Physical Exam  Vitals and nursing note reviewed.   Constitutional:       General: She is sleeping.      Appearance: She is ill-appearing.   Pulmonary:      Effort: Pulmonary effort is normal. No respiratory distress.   Abdominal:      General: There is no distension.      Palpations: Abdomen is soft.   Musculoskeletal:      Cervical back: Normal range of motion and neck supple.      Comments: Right sided weakness.    Skin:     Capillary Refill: Capillary refill takes 2 to 3 seconds.   Neurological:      General: No focal deficit present.      GCS: GCS eye subscore is 3. GCS verbal subscore is 3. GCS motor subscore is 5.      Cranial Nerves: Dysarthria present.      Motor: Weakness present.      Coordination: Coordination abnormal. Finger-Nose-Finger Test abnormal and Heel to Vo Test abnormal.      Gait: Gait abnormal.   Psychiatric:         Behavior: Behavior is slowed and withdrawn.         Cognition and Memory: Cognition is impaired. Memory is impaired.              Neurological Exam:   LOC: alert    Laboratory:  CMP:   Recent Labs    Lab 11/18/24  0534   CALCIUM 9.8   ALBUMIN 3.1*   PROT 7.8   *   K 4.1   CO2 18*      BUN 21*   CREATININE 0.7   ALKPHOS 91   ALT 52*   AST 38   BILITOT 0.3     CBC:   Recent Labs   Lab 11/18/24  0534   WBC 19.25*   RBC 4.43   HGB 13.0   HCT 39.2      MCV 89   MCH 29.3   MCHC 33.2       Diagnostic Results     Brain Imaging / Vessel Imaging       Results for orders placed or performed during the hospital encounter of 11/06/24 (from the past 2160 hours)   CTA STROKE MULTI-PHASE     Narrative     EXAMINATION:  CTA STROKE MULTI-PHASE; CT CERVICAL SPINE WITHOUT CONTRAST     CLINICAL HISTORY:  Neuro deficit, acute, stroke suspected;; Neck trauma, intoxicated or obtunded (Age >= 16y);     TECHNIQUE:  Axial CT images obtained throughout the region of the head and cervical spine before the administration of intravenous contrast.     CT angiogram was performed through the cervical and intracranial vasculature during the IV bolus administration of 75mL of Omnipaque 350.  Two additional phases through the intracranial vasculature via multiphase technique.     Multiplanar MPR and MIP reformats were performed.     CT source data was analyzed using artificial intelligence software for detection of a large vessel occlusions (LVO) in order to enable computer assisted triage notification and aid clinical stroke decision making.     COMPARISON:  CTA head and cervical spine 10/25/2015     FINDINGS:  Large region of hypoattenuation loss of gray-white differentiation in left occipital lobe in medial temporal lobe in keeping with an acute PCA distribution infarct.  Modest localized mass effect with some sulcal crowding.  Additional involvement of the left thalamus.  Similar appearance in the medial right occipital lobe, but smaller area of infarction.  Small left superior cerebellar infarct, also likely acute.  No evidence of recent or remote major vascular distribution infarct in the anterior circulation.  No acute  parenchymal hemorrhage.  No midline shift.     The ventricles are normal in size without evidence of hydrocephalus.     No extra-axial blood or fluid collections.     The cranium appears intact.     Mastoid air cells and paranasal sinuses are essentially clear.     The vertebral bodies are normal in height and morphology without evidence of an acute displaced fracture or focal osseous destructive process.     Normal sagittal alignment is preserved.  No spondylolisthesis.     Intervertebral disc heights are well maintained.  No evidence of a bony spinal canal stenosis.     Evaluation intraspinal contents demonstrates no evidence of mass or hematoma.     No acute abnormalities in the paraspinal soft tissue structures.     Emphysematous changes patchy ground-glass opacity in the partially visualized lung apices.        CTA:     The aortic arch demonstrates no significant stenosis at the major branch vessel origins.     There is a focal thrombus in the right subclavian artery extending into the right vertebral artery origin with severe stenosis.  Thrombus extending over proximally 1 cm.  The V1 and V2 segments of right vertebral artery are otherwise normal in caliber of slightly decreased in attenuation in comparison to the other cervical vasculature.  Abrupt occlusion of distal right vertebral artery about the level of the PICA origin approximate 2 cm segment of non enhancement.     Moderate focal stenosis at the left vertebral artery origin.  Left vertebral artery is otherwise normal in caliber.  The basilar artery is normal in caliber.     The right common carotid artery is normal in caliber.  No significant stenosis at the carotid bifurcation.The right internal carotid artery is normal in caliber.     The left common carotid artery is normal in caliber. No significant stenosis at the carotid bifurcation.The left internal carotid artery is normal in caliber.     Abrupt non enhancement in the P1 segment of the left  PCA.  The proximal right PCA appears within normal limits.     The proximal MCAs and ACAs are unremarkable.     Findings were immediately discussed upon identification via telephone with the stroke service ([place]) at approximately 09:20.        Impression     Multifocal acute posterior circulation distribution infarcts as further discussed above, particularly involving essentially the entirety of the left PCA territory.  Modest mass effect without significant hemorrhage.     Focal thrombus with severe stenosis extending from the right subclavian artery into the proximal right vertebral artery.     Focal occlusion of the proximal intracranial segment of the right vertebral artery.     Focal occlusion of the proximal left PCA.     Moderate focal stenosis at the left vertebral artery origin.     Anterior circulation appears unremarkable.     No evidence of fracture or traumatic malalignment in the cervical spine.     Emphysematous changes and patchy ground-glass opacity in the partially visualized lung apices.     This report was flagged in Epic as abnormal.        Electronically signed by:Geo Ambrocio MD  Date:                                                11/06/2024  Time:                                                09:33   CT Cervical Spine Without Contrast     Narrative     EXAMINATION:  CTA STROKE MULTI-PHASE; CT CERVICAL SPINE WITHOUT CONTRAST     CLINICAL HISTORY:  Neuro deficit, acute, stroke suspected;; Neck trauma, intoxicated or obtunded (Age >= 16y);     TECHNIQUE:  Axial CT images obtained throughout the region of the head and cervical spine before the administration of intravenous contrast.     CT angiogram was performed through the cervical and intracranial vasculature during the IV bolus administration of 75mL of Omnipaque 350.  Two additional phases through the intracranial vasculature via multiphase technique.     Multiplanar MPR and MIP reformats were performed.     CT source data was  analyzed using artificial intelligence software for detection of a large vessel occlusions (LVO) in order to enable computer assisted triage notification and aid clinical stroke decision making.     COMPARISON:  CTA head and cervical spine 10/25/2015     FINDINGS:  Large region of hypoattenuation loss of gray-white differentiation in left occipital lobe in medial temporal lobe in keeping with an acute PCA distribution infarct.  Modest localized mass effect with some sulcal crowding.  Additional involvement of the left thalamus.  Similar appearance in the medial right occipital lobe, but smaller area of infarction.  Small left superior cerebellar infarct, also likely acute.  No evidence of recent or remote major vascular distribution infarct in the anterior circulation.  No acute parenchymal hemorrhage.  No midline shift.     The ventricles are normal in size without evidence of hydrocephalus.     No extra-axial blood or fluid collections.     The cranium appears intact.     Mastoid air cells and paranasal sinuses are essentially clear.     The vertebral bodies are normal in height and morphology without evidence of an acute displaced fracture or focal osseous destructive process.     Normal sagittal alignment is preserved.  No spondylolisthesis.     Intervertebral disc heights are well maintained.  No evidence of a bony spinal canal stenosis.     Evaluation intraspinal contents demonstrates no evidence of mass or hematoma.     No acute abnormalities in the paraspinal soft tissue structures.     Emphysematous changes patchy ground-glass opacity in the partially visualized lung apices.        CTA:     The aortic arch demonstrates no significant stenosis at the major branch vessel origins.     There is a focal thrombus in the right subclavian artery extending into the right vertebral artery origin with severe stenosis.  Thrombus extending over proximally 1 cm.  The V1 and V2 segments of right vertebral artery are  otherwise normal in caliber of slightly decreased in attenuation in comparison to the other cervical vasculature.  Abrupt occlusion of distal right vertebral artery about the level of the PICA origin approximate 2 cm segment of non enhancement.     Moderate focal stenosis at the left vertebral artery origin.  Left vertebral artery is otherwise normal in caliber.  The basilar artery is normal in caliber.     The right common carotid artery is normal in caliber.  No significant stenosis at the carotid bifurcation.The right internal carotid artery is normal in caliber.     The left common carotid artery is normal in caliber. No significant stenosis at the carotid bifurcation.The left internal carotid artery is normal in caliber.     Abrupt non enhancement in the P1 segment of the left PCA.  The proximal right PCA appears within normal limits.     The proximal MCAs and ACAs are unremarkable.     Findings were immediately discussed upon identification via telephone with the stroke service ([place]) at approximately 09:20.        Impression     Multifocal acute posterior circulation distribution infarcts as further discussed above, particularly involving essentially the entirety of the left PCA territory.  Modest mass effect without significant hemorrhage.     Focal thrombus with severe stenosis extending from the right subclavian artery into the proximal right vertebral artery.     Focal occlusion of the proximal intracranial segment of the right vertebral artery.     Focal occlusion of the proximal left PCA.     Moderate focal stenosis at the left vertebral artery origin.     Anterior circulation appears unremarkable.     No evidence of fracture or traumatic malalignment in the cervical spine.     Emphysematous changes and patchy ground-glass opacity in the partially visualized lung apices.     This report was flagged in Epic as abnormal.        Electronically signed by:Geo Ambrocio MD  Date:                                                 11/06/2024  Time:                                                09:33   CT Head Without Contrast     Narrative     EXAMINATION:  CT HEAD WITHOUT CONTRAST     CLINICAL HISTORY:  Stroke, follow up;     TECHNIQUE:  Low dose axial images were obtained through the head.  Coronal and sagittal reformations were also performed. Contrast was not administered.     COMPARISON:  CT, MRI 11/06/2024     FINDINGS:  Evolving posterior circulation infarcts are identified as described on recent MR imaging again most notable within the left greater than right occipital lobes extending to the medial left temporal lobe and bilateral thalamus.  No hemorrhagic conversion with potentially minimal stable petechial hemorrhage within the medial left thalamus.  No midline shift or herniation with mild mass effect on the 3rd ventricle again noted.     No new acute territorial infarct.     The visualized sinuses and mastoid air cells are clear.        Impression     Expected evolutionary changes of the posterior circulation infarcts with no overt hemorrhagic conversion or significant midline shift/herniation.        Electronically signed by:Adrián Hobbs  Date:                                                11/09/2024  Time:                                                08:38          Results for orders placed or performed during the hospital encounter of 11/06/24 (from the past 2160 hours)   MRI Brain Without Contrast     Impression     Multiple posterior circulation infarcts are identified with involvement of the left greater than right occipital lobes and medial left temporal lobe, thalamus, right dorsal medulla, and minimally involving the cerebellum.  No midline shift with minimal mass effect on the 3rd ventricle.     Loss of flow void within the right intracranial vertebral artery in keeping with the findings on CTA.        Electronically signed by:Adrián Hobbs  Date:                                                 11/06/2024  Time:                                                17:40      Cardiac Imaging   Results for orders placed during the hospital encounter of 11/06/24     Echo Saline Bubble? Yes; Ultrasound enhancing contrast? Yes     Interpretation Summary    Left Ventricle: The left ventricle is normal in size. Normal wall thickness. There is normal systolic function with a visually estimated ejection fraction of 55 - 60%. Biplane (2D) method of discs ejection fraction is 56%. Global longitudinal strain is -18.0%. There is normal diastolic function.    Right Ventricle: Normal right ventricular cavity size. Wall thickness is normal. Systolic function is normal.    Tricuspid Valve: There is mild regurgitation.    Pulmonary Artery: The estimated pulmonary artery systolic pressure is at least 28 mmHg.    IVC/SVC: Patient is ventilated, cannot use IVC diameter to estimate right atrial pressure.     Gerhard De Jesus MD  [facility]  Department of Vascular Neurology   [facility])

## 2024-11-18 NOTE — PLAN OF CARE
Problem: Adult Inpatient Plan of Care  Goal: Plan of Care Review  Outcome: Progressing     Problem: Adult Inpatient Plan of Care  Goal: Patient-Specific Goal (Individualized)  Outcome: Progressing     Problem: Adult Inpatient Plan of Care  Goal: Optimal Comfort and Wellbeing  Outcome: Progressing   Review POC; progressing; this shift had BC collected; received PRN Seroquel; remains agitated; TF started at trickle for 6 hours per MD order and rate to be increased to goal on next shift.  Tolerating trickle TF per NGT at this time.  Received one time dose of Vancomycin 1250 mg and pharmacy to dose with Vanc Trough due 11/19/24.  Scheduled Seroquel doses order to increase to 50 mg Q AM and 100mg Q HS.

## 2024-11-18 NOTE — PROCEDURES
Modified Barium Swallow    Patient Name:  Malathi Mcpherson   MRN:  [id number]      Recommendations:     Impressions    Patient demonstrates moderate Oropharyngeal dysphagia. Overall, pt's cognitive deficits, combative nature, and refusal behaviors interfering with safety of swallow and largely limited study  this date. Pt inconsistent bolus control paired with delayed swallow initiation did result in resulted in aspiration of nectar thickened liquids .  Pt weakness and poor attention to feeding tasks further increase her aspiration risk of all consistencies over time/duration of a meal.  Recommend pt remain npo with continued alternate means of nutrition. Recommend repeating MBSS when pt less combative and improvement in overall cognitive presentation/command following. Speech to continue to closely monitor.        Recommendations:                General Recommendations:  Dysphagia therapy, Speech/language therapy, and Cognitive-linguistic therapy  Diet recommendations:   NPO, continue alternate means of nutrition/hydration/medication via NG tube  Team may wish to begin to consider more long term alternative means given aspiration risk and inconsistent progress towards oral feeding goals as part of discharge planning   For highest elimination of risk for aspiration, recommend pleasure feeds of tsp of puree WITH SLP ONLY with goal of advancing once cognition improves with the following precautions:   Avoid talking while eating  Check for pocketing/oral residue  Continue alternate means of nutrition  Eliminate distractions   Feed only when awake/alert  Frequent oral care  HOB to 90 degrees  Small bites/sips  Strict aspiration precautions   General Precautions: Standard, aspiration, fall  Communication strategies:  provide increased time to answer and go to room if call light pushed    Referral     Reason for Referral  Patient was referred for a Modified Barium Swallow Study to assess the efficiency of his/her swallow  function, rule out aspiration and make recommendations regarding safe dietary consistencies, effective compensatory strategies, and safe eating environment.     Diagnosis: Acute arterial ischemic stroke, multifocal, posterior circulation       History:     Past Medical History:   Diagnosis Date    Acute adjustment disorder with depressed mood     Cutaneous abscess 08/08/2019    Depression     Enlarged liver 04/27/2017    Hep C w/o coma, chronic     Hepatitis B     History of mental disorder 03/22/2017    History of substance abuse        Objective:     Current Respiratory Status: 11/18/24    Alert: yes    Cooperative: inconsistent.   Pt combative and aggressive throughout study.      Pt hyper-verbal throughout study despite administration of po trials.  Acceptance and participation inconsistent overall, max cues and coaxing across all modalities minimally beneficial in pt participation in   PO trials across study   Overall trials significantly limited due to pt combative nature and refusal behaviors     Follows Directions: no. Pt unable to follow commands.     Visualization  Patient was seen in the lateral view  NG tube observed in place  Pt restless and unable to maintain ideal posture negatively impacting visualization throughout study     Oral Peripheral Examination  Oral Musculature: right weakness, WFL  Dentition: edentulous  Secretion Management: adequate  Mucosal Quality: adequate  Mandibular Strength and Mobility: WFL  Oral Labial Strength and Mobility: WFL  Lingual Strength and Mobility: functional protrusion, functional lateral movement  Velar Elevation: WFL  Buccal Strength and Mobility: WFL  Volitional Cough: present  Volitional Swallow: present  Voice Prior to PO Intake: hoarse    Consistencies Assessed  Nectar thick via tsp x1, 1/2 tsp x1, straw sip x1  Puree tsp x2  * notable, to mention study limited in nature based on pt poor participation, combativeness, restlessness throughout    Oral  Preparation/Oral Phase  Acceptance and participation inconsistent overall, max cues and coaxing across all modalities minimally beneficial in enhancing pt participation in   PO trials across study   Overall trials significantly limited due to pt combative nature and refusal behaviors   With trials accepted pt with inconsistent bolus control and transfer appearing largely impacted by underlying cognition and attentions to trails offered across all nectar thick presentations (teaspoon vs. Straw)  and puree trials; Pt with inconsistent premature spillage to pharynx and also demonstrated oral holding   Right sided flaccidy noted resulting in Right sided pocketing  Given severity of cognitive impairments and combative nature difficult to attempt to determine appropriate compensatory strategies (such as feeding utensils, method, bolus size, consistency etc.)     Pharyngeal Phase   Pt with evident delay in swallow initiation.   Swallow trigger overall most consistently at the level of the valleculae (with both nectar thickened liqiuds and purees) but was also noted to trigger at the level of the pyriform sinuses with larger boluses of nectar thick liquids   Inconsistent pharyngeal pooling present which again may be attributed to oral phase deficits and poor attention to PO trials as described above   Pt with weak BOT retraction  Decreased pharyngeal stripping   Pt with adequate hyolaryngeal elevation and reduced excursion patterns  Pt with incomplete epiglottic inversion patterns, epiglottic atrophy observed   Pt with Aspiration of 50% of the bolus during with nectar thickened liquid consistency via tsp  Pt with sensory response/airway protective response   Pt without spontaneous strong cough but was ineffective in ejecting material from laryngeal vestibule  Strategies were unable to be attempted secondary to inability to follow commands , combativeness and refusals to participate in additional PO trials   Pt with adequate  airway protection without penetration or aspiration with nectar thickened liquids via 1/2 tsp and straw sips and puree via tsp; however; study overall was limited secondary to pt's combative nature and refusals  There was presence of residue at valleculae and pyriform sinus with all consistencies trialed that increased from trace to moderate amounts as trials progressed; Given pt inability to consistently execute a second dry swallow to potentially clear any residuals pt remains increased aspiration risk across all consistencies     Cervical Esophageal Phase  UES appeared to accommodate all bolus types without stasis or retrograde movement observed     Assessment:     Impressions    Patient demonstrates moderate Oropharyngeal dysphagia. Overall, pt's cognitive deficits, combative nature, and refusal behaviors interfering with safety of swallow at this date and largely limited study  this date. Pt inconsistent bolus control paired with delayed swallow initiation did result in resulted in aspiration of nectar thickened liquids .  Overall weakness and poor attention to feeding tasks further increase her aspiration risk of all consistencies over time/duration of a meal.  Recommend pt remain npo with continued alternate means of nutrition. Recommend repeating MBSS when pt less combative and improvement in overall cognitive presentation/command following. Speech to continue to closely monitor.      Prognosis: Guarded pending improvement in overall cognition     Barriers:  Cognitive status  Current combative nature and refusal behaviors    Plan  Pt remain npo with continued alternate means for nutrition.  Ongoing PO trials of puree within speech therapy services   Repeat MBSS if improve combative nature and cognition     Education  Results were discussed with patient however learning unappreciated and will benefit from consistent review  Results were discussed with Medical Team who was in agreement with plan.     Goals:    Multidisciplinary Problems       SLP Goals          Problem: SLP    Goal Priority Disciplines Outcome   SLP Goal     SLP Progressing   Description: Speech Language Pathology Goals  Goals expected to be met by 11/25    1. Pt will participate in ongoing swallow assessment to determine leats restrictive PO diet when appropriate.   2. Pt will complete cognitive-linguistic assessment to determine additional therapeutic needs.                                  Plan:   Patient to be seen:  Therapy Frequency: 4 x/week   Plan of Care expires:  12/11/24  Plan of Care reviewed with:  patient        Discharge recommendations:  High Intensity Therapy   Barriers to Discharge:  Level of Skilled Assistance Needed      Time Tracking:   SLP Treatment Date:   11/18/24  Speech Start Time:  1121  Speech Stop Time:  1205     Speech Total Time (min):  44 min    11/18/2024

## 2024-11-19 PROBLEM — R13.10 DYSPHAGIA: Status: ACTIVE | Noted: 2024-11-19

## 2024-11-19 LAB
ALBUMIN SERPL BCP-MCNC: 3.1 G/DL (ref 3.5–5.2)
ALP SERPL-CCNC: 102 U/L (ref 40–150)
ALT SERPL W/O P-5'-P-CCNC: 56 U/L (ref 10–44)
ANION GAP SERPL CALC-SCNC: 11 MMOL/L (ref 8–16)
AST SERPL-CCNC: 44 U/L (ref 10–40)
BASOPHILS # BLD AUTO: 0.11 K/UL (ref 0–0.2)
BASOPHILS NFR BLD: 0.7 % (ref 0–1.9)
BILIRUB SERPL-MCNC: 0.2 MG/DL (ref 0.1–1)
BUN SERPL-MCNC: 16 MG/DL (ref 6–20)
CALCIUM SERPL-MCNC: 9.9 MG/DL (ref 8.7–10.5)
CHLORIDE SERPL-SCNC: 103 MMOL/L (ref 95–110)
CO2 SERPL-SCNC: 21 MMOL/L (ref 23–29)
CREAT SERPL-MCNC: 0.6 MG/DL (ref 0.5–1.4)
DIFFERENTIAL METHOD BLD: ABNORMAL
EOSINOPHIL # BLD AUTO: 0.3 K/UL (ref 0–0.5)
EOSINOPHIL NFR BLD: 1.6 % (ref 0–8)
ERYTHROCYTE [DISTWIDTH] IN BLOOD BY AUTOMATED COUNT: 14.1 % (ref 11.5–14.5)
EST. GFR  (NO RACE VARIABLE): >60 ML/MIN/1.73 M^2
GLUCOSE SERPL-MCNC: 116 MG/DL (ref 70–110)
HCT VFR BLD AUTO: 39.5 % (ref 37–48.5)
HGB BLD-MCNC: 12.8 G/DL (ref 12–16)
IMM GRANULOCYTES # BLD AUTO: 0.29 K/UL (ref 0–0.04)
IMM GRANULOCYTES NFR BLD AUTO: 1.8 % (ref 0–0.5)
INR PPP: 1.1 (ref 0.8–1.2)
LITHIUM SERPL-SCNC: 0.4 MMOL/L (ref 0.6–1.2)
LYMPHOCYTES # BLD AUTO: 1.7 K/UL (ref 1–4.8)
LYMPHOCYTES NFR BLD: 10.6 % (ref 18–48)
MAGNESIUM SERPL-MCNC: 2.4 MG/DL (ref 1.6–2.6)
MCH RBC QN AUTO: 29 PG (ref 27–31)
MCHC RBC AUTO-ENTMCNC: 32.4 G/DL (ref 32–36)
MCV RBC AUTO: 90 FL (ref 82–98)
MONOCYTES # BLD AUTO: 1.1 K/UL (ref 0.3–1)
MONOCYTES NFR BLD: 6.7 % (ref 4–15)
NEUTROPHILS # BLD AUTO: 12.9 K/UL (ref 1.8–7.7)
NEUTROPHILS NFR BLD: 78.6 % (ref 38–73)
NRBC BLD-RTO: 0 /100 WBC
OHS QRS DURATION: 72 MS
OHS QTC CALCULATION: 508 MS
PHOSPHATE SERPL-MCNC: 3.5 MG/DL (ref 2.7–4.5)
PLATELET # BLD AUTO: 458 K/UL (ref 150–450)
PMV BLD AUTO: 10.9 FL (ref 9.2–12.9)
POTASSIUM SERPL-SCNC: 3.7 MMOL/L (ref 3.5–5.1)
PROT SERPL-MCNC: 7.6 G/DL (ref 6–8.4)
PROTHROMBIN TIME: 11.5 SEC (ref 9–12.5)
RBC # BLD AUTO: 4.41 M/UL (ref 4–5.4)
SODIUM SERPL-SCNC: 135 MMOL/L (ref 136–145)
VANCOMYCIN TROUGH SERPL-MCNC: 8.4 UG/ML (ref 10–22)
WBC # BLD AUTO: 16.35 K/UL (ref 3.9–12.7)

## 2024-11-19 PROCEDURE — 87522 HEPATITIS C REVRS TRNSCRPJ: CPT

## 2024-11-19 PROCEDURE — 85610 PROTHROMBIN TIME: CPT

## 2024-11-19 PROCEDURE — 85025 COMPLETE CBC W/AUTO DIFF WBC: CPT

## 2024-11-19 PROCEDURE — 80202 ASSAY OF VANCOMYCIN: CPT | Performed by: STUDENT IN AN ORGANIZED HEALTH CARE EDUCATION/TRAINING PROGRAM

## 2024-11-19 PROCEDURE — 99223 1ST HOSP IP/OBS HIGH 75: CPT | Mod: ,,, | Performed by: PHYSICIAN ASSISTANT

## 2024-11-19 PROCEDURE — 63600175 PHARM REV CODE 636 W HCPCS

## 2024-11-19 PROCEDURE — 25000003 PHARM REV CODE 250: Performed by: STUDENT IN AN ORGANIZED HEALTH CARE EDUCATION/TRAINING PROGRAM

## 2024-11-19 PROCEDURE — 93005 ELECTROCARDIOGRAM TRACING: CPT

## 2024-11-19 PROCEDURE — 99233 SBSQ HOSP IP/OBS HIGH 50: CPT | Mod: ,,, | Performed by: STUDENT IN AN ORGANIZED HEALTH CARE EDUCATION/TRAINING PROGRAM

## 2024-11-19 PROCEDURE — 97535 SELF CARE MNGMENT TRAINING: CPT

## 2024-11-19 PROCEDURE — 36415 COLL VENOUS BLD VENIPUNCTURE: CPT | Performed by: STUDENT IN AN ORGANIZED HEALTH CARE EDUCATION/TRAINING PROGRAM

## 2024-11-19 PROCEDURE — 63600175 PHARM REV CODE 636 W HCPCS: Performed by: STUDENT IN AN ORGANIZED HEALTH CARE EDUCATION/TRAINING PROGRAM

## 2024-11-19 PROCEDURE — 25000003 PHARM REV CODE 250: Performed by: PHYSICIAN ASSISTANT

## 2024-11-19 PROCEDURE — 25000003 PHARM REV CODE 250

## 2024-11-19 PROCEDURE — 11000001 HC ACUTE MED/SURG PRIVATE ROOM

## 2024-11-19 PROCEDURE — 94668 MNPJ CHEST WALL SBSQ: CPT

## 2024-11-19 PROCEDURE — 83735 ASSAY OF MAGNESIUM: CPT

## 2024-11-19 PROCEDURE — 80053 COMPREHEN METABOLIC PANEL: CPT

## 2024-11-19 PROCEDURE — 80178 ASSAY OF LITHIUM: CPT | Performed by: STUDENT IN AN ORGANIZED HEALTH CARE EDUCATION/TRAINING PROGRAM

## 2024-11-19 PROCEDURE — 92526 ORAL FUNCTION THERAPY: CPT

## 2024-11-19 PROCEDURE — 84100 ASSAY OF PHOSPHORUS: CPT

## 2024-11-19 PROCEDURE — 93010 ELECTROCARDIOGRAM REPORT: CPT | Mod: ,,, | Performed by: INTERNAL MEDICINE

## 2024-11-19 PROCEDURE — 94761 N-INVAS EAR/PLS OXIMETRY MLT: CPT

## 2024-11-19 RX ADMIN — QUETIAPINE FUMARATE 100 MG: 100 TABLET ORAL at 08:11

## 2024-11-19 RX ADMIN — HYDROXYZINE HYDROCHLORIDE 25 MG: 25 TABLET ORAL at 01:11

## 2024-11-19 RX ADMIN — CLOPIDOGREL BISULFATE 75 MG: 75 TABLET ORAL at 09:11

## 2024-11-19 RX ADMIN — FLUOXETINE HYDROCHLORIDE 60 MG: 20 SOLUTION ORAL at 09:11

## 2024-11-19 RX ADMIN — LITHIUM 300 MG: 8 SOLUTION ORAL at 09:11

## 2024-11-19 RX ADMIN — ASPIRIN 81 MG CHEWABLE TABLET 81 MG: 81 TABLET CHEWABLE at 09:11

## 2024-11-19 RX ADMIN — CEFEPIME 2 G: 2 INJECTION, POWDER, FOR SOLUTION INTRAVENOUS at 09:11

## 2024-11-19 RX ADMIN — MUSCLE RUB CREAM: 100; 150 CREAM TOPICAL at 03:11

## 2024-11-19 RX ADMIN — ACETAMINOPHEN 1000 MG: 500 TABLET ORAL at 01:11

## 2024-11-19 RX ADMIN — BARIUM SULFATE 450 ML: 20 SUSPENSION ORAL at 11:11

## 2024-11-19 RX ADMIN — ATORVASTATIN CALCIUM 40 MG: 40 TABLET, FILM COATED ORAL at 09:11

## 2024-11-19 RX ADMIN — QUETIAPINE FUMARATE 50 MG: 25 TABLET ORAL at 09:11

## 2024-11-19 RX ADMIN — CEFEPIME 2 G: 2 INJECTION, POWDER, FOR SOLUTION INTRAVENOUS at 06:11

## 2024-11-19 RX ADMIN — SENNOSIDES AND DOCUSATE SODIUM 1 TABLET: 50; 8.6 TABLET ORAL at 09:11

## 2024-11-19 RX ADMIN — LIDOCAINE 5% 1 PATCH: 700 PATCH TOPICAL at 02:11

## 2024-11-19 RX ADMIN — CARVEDILOL 6.25 MG: 6.25 TABLET, FILM COATED ORAL at 09:11

## 2024-11-19 RX ADMIN — LITHIUM 300 MG: 8 SOLUTION ORAL at 08:11

## 2024-11-19 RX ADMIN — VANCOMYCIN HYDROCHLORIDE 750 MG: 750 INJECTION, POWDER, LYOPHILIZED, FOR SOLUTION INTRAVENOUS at 01:11

## 2024-11-19 RX ADMIN — CARVEDILOL 6.25 MG: 6.25 TABLET, FILM COATED ORAL at 08:11

## 2024-11-19 RX ADMIN — VANCOMYCIN HYDROCHLORIDE 1250 MG: 1.25 INJECTION, POWDER, LYOPHILIZED, FOR SOLUTION INTRAVENOUS at 02:11

## 2024-11-19 RX ADMIN — MIRTAZAPINE 7.5 MG: 7.5 TABLET, FILM COATED ORAL at 08:11

## 2024-11-19 RX ADMIN — THERA TABS 1 TABLET: TAB at 09:11

## 2024-11-19 RX ADMIN — MUSCLE RUB CREAM: 100; 150 CREAM TOPICAL at 09:11

## 2024-11-19 RX ADMIN — FOLIC ACID 1 MG: 1 TABLET ORAL at 09:11

## 2024-11-19 RX ADMIN — LITHIUM 300 MG: 8 SOLUTION ORAL at 03:11

## 2024-11-19 RX ADMIN — Medication 6 MG: at 08:11

## 2024-11-19 RX ADMIN — Medication 100 MG: at 09:11

## 2024-11-19 RX ADMIN — ENOXAPARIN SODIUM 40 MG: 40 INJECTION SUBCUTANEOUS at 06:11

## 2024-11-19 RX ADMIN — CEFEPIME 2 G: 2 INJECTION, POWDER, FOR SOLUTION INTRAVENOUS at 12:11

## 2024-11-19 RX ADMIN — MUPIROCIN: 20 OINTMENT TOPICAL at 08:11

## 2024-11-19 RX ADMIN — MUPIROCIN: 20 OINTMENT TOPICAL at 09:11

## 2024-11-19 RX ADMIN — VANCOMYCIN HYDROCHLORIDE 500 MG: 500 INJECTION, POWDER, LYOPHILIZED, FOR SOLUTION INTRAVENOUS at 02:11

## 2024-11-19 RX ADMIN — MUSCLE RUB CREAM: 100; 150 CREAM TOPICAL at 08:11

## 2024-11-19 NOTE — PLAN OF CARE
Problem: Adult Inpatient Plan of Care  Goal: Plan of Care Review  Outcome: Progressing     Problem: Adult Inpatient Plan of Care  Goal: Patient-Specific Goal (Individualized)  Outcome: Progressing     Problem: Adult Inpatient Plan of Care  Goal: Optimal Comfort and Wellbeing  Outcome: Progressing     Problem: Wound  Goal: Optimal Pain Control and Function  Outcome: Progressing   POC reviewed and updated with the patient. Questions regarding POC were encouraged and addressed with the patient.JOSHUA. Patient is AO X 1 at this time. Fall/safety precautions maintained, no signs of injury noted during shift. Upon exiting room, patient's bed locked in low position, side rails up x 3, bed alarm set, with call light within reach. Instructed patient to call staff for assistance, verbalized understanding.  No acute signs of distress noted at this time.

## 2024-11-19 NOTE — PLAN OF CARE
See IR consult dated 11/19/24. CT abdomen reviewed with staff, shows adequate window for G tube placement. Plan to proceed with G tube placement under moderate sedation on 11/20/24. Will obtain consent from pt's mother.     ASA: 3  Mallampati: 2    Plan:  Will proceed with percutaneous gastrostomy tube placement under moderate sedation on 11/20  Please place/maintain NG tube prior to barium administration (see #3) and keep in placed for procedure  Administer barium overnight starting at midnight (ordered by IR, see instructions in orders) via NG tube  Please keep pt NPO starting at midnight (except for barium administration).   Anticoagulation history reviewed. Pt takes asa and plavix, no need to hold per staff  Coagulation labs reviewed. INR 1.1 and plt count 458 on 11/19.  Thank you for the consult. Please contact with questions via [employer] secure chat or spectra.    Jerri Hooks PA-C  Interventional Radiology   Spectra: [phone]

## 2024-11-19 NOTE — PT/OT/SLP PROGRESS
Detail Level: Detailed Speech Language Pathology Treatment    Patient Name:  Malathi Mcpherson   MRN:  [id number]  Admitting Diagnosis: Acute arterial ischemic stroke, multifocal, posterior circulation    Recommendations:                 General Recommendations:  Dysphagia therapy & repeat MBSS when appropriate, Speech/language therapy, and Cognitive-linguistic therapy   Diet recommendations:  NPO (except puree trials for pleasure with SLP only), Liquid Diet Level: NPO   Continue alternate means of nutrition/hydration/medication via NG tube  Team may wish to begin to consider more long term alternative means given aspiration risk and inconsistent progress towards oral feeding goals as part of discharge planning   For highest elimination of risk for aspiration, recommend pleasure feeds of tsp of puree WITH SLP ONLY with goal of advancing once cognition improves with the following precautions:   Avoid talking while eating  Check for pocketing/oral residue  Continue alternate means of nutrition  Eliminate distractions   Feed only when awake/alert  Frequent oral care  HOB to 90 degrees  Small bites/sips  Strict aspiration precautions   General Precautions: Standard, aspiration, fall  Communication strategies:  provide increased time to answer and go to room if call light pushed    Assessment:     Malathi Mcpherson is a 49 y.o. female with an SLP diagnosis of Dysphagia.     MBSS 11/18: Impressions: Patient demonstrates moderate Oropharyngeal dysphagia. Overall, pt's cognitive deficits, combative nature, and refusal behaviors interfering with safety of swallow and largely limited study  this date. Pt inconsistent bolus control paired with delayed swallow initiation did result in resulted in aspiration of nectar thickened liquids .  Pt weakness and poor attention to feeding tasks further increase her aspiration risk of all consistencies over time/duration of a meal.  Recommend pt remain npo with continued alternate means of nutrition. Recommend  repeating MBSS when pt less combative and improvement in overall cognitive presentation/command following. Speech to continue to closely monitor.      Subjective     Spoke with RN prior to session. Pt awake though altered, reclined in bed angled sideways. Mother at bedside. NGT, bilateral mitts and wrist restraints in place.     Pain/Comfort:  Pain Rating 1: 0/10  Pain Rating Post-Intervention 1: 0/10    Respiratory Status: Room air    Objective:     Has the patient been evaluated by SLP for swallowing?   Yes  Keep patient NPO? Yes     Pt seen bedside for dysphagia therapy. PCT at bedside changing dede pads, taking vitals and assisting clinician reposition pt upright, midline in bed prior to PO trials. Pt awake and communicative, though continues to demonstrate confusion, poor sustained attention and intermittent agitation across session. Reviewed MBSS procedure and protocol with pt and mother per their request, provided education on results of the test which revealed a delayed swallow and blatant aspiration of NTL, ongoing deficits, aspiration risk, diet recommendations for NPO except puree trials with SLP only, recs for ongoing alternative means of nutrition, consideration for long term means for nutrition and ongoing SLP POC. Mother verbalized understanding, in agreement and all questions answered within SLP scope. Provided feed assist with 1/2 tsp trials of puree x 4 (vanilla pudding). Pt demonstrated adequate acceptance and stripping from spoon. Noted inconsistent timeliness of AP transit with subsequent delayed swallow trigger. She demonstrated cough response on 1/4 trials of puree and did not follow commands or respond to verbal or tactile prompts to elicit quicker swallow initiation or utilize double swallow pattern. Pt w/ increased agitation and refusal of additional trials. She remains at high risk for aspiration 2/2 cognition and recommend she continue with alternative means of nutrition at this time and  limited PO trials of puree with SLP only. Education provided re: role of SLP, diet recs, swallow precs, s/s aspiration and POC.  Pt verbalized understanding though requires ongoing education and reinforcement.     Goals:   Multidisciplinary Problems       SLP Goals          Problem: SLP    Goal Priority Disciplines Outcome   SLP Goal     SLP Progressing   Description: Speech Language Pathology Goals  Goals expected to be met by 11/25    1. Pt will participate in ongoing swallow assessment to determine leats restrictive PO diet when appropriate.   2. Pt will complete cognitive-linguistic assessment to determine additional therapeutic needs.                                Plan:     Patient to be seen:  4 x/week   Plan of Care expires:  12/11/24  Plan of Care reviewed with:  patient, mother   SLP Follow-Up:  Yes       Discharge recommendations:  High Intensity Therapy/ TBD  Barriers to Discharge:  Level of Skilled Assistance Needed      Time Tracking:     SLP Treatment Date:   11/19/24  Speech Start Time:  1143  Speech Stop Time:  1201     Speech Total Time (min):  18 min    Billable Minutes: Treatment Swallowing Dysfunction 8 and Self Care/Home Management Training 10    11/19/2024

## 2024-11-19 NOTE — PROGRESS NOTES
"CONSULTATION LIAISON PSYCHIATRY PROGRESS NOTE     Patient Name: Malathi Mcpherson  MRN: [id number]  Patient Class: IP- Inpatient  Admission Date: 11/6/2024  Attending Physician: Emily Recinos MD        SUBJECTIVE:   Malathi Mcpherson is a 49 y.o. female with past psychiatric history of polysubstance use disorder (crack cocaine, heroin, alcohol) and bipolar disorder & past pertinent medical history of seizure, HTN, and HLD presents to the ED/admitted to the hospital for large BL CVA. Patient most recently in drug rehab court prior to hospitalization and relapsed on crack cocaine in the week prior to admission. She is admitted to hospital for further evaluation and management of multifocal acute infarcts.      Recently evaluated by psychiatry on 11/14/24 for her stimulant use disorder and history of bipolar disorder. Psychiatry re-consulted for "Worsening agitation, extensive psych hx (bipolar?) on lithium, need med recs for agitation."         Today, patient is laying in bed with 2-pt restraints with mother at bedside. Patient mumbles feeling "so so". Her mother says she seems more restful today and is not cursing her out. Patient is oriented to person. Mother says  this is not her baseline prior to the stroke.          OBJECTIVE:     Mental Status Exam:  General Appearance: well-nourished, dressed in hospital garb, disheveled, NG tube to R nare   Behavior: unable to participate, minimal responses, agitated, restless and fidgety, poor eye contact   Involuntary Movements and Motor Activity: no abnormal involuntary movements noted; no tics, no tremors, no akathisia, no dystonia, no evidence of tardive dyskinesia; no psychomotor agitation or retardation   Gait and Station: unable to assess - patient lying down or seated   Speech and Language: mumbling, responds to questions minimally/briefly, dysarthric   Mood: "so so"  Affect: irritable  Thought Process and Associations: Unable to Assess  Perceptual Disturbances:  " BETTYE  Thought Content and Perceptions:: Unable to Assess  Sensorium and Orientation: oriented to person only  Recent and Remote Memory: Unable to  Formally Assess  Attention and Concentration: Unable to Formally Assess  Fund of Knowledge: Unable to Formally Assess  Insight:  BETTYE due to patient's mental state  Judgment:  BETTYE due to patient's mental state     CAM ICU positive? Did not assess        ASSESSMENT & RECOMMENDATIONS   Delirium due to multiple etiologies [multiple acute infarcts, debility, pneumonia ] (F05)  Cocaine use disorder, severe  Hx of Bipolar Disorder      Scheduled Medication(s):  Continue lithium 300 mg TID, Prozac 60 mg daily, Seroquel 50 mg in the AM and 100 mg in the PM, Remeron 7.5 mg nightly.         PRN Medication(s):  PRN Seroquel 25 mg BID for non-redirectable agitation.         Other Recommendations (labs, imaging, further consults, etc.):  Most recent EKG on 11/16/24 with QTC of 476; please obtain repeat EKG in the setting of QT prolongation and agitation despite current psychotropic regimen.         Delirium Behavior Management  PLEASE utilize PRN meds first for agitation. Minimize use of PHYSICAL restraints OR have periods of being out of physical restraints if possible.  Keep window shades open and room lit during day and room dim at night in order to promote normal sleep-wake cycles  Encourage family at bedside. [place] patient often to situation, location, date.  Continue to Limit or Discontinue use of Narcotics, Benzos and Anti-cholinergic medications as they may worsen delirium.  Continue medical workup for causative etiology of Delirium.      Risk Assessment / Legal Status  Patient does not meet criteria for PEC or involuntary inpatient psychiatric admission at this time. Recommend to rescind PEC if one was placed. Patient is not currently an imminent danger to self or others and is not gravely disabled due to a psychiatric illness.     Follow-up:  Will follow-up while in house.      Disposition:   Defer to primary team.     Please contact ON CALL psychiatry service (24/7) for any acute issues that may arise.     Christiana Adams, Student Doctor  Patient interviewed and examined by myself and medical student. Note originally drafted by medical student and submitted with my own revisions and additions. Note as submitted is true to my independent evaluation, examination, and plan for this patient.    William Sistrunk, MD PGY-2  CL Psychiatry  [facility]  11/19/2024 8:45 AM

## 2024-11-19 NOTE — SUBJECTIVE & OBJECTIVE
Neurologic Chief Complaint: acute arterial ischemic stroke, multifocal, posterior circulation     Subjective:     Interval History: Patient is seen for follow-up neurological assessment and treatment recommendations: Please see hospital course    HPI, Past Medical, Family, and Social History remains the same as documented in the initial encounter.     Review of Systems   Unable to perform ROS: Mental status change     Scheduled Meds:   aspirin  81 mg Per NG tube Daily    atorvastatin  40 mg Per NG tube Daily    carvediloL  6.25 mg Per NG tube BID    ceFEPime IV (PEDS and ADULTS)  2 g Intravenous Q8H    clopidogreL  75 mg Per NG tube Daily    enoxparin  40 mg Subcutaneous Q24H (prophylaxis, 1700)    FLUoxetine  60 mg Per NG tube Daily    folic acid  1 mg Per NG tube Daily    LIDOcaine  1 patch Transdermal Q24H    lithium citrate 8 meq/5 ml  300 mg Per NG tube TID    melatonin  6 mg Per NG tube Nightly    methyl salicylate-menthol 15-10%   Topical (Top) TID    mirtazapine  7.5 mg Per NG tube QHS    multivitamin  1 tablet Per NG tube Daily    mupirocin   Nasal BID    QUEtiapine  100 mg Per NG tube QHS    QUEtiapine  50 mg Per NG tube Daily    senna-docusate 8.6-50 mg  1 tablet Per NG tube Daily    thiamine  100 mg Per NG tube Daily    vancomycin (VANCOCIN) IV (PEDS and ADULTS)  1,250 mg Intravenous Q12H     Continuous Infusions:  PRN Meds:  Current Facility-Administered Medications:     acetaminophen, 1,000 mg, Per NG tube, BID PRN    albuterol-ipratropium, 3 mL, Nebulization, Q6H PRN    polyethylene glycol, 17 g, Per NG tube, BID PRN    QUEtiapine, 25 mg, Per NG tube, BID PRN    sodium chloride 0.9%, 10 mL, Intravenous, PRN    Pharmacy to dose Vancomycin consult, , , Once **AND** vancomycin - pharmacy to dose, , Intravenous, pharmacy to manage frequency    Objective:     Vital Signs (Most Recent):  Temp: 98 °F (36.7 °C) (11/19/24 1157)  Pulse: 89 (11/19/24 1157)  Resp: 16 (11/19/24 1157)  BP: 114/69 (11/19/24  1157)  SpO2: 95 % (11/19/24 1157)  BP Location: Right arm    Vital Signs Range (Last 24H):  Temp:  [97 °F (36.1 °C)-99 °F (37.2 °C)]   Pulse:  []   Resp:  [16-22]   BP: (114-203)/()   SpO2:  [95 %-100 %]   BP Location: Right arm       Physical Exam  Vitals and nursing note reviewed.   Constitutional:       General: She is sleeping.      Appearance: She is ill-appearing.   Pulmonary:      Effort: Pulmonary effort is normal. No respiratory distress.   Abdominal:      General: There is no distension.      Palpations: Abdomen is soft.   Musculoskeletal:      Cervical back: Normal range of motion and neck supple.      Comments: Right sided weakness.    Skin:     Capillary Refill: Capillary refill takes 2 to 3 seconds.   Neurological:      General: No focal deficit present.      GCS: GCS eye subscore is 3. GCS verbal subscore is 3. GCS motor subscore is 5.      Cranial Nerves: Dysarthria present.      Motor: Weakness present.      Coordination: Coordination abnormal. Finger-Nose-Finger Test abnormal and Heel to Vo Test abnormal.      Gait: Gait abnormal.   Psychiatric:         Behavior: Behavior is slowed and withdrawn.         Cognition and Memory: Cognition is impaired. Memory is impaired.              Neurological Exam:   LOC: alert  Attention Span: poor    Laboratory:  CMP:   Recent Labs   Lab 11/19/24  0535   CALCIUM 9.9   ALBUMIN 3.1*   PROT 7.6   *   K 3.7   CO2 21*      BUN 16   CREATININE 0.6   ALKPHOS 102   ALT 56*   AST 44*   BILITOT 0.2     CBC:   Recent Labs   Lab 11/19/24  0535   WBC 16.35*   RBC 4.41   HGB 12.8   HCT 39.5   *   MCV 90   MCH 29.0   MCHC 32.4       Diagnostic Results     Brain Imaging / Vessel Imaging         Results for orders placed or performed during the hospital encounter of 11/06/24 (from the past 2160 hours)   CTA STROKE MULTI-PHASE     Narrative     EXAMINATION:  CTA STROKE MULTI-PHASE; CT CERVICAL SPINE WITHOUT CONTRAST     CLINICAL HISTORY:  Neuro  deficit, acute, stroke suspected;; Neck trauma, intoxicated or obtunded (Age >= 16y);     TECHNIQUE:  Axial CT images obtained throughout the region of the head and cervical spine before the administration of intravenous contrast.     CT angiogram was performed through the cervical and intracranial vasculature during the IV bolus administration of 75mL of Omnipaque 350.  Two additional phases through the intracranial vasculature via multiphase technique.     Multiplanar MPR and MIP reformats were performed.     CT source data was analyzed using artificial intelligence software for detection of a large vessel occlusions (LVO) in order to enable computer assisted triage notification and aid clinical stroke decision making.     COMPARISON:  CTA head and cervical spine 10/25/2015     FINDINGS:  Large region of hypoattenuation loss of gray-white differentiation in left occipital lobe in medial temporal lobe in keeping with an acute PCA distribution infarct.  Modest localized mass effect with some sulcal crowding.  Additional involvement of the left thalamus.  Similar appearance in the medial right occipital lobe, but smaller area of infarction.  Small left superior cerebellar infarct, also likely acute.  No evidence of recent or remote major vascular distribution infarct in the anterior circulation.  No acute parenchymal hemorrhage.  No midline shift.     The ventricles are normal in size without evidence of hydrocephalus.     No extra-axial blood or fluid collections.     The cranium appears intact.     Mastoid air cells and paranasal sinuses are essentially clear.     The vertebral bodies are normal in height and morphology without evidence of an acute displaced fracture or focal osseous destructive process.     Normal sagittal alignment is preserved.  No spondylolisthesis.     Intervertebral disc heights are well maintained.  No evidence of a bony spinal canal stenosis.     Evaluation intraspinal contents demonstrates  no evidence of mass or hematoma.     No acute abnormalities in the paraspinal soft tissue structures.     Emphysematous changes patchy ground-glass opacity in the partially visualized lung apices.        CTA:     The aortic arch demonstrates no significant stenosis at the major branch vessel origins.     There is a focal thrombus in the right subclavian artery extending into the right vertebral artery origin with severe stenosis.  Thrombus extending over proximally 1 cm.  The V1 and V2 segments of right vertebral artery are otherwise normal in caliber of slightly decreased in attenuation in comparison to the other cervical vasculature.  Abrupt occlusion of distal right vertebral artery about the level of the PICA origin approximate 2 cm segment of non enhancement.     Moderate focal stenosis at the left vertebral artery origin.  Left vertebral artery is otherwise normal in caliber.  The basilar artery is normal in caliber.     The right common carotid artery is normal in caliber.  No significant stenosis at the carotid bifurcation.The right internal carotid artery is normal in caliber.     The left common carotid artery is normal in caliber. No significant stenosis at the carotid bifurcation.The left internal carotid artery is normal in caliber.     Abrupt non enhancement in the P1 segment of the left PCA.  The proximal right PCA appears within normal limits.     The proximal MCAs and ACAs are unremarkable.     Findings were immediately discussed upon identification via telephone with the stroke service ([facility]) at approximately 09:20.        Impression     Multifocal acute posterior circulation distribution infarcts as further discussed above, particularly involving essentially the entirety of the left PCA territory.  Modest mass effect without significant hemorrhage.     Focal thrombus with severe stenosis extending from the right subclavian artery into the proximal right vertebral artery.     Focal  occlusion of the proximal intracranial segment of the right vertebral artery.     Focal occlusion of the proximal left PCA.     Moderate focal stenosis at the left vertebral artery origin.     Anterior circulation appears unremarkable.     No evidence of fracture or traumatic malalignment in the cervical spine.     Emphysematous changes and patchy ground-glass opacity in the partially visualized lung apices.     This report was flagged in Epic as abnormal.        Electronically signed by:Geo Ambrocio MD  Date:                                                11/06/2024  Time:                                                09:33   CT Cervical Spine Without Contrast     Narrative     EXAMINATION:  CTA STROKE MULTI-PHASE; CT CERVICAL SPINE WITHOUT CONTRAST     CLINICAL HISTORY:  Neuro deficit, acute, stroke suspected;; Neck trauma, intoxicated or obtunded (Age >= 16y);     TECHNIQUE:  Axial CT images obtained throughout the region of the head and cervical spine before the administration of intravenous contrast.     CT angiogram was performed through the cervical and intracranial vasculature during the IV bolus administration of 75mL of Omnipaque 350.  Two additional phases through the intracranial vasculature via multiphase technique.     Multiplanar MPR and MIP reformats were performed.     CT source data was analyzed using artificial intelligence software for detection of a large vessel occlusions (LVO) in order to enable computer assisted triage notification and aid clinical stroke decision making.     COMPARISON:  CTA head and cervical spine 10/25/2015     FINDINGS:  Large region of hypoattenuation loss of gray-white differentiation in left occipital lobe in medial temporal lobe in keeping with an acute PCA distribution infarct.  Modest localized mass effect with some sulcal crowding.  Additional involvement of the left thalamus.  Similar appearance in the medial right occipital lobe, but smaller area of  infarction.  Small left superior cerebellar infarct, also likely acute.  No evidence of recent or remote major vascular distribution infarct in the anterior circulation.  No acute parenchymal hemorrhage.  No midline shift.     The ventricles are normal in size without evidence of hydrocephalus.     No extra-axial blood or fluid collections.     The cranium appears intact.     Mastoid air cells and paranasal sinuses are essentially clear.     The vertebral bodies are normal in height and morphology without evidence of an acute displaced fracture or focal osseous destructive process.     Normal sagittal alignment is preserved.  No spondylolisthesis.     Intervertebral disc heights are well maintained.  No evidence of a bony spinal canal stenosis.     Evaluation intraspinal contents demonstrates no evidence of mass or hematoma.     No acute abnormalities in the paraspinal soft tissue structures.     Emphysematous changes patchy ground-glass opacity in the partially visualized lung apices.        CTA:     The aortic arch demonstrates no significant stenosis at the major branch vessel origins.     There is a focal thrombus in the right subclavian artery extending into the right vertebral artery origin with severe stenosis.  Thrombus extending over proximally 1 cm.  The V1 and V2 segments of right vertebral artery are otherwise normal in caliber of slightly decreased in attenuation in comparison to the other cervical vasculature.  Abrupt occlusion of distal right vertebral artery about the level of the PICA origin approximate 2 cm segment of non enhancement.     Moderate focal stenosis at the left vertebral artery origin.  Left vertebral artery is otherwise normal in caliber.  The basilar artery is normal in caliber.     The right common carotid artery is normal in caliber.  No significant stenosis at the carotid bifurcation.The right internal carotid artery is normal in caliber.     The left common carotid artery is  normal in caliber. No significant stenosis at the carotid bifurcation.The left internal carotid artery is normal in caliber.     Abrupt non enhancement in the P1 segment of the left PCA.  The proximal right PCA appears within normal limits.     The proximal MCAs and ACAs are unremarkable.     Findings were immediately discussed upon identification via telephone with the stroke service ([facility]) at approximately 09:20.        Impression     Multifocal acute posterior circulation distribution infarcts as further discussed above, particularly involving essentially the entirety of the left PCA territory.  Modest mass effect without significant hemorrhage.     Focal thrombus with severe stenosis extending from the right subclavian artery into the proximal right vertebral artery.     Focal occlusion of the proximal intracranial segment of the right vertebral artery.     Focal occlusion of the proximal left PCA.     Moderate focal stenosis at the left vertebral artery origin.     Anterior circulation appears unremarkable.     No evidence of fracture or traumatic malalignment in the cervical spine.     Emphysematous changes and patchy ground-glass opacity in the partially visualized lung apices.     This report was flagged in Epic as abnormal.        Electronically signed by:Geo Ambrocio MD  Date:                                                11/06/2024  Time:                                                09:33   CT Head Without Contrast     Narrative     EXAMINATION:  CT HEAD WITHOUT CONTRAST     CLINICAL HISTORY:  Stroke, follow up;     TECHNIQUE:  Low dose axial images were obtained through the head.  Coronal and sagittal reformations were also performed. Contrast was not administered.     COMPARISON:  CT, MRI 11/06/2024     FINDINGS:  Evolving posterior circulation infarcts are identified as described on recent MR imaging again most notable within the left greater than right occipital lobes extending to the  medial left temporal lobe and bilateral thalamus.  No hemorrhagic conversion with potentially minimal stable petechial hemorrhage within the medial left thalamus.  No midline shift or herniation with mild mass effect on the 3rd ventricle again noted.     No new acute territorial infarct.     The visualized sinuses and mastoid air cells are clear.        Impression     Expected evolutionary changes of the posterior circulation infarcts with no overt hemorrhagic conversion or significant midline shift/herniation.        Electronically signed by:Adrián Hobbs  Date:                                                11/09/2024  Time:                                                08:38            Results for orders placed or performed during the hospital encounter of 11/06/24 (from the past 2160 hours)   MRI Brain Without Contrast     Impression     Multiple posterior circulation infarcts are identified with involvement of the left greater than right occipital lobes and medial left temporal lobe, thalamus, right dorsal medulla, and minimally involving the cerebellum.  No midline shift with minimal mass effect on the 3rd ventricle.     Loss of flow void within the right intracranial vertebral artery in keeping with the findings on CTA.        Electronically signed by:Adrián Hobbs  Date:                                                11/06/2024  Time:                                                17:40      Cardiac Imaging   Results for orders placed during the hospital encounter of 11/06/24     Echo Saline Bubble? Yes; Ultrasound enhancing contrast? Yes     Interpretation Summary    Left Ventricle: The left ventricle is normal in size. Normal wall thickness. There is normal systolic function with a visually estimated ejection fraction of 55 - 60%. Biplane (2D) method of discs ejection fraction is 56%. Global longitudinal strain is -18.0%. There is normal diastolic function.    Right Ventricle: Normal right ventricular  cavity size. Wall thickness is normal. Systolic function is normal.    Tricuspid Valve: There is mild regurgitation.    Pulmonary Artery: The estimated pulmonary artery systolic pressure is at least 28 mmHg.    IVC/SVC: Patient is ventilated, cannot use IVC diameter to estimate right atrial pressure.

## 2024-11-19 NOTE — PROGRESS NOTES
Pharmacokinetic Assessment Follow Up: IV Vancomycin    Vancomycin serum concentration assessment(s):    The trough level was drawn correctly and can be used to guide therapy at this time. The measurement is below the desired definitive target range of 15 to 20 mcg/mL.    Vancomycin Regimen Plan:    Change regimen to Vancomycin 1250 mg IV every 12 hours with next serum trough concentration measured at 14:00 prior to 4th dose on 11/20/24 or sooner if renal function changes significantly    Drug levels (last 3 results):  Recent Labs   Lab Result Units 11/19/24  0110   Vancomycin-Trough ug/mL 8.4*       Pharmacy will continue to follow and monitor vancomycin.    Please contact pharmacy at extension [phone] for questions regarding this assessment.    Thank you for the consult,   Bill Hancock       Patient brief summary:  Malathi Mcpherson is a 49 y.o. female initiated on antimicrobial therapy with IV Vancomycin for treatment of sepsis    The patient's current regimen is Vancomycin 750 mg IV every 12 hrs    Drug Allergies:   Review of patient's allergies indicates:  No Known Allergies    Actual Body Weight:   59 kg    Renal Function:   Estimated Creatinine Clearance: 90.5 mL/min (based on SCr of 0.7 mg/dL).,     Dialysis Method (if applicable):  N/A    CBC (last 72 hours):  Recent Labs   Lab Result Units 11/16/24  0515 11/17/24  0530 11/18/24  0534   WBC K/uL 14.02* 17.82* 19.25*   Hemoglobin g/dL 12.9 12.7 13.0   Hematocrit % 40.0 39.5 39.2   Platelets K/uL 344 436 426   Gran % % 69.0 78.2* 79.4*   Lymph % % 17.7* 12.1* 9.7*   Mono % % 6.2 4.9 6.3   Eosinophil % % 2.2 1.1 1.2   Basophil % % 0.9 0.7 0.8   Differential Method  Automated Automated Automated       Metabolic Panel (last 72 hours):  Recent Labs   Lab Result Units 11/16/24  0515 11/17/24  0530 11/18/24  0534   Sodium mmol/L 137 134* 132*   Potassium mmol/L 4.4 4.0 4.1   Chloride mmol/L 104 103 104   CO2 mmol/L 21* 18* 18*   Glucose mg/dL 116* 118* 122*   BUN mg/dL  15 22* 21*   Creatinine mg/dL 0.6 0.7 0.7   Albumin g/dL 2.7* 2.9* 3.1*   Total Bilirubin mg/dL 0.3 0.4 0.3   Alkaline Phosphatase U/L 101 96 91   AST U/L 61* 47* 38   ALT U/L 58* 55* 52*   Magnesium mg/dL 2.3 2.3 2.3   Phosphorus mg/dL 3.6 3.3 2.9       Vancomycin Administrations:  vancomycin given in the last 96 hours                     vancomycin 750 mg in D5W 250 mL IVPB (admixture device) (mg) 750 mg New Bag 11/19/24 0120     750 mg New Bag 11/18/24 1529     750 mg New Bag  0108    vancomycin 1,250 mg in D5W 250 mL IVPB (admixture device) (mg) 1,250 mg New Bag 11/17/24 1639                    Microbiologic Results:  Microbiology Results (last 7 days)       Procedure Component Value Units Date/Time    Blood culture [[id number]] Collected: 11/17/24 1846    Order Status: Completed Specimen: Blood from Peripheral, Hand, Right Updated: 11/18/24 2212     Blood Culture, Routine No Growth to date      No Growth to date    Narrative:      Collection has been rescheduled by DJK at 11/17/2024 12:04 Reason:   Unable to collect notified nurse matiasa  Collection has been rescheduled by DF2 at 11/17/2024 14:58 Reason:   Tiffany RN is administering medicine, and repositioning, will come   back.  Collection has been rescheduled by DF2 at 11/17/2024 16:14 Reason:   Only able to obtain an aerobic bottle, informed tiffany STANFORD for her   assistance, patient is in restraints but able to move too much to   collect samples.  Collection has been rescheduled by DJK at 11/17/2024 12:04 Reason:   Unable to collect notified nurse lacinda  Collection has been rescheduled by DF2 at 11/17/2024 14:58 Reason:   Tiffany RN is administering medicine, and repositioning, will come   back.  Collection has been rescheduled by DF2 at 11/17/2024 16:14 Reason:   Only able to obtain an aerobic bottle, informed tiffany STANFORD for her   assistance, patient is in restraints but able to move too much to   collect samples.    Blood culture [[id number]] Collected:  11/17/24 1603    Order Status: Completed Specimen: Blood from Peripheral, Hand, Left Updated: 11/18/24 1812     Blood Culture, Routine No Growth to date      No Growth to date    Narrative:      Collection has been rescheduled by DJK at 11/17/2024 12:04 Reason:   Unable to collect notified nurse nanette  Collection has been rescheduled by DF2 at 11/17/2024 14:58 Reason:   Tiffany RN is administering medicine, and repositioning, will come   back.  Only able to get an aerobic bottle, will need nurse to help hold.    Patient is in retraints.  Collection has been rescheduled by DJK at 11/17/2024 12:04 Reason:   Unable to collect notified nurse nanette  Collection has been rescheduled by DF2 at 11/17/2024 14:58 Reason:   Tiffany RN is administering medicine, and repositioning, will come   back.    Culture, Respiratory with Gram Stain [[id number]]  (Abnormal)  (Susceptibility) Collected: 11/09/24 1223    Order Status: Completed Specimen: Respiratory from Endotracheal Aspirate Updated: 11/12/24 1150     Respiratory Culture No Pseudomonas isolated.      HAEMOPHILUS INFLUENZAE  Many  Beta Lactamase positive        STAPHYLOCOCCUS AUREUS  Moderate       Gram Stain (Respiratory) <10 epithelial cells per low power field.     Gram Stain (Respiratory) Few WBC's     Gram Stain (Respiratory) Rare Gram positive cocci     Gram Stain (Respiratory) Rare Gram negative rods

## 2024-11-19 NOTE — CONSULTS
Interventional Radiology  Consult/History & Physical Note    Consult Requested By: Gerhard Kate MD  Reason for Consult: PEG tube placement    SUBJECTIVE:     Chief Complaint:  dysphagia    History of Present Illness:  Malathi Mcpherson is a 49 y.o. female with a PMHx of drug use, seizures, HTN, HLD who was brought to [facility] [place] ambulance on 11/6/24 after being found down at home, was found to have multifocal acute infarcts. Interventional Radiology has been consulted for percutaneous gastrostomy tube placement for management of long term enteral feeds. Pt unable to tolerate oral feeds due to dysphagia 2/2 recent CVA.  Pt was evaluated by SLP who recommend NPO and strict aspiration precautions. Pt failed her MBSS on 11/19. Pt also with leukocytosis over the past few days which is suspected to be related to a possible aspiration event. WBC 16 from 19. Blood cultures on 11/17 remain NGTD. The pt does have an NG tube in place. No recent cross sectional abdominal imaging, CT abdomen pending. Pt is afebrile and hemodynamically stable. She does not have a history of KHOI requiring nightly CPAP or difficulty breathing when lying flat. She is receiving ASA and plavix. Her mother is at bedside.    Review of Systems   Unable to perform ROS: Mental status change       Scheduled Meds:   aspirin  81 mg Per NG tube Daily    atorvastatin  40 mg Per NG tube Daily    carvediloL  6.25 mg Per NG tube BID    ceFEPime IV (PEDS and ADULTS)  2 g Intravenous Q8H    clopidogreL  75 mg Per NG tube Daily    enoxparin  40 mg Subcutaneous Q24H (prophylaxis, 1700)    FLUoxetine  60 mg Per NG tube Daily    folic acid  1 mg Per NG tube Daily    LIDOcaine  1 patch Transdermal Q24H    lithium citrate 8 meq/5 ml  300 mg Per NG tube TID    melatonin  6 mg Per NG tube Nightly    methyl salicylate-menthol 15-10%   Topical (Top) TID    mirtazapine  7.5 mg Per NG tube QHS    multivitamin  1 tablet Per NG tube Daily    mupirocin   Nasal BID     QUEtiapine  100 mg Per NG tube QHS    QUEtiapine  50 mg Per NG tube Daily    senna-docusate 8.6-50 mg  1 tablet Per NG tube Daily    thiamine  100 mg Per NG tube Daily    vancomycin (VANCOCIN) IV (PEDS and ADULTS)  1,250 mg Intravenous Q12H     Continuous Infusions:  PRN Meds:  Current Facility-Administered Medications:     acetaminophen, 1,000 mg, Per NG tube, BID PRN    albuterol-ipratropium, 3 mL, Nebulization, Q6H PRN    polyethylene glycol, 17 g, Per NG tube, BID PRN    QUEtiapine, 25 mg, Per NG tube, BID PRN    sodium chloride 0.9%, 10 mL, Intravenous, PRN    Pharmacy to dose Vancomycin consult, , , Once **AND** vancomycin - pharmacy to dose, , Intravenous, pharmacy to manage frequency    Review of patient's allergies indicates:  No Known Allergies    Past Medical History:   Diagnosis Date    Acute adjustment disorder with depressed mood     Cutaneous abscess 08/08/2019    Depression     Enlarged liver 04/27/2017    Hep C w/o coma, chronic     Hepatitis B     History of mental disorder 03/22/2017    History of substance abuse      History reviewed. No pertinent surgical history.  No family history on file.  Social History     Tobacco Use    Smoking status: Every Day     Current packs/day: 0.50     Types: Cigarettes    Smokeless tobacco: Never   Substance Use Topics    Alcohol use: Yes     Comment: occaisionally    Drug use: No       OBJECTIVE:     Vital Signs (Most Recent)  Temp: 98 °F (36.7 °C) (11/19/24 1157)  Pulse: 89 (11/19/24 1157)  Resp: 16 (11/19/24 1157)  BP: 114/69 (11/19/24 1157)  SpO2: 95 % (11/19/24 1157)    Physical Exam:  Physical Exam  Vitals and nursing note reviewed.   Constitutional:       General: She is not in acute distress.     Appearance: She is ill-appearing.   HENT:      Head: Normocephalic and atraumatic.      Right Ear: External ear normal.      Left Ear: External ear normal.      Nose:      Comments: NGT  Eyes:      Extraocular Movements: Extraocular movements intact.       Conjunctiva/sclera: Conjunctivae normal.      Pupils: Pupils are equal, round, and reactive to light.   Cardiovascular:      Rate and Rhythm: Normal rate.   Pulmonary:      Effort: Pulmonary effort is normal. No respiratory distress.   Abdominal:      General: Abdomen is flat. There is no distension.   Musculoskeletal:      Comments: Soft restraints   Neurological:      Mental Status: She is disoriented.   Psychiatric:      Comments: Mumbles incoherently          Laboratory  I have reviewed all pertinent lab results within the past 24 hours.  CBC:   Recent Labs   Lab 11/19/24  0535   WBC 16.35*   RBC 4.41   HGB 12.8   HCT 39.5   *   MCV 90   MCH 29.0   MCHC 32.4     BMP:   Recent Labs   Lab 11/19/24  0535   *   *   K 3.7      CO2 21*   BUN 16   CREATININE 0.6   CALCIUM 9.9   MG 2.4     CMP:   Recent Labs   Lab 11/19/24  0535   *   CALCIUM 9.9   ALBUMIN 3.1*   PROT 7.6   *   K 3.7   CO2 21*      BUN 16   CREATININE 0.6   ALKPHOS 102   ALT 56*   AST 44*   BILITOT 0.2     LFTs:   Recent Labs   Lab 11/19/24  0535   ALT 56*   AST 44*   ALKPHOS 102   BILITOT 0.2   PROT 7.6   ALBUMIN 3.1*     Coagulation:   Recent Labs   Lab 11/19/24  0535   LABPROT 11.5   INR 1.1     Microbiology Results (last 7 days)       Procedure Component Value Units Date/Time    Blood culture [[id number]] Collected: 11/17/24 1846    Order Status: Completed Specimen: Blood from Peripheral, Hand, Right Updated: 11/18/24 2212     Blood Culture, Routine No Growth to date      No Growth to date    Narrative:      Collection has been rescheduled by DJK at 11/17/2024 12:04 Reason:   Unable to collect notified nurse nanette  Collection has been rescheduled by DF2 at 11/17/2024 14:58 Reason:   Tiffany STANFORD is administering medicine, and repositioning, will come   back.  Collection has been rescheduled by DF2 at 11/17/2024 16:14 Reason:   Only able to obtain an aerobic bottle, informed tiffany STANFORD for her    assistance, patient is in restraints but able to move too much to   collect samples.  Collection has been rescheduled by DJK at 11/17/2024 12:04 Reason:   Unable to collect notified nurse kendrainda  Collection has been rescheduled by DF2 at 11/17/2024 14:58 Reason:   Tiffany RN is administering medicine, and repositioning, will come   back.  Collection has been rescheduled by DF2 at 11/17/2024 16:14 Reason:   Only able to obtain an aerobic bottle, informed tiffany RN for her   assistance, patient is in restraints but able to move too much to   collect samples.    Blood culture [[id number]] Collected: 11/17/24 1603    Order Status: Completed Specimen: Blood from Peripheral, Hand, Left Updated: 11/18/24 1812     Blood Culture, Routine No Growth to date      No Growth to date    Narrative:      Collection has been rescheduled by DJK at 11/17/2024 12:04 Reason:   Unable to collect notified nurse matiasa  Collection has been rescheduled by DF2 at 11/17/2024 14:58 Reason:   Tiffany RN is administering medicine, and repositioning, will come   back.  Only able to get an aerobic bottle, will need nurse to help hold.    Patient is in retraints.  Collection has been rescheduled by DJK at 11/17/2024 12:04 Reason:   Unable to collect notified nurse kendrainda  Collection has been rescheduled by DF2 at 11/17/2024 14:58 Reason:   Tiffany RN is administering medicine, and repositioning, will come   back.            ASA/Mallampati  ASA: 3  Mallampati: 2    Imaging:  Recent imaging studies reviewed. CT abdomen pending.    ASSESSMENT/PLAN:     Assessment:  49 y.o. female with a PMHx of recent CVA who has been referred to IR for percutaneous gastrostomy tube placement for long term enteral feeds due to dysphagia 2/2 recent CVA. Case discussed with staff. Prior to proceeding with G tube placement, pt will first need a CT abdomen without IV contrast to ensure she has a safe window for G tube placement. The procedure was discussed in great  detail with the pt's mother including thorough explanations of the potential risks and benefits of gastrostomy tube placement. Explained to pt's mother and primary team that NG tube placement is required to insufflate the stomach during the procedure and to give barium through prior to the procedure, both of which make G tube placement a safer procedure. Risks include but are not limited to sepsis, severe infection, hemorrhage, bowel injury, catheter dislodgement, catheter blockage and need for additional procedures. Explained to pt/pt's family that opting to forgo gastrostomy tube placement could pose a risk to life or bodily function. The pt may be a candidate for percutaneous gastrostromy tube placement under moderate sedation pending CT a/p results. Plan discussed with ordering physician and pt's mother who verbalized understanding of the plan and would like to proceed. Will obtain consent from pt's mother.    Plan:  May proceed with percutaneous gastrostomy tube placement under moderate sedation on 11/20 or 11/21 pending CT abdomen results  Please place/maintain NG tube prior to barium administration (see #3) and keep in placed for procedure  Administer barium overnight starting at midnight (ordered by IR, see instructions in orders) via NG tube  Please keep pt NPO starting at midnight the night before the procedure (except for barium administration).   Anticoagulation history reviewed. Pt takes asa and plavix, no need to hold per staff  Coagulation labs reviewed. INR 1.1 and plt count 458 on 11/19.  Thank you for the consult. Please contact with questions via [employer] secure chat or spectra.    Time spent during patient care today was 79 minutes. This includes time spent before the visit reviewing the chart, discussing case with staff physician and ordering provider, time spent during the face to face patient visit, and time spent after the visit on documentation. Time excludes procedure time.     Jerri Hooks  [facility]  Interventional Radiology  Spectra: [id number]

## 2024-11-19 NOTE — PROGRESS NOTES
Neno Conley - Neurosurgery ([facility])  Vascular Neurology  Comprehensive Stroke Center  Progress Note    Assessment/Plan:     * Acute arterial ischemic stroke, multifocal, posterior circulation  Malathi Mcpherson is a 49 y.o. female with PMH of drug use, seizure, HTN, HLD that is admitted [place] [facility] [place] evaluation and management of multifocal acute infarcts. She initially presented to [facility] [place] 11/6 after being found lying face down at home that morning, per EMS was lethargic and aphasic and moving LUE/LLE/RLE but was not moving RUE. EMS also reported that they had seen her before in context of seizures. LKW 10pm 11/5. In ED was noted to have exam worsening, now with R sided plegia and global aphasia. NIHSS 22. Also noted to have O2 desat in 70s and ultimately was intubated for airway protection. CTA showed multifocal areas of hypodensity involving the posterior circulation suspicious for infarcts as well as focal occlusion of R vert and L PCA. Not candidate for acute stroke interventions, no TNK due to OOW and no IR due to stroke burden/risk for bleeding. She was admitted to [facility] for higher level of care. NSGY consulted on admit for edema/hemicrani watch, however no surgical interventions recommended. MRI brain confirmed multifocal areas of acute infarct involving: L>R occipital lobes, L medial temporal lobe, bilateral thalami, L hippocampus and R hippocampus tail, R dorsal medulla, bilateral cerebellar hemispheres. TTE unremarkable. Suspect etiology of stroke likely secondary to substance use, Utox + cocaine.    Interval hx: Episodes of coughing and vomiting overnight with concern for aspiration event. TF held, repeat KUB unremarkable. Worsening leukocytosis today, possibly related to aspiration event although uncertain. Will test COVID/flu/RSV, collect blood cultures. Consider addition of vancomycin especially is patient becomes febrile or clinically unstable. LFT's slightly improved, elevated INR. Acute hepatitis  panel ordered, consider liver ultrasound.     Antithrombotics for secondary stroke prevention: Antiplatelets: Aspirin: 81 mg daily  Clopidogrel: 75 mg daily.     Statins for secondary stroke prevention and hyperlipidemia, if present: Statins: Atorvastatin- 40 mg daily    Aggressive risk factor modification: HTN, HLD, Substance use.     Rehab efforts: The patient has been evaluated by a stroke team provider and the therapy needs have been fully considered based off the presenting complaints and exam findings. The following therapy evaluations are needed: PT evaluate and treat, OT evaluate and treat, SLP evaluate and treat. High intensity therapy recommended. Pt failed barium swallow study, currently NPO. Plan for PEG tube placement.     Diagnostics ordered/pending: Other: Modified barium swallow    VTE prophylaxis: Enoxaparin 40 mg SQ every 24 hours  Mechanical prophylaxis: Place SCDs    BP parameters: Infarct: SBP goal <180      Transaminitis  LFTs uptrending, tylenol updated to PRN.   Decreased dose of statin to atorvastatin 40 mg 11/17 given elevated INR 1.5.   Consider liver ultrasound  Hep C antigen reactive, ordering RNA quant, pending results     Lab Results   Component Value Date    ALT 56 (H) 11/19/2024    AST 44 (H) 11/19/2024    ALKPHOS 102 11/19/2024    BILITOT 0.2 11/19/2024     Monitor daily CMP, PT/INR    Agitation  -see anxiety and depression    HTN (hypertension)  Patients blood pressure range in the last 24 hours was: BP  Min: 86/51  Max: 212/97. The patient's inpatient anti-hypertensive regimen is listed below:    Current Antihypertensives  , 2 times daily, Oral  carvediloL tablet 6.25 mg, 2 times daily, Per NG tube    - BP is controlled, no changes needed to their regimen  -SBP goal < 180      Right upper lobe pneumonia  Extubated 11/10 in [facility] with persistent oxygen requirement. CXR remarkable for RUL consolidation. Respiratory culture positive for H flu and MSSA. MRSA screen by PCR positive. Was  transitioned to cefazolin 11/12 then cefepime 11/14 after sensitivities c/w h flu beta lactamase positive. Extended course even persistent or worsening leukocytosis, plan for total 7 day course (EED 11/21). 11/18/24 worsening leukocytosis with WBC up to 19.25 from 17.82. Viral panel negative.     -wean oxygen as tolerated, stable on RA  -continue cefepime (EED 11/21)   - Started on vanc  - F/U CBC  - F/U blood cultures     Debility  -Due to stroke  -Aggressive therapy  -PT/OT/SLP eval and treat    Polysubstance abuse  -Utox + cocaine  -Suspect etiology of stroke likely due to substance use  -Continue thiamine/folate/multivitamin supplementation  -Addiction psych consulted early on admission    Cytotoxic cerebral edema  -Area of cerebral edema identified when reviewing brain imaging involving bilateral posterior circulation territories due to acute infarcts, there is mild mass effect associated with it that has remained stable on follow up imaging.  -Admitted to [facility] for close neuro monitoring and observation  -NSGY consulted on admit, no surgical intervention recommended and NSGY s/o  - Stepped down to floor  -Continue frequent q4hr neuro checks as any acute changes or worsening neuro status .  -Obtain stat CTH for any new or worsening neuro changes and notify primary team immediately    Anxiety and depression  -Psychiatry consulted, appreciate recs  Continue lithium 300 mg TID (resumed 11/13), Prozac 60 mg daily, Seroquel 25 mg in the AM and 50 mg in the PM, Remeron 7.5 mg nightly.   PRN Seroquel 25 mg BID for non-redirectable agitation.    -daily EKG to monitor Qtc  - Pending serum lithium level on 11/18/24 11/07/2024 Pt was agitated overnight and is on fentanyl. Currently intubated.On repeated painful stimuli has movements on LUE,LLE, RLE> RUE.The movement on right side is an improvement from yesterday.  11/11/2024 NAEON, remains on precedex for agitation. Extubated yesterday (11/10), tolerating extubation  thus far without complication. Moving extremities spontaneously although weakness noted in RUE>>RLE. Remains globally aphasic and agitated despite max precedex infusion. Respiratory cultures + H flu, on rocephin for abx. Also on bactrim for SSTI. Home lithium restarted today, continue seoquel and fluoxetine.  11/14/2024 NAEO, patient off precedex, BL wrist and torso restraints, NG tube with feeds running. Patient able to respond to questions and follow some commands. Oriented to self only. Stable for stepdown.   11/15/2024 Pt still in restraints. Per nurse tendency to pull at tubes and things.On restraints. Oriented to self. No other complaints in the am.  11/16/2024 remains in restraints this AM, reportedly agitated overnight with concerns from nursing. Psychiatry consulted for med rec given extensive psych history on lithium and multiple different psych meds on admission med rec/recent dispense history and concerns for worsening agitation. LFTs uptrending, tylenol updated to PRN. Consider decreased dose of statin if worsening.   11/17/2024 episodes of coughing and vomiting overnight with concern for aspiration event. TF held, repeat KUB unremarkable. Worsening leukocytosis today, possibly related to aspiration event although uncertain. Will test COVID/flu/RSV, collect blood cultures. Consider addition of vancomycin especially is patient becomes febrile or clinically unstable. LFT's slightly improved, elevated INR. Acute hepatitis panel ordered, consider liver ultrasound.   11/18/24 Viral panel negative for any respiratory infection. WBC trending up to 19.25 from 17.82. Currently on Vanc and cefepime. LFT downtrending to normal values. Plan for barium swallow study today, to assess for the continuation of NG tube feedings. Plan to likely move to hospital medicine service.   11/19/2024 Pt failed barium swallow study yesterday. IR consulted for PEG tube placement. CT abdomen ordered. Hep C antigen reactive, pending  RNA quant. WBC trending down to 16. 35 from 19.25. Blood cultures NGTD.       STROKE DOCUMENTATION   Acute Stroke Times   Last Known Normal Date: 11/05/24  Last Known Normal Time: 2200  Symptom Onset Date: 11/06/24 (unsure of timing)  Symptom Onset Time:  (sometime this morning.)  Unknown Symptom Onset Time: Unknown Time  Stroke Team Called Date: 11/06/24  Stroke Team Called Time: 0843  Stroke Team Arrival Date: 11/06/24  Stroke Team Arrival Time: 0845  CT Interpretation Time: 0859  Thrombolytic Therapy Recommended: No  CTA Interpretation Time: 0902  Thrombectomy Recommended: No    NIH Scale:          Modified Hanover Score: 0  [place] Coma Scale:    ABCD2 Score:    [id number] Score:   HAS -BLED Score:   ICH Score:   Hunt & Argueta Classification:      Hemorrhagic change of an Ischemic Stroke: Does this patient have an ischemic stroke with hemorrhagic changes? No     Neurologic Chief Complaint: acute arterial ischemic stroke, multifocal, posterior circulation     Subjective:     Interval History: Patient is seen for follow-up neurological assessment and treatment recommendations: Please see hospital course    HPI, Past Medical, Family, and Social History remains the same as documented in the initial encounter.     Review of Systems   Unable to perform ROS: Mental status change     Scheduled Meds:   aspirin  81 mg Per NG tube Daily    atorvastatin  40 mg Per NG tube Daily    carvediloL  6.25 mg Per NG tube BID    ceFEPime IV (PEDS and ADULTS)  2 g Intravenous Q8H    clopidogreL  75 mg Per NG tube Daily    enoxparin  40 mg Subcutaneous Q24H (prophylaxis, 1700)    FLUoxetine  60 mg Per NG tube Daily    folic acid  1 mg Per NG tube Daily    LIDOcaine  1 patch Transdermal Q24H    lithium citrate 8 meq/5 ml  300 mg Per NG tube TID    melatonin  6 mg Per NG tube Nightly    methyl salicylate-menthol 15-10%   Topical (Top) TID    mirtazapine  7.5 mg Per NG tube QHS    multivitamin  1 tablet Per NG tube Daily    mupirocin    Nasal BID    QUEtiapine  100 mg Per NG tube QHS    QUEtiapine  50 mg Per NG tube Daily    senna-docusate 8.6-50 mg  1 tablet Per NG tube Daily    thiamine  100 mg Per NG tube Daily    vancomycin (VANCOCIN) IV (PEDS and ADULTS)  1,250 mg Intravenous Q12H     Continuous Infusions:  PRN Meds:  Current Facility-Administered Medications:     acetaminophen, 1,000 mg, Per NG tube, BID PRN    albuterol-ipratropium, 3 mL, Nebulization, Q6H PRN    polyethylene glycol, 17 g, Per NG tube, BID PRN    QUEtiapine, 25 mg, Per NG tube, BID PRN    sodium chloride 0.9%, 10 mL, Intravenous, PRN    Pharmacy to dose Vancomycin consult, , , Once **AND** vancomycin - pharmacy to dose, , Intravenous, pharmacy to manage frequency    Objective:     Vital Signs (Most Recent):  Temp: 98 °F (36.7 °C) (11/19/24 1157)  Pulse: 89 (11/19/24 1157)  Resp: 16 (11/19/24 1157)  BP: 114/69 (11/19/24 1157)  SpO2: 95 % (11/19/24 1157)  BP Location: Right arm    Vital Signs Range (Last 24H):  Temp:  [97 °F (36.1 °C)-99 °F (37.2 °C)]   Pulse:  []   Resp:  [16-22]   BP: (114-203)/()   SpO2:  [95 %-100 %]   BP Location: Right arm       Physical Exam  Vitals and nursing note reviewed.   Constitutional:       General: She is sleeping.      Appearance: She is ill-appearing.   Pulmonary:      Effort: Pulmonary effort is normal. No respiratory distress.   Abdominal:      General: There is no distension.      Palpations: Abdomen is soft.   Musculoskeletal:      Cervical back: Normal range of motion and neck supple.      Comments: Right sided weakness.    Skin:     Capillary Refill: Capillary refill takes 2 to 3 seconds.   Neurological:      General: No focal deficit present.      GCS: GCS eye subscore is 3. GCS verbal subscore is 3. GCS motor subscore is 5.      Cranial Nerves: Dysarthria present.      Motor: Weakness present.      Coordination: Coordination abnormal. Finger-Nose-Finger Test abnormal and Heel to Vo Test abnormal.      Gait: Gait  abnormal.   Psychiatric:         Behavior: Behavior is slowed and withdrawn.         Cognition and Memory: Cognition is impaired. Memory is impaired.              Neurological Exam:   LOC: alert  Attention Span: poor    Laboratory:  CMP:   Recent Labs   Lab 11/19/24  0535   CALCIUM 9.9   ALBUMIN 3.1*   PROT 7.6   *   K 3.7   CO2 21*      BUN 16   CREATININE 0.6   ALKPHOS 102   ALT 56*   AST 44*   BILITOT 0.2     CBC:   Recent Labs   Lab 11/19/24  0535   WBC 16.35*   RBC 4.41   HGB 12.8   HCT 39.5   *   MCV 90   MCH 29.0   MCHC 32.4       Diagnostic Results     Brain Imaging / Vessel Imaging         Results for orders placed or performed during the hospital encounter of 11/06/24 (from the past 2160 hours)   CTA STROKE MULTI-PHASE     Narrative     EXAMINATION:  CTA STROKE MULTI-PHASE; CT CERVICAL SPINE WITHOUT CONTRAST     CLINICAL HISTORY:  Neuro deficit, acute, stroke suspected;; Neck trauma, intoxicated or obtunded (Age >= 16y);     TECHNIQUE:  Axial CT images obtained throughout the region of the head and cervical spine before the administration of intravenous contrast.     CT angiogram was performed through the cervical and intracranial vasculature during the IV bolus administration of 75mL of Omnipaque 350.  Two additional phases through the intracranial vasculature via multiphase technique.     Multiplanar MPR and MIP reformats were performed.     CT source data was analyzed using artificial intelligence software for detection of a large vessel occlusions (LVO) in order to enable computer assisted triage notification and aid clinical stroke decision making.     COMPARISON:  CTA head and cervical spine 10/25/2015     FINDINGS:  Large region of hypoattenuation loss of gray-white differentiation in left occipital lobe in medial temporal lobe in keeping with an acute PCA distribution infarct.  Modest localized mass effect with some sulcal crowding.  Additional involvement of the left thalamus.   Similar appearance in the medial right occipital lobe, but smaller area of infarction.  Small left superior cerebellar infarct, also likely acute.  No evidence of recent or remote major vascular distribution infarct in the anterior circulation.  No acute parenchymal hemorrhage.  No midline shift.     The ventricles are normal in size without evidence of hydrocephalus.     No extra-axial blood or fluid collections.     The cranium appears intact.     Mastoid air cells and paranasal sinuses are essentially clear.     The vertebral bodies are normal in height and morphology without evidence of an acute displaced fracture or focal osseous destructive process.     Normal sagittal alignment is preserved.  No spondylolisthesis.     Intervertebral disc heights are well maintained.  No evidence of a bony spinal canal stenosis.     Evaluation intraspinal contents demonstrates no evidence of mass or hematoma.     No acute abnormalities in the paraspinal soft tissue structures.     Emphysematous changes patchy ground-glass opacity in the partially visualized lung apices.        CTA:     The aortic arch demonstrates no significant stenosis at the major branch vessel origins.     There is a focal thrombus in the right subclavian artery extending into the right vertebral artery origin with severe stenosis.  Thrombus extending over proximally 1 cm.  The V1 and V2 segments of right vertebral artery are otherwise normal in caliber of slightly decreased in attenuation in comparison to the other cervical vasculature.  Abrupt occlusion of distal right vertebral artery about the level of the PICA origin approximate 2 cm segment of non enhancement.     Moderate focal stenosis at the left vertebral artery origin.  Left vertebral artery is otherwise normal in caliber.  The basilar artery is normal in caliber.     The right common carotid artery is normal in caliber.  No significant stenosis at the carotid bifurcation.The right internal  carotid artery is normal in caliber.     The left common carotid artery is normal in caliber. No significant stenosis at the carotid bifurcation.The left internal carotid artery is normal in caliber.     Abrupt non enhancement in the P1 segment of the left PCA.  The proximal right PCA appears within normal limits.     The proximal MCAs and ACAs are unremarkable.     Findings were immediately discussed upon identification via telephone with the stroke service ([facility]) at approximately 09:20.        Impression     Multifocal acute posterior circulation distribution infarcts as further discussed above, particularly involving essentially the entirety of the left PCA territory.  Modest mass effect without significant hemorrhage.     Focal thrombus with severe stenosis extending from the right subclavian artery into the proximal right vertebral artery.     Focal occlusion of the proximal intracranial segment of the right vertebral artery.     Focal occlusion of the proximal left PCA.     Moderate focal stenosis at the left vertebral artery origin.     Anterior circulation appears unremarkable.     No evidence of fracture or traumatic malalignment in the cervical spine.     Emphysematous changes and patchy ground-glass opacity in the partially visualized lung apices.     This report was flagged in Epic as abnormal.        Electronically signed by:Geo Ambrocio MD  Date:                                                11/06/2024  Time:                                                09:33   CT Cervical Spine Without Contrast     Narrative     EXAMINATION:  CTA STROKE MULTI-PHASE; CT CERVICAL SPINE WITHOUT CONTRAST     CLINICAL HISTORY:  Neuro deficit, acute, stroke suspected;; Neck trauma, intoxicated or obtunded (Age >= 16y);     TECHNIQUE:  Axial CT images obtained throughout the region of the head and cervical spine before the administration of intravenous contrast.     CT angiogram was performed through the  cervical and intracranial vasculature during the IV bolus administration of 75mL of Omnipaque 350.  Two additional phases through the intracranial vasculature via multiphase technique.     Multiplanar MPR and MIP reformats were performed.     CT source data was analyzed using artificial intelligence software for detection of a large vessel occlusions (LVO) in order to enable computer assisted triage notification and aid clinical stroke decision making.     COMPARISON:  CTA head and cervical spine 10/25/2015     FINDINGS:  Large region of hypoattenuation loss of gray-white differentiation in left occipital lobe in medial temporal lobe in keeping with an acute PCA distribution infarct.  Modest localized mass effect with some sulcal crowding.  Additional involvement of the left thalamus.  Similar appearance in the medial right occipital lobe, but smaller area of infarction.  Small left superior cerebellar infarct, also likely acute.  No evidence of recent or remote major vascular distribution infarct in the anterior circulation.  No acute parenchymal hemorrhage.  No midline shift.     The ventricles are normal in size without evidence of hydrocephalus.     No extra-axial blood or fluid collections.     The cranium appears intact.     Mastoid air cells and paranasal sinuses are essentially clear.     The vertebral bodies are normal in height and morphology without evidence of an acute displaced fracture or focal osseous destructive process.     Normal sagittal alignment is preserved.  No spondylolisthesis.     Intervertebral disc heights are well maintained.  No evidence of a bony spinal canal stenosis.     Evaluation intraspinal contents demonstrates no evidence of mass or hematoma.     No acute abnormalities in the paraspinal soft tissue structures.     Emphysematous changes patchy ground-glass opacity in the partially visualized lung apices.        CTA:     The aortic arch demonstrates no significant stenosis at the  major branch vessel origins.     There is a focal thrombus in the right subclavian artery extending into the right vertebral artery origin with severe stenosis.  Thrombus extending over proximally 1 cm.  The V1 and V2 segments of right vertebral artery are otherwise normal in caliber of slightly decreased in attenuation in comparison to the other cervical vasculature.  Abrupt occlusion of distal right vertebral artery about the level of the PICA origin approximate 2 cm segment of non enhancement.     Moderate focal stenosis at the left vertebral artery origin.  Left vertebral artery is otherwise normal in caliber.  The basilar artery is normal in caliber.     The right common carotid artery is normal in caliber.  No significant stenosis at the carotid bifurcation.The right internal carotid artery is normal in caliber.     The left common carotid artery is normal in caliber. No significant stenosis at the carotid bifurcation.The left internal carotid artery is normal in caliber.     Abrupt non enhancement in the P1 segment of the left PCA.  The proximal right PCA appears within normal limits.     The proximal MCAs and ACAs are unremarkable.     Findings were immediately discussed upon identification via telephone with the stroke service ([place]) at approximately 09:20.        Impression     Multifocal acute posterior circulation distribution infarcts as further discussed above, particularly involving essentially the entirety of the left PCA territory.  Modest mass effect without significant hemorrhage.     Focal thrombus with severe stenosis extending from the right subclavian artery into the proximal right vertebral artery.     Focal occlusion of the proximal intracranial segment of the right vertebral artery.     Focal occlusion of the proximal left PCA.     Moderate focal stenosis at the left vertebral artery origin.     Anterior circulation appears unremarkable.     No evidence of fracture or traumatic  malalignment in the cervical spine.     Emphysematous changes and patchy ground-glass opacity in the partially visualized lung apices.     This report was flagged in Epic as abnormal.        Electronically signed by:Geo Ambrocio MD  Date:                                                11/06/2024  Time:                                                09:33   CT Head Without Contrast     Narrative     EXAMINATION:  CT HEAD WITHOUT CONTRAST     CLINICAL HISTORY:  Stroke, follow up;     TECHNIQUE:  Low dose axial images were obtained through the head.  Coronal and sagittal reformations were also performed. Contrast was not administered.     COMPARISON:  CT, MRI 11/06/2024     FINDINGS:  Evolving posterior circulation infarcts are identified as described on recent MR imaging again most notable within the left greater than right occipital lobes extending to the medial left temporal lobe and bilateral thalamus.  No hemorrhagic conversion with potentially minimal stable petechial hemorrhage within the medial left thalamus.  No midline shift or herniation with mild mass effect on the 3rd ventricle again noted.     No new acute territorial infarct.     The visualized sinuses and mastoid air cells are clear.        Impression     Expected evolutionary changes of the posterior circulation infarcts with no overt hemorrhagic conversion or significant midline shift/herniation.        Electronically signed by:Adrián Hobbs  Date:                                                11/09/2024  Time:                                                08:38            Results for orders placed or performed during the hospital encounter of 11/06/24 (from the past 2160 hours)   MRI Brain Without Contrast     Impression     Multiple posterior circulation infarcts are identified with involvement of the left greater than right occipital lobes and medial left temporal lobe, thalamus, right dorsal medulla, and minimally involving the cerebellum.   No midline shift with minimal mass effect on the 3rd ventricle.     Loss of flow void within the right intracranial vertebral artery in keeping with the findings on CTA.        Electronically signed by:Adrián Hobbs  Date:                                                11/06/2024  Time:                                                17:40      Cardiac Imaging   Results for orders placed during the hospital encounter of 11/06/24     Echo Saline Bubble? Yes; Ultrasound enhancing contrast? Yes     Interpretation Summary    Left Ventricle: The left ventricle is normal in size. Normal wall thickness. There is normal systolic function with a visually estimated ejection fraction of 55 - 60%. Biplane (2D) method of discs ejection fraction is 56%. Global longitudinal strain is -18.0%. There is normal diastolic function.    Right Ventricle: Normal right ventricular cavity size. Wall thickness is normal. Systolic function is normal.    Tricuspid Valve: There is mild regurgitation.    Pulmonary Artery: The estimated pulmonary artery systolic pressure is at least 28 mmHg.    IVC/SVC: Patient is ventilated, cannot use IVC diameter to estimate right atrial pressure.     Gerhard De Jesus MD  [facility]  Department of Vascular Neurology   [facility])

## 2024-11-19 NOTE — ASSESSMENT & PLAN NOTE
LFTs uptrending, tylenol updated to PRN.   Decreased dose of statin to atorvastatin 40 mg 11/17 given elevated INR 1.5.   Consider liver ultrasound  Hep C antigen reactive, ordering RNA quant, pending results     Lab Results   Component Value Date    ALT 56 (H) 11/19/2024    AST 44 (H) 11/19/2024    ALKPHOS 102 11/19/2024    BILITOT 0.2 11/19/2024     Monitor daily CMP, PT/INR

## 2024-11-19 NOTE — MEDICAL/APP STUDENT
"CONSULTATION LIAISON PSYCHIATRY PROGRESS NOTE    Patient Name: Malathi Mcpherson  MRN: [id number]  Patient Class: IP- Inpatient  Admission Date: 11/6/2024  Attending Physician: Emily Recinos MD      SUBJECTIVE:   Malathi Mcpherson is a 49 y.o. female with past psychiatric history of polysubstance use disorder (crack cocaine, heroin, alcohol) and bipolar disorder & past pertinent medical history of seizure, HTN, and HLD presents to the ED/admitted to the hospital for large BL CVA. Patient most recently in drug rehab court prior to hospitalization and relapsed on crack cocaine in the week prior to admission. She is admitted to hospital for further evaluation and management of multifocal acute infarcts.      Recently evaluated by psychiatry on 11/14/24 for her stimulant use disorder and history of bipolar disorder. Psychiatry re-consulted for "Worsening agitation, extensive psych hx (bipolar?) on lithium, need med recs for agitation."       Today, patient is laying in bed with 2-pt restraints with mother at bedside. Patient mumbles feeling "so so". Her mother says she seems more restful today and is not cursing her out. Patient is oriented to person. Mother says  this is not her baseline prior to the stroke.         OBJECTIVE:    Mental Status Exam:  General Appearance: well-nourished, dressed in hospital garb, disheveled, NG tube to R nare   Behavior: unable to participate, minimal responses, agitated, restless and fidgety, poor eye contact   Involuntary Movements and Motor Activity: no abnormal involuntary movements noted; no tics, no tremors, no akathisia, no dystonia, no evidence of tardive dyskinesia; no psychomotor agitation or retardation   Gait and Station: unable to assess - patient lying down or seated   Speech and Language: mumbling, responds to questions minimally/briefly, dysarthric   Mood: "so so"  Affect: irritable  Thought Process and Associations: Unable to Assess  Perceptual Disturbances:  " BETTYE  Thought Content and Perceptions:: Unable to Assess  Sensorium and Orientation: oriented to person only  Recent and Remote Memory: Unable to  Formally Assess  Attention and Concentration: Unable to Formally Assess  Fund of Knowledge: Unable to Formally Assess  Insight:  BETTYE due to patient's mental state  Judgment:  BETTYE due to patient's mental state    CAM ICU positive? Did not assess      ASSESSMENT & RECOMMENDATIONS   Delirium due to multiple etiologies [multiple acute infarcts, debility, pneumonia ] (F05)  Cocaine use disorder, severe  Hx of Bipolar Disorder     Scheduled Medication(s):  Continue lithium 300 mg TID, Prozac 60 mg daily, Seroquel 50 mg in the AM and 100 mg in the PM, Remeron 7.5 mg nightly.       PRN Medication(s):  PRN Seroquel 25 mg BID for non-redirectable agitation.       Other Recommendations (labs, imaging, further consults, etc.):  Most recent EKG on 11/16/24 with QTC of 476; please obtain repeat EKG in the setting of QT prolongation and agitation despite current psychotropic regimen.       Delirium Behavior Management  PLEASE utilize PRN meds first for agitation. Minimize use of PHYSICAL restraints OR have periods of being out of physical restraints if possible.  Keep window shades open and room lit during day and room dim at night in order to promote normal sleep-wake cycles  Encourage family at bedside. Heppner patient often to situation, location, date.  Continue to Limit or Discontinue use of Narcotics, Benzos and Anti-cholinergic medications as they may worsen delirium.  Continue medical workup for causative etiology of Delirium.     Risk Assessment / Legal Status  Patient does not meet criteria for PEC or involuntary inpatient psychiatric admission at this time. Recommend to rescind PEC if one was placed. Patient is not currently an imminent danger to self or others and is not gravely disabled due to a psychiatric illness.    Follow-up:  Will follow-up while in  house.    Disposition:   Defer to primary team.    Please contact ON CALL psychiatry service (24/7) for any acute issues that may arise.    Christiana Adams, Student Doctor  CL Psychiatry  [facility]  11/19/2024 8:45 AM        --------------------------------------------------------------------------------------------------------------------------------------------------------------------------------------------------------------------------------------    CONTINUED OBJECTIVE clinical data & findings reviewed and noted for above decision making    Current Medications:   Scheduled Meds:    aspirin  81 mg Per NG tube Daily    atorvastatin  40 mg Per NG tube Daily    carvediloL  6.25 mg Per NG tube BID    ceFEPime IV (PEDS and ADULTS)  2 g Intravenous Q8H    clopidogreL  75 mg Per NG tube Daily    enoxparin  40 mg Subcutaneous Q24H (prophylaxis, 1700)    FLUoxetine  60 mg Per NG tube Daily    folic acid  1 mg Per NG tube Daily    LIDOcaine  1 patch Transdermal Q24H    lithium citrate 8 meq/5 ml  300 mg Per NG tube TID    melatonin  6 mg Per NG tube Nightly    methyl salicylate-menthol 15-10%   Topical (Top) TID    mirtazapine  7.5 mg Per NG tube QHS    multivitamin  1 tablet Per NG tube Daily    mupirocin   Nasal BID    QUEtiapine  100 mg Per NG tube QHS    QUEtiapine  50 mg Per NG tube Daily    senna-docusate 8.6-50 mg  1 tablet Per NG tube Daily    thiamine  100 mg Per NG tube Daily    vancomycin (VANCOCIN) IV (PEDS and ADULTS)  1,250 mg Intravenous Q12H     PRN Meds:   Current Facility-Administered Medications:     acetaminophen, 1,000 mg, Per NG tube, BID PRN    albuterol-ipratropium, 3 mL, Nebulization, Q6H PRN    hydrOXYzine HCL, 25 mg, Per NG tube, QID PRN    polyethylene glycol, 17 g, Per NG tube, BID PRN    QUEtiapine, 25 mg, Per NG tube, BID PRN    sodium chloride 0.9%, 10 mL, Intravenous, PRN    Pharmacy to dose Vancomycin consult, , , Once **AND** vancomycin - pharmacy to dose, ,  Intravenous, pharmacy to manage frequency    Allergies:   Review of patient's allergies indicates:  No Known Allergies    Vitals  Vitals:    11/19/24 0800   BP: (!) 159/76   Pulse: 100   Resp: 18   Temp: 98.3 °F (36.8 °C)       Labs/Imaging/Studies:  Recent Results (from the past 24 hours)   VANCOMYCIN, TROUGH    Collection Time: 11/19/24  1:10 AM   Result Value Ref Range    Vancomycin-Trough 8.4 (L) 10.0 - 22.0 ug/mL   Comprehensive metabolic panel    Collection Time: 11/19/24  5:35 AM   Result Value Ref Range    Sodium 135 (L) 136 - 145 mmol/L    Potassium 3.7 3.5 - 5.1 mmol/L    Chloride 103 95 - 110 mmol/L    CO2 21 (L) 23 - 29 mmol/L    Glucose 116 (H) 70 - 110 mg/dL    BUN 16 6 - 20 mg/dL    Creatinine 0.6 0.5 - 1.4 mg/dL    Calcium 9.9 8.7 - 10.5 mg/dL    Total Protein 7.6 6.0 - 8.4 g/dL    Albumin 3.1 (L) 3.5 - 5.2 g/dL    Total Bilirubin 0.2 0.1 - 1.0 mg/dL    Alkaline Phosphatase 102 40 - 150 U/L    AST 44 (H) 10 - 40 U/L    ALT 56 (H) 10 - 44 U/L    eGFR >60.0 >60 mL/min/1.73 m^2    Anion Gap 11 8 - 16 mmol/L   Magnesium    Collection Time: 11/19/24  5:35 AM   Result Value Ref Range    Magnesium 2.4 1.6 - 2.6 mg/dL   Phosphorus    Collection Time: 11/19/24  5:35 AM   Result Value Ref Range    Phosphorus 3.5 2.7 - 4.5 mg/dL   CBC auto differential    Collection Time: 11/19/24  5:35 AM   Result Value Ref Range    WBC 16.35 (H) 3.90 - 12.70 K/uL    RBC 4.41 4.00 - 5.40 M/uL    Hemoglobin 12.8 12.0 - 16.0 g/dL    Hematocrit 39.5 37.0 - 48.5 %    MCV 90 82 - 98 fL    MCH 29.0 27.0 - 31.0 pg    MCHC 32.4 32.0 - 36.0 g/dL    RDW 14.1 11.5 - 14.5 %    Platelets 458 (H) 150 - 450 K/uL    MPV 10.9 9.2 - 12.9 fL    Immature Granulocytes 1.8 (H) 0.0 - 0.5 %    Gran # (ANC) 12.9 (H) 1.8 - 7.7 K/uL    Immature Grans (Abs) 0.29 (H) 0.00 - 0.04 K/uL    Lymph # 1.7 1.0 - 4.8 K/uL    Mono # 1.1 (H) 0.3 - 1.0 K/uL    Eos # 0.3 0.0 - 0.5 K/uL    Baso # 0.11 0.00 - 0.20 K/uL    nRBC 0 0 /100 WBC    Gran % 78.6 (H) 38.0 - 73.0  %    Lymph % 10.6 (L) 18.0 - 48.0 %    Mono % 6.7 4.0 - 15.0 %    Eosinophil % 1.6 0.0 - 8.0 %    Basophil % 0.7 0.0 - 1.9 %    Differential Method Automated    Protime-INR    Collection Time: 11/19/24  5:35 AM   Result Value Ref Range    Prothrombin Time 11.5 9.0 - 12.5 sec    INR 1.1 0.8 - 1.2     Imaging Results              MRI Brain Without Contrast (Final result)  Result time 11/06/24 17:40:17      Final result by Adrián Hobbs MD (11/06/24 17:40:17)                   Impression:      Multiple posterior circulation infarcts are identified with involvement of the left greater than right occipital lobes and medial left temporal lobe, thalamus, right dorsal medulla, and minimally involving the cerebellum.  No midline shift with minimal mass effect on the 3rd ventricle.    Loss of flow void within the right intracranial vertebral artery in keeping with the findings on CTA.      Electronically signed by: Adriná Hobbs  Date:    11/06/2024  Time:    17:40               Narrative:    EXAMINATION:  MRI BRAIN WITHOUT CONTRAST    CLINICAL HISTORY:  Stroke, follow up;    TECHNIQUE:  Multiplanar multisequence MR imaging of the brain was performed without contrast.    COMPARISON:  CT 11/06/2024    FINDINGS:  Multiple areas of restricted diffusion consistent with acute infarcts within the posterior circulation are identified.  There is involvement of the left greater than right occipital lobes and left medial temporal lobe (PCA territory), bilateral thalamus, left hippocampus and right hippocampal tail, right dorsal medulla, and tiny bilateral cerebellar infarcts.  Trace petechial hemorrhage left medial temporal lobe.  No overt hemorrhagic conversion.    No midline shift or herniation with minimal mass effect on the 3rd ventricle.    No hydrocephalus.    Loss of the intracranial right vertebral artery flow void in keeping with the findings on the recent CTA.                                       XR NG/OG tube  placement check, non-radiologist performed (Final result)  Result time 11/06/24 10:25:49      Final result by Segundo Sheth MD (11/06/24 10:25:49)                   Impression:      Please see above.      Electronically signed by: Segundo Sheth  Date:    11/06/2024  Time:    10:25               Narrative:    EXAMINATION:  XR NG/OG TUBE PLACEMENT CHECK, NON-RADIOLOGIST PERFORMED    CLINICAL HISTORY:  placement;    TECHNIQUE:  Single overhead image of the abdomen was obtained.    COMPARISON:  Radiograph abdomen 01/19/2021    FINDINGS:  Enteric tube is visualized with tip just distal to the gastroesophageal junction.  However, side port is proximal to the expected region of the gastroesophageal junction.  Correlation and advancement advised.    Cardiomediastinal silhouette and lungs are unchanged.    Nonspecific, nonobstructive bowel gas pattern.  No free intra-abdominal air.  No pneumatosis.    Osseous structures demonstrate no evidence for acute fracture or osseous destructive lesion.    Soft tissues are unremarkable.                                       X-Ray Chest AP Portable (Final result)  Result time 11/06/24 10:26:26      Final result by Kervin Castaneda MD (11/06/24 10:26:26)                   Impression:      Further advancement of the NG tube recommended      Electronically signed by: Kervin Castaneda MD  Date:    11/06/2024  Time:    10:26               Narrative:    EXAMINATION:  XR CHEST AP PORTABLE    CLINICAL HISTORY:  Hypoxemia    TECHNIQUE:  Single frontal view of the chest was performed.    COMPARISON:  No 06/26/2024 ne    FINDINGS:  ET tube at T4 level.  NG tube in the lower esophagus and further advancement recommended.  Heart size normal.  There is slight degree of diffuse accentuation interstitial markings.  No significant airspace consolidation or pleural effusion identified.                                        CTA STROKE MULTI-PHASE (Final result)  Result time 11/06/24 09:33:04      Final result  by Geo Ambrocio MD (11/06/24 09:33:04)                   Impression:      Multifocal acute posterior circulation distribution infarcts as further discussed above, particularly involving essentially the entirety of the left PCA territory.  Modest mass effect without significant hemorrhage.    Focal thrombus with severe stenosis extending from the right subclavian artery into the proximal right vertebral artery.    Focal occlusion of the proximal intracranial segment of the right vertebral artery.    Focal occlusion of the proximal left PCA.    Moderate focal stenosis at the left vertebral artery origin.    Anterior circulation appears unremarkable.    No evidence of fracture or traumatic malalignment in the cervical spine.    Emphysematous changes and patchy ground-glass opacity in the partially visualized lung apices.    This report was flagged in Epic as abnormal.      Electronically signed by: Geo Ambrocio MD  Date:    11/06/2024  Time:    09:33               Narrative:    EXAMINATION:  CTA STROKE MULTI-PHASE; CT CERVICAL SPINE WITHOUT CONTRAST    CLINICAL HISTORY:  Neuro deficit, acute, stroke suspected;; Neck trauma, intoxicated or obtunded (Age >= 16y);    TECHNIQUE:  Axial CT images obtained throughout the region of the head and cervical spine before the administration of intravenous contrast.    CT angiogram was performed through the cervical and intracranial vasculature during the IV bolus administration of 75mL of Omnipaque 350.  Two additional phases through the intracranial vasculature via multiphase technique.    Multiplanar MPR and MIP reformats were performed.    CT source data was analyzed using artificial intelligence software for detection of a large vessel occlusions (LVO) in order to enable computer assisted triage notification and aid clinical stroke decision making.    COMPARISON:  CTA head and cervical spine 10/25/2015    FINDINGS:  Large region of hypoattenuation loss of gray-white  differentiation in left occipital lobe in medial temporal lobe in keeping with an acute PCA distribution infarct.  Modest localized mass effect with some sulcal crowding.  Additional involvement of the left thalamus.  Similar appearance in the medial right occipital lobe, but smaller area of infarction.  Small left superior cerebellar infarct, also likely acute.  No evidence of recent or remote major vascular distribution infarct in the anterior circulation.  No acute parenchymal hemorrhage.  No midline shift.    The ventricles are normal in size without evidence of hydrocephalus.    No extra-axial blood or fluid collections.    The cranium appears intact.    Mastoid air cells and paranasal sinuses are essentially clear.    The vertebral bodies are normal in height and morphology without evidence of an acute displaced fracture or focal osseous destructive process.    Normal sagittal alignment is preserved.  No spondylolisthesis.    Intervertebral disc heights are well maintained.  No evidence of a bony spinal canal stenosis.    Evaluation intraspinal contents demonstrates no evidence of mass or hematoma.    No acute abnormalities in the paraspinal soft tissue structures.    Emphysematous changes patchy ground-glass opacity in the partially visualized lung apices.      CTA:    The aortic arch demonstrates no significant stenosis at the major branch vessel origins.    There is a focal thrombus in the right subclavian artery extending into the right vertebral artery origin with severe stenosis.  Thrombus extending over proximally 1 cm.  The V1 and V2 segments of right vertebral artery are otherwise normal in caliber of slightly decreased in attenuation in comparison to the other cervical vasculature.  Abrupt occlusion of distal right vertebral artery about the level of the PICA origin approximate 2 cm segment of non enhancement.    Moderate focal stenosis at the left vertebral artery origin.  Left vertebral artery is  otherwise normal in caliber.  The basilar artery is normal in caliber.    The right common carotid artery is normal in caliber.  No significant stenosis at the carotid bifurcation.The right internal carotid artery is normal in caliber.    The left common carotid artery is normal in caliber. No significant stenosis at the carotid bifurcation.The left internal carotid artery is normal in caliber.    Abrupt non enhancement in the P1 segment of the left PCA.  The proximal right PCA appears within normal limits.    The proximal MCAs and ACAs are unremarkable.    Findings were immediately discussed upon identification via telephone with the stroke service ([place]) at approximately 09:20.                                        CT Cervical Spine Without Contrast (Final result)  Result time 11/06/24 09:33:04      Final result by Geo Ambrocio MD (11/06/24 09:33:04)                   Impression:      Multifocal acute posterior circulation distribution infarcts as further discussed above, particularly involving essentially the entirety of the left PCA territory.  Modest mass effect without significant hemorrhage.    Focal thrombus with severe stenosis extending from the right subclavian artery into the proximal right vertebral artery.    Focal occlusion of the proximal intracranial segment of the right vertebral artery.    Focal occlusion of the proximal left PCA.    Moderate focal stenosis at the left vertebral artery origin.    Anterior circulation appears unremarkable.    No evidence of fracture or traumatic malalignment in the cervical spine.    Emphysematous changes and patchy ground-glass opacity in the partially visualized lung apices.    This report was flagged in Epic as abnormal.      Electronically signed by: Geo Ambrocio MD  Date:    11/06/2024  Time:    09:33               Narrative:    EXAMINATION:  CTA STROKE MULTI-PHASE; CT CERVICAL SPINE WITHOUT CONTRAST    CLINICAL HISTORY:  Neuro deficit,  acute, stroke suspected;; Neck trauma, intoxicated or obtunded (Age >= 16y);    TECHNIQUE:  Axial CT images obtained throughout the region of the head and cervical spine before the administration of intravenous contrast.    CT angiogram was performed through the cervical and intracranial vasculature during the IV bolus administration of 75mL of Omnipaque 350.  Two additional phases through the intracranial vasculature via multiphase technique.    Multiplanar MPR and MIP reformats were performed.    CT source data was analyzed using artificial intelligence software for detection of a large vessel occlusions (LVO) in order to enable computer assisted triage notification and aid clinical stroke decision making.    COMPARISON:  CTA head and cervical spine 10/25/2015    FINDINGS:  Large region of hypoattenuation loss of gray-white differentiation in left occipital lobe in medial temporal lobe in keeping with an acute PCA distribution infarct.  Modest localized mass effect with some sulcal crowding.  Additional involvement of the left thalamus.  Similar appearance in the medial right occipital lobe, but smaller area of infarction.  Small left superior cerebellar infarct, also likely acute.  No evidence of recent or remote major vascular distribution infarct in the anterior circulation.  No acute parenchymal hemorrhage.  No midline shift.    The ventricles are normal in size without evidence of hydrocephalus.    No extra-axial blood or fluid collections.    The cranium appears intact.    Mastoid air cells and paranasal sinuses are essentially clear.    The vertebral bodies are normal in height and morphology without evidence of an acute displaced fracture or focal osseous destructive process.    Normal sagittal alignment is preserved.  No spondylolisthesis.    Intervertebral disc heights are well maintained.  No evidence of a bony spinal canal stenosis.    Evaluation intraspinal contents demonstrates no evidence of mass or  hematoma.    No acute abnormalities in the paraspinal soft tissue structures.    Emphysematous changes patchy ground-glass opacity in the partially visualized lung apices.      CTA:    The aortic arch demonstrates no significant stenosis at the major branch vessel origins.    There is a focal thrombus in the right subclavian artery extending into the right vertebral artery origin with severe stenosis.  Thrombus extending over proximally 1 cm.  The V1 and V2 segments of right vertebral artery are otherwise normal in caliber of slightly decreased in attenuation in comparison to the other cervical vasculature.  Abrupt occlusion of distal right vertebral artery about the level of the PICA origin approximate 2 cm segment of non enhancement.    Moderate focal stenosis at the left vertebral artery origin.  Left vertebral artery is otherwise normal in caliber.  The basilar artery is normal in caliber.    The right common carotid artery is normal in caliber.  No significant stenosis at the carotid bifurcation.The right internal carotid artery is normal in caliber.    The left common carotid artery is normal in caliber. No significant stenosis at the carotid bifurcation.The left internal carotid artery is normal in caliber.    Abrupt non enhancement in the P1 segment of the left PCA.  The proximal right PCA appears within normal limits.    The proximal MCAs and ACAs are unremarkable.    Findings were immediately discussed upon identification via telephone with the stroke service ([place]) at approximately 09:20.

## 2024-11-20 LAB
ALBUMIN SERPL BCP-MCNC: 3.1 G/DL (ref 3.5–5.2)
ALP SERPL-CCNC: 90 U/L (ref 40–150)
ALT SERPL W/O P-5'-P-CCNC: 50 U/L (ref 10–44)
ANION GAP SERPL CALC-SCNC: 9 MMOL/L (ref 8–16)
AST SERPL-CCNC: 32 U/L (ref 10–40)
BASOPHILS # BLD AUTO: 0.12 K/UL (ref 0–0.2)
BASOPHILS NFR BLD: 0.7 % (ref 0–1.9)
BILIRUB SERPL-MCNC: 0.3 MG/DL (ref 0.1–1)
BUN SERPL-MCNC: 14 MG/DL (ref 6–20)
CALCIUM SERPL-MCNC: 9.9 MG/DL (ref 8.7–10.5)
CHLORIDE SERPL-SCNC: 104 MMOL/L (ref 95–110)
CO2 SERPL-SCNC: 21 MMOL/L (ref 23–29)
CREAT SERPL-MCNC: 0.6 MG/DL (ref 0.5–1.4)
DIFFERENTIAL METHOD BLD: ABNORMAL
EOSINOPHIL # BLD AUTO: 0.3 K/UL (ref 0–0.5)
EOSINOPHIL NFR BLD: 2 % (ref 0–8)
ERYTHROCYTE [DISTWIDTH] IN BLOOD BY AUTOMATED COUNT: 14.4 % (ref 11.5–14.5)
EST. GFR  (NO RACE VARIABLE): >60 ML/MIN/1.73 M^2
GLUCOSE SERPL-MCNC: 123 MG/DL (ref 70–110)
HCG INTACT+B SERPL-ACNC: <2.4 MIU/ML
HCT VFR BLD AUTO: 37.5 % (ref 37–48.5)
HCV RNA SERPL QL NAA+PROBE: NOT DETECTED
HCV RNA SPEC NAA+PROBE-ACNC: NOT DETECTED IU/ML
HGB BLD-MCNC: 12.4 G/DL (ref 12–16)
IMM GRANULOCYTES # BLD AUTO: 0.23 K/UL (ref 0–0.04)
IMM GRANULOCYTES NFR BLD AUTO: 1.4 % (ref 0–0.5)
LYMPHOCYTES # BLD AUTO: 2.1 K/UL (ref 1–4.8)
LYMPHOCYTES NFR BLD: 12.6 % (ref 18–48)
MAGNESIUM SERPL-MCNC: 2.3 MG/DL (ref 1.6–2.6)
MCH RBC QN AUTO: 29.4 PG (ref 27–31)
MCHC RBC AUTO-ENTMCNC: 33.1 G/DL (ref 32–36)
MCV RBC AUTO: 89 FL (ref 82–98)
MONOCYTES # BLD AUTO: 1 K/UL (ref 0.3–1)
MONOCYTES NFR BLD: 6.1 % (ref 4–15)
NEUTROPHILS # BLD AUTO: 12.6 K/UL (ref 1.8–7.7)
NEUTROPHILS NFR BLD: 77.2 % (ref 38–73)
NRBC BLD-RTO: 0 /100 WBC
OHS QRS DURATION: 70 MS
OHS QTC CALCULATION: 498 MS
PHOSPHATE SERPL-MCNC: 2.8 MG/DL (ref 2.7–4.5)
PLATELET # BLD AUTO: 493 K/UL (ref 150–450)
PMV BLD AUTO: 10.5 FL (ref 9.2–12.9)
POCT GLUCOSE: 115 MG/DL (ref 70–110)
POCT GLUCOSE: 96 MG/DL (ref 70–110)
POTASSIUM SERPL-SCNC: 3.7 MMOL/L (ref 3.5–5.1)
PROT SERPL-MCNC: 7.4 G/DL (ref 6–8.4)
RBC # BLD AUTO: 4.22 M/UL (ref 4–5.4)
SODIUM SERPL-SCNC: 134 MMOL/L (ref 136–145)
WBC # BLD AUTO: 16.36 K/UL (ref 3.9–12.7)

## 2024-11-20 PROCEDURE — 94761 N-INVAS EAR/PLS OXIMETRY MLT: CPT

## 2024-11-20 PROCEDURE — 25500020 PHARM REV CODE 255: Performed by: PHYSICIAN ASSISTANT

## 2024-11-20 PROCEDURE — 25000003 PHARM REV CODE 250: Performed by: STUDENT IN AN ORGANIZED HEALTH CARE EDUCATION/TRAINING PROGRAM

## 2024-11-20 PROCEDURE — 25000003 PHARM REV CODE 250

## 2024-11-20 PROCEDURE — 99233 SBSQ HOSP IP/OBS HIGH 50: CPT | Mod: ,,, | Performed by: STUDENT IN AN ORGANIZED HEALTH CARE EDUCATION/TRAINING PROGRAM

## 2024-11-20 PROCEDURE — 63600175 PHARM REV CODE 636 W HCPCS: Performed by: STUDENT IN AN ORGANIZED HEALTH CARE EDUCATION/TRAINING PROGRAM

## 2024-11-20 PROCEDURE — 36415 COLL VENOUS BLD VENIPUNCTURE: CPT | Performed by: PHYSICIAN ASSISTANT

## 2024-11-20 PROCEDURE — 63600175 PHARM REV CODE 636 W HCPCS

## 2024-11-20 PROCEDURE — 25000003 PHARM REV CODE 250: Performed by: PHYSICIAN ASSISTANT

## 2024-11-20 PROCEDURE — 83735 ASSAY OF MAGNESIUM: CPT

## 2024-11-20 PROCEDURE — 85025 COMPLETE CBC W/AUTO DIFF WBC: CPT

## 2024-11-20 PROCEDURE — 11000001 HC ACUTE MED/SURG PRIVATE ROOM

## 2024-11-20 PROCEDURE — 97530 THERAPEUTIC ACTIVITIES: CPT

## 2024-11-20 PROCEDURE — 84100 ASSAY OF PHOSPHORUS: CPT

## 2024-11-20 PROCEDURE — 25500020 PHARM REV CODE 255: Performed by: STUDENT IN AN ORGANIZED HEALTH CARE EDUCATION/TRAINING PROGRAM

## 2024-11-20 PROCEDURE — 94668 MNPJ CHEST WALL SBSQ: CPT

## 2024-11-20 PROCEDURE — A9698 NON-RAD CONTRAST MATERIALNOC: HCPCS | Performed by: PHYSICIAN ASSISTANT

## 2024-11-20 PROCEDURE — 93005 ELECTROCARDIOGRAM TRACING: CPT

## 2024-11-20 PROCEDURE — 80053 COMPREHEN METABOLIC PANEL: CPT

## 2024-11-20 PROCEDURE — 87633 RESP VIRUS 12-25 TARGETS: CPT | Performed by: STUDENT IN AN ORGANIZED HEALTH CARE EDUCATION/TRAINING PROGRAM

## 2024-11-20 PROCEDURE — 93010 ELECTROCARDIOGRAM REPORT: CPT | Mod: ,,, | Performed by: STUDENT IN AN ORGANIZED HEALTH CARE EDUCATION/TRAINING PROGRAM

## 2024-11-20 PROCEDURE — 97112 NEUROMUSCULAR REEDUCATION: CPT

## 2024-11-20 PROCEDURE — 84702 CHORIONIC GONADOTROPIN TEST: CPT | Performed by: PHYSICIAN ASSISTANT

## 2024-11-20 PROCEDURE — 36415 COLL VENOUS BLD VENIPUNCTURE: CPT

## 2024-11-20 RX ORDER — MIDAZOLAM HYDROCHLORIDE 1 MG/ML
INJECTION, SOLUTION INTRAMUSCULAR; INTRAVENOUS
Status: COMPLETED | OUTPATIENT
Start: 2024-11-20 | End: 2024-11-20

## 2024-11-20 RX ORDER — FENTANYL CITRATE 50 UG/ML
INJECTION, SOLUTION INTRAMUSCULAR; INTRAVENOUS
Status: COMPLETED | OUTPATIENT
Start: 2024-11-20 | End: 2024-11-20

## 2024-11-20 RX ORDER — CEFAZOLIN SODIUM 1 G/3ML
INJECTION, POWDER, FOR SOLUTION INTRAMUSCULAR; INTRAVENOUS
Status: COMPLETED | OUTPATIENT
Start: 2024-11-20 | End: 2024-11-20

## 2024-11-20 RX ORDER — LIDOCAINE HYDROCHLORIDE 10 MG/ML
INJECTION, SOLUTION INFILTRATION; PERINEURAL
Status: COMPLETED | OUTPATIENT
Start: 2024-11-20 | End: 2024-11-20

## 2024-11-20 RX ORDER — GLUCAGON 1 MG
KIT INJECTION
Status: COMPLETED | OUTPATIENT
Start: 2024-11-20 | End: 2024-11-20

## 2024-11-20 RX ADMIN — VANCOMYCIN HYDROCHLORIDE 1250 MG: 1.25 INJECTION, POWDER, LYOPHILIZED, FOR SOLUTION INTRAVENOUS at 08:11

## 2024-11-20 RX ADMIN — IOHEXOL 20 ML: 300 INJECTION, SOLUTION INTRAVENOUS at 02:11

## 2024-11-20 RX ADMIN — CARVEDILOL 6.25 MG: 6.25 TABLET, FILM COATED ORAL at 08:11

## 2024-11-20 RX ADMIN — ASPIRIN 81 MG CHEWABLE TABLET 81 MG: 81 TABLET CHEWABLE at 09:11

## 2024-11-20 RX ADMIN — QUETIAPINE FUMARATE 50 MG: 25 TABLET ORAL at 09:11

## 2024-11-20 RX ADMIN — ATORVASTATIN CALCIUM 40 MG: 40 TABLET, FILM COATED ORAL at 09:11

## 2024-11-20 RX ADMIN — THERA TABS 1 TABLET: TAB at 09:11

## 2024-11-20 RX ADMIN — FLUOXETINE HYDROCHLORIDE 60 MG: 20 SOLUTION ORAL at 09:11

## 2024-11-20 RX ADMIN — ENOXAPARIN SODIUM 40 MG: 40 INJECTION SUBCUTANEOUS at 05:11

## 2024-11-20 RX ADMIN — MUSCLE RUB CREAM: 100; 150 CREAM TOPICAL at 08:11

## 2024-11-20 RX ADMIN — CEFEPIME 2 G: 2 INJECTION, POWDER, FOR SOLUTION INTRAVENOUS at 09:11

## 2024-11-20 RX ADMIN — VANCOMYCIN HYDROCHLORIDE 1250 MG: 1.25 INJECTION, POWDER, LYOPHILIZED, FOR SOLUTION INTRAVENOUS at 02:11

## 2024-11-20 RX ADMIN — FENTANYL CITRATE 50 MCG: 50 INJECTION, SOLUTION INTRAMUSCULAR; INTRAVENOUS at 01:11

## 2024-11-20 RX ADMIN — Medication 6 MG: at 08:11

## 2024-11-20 RX ADMIN — SENNOSIDES AND DOCUSATE SODIUM 1 TABLET: 50; 8.6 TABLET ORAL at 09:11

## 2024-11-20 RX ADMIN — MIDAZOLAM HYDROCHLORIDE 1 MG: 2 INJECTION, SOLUTION INTRAMUSCULAR; INTRAVENOUS at 01:11

## 2024-11-20 RX ADMIN — GLUCAGON 1 MG: 1 INJECTION, POWDER, LYOPHILIZED, FOR SOLUTION INTRAMUSCULAR; INTRAVENOUS at 01:11

## 2024-11-20 RX ADMIN — CLOPIDOGREL BISULFATE 75 MG: 75 TABLET ORAL at 09:11

## 2024-11-20 RX ADMIN — LITHIUM 300 MG: 8 SOLUTION ORAL at 09:11

## 2024-11-20 RX ADMIN — FOLIC ACID 1 MG: 1 TABLET ORAL at 09:11

## 2024-11-20 RX ADMIN — MUSCLE RUB CREAM: 100; 150 CREAM TOPICAL at 09:11

## 2024-11-20 RX ADMIN — Medication 100 MG: at 09:11

## 2024-11-20 RX ADMIN — CEFEPIME 2 G: 2 INJECTION, POWDER, FOR SOLUTION INTRAVENOUS at 05:11

## 2024-11-20 RX ADMIN — LIDOCAINE HYDROCHLORIDE 8 ML: 10 INJECTION, SOLUTION INFILTRATION; PERINEURAL at 01:11

## 2024-11-20 RX ADMIN — CARVEDILOL 6.25 MG: 6.25 TABLET, FILM COATED ORAL at 09:11

## 2024-11-20 RX ADMIN — CEFAZOLIN 1 G: 330 INJECTION, POWDER, FOR SOLUTION INTRAMUSCULAR; INTRAVENOUS at 01:11

## 2024-11-20 RX ADMIN — MIRTAZAPINE 7.5 MG: 7.5 TABLET, FILM COATED ORAL at 08:11

## 2024-11-20 RX ADMIN — LITHIUM 300 MG: 8 SOLUTION ORAL at 08:11

## 2024-11-20 RX ADMIN — QUETIAPINE FUMARATE 100 MG: 100 TABLET ORAL at 09:11

## 2024-11-20 RX ADMIN — CEFEPIME 2 G: 2 INJECTION, POWDER, FOR SOLUTION INTRAVENOUS at 12:11

## 2024-11-20 NOTE — PT/OT/SLP PROGRESS
Occupational Therapy   Treatment    Name: Malathi Mcpherson  MRN: [id number]  Admitting Diagnosis:  Acute arterial ischemic stroke, multifocal, posterior circulation       Recommendations:     Discharge Recommendations: High Intensity Therapy  Discharge Equipment Recommendations:  bath bench, bedside commode, wheelchair  Barriers to discharge:  None    Assessment:     Malathi Mcpherson is a 49 y.o. female with a medical diagnosis of Acute arterial ischemic stroke, multifocal, posterior circulation.  She presents with performance deficits affecting function are impaired functional mobility, impaired self care skills, impaired balance, decreased upper extremity function, impaired endurance, decreased coordination, impaired cognition.     Rehab Prognosis:  Good; patient would benefit from acute skilled OT services to address these deficits and reach maximum level of function.       Plan:     Patient to be seen 4 x/week to address the above listed problems via self-care/home management, neuromuscular re-education, cognitive retraining, therapeutic exercises, therapeutic activities  Plan of Care Expires: 12/05/24  Plan of Care Reviewed with: patient    Subjective     Patient:  Nonverbal during session  MD reported patient scheduled for PEG today.  Pain/Comfort:  Pain Rating 1: 0/10  Pain Rating Post-Intervention 1: 0/10    Objective:     Communicated with: Nurse and MD prior to session.  Patient found supine with bed alarm, telemetry, restraints, SCD, NG tube, PureWick, blood pressure cuff ([employer] camera monitoring) upon OT entry to room.    General Precautions: Standard, aspiration, fall, NPO    Orthopedic Precautions:N/A  Braces: N/A  Respiratory Status: Room air     Occupational Performance:     Bed Mobility:    Patient completed Rolling/Turning to Left with  maximal assistance  Patient completed Rolling/Turning to Right with maximal assistance  Patient completed Supine to Sit with total assistance  Patient completed  Sit to Supine with total assistance     Activities of Daily Living:  Grooming: total assistance while seated EOB    [facility] 6 Click ADL: 11    Treatment & Education:  Patient arousable but unable to sustain attention.  Daily orientation provided.  Responded with head nods    Patient left supine with all lines intact, call button in reach, and bed alarm on    GOALS:   Multidisciplinary Problems       Occupational Therapy Goals          Problem: Occupational Therapy    Goal Priority Disciplines Outcome Interventions   Occupational Therapy Goal     OT, PT/OT Progressing    Description: Goals set 11/11 with an expiration date, 12/5:  Patient will increase functional independence with ADLs by performing:    Patient will demonstrate rolling to the right with min assist.  Not met   Patient will demonstrate rolling to the left with min assist.   Not met  Patient will demonstrate supine -sit with min assist.   Not met  Patient will demonstrate squat pivot transfers with min assist.   Not met  Patient will demonstrate grooming while seated with min assist.   Not met  Patient will demonstrate upper body dressing with min assist while seated EOB.   Not met  Patient will demonstrate lower body dressing with mod assist while seated EOB.   Not met  Patient will demonstrate toileting with mod assist.   Not met  Patient's family / caregiver will demonstrate independence and safety with assisting patient with self-care skills and functional mobility.     Not met  Patient's family / caregiver will demonstrate independence with providing ROM and changes in bed positioning.   Not met                                 Time Tracking:     OT Date of Treatment: 11/20/24  OT Start Time: 0752  OT Stop Time: 0803  OT Total Time (min): 11 min    Billable Minutes:Neuromuscular Re-education 11    OT/MADHURI: OT          11/20/2024

## 2024-11-20 NOTE — PROCEDURES
"  Pre Op Diagnosis: dysphagia  Post Op Diagnosis: Same    Procedure: Gtube placement    Procedure performed by: John    Written Informed Consent Obtained: Yes  Specimen Removed: NO  Estimated Blood Loss: Minimal    Findings:   Successful placement of gtube. Ready for use 12 hours post op.    Patient tolerated procedure well.    Raúl Lopez MD ([place])  Interventional Radiology  [phone]      "

## 2024-11-20 NOTE — PROGRESS NOTES
Pt moving in  bed  and groaning  out loud  several attempts to reposition pt  and  straighten lined with no  long term success / pt  trashes around groaning / otherwise NAD and no  acute distress noted / waiting  on  transport  to  floor / pt  arrived from IR  with bi-lat  mittens  in place  and not  tied to bed frame

## 2024-11-20 NOTE — PROGRESS NOTES
"CONSULTATION LIAISON PSYCHIATRY PROGRESS NOTE     Patient Name: Malathi Mcpherson  MRN: [id number]  Patient Class: IP- Inpatient  Admission Date: 11/6/2024  Attending Physician: Emily Recinos MD        SUBJECTIVE:   Malathi Mcpherson is a 49 y.o. female with past psychiatric history of polysubstance use disorder (crack cocaine, heroin, alcohol) and bipolar disorder & past pertinent medical history of seizure, HTN, and HLD presents to the ED/admitted to the hospital for large BL CVA. Patient most recently in drug rehab court prior to hospitalization and relapsed on crack cocaine in the week prior to admission. She is admitted to hospital for further evaluation and management of multifocal acute infarcts.      Recently evaluated by psychiatry on 11/14/24 for her stimulant use disorder and history of bipolar disorder. Psychiatry re-consulted for "Worsening agitation, extensive psych hx (bipolar?) on lithium, need med recs for agitation."      Today, patient is sitting up in bed with no family at bedside. Patient is fidgety and grunts and mumbles in response to questions. She does not appear to be in acute distress. When asked if she knows her name she nods her head no. When asked if she is at the hospital she nods her head yes.         OBJECTIVE:     Mental Status Exam:  General Appearance: well-nourished, dressed in hospital garb, disheveled, NG tube to R nare  Behavior: unable to participate, minimal responses, agitated, restless and fidgety, poor eye contact   Involuntary Movements and Motor Activity: no abnormal involuntary movements noted; no tics, no tremors, no akathisia, no dystonia, no evidence of tardive dyskinesia; no psychomotor agitation or retardation  Gait and Station: unable to assess - patient lying down or seated  Speech and Language: mumbling, responds to questions minimally/briefly, dysarthric   Mood: "okay"  Affect: irritable  Thought Process and Associations: Unable to Assess  Perceptual " Disturbances:  BETTYE  Thought Content and Perceptions:: Unable to Assess  Sensorium and Orientation:  Oriented to place only  Recent and Remote Memory: Unable to  Formally Assess  Attention and Concentration: Unable to Formally Assess  Fund of Knowledge: Unable to Formally Assess  Insight:  BETTYE due to patient's mental state  Judgment:  BETTYE due to patient's mental state     CAM ICU positive? Did not assess        ASSESSMENT & RECOMMENDATIONS   Delirium due to multiple etiologies [multiple acute infarcts, debility, pneumonia ] (F05)  Cocaine use disorder, severe  Hx of Bipolar Disorder         Scheduled Medication(s):  Continue lithium 300 mg TID, Prozac 60 mg daily, Seroquel 50 mg in the AM and 100 mg in the PM, Remeron 7.5 mg nightly         PRN Medication(s):  PRN Seroquel 25 mg BID for non-redirectable agitation         Delirium Behavior Management  PLEASE utilize PRN meds first for agitation. Minimize use of PHYSICAL restraints OR have periods of being out of physical restraints if possible.  Keep window shades open and room lit during day and room dim at night in order to promote normal sleep-wake cycles  Encourage family at bedside. [place] patient often to situation, location, date.  Continue to Limit or Discontinue use of Narcotics, Benzos and Anti-cholinergic medications as they may worsen delirium.  Continue medical workup for causative etiology of Delirium.      Risk Assessment / Legal Status  Patient does not meet criteria for PEC or involuntary inpatient psychiatric admission at this time. Recommend to rescind PEC if one was placed. Patient is not currently an imminent danger to self or others and is not gravely disabled due to a psychiatric illness.     Follow-up:  Will sign off. Final recommendations as stated above. Patient can follow-up with outpatient psychiatry. If the patient does not have an outpatient psychiatrist, resources for outpatient clinics will be provided in patient's discharge  instructions.      Disposition:   Defer to primary team.     Please contact ON CALL psychiatry service (24/7) for any acute issues that may arise.     Christiana Adams, Student Doctor  Patient interviewed and examined by myself and medical student. Note originally drafted by medical student and submitted with my own revisions and additions. Note as submitted is true to my independent evaluation, examination, and plan for this patient.    William Sistrunk, MD PGY-2  CL Psychiatry  [facility]  11/20/2024 8:24 AM

## 2024-11-20 NOTE — PLAN OF CARE
Problem: Adult Inpatient Plan of Care  Goal: Plan of Care Review  Outcome: Progressing  Goal: Optimal Comfort and Wellbeing  Outcome: Progressing     Problem: Wound  Goal: Optimal Coping  Outcome: Progressing     Problem: Fall Injury Risk  Goal: Absence of Fall and Fall-Related Injury  Outcome: Progressing   POC reviewed and updated with the patient. Questions regarding POC were encouraged and addressed with the patient.JOSHUA. Patient is AO X 1 at this time. NPO at MN for peg tube placement. Fall/safety precautions maintained, no signs of injury noted during shift. Upon exiting room, patient's bed locked in low position, side rails up x 3, bed alarm set, with call light within reach. Bilateral soft wrist restraints in place.  No acute signs of distress noted at this time.

## 2024-11-20 NOTE — MEDICAL/APP STUDENT
"CONSULTATION LIAISON PSYCHIATRY PROGRESS NOTE    Patient Name: Malathi Mcpherson  MRN: [id number]  Patient Class: IP- Inpatient  Admission Date: 11/6/2024  Attending Physician: Emily Recinos MD      SUBJECTIVE:   Malathi Mcpherson is a 49 y.o. female with past psychiatric history of polysubstance use disorder (crack cocaine, heroin, alcohol) and bipolar disorder & past pertinent medical history of seizure, HTN, and HLD presents to the ED/admitted to the hospital for large BL CVA. Patient most recently in drug rehab court prior to hospitalization and relapsed on crack cocaine in the week prior to admission. She is admitted to hospital for further evaluation and management of multifocal acute infarcts.      Recently evaluated by psychiatry on 11/14/24 for her stimulant use disorder and history of bipolar disorder. Psychiatry re-consulted for "Worsening agitation, extensive psych hx (bipolar?) on lithium, need med recs for agitation."     Today, patient is sitting up in bed with no family at bedside. Patient is fidgety and grunts and mumbles in response to questions. She does not appear to be in acute distress. When asked if she knows her name she nods her head no. When asked if she is at the hospital she nods her head yes.        OBJECTIVE:    Mental Status Exam:  General Appearance: well-nourished, dressed in hospital garb, disheveled, NG tube to R nare  Behavior: unable to participate, minimal responses, agitated, restless and fidgety, poor eye contact   Involuntary Movements and Motor Activity: no abnormal involuntary movements noted; no tics, no tremors, no akathisia, no dystonia, no evidence of tardive dyskinesia; no psychomotor agitation or retardation  Gait and Station: unable to assess - patient lying down or seated  Speech and Language: mumbling, responds to questions minimally/briefly, dysarthric   Mood: "okay"  Affect: irritable  Thought Process and Associations: Unable to Assess  Perceptual Disturbances:  " BETTYE  Thought Content and Perceptions:: Unable to Assess  Sensorium and Orientation:  Oriented to place only  Recent and Remote Memory: Unable to  Formally Assess  Attention and Concentration: Unable to Formally Assess  Fund of Knowledge: Unable to Formally Assess  Insight:  BETTYE due to patient's mental state  Judgment:  BETTYE due to patient's mental state    CAM ICU positive? Did not assess      ASSESSMENT & RECOMMENDATIONS   Delirium due to multiple etiologies [multiple acute infarcts, debility, pneumonia ] (F05)  Cocaine use disorder, severe  Hx of Bipolar Disorder       Scheduled Medication(s):  Continue lithium 300 mg TID, Prozac 60 mg daily, Seroquel 50 mg in the AM and 100 mg in the PM, Remeron 7.5 mg nightly       PRN Medication(s):  PRN Seroquel 25 mg BID for non-redirectable agitation       Delirium Behavior Management  PLEASE utilize PRN meds first for agitation. Minimize use of PHYSICAL restraints OR have periods of being out of physical restraints if possible.  Keep window shades open and room lit during day and room dim at night in order to promote normal sleep-wake cycles  Encourage family at bedside. Barlow patient often to situation, location, date.  Continue to Limit or Discontinue use of Narcotics, Benzos and Anti-cholinergic medications as they may worsen delirium.  Continue medical workup for causative etiology of Delirium.     Risk Assessment / Legal Status  Patient does not meet criteria for PEC or involuntary inpatient psychiatric admission at this time. Recommend to rescind PEC if one was placed. Patient is not currently an imminent danger to self or others and is not gravely disabled due to a psychiatric illness.    Follow-up:  Will sign off. Final recommendations as stated above. Patient can follow-up with outpatient psychiatry. If the patient does not have an outpatient psychiatrist, resources for outpatient clinics will be provided in patient's discharge instructions.     Disposition:    Defer to primary team.    Please contact ON CALL psychiatry service (24/7) for any acute issues that may arise.    Christiana Adams, Student Doctor  CL Psychiatry  [facility]  11/20/2024 8:24 AM        --------------------------------------------------------------------------------------------------------------------------------------------------------------------------------------------------------------------------------------    CONTINUED OBJECTIVE clinical data & findings reviewed and noted for above decision making    Current Medications:   Scheduled Meds:    aspirin  81 mg Per NG tube Daily    atorvastatin  40 mg Per NG tube Daily    carvediloL  6.25 mg Per NG tube BID    ceFEPime IV (PEDS and ADULTS)  2 g Intravenous Q8H    clopidogreL  75 mg Per NG tube Daily    enoxparin  40 mg Subcutaneous Q24H (prophylaxis, 1700)    FLUoxetine  60 mg Per NG tube Daily    folic acid  1 mg Per NG tube Daily    LIDOcaine  1 patch Transdermal Q24H    lithium citrate 8 meq/5 ml  300 mg Per NG tube TID    melatonin  6 mg Per NG tube Nightly    methyl salicylate-menthol 15-10%   Topical (Top) TID    mirtazapine  7.5 mg Per NG tube QHS    multivitamin  1 tablet Per NG tube Daily    QUEtiapine  100 mg Per NG tube QHS    QUEtiapine  50 mg Per NG tube Daily    senna-docusate 8.6-50 mg  1 tablet Per NG tube Daily    thiamine  100 mg Per NG tube Daily    vancomycin (VANCOCIN) IV (PEDS and ADULTS)  1,250 mg Intravenous Q12H     PRN Meds:   Current Facility-Administered Medications:     acetaminophen, 1,000 mg, Per NG tube, BID PRN    albuterol-ipratropium, 3 mL, Nebulization, Q6H PRN    polyethylene glycol, 17 g, Per NG tube, BID PRN    QUEtiapine, 25 mg, Per NG tube, BID PRN    sodium chloride 0.9%, 10 mL, Intravenous, PRN    Pharmacy to dose Vancomycin consult, , , Once **AND** vancomycin - pharmacy to dose, , Intravenous, pharmacy to manage frequency    Allergies:   Review of patient's allergies indicates:  No  Known Allergies    Vitals  Vitals:    11/20/24 0410   BP: (!) 143/92   Pulse: 80   Resp: 18   Temp: 97.6 °F (36.4 °C)       Labs/Imaging/Studies:  Recent Results (from the past 24 hours)   Lithium level    Collection Time: 11/19/24 10:25 AM   Result Value Ref Range    Lithium Level 0.4 (L) 0.6 - 1.2 mmol/L   Comprehensive metabolic panel    Collection Time: 11/20/24  5:04 AM   Result Value Ref Range    Sodium 134 (L) 136 - 145 mmol/L    Potassium 3.7 3.5 - 5.1 mmol/L    Chloride 104 95 - 110 mmol/L    CO2 21 (L) 23 - 29 mmol/L    Glucose 123 (H) 70 - 110 mg/dL    BUN 14 6 - 20 mg/dL    Creatinine 0.6 0.5 - 1.4 mg/dL    Calcium 9.9 8.7 - 10.5 mg/dL    Total Protein 7.4 6.0 - 8.4 g/dL    Albumin 3.1 (L) 3.5 - 5.2 g/dL    Total Bilirubin 0.3 0.1 - 1.0 mg/dL    Alkaline Phosphatase 90 40 - 150 U/L    AST 32 10 - 40 U/L    ALT 50 (H) 10 - 44 U/L    eGFR >60.0 >60 mL/min/1.73 m^2    Anion Gap 9 8 - 16 mmol/L   Magnesium    Collection Time: 11/20/24  5:04 AM   Result Value Ref Range    Magnesium 2.3 1.6 - 2.6 mg/dL   Phosphorus    Collection Time: 11/20/24  5:04 AM   Result Value Ref Range    Phosphorus 2.8 2.7 - 4.5 mg/dL   CBC auto differential    Collection Time: 11/20/24  5:04 AM   Result Value Ref Range    WBC 16.36 (H) 3.90 - 12.70 K/uL    RBC 4.22 4.00 - 5.40 M/uL    Hemoglobin 12.4 12.0 - 16.0 g/dL    Hematocrit 37.5 37.0 - 48.5 %    MCV 89 82 - 98 fL    MCH 29.4 27.0 - 31.0 pg    MCHC 33.1 32.0 - 36.0 g/dL    RDW 14.4 11.5 - 14.5 %    Platelets 493 (H) 150 - 450 K/uL    MPV 10.5 9.2 - 12.9 fL    Immature Granulocytes 1.4 (H) 0.0 - 0.5 %    Gran # (ANC) 12.6 (H) 1.8 - 7.7 K/uL    Immature Grans (Abs) 0.23 (H) 0.00 - 0.04 K/uL    Lymph # 2.1 1.0 - 4.8 K/uL    Mono # 1.0 0.3 - 1.0 K/uL    Eos # 0.3 0.0 - 0.5 K/uL    Baso # 0.12 0.00 - 0.20 K/uL    nRBC 0 0 /100 WBC    Gran % 77.2 (H) 38.0 - 73.0 %    Lymph % 12.6 (L) 18.0 - 48.0 %    Mono % 6.1 4.0 - 15.0 %    Eosinophil % 2.0 0.0 - 8.0 %    Basophil % 0.7 0.0 - 1.9  %    Differential Method Automated      Imaging Results              MRI Brain Without Contrast (Final result)  Result time 11/06/24 17:40:17      Final result by Adrián Hobbs MD (11/06/24 17:40:17)                   Impression:      Multiple posterior circulation infarcts are identified with involvement of the left greater than right occipital lobes and medial left temporal lobe, thalamus, right dorsal medulla, and minimally involving the cerebellum.  No midline shift with minimal mass effect on the 3rd ventricle.    Loss of flow void within the right intracranial vertebral artery in keeping with the findings on CTA.      Electronically signed by: Adrián Hobbs  Date:    11/06/2024  Time:    17:40               Narrative:    EXAMINATION:  MRI BRAIN WITHOUT CONTRAST    CLINICAL HISTORY:  Stroke, follow up;    TECHNIQUE:  Multiplanar multisequence MR imaging of the brain was performed without contrast.    COMPARISON:  CT 11/06/2024    FINDINGS:  Multiple areas of restricted diffusion consistent with acute infarcts within the posterior circulation are identified.  There is involvement of the left greater than right occipital lobes and left medial temporal lobe (PCA territory), bilateral thalamus, left hippocampus and right hippocampal tail, right dorsal medulla, and tiny bilateral cerebellar infarcts.  Trace petechial hemorrhage left medial temporal lobe.  No overt hemorrhagic conversion.    No midline shift or herniation with minimal mass effect on the 3rd ventricle.    No hydrocephalus.    Loss of the intracranial right vertebral artery flow void in keeping with the findings on the recent CTA.                                       XR NG/OG tube placement check, non-radiologist performed (Final result)  Result time 11/06/24 10:25:49      Final result by Segundo Sheth MD (11/06/24 10:25:49)                   Impression:      Please see above.      Electronically signed by: Segundo  Domenic  Date:    11/06/2024  Time:    10:25               Narrative:    EXAMINATION:  XR NG/OG TUBE PLACEMENT CHECK, NON-RADIOLOGIST PERFORMED    CLINICAL HISTORY:  placement;    TECHNIQUE:  Single overhead image of the abdomen was obtained.    COMPARISON:  Radiograph abdomen 01/19/2021    FINDINGS:  Enteric tube is visualized with tip just distal to the gastroesophageal junction.  However, side port is proximal to the expected region of the gastroesophageal junction.  Correlation and advancement advised.    Cardiomediastinal silhouette and lungs are unchanged.    Nonspecific, nonobstructive bowel gas pattern.  No free intra-abdominal air.  No pneumatosis.    Osseous structures demonstrate no evidence for acute fracture or osseous destructive lesion.    Soft tissues are unremarkable.                                       X-Ray Chest AP Portable (Final result)  Result time 11/06/24 10:26:26      Final result by Kervin Castaneda MD (11/06/24 10:26:26)                   Impression:      Further advancement of the NG tube recommended      Electronically signed by: Kervin Castaneda MD  Date:    11/06/2024  Time:    10:26               Narrative:    EXAMINATION:  XR CHEST AP PORTABLE    CLINICAL HISTORY:  Hypoxemia    TECHNIQUE:  Single frontal view of the chest was performed.    COMPARISON:  No 06/26/2024 ne    FINDINGS:  ET tube at T4 level.  NG tube in the lower esophagus and further advancement recommended.  Heart size normal.  There is slight degree of diffuse accentuation interstitial markings.  No significant airspace consolidation or pleural effusion identified.                                        CTA STROKE MULTI-PHASE (Final result)  Result time 11/06/24 09:33:04      Final result by Geo Ambrocio MD (11/06/24 09:33:04)                   Impression:      Multifocal acute posterior circulation distribution infarcts as further discussed above, particularly involving essentially the entirety of the left PCA  territory.  Modest mass effect without significant hemorrhage.    Focal thrombus with severe stenosis extending from the right subclavian artery into the proximal right vertebral artery.    Focal occlusion of the proximal intracranial segment of the right vertebral artery.    Focal occlusion of the proximal left PCA.    Moderate focal stenosis at the left vertebral artery origin.    Anterior circulation appears unremarkable.    No evidence of fracture or traumatic malalignment in the cervical spine.    Emphysematous changes and patchy ground-glass opacity in the partially visualized lung apices.    This report was flagged in Epic as abnormal.      Electronically signed by: Geo Ambrocio MD  Date:    11/06/2024  Time:    09:33               Narrative:    EXAMINATION:  CTA STROKE MULTI-PHASE; CT CERVICAL SPINE WITHOUT CONTRAST    CLINICAL HISTORY:  Neuro deficit, acute, stroke suspected;; Neck trauma, intoxicated or obtunded (Age >= 16y);    TECHNIQUE:  Axial CT images obtained throughout the region of the head and cervical spine before the administration of intravenous contrast.    CT angiogram was performed through the cervical and intracranial vasculature during the IV bolus administration of 75mL of Omnipaque 350.  Two additional phases through the intracranial vasculature via multiphase technique.    Multiplanar MPR and MIP reformats were performed.    CT source data was analyzed using artificial intelligence software for detection of a large vessel occlusions (LVO) in order to enable computer assisted triage notification and aid clinical stroke decision making.    COMPARISON:  CTA head and cervical spine 10/25/2015    FINDINGS:  Large region of hypoattenuation loss of gray-white differentiation in left occipital lobe in medial temporal lobe in keeping with an acute PCA distribution infarct.  Modest localized mass effect with some sulcal crowding.  Additional involvement of the left thalamus.  Similar appearance  in the medial right occipital lobe, but smaller area of infarction.  Small left superior cerebellar infarct, also likely acute.  No evidence of recent or remote major vascular distribution infarct in the anterior circulation.  No acute parenchymal hemorrhage.  No midline shift.    The ventricles are normal in size without evidence of hydrocephalus.    No extra-axial blood or fluid collections.    The cranium appears intact.    Mastoid air cells and paranasal sinuses are essentially clear.    The vertebral bodies are normal in height and morphology without evidence of an acute displaced fracture or focal osseous destructive process.    Normal sagittal alignment is preserved.  No spondylolisthesis.    Intervertebral disc heights are well maintained.  No evidence of a bony spinal canal stenosis.    Evaluation intraspinal contents demonstrates no evidence of mass or hematoma.    No acute abnormalities in the paraspinal soft tissue structures.    Emphysematous changes patchy ground-glass opacity in the partially visualized lung apices.      CTA:    The aortic arch demonstrates no significant stenosis at the major branch vessel origins.    There is a focal thrombus in the right subclavian artery extending into the right vertebral artery origin with severe stenosis.  Thrombus extending over proximally 1 cm.  The V1 and V2 segments of right vertebral artery are otherwise normal in caliber of slightly decreased in attenuation in comparison to the other cervical vasculature.  Abrupt occlusion of distal right vertebral artery about the level of the PICA origin approximate 2 cm segment of non enhancement.    Moderate focal stenosis at the left vertebral artery origin.  Left vertebral artery is otherwise normal in caliber.  The basilar artery is normal in caliber.    The right common carotid artery is normal in caliber.  No significant stenosis at the carotid bifurcation.The right internal carotid artery is normal in  caliber.    The left common carotid artery is normal in caliber. No significant stenosis at the carotid bifurcation.The left internal carotid artery is normal in caliber.    Abrupt non enhancement in the P1 segment of the left PCA.  The proximal right PCA appears within normal limits.    The proximal MCAs and ACAs are unremarkable.    Findings were immediately discussed upon identification via telephone with the stroke service ([facility]) at approximately 09:20.                                        CT Cervical Spine Without Contrast (Final result)  Result time 11/06/24 09:33:04      Final result by Geo Ambrocio MD (11/06/24 09:33:04)                   Impression:      Multifocal acute posterior circulation distribution infarcts as further discussed above, particularly involving essentially the entirety of the left PCA territory.  Modest mass effect without significant hemorrhage.    Focal thrombus with severe stenosis extending from the right subclavian artery into the proximal right vertebral artery.    Focal occlusion of the proximal intracranial segment of the right vertebral artery.    Focal occlusion of the proximal left PCA.    Moderate focal stenosis at the left vertebral artery origin.    Anterior circulation appears unremarkable.    No evidence of fracture or traumatic malalignment in the cervical spine.    Emphysematous changes and patchy ground-glass opacity in the partially visualized lung apices.    This report was flagged in Epic as abnormal.      Electronically signed by: Geo Ambrocio MD  Date:    11/06/2024  Time:    09:33               Narrative:    EXAMINATION:  CTA STROKE MULTI-PHASE; CT CERVICAL SPINE WITHOUT CONTRAST    CLINICAL HISTORY:  Neuro deficit, acute, stroke suspected;; Neck trauma, intoxicated or obtunded (Age >= 16y);    TECHNIQUE:  Axial CT images obtained throughout the region of the head and cervical spine before the administration of intravenous contrast.    CT  angiogram was performed through the cervical and intracranial vasculature during the IV bolus administration of 75mL of Omnipaque 350.  Two additional phases through the intracranial vasculature via multiphase technique.    Multiplanar MPR and MIP reformats were performed.    CT source data was analyzed using artificial intelligence software for detection of a large vessel occlusions (LVO) in order to enable computer assisted triage notification and aid clinical stroke decision making.    COMPARISON:  CTA head and cervical spine 10/25/2015    FINDINGS:  Large region of hypoattenuation loss of gray-white differentiation in left occipital lobe in medial temporal lobe in keeping with an acute PCA distribution infarct.  Modest localized mass effect with some sulcal crowding.  Additional involvement of the left thalamus.  Similar appearance in the medial right occipital lobe, but smaller area of infarction.  Small left superior cerebellar infarct, also likely acute.  No evidence of recent or remote major vascular distribution infarct in the anterior circulation.  No acute parenchymal hemorrhage.  No midline shift.    The ventricles are normal in size without evidence of hydrocephalus.    No extra-axial blood or fluid collections.    The cranium appears intact.    Mastoid air cells and paranasal sinuses are essentially clear.    The vertebral bodies are normal in height and morphology without evidence of an acute displaced fracture or focal osseous destructive process.    Normal sagittal alignment is preserved.  No spondylolisthesis.    Intervertebral disc heights are well maintained.  No evidence of a bony spinal canal stenosis.    Evaluation intraspinal contents demonstrates no evidence of mass or hematoma.    No acute abnormalities in the paraspinal soft tissue structures.    Emphysematous changes patchy ground-glass opacity in the partially visualized lung apices.      CTA:    The aortic arch demonstrates no  significant stenosis at the major branch vessel origins.    There is a focal thrombus in the right subclavian artery extending into the right vertebral artery origin with severe stenosis.  Thrombus extending over proximally 1 cm.  The V1 and V2 segments of right vertebral artery are otherwise normal in caliber of slightly decreased in attenuation in comparison to the other cervical vasculature.  Abrupt occlusion of distal right vertebral artery about the level of the PICA origin approximate 2 cm segment of non enhancement.    Moderate focal stenosis at the left vertebral artery origin.  Left vertebral artery is otherwise normal in caliber.  The basilar artery is normal in caliber.    The right common carotid artery is normal in caliber.  No significant stenosis at the carotid bifurcation.The right internal carotid artery is normal in caliber.    The left common carotid artery is normal in caliber. No significant stenosis at the carotid bifurcation.The left internal carotid artery is normal in caliber.    Abrupt non enhancement in the P1 segment of the left PCA.  The proximal right PCA appears within normal limits.    The proximal MCAs and ACAs are unremarkable.    Findings were immediately discussed upon identification via telephone with the stroke service ([place]) at approximately 09:20.

## 2024-11-20 NOTE — PLAN OF CARE
Dr. Carlos Gill at bedside evaluating patient at this time. Will continue to monitor.       Cathy Stovall RN  07/28/18 3668 Procedure complete. Pt tolerated well. Recovery for 1 hr. G tube placed. Site CDI. Pt transferred to MPU and report to be given bedside. Floor RN given report.

## 2024-11-20 NOTE — SUBJECTIVE & OBJECTIVE
Neurologic Chief Complaint: acute arterial ischemic stroke, multifocal, posterior circulation     Subjective:     Interval History: Patient is seen for follow-up neurological assessment and treatment recommendations: Please see hospital course    HPI, Past Medical, Family, and Social History remains the same as documented in the initial encounter.     Review of Systems   Unable to perform ROS: Mental status change     Scheduled Meds:   aspirin  81 mg Per NG tube Daily    atorvastatin  40 mg Per NG tube Daily    carvediloL  6.25 mg Per NG tube BID    ceFEPime IV (PEDS and ADULTS)  2 g Intravenous Q8H    clopidogreL  75 mg Per NG tube Daily    enoxparin  40 mg Subcutaneous Q24H (prophylaxis, 1700)    FLUoxetine  60 mg Per NG tube Daily    folic acid  1 mg Per NG tube Daily    LIDOcaine  1 patch Transdermal Q24H    lithium citrate 8 meq/5 ml  300 mg Per NG tube TID    melatonin  6 mg Per NG tube Nightly    methyl salicylate-menthol 15-10%   Topical (Top) TID    mirtazapine  7.5 mg Per NG tube QHS    multivitamin  1 tablet Per NG tube Daily    QUEtiapine  100 mg Per NG tube QHS    QUEtiapine  50 mg Per NG tube Daily    senna-docusate 8.6-50 mg  1 tablet Per NG tube Daily    thiamine  100 mg Per NG tube Daily    vancomycin (VANCOCIN) IV (PEDS and ADULTS)  1,250 mg Intravenous Q12H     Continuous Infusions:  PRN Meds:  Current Facility-Administered Medications:     acetaminophen, 1,000 mg, Per NG tube, BID PRN    albuterol-ipratropium, 3 mL, Nebulization, Q6H PRN    polyethylene glycol, 17 g, Per NG tube, BID PRN    QUEtiapine, 25 mg, Per NG tube, BID PRN    sodium chloride 0.9%, 10 mL, Intravenous, PRN    Pharmacy to dose Vancomycin consult, , , Once **AND** vancomycin - pharmacy to dose, , Intravenous, pharmacy to manage frequency    Objective:     Vital Signs (Most Recent):  Temp: 98.3 °F (36.8 °C) (11/20/24 0815)  Pulse: 90 (11/20/24 0815)  Resp: 18 (11/20/24 0815)  BP: (!) 138/92 (11/20/24 0815)  SpO2: 95 % (11/20/24  0815)  BP Location: Right arm    Vital Signs Range (Last 24H):  Temp:  [97.6 °F (36.4 °C)-99.8 °F (37.7 °C)]   Pulse:  []   Resp:  [16-22]   BP: (114-184)/()   SpO2:  [93 %-97 %]   BP Location: Right arm       Physical Exam  Vitals and nursing note reviewed.   Constitutional:       General: She is sleeping.      Appearance: She is ill-appearing.   Pulmonary:      Effort: Pulmonary effort is normal. No respiratory distress.   Abdominal:      General: There is no distension.      Palpations: Abdomen is soft.   Musculoskeletal:      Cervical back: Normal range of motion and neck supple.      Comments: Right sided weakness.    Skin:     Capillary Refill: Capillary refill takes 2 to 3 seconds.   Neurological:      General: No focal deficit present.      GCS: GCS eye subscore is 3. GCS verbal subscore is 3. GCS motor subscore is 5.      Cranial Nerves: Dysarthria present.      Motor: Weakness present.      Coordination: Coordination abnormal. Finger-Nose-Finger Test abnormal and Heel to Vo Test abnormal.      Gait: Gait abnormal.   Psychiatric:         Behavior: Behavior is slowed and withdrawn.         Cognition and Memory: Cognition is impaired. Memory is impaired.              Neurological Exam:   LOC: alert  Attention Span: poor    Laboratory:  CMP:   Recent Labs   Lab 11/20/24  0504   CALCIUM 9.9   ALBUMIN 3.1*   PROT 7.4   *   K 3.7   CO2 21*      BUN 14   CREATININE 0.6   ALKPHOS 90   ALT 50*   AST 32   BILITOT 0.3     CBC:   Recent Labs   Lab 11/20/24  0504   WBC 16.36*   RBC 4.22   HGB 12.4   HCT 37.5   *   MCV 89   MCH 29.4   MCHC 33.1       Diagnostic Results     Brain Imaging / Vessel Imaging         Results for orders placed or performed during the hospital encounter of 11/06/24 (from the past 2160 hours)   CTA STROKE MULTI-PHASE     Narrative     EXAMINATION:  CTA STROKE MULTI-PHASE; CT CERVICAL SPINE WITHOUT CONTRAST     CLINICAL HISTORY:  Neuro deficit, acute, stroke  suspected;; Neck trauma, intoxicated or obtunded (Age >= 16y);     TECHNIQUE:  Axial CT images obtained throughout the region of the head and cervical spine before the administration of intravenous contrast.     CT angiogram was performed through the cervical and intracranial vasculature during the IV bolus administration of 75mL of Omnipaque 350.  Two additional phases through the intracranial vasculature via multiphase technique.     Multiplanar MPR and MIP reformats were performed.     CT source data was analyzed using artificial intelligence software for detection of a large vessel occlusions (LVO) in order to enable computer assisted triage notification and aid clinical stroke decision making.     COMPARISON:  CTA head and cervical spine 10/25/2015     FINDINGS:  Large region of hypoattenuation loss of gray-white differentiation in left occipital lobe in medial temporal lobe in keeping with an acute PCA distribution infarct.  Modest localized mass effect with some sulcal crowding.  Additional involvement of the left thalamus.  Similar appearance in the medial right occipital lobe, but smaller area of infarction.  Small left superior cerebellar infarct, also likely acute.  No evidence of recent or remote major vascular distribution infarct in the anterior circulation.  No acute parenchymal hemorrhage.  No midline shift.     The ventricles are normal in size without evidence of hydrocephalus.     No extra-axial blood or fluid collections.     The cranium appears intact.     Mastoid air cells and paranasal sinuses are essentially clear.     The vertebral bodies are normal in height and morphology without evidence of an acute displaced fracture or focal osseous destructive process.     Normal sagittal alignment is preserved.  No spondylolisthesis.     Intervertebral disc heights are well maintained.  No evidence of a bony spinal canal stenosis.     Evaluation intraspinal contents demonstrates no evidence of mass or  hematoma.     No acute abnormalities in the paraspinal soft tissue structures.     Emphysematous changes patchy ground-glass opacity in the partially visualized lung apices.        CTA:     The aortic arch demonstrates no significant stenosis at the major branch vessel origins.     There is a focal thrombus in the right subclavian artery extending into the right vertebral artery origin with severe stenosis.  Thrombus extending over proximally 1 cm.  The V1 and V2 segments of right vertebral artery are otherwise normal in caliber of slightly decreased in attenuation in comparison to the other cervical vasculature.  Abrupt occlusion of distal right vertebral artery about the level of the PICA origin approximate 2 cm segment of non enhancement.     Moderate focal stenosis at the left vertebral artery origin.  Left vertebral artery is otherwise normal in caliber.  The basilar artery is normal in caliber.     The right common carotid artery is normal in caliber.  No significant stenosis at the carotid bifurcation.The right internal carotid artery is normal in caliber.     The left common carotid artery is normal in caliber. No significant stenosis at the carotid bifurcation.The left internal carotid artery is normal in caliber.     Abrupt non enhancement in the P1 segment of the left PCA.  The proximal right PCA appears within normal limits.     The proximal MCAs and ACAs are unremarkable.     Findings were immediately discussed upon identification via telephone with the stroke service ([facility]) at approximately 09:20.        Impression     Multifocal acute posterior circulation distribution infarcts as further discussed above, particularly involving essentially the entirety of the left PCA territory.  Modest mass effect without significant hemorrhage.     Focal thrombus with severe stenosis extending from the right subclavian artery into the proximal right vertebral artery.     Focal occlusion of the proximal  intracranial segment of the right vertebral artery.     Focal occlusion of the proximal left PCA.     Moderate focal stenosis at the left vertebral artery origin.     Anterior circulation appears unremarkable.     No evidence of fracture or traumatic malalignment in the cervical spine.     Emphysematous changes and patchy ground-glass opacity in the partially visualized lung apices.     This report was flagged in Epic as abnormal.        Electronically signed by:Geo Ambrocio MD  Date:                                                11/06/2024  Time:                                                09:33   CT Cervical Spine Without Contrast     Narrative     EXAMINATION:  CTA STROKE MULTI-PHASE; CT CERVICAL SPINE WITHOUT CONTRAST     CLINICAL HISTORY:  Neuro deficit, acute, stroke suspected;; Neck trauma, intoxicated or obtunded (Age >= 16y);     TECHNIQUE:  Axial CT images obtained throughout the region of the head and cervical spine before the administration of intravenous contrast.     CT angiogram was performed through the cervical and intracranial vasculature during the IV bolus administration of 75mL of Omnipaque 350.  Two additional phases through the intracranial vasculature via multiphase technique.     Multiplanar MPR and MIP reformats were performed.     CT source data was analyzed using artificial intelligence software for detection of a large vessel occlusions (LVO) in order to enable computer assisted triage notification and aid clinical stroke decision making.     COMPARISON:  CTA head and cervical spine 10/25/2015     FINDINGS:  Large region of hypoattenuation loss of gray-white differentiation in left occipital lobe in medial temporal lobe in keeping with an acute PCA distribution infarct.  Modest localized mass effect with some sulcal crowding.  Additional involvement of the left thalamus.  Similar appearance in the medial right occipital lobe, but smaller area of infarction.  Small left superior  cerebellar infarct, also likely acute.  No evidence of recent or remote major vascular distribution infarct in the anterior circulation.  No acute parenchymal hemorrhage.  No midline shift.     The ventricles are normal in size without evidence of hydrocephalus.     No extra-axial blood or fluid collections.     The cranium appears intact.     Mastoid air cells and paranasal sinuses are essentially clear.     The vertebral bodies are normal in height and morphology without evidence of an acute displaced fracture or focal osseous destructive process.     Normal sagittal alignment is preserved.  No spondylolisthesis.     Intervertebral disc heights are well maintained.  No evidence of a bony spinal canal stenosis.     Evaluation intraspinal contents demonstrates no evidence of mass or hematoma.     No acute abnormalities in the paraspinal soft tissue structures.     Emphysematous changes patchy ground-glass opacity in the partially visualized lung apices.        CTA:     The aortic arch demonstrates no significant stenosis at the major branch vessel origins.     There is a focal thrombus in the right subclavian artery extending into the right vertebral artery origin with severe stenosis.  Thrombus extending over proximally 1 cm.  The V1 and V2 segments of right vertebral artery are otherwise normal in caliber of slightly decreased in attenuation in comparison to the other cervical vasculature.  Abrupt occlusion of distal right vertebral artery about the level of the PICA origin approximate 2 cm segment of non enhancement.     Moderate focal stenosis at the left vertebral artery origin.  Left vertebral artery is otherwise normal in caliber.  The basilar artery is normal in caliber.     The right common carotid artery is normal in caliber.  No significant stenosis at the carotid bifurcation.The right internal carotid artery is normal in caliber.     The left common carotid artery is normal in caliber. No significant  stenosis at the carotid bifurcation.The left internal carotid artery is normal in caliber.     Abrupt non enhancement in the P1 segment of the left PCA.  The proximal right PCA appears within normal limits.     The proximal MCAs and ACAs are unremarkable.     Findings were immediately discussed upon identification via telephone with the stroke service ([place]) at approximately 09:20.        Impression     Multifocal acute posterior circulation distribution infarcts as further discussed above, particularly involving essentially the entirety of the left PCA territory.  Modest mass effect without significant hemorrhage.     Focal thrombus with severe stenosis extending from the right subclavian artery into the proximal right vertebral artery.     Focal occlusion of the proximal intracranial segment of the right vertebral artery.     Focal occlusion of the proximal left PCA.     Moderate focal stenosis at the left vertebral artery origin.     Anterior circulation appears unremarkable.     No evidence of fracture or traumatic malalignment in the cervical spine.     Emphysematous changes and patchy ground-glass opacity in the partially visualized lung apices.     This report was flagged in Epic as abnormal.        Electronically signed by:Geo Ambrocio MD  Date:                                                11/06/2024  Time:                                                09:33   CT Head Without Contrast     Narrative     EXAMINATION:  CT HEAD WITHOUT CONTRAST     CLINICAL HISTORY:  Stroke, follow up;     TECHNIQUE:  Low dose axial images were obtained through the head.  Coronal and sagittal reformations were also performed. Contrast was not administered.     COMPARISON:  CT, MRI 11/06/2024     FINDINGS:  Evolving posterior circulation infarcts are identified as described on recent MR imaging again most notable within the left greater than right occipital lobes extending to the medial left temporal lobe and  bilateral thalamus.  No hemorrhagic conversion with potentially minimal stable petechial hemorrhage within the medial left thalamus.  No midline shift or herniation with mild mass effect on the 3rd ventricle again noted.     No new acute territorial infarct.     The visualized sinuses and mastoid air cells are clear.        Impression     Expected evolutionary changes of the posterior circulation infarcts with no overt hemorrhagic conversion or significant midline shift/herniation.        Electronically signed by:Adrián Hobbs  Date:                                                11/09/2024  Time:                                                08:38            Results for orders placed or performed during the hospital encounter of 11/06/24 (from the past 2160 hours)   MRI Brain Without Contrast     Impression     Multiple posterior circulation infarcts are identified with involvement of the left greater than right occipital lobes and medial left temporal lobe, thalamus, right dorsal medulla, and minimally involving the cerebellum.  No midline shift with minimal mass effect on the 3rd ventricle.     Loss of flow void within the right intracranial vertebral artery in keeping with the findings on CTA.        Electronically signed by:Adrián Hobbs  Date:                                                11/06/2024  Time:                                                17:40      Cardiac Imaging   Results for orders placed during the hospital encounter of 11/06/24     Echo Saline Bubble? Yes; Ultrasound enhancing contrast? Yes     Interpretation Summary    Left Ventricle: The left ventricle is normal in size. Normal wall thickness. There is normal systolic function with a visually estimated ejection fraction of 55 - 60%. Biplane (2D) method of discs ejection fraction is 56%. Global longitudinal strain is -18.0%. There is normal diastolic function.    Right Ventricle: Normal right ventricular cavity size. Wall thickness is  normal. Systolic function is normal.    Tricuspid Valve: There is mild regurgitation.    Pulmonary Artery: The estimated pulmonary artery systolic pressure is at least 28 mmHg.    IVC/SVC: Patient is ventilated, cannot use IVC diameter to estimate right atrial pressure.

## 2024-11-20 NOTE — PT/OT/SLP PROGRESS
Speech Language Pathology      Malathi Mcpherson  MRN: [id number]    Patient not seen today secondary to Off the floor for PEG placement. SLP will follow up next service date per POC.

## 2024-11-20 NOTE — PROGRESS NOTES
Neno Conley - Neurosurgery ([facility])  Vascular Neurology  Comprehensive Stroke Center  Progress Note    Assessment/Plan:     * Acute arterial ischemic stroke, multifocal, posterior circulation  Malathi Mcpherson is a 49 y.o. female with PMH of drug use, seizure, HTN, HLD that is admitted to [facility] for further evaluation and management of multifocal acute infarcts. She initially presented to [facility] ED 11/6 after being found lying face down at home that morning, per EMS was lethargic and aphasic and moving LUE/LLE/RLE but was not moving RUE. EMS also reported that they had seen her before in context of seizures. LKW 10pm 11/5. In ED was noted to have exam worsening, now with R sided plegia and global aphasia. NIHSS 22. Also noted to have O2 desat in 70s and ultimately was intubated for airway protection. CTA showed multifocal areas of hypodensity involving the posterior circulation suspicious for infarcts as well as focal occlusion of R vert and L PCA. Not candidate for acute stroke interventions, no TNK due to OOW and no IR due to stroke burden/risk for bleeding. She was admitted to [facility] for higher level of care. NSGY consulted on admit for edema/hemicrani watch, however no surgical interventions recommended. MRI brain confirmed multifocal areas of acute infarct involving: L>R occipital lobes, L medial temporal lobe, bilateral thalami, L hippocampus and R hippocampus tail, R dorsal medulla, bilateral cerebellar hemispheres. TTE unremarkable. Suspect etiology of stroke likely secondary to substance use, Utox + cocaine.    Interval hx: Episodes of coughing and vomiting overnight with concern for aspiration event. TF held, repeat KUB unremarkable. Worsening leukocytosis today, possibly related to aspiration event although uncertain. Will test COVID/flu/RSV, collect blood cultures. Consider addition of vancomycin especially is patient becomes febrile or clinically unstable. LFT's slightly improved, elevated INR. Acute hepatitis  panel ordered, consider liver ultrasound.     Antithrombotics for secondary stroke prevention: Antiplatelets: Aspirin: 81 mg daily  Clopidogrel: 75 mg daily.     Statins for secondary stroke prevention and hyperlipidemia, if present: Statins: Atorvastatin- 40 mg daily    Aggressive risk factor modification: HTN, HLD, Substance use.     Rehab efforts: The patient has been evaluated by a stroke team provider and the therapy needs have been fully considered based off the presenting complaints and exam findings. The following therapy evaluations are needed: PT evaluate and treat, OT evaluate and treat, SLP evaluate and treat. High intensity therapy recommended. Pt failed barium swallow study, currently NPO. Plan for PEG tube placement today.     Diagnostics ordered/pending: Other: Modified barium swallow    VTE prophylaxis: Enoxaparin 40 mg SQ every 24 hours  Mechanical prophylaxis: Place SCDs    BP parameters: Infarct: SBP goal <180      Transaminitis  LFTs uptrending, tylenol updated to PRN.   Decreased dose of statin to atorvastatin 40 mg 11/17 given elevated INR 1.5.   Consider liver ultrasound  Hep C antigen reactive, ordering RNA quant, pending results     Lab Results   Component Value Date    ALT 50 (H) 11/20/2024    AST 32 11/20/2024    ALKPHOS 90 11/20/2024    BILITOT 0.3 11/20/2024     Monitor daily CMP, PT/INR    Agitation  -see anxiety and depression    HTN (hypertension)  Patients blood pressure range in the last 24 hours was: BP  Min: 86/51  Max: 212/97. The patient's inpatient anti-hypertensive regimen is listed below:    Current Antihypertensives  , 2 times daily, Oral  carvediloL tablet 6.25 mg, 2 times daily, Per NG tube    - BP is controlled, no changes needed to their regimen  -SBP goal < 180      Right upper lobe pneumonia  Extubated 11/10 in [facility] with persistent oxygen requirement. CXR remarkable for RUL consolidation. Respiratory culture positive for H flu and MSSA. MRSA screen by PCR positive. Was  transitioned to cefazolin 11/12 then cefepime 11/14 after sensitivities c/w h flu beta lactamase positive. Extended course even persistent or worsening leukocytosis, plan for total 7 day course (EED 11/21). 11/18/24 worsening leukocytosis with WBC up to 19.25 from 17.82. Viral panel negative.     -wean oxygen as tolerated, stable on RA  -continue cefepime (EED 11/21)   - Started on vanc  - F/U CBC: WBC 16.36  - F/U blood cultures: NGTD  - F/U RIP    Debility  -Due to stroke  -Aggressive therapy  -PT/OT/SLP eval and treat    Polysubstance abuse  -Utox + cocaine  -Suspect etiology of stroke likely due to substance use  -Continue thiamine/folate/multivitamin supplementation  -Addiction psych consulted early on admission    Cytotoxic cerebral edema  -Area of cerebral edema identified when reviewing brain imaging involving bilateral posterior circulation territories due to acute infarcts, there is mild mass effect associated with it that has remained stable on follow up imaging.  -Admitted to [facility] for close neuro monitoring and observation  -NSGY consulted on admit, no surgical intervention recommended and NSGY s/o  - Stepped down to floor  -Continue frequent q4hr neuro checks as any acute changes or worsening neuro status .  -Obtain stat CTH for any new or worsening neuro changes and notify primary team immediately    Anxiety and depression  -Psychiatry consulted, appreciate recs  Continue lithium 300 mg TID (resumed 11/13), Prozac 60 mg daily, Seroquel 25 mg in the AM and 50 mg in the PM, Remeron 7.5 mg nightly.   PRN Seroquel 25 mg BID for non-redirectable agitation.    -daily EKG to monitor Qtc  - Pending serum lithium level on 11/18/24 11/07/2024 Pt was agitated overnight and is on fentanyl. Currently intubated.On repeated painful stimuli has movements on LUE,LLE, RLE> RUE.The movement on right side is an improvement from yesterday.  11/11/2024 NAEON, remains on precedex for agitation. Extubated yesterday  (11/10), tolerating extubation thus far without complication. Moving extremities spontaneously although weakness noted in RUE>>RLE. Remains globally aphasic and agitated despite max precedex infusion. Respiratory cultures + H flu, on rocephin for abx. Also on bactrim for SSTI. Home lithium restarted today, continue seoquel and fluoxetine.  11/14/2024 NAEO, patient off precedex, BL wrist and torso restraints, NG tube with feeds running. Patient able to respond to questions and follow some commands. Oriented to self only. Stable for stepdown.   11/15/2024 Pt still in restraints. Per nurse tendency to pull at tubes and things.On restraints. Oriented to self. No other complaints in the am.  11/16/2024 remains in restraints this AM, reportedly agitated overnight with concerns from nursing. Psychiatry consulted for med rec given extensive psych history on lithium and multiple different psych meds on admission med rec/recent dispense history and concerns for worsening agitation. LFTs uptrending, tylenol updated to PRN. Consider decreased dose of statin if worsening.   11/17/2024 episodes of coughing and vomiting overnight with concern for aspiration event. TF held, repeat KUB unremarkable. Worsening leukocytosis today, possibly related to aspiration event although uncertain. Will test COVID/flu/RSV, collect blood cultures. Consider addition of vancomycin especially is patient becomes febrile or clinically unstable. LFT's slightly improved, elevated INR. Acute hepatitis panel ordered, consider liver ultrasound.   11/18/24 Viral panel negative for any respiratory infection. WBC trending up to 19.25 from 17.82. Currently on Vanc and cefepime. LFT downtrending to normal values. Plan for barium swallow study today, to assess for the continuation of NG tube feedings. Plan to likely move to hospital medicine service.   11/19/2024 Pt failed barium swallow study yesterday. IR consulted for PEG tube placement. CT abdomen ordered.  Hep C antigen reactive, pending RNA quant. WBC trending down to 16. 35 from 19.25. Blood cultures NGTD.   11/20/2024 NAOE, WBC stable at 16.36, Bcx NGTD. Ordering RIP. Currently on Vanc and cefepime. Scheduled for PEG placement today. LFT improving. Pending Hep C RNA quant.         STROKE DOCUMENTATION   Acute Stroke Times   Last Known Normal Date: 11/05/24  Last Known Normal Time: 2200  Symptom Onset Date: 11/06/24 (unsure of timing)  Symptom Onset Time:  (sometime this morning.)  Unknown Symptom Onset Time: Unknown Time  Stroke Team Called Date: 11/06/24  Stroke Team Called Time: 0843  Stroke Team Arrival Date: 11/06/24  Stroke Team Arrival Time: 0845  CT Interpretation Time: 0859  Thrombolytic Therapy Recommended: No  CTA Interpretation Time: 0902  Thrombectomy Recommended: No    NIH Scale:  Interval: baseline  1a. Level of Consciousness: 0-->Alert, keenly responsive  1b. LOC Questions: 1-->Answers one question correctly  1c. LOC Commands: 2-->Performs neither task correctly (Not following commands)  2. Best Gaze: 0-->Normal  3. Visual: 3-->Bilateral hemianopia (blind including cortical blindness)  4. Facial Palsy: 0-->Normal symmetrical movements  5a. Motor Arm, Left: 0-->No drift, limb holds 90 (or 45) degrees for full 10 secs  5b. Motor Arm, Right: 0-->No drift, limb holds 90 (or 45) degrees for full 10 secs  6a. Motor Leg, Left: 0-->No drift, leg holds 30 degree position for full 5 secs  6b. Motor Leg, Right: 0-->No drift, leg holds 30 degree position for full 5 secs  7. Limb Ataxia: 0-->Absent  8. Sensory: 0-->Normal, no sensory loss  9. Best Language: 0-->No aphasia, normal  10. Dysarthria: 0-->Normal  11. Extinction and Inattention (formerly Neglect): 0-->No abnormality  Total (NIH Stroke Scale): 6       Modified Yonny Score: 0  [place] Coma Scale:11   ABCD2 Score:    [id number] Score:   HAS -BLED Score:   ICH Score:   Hunt & Argueta Classification:      Hemorrhagic change of an Ischemic Stroke: Does this  patient have an ischemic stroke with hemorrhagic changes? No     Neurologic Chief Complaint: acute arterial ischemic stroke, multifocal, posterior circulation     Subjective:     Interval History: Patient is seen for follow-up neurological assessment and treatment recommendations: Please see hospital course    HPI, Past Medical, Family, and Social History remains the same as documented in the initial encounter.     Review of Systems   Unable to perform ROS: Mental status change     Scheduled Meds:   aspirin  81 mg Per NG tube Daily    atorvastatin  40 mg Per NG tube Daily    carvediloL  6.25 mg Per NG tube BID    ceFEPime IV (PEDS and ADULTS)  2 g Intravenous Q8H    clopidogreL  75 mg Per NG tube Daily    enoxparin  40 mg Subcutaneous Q24H (prophylaxis, 1700)    FLUoxetine  60 mg Per NG tube Daily    folic acid  1 mg Per NG tube Daily    LIDOcaine  1 patch Transdermal Q24H    lithium citrate 8 meq/5 ml  300 mg Per NG tube TID    melatonin  6 mg Per NG tube Nightly    methyl salicylate-menthol 15-10%   Topical (Top) TID    mirtazapine  7.5 mg Per NG tube QHS    multivitamin  1 tablet Per NG tube Daily    QUEtiapine  100 mg Per NG tube QHS    QUEtiapine  50 mg Per NG tube Daily    senna-docusate 8.6-50 mg  1 tablet Per NG tube Daily    thiamine  100 mg Per NG tube Daily    vancomycin (VANCOCIN) IV (PEDS and ADULTS)  1,250 mg Intravenous Q12H     Continuous Infusions:  PRN Meds:  Current Facility-Administered Medications:     acetaminophen, 1,000 mg, Per NG tube, BID PRN    albuterol-ipratropium, 3 mL, Nebulization, Q6H PRN    polyethylene glycol, 17 g, Per NG tube, BID PRN    QUEtiapine, 25 mg, Per NG tube, BID PRN    sodium chloride 0.9%, 10 mL, Intravenous, PRN    Pharmacy to dose Vancomycin consult, , , Once **AND** vancomycin - pharmacy to dose, , Intravenous, pharmacy to manage frequency    Objective:     Vital Signs (Most Recent):  Temp: 98.3 °F (36.8 °C) (11/20/24 0815)  Pulse: 90 (11/20/24 0815)  Resp: 18  (11/20/24 0815)  BP: (!) 138/92 (11/20/24 0815)  SpO2: 95 % (11/20/24 0815)  BP Location: Right arm    Vital Signs Range (Last 24H):  Temp:  [97.6 °F (36.4 °C)-99.8 °F (37.7 °C)]   Pulse:  []   Resp:  [16-22]   BP: (114-184)/()   SpO2:  [93 %-97 %]   BP Location: Right arm       Physical Exam  Vitals and nursing note reviewed.   Constitutional:       General: She is sleeping.      Appearance: She is ill-appearing.   Pulmonary:      Effort: Pulmonary effort is normal. No respiratory distress.   Abdominal:      General: There is no distension.      Palpations: Abdomen is soft.   Musculoskeletal:      Cervical back: Normal range of motion and neck supple.      Comments: Right sided weakness.    Skin:     Capillary Refill: Capillary refill takes 2 to 3 seconds.   Neurological:      General: No focal deficit present.      GCS: GCS eye subscore is 3. GCS verbal subscore is 3. GCS motor subscore is 5.      Cranial Nerves: Dysarthria present.      Motor: Weakness present.      Coordination: Coordination abnormal. Finger-Nose-Finger Test abnormal and Heel to Vo Test abnormal.      Gait: Gait abnormal.   Psychiatric:         Behavior: Behavior is slowed and withdrawn.         Cognition and Memory: Cognition is impaired. Memory is impaired.              Neurological Exam:   LOC: alert  Attention Span: poor    Laboratory:  CMP:   Recent Labs   Lab 11/20/24  0504   CALCIUM 9.9   ALBUMIN 3.1*   PROT 7.4   *   K 3.7   CO2 21*      BUN 14   CREATININE 0.6   ALKPHOS 90   ALT 50*   AST 32   BILITOT 0.3     CBC:   Recent Labs   Lab 11/20/24  0504   WBC 16.36*   RBC 4.22   HGB 12.4   HCT 37.5   *   MCV 89   MCH 29.4   MCHC 33.1       Diagnostic Results     Brain Imaging / Vessel Imaging         Results for orders placed or performed during the hospital encounter of 11/06/24 (from the past 2160 hours)   CTA STROKE MULTI-PHASE     Narrative     EXAMINATION:  CTA STROKE MULTI-PHASE; CT CERVICAL SPINE  WITHOUT CONTRAST     CLINICAL HISTORY:  Neuro deficit, acute, stroke suspected;; Neck trauma, intoxicated or obtunded (Age >= 16y);     TECHNIQUE:  Axial CT images obtained throughout the region of the head and cervical spine before the administration of intravenous contrast.     CT angiogram was performed through the cervical and intracranial vasculature during the IV bolus administration of 75mL of Omnipaque 350.  Two additional phases through the intracranial vasculature via multiphase technique.     Multiplanar MPR and MIP reformats were performed.     CT source data was analyzed using artificial intelligence software for detection of a large vessel occlusions (LVO) in order to enable computer assisted triage notification and aid clinical stroke decision making.     COMPARISON:  CTA head and cervical spine 10/25/2015     FINDINGS:  Large region of hypoattenuation loss of gray-white differentiation in left occipital lobe in medial temporal lobe in keeping with an acute PCA distribution infarct.  Modest localized mass effect with some sulcal crowding.  Additional involvement of the left thalamus.  Similar appearance in the medial right occipital lobe, but smaller area of infarction.  Small left superior cerebellar infarct, also likely acute.  No evidence of recent or remote major vascular distribution infarct in the anterior circulation.  No acute parenchymal hemorrhage.  No midline shift.     The ventricles are normal in size without evidence of hydrocephalus.     No extra-axial blood or fluid collections.     The cranium appears intact.     Mastoid air cells and paranasal sinuses are essentially clear.     The vertebral bodies are normal in height and morphology without evidence of an acute displaced fracture or focal osseous destructive process.     Normal sagittal alignment is preserved.  No spondylolisthesis.     Intervertebral disc heights are well maintained.  No evidence of a bony spinal canal stenosis.      Evaluation intraspinal contents demonstrates no evidence of mass or hematoma.     No acute abnormalities in the paraspinal soft tissue structures.     Emphysematous changes patchy ground-glass opacity in the partially visualized lung apices.        CTA:     The aortic arch demonstrates no significant stenosis at the major branch vessel origins.     There is a focal thrombus in the right subclavian artery extending into the right vertebral artery origin with severe stenosis.  Thrombus extending over proximally 1 cm.  The V1 and V2 segments of right vertebral artery are otherwise normal in caliber of slightly decreased in attenuation in comparison to the other cervical vasculature.  Abrupt occlusion of distal right vertebral artery about the level of the PICA origin approximate 2 cm segment of non enhancement.     Moderate focal stenosis at the left vertebral artery origin.  Left vertebral artery is otherwise normal in caliber.  The basilar artery is normal in caliber.     The right common carotid artery is normal in caliber.  No significant stenosis at the carotid bifurcation.The right internal carotid artery is normal in caliber.     The left common carotid artery is normal in caliber. No significant stenosis at the carotid bifurcation.The left internal carotid artery is normal in caliber.     Abrupt non enhancement in the P1 segment of the left PCA.  The proximal right PCA appears within normal limits.     The proximal MCAs and ACAs are unremarkable.     Findings were immediately discussed upon identification via telephone with the stroke service ([facility]) at approximately 09:20.        Impression     Multifocal acute posterior circulation distribution infarcts as further discussed above, particularly involving essentially the entirety of the left PCA territory.  Modest mass effect without significant hemorrhage.     Focal thrombus with severe stenosis extending from the right subclavian artery into the  proximal right vertebral artery.     Focal occlusion of the proximal intracranial segment of the right vertebral artery.     Focal occlusion of the proximal left PCA.     Moderate focal stenosis at the left vertebral artery origin.     Anterior circulation appears unremarkable.     No evidence of fracture or traumatic malalignment in the cervical spine.     Emphysematous changes and patchy ground-glass opacity in the partially visualized lung apices.     This report was flagged in Epic as abnormal.        Electronically signed by:Geo Ambrocio MD  Date:                                                11/06/2024  Time:                                                09:33   CT Cervical Spine Without Contrast     Narrative     EXAMINATION:  CTA STROKE MULTI-PHASE; CT CERVICAL SPINE WITHOUT CONTRAST     CLINICAL HISTORY:  Neuro deficit, acute, stroke suspected;; Neck trauma, intoxicated or obtunded (Age >= 16y);     TECHNIQUE:  Axial CT images obtained throughout the region of the head and cervical spine before the administration of intravenous contrast.     CT angiogram was performed through the cervical and intracranial vasculature during the IV bolus administration of 75mL of Omnipaque 350.  Two additional phases through the intracranial vasculature via multiphase technique.     Multiplanar MPR and MIP reformats were performed.     CT source data was analyzed using artificial intelligence software for detection of a large vessel occlusions (LVO) in order to enable computer assisted triage notification and aid clinical stroke decision making.     COMPARISON:  CTA head and cervical spine 10/25/2015     FINDINGS:  Large region of hypoattenuation loss of gray-white differentiation in left occipital lobe in medial temporal lobe in keeping with an acute PCA distribution infarct.  Modest localized mass effect with some sulcal crowding.  Additional involvement of the left thalamus.  Similar appearance in the medial right  occipital lobe, but smaller area of infarction.  Small left superior cerebellar infarct, also likely acute.  No evidence of recent or remote major vascular distribution infarct in the anterior circulation.  No acute parenchymal hemorrhage.  No midline shift.     The ventricles are normal in size without evidence of hydrocephalus.     No extra-axial blood or fluid collections.     The cranium appears intact.     Mastoid air cells and paranasal sinuses are essentially clear.     The vertebral bodies are normal in height and morphology without evidence of an acute displaced fracture or focal osseous destructive process.     Normal sagittal alignment is preserved.  No spondylolisthesis.     Intervertebral disc heights are well maintained.  No evidence of a bony spinal canal stenosis.     Evaluation intraspinal contents demonstrates no evidence of mass or hematoma.     No acute abnormalities in the paraspinal soft tissue structures.     Emphysematous changes patchy ground-glass opacity in the partially visualized lung apices.        CTA:     The aortic arch demonstrates no significant stenosis at the major branch vessel origins.     There is a focal thrombus in the right subclavian artery extending into the right vertebral artery origin with severe stenosis.  Thrombus extending over proximally 1 cm.  The V1 and V2 segments of right vertebral artery are otherwise normal in caliber of slightly decreased in attenuation in comparison to the other cervical vasculature.  Abrupt occlusion of distal right vertebral artery about the level of the PICA origin approximate 2 cm segment of non enhancement.     Moderate focal stenosis at the left vertebral artery origin.  Left vertebral artery is otherwise normal in caliber.  The basilar artery is normal in caliber.     The right common carotid artery is normal in caliber.  No significant stenosis at the carotid bifurcation.The right internal carotid artery is normal in caliber.      The left common carotid artery is normal in caliber. No significant stenosis at the carotid bifurcation.The left internal carotid artery is normal in caliber.     Abrupt non enhancement in the P1 segment of the left PCA.  The proximal right PCA appears within normal limits.     The proximal MCAs and ACAs are unremarkable.     Findings were immediately discussed upon identification via telephone with the stroke service ([place]) at approximately 09:20.        Impression     Multifocal acute posterior circulation distribution infarcts as further discussed above, particularly involving essentially the entirety of the left PCA territory.  Modest mass effect without significant hemorrhage.     Focal thrombus with severe stenosis extending from the right subclavian artery into the proximal right vertebral artery.     Focal occlusion of the proximal intracranial segment of the right vertebral artery.     Focal occlusion of the proximal left PCA.     Moderate focal stenosis at the left vertebral artery origin.     Anterior circulation appears unremarkable.     No evidence of fracture or traumatic malalignment in the cervical spine.     Emphysematous changes and patchy ground-glass opacity in the partially visualized lung apices.     This report was flagged in Epic as abnormal.        Electronically signed by:Geo Ambrocio MD  Date:                                                11/06/2024  Time:                                                09:33   CT Head Without Contrast     Narrative     EXAMINATION:  CT HEAD WITHOUT CONTRAST     CLINICAL HISTORY:  Stroke, follow up;     TECHNIQUE:  Low dose axial images were obtained through the head.  Coronal and sagittal reformations were also performed. Contrast was not administered.     COMPARISON:  CT, MRI 11/06/2024     FINDINGS:  Evolving posterior circulation infarcts are identified as described on recent MR imaging again most notable within the left greater than  right occipital lobes extending to the medial left temporal lobe and bilateral thalamus.  No hemorrhagic conversion with potentially minimal stable petechial hemorrhage within the medial left thalamus.  No midline shift or herniation with mild mass effect on the 3rd ventricle again noted.     No new acute territorial infarct.     The visualized sinuses and mastoid air cells are clear.        Impression     Expected evolutionary changes of the posterior circulation infarcts with no overt hemorrhagic conversion or significant midline shift/herniation.        Electronically signed by:Adrián Hobbs  Date:                                                11/09/2024  Time:                                                08:38            Results for orders placed or performed during the hospital encounter of 11/06/24 (from the past 2160 hours)   MRI Brain Without Contrast     Impression     Multiple posterior circulation infarcts are identified with involvement of the left greater than right occipital lobes and medial left temporal lobe, thalamus, right dorsal medulla, and minimally involving the cerebellum.  No midline shift with minimal mass effect on the 3rd ventricle.     Loss of flow void within the right intracranial vertebral artery in keeping with the findings on CTA.        Electronically signed by:Adrián Hobbs  Date:                                                11/06/2024  Time:                                                17:40      Cardiac Imaging   Results for orders placed during the hospital encounter of 11/06/24     Echo Saline Bubble? Yes; Ultrasound enhancing contrast? Yes     Interpretation Summary    Left Ventricle: The left ventricle is normal in size. Normal wall thickness. There is normal systolic function with a visually estimated ejection fraction of 55 - 60%. Biplane (2D) method of discs ejection fraction is 56%. Global longitudinal strain is -18.0%. There is normal diastolic function.     Right Ventricle: Normal right ventricular cavity size. Wall thickness is normal. Systolic function is normal.    Tricuspid Valve: There is mild regurgitation.    Pulmonary Artery: The estimated pulmonary artery systolic pressure is at least 28 mmHg.    IVC/SVC: Patient is ventilated, cannot use IVC diameter to estimate right atrial pressure.     Gerhard De Jesus MD  [facility]  Department of Vascular Neurology   [facility])

## 2024-11-20 NOTE — ASSESSMENT & PLAN NOTE
Extubated 11/10 in NCC with persistent oxygen requirement. CXR remarkable for RUL consolidation. Respiratory culture positive for H flu and MSSA. MRSA screen by PCR positive. Was transitioned to cefazolin 11/12 then cefepime 11/14 after sensitivities c/w h flu beta lactamase positive. Extended course even persistent or worsening leukocytosis, plan for total 7 day course (EED 11/21). 11/18/24 worsening leukocytosis with WBC up to 19.25 from 17.82. Viral panel negative.     -wean oxygen as tolerated, stable on RA  -continue cefepime (EED 11/21)   - Started on vanc  - F/U CBC: WBC 16.36  - F/U blood cultures: NGTD  - F/U RIP

## 2024-11-20 NOTE — PT/OT/SLP PROGRESS
Physical Therapy Treatment    Patient Name:  Malathi Mcpherson   MRN:  [id number]  Admitting Diagnosis:  Acute arterial ischemic stroke, multifocal, posterior circulation   Recent Surgery: * No surgery found *    Admit Date: 11/6/2024  Length of Stay: 14 days    Recommendations:     Discharge Recommendations:  High Intensity Therapy  Discharge Equipment Recommendations: bedside commode, bath bench, wheelchair   Barriers to discharge: Evolving Clinical Presentation, increased physical assistance needed    Assessment:     Malathi Mcpherson is a 49 y.o. female admitted with a medical diagnosis of Acute arterial ischemic stroke, multifocal, posterior circulation.  She presents with the following impairments/functional limitations:  weakness, impaired endurance, impaired self care skills, impaired functional mobility, impaired balance, impaired cognition, decreased coordination, decreased upper extremity function, decreased lower extremity function, decreased safety awareness, pain, impaired coordination, impaired fine motor.     Pt very lethargic today with minimal participation in session. Pt mostly keeping eyes shut despite cueing. She required totalA x 2 for supine <> sit. Attempted to engage pt to participate while EOB however pt not responding to commands nor opening eyes. Pt's mother at bedside who states they have been adjusting pt's medications which has made her calmer but also more lethargic. Pt has planned PEG placement later today. Plan to continue to progress pt per POC to optimize independence upon discharge.     Rehab Prognosis: Poor; patient would benefit from acute skilled PT services to address these deficits and reach maximum level of function.    Recent Surgery: * No surgery found *      Treatment Tolerated: Poorly    Highest level of mobility achieved this visit: supine<>sit with totalA x 2    Activity with RN/PCT: bed mobility only with one person assist    Plan:     During this hospitalization,  "patient to be seen 4 x/week to address the identified rehab impairments via gait training, therapeutic activities, therapeutic exercises, neuromuscular re-education and progress toward the following goals:    Plan of Care Expires:  12/14/24    Subjective     RN Alpa notified prior to session.     Chief Complaint: pt groaning but without any verbalizations during session  Patient/Family Comments/goals: "it's been 2 weeks today since her stroke" - pt's mom  Pain/Comfort:  Pain Rating 1: 0/10  Pain Rating Post-Intervention 1: 0/10      Objective:     Additional staff present: Monique (therapy tech)    Patient found HOB elevated with: bed alarm, telemetry, restraints, SCD, NG tube, PureWick, blood pressure cuff (telesitter) -bilateral wrist restraints and mittens  Cognition:   Lethargic  Command following: Not following commands  Fluency: non-verbal, groaning but no verbalizations  General Precautions: Standard, NPO, aspiration, fall   Orthopedic Precautions:N/A   Braces: N/A   Body mass index is 19.78 kg/m².      Vitals: BP (!) 168/77   Pulse 84   Temp 98.2 °F (36.8 °C) (Skin)   Resp 20   Ht 5' 8" (1.727 m)   Wt 59 kg (130 lb 1.1 oz)   SpO2 100%   Breastfeeding No   BMI 19.78 kg/m²     Outcome Measures:  AM-PAC 6 CLICK MOBILITY  Turning over in bed (including adjusting bedclothes, sheets and blankets)?: 2  Sitting down on and standing up from a chair with arms (e.g., wheelchair, bedside commode, etc.): 1  Moving from lying on back to sitting on the side of the bed?: 2  Moving to and from a bed to a chair (including a wheelchair)?: 1  Need to walk in hospital room?: 1  Climbing 3-5 steps with a railing?: 1  Basic Mobility Total Score: 8     Functional Mobility:    Bed Mobility:   Supine to Sit: total assistance of 2 persons;   Sit to supine: totalA fo 2 persons    Sitting Balance at Edge of Bed:  Assistance Level Required: Maximum Assistance and Total Assistance  Time: 10 min  Attempted to engage pt in sitting " balance activities but not following commands.      Education:  All questions answered within PT scope of practice and to patient's satisfaction  PT role in POC to address current functional deficits    Patient left HOB elevated with all lines intact, call button in reach, restraints reapplied at end of session, and mom present.    GOALS:   Multidisciplinary Problems       Physical Therapy Goals          Problem: Physical Therapy    Goal Priority Disciplines Outcome Interventions   Physical Therapy Goal     PT, PT/OT Progressing    Description: Goals to be met by: 24     Patient will increase functional independence with mobility by performin. Supine to sit with Contact Guard Assistance  2. Sit to supine with Contact Guard Assistance  3. Sit to stand transfer with Minimal Assistance  4. Bed to chair transfer with Minimal Assistance using LRAD as needed  5. Gait  x 50 feet with Minimal Assistance using LRAD as needed.   6. Lower extremity exercise program x10 reps per handout, with assistance as needed    DME Justifications (see above for complete DME recommendations)    Bedside Commode- Patient has a mobility limitation that significantly impairs their ability to participate in one or more mobility related activities of daily living, including toileting. This deficit can be resolved by using a bedside commode. Patient demonstrates mobility limitations that will cause them to be confined to one room at home without bathroom access for up to 30 days. Using a bedside commode will greatly improve the patient's ability to participate in MRADLs.     Hospital Bed ·       Patient requires a hospital bed for positioning of the body in ways that are not feasible with an ordinary bed. The patient requires special positioning for pain relief, limited mobility, and/or being unable to independently make changes in body position without the use of a hospital bed. Pillows and wedges will not be adequate for resolving  these positional issues.                       Time Tracking:     PT Received On: 11/20/24  PT Start Time: 1046     PT Stop Time: 1103  PT Total Time (min): 17 min     Billable Minutes:   Therapeutic Activity 17 min    Treatment Type: Treatment  PT/PTA: PT       Joellen Kaufman PT, DPT  11/20/2024

## 2024-11-20 NOTE — ASSESSMENT & PLAN NOTE
LFTs uptrending, tylenol updated to PRN.   Decreased dose of statin to atorvastatin 40 mg 11/17 given elevated INR 1.5.   Consider liver ultrasound  Hep C antigen reactive, ordering RNA quant, pending results     Lab Results   Component Value Date    ALT 50 (H) 11/20/2024    AST 32 11/20/2024    ALKPHOS 90 11/20/2024    BILITOT 0.3 11/20/2024     Monitor daily CMP, PT/INR

## 2024-11-20 NOTE — ASSESSMENT & PLAN NOTE
Malathi Mcpherson is a 49 y.o. female with PMH of drug use, seizure, HTN, HLD that is admitted to [facility] for further evaluation and management of multifocal acute infarcts. She initially presented to [facility] ED 11/6 after being found lying face down at home that morning, per EMS was lethargic and aphasic and moving LUE/LLE/RLE but was not moving RUE. EMS also reported that they had seen her before in context of seizures. LKW 10pm 11/5. In ED was noted to have exam worsening, now with R sided plegia and global aphasia. NIHSS 22. Also noted to have O2 desat in 70s and ultimately was intubated for airway protection. CTA showed multifocal areas of hypodensity involving the posterior circulation suspicious for infarcts as well as focal occlusion of R vert and L PCA. Not candidate for acute stroke interventions, no TNK due to OOW and no IR due to stroke burden/risk for bleeding. She was admitted to [facility] for higher level of care. NSGY consulted on admit for edema/hemicrani watch, however no surgical interventions recommended. MRI brain confirmed multifocal areas of acute infarct involving: L>R occipital lobes, L medial temporal lobe, bilateral thalami, L hippocampus and R hippocampus tail, R dorsal medulla, bilateral cerebellar hemispheres. TTE unremarkable. Suspect etiology of stroke likely secondary to substance use, Utox + cocaine.    Interval hx: Episodes of coughing and vomiting overnight with concern for aspiration event. TF held, repeat KUB unremarkable. Worsening leukocytosis today, possibly related to aspiration event although uncertain. Will test COVID/flu/RSV, collect blood cultures. Consider addition of vancomycin especially is patient becomes febrile or clinically unstable. LFT's slightly improved, elevated INR. Acute hepatitis panel ordered, consider liver ultrasound.     Antithrombotics for secondary stroke prevention: Antiplatelets: Aspirin: 81 mg daily  Clopidogrel: 75 mg daily.     Statins for secondary stroke  prevention and hyperlipidemia, if present: Statins: Atorvastatin- 40 mg daily    Aggressive risk factor modification: HTN, HLD, Substance use.     Rehab efforts: The patient has been evaluated by a stroke team provider and the therapy needs have been fully considered based off the presenting complaints and exam findings. The following therapy evaluations are needed: PT evaluate and treat, OT evaluate and treat, SLP evaluate and treat. High intensity therapy recommended. Pt failed barium swallow study, currently NPO. Plan for PEG tube placement today.     Diagnostics ordered/pending: Other: Modified barium swallow    VTE prophylaxis: Enoxaparin 40 mg SQ every 24 hours  Mechanical prophylaxis: Place SCDs    BP parameters: Infarct: SBP goal <180

## 2024-11-21 LAB
ADENOVIRUS: NOT DETECTED
ALBUMIN SERPL BCP-MCNC: 3.2 G/DL (ref 3.5–5.2)
ALP SERPL-CCNC: 91 U/L (ref 40–150)
ALT SERPL W/O P-5'-P-CCNC: 41 U/L (ref 10–44)
ANION GAP SERPL CALC-SCNC: 11 MMOL/L (ref 8–16)
AST SERPL-CCNC: 29 U/L (ref 10–40)
BASOPHILS # BLD AUTO: 0.1 K/UL (ref 0–0.2)
BASOPHILS NFR BLD: 0.7 % (ref 0–1.9)
BILIRUB SERPL-MCNC: 0.3 MG/DL (ref 0.1–1)
BORDETELLA PARAPERTUSSIS (IS1001): NOT DETECTED
BORDETELLA PERTUSSIS (PTXP): NOT DETECTED
BUN SERPL-MCNC: 17 MG/DL (ref 6–20)
CALCIUM SERPL-MCNC: 10 MG/DL (ref 8.7–10.5)
CHLAMYDIA PNEUMONIAE: NOT DETECTED
CHLORIDE SERPL-SCNC: 100 MMOL/L (ref 95–110)
CO2 SERPL-SCNC: 26 MMOL/L (ref 23–29)
CORONAVIRUS 229E, COMMON COLD VIRUS: NOT DETECTED
CORONAVIRUS HKU1, COMMON COLD VIRUS: NOT DETECTED
CORONAVIRUS NL63, COMMON COLD VIRUS: NOT DETECTED
CORONAVIRUS OC43, COMMON COLD VIRUS: NOT DETECTED
CREAT SERPL-MCNC: 0.7 MG/DL (ref 0.5–1.4)
DIFFERENTIAL METHOD BLD: ABNORMAL
EOSINOPHIL # BLD AUTO: 0.2 K/UL (ref 0–0.5)
EOSINOPHIL NFR BLD: 1.5 % (ref 0–8)
ERYTHROCYTE [DISTWIDTH] IN BLOOD BY AUTOMATED COUNT: 14.6 % (ref 11.5–14.5)
EST. GFR  (NO RACE VARIABLE): >60 ML/MIN/1.73 M^2
FLUBV RNA NPH QL NAA+NON-PROBE: NOT DETECTED
GLUCOSE SERPL-MCNC: 109 MG/DL (ref 70–110)
HCT VFR BLD AUTO: 38 % (ref 37–48.5)
HGB BLD-MCNC: 12.5 G/DL (ref 12–16)
HPIV1 RNA NPH QL NAA+NON-PROBE: NOT DETECTED
HPIV2 RNA NPH QL NAA+NON-PROBE: NOT DETECTED
HPIV3 RNA NPH QL NAA+NON-PROBE: NOT DETECTED
HPIV4 RNA NPH QL NAA+NON-PROBE: NOT DETECTED
HUMAN METAPNEUMOVIRUS: NOT DETECTED
IMM GRANULOCYTES # BLD AUTO: 0.14 K/UL (ref 0–0.04)
IMM GRANULOCYTES NFR BLD AUTO: 1 % (ref 0–0.5)
INFLUENZA A (SUBTYPES H1,H1-2009,H3): NOT DETECTED
LITHIUM SERPL-SCNC: 0.6 MMOL/L (ref 0.6–1.2)
LYMPHOCYTES # BLD AUTO: 1.7 K/UL (ref 1–4.8)
LYMPHOCYTES NFR BLD: 11.8 % (ref 18–48)
MAGNESIUM SERPL-MCNC: 2.3 MG/DL (ref 1.6–2.6)
MCH RBC QN AUTO: 29.1 PG (ref 27–31)
MCHC RBC AUTO-ENTMCNC: 32.9 G/DL (ref 32–36)
MCV RBC AUTO: 88 FL (ref 82–98)
MONOCYTES # BLD AUTO: 0.9 K/UL (ref 0.3–1)
MONOCYTES NFR BLD: 6 % (ref 4–15)
MYCOPLASMA PNEUMONIAE: NOT DETECTED
NEUTROPHILS # BLD AUTO: 11.3 K/UL (ref 1.8–7.7)
NEUTROPHILS NFR BLD: 79 % (ref 38–73)
NRBC BLD-RTO: 0 /100 WBC
OHS QRS DURATION: 72 MS
OHS QRS DURATION: 76 MS
OHS QTC CALCULATION: 518 MS
OHS QTC CALCULATION: 535 MS
PHOSPHATE SERPL-MCNC: 3.2 MG/DL (ref 2.7–4.5)
PLATELET # BLD AUTO: 588 K/UL (ref 150–450)
PMV BLD AUTO: 10.2 FL (ref 9.2–12.9)
POTASSIUM SERPL-SCNC: 3.6 MMOL/L (ref 3.5–5.1)
PROT SERPL-MCNC: 7.7 G/DL (ref 6–8.4)
RBC # BLD AUTO: 4.3 M/UL (ref 4–5.4)
RESPIRATORY INFECTION PANEL SOURCE: NORMAL
RSV RNA NPH QL NAA+NON-PROBE: NOT DETECTED
RV+EV RNA NPH QL NAA+NON-PROBE: NOT DETECTED
SARS-COV-2 RNA RESP QL NAA+PROBE: NOT DETECTED
SODIUM SERPL-SCNC: 137 MMOL/L (ref 136–145)
WBC # BLD AUTO: 14.28 K/UL (ref 3.9–12.7)

## 2024-11-21 PROCEDURE — 36415 COLL VENOUS BLD VENIPUNCTURE: CPT

## 2024-11-21 PROCEDURE — 25000003 PHARM REV CODE 250: Performed by: STUDENT IN AN ORGANIZED HEALTH CARE EDUCATION/TRAINING PROGRAM

## 2024-11-21 PROCEDURE — 25000003 PHARM REV CODE 250

## 2024-11-21 PROCEDURE — 99233 SBSQ HOSP IP/OBS HIGH 50: CPT | Mod: ,,, | Performed by: STUDENT IN AN ORGANIZED HEALTH CARE EDUCATION/TRAINING PROGRAM

## 2024-11-21 PROCEDURE — 25000003 PHARM REV CODE 250: Performed by: PHYSICIAN ASSISTANT

## 2024-11-21 PROCEDURE — 63600175 PHARM REV CODE 636 W HCPCS

## 2024-11-21 PROCEDURE — 85025 COMPLETE CBC W/AUTO DIFF WBC: CPT

## 2024-11-21 PROCEDURE — 92526 ORAL FUNCTION THERAPY: CPT

## 2024-11-21 PROCEDURE — 84100 ASSAY OF PHOSPHORUS: CPT

## 2024-11-21 PROCEDURE — 93010 ELECTROCARDIOGRAM REPORT: CPT | Mod: ,,, | Performed by: INTERNAL MEDICINE

## 2024-11-21 PROCEDURE — 80053 COMPREHEN METABOLIC PANEL: CPT

## 2024-11-21 PROCEDURE — 97112 NEUROMUSCULAR REEDUCATION: CPT

## 2024-11-21 PROCEDURE — 93010 ELECTROCARDIOGRAM REPORT: CPT | Mod: 59,,, | Performed by: INTERNAL MEDICINE

## 2024-11-21 PROCEDURE — 99232 SBSQ HOSP IP/OBS MODERATE 35: CPT | Mod: ,,, | Performed by: NURSE PRACTITIONER

## 2024-11-21 PROCEDURE — 97535 SELF CARE MNGMENT TRAINING: CPT

## 2024-11-21 PROCEDURE — 94668 MNPJ CHEST WALL SBSQ: CPT

## 2024-11-21 PROCEDURE — 93005 ELECTROCARDIOGRAM TRACING: CPT

## 2024-11-21 PROCEDURE — 83735 ASSAY OF MAGNESIUM: CPT

## 2024-11-21 PROCEDURE — 11000001 HC ACUTE MED/SURG PRIVATE ROOM

## 2024-11-21 PROCEDURE — 80178 ASSAY OF LITHIUM: CPT

## 2024-11-21 PROCEDURE — 94761 N-INVAS EAR/PLS OXIMETRY MLT: CPT

## 2024-11-21 RX ADMIN — FLUOXETINE HYDROCHLORIDE 60 MG: 20 SOLUTION ORAL at 09:11

## 2024-11-21 RX ADMIN — MUSCLE RUB CREAM: 100; 150 CREAM TOPICAL at 09:11

## 2024-11-21 RX ADMIN — ASPIRIN 81 MG CHEWABLE TABLET 81 MG: 81 TABLET CHEWABLE at 09:11

## 2024-11-21 RX ADMIN — QUETIAPINE FUMARATE 100 MG: 100 TABLET ORAL at 09:11

## 2024-11-21 RX ADMIN — CLOPIDOGREL BISULFATE 75 MG: 75 TABLET ORAL at 09:11

## 2024-11-21 RX ADMIN — SENNOSIDES AND DOCUSATE SODIUM 1 TABLET: 50; 8.6 TABLET ORAL at 09:11

## 2024-11-21 RX ADMIN — ENOXAPARIN SODIUM 40 MG: 40 INJECTION SUBCUTANEOUS at 06:11

## 2024-11-21 RX ADMIN — MIRTAZAPINE 7.5 MG: 7.5 TABLET, FILM COATED ORAL at 09:11

## 2024-11-21 RX ADMIN — LITHIUM 300 MG: 8 SOLUTION ORAL at 03:11

## 2024-11-21 RX ADMIN — THERA TABS 1 TABLET: TAB at 09:11

## 2024-11-21 RX ADMIN — MUSCLE RUB CREAM: 100; 150 CREAM TOPICAL at 03:11

## 2024-11-21 RX ADMIN — LITHIUM 300 MG: 8 SOLUTION ORAL at 09:11

## 2024-11-21 RX ADMIN — LIDOCAINE 5% 1 PATCH: 700 PATCH TOPICAL at 02:11

## 2024-11-21 RX ADMIN — CARVEDILOL 6.25 MG: 6.25 TABLET, FILM COATED ORAL at 09:11

## 2024-11-21 RX ADMIN — CEFEPIME 2 G: 2 INJECTION, POWDER, FOR SOLUTION INTRAVENOUS at 01:11

## 2024-11-21 RX ADMIN — FOLIC ACID 1 MG: 1 TABLET ORAL at 09:11

## 2024-11-21 RX ADMIN — QUETIAPINE FUMARATE 50 MG: 25 TABLET ORAL at 12:11

## 2024-11-21 RX ADMIN — Medication 100 MG: at 09:11

## 2024-11-21 RX ADMIN — Medication 6 MG: at 09:11

## 2024-11-21 RX ADMIN — ATORVASTATIN CALCIUM 40 MG: 40 TABLET, FILM COATED ORAL at 09:11

## 2024-11-21 NOTE — PLAN OF CARE
Problem: Adult Inpatient Plan of Care  Goal: Plan of Care Review  Outcome: Progressing  Goal: Optimal Comfort and Wellbeing  Outcome: Progressing     Problem: Wound  Goal: Optimal Wound Healing  Outcome: Progressing     Problem: Stroke, Ischemic (Includes Transient Ischemic Attack)  Goal: Optimal Coping  Outcome: Progressing   POC reviewed and updated with the patient. Questions regarding POC were encouraged and addressed with the patient.JOSHUA. Patient is AO X 1 at this time. Fall/safety precautions maintained, no signs of injury noted during shift. Upon exiting room, patient's bed locked in low position, side rails up x 3, bed alarm set, with call light within reach. Instructed patient to call staff for assistance, verbalized understanding.  No acute signs of distress noted at this time.

## 2024-11-21 NOTE — ASSESSMENT & PLAN NOTE
-CTA showed multifocal areas of hypodensity involving the posterior circulation suspicious for infarcts as well as focal occlusion of R vert and L PCA.   -No TNK or IR intervention.  -Found to have Utox pos for cocaine.   -PT/OT rec for high intensity therapies. Will need SLP as well  -NPO and had NGT. Will need MBSS.  -In restraints. Will need to be off to pursue rehab.   -11/21- Mittens present BUE. PEG in place. Not started on TF. Pt with incomprehensible speech. Did follow some commands. Spoke to mother. Will follow up on therapy progress, mental status improvement .

## 2024-11-21 NOTE — ASSESSMENT & PLAN NOTE
-Psychiatry consulted, appreciate recs  Continue lithium 300 mg TID (resumed 11/13), Prozac 60 mg daily, Seroquel 25 mg in the AM and 50 mg in the PM, Remeron 7.5 mg nightly.   PRN Seroquel 25 mg BID for non-redirectable agitation.    -daily EKG to monitor Qtc: Elevated to 535, talking to psych for possible medication adjustment  - serum lithium level on 11/18/24: 0.4

## 2024-11-21 NOTE — ASSESSMENT & PLAN NOTE
Malathi Mcpherson is a 49 y.o. female with PMH of drug use, seizure, HTN, HLD that is admitted to [facility] for further evaluation and management of multifocal acute infarcts. She initially presented to [facility] ED 11/6 after being found lying face down at home that morning, per EMS was lethargic and aphasic and moving LUE/LLE/RLE but was not moving RUE. EMS also reported that they had seen her before in context of seizures. LKW 10pm 11/5. In ED was noted to have exam worsening, now with R sided plegia and global aphasia. NIHSS 22. Also noted to have O2 desat in 70s and ultimately was intubated for airway protection. CTA showed multifocal areas of hypodensity involving the posterior circulation suspicious for infarcts as well as focal occlusion of R vert and L PCA. Not candidate for acute stroke interventions, no TNK due to OOW and no IR due to stroke burden/risk for bleeding. She was admitted to [facility] for higher level of care. NSGY consulted on admit for edema/hemicrani watch, however no surgical interventions recommended. MRI brain confirmed multifocal areas of acute infarct involving: L>R occipital lobes, L medial temporal lobe, bilateral thalami, L hippocampus and R hippocampus tail, R dorsal medulla, bilateral cerebellar hemispheres. TTE unremarkable. Suspect etiology of stroke likely secondary to substance use, Utox + cocaine.    Interval hx: Episodes of coughing and vomiting overnight with concern for aspiration event. TF held, repeat KUB unremarkable. Worsening leukocytosis today, possibly related to aspiration event although uncertain. Will test COVID/flu/RSV, collect blood cultures. Consider addition of vancomycin especially is patient becomes febrile or clinically unstable. LFT's slightly improved, elevated INR. Acute hepatitis panel ordered, consider liver ultrasound.     Antithrombotics for secondary stroke prevention: Antiplatelets: Aspirin: 81 mg daily  Clopidogrel: 75 mg daily.     Statins for secondary stroke  prevention and hyperlipidemia, if present: Statins: Atorvastatin- 40 mg daily    Aggressive risk factor modification: HTN, HLD, Substance use.     Rehab efforts: The patient has been evaluated by a stroke team provider and the therapy needs have been fully considered based off the presenting complaints and exam findings. The following therapy evaluations are needed: PT evaluate and treat, OT evaluate and treat, SLP evaluate and treat. High intensity therapy recommended. Pt failed barium swallow study. PEG tube in place  Diagnostics ordered/pending: Other: Modified barium swallow    VTE prophylaxis: Enoxaparin 40 mg SQ every 24 hours  Mechanical prophylaxis: Place SCDs    BP parameters: Infarct: SBP goal <180

## 2024-11-21 NOTE — PROGRESS NOTES
[facility] - Neurosurgery ([facility])  Physical Medicine & Rehab  Progress Note    Patient Name: Malathi Mcpherson  MRN: [id number]  Admission Date: 11/6/2024  Length of Stay: 15 days  Attending Physician: Emily Recinos MD    Subjective:     Principal Problem:Acute arterial ischemic stroke, multifocal, posterior circulation    Hospital Course:   PT- 11/20    Functional Mobility:     Bed Mobility:   Supine to Sit: total assistance of 2 persons;   Sit to supine: totalA fo 2 persons     Sitting Balance at Edge of Bed:  Assistance Level Required: Maximum Assistance and Total Assistance  Time: 10 min    OT- 11/20    Bed Mobility:    Patient completed Rolling/Turning to Left with  maximal assistance  Patient completed Rolling/Turning to Right with maximal assistance  Patient completed Supine to Sit with total assistance  Patient completed Sit to Supine with total assistance      Activities of Daily Living:  Grooming: total assistance while seated EOB        PT- 11/14    Functional Mobility:     Bed Mobility:  Supine to Sit: Moderate Assistance on L side of bed  Sit to Supine: Minimal Assistance  Pt self returned to supine but needed min(A) for B LE management  Rolling L: Minimal Assistance  Rolling R: Minimal Assistance  Scooting anteriorly to EOB to plant feet on floor: Moderate Assistance     Transfers:   Sit to Stand Transfer: Activity did not occur - pt self returned to supine     Balance:  Static Sit:   Mod Assist at EOB    Interval History 11/21/2024:  Patient is seen for follow-up PM&R evaluation and recommendations: Pt seen at bedside. Mars GRIFFIN present. Mother at bedside. Unable to fully assess pt's orientation as she had incomprehensible speech. She did follow some commands when asked to raise leg for instance.   HPI, Past Medical, Family, and Social History remains the same as documented in the initial encounter.    Scheduled Medications:    aspirin  81 mg Per NG tube Daily    atorvastatin  40 mg Per NG tube  Daily    carvediloL  6.25 mg Per NG tube BID    clopidogreL  75 mg Per NG tube Daily    enoxparin  40 mg Subcutaneous Q24H (prophylaxis, 1700)    FLUoxetine  60 mg Per NG tube Daily    folic acid  1 mg Per NG tube Daily    LIDOcaine  1 patch Transdermal Q24H    lithium citrate 8 meq/5 ml  300 mg Per NG tube TID    melatonin  6 mg Per NG tube Nightly    methyl salicylate-menthol 15-10%   Topical (Top) TID    mirtazapine  7.5 mg Per NG tube QHS    multivitamin  1 tablet Per NG tube Daily    QUEtiapine  100 mg Per NG tube QHS    QUEtiapine  50 mg Per NG tube Daily    senna-docusate 8.6-50 mg  1 tablet Per NG tube Daily    thiamine  100 mg Per NG tube Daily       Diagnostic Results: Labs: Reviewed    PRN Medications:   Current Facility-Administered Medications:     acetaminophen, 1,000 mg, Per NG tube, BID PRN    albuterol-ipratropium, 3 mL, Nebulization, Q6H PRN    polyethylene glycol, 17 g, Per NG tube, BID PRN    QUEtiapine, 25 mg, Per NG tube, BID PRN    sodium chloride 0.9%, 10 mL, Intravenous, PRN    Review of Systems   Unable to perform ROS: Mental status change (Incomprehnsible speech)     Objective:     Vital Signs (Most Recent):  Temp: 98.4 °F (36.9 °C) (11/21/24 1140)  Pulse: 100 (11/21/24 1202)  Resp: 18 (11/21/24 1140)  BP: (!) 160/80 (11/21/24 1140)  SpO2: (!) 94 % (11/21/24 1140)    Vital Signs (24h Range):  Temp:  [98 °F (36.7 °C)-98.7 °F (37.1 °C)] 98.4 °F (36.9 °C)  Pulse:  [] 100  Resp:  [18-32] 18  SpO2:  [94 %-100 %] 94 %  BP: (141-186)/(60-92) 160/80         Physical Exam  Vitals and nursing note reviewed.   Constitutional:       Appearance: She is ill-appearing.   Pulmonary:      Effort: Pulmonary effort is normal. No respiratory distress.   Abdominal:      General: There is no distension.      Palpations: Abdomen is soft.   Musculoskeletal:      Cervical back: Normal range of motion and neck supple.      Comments: Appears with Right sided weakness. Unable to fully assess as pt was not  cooperating fully.    Skin:     General: Skin is warm and dry.      Capillary Refill: Capillary refill takes 2 to 3 seconds.   Neurological:      General: No focal deficit present.      Mental Status: She is alert and easily aroused.      GCS: GCS eye subscore is 3. GCS verbal subscore is 3. GCS motor subscore is 5.      Cranial Nerves: Dysarthria present.      Motor: Weakness present.      Coordination: Coordination abnormal.      Gait: Gait abnormal.   Psychiatric:         Mood and Affect: Affect is labile.         Behavior: Behavior is slowed and withdrawn.         Cognition and Memory: Cognition is impaired. Memory is impaired.               Assessment/Plan:      * Acute arterial ischemic stroke, multifocal, posterior circulation  -CTA showed multifocal areas of hypodensity involving the posterior circulation suspicious for infarcts as well as focal occlusion of R vert and L PCA.   -No TNK or IR intervention.  -Found to have Utox pos for cocaine.   -PT/OT rec for high intensity therapies. Will need SLP as well  -NPO and had NGT. Will need MBSS.  -In restraints. Will need to be off to pursue rehab.   -11/21- Mittens present BUE. PEG in place. Not started on TF. Pt with incomprehensible speech. Did follow some commands. Spoke to mother. Will follow up on therapy progress, mental status improvement .     Dysphagia  -S/P PEG placement 11/20.    Encephalopathy, metabolic  -Requiring restraints currently. Monitor.     Aphasia  -Will need aggressive SLP.     Reduced mobility  See hospital course for functional status.    Recommendations  -  Encourage mobility, OOB in chair, and early ambulation as appropriate  -  PT/OT evaluate and treat  -  Pain management  -  DVT prophylaxis if appropriate   -  Monitor for and prevent skin breakdown and pressure ulcers  Early mobility, repositioning/weight shifting every 20-30 minutes when sitting, turn patient every 2 hours, proper mattress/overlay and chair cushioning, pressure  relief/heel protector boots     Right upper lobe pneumonia  -Completing Cefepime per chart review.  -Monitor resp status. Appears stable.     Debility  See hospital course for functional status.    Recommendations  -  Encourage mobility, OOB in chair, and early ambulation as appropriate  -  PT/OT evaluate and treat  -  Pain management  -  DVT prophylaxis if appropriate   -  Monitor for and prevent skin breakdown and pressure ulcers  Early mobility, repositioning/weight shifting every 20-30 minutes when sitting, turn patient every 2 hours, proper mattress/overlay and chair cushioning, pressure relief/heel protector boots      Polysubstance abuse  -Hx of Cocaine, Heroine, ETOH use.   -Addiction psych following.     PM&R Recommendation:     At this time, the PM&R team has reviewed this patient's ongoing medical case including inpatient diagnosis, medical history, clinical examination, labs, vitals, current social and functional history. We will continue to follow pt for a post acute placement recommendation pending mental status improvement, therapies progress, medical stability, Not requiring mittens/ restraints for at least 24 hours with improved behavior.          Chapis Escamilla NP  Department of Physical Medicine & Rehab   [facility] - Neurosurgery ([facility])

## 2024-11-21 NOTE — PROGRESS NOTES
Therapy with vancomycin complete and/or consult discontinued by provider.  Pharmacy will sign off, please re-consult as needed.    Chava Young, PharmD  Ext: [phone]

## 2024-11-21 NOTE — SUBJECTIVE & OBJECTIVE
Neurologic Chief Complaint:  acute arterial ischemic stroke, multifocal, posterior circulation     Subjective:     Interval History: Patient is seen for follow-up neurological assessment and treatment recommendations: Please see hospital course    HPI, Past Medical, Family, and Social History remains the same as documented in the initial encounter.     Review of Systems   Unable to perform ROS: Mental status change     Scheduled Meds:   aspirin  81 mg Per NG tube Daily    atorvastatin  40 mg Per NG tube Daily    carvediloL  6.25 mg Per NG tube BID    clopidogreL  75 mg Per NG tube Daily    enoxparin  40 mg Subcutaneous Q24H (prophylaxis, 1700)    FLUoxetine  60 mg Per NG tube Daily    folic acid  1 mg Per NG tube Daily    LIDOcaine  1 patch Transdermal Q24H    lithium citrate 8 meq/5 ml  300 mg Per NG tube TID    melatonin  6 mg Per NG tube Nightly    methyl salicylate-menthol 15-10%   Topical (Top) TID    mirtazapine  7.5 mg Per NG tube QHS    multivitamin  1 tablet Per NG tube Daily    QUEtiapine  100 mg Per NG tube QHS    QUEtiapine  50 mg Per NG tube Daily    senna-docusate 8.6-50 mg  1 tablet Per NG tube Daily    thiamine  100 mg Per NG tube Daily     Continuous Infusions:  PRN Meds:  Current Facility-Administered Medications:     acetaminophen, 1,000 mg, Per NG tube, BID PRN    albuterol-ipratropium, 3 mL, Nebulization, Q6H PRN    polyethylene glycol, 17 g, Per NG tube, BID PRN    QUEtiapine, 25 mg, Per NG tube, BID PRN    sodium chloride 0.9%, 10 mL, Intravenous, PRN    Objective:     Vital Signs (Most Recent):  Temp: 98.4 °F (36.9 °C) (11/21/24 1140)  Pulse: 100 (11/21/24 1202)  Resp: 18 (11/21/24 1140)  BP: (!) 160/80 (11/21/24 1140)  SpO2: (!) 94 % (11/21/24 1140)  BP Location: Left arm    Vital Signs Range (Last 24H):  Temp:  [98 °F (36.7 °C)-98.7 °F (37.1 °C)]   Pulse:  []   Resp:  [18-22]   BP: (141-168)/(60-89)   SpO2:  [94 %-100 %]   BP Location: Left arm       Physical Exam  Vitals and nursing  note reviewed.   Constitutional:       Appearance: She is ill-appearing.   Pulmonary:      Effort: Pulmonary effort is normal. No respiratory distress.   Abdominal:      General: There is no distension.      Palpations: Abdomen is soft.   Musculoskeletal:      Cervical back: Normal range of motion and neck supple.      Comments: Appears with Right sided weakness. Unable to fully assess as pt was not cooperating fully.    Skin:     General: Skin is warm and dry.      Capillary Refill: Capillary refill takes 2 to 3 seconds.   Neurological:      General: No focal deficit present.      Mental Status: She is alert and easily aroused.      GCS: GCS eye subscore is 3. GCS verbal subscore is 3. GCS motor subscore is 5.      Cranial Nerves: Dysarthria present.      Motor: Weakness present.      Coordination: Coordination abnormal.      Gait: Gait abnormal.   Psychiatric:         Mood and Affect: Affect is labile.         Behavior: Behavior is slowed and withdrawn.         Cognition and Memory: Cognition is impaired. Memory is impaired.              Neurological Exam:   LOC: alert  Attention Span: poor    Laboratory:  CMP:   Recent Labs   Lab 11/21/24  0518   CALCIUM 10.0   ALBUMIN 3.2*   PROT 7.7      K 3.6   CO2 26      BUN 17   CREATININE 0.7   ALKPHOS 91   ALT 41   AST 29   BILITOT 0.3     CBC:   Recent Labs   Lab 11/21/24  0518   WBC 14.28*   RBC 4.30   HGB 12.5   HCT 38.0   *   MCV 88   MCH 29.1   MCHC 32.9       Diagnostic Results     Brain Imaging / Vessel Imaging         Results for orders placed or performed during the hospital encounter of 11/06/24 (from the past 2160 hours)   CTA STROKE MULTI-PHASE     Narrative     EXAMINATION:  CTA STROKE MULTI-PHASE; CT CERVICAL SPINE WITHOUT CONTRAST     CLINICAL HISTORY:  Neuro deficit, acute, stroke suspected;; Neck trauma, intoxicated or obtunded (Age >= 16y);     TECHNIQUE:  Axial CT images obtained throughout the region of the head and cervical spine  before the administration of intravenous contrast.     CT angiogram was performed through the cervical and intracranial vasculature during the IV bolus administration of 75mL of Omnipaque 350.  Two additional phases through the intracranial vasculature via multiphase technique.     Multiplanar MPR and MIP reformats were performed.     CT source data was analyzed using artificial intelligence software for detection of a large vessel occlusions (LVO) in order to enable computer assisted triage notification and aid clinical stroke decision making.     COMPARISON:  CTA head and cervical spine 10/25/2015     FINDINGS:  Large region of hypoattenuation loss of gray-white differentiation in left occipital lobe in medial temporal lobe in keeping with an acute PCA distribution infarct.  Modest localized mass effect with some sulcal crowding.  Additional involvement of the left thalamus.  Similar appearance in the medial right occipital lobe, but smaller area of infarction.  Small left superior cerebellar infarct, also likely acute.  No evidence of recent or remote major vascular distribution infarct in the anterior circulation.  No acute parenchymal hemorrhage.  No midline shift.     The ventricles are normal in size without evidence of hydrocephalus.     No extra-axial blood or fluid collections.     The cranium appears intact.     Mastoid air cells and paranasal sinuses are essentially clear.     The vertebral bodies are normal in height and morphology without evidence of an acute displaced fracture or focal osseous destructive process.     Normal sagittal alignment is preserved.  No spondylolisthesis.     Intervertebral disc heights are well maintained.  No evidence of a bony spinal canal stenosis.     Evaluation intraspinal contents demonstrates no evidence of mass or hematoma.     No acute abnormalities in the paraspinal soft tissue structures.     Emphysematous changes patchy ground-glass opacity in the partially  visualized lung apices.        CTA:     The aortic arch demonstrates no significant stenosis at the major branch vessel origins.     There is a focal thrombus in the right subclavian artery extending into the right vertebral artery origin with severe stenosis.  Thrombus extending over proximally 1 cm.  The V1 and V2 segments of right vertebral artery are otherwise normal in caliber of slightly decreased in attenuation in comparison to the other cervical vasculature.  Abrupt occlusion of distal right vertebral artery about the level of the PICA origin approximate 2 cm segment of non enhancement.     Moderate focal stenosis at the left vertebral artery origin.  Left vertebral artery is otherwise normal in caliber.  The basilar artery is normal in caliber.     The right common carotid artery is normal in caliber.  No significant stenosis at the carotid bifurcation.The right internal carotid artery is normal in caliber.     The left common carotid artery is normal in caliber. No significant stenosis at the carotid bifurcation.The left internal carotid artery is normal in caliber.     Abrupt non enhancement in the P1 segment of the left PCA.  The proximal right PCA appears within normal limits.     The proximal MCAs and ACAs are unremarkable.     Findings were immediately discussed upon identification via telephone with the stroke service ([facility]) at approximately 09:20.        Impression     Multifocal acute posterior circulation distribution infarcts as further discussed above, particularly involving essentially the entirety of the left PCA territory.  Modest mass effect without significant hemorrhage.     Focal thrombus with severe stenosis extending from the right subclavian artery into the proximal right vertebral artery.     Focal occlusion of the proximal intracranial segment of the right vertebral artery.     Focal occlusion of the proximal left PCA.     Moderate focal stenosis at the left vertebral  artery origin.     Anterior circulation appears unremarkable.     No evidence of fracture or traumatic malalignment in the cervical spine.     Emphysematous changes and patchy ground-glass opacity in the partially visualized lung apices.     This report was flagged in Epic as abnormal.        Electronically signed by:Geo Ambrocio MD  Date:                                                11/06/2024  Time:                                                09:33   CT Cervical Spine Without Contrast     Narrative     EXAMINATION:  CTA STROKE MULTI-PHASE; CT CERVICAL SPINE WITHOUT CONTRAST     CLINICAL HISTORY:  Neuro deficit, acute, stroke suspected;; Neck trauma, intoxicated or obtunded (Age >= 16y);     TECHNIQUE:  Axial CT images obtained throughout the region of the head and cervical spine before the administration of intravenous contrast.     CT angiogram was performed through the cervical and intracranial vasculature during the IV bolus administration of 75mL of Omnipaque 350.  Two additional phases through the intracranial vasculature via multiphase technique.     Multiplanar MPR and MIP reformats were performed.     CT source data was analyzed using artificial intelligence software for detection of a large vessel occlusions (LVO) in order to enable computer assisted triage notification and aid clinical stroke decision making.     COMPARISON:  CTA head and cervical spine 10/25/2015     FINDINGS:  Large region of hypoattenuation loss of gray-white differentiation in left occipital lobe in medial temporal lobe in keeping with an acute PCA distribution infarct.  Modest localized mass effect with some sulcal crowding.  Additional involvement of the left thalamus.  Similar appearance in the medial right occipital lobe, but smaller area of infarction.  Small left superior cerebellar infarct, also likely acute.  No evidence of recent or remote major vascular distribution infarct in the anterior circulation.  No acute  parenchymal hemorrhage.  No midline shift.     The ventricles are normal in size without evidence of hydrocephalus.     No extra-axial blood or fluid collections.     The cranium appears intact.     Mastoid air cells and paranasal sinuses are essentially clear.     The vertebral bodies are normal in height and morphology without evidence of an acute displaced fracture or focal osseous destructive process.     Normal sagittal alignment is preserved.  No spondylolisthesis.     Intervertebral disc heights are well maintained.  No evidence of a bony spinal canal stenosis.     Evaluation intraspinal contents demonstrates no evidence of mass or hematoma.     No acute abnormalities in the paraspinal soft tissue structures.     Emphysematous changes patchy ground-glass opacity in the partially visualized lung apices.        CTA:     The aortic arch demonstrates no significant stenosis at the major branch vessel origins.     There is a focal thrombus in the right subclavian artery extending into the right vertebral artery origin with severe stenosis.  Thrombus extending over proximally 1 cm.  The V1 and V2 segments of right vertebral artery are otherwise normal in caliber of slightly decreased in attenuation in comparison to the other cervical vasculature.  Abrupt occlusion of distal right vertebral artery about the level of the PICA origin approximate 2 cm segment of non enhancement.     Moderate focal stenosis at the left vertebral artery origin.  Left vertebral artery is otherwise normal in caliber.  The basilar artery is normal in caliber.     The right common carotid artery is normal in caliber.  No significant stenosis at the carotid bifurcation.The right internal carotid artery is normal in caliber.     The left common carotid artery is normal in caliber. No significant stenosis at the carotid bifurcation.The left internal carotid artery is normal in caliber.     Abrupt non enhancement in the P1 segment of the left  PCA.  The proximal right PCA appears within normal limits.     The proximal MCAs and ACAs are unremarkable.     Findings were immediately discussed upon identification via telephone with the stroke service ([facility]) at approximately 09:20.        Impression     Multifocal acute posterior circulation distribution infarcts as further discussed above, particularly involving essentially the entirety of the left PCA territory.  Modest mass effect without significant hemorrhage.     Focal thrombus with severe stenosis extending from the right subclavian artery into the proximal right vertebral artery.     Focal occlusion of the proximal intracranial segment of the right vertebral artery.     Focal occlusion of the proximal left PCA.     Moderate focal stenosis at the left vertebral artery origin.     Anterior circulation appears unremarkable.     No evidence of fracture or traumatic malalignment in the cervical spine.     Emphysematous changes and patchy ground-glass opacity in the partially visualized lung apices.     This report was flagged in Epic as abnormal.        Electronically signed by:Geo Ambrocio MD  Date:                                                11/06/2024  Time:                                                09:33   CT Head Without Contrast     Narrative     EXAMINATION:  CT HEAD WITHOUT CONTRAST     CLINICAL HISTORY:  Stroke, follow up;     TECHNIQUE:  Low dose axial images were obtained through the head.  Coronal and sagittal reformations were also performed. Contrast was not administered.     COMPARISON:  CT, MRI 11/06/2024     FINDINGS:  Evolving posterior circulation infarcts are identified as described on recent MR imaging again most notable within the left greater than right occipital lobes extending to the medial left temporal lobe and bilateral thalamus.  No hemorrhagic conversion with potentially minimal stable petechial hemorrhage within the medial left thalamus.  No midline shift  or herniation with mild mass effect on the 3rd ventricle again noted.     No new acute territorial infarct.     The visualized sinuses and mastoid air cells are clear.        Impression     Expected evolutionary changes of the posterior circulation infarcts with no overt hemorrhagic conversion or significant midline shift/herniation.        Electronically signed by:Adrián Hobbs  Date:                                                11/09/2024  Time:                                                08:38            Results for orders placed or performed during the hospital encounter of 11/06/24 (from the past 2160 hours)   MRI Brain Without Contrast     Impression     Multiple posterior circulation infarcts are identified with involvement of the left greater than right occipital lobes and medial left temporal lobe, thalamus, right dorsal medulla, and minimally involving the cerebellum.  No midline shift with minimal mass effect on the 3rd ventricle.     Loss of flow void within the right intracranial vertebral artery in keeping with the findings on CTA.        Electronically signed by:Adrián Hobbs  Date:                                                11/06/2024  Time:                                                17:40      Cardiac Imaging   Results for orders placed during the hospital encounter of 11/06/24     Echo Saline Bubble? Yes; Ultrasound enhancing contrast? Yes     Interpretation Summary    Left Ventricle: The left ventricle is normal in size. Normal wall thickness. There is normal systolic function with a visually estimated ejection fraction of 55 - 60%. Biplane (2D) method of discs ejection fraction is 56%. Global longitudinal strain is -18.0%. There is normal diastolic function.    Right Ventricle: Normal right ventricular cavity size. Wall thickness is normal. Systolic function is normal.    Tricuspid Valve: There is mild regurgitation.    Pulmonary Artery: The estimated pulmonary artery systolic  pressure is at least 28 mmHg.    IVC/SVC: Patient is ventilated, cannot use IVC diameter to estimate right atrial pressure.

## 2024-11-21 NOTE — PT/OT/SLP PROGRESS
Speech Language Pathology Treatment    Patient Name:  Malathi Mcpherson   MRN:  [id number]  Admitting Diagnosis: Acute arterial ischemic stroke, multifocal, posterior circulation    Recommendations:                 General Recommendations:  Dysphagia therapy & repeat MBSS if/when appropriate, Speech/language therapy, and Cognitive-linguistic therapy   Diet recommendations:  NPO, Liquid Diet Level: NPO   Continue alternate means of nutrition/hydration/medication via NG tube  Team may wish to begin to consider more long term alternative means given aspiration risk and inconsistent progress towards oral feeding goals as part of discharge planning   For highest elimination of risk for aspiration, recommend pleasure feeds of tsp of puree WITH SLP ONLY with goal of advancing once cognition improves with the following precautions:   Avoid talking while eating  Check for pocketing/oral residue  Continue alternate means of nutrition  Eliminate distractions   Feed only when awake/alert  Frequent oral care  HOB to 90 degrees  Small bites/sips  Strict aspiration precautions   General Precautions: Standard, aspiration, fall  Communication strategies:  provide increased time to answer and go to room if call light pushed    Assessment:     Malathi Mcpherson is a 49 y.o. female with an SLP diagnosis of Dysphagia.     MBSS 11/18: Impressions: Patient demonstrates moderate Oropharyngeal dysphagia. Overall, pt's cognitive deficits, combative nature, and refusal behaviors interfering with safety of swallow and largely limited study  this date. Pt inconsistent bolus control paired with delayed swallow initiation did result in resulted in aspiration of nectar thickened liquids .  Pt weakness and poor attention to feeding tasks further increase her aspiration risk of all consistencies over time/duration of a meal.  Recommend pt remain npo with continued alternate means of nutrition. Recommend repeating MBSS when pt less combative and  improvement in overall cognitive presentation/command following. Speech to continue to closely monitor.      Subjective     Spoke with RN prior to session. Pt asleep upon entry to room. PEG placed 11/20.     Pain/Comfort:  Pain Rating 1: 0/10  Pain Rating Post-Intervention 1: 0/10    Respiratory Status: Room air    Objective:     Has the patient been evaluated by SLP for swallowing?   Yes  Keep patient NPO? Yes     Pt seen bedside for dysphagia therapy. Restraints in place, NGT removed, PEG in place 11/20. Pt roused and HOB elevated prior to PO trials. Pt drowsy, inconsistently and unreliably answering Y/N and only able to orient x 1 to self, first name only. Provided feed assist with 1/2 tsp trials of puree x 5 (vanilla pudding). Pt required prompting to elicit acceptance and extraction. Noted inconsistent timeliness of AP transit with subsequent delayed swallow trigger. She demonstrated cough response on 2/5 trials of puree eliminated with use of double swallow pattern, which pt implemented following max cueing and prompting from clinician. Pt refusing additional trials and drifting off to sleep, therefore additional PO trials deferred. She remains at high risk for aspiration 2/2 cognition and recommend she continue with alternative means of nutrition at this time. Education provided re: role of SLP, diet recs, swallow precs, s/s aspiration and POC.  Pt verbalized understanding though requires ongoing education and reinforcement.     Goals:   Multidisciplinary Problems       SLP Goals          Problem: SLP    Goal Priority Disciplines Outcome   SLP Goal     SLP Progressing   Description: Speech Language Pathology Goals  Goals expected to be met by 11/25    1. Pt will participate in ongoing swallow assessment to determine leats restrictive PO diet when appropriate.   2. Pt will complete cognitive-linguistic assessment to determine additional therapeutic needs.                                Plan:     Patient to be  seen:  4 x/week   Plan of Care expires:  12/11/24  Plan of Care reviewed with:  patient   SLP Follow-Up:  Yes       Discharge recommendations:   (tbd)/ TBD  Barriers to Discharge:  Level of Skilled Assistance Needed      Time Tracking:     SLP Treatment Date:   11/21/24  Speech Start Time:  1413  Speech Stop Time:  1423     Speech Total Time (min):  10 min    Billable Minutes: Treatment Swallowing Dysfunction 10    11/21/2024

## 2024-11-21 NOTE — PROGRESS NOTES
"[facility] - Neurosurgery ([facility])  Adult Nutrition  Progress Note    SUMMARY       Recommendations    Continue TF when able: Isosource 1.5 @ 45 mL/hr to provide 1080 mL total volume, 1620 kcal, 73 g protein, 190 g CHO, 825 mL free water, 16 g fiber, 108% DRI     Free water flushes 130 mL flush q 4 hours to provide additional 780 mL free water (total 1605 mL) or flush per MD.     RD to monitor and follow up.    Goals: Meet % EEN/EPN by RD follow up.  Nutrition Goal Status: goal not met  Communication of RD Recs:  (POC)    Assessment and Plan    Nutrition Problem  Impaired nutrient utilization      Related to (etiology):   Alcohol or drug use     Signs and Symptoms (as evidenced by):   History ETOH abuse, polysubstance abuse, B1, folic acid, MVI ordered     Interventions/Recommendations (treatment strategy):  Collab of care w/ other providers  EN     Nutrition Diagnosis Status:   Continues          Reason for Assessment    Reason For Assessment: RD follow-up  Diagnosis: other (see comments) (Acute arterial ischemic stroke, multifocal, posterior circulation)  General Information Comments: Current order for Isosource 1.5 @ 45 ml/hr - TF held for PEG placement today. NGT remains in place. No nausea, vomiting, or TF discomfort noted.  Nutrition Discharge Planning: Pending Clinical Course    Nutrition Related Social Determinants of Health: SDOH: Unable to assess at this time.       Nutrition Risk Screen    Nutrition Risk Screen: tube feeding or parenteral nutrition    Nutrition/Diet History    Patient Reported Diet/Restrictions/Preferences: other (see comments) (BETTYE)  Spiritual, Cultural Beliefs, Catholic Practices, Values that Affect Care: no  Food Allergies: NKFA  Factors Affecting Nutritional Intake: impaired cognitive status/motor control, NPO    Anthropometrics    Temp: 98.4 °F (36.9 °C)  Height Method: Estimated  Height: 5' 8" (172.7 cm)  Height (inches): 68 in  Weight Method: Bed Scale  Weight: 59 kg (130 lb " 1.1 oz)  Weight (lb): 130.07 lb  Ideal Body Weight (IBW), Female: 140 lb  % Ideal Body Weight, Female (lb): 92.91 %  BMI (Calculated): 19.8  BMI Grade: 18.5-24.9 - normal  Usual Body Weight (UBW), k kg  % Usual Body Weight: 100.21  % Weight Change From Usual Weight: 0 %       Lab/Procedures/Meds    Pertinent Labs Reviewed: reviewed  Pertinent Labs Comments: Glucose 123, albumin 3.1, ALT 50  Pertinent Medications Reviewed: reviewed  Pertinent Medications Comments: Aspirin, atorvastatin, folic acid, MVI, senna docusate, thiamine        Estimated/Assessed Needs    Weight Used For Calorie Calculations: 59 kg (130 lb 1.1 oz)  Energy Calorie Requirements (kcal): 4215-3493 kcal (25-30 kcal/kg)  Energy Need Method: Kcal/kg  Protein Requirements: 59-71 (1.0-1.2 g/kg ABW)  Weight Used For Protein Calculations: 59 kg (130 lb 1.1 oz)     Estimated Fluid Requirement Method: RDA Method  RDA Method (mL): 1475         Nutrition Prescription Ordered    Current Diet Order: NPO  Current Nutrition Support Formula Ordered: Isosource 1.5  Current Nutrition Support Rate Ordered: 0 (ml)  Current Nutrition Support Frequency Ordered: Feeds held    Evaluation of Received Nutrient/Fluid Intake    Enteral Calories (kcal): 0  Enteral Protein (gm): 0  Enteral (Free Water) Fluid (mL): 0  % Kcal Needs: 0  % Protein Needs: 0  I/O: - 2,218 net I/O  Comments: LBM 11/20  % Intake of Estimated Energy Needs: 0 - 25 %  % Meal Intake: NPO    Nutrition Risk    Level of Risk/Frequency of Follow-up:  (1x/week)     Monitor and Evaluation    Food and Nutrient Intake: enteral nutrition intake  Food and Nutrient Adminstration: enteral and parenteral nutrition administration  Knowledge/Beliefs/Attitudes: food and nutrition knowledge/skill  Physical Activity and Function: nutrition-related ADLs and IADLs, factors affecting access to physical activity  Anthropometric Measurements: weight change, weight, body mass index  Biochemical Data, Medical Tests and  Procedures: glucose/endocrine profile, gastrointestinal profile  Nutrition-Focused Physical Findings: overall appearance     Nutrition Follow-Up    RD Follow-up?: Yes    Sabrina Castillo, Registration Eligible, Provisional LDN

## 2024-11-21 NOTE — PLAN OF CARE
[facility] - Neurosurgery (Hospital)  Discharge Reassessment    Primary Care Provider: Adrián Hahn MD    Expected Discharge Date: 11/25/2024    Reassessment (most recent)       Discharge Reassessment - 11/21/24 1706          Discharge Reassessment    Assessment Type Discharge Planning Assessment (P)      Did the patient's condition or plan change since previous assessment? No (P)      Communicated LOUIE with patient/caregiver Date not available/Unable to determine (P)      Discharge Plan A Rehab (P)      Discharge Plan B Rehab (P)      DME Needed Upon Discharge  other (see comments) (P)    TBD    Transition of Care Barriers None (P)      Why the patient remains in the hospital Requires continued medical care (P)         Post-Acute Status    Post-Acute Authorization Placement (P)      Post-Acute Placement Status Referrals Sent (P)      Coverage Medicaid-[employer] or [place] (P)                      Pt's mother, [place] visiting with pt earlier today. SW collaboration with pt's nurse regarding discharge planning. Pt. remains in restraints and has a telesitter: barriers to rehab placement. Psych consulted. Plan to remove NG tube today. PEG placed yesterday.SW Secure [facility]. LOUIE: Monday, 11/25/24.     Discharge Plan A and Plan B have been determined by review of patient's clinical status, future medical and therapeutic needs, and coverage/benefits for post-acute care in coordination with multidisciplinary team members.     Carlos Montgomery LMSW

## 2024-11-21 NOTE — ASSESSMENT & PLAN NOTE
See hospital course for functional status.    Recommendations  -  Encourage mobility, OOB in chair, and early ambulation as appropriate  -  PT/OT evaluate and treat  -  Pain management  -  DVT prophylaxis if appropriate   -  Monitor for and prevent skin breakdown and pressure ulcers  Early mobility, repositioning/weight shifting every 20-30 minutes when sitting, turn patient every 2 hours, proper mattress/overlay and chair cushioning, pressure relief/heel protector boots

## 2024-11-21 NOTE — PROGRESS NOTES
Neno Conley - Neurosurgery ([facility])  Vascular Neurology  Comprehensive Stroke Center  Progress Note    Assessment/Plan:     * Acute arterial ischemic stroke, multifocal, posterior circulation  Malathi Mcpherson is a 49 y.o. female with PMH of drug use, seizure, HTN, HLD that [place] [facility] [place] and management of multifocal acute infarcts. She initially [place] [facility] [place]6 after being found lying face down at home that morning, per EMS was lethargic and aphasic and moving LUE/LLE/RLE but was not moving RUE. EMS also reported that they had seen her before in context of seizures. LKW 10pm 11/5. In ED was noted to have exam worsening, now with R sided plegia and global aphasia. NIHSS 22. Also noted to have O2 desat in 70s and ultimately was intubated for airway protection. CTA showed multifocal areas of hypodensity involving the posterior circulation suspicious for infarcts as well as focal occlusion of R vert and L PCA. Not candidate for acute stroke interventions, no TNK due to OOW and no IR due to stroke burden/risk for bleeding. She was admitted to [facility] for higher level of care. NSGY consulted on admit for edema/hemicrani watch, however no surgical interventions recommended. MRI brain confirmed multifocal areas of acute infarct involving: L>R occipital lobes, L medial temporal lobe, bilateral thalami, L hippocampus and R hippocampus tail, R dorsal medulla, bilateral cerebellar hemispheres. TTE unremarkable. Suspect etiology of stroke likely secondary to substance use, Utox + cocaine.    Interval hx: Episodes of coughing and vomiting overnight with concern for aspiration event. TF held, repeat KUB unremarkable. Worsening leukocytosis today, possibly related to aspiration event although uncertain. Will test COVID/flu/RSV, collect blood cultures. Consider addition of vancomycin especially is patient becomes febrile or clinically unstable. LFT's slightly improved, elevated INR. Acute hepatitis  panel ordered, consider liver ultrasound.     Antithrombotics for secondary stroke prevention: Antiplatelets: Aspirin: 81 mg daily  Clopidogrel: 75 mg daily.     Statins for secondary stroke prevention and hyperlipidemia, if present: Statins: Atorvastatin- 40 mg daily    Aggressive risk factor modification: HTN, HLD, Substance use.     Rehab efforts: The patient has been evaluated by a stroke team provider and the therapy needs have been fully considered based off the presenting complaints and exam findings. The following therapy evaluations are needed: PT evaluate and treat, OT evaluate and treat, SLP evaluate and treat. High intensity therapy recommended. Pt failed barium swallow study. PEG tube in place  Diagnostics ordered/pending: Other: Modified barium swallow    VTE prophylaxis: Enoxaparin 40 mg SQ every 24 hours  Mechanical prophylaxis: Place SCDs    BP parameters: Infarct: SBP goal <180      Transaminitis  LFTs uptrending, tylenol updated to PRN.   Decreased dose of statin to atorvastatin 40 mg 11/17 given elevated INR 1.5.   Consider liver ultrasound  Hep C antigen reactive, ordering RNA quant, pending results     Lab Results   Component Value Date    ALT 41 11/21/2024    AST 29 11/21/2024    ALKPHOS 91 11/21/2024    BILITOT 0.3 11/21/2024     Monitor daily CMP, PT/INR    Agitation  -see anxiety and depression    HTN (hypertension)  Patients blood pressure range in the last 24 hours was: BP  Min: 86/51  Max: 212/97. The patient's inpatient anti-hypertensive regimen is listed below:    Current Antihypertensives  , 2 times daily, Oral  carvediloL tablet 6.25 mg, 2 times daily, Per NG tube    - BP is controlled, no changes needed to their regimen  -SBP goal < 180      Right upper lobe pneumonia  Extubated 11/10 in [facility] with persistent oxygen requirement. CXR remarkable for RUL consolidation. Respiratory culture positive for H flu and MSSA. MRSA screen by PCR positive. Was transitioned to cefazolin 11/12 then  cefepime 11/14 after sensitivities c/w h flu beta lactamase positive. Extended course even persistent or worsening leukocytosis, plan for total 7 day course (EED 11/21). 11/18/24 worsening leukocytosis with WBC up to 19.25 from 17.82. Viral panel negative.     -wean oxygen as tolerated, stable on RA  -Finished with course of cefepime    - Vanc d/pardeep  - F/U CBC: WBC 14.28  - F/U blood cultures: NGTD  - RIP negative    Debility  -Due to stroke  -Aggressive therapy  -PT/OT/SLP eval and treat    Polysubstance abuse  -Utox + cocaine  -Suspect etiology of stroke likely due to substance use  -Continue thiamine/folate/multivitamin supplementation  -Addiction psych consulted early on admission    Cytotoxic cerebral edema  -Area of cerebral edema identified when reviewing brain imaging involving bilateral posterior circulation territories due to acute infarcts, there is mild mass effect associated with it that has remained stable on follow up imaging.  -Admitted to [facility] for close neuro monitoring and observation  -NSGY consulted on admit, no surgical intervention recommended and NSGY s/o  - Stepped down to floor  -Continue frequent q4hr neuro checks as any acute changes or worsening neuro status .  -Obtain stat CTH for any new or worsening neuro changes and notify primary team immediately    Anxiety and depression  -Psychiatry consulted, appreciate recs  Continue lithium 300 mg TID (resumed 11/13), Prozac 60 mg daily, Seroquel 25 mg in the AM and 50 mg in the PM, Remeron 7.5 mg nightly.   PRN Seroquel 25 mg BID for non-redirectable agitation.    -daily EKG to monitor Qtc: Elevated to 535, talking to psych for possible medication adjustment  - serum lithium level on 11/18/24: 0.4         11/07/2024 Pt was agitated overnight and is on fentanyl. Currently intubated.On repeated painful stimuli has movements on LUE,LLE, RLE> RUE.The movement on right side is an improvement from yesterday.  11/11/2024 NAEON, remains on precedex for  agitation. Extubated yesterday (11/10), tolerating extubation thus far without complication. Moving extremities spontaneously although weakness noted in RUE>>RLE. Remains globally aphasic and agitated despite max precedex infusion. Respiratory cultures + H flu, on rocephin for abx. Also on bactrim for SSTI. Home lithium restarted today, continue seoquel and fluoxetine.  11/14/2024 NAEO, patient off precedex, BL wrist and torso restraints, NG tube with feeds running. Patient able to respond to questions and follow some commands. Oriented to self only. Stable for stepdown.   11/15/2024 Pt still in restraints. Per nurse tendency to pull at tubes and things.On restraints. Oriented to self. No other complaints in the am.  11/16/2024 remains in restraints this AM, reportedly agitated overnight with concerns from nursing. Psychiatry consulted for med rec given extensive psych history on lithium and multiple different psych meds on admission med rec/recent dispense history and concerns for worsening agitation. LFTs uptrending, tylenol updated to PRN. Consider decreased dose of statin if worsening.   11/17/2024 episodes of coughing and vomiting overnight with concern for aspiration event. TF held, repeat KUB unremarkable. Worsening leukocytosis today, possibly related to aspiration event although uncertain. Will test COVID/flu/RSV, collect blood cultures. Consider addition of vancomycin especially is patient becomes febrile or clinically unstable. LFT's slightly improved, elevated INR. Acute hepatitis panel ordered, consider liver ultrasound.   11/18/24 Viral panel negative for any respiratory infection. WBC trending up to 19.25 from 17.82. Currently on Vanc and cefepime. LFT downtrending to normal values. Plan for barium swallow study today, to assess for the continuation of NG tube feedings. Plan to likely move to hospital medicine service.   11/19/2024 Pt failed barium swallow study yesterday. IR consulted for PEG tube  placement. CT abdomen ordered. Hep C antigen reactive, pending RNA quant. WBC trending down to 16. 35 from 19.25. Blood cultures NGTD.   11/20/2024 NAOE, WBC stable at 16.36, Bcx NGTD. Ordering RIP. Currently on Vanc and cefepime. Scheduled for PEG placement today. LFT improving. Pending Hep C RNA quant.   11/21/2024 NAOE, NG tube removed. PEG tube in place and functioning. WBC improving now down to 14. 28. LFT wnl. RIP negative and all other infectious panel negative. Vanc d/pardeep. Finished course of cefepime today.           STROKE DOCUMENTATION   Acute Stroke Times   Last Known Normal Date: 11/05/24  Last Known Normal Time: 2200  Symptom Onset Date: 11/06/24 (unsure of timing)  Symptom Onset Time:  (sometime this morning.)  Unknown Symptom Onset Time: Unknown Time  Stroke Team Called Date: 11/06/24  Stroke Team Called Time: 0843  Stroke Team Arrival Date: 11/06/24  Stroke Team Arrival Time: 0845  CT Interpretation Time: 0859  Thrombolytic Therapy Recommended: No  CTA Interpretation Time: 0902  Thrombectomy Recommended: No    NIH Scale:  1a. Level of Consciousness: 0-->Alert, keenly responsive  1b. LOC Questions: 1-->Answers one question correctly  1c. LOC Commands: 2-->Performs neither task correctly  2. Best Gaze: 0-->Normal  3. Visual: 0-->No visual loss  4. Facial Palsy: 0-->Normal symmetrical movements  5a. Motor Arm, Left: 0-->No drift, limb holds 90 (or 45) degrees for full 10 secs  5b. Motor Arm, Right: 0-->No drift, limb holds 90 (or 45) degrees for full 10 secs  6a. Motor Leg, Left: 0-->No drift, leg holds 30 degree position for full 5 secs  6b. Motor Leg, Right: 0-->No drift, leg holds 30 degree position for full 5 secs  7. Limb Ataxia: 0-->Absent  8. Sensory: 0-->Normal, no sensory loss  9. Best Language: 1-->Mild-to-moderate aphasia, some obvious loss of fluency or facility of comprehension, without significant limitation on ideas expressed or form of expression. Reduction of speech and/or  comprehension, however, makes conversation. . . (see row details)  10. Dysarthria: 0-->Normal  11. Extinction and Inattention (formerly Neglect): 0-->No abnormality  Total (NIH Stroke Scale): 4       Modified [place] Score: 0  [place] Coma Scale:15   ABCD2 Score:    [id number] Score:   HAS -BLED Score:   ICH Score:   Hunt & Agrueta Classification:      Hemorrhagic change of an Ischemic Stroke: Does this patient have an ischemic stroke with hemorrhagic changes? No     Neurologic Chief Complaint:  acute arterial ischemic stroke, multifocal, posterior circulation     Subjective:     Interval History: Patient is seen for follow-up neurological assessment and treatment recommendations: Please see hospital course    HPI, Past Medical, Family, and Social History remains the same as documented in the initial encounter.     Review of Systems   Unable to perform ROS: Mental status change     Scheduled Meds:   aspirin  81 mg Per NG tube Daily    atorvastatin  40 mg Per NG tube Daily    carvediloL  6.25 mg Per NG tube BID    clopidogreL  75 mg Per NG tube Daily    enoxparin  40 mg Subcutaneous Q24H (prophylaxis, 1700)    FLUoxetine  60 mg Per NG tube Daily    folic acid  1 mg Per NG tube Daily    LIDOcaine  1 patch Transdermal Q24H    lithium citrate 8 meq/5 ml  300 mg Per NG tube TID    melatonin  6 mg Per NG tube Nightly    methyl salicylate-menthol 15-10%   Topical (Top) TID    mirtazapine  7.5 mg Per NG tube QHS    multivitamin  1 tablet Per NG tube Daily    QUEtiapine  100 mg Per NG tube QHS    QUEtiapine  50 mg Per NG tube Daily    senna-docusate 8.6-50 mg  1 tablet Per NG tube Daily    thiamine  100 mg Per NG tube Daily     Continuous Infusions:  PRN Meds:  Current Facility-Administered Medications:     acetaminophen, 1,000 mg, Per NG tube, BID PRN    albuterol-ipratropium, 3 mL, Nebulization, Q6H PRN    polyethylene glycol, 17 g, Per NG tube, BID PRN    QUEtiapine, 25 mg, Per NG tube, BID PRN    sodium chloride 0.9%, 10  mL, Intravenous, PRN    Objective:     Vital Signs (Most Recent):  Temp: 98.4 °F (36.9 °C) (11/21/24 1140)  Pulse: 100 (11/21/24 1202)  Resp: 18 (11/21/24 1140)  BP: (!) 160/80 (11/21/24 1140)  SpO2: (!) 94 % (11/21/24 1140)  BP Location: Left arm    Vital Signs Range (Last 24H):  Temp:  [98 °F (36.7 °C)-98.7 °F (37.1 °C)]   Pulse:  []   Resp:  [18-22]   BP: (141-168)/(60-89)   SpO2:  [94 %-100 %]   BP Location: Left arm       Physical Exam  Vitals and nursing note reviewed.   Constitutional:       Appearance: She is ill-appearing.   Pulmonary:      Effort: Pulmonary effort is normal. No respiratory distress.   Abdominal:      General: There is no distension.      Palpations: Abdomen is soft.   Musculoskeletal:      Cervical back: Normal range of motion and neck supple.      Comments: Appears with Right sided weakness. Unable to fully assess as pt was not cooperating fully.    Skin:     General: Skin is warm and dry.      Capillary Refill: Capillary refill takes 2 to 3 seconds.   Neurological:      General: No focal deficit present.      Mental Status: She is alert and easily aroused.      GCS: GCS eye subscore is 3. GCS verbal subscore is 3. GCS motor subscore is 5.      Cranial Nerves: Dysarthria present.      Motor: Weakness present.      Coordination: Coordination abnormal.      Gait: Gait abnormal.   Psychiatric:         Mood and Affect: Affect is labile.         Behavior: Behavior is slowed and withdrawn.         Cognition and Memory: Cognition is impaired. Memory is impaired.              Neurological Exam:   LOC: alert  Attention Span: poor    Laboratory:  CMP:   Recent Labs   Lab 11/21/24  0518   CALCIUM 10.0   ALBUMIN 3.2*   PROT 7.7      K 3.6   CO2 26      BUN 17   CREATININE 0.7   ALKPHOS 91   ALT 41   AST 29   BILITOT 0.3     CBC:   Recent Labs   Lab 11/21/24  0518   WBC 14.28*   RBC 4.30   HGB 12.5   HCT 38.0   *   MCV 88   MCH 29.1   MCHC 32.9       Diagnostic Results      Brain Imaging / Vessel Imaging         Results for orders placed or performed during the hospital encounter of 11/06/24 (from the past 2160 hours)   CTA STROKE MULTI-PHASE     Narrative     EXAMINATION:  CTA STROKE MULTI-PHASE; CT CERVICAL SPINE WITHOUT CONTRAST     CLINICAL HISTORY:  Neuro deficit, acute, stroke suspected;; Neck trauma, intoxicated or obtunded (Age >= 16y);     TECHNIQUE:  Axial CT images obtained throughout the region of the head and cervical spine before the administration of intravenous contrast.     CT angiogram was performed through the cervical and intracranial vasculature during the IV bolus administration of 75mL of Omnipaque 350.  Two additional phases through the intracranial vasculature via multiphase technique.     Multiplanar MPR and MIP reformats were performed.     CT source data was analyzed using artificial intelligence software for detection of a large vessel occlusions (LVO) in order to enable computer assisted triage notification and aid clinical stroke decision making.     COMPARISON:  CTA head and cervical spine 10/25/2015     FINDINGS:  Large region of hypoattenuation loss of gray-white differentiation in left occipital lobe in medial temporal lobe in keeping with an acute PCA distribution infarct.  Modest localized mass effect with some sulcal crowding.  Additional involvement of the left thalamus.  Similar appearance in the medial right occipital lobe, but smaller area of infarction.  Small left superior cerebellar infarct, also likely acute.  No evidence of recent or remote major vascular distribution infarct in the anterior circulation.  No acute parenchymal hemorrhage.  No midline shift.     The ventricles are normal in size without evidence of hydrocephalus.     No extra-axial blood or fluid collections.     The cranium appears intact.     Mastoid air cells and paranasal sinuses are essentially clear.     The vertebral bodies are normal in height and morphology  without evidence of an acute displaced fracture or focal osseous destructive process.     Normal sagittal alignment is preserved.  No spondylolisthesis.     Intervertebral disc heights are well maintained.  No evidence of a bony spinal canal stenosis.     Evaluation intraspinal contents demonstrates no evidence of mass or hematoma.     No acute abnormalities in the paraspinal soft tissue structures.     Emphysematous changes patchy ground-glass opacity in the partially visualized lung apices.        CTA:     The aortic arch demonstrates no significant stenosis at the major branch vessel origins.     There is a focal thrombus in the right subclavian artery extending into the right vertebral artery origin with severe stenosis.  Thrombus extending over proximally 1 cm.  The V1 and V2 segments of right vertebral artery are otherwise normal in caliber of slightly decreased in attenuation in comparison to the other cervical vasculature.  Abrupt occlusion of distal right vertebral artery about the level of the PICA origin approximate 2 cm segment of non enhancement.     Moderate focal stenosis at the left vertebral artery origin.  Left vertebral artery is otherwise normal in caliber.  The basilar artery is normal in caliber.     The right common carotid artery is normal in caliber.  No significant stenosis at the carotid bifurcation.The right internal carotid artery is normal in caliber.     The left common carotid artery is normal in caliber. No significant stenosis at the carotid bifurcation.The left internal carotid artery is normal in caliber.     Abrupt non enhancement in the P1 segment of the left PCA.  The proximal right PCA appears within normal limits.     The proximal MCAs and ACAs are unremarkable.     Findings were immediately discussed upon identification via telephone with the stroke service ([facility]) at approximately 09:20.        Impression     Multifocal acute posterior circulation distribution  infarcts as further discussed above, particularly involving essentially the entirety of the left PCA territory.  Modest mass effect without significant hemorrhage.     Focal thrombus with severe stenosis extending from the right subclavian artery into the proximal right vertebral artery.     Focal occlusion of the proximal intracranial segment of the right vertebral artery.     Focal occlusion of the proximal left PCA.     Moderate focal stenosis at the left vertebral artery origin.     Anterior circulation appears unremarkable.     No evidence of fracture or traumatic malalignment in the cervical spine.     Emphysematous changes and patchy ground-glass opacity in the partially visualized lung apices.     This report was flagged in Epic as abnormal.        Electronically signed by:Geo Ambrocio MD  Date:                                                11/06/2024  Time:                                                09:33   CT Cervical Spine Without Contrast     Narrative     EXAMINATION:  CTA STROKE MULTI-PHASE; CT CERVICAL SPINE WITHOUT CONTRAST     CLINICAL HISTORY:  Neuro deficit, acute, stroke suspected;; Neck trauma, intoxicated or obtunded (Age >= 16y);     TECHNIQUE:  Axial CT images obtained throughout the region of the head and cervical spine before the administration of intravenous contrast.     CT angiogram was performed through the cervical and intracranial vasculature during the IV bolus administration of 75mL of Omnipaque 350.  Two additional phases through the intracranial vasculature via multiphase technique.     Multiplanar MPR and MIP reformats were performed.     CT source data was analyzed using artificial intelligence software for detection of a large vessel occlusions (LVO) in order to enable computer assisted triage notification and aid clinical stroke decision making.     COMPARISON:  CTA head and cervical spine 10/25/2015     FINDINGS:  Large region of hypoattenuation loss of gray-white  differentiation in left occipital lobe in medial temporal lobe in keeping with an acute PCA distribution infarct.  Modest localized mass effect with some sulcal crowding.  Additional involvement of the left thalamus.  Similar appearance in the medial right occipital lobe, but smaller area of infarction.  Small left superior cerebellar infarct, also likely acute.  No evidence of recent or remote major vascular distribution infarct in the anterior circulation.  No acute parenchymal hemorrhage.  No midline shift.     The ventricles are normal in size without evidence of hydrocephalus.     No extra-axial blood or fluid collections.     The cranium appears intact.     Mastoid air cells and paranasal sinuses are essentially clear.     The vertebral bodies are normal in height and morphology without evidence of an acute displaced fracture or focal osseous destructive process.     Normal sagittal alignment is preserved.  No spondylolisthesis.     Intervertebral disc heights are well maintained.  No evidence of a bony spinal canal stenosis.     Evaluation intraspinal contents demonstrates no evidence of mass or hematoma.     No acute abnormalities in the paraspinal soft tissue structures.     Emphysematous changes patchy ground-glass opacity in the partially visualized lung apices.        CTA:     The aortic arch demonstrates no significant stenosis at the major branch vessel origins.     There is a focal thrombus in the right subclavian artery extending into the right vertebral artery origin with severe stenosis.  Thrombus extending over proximally 1 cm.  The V1 and V2 segments of right vertebral artery are otherwise normal in caliber of slightly decreased in attenuation in comparison to the other cervical vasculature.  Abrupt occlusion of distal right vertebral artery about the level of the PICA origin approximate 2 cm segment of non enhancement.     Moderate focal stenosis at the left vertebral artery origin.  Left  vertebral artery is otherwise normal in caliber.  The basilar artery is normal in caliber.     The right common carotid artery is normal in caliber.  No significant stenosis at the carotid bifurcation.The right internal carotid artery is normal in caliber.     The left common carotid artery is normal in caliber. No significant stenosis at the carotid bifurcation.The left internal carotid artery is normal in caliber.     Abrupt non enhancement in the P1 segment of the left PCA.  The proximal right PCA appears within normal limits.     The proximal MCAs and ACAs are unremarkable.     Findings were immediately discussed upon identification via telephone with the stroke service ([facility]) at approximately 09:20.        Impression     Multifocal acute posterior circulation distribution infarcts as further discussed above, particularly involving essentially the entirety of the left PCA territory.  Modest mass effect without significant hemorrhage.     Focal thrombus with severe stenosis extending from the right subclavian artery into the proximal right vertebral artery.     Focal occlusion of the proximal intracranial segment of the right vertebral artery.     Focal occlusion of the proximal left PCA.     Moderate focal stenosis at the left vertebral artery origin.     Anterior circulation appears unremarkable.     No evidence of fracture or traumatic malalignment in the cervical spine.     Emphysematous changes and patchy ground-glass opacity in the partially visualized lung apices.     This report was flagged in Epic as abnormal.        Electronically signed by:Geo Ambrocio MD  Date:                                                11/06/2024  Time:                                                09:33   CT Head Without Contrast     Narrative     EXAMINATION:  CT HEAD WITHOUT CONTRAST     CLINICAL HISTORY:  Stroke, follow up;     TECHNIQUE:  Low dose axial images were obtained through the head.  Coronal and  sagittal reformations were also performed. Contrast was not administered.     COMPARISON:  CT, MRI 11/06/2024     FINDINGS:  Evolving posterior circulation infarcts are identified as described on recent MR imaging again most notable within the left greater than right occipital lobes extending to the medial left temporal lobe and bilateral thalamus.  No hemorrhagic conversion with potentially minimal stable petechial hemorrhage within the medial left thalamus.  No midline shift or herniation with mild mass effect on the 3rd ventricle again noted.     No new acute territorial infarct.     The visualized sinuses and mastoid air cells are clear.        Impression     Expected evolutionary changes of the posterior circulation infarcts with no overt hemorrhagic conversion or significant midline shift/herniation.        Electronically signed by:Adrián Hobbs  Date:                                                11/09/2024  Time:                                                08:38            Results for orders placed or performed during the hospital encounter of 11/06/24 (from the past 2160 hours)   MRI Brain Without Contrast     Impression     Multiple posterior circulation infarcts are identified with involvement of the left greater than right occipital lobes and medial left temporal lobe, thalamus, right dorsal medulla, and minimally involving the cerebellum.  No midline shift with minimal mass effect on the 3rd ventricle.     Loss of flow void within the right intracranial vertebral artery in keeping with the findings on CTA.        Electronically signed by:Adrián Hobbs  Date:                                                11/06/2024  Time:                                                17:40      Cardiac Imaging   Results for orders placed during the hospital encounter of 11/06/24     Echo Saline Bubble? Yes; Ultrasound enhancing contrast? Yes     Interpretation Summary    Left Ventricle: The left ventricle is  normal in size. Normal wall thickness. There is normal systolic function with a visually estimated ejection fraction of 55 - 60%. Biplane (2D) method of discs ejection fraction is 56%. Global longitudinal strain is -18.0%. There is normal diastolic function.    Right Ventricle: Normal right ventricular cavity size. Wall thickness is normal. Systolic function is normal.    Tricuspid Valve: There is mild regurgitation.    Pulmonary Artery: The estimated pulmonary artery systolic pressure is at least 28 mmHg.    IVC/SVC: Patient is ventilated, cannot use IVC diameter to estimate right atrial pressure.     Gerhard De Jesus MD  [facility]  Department of Vascular Neurology   [facility])

## 2024-11-21 NOTE — PLAN OF CARE
Recommendations    Continue TF when able: Isosource 1.5 @ 45 mL/hr to provide 1080 mL total volume, 1620 kcal, 73 g protein, 190 g CHO, 825 mL free water, 16 g fiber, 108% DRI     Free water flushes 130 mL flush q 4 hours to provide additional 780 mL free water (total 1605 mL) or flush per MD.     RD to monitor and follow up.    Goals: Meet % EEN/EPN by RD follow up.  Nutrition Goal Status: goal not met  Communication of RD Recs:  (POC)

## 2024-11-21 NOTE — SUBJECTIVE & OBJECTIVE
Interval History 11/21/2024:  Patient is seen for follow-up PM&R evaluation and recommendations: Pt seen at bedside. Mars GRIFFIN present. Mother at bedside. Unable to fully assess pt's orientation as she had incomprehensible speech. She did follow some commands when asked to raise leg for instance.   HPI, Past Medical, Family, and Social History remains the same as documented in the initial encounter.    Scheduled Medications:    aspirin  81 mg Per NG tube Daily    atorvastatin  40 mg Per NG tube Daily    carvediloL  6.25 mg Per NG tube BID    clopidogreL  75 mg Per NG tube Daily    enoxparin  40 mg Subcutaneous Q24H (prophylaxis, 1700)    FLUoxetine  60 mg Per NG tube Daily    folic acid  1 mg Per NG tube Daily    LIDOcaine  1 patch Transdermal Q24H    lithium citrate 8 meq/5 ml  300 mg Per NG tube TID    melatonin  6 mg Per NG tube Nightly    methyl salicylate-menthol 15-10%   Topical (Top) TID    mirtazapine  7.5 mg Per NG tube QHS    multivitamin  1 tablet Per NG tube Daily    QUEtiapine  100 mg Per NG tube QHS    QUEtiapine  50 mg Per NG tube Daily    senna-docusate 8.6-50 mg  1 tablet Per NG tube Daily    thiamine  100 mg Per NG tube Daily       Diagnostic Results: Labs: Reviewed    PRN Medications:   Current Facility-Administered Medications:     acetaminophen, 1,000 mg, Per NG tube, BID PRN    albuterol-ipratropium, 3 mL, Nebulization, Q6H PRN    polyethylene glycol, 17 g, Per NG tube, BID PRN    QUEtiapine, 25 mg, Per NG tube, BID PRN    sodium chloride 0.9%, 10 mL, Intravenous, PRN    Review of Systems   Unable to perform ROS: Mental status change (Incomprehnsible speech)     Objective:     Vital Signs (Most Recent):  Temp: 98.4 °F (36.9 °C) (11/21/24 1140)  Pulse: 100 (11/21/24 1202)  Resp: 18 (11/21/24 1140)  BP: (!) 160/80 (11/21/24 1140)  SpO2: (!) 94 % (11/21/24 1140)    Vital Signs (24h Range):  Temp:  [98 °F (36.7 °C)-98.7 °F (37.1 °C)] 98.4 °F (36.9 °C)  Pulse:  [] 100  Resp:  [18-32]  18  SpO2:  [94 %-100 %] 94 %  BP: (141-186)/(60-92) 160/80         Physical Exam  Vitals and nursing note reviewed.   Constitutional:       Appearance: She is ill-appearing.   Pulmonary:      Effort: Pulmonary effort is normal. No respiratory distress.   Abdominal:      General: There is no distension.      Palpations: Abdomen is soft.   Musculoskeletal:      Cervical back: Normal range of motion and neck supple.      Comments: Appears with Right sided weakness. Unable to fully assess as pt was not cooperating fully.    Skin:     General: Skin is warm and dry.      Capillary Refill: Capillary refill takes 2 to 3 seconds.   Neurological:      General: No focal deficit present.      Mental Status: She is alert and easily aroused.      GCS: GCS eye subscore is 3. GCS verbal subscore is 3. GCS motor subscore is 5.      Cranial Nerves: Dysarthria present.      Motor: Weakness present.      Coordination: Coordination abnormal.      Gait: Gait abnormal.   Psychiatric:         Mood and Affect: Affect is labile.         Behavior: Behavior is slowed and withdrawn.         Cognition and Memory: Cognition is impaired. Memory is impaired.

## 2024-11-21 NOTE — ASSESSMENT & PLAN NOTE
Extubated 11/10 in NCC with persistent oxygen requirement. CXR remarkable for RUL consolidation. Respiratory culture positive for H flu and MSSA. MRSA screen by PCR positive. Was transitioned to cefazolin 11/12 then cefepime 11/14 after sensitivities c/w h flu beta lactamase positive. Extended course even persistent or worsening leukocytosis, plan for total 7 day course (EED 11/21). 11/18/24 worsening leukocytosis with WBC up to 19.25 from 17.82. Viral panel negative.     -wean oxygen as tolerated, stable on RA  -Finished with course of cefepime    - Vanc d/pardeep  - F/U CBC: WBC 14.28  - F/U blood cultures: NGTD  - RIP negative

## 2024-11-21 NOTE — ASSESSMENT & PLAN NOTE
LFTs uptrending, tylenol updated to PRN.   Decreased dose of statin to atorvastatin 40 mg 11/17 given elevated INR 1.5.   Consider liver ultrasound  Hep C antigen reactive, ordering RNA quant, pending results     Lab Results   Component Value Date    ALT 41 11/21/2024    AST 29 11/21/2024    ALKPHOS 91 11/21/2024    BILITOT 0.3 11/21/2024     Monitor daily CMP, PT/INR

## 2024-11-21 NOTE — PT/OT/SLP PROGRESS
"Occupational Therapy   Treatment    Name: Malathi Mcpherson  MRN: [id number]  Admitting Diagnosis:  Acute arterial ischemic stroke, multifocal, posterior circulation       Recommendations:     Discharge Recommendations: High Intensity Therapy  Discharge Equipment Recommendations:  bath bench, bedside commode, wheelchair  Barriers to discharge:  None    Assessment:     Malathi Mcpherson is a 49 y.o. female with a medical diagnosis of Acute arterial ischemic stroke, multifocal, posterior circulation.  She presents with performance deficits affecting function are weakness, impaired endurance, impaired self care skills, impaired functional mobility, impaired balance, decreased upper extremity function, decreased lower extremity function, decreased ROM, impaired coordination, decreased safety awareness, impaired cognition, decreased coordination.   Improvements noted with attention and participation.    Rehab Prognosis:  Good; patient would benefit from acute skilled OT services to address these deficits and reach maximum level of function.       Plan:     Patient to be seen 4 x/week to address the above listed problems via neuromuscular re-education, therapeutic exercises, therapeutic activities, self-care/home management, cognitive retraining  Plan of Care Expires: 12/05/24  Plan of Care Reviewed with: patient    Subjective     Patient:  "Hey."  "I'm alright."  "It's 2016."  Pain/Comfort:  Pain Rating 1: 0/10  Pain Rating Post-Intervention 1: 0/10    Objective:     Communicated with: Nurse prior to session.  Patient found supine with bed alarm, telemetry, restraints, SCD, NG tube, PureWick, blood pressure cuff, PEG Tube ([employer] camera monitoring) upon OT entry to room.    General Precautions: Standard, aspiration, fall, NPO    Orthopedic Precautions:N/A  Braces: N/A  Respiratory Status: Room air     Occupational Performance:     Bed Mobility:    Patient completed Rolling/Turning to Left with  moderate " assistance  Patient completed Rolling/Turning to Right with moderate assistance  Patient completed Supine to Sit with moderate assistance  Patient completed Sit to Supine with moderate assistance     Functional Mobility/Transfers:  Max assist with sit-stand from EOB  Max assist with scooting along the EOB    Activities of Daily Living:  Upper Body Dressing: maximal assistance while seated EOB  Lower Body Dressing: total assistance while seated EOB    AMPA 6 Click ADL: 11    Treatment & Education:  Patient alert and oriented x person.  Daily orientation provided.  Able to follow 3/3 one step commands.  Patient attentive and interactive throughout the session.  Max assist with postural control while seated EOB during ADLs.      Patient left supine with all lines intact, call button in reach, bed alarm on, and restraints reapplied at end of session    GOALS:   Multidisciplinary Problems       Occupational Therapy Goals          Problem: Occupational Therapy    Goal Priority Disciplines Outcome Interventions   Occupational Therapy Goal     OT, PT/OT Progressing    Description: Goals set 11/11 with an expiration date, 12/5:  Patient will increase functional independence with ADLs by performing:    Patient will demonstrate rolling to the right with min assist.  Not met   Patient will demonstrate rolling to the left with min assist.   Not met  Patient will demonstrate supine -sit with min assist.   Not met  Patient will demonstrate squat pivot transfers with min assist.   Not met  Patient will demonstrate grooming while seated with min assist.   Not met  Patient will demonstrate upper body dressing with min assist while seated EOB.   Not met  Patient will demonstrate lower body dressing with mod assist while seated EOB.   Not met  Patient will demonstrate toileting with mod assist.   Not met  Patient's family / caregiver will demonstrate independence and safety with assisting patient with self-care skills and functional  mobility.     Not met  Patient's family / caregiver will demonstrate independence with providing ROM and changes in bed positioning.   Not met                                 Time Tracking:     OT Date of Treatment: 11/21/24  OT Start Time: 0615  OT Stop Time: 0649  OT Total Time (min): 34 min    Billable Minutes:Self Care/Home Management 20  Neuromuscular Re-education 14    OT/MADHURI: OT          11/21/2024

## 2024-11-22 PROBLEM — Z43.1 ATTENTION TO GASTROSTOMY: Status: ACTIVE | Noted: 2024-11-22

## 2024-11-22 PROBLEM — D75.839 THROMBOCYTOSIS: Status: ACTIVE | Noted: 2024-11-22

## 2024-11-22 PROBLEM — E87.1 HYPONATREMIA: Status: ACTIVE | Noted: 2024-11-22

## 2024-11-22 LAB
ALBUMIN SERPL BCP-MCNC: 3.4 G/DL (ref 3.5–5.2)
ALP SERPL-CCNC: 98 U/L (ref 40–150)
ALT SERPL W/O P-5'-P-CCNC: 45 U/L (ref 10–44)
ANION GAP SERPL CALC-SCNC: 13 MMOL/L (ref 8–16)
AST SERPL-CCNC: 30 U/L (ref 10–40)
BACTERIA BLD CULT: NORMAL
BACTERIA BLD CULT: NORMAL
BASOPHILS # BLD AUTO: 0.08 K/UL (ref 0–0.2)
BASOPHILS NFR BLD: 0.6 % (ref 0–1.9)
BILIRUB SERPL-MCNC: 0.2 MG/DL (ref 0.1–1)
BUN SERPL-MCNC: 23 MG/DL (ref 6–20)
CALCIUM SERPL-MCNC: 10.8 MG/DL (ref 8.7–10.5)
CHLORIDE SERPL-SCNC: 102 MMOL/L (ref 95–110)
CO2 SERPL-SCNC: 25 MMOL/L (ref 23–29)
CREAT SERPL-MCNC: 0.7 MG/DL (ref 0.5–1.4)
DIFFERENTIAL METHOD BLD: ABNORMAL
EOSINOPHIL # BLD AUTO: 0.4 K/UL (ref 0–0.5)
EOSINOPHIL NFR BLD: 2.6 % (ref 0–8)
ERYTHROCYTE [DISTWIDTH] IN BLOOD BY AUTOMATED COUNT: 14.8 % (ref 11.5–14.5)
EST. GFR  (NO RACE VARIABLE): >60 ML/MIN/1.73 M^2
GLUCOSE SERPL-MCNC: 142 MG/DL (ref 70–110)
HCT VFR BLD AUTO: 38.8 % (ref 37–48.5)
HGB BLD-MCNC: 12.9 G/DL (ref 12–16)
IMM GRANULOCYTES # BLD AUTO: 0.08 K/UL (ref 0–0.04)
IMM GRANULOCYTES NFR BLD AUTO: 0.6 % (ref 0–0.5)
LYMPHOCYTES # BLD AUTO: 2 K/UL (ref 1–4.8)
LYMPHOCYTES NFR BLD: 14.5 % (ref 18–48)
MAGNESIUM SERPL-MCNC: 2.5 MG/DL (ref 1.6–2.6)
MCH RBC QN AUTO: 29.5 PG (ref 27–31)
MCHC RBC AUTO-ENTMCNC: 33.2 G/DL (ref 32–36)
MCV RBC AUTO: 89 FL (ref 82–98)
MONOCYTES # BLD AUTO: 0.8 K/UL (ref 0.3–1)
MONOCYTES NFR BLD: 5.6 % (ref 4–15)
NEUTROPHILS # BLD AUTO: 10.4 K/UL (ref 1.8–7.7)
NEUTROPHILS NFR BLD: 76.1 % (ref 38–73)
NRBC BLD-RTO: 0 /100 WBC
OHS QRS DURATION: 74 MS
OHS QRS DURATION: 78 MS
OHS QRS DURATION: 78 MS
OHS QTC CALCULATION: 534 MS
OHS QTC CALCULATION: 548 MS
OHS QTC CALCULATION: 575 MS
PHOSPHATE SERPL-MCNC: 3.7 MG/DL (ref 2.7–4.5)
PLATELET # BLD AUTO: 760 K/UL (ref 150–450)
PMV BLD AUTO: 9.9 FL (ref 9.2–12.9)
POTASSIUM SERPL-SCNC: 3.7 MMOL/L (ref 3.5–5.1)
PROT SERPL-MCNC: 8.1 G/DL (ref 6–8.4)
RBC # BLD AUTO: 4.37 M/UL (ref 4–5.4)
SODIUM SERPL-SCNC: 140 MMOL/L (ref 136–145)
WBC # BLD AUTO: 13.6 K/UL (ref 3.9–12.7)

## 2024-11-22 PROCEDURE — 80053 COMPREHEN METABOLIC PANEL: CPT

## 2024-11-22 PROCEDURE — 25000003 PHARM REV CODE 250

## 2024-11-22 PROCEDURE — 97530 THERAPEUTIC ACTIVITIES: CPT | Mod: CQ

## 2024-11-22 PROCEDURE — 93010 ELECTROCARDIOGRAM REPORT: CPT | Mod: ,,, | Performed by: INTERNAL MEDICINE

## 2024-11-22 PROCEDURE — 84100 ASSAY OF PHOSPHORUS: CPT

## 2024-11-22 PROCEDURE — 97112 NEUROMUSCULAR REEDUCATION: CPT

## 2024-11-22 PROCEDURE — 85025 COMPLETE CBC W/AUTO DIFF WBC: CPT

## 2024-11-22 PROCEDURE — 99233 SBSQ HOSP IP/OBS HIGH 50: CPT | Mod: ,,, | Performed by: PSYCHIATRY & NEUROLOGY

## 2024-11-22 PROCEDURE — 63600175 PHARM REV CODE 636 W HCPCS

## 2024-11-22 PROCEDURE — 25000003 PHARM REV CODE 250: Performed by: STUDENT IN AN ORGANIZED HEALTH CARE EDUCATION/TRAINING PROGRAM

## 2024-11-22 PROCEDURE — 93005 ELECTROCARDIOGRAM TRACING: CPT

## 2024-11-22 PROCEDURE — 97112 NEUROMUSCULAR REEDUCATION: CPT | Mod: CQ

## 2024-11-22 PROCEDURE — 83735 ASSAY OF MAGNESIUM: CPT

## 2024-11-22 PROCEDURE — 36415 COLL VENOUS BLD VENIPUNCTURE: CPT

## 2024-11-22 PROCEDURE — 11000001 HC ACUTE MED/SURG PRIVATE ROOM

## 2024-11-22 PROCEDURE — 25000003 PHARM REV CODE 250: Performed by: PHYSICIAN ASSISTANT

## 2024-11-22 RX ADMIN — CLOPIDOGREL BISULFATE 75 MG: 75 TABLET ORAL at 09:11

## 2024-11-22 RX ADMIN — FOLIC ACID 1 MG: 1 TABLET ORAL at 09:11

## 2024-11-22 RX ADMIN — Medication 6 MG: at 09:11

## 2024-11-22 RX ADMIN — LITHIUM 300 MG: 8 SOLUTION ORAL at 02:11

## 2024-11-22 RX ADMIN — ASPIRIN 81 MG CHEWABLE TABLET 81 MG: 81 TABLET CHEWABLE at 09:11

## 2024-11-22 RX ADMIN — QUETIAPINE FUMARATE 50 MG: 25 TABLET ORAL at 09:11

## 2024-11-22 RX ADMIN — Medication 100 MG: at 09:11

## 2024-11-22 RX ADMIN — MUSCLE RUB CREAM: 100; 150 CREAM TOPICAL at 09:11

## 2024-11-22 RX ADMIN — LIDOCAINE 5% 1 PATCH: 700 PATCH TOPICAL at 02:11

## 2024-11-22 RX ADMIN — LITHIUM 300 MG: 8 SOLUTION ORAL at 09:11

## 2024-11-22 RX ADMIN — CARVEDILOL 6.25 MG: 6.25 TABLET, FILM COATED ORAL at 09:11

## 2024-11-22 RX ADMIN — SENNOSIDES AND DOCUSATE SODIUM 1 TABLET: 50; 8.6 TABLET ORAL at 09:11

## 2024-11-22 RX ADMIN — ENOXAPARIN SODIUM 40 MG: 40 INJECTION SUBCUTANEOUS at 05:11

## 2024-11-22 RX ADMIN — MUSCLE RUB CREAM: 100; 150 CREAM TOPICAL at 02:11

## 2024-11-22 RX ADMIN — THERA TABS 1 TABLET: TAB at 09:11

## 2024-11-22 RX ADMIN — MIRTAZAPINE 7.5 MG: 7.5 TABLET, FILM COATED ORAL at 09:11

## 2024-11-22 RX ADMIN — FLUOXETINE HYDROCHLORIDE 60 MG: 20 SOLUTION ORAL at 09:11

## 2024-11-22 RX ADMIN — QUETIAPINE FUMARATE 100 MG: 100 TABLET ORAL at 09:11

## 2024-11-22 RX ADMIN — ATORVASTATIN CALCIUM 40 MG: 40 TABLET, FILM COATED ORAL at 09:11

## 2024-11-22 NOTE — CARE UPDATE
I have reviewed the chart of Malathi Mcpherson who is hospitalized for the following:    Active Hospital Problems    Diagnosis    *Acute arterial ischemic stroke, multifocal, posterior circulation    Attention to gastrostomy     PEG in place      Hyponatremia     Monitor with daily cmp       Thrombocytosis    Dysphagia    Transaminitis    Tobacco dependency    Agitation    Hypokalemia     K 3.1 on 11/112  Replace  Monitor with daily cmp      Drug dependence     Uds + cocaine on admission  Kake on cessation      Body mass index (BMI) less than 19     underweight      Brain compression     mass effect on the 3rd ventricle   Required close monitoring in the icu with q1h neurochecks, and repeat imaging       Hypercoagulable state     hypercoagulopathy from cocaine use.   Kake on cessation      Hemiplegia     Therapy to evaluate and treat       Reduced mobility     Therapy to evaluate and treat       Sensory loss    Aphasia     Therapy to evaluate and treat       Acute hypoxemic respiratory failure     On admission noted to have O2 desat in 70s and ultimately was intubated and placed on ventilator       Encephalopathy, metabolic     AMS requiring restraints  Found to have Pneumonia and SSTI  Required antibiotics      Delirium    Hypomagnesemia     Replaced in the icu  Monitor with labs      HTN (hypertension)    Right upper lobe pneumonia    Folliculitis    HyperCKemia    Polysubstance abuse    Debility    Goals of care, counseling/discussion    Lithium toxicity    Cytotoxic cerebral edema    Anxiety and depression        Mari Markham NP  [facility]

## 2024-11-22 NOTE — PT/OT/SLP PROGRESS
Occupational Therapy   Treatment    Name: Malathi Mcpherson  MRN: [id number]  Admitting Diagnosis:  Acute arterial ischemic stroke, multifocal, posterior circulation       Recommendations:     Discharge Recommendations: High Intensity Therapy  Discharge Equipment Recommendations:  bath bench, bedside commode, wheelchair  Barriers to discharge:  None    Assessment:     Malathi Mcpherson is a 49 y.o. female with a medical diagnosis of Acute arterial ischemic stroke, multifocal, posterior circulation.  She presents with performance deficits affecting function are impaired endurance, impaired balance, decreased lower extremity function, decreased upper extremity function, gait instability, decreased ROM, impaired coordination, impaired fine motor, decreased safety awareness, impaired sensation, impaired self care skills, impaired cognition, decreased coordination, impaired functional mobility, abnormal tone.     Rehab Prognosis:  Good; patient would benefit from acute skilled OT services to address these deficits and reach maximum level of function.       Plan:     Patient to be seen 4 x/week to address the above listed problems via neuromuscular re-education, therapeutic exercises, therapeutic activities, self-care/home management, cognitive retraining  Plan of Care Expires: 12/05/24  Plan of Care Reviewed with: patient    Subjective     Patient:  nonverbal during session; communicating via head nods  Pain/Comfort:  Pain Rating 1: 0/10  Pain Rating Post-Intervention 1: 0/10    Objective:     Communicated with: nurse prior to session.  Patient found supine with bed alarm, telemetry, restraints, SCD, PureWick, PEG Tube ([employer] camera monitoring) upon OT entry to room.    General Precautions: Standard, aspiration, fall, NPO    Orthopedic Precautions:N/A  Braces: N/A  Respiratory Status: Room air     Occupational Performance:     Bed Mobility:    Patient completed Rolling/Turning to Right with maximal assistance  Patient  completed Supine to Sit with maximal assistance  Patient completed Sit to Supine with maximal assistance     Functional Mobility/Transfers:  NT 2* lethargy    Activities of Daily Living:  Feeding:  NPO    Lower Body Dressing: total assistance while seated donning socks    [facility] 6 Click ADL: 9    Treatment & Education:  Daily orientation provided. Patient following simple commands intermittently; lethargic.  AAROM performed R UE one set x 10 rep in all planes of motion.  Family not present.    Patient left supine with all lines intact, call button in reach, bed alarm on, and mitts reapplied    GOALS:   Multidisciplinary Problems       Occupational Therapy Goals          Problem: Occupational Therapy    Goal Priority Disciplines Outcome Interventions   Occupational Therapy Goal     OT, PT/OT Progressing    Description: Goals set 11/11 with an expiration date, 12/5:  Patient will increase functional independence with ADLs by performing:    Patient will demonstrate rolling to the right with min assist.  Not met   Patient will demonstrate rolling to the left with min assist.   Not met  Patient will demonstrate supine -sit with min assist.   Not met  Patient will demonstrate squat pivot transfers with min assist.   Not met  Patient will demonstrate grooming while seated with min assist.   Not met  Patient will demonstrate upper body dressing with min assist while seated EOB.   Not met  Patient will demonstrate lower body dressing with mod assist while seated EOB.   Not met  Patient will demonstrate toileting with mod assist.   Not met  Patient's family / caregiver will demonstrate independence and safety with assisting patient with self-care skills and functional mobility.     Not met  Patient's family / caregiver will demonstrate independence with providing ROM and changes in bed positioning.   Not met                                 Time Tracking:     OT Date of Treatment: 11/22/24  OT Start Time: 0715  OT Stop Time:  0731  OT Total Time (min): 16 min    Billable Minutes:Neuromuscular Re-education 16    OT/MADHURI: OT          11/22/2024

## 2024-11-22 NOTE — PT/OT/SLP PROGRESS
Physical Therapy Treatment    Patient Name:  Malathi Mcpherson   MRN:  [id number]    Recommendations:     Discharge Recommendations: High Intensity Therapy  Discharge Equipment Recommendations: bedside commode, bath bench, wheelchair  Barriers to discharge:  Evolving clinical presentation and increased physical assistance needed    Assessment:     Malathi Mcpherson is a 49 y.o. female admitted with a medical diagnosis of Acute arterial ischemic stroke, multifocal, posterior circulation.  She presents with the following impairments/functional limitations: impaired endurance, impaired balance, decreased lower extremity function, decreased upper extremity function, gait instability, decreased ROM, impaired coordination, impaired fine motor, decreased safety awareness, impaired sensation, impaired self care skills, impaired cognition, decreased coordination, impaired functional mobility, abnormal tone.    Pt met with HOB elevated, A&O x1, and agreeable to session. Pt initially appears lethargic but more alert once seated at EOB with eyes open ~50% of session with verbal cueing. Pt also able to follow ~10% of single step commands this session. Pt tolerates session well with emphasis on bed mobility, sitting balance, and transfers. Pt requires decreased assistance with bed mobility and sitting balance this session and tolerates x2 trials of STS transfers. Pt is not at PLOF and will continue to benefit from skilled therapy services.    Rehab Prognosis: Poor; patient would benefit from acute skilled PT services to address these deficits and reach maximum level of function.    Recent Surgery: * No surgery found *      Plan:     During this hospitalization, patient to be seen 4 x/week to address the identified rehab impairments via gait training, therapeutic activities, therapeutic exercises, neuromuscular re-education and progress toward the following goals:    Plan of Care Expires:  12/14/24    Subjective     Chief  Complaint: pt grimacing and groaning throughout session but unable to specify or rate pain  Patient/Family Comments/goals: none stated  Pain/Comfort:  Pain Rating 1: other (see comments) (unable to rate/specify)  Pain Addressed 1: Reposition, Distraction  Pain Rating Post-Intervention 2: other (see comments) (unable to rate/specify)      Objective:     Communicated with RN (Nico) prior to session.  Patient found HOB elevated with bed alarm, telemetry, SCD, PureWick, PEG Tube (KELSEY mitts) upon PTA entry to room.     General Precautions: Standard, aspiration, fall  Orthopedic Precautions: N/A  Braces: N/A  Respiratory Status: Room air    Cognition:    Patient is oriented to Person (able to state name and  birthday) , difficult to assess, patient mostly non-verbal  Command following: Follows ~10% one-step verbal commands  Fluency: mostly non-verbal, groaning throughout session     Functional Mobility:  Bed Mobility:     Rolling Right: maximal assistance  Scooting: anteriorly to EOB maximal assistance  Supine to Sit: maximal assistance x2 persons for Trunk/BLEs  Sit to Supine: maximal assistance x2 persons for Trunk/BLEs  Transfers:     Sit to Stand:  x2 trials from EOB maximal assistance with no AD/KELSEY HHA and KELSEY knees blocked  Balance:   Sitting Balance at EOB:  Level of Assist Required: Moderate Assistance-CGA  Deviations noted: slouched posture, decreased trunk control, multidirectional lean, eventually progressing to upright with CGA  Total Time Sitting EOB: ~ 15 minutes  Standing Balance:  Level of Assist Required: Maximum Assistance x2 persons  Deviations noted: forward flexed posture, decreased hip extension, R lateral lean, KELSEY knees buckling  Verbal and tactile cues provided for upright posture, gluteal activation, weight shift to L. Assistance provided for KELSEY knee extension.  Total Time Standing: ~30 seconds    AM-PAC 6 CLICK MOBILITY  Turning over in bed (including adjusting bedclothes, sheets and  blankets)?: 2  Sitting down on and standing up from a chair with arms (e.g., wheelchair, bedside commode, etc.): 2  Moving from lying on back to sitting on the side of the bed?: 2  Moving to and from a bed to a chair (including a wheelchair)?: 1  Need to walk in hospital room?: 1  Climbing 3-5 steps with a railing?: 1  Basic Mobility Total Score: 9       Treatment & Education:  Patient provided with daily orientation and goals of this PT session.     Pt performed the following BLE therex seated at EOB:  X10 AP, LAQ, and hip flexion PROM to RLE, AAROM to LLE    Pt educated to call for assistance and to transfer with hospital staff only.  Also, pt was educated on the effects of prolonged immobility and the importance of performing OOB activity and exercises to promote healing and reduce recovery time.    Patient left HOB elevated with all lines intact, call button in reach, bed alarm on, and RN notified.    GOALS:   Multidisciplinary Problems       Physical Therapy Goals          Problem: Physical Therapy    Goal Priority Disciplines Outcome Interventions   Physical Therapy Goal     PT, PT/OT Progressing    Description: Goals to be met by: 24     Patient will increase functional independence with mobility by performin. Supine to sit with Contact Guard Assistance  2. Sit to supine with Contact Guard Assistance  3. Sit to stand transfer with Minimal Assistance  4. Bed to chair transfer with Minimal Assistance using LRAD as needed  5. Gait  x 50 feet with Minimal Assistance using LRAD as needed.   6. Lower extremity exercise program x10 reps per handout, with assistance as needed    DME Justifications (see above for complete DME recommendations)    Bedside Commode- Patient has a mobility limitation that significantly impairs their ability to participate in one or more mobility related activities of daily living, including toileting. This deficit can be resolved by using a bedside commode. Patient  demonstrates mobility limitations that will cause them to be confined to one room at home without bathroom access for up to 30 days. Using a bedside commode will greatly improve the patient's ability to participate in MRADLs.     Hospital Bed ·       Patient requires a hospital bed for positioning of the body in ways that are not feasible with an ordinary bed. The patient requires special positioning for pain relief, limited mobility, and/or being unable to independently make changes in body position without the use of a hospital bed. Pillows and wedges will not be adequate for resolving these positional issues.                         Time Tracking:     PT Received On: 11/22/24  PT Start Time: 0839     PT Stop Time: 0903  PT Total Time (min): 24 min     Billable Minutes: Therapeutic Activity 15 and Neuromuscular Re-education 9    Treatment Type: Treatment  PT/PTA: PTA     Number of PTA visits since last PT visit: 1 11/22/2024

## 2024-11-22 NOTE — PLAN OF CARE
Problem: Adult Inpatient Plan of Care  Goal: Plan of Care Review  Outcome: Not Progressing  Goal: Patient-Specific Goal (Individualized)  Outcome: Not Progressing  Goal: Absence of Hospital-Acquired Illness or Injury  Outcome: Not Progressing  Goal: Optimal Comfort and Wellbeing  Outcome: Not Progressing  Goal: Readiness for Transition of Care  Outcome: Not Progressing     Problem: Infection  Goal: Absence of Infection Signs and Symptoms  Outcome: Not Progressing     Problem: Wound  Goal: Optimal Coping  Outcome: Not Progressing  Goal: Optimal Functional Ability  Outcome: Not Progressing  Goal: Absence of Infection Signs and Symptoms  Outcome: Not Progressing  Goal: Improved Oral Intake  Outcome: Not Progressing  Goal: Optimal Pain Control and Function  Outcome: Not Progressing  Goal: Skin Health and Integrity  Outcome: Not Progressing  Goal: Optimal Wound Healing  Outcome: Not Progressing     Problem: Stroke, Ischemic (Includes Transient Ischemic Attack)  Goal: Optimal Coping  Outcome: Not Progressing  Goal: Effective Bowel Elimination  Outcome: Not Progressing  Goal: Optimal Cerebral Tissue Perfusion  Outcome: Not Progressing  Goal: Optimal Cognitive Function  Outcome: Not Progressing  Goal: Improved Communication Skills  Outcome: Not Progressing  Goal: Optimal Functional Ability  Outcome: Not Progressing  Goal: Optimal Nutrition Intake  Outcome: Not Progressing  Goal: Effective Oxygenation and Ventilation  Outcome: Not Progressing  Goal: Improved Sensorimotor Function  Outcome: Not Progressing  Goal: Safe and Effective Swallow  Outcome: Not Progressing  Goal: Effective Urinary Elimination  Outcome: Not Progressing     Problem: Mechanical Ventilation Invasive  Goal: Effective Communication  Outcome: Not Progressing  Goal: Optimal Device Function  Outcome: Not Progressing  Goal: Mechanical Ventilation Liberation  Outcome: Not Progressing  Goal: Optimal Nutrition Delivery  Outcome: Not Progressing  Goal: Absence  of Device-Related Skin and Tissue Injury  Outcome: Not Progressing  Goal: Absence of Ventilator-Induced Lung Injury  Outcome: Not Progressing     Problem: Artificial Airway  Goal: Effective Communication  Outcome: Not Progressing  Goal: Optimal Device Function  Outcome: Not Progressing  Goal: Absence of Device-Related Skin or Tissue Injury  Outcome: Not Progressing     Problem: Fall Injury Risk  Goal: Absence of Fall and Fall-Related Injury  Outcome: Not Progressing     Problem: Restraint, Nonviolent  Goal: Absence of Harm or Injury  Outcome: Not Progressing     Problem: Skin Injury Risk Increased  Goal: Skin Health and Integrity  Outcome: Not Progressing     Problem: Pneumonia  Goal: Fluid Balance  Outcome: Not Progressing  Goal: Resolution of Infection Signs and Symptoms  Outcome: Not Progressing  Goal: Effective Oxygenation and Ventilation  Outcome: Not Progressing           POC updated and reviewed with the patient at the bedside. Questions regarding POC were encouraged and addressed. VSS, see flowsheets. Patient is AOX 1 at this time. Tele maintained per order. NAEON. Fall and safety precautions maintained, no signs of injury noted during shift. Patient repositioned  with assistance in bed for comfort. Upon exiting room, patient's bed locked in low position, side rails up x 3, bed alarm on, with call light within reach. Stroke book and education reviewed at the bedside, see education flowsheets for details. No acute signs of distress noted at this time.

## 2024-11-22 NOTE — PROGRESS NOTES
Neno Conley - Neurosurgery ([facility])  Vascular Neurology  Comprehensive Stroke Center  Progress Note    Assessment/Plan:     * Acute arterial ischemic stroke, multifocal, posterior circulation  Malathi Mcpherson is a 49 y.o. female with PMH of drug use, seizure, HTN, HLD that is [place] to [facility] [place] and management of multifocal acute infarcts. She initially presented to [facility] ED 11/6 after being found lying face down at home that morning, per EMS was lethargic and aphasic and moving LUE/LLE/RLE but was not moving RUE. EMS also reported that they had seen her before in context of seizures. LKW 10pm 11/5. In ED was noted to have exam worsening, now with R sided plegia and global aphasia. NIHSS 22. Also noted to have O2 desat in 70s and ultimately was intubated for airway protection. CTA showed multifocal areas of hypodensity involving the posterior circulation suspicious for infarcts as well as focal occlusion of R vert and L PCA. Not candidate for acute stroke interventions, no TNK due to OOW and no IR due to stroke burden/risk for bleeding. She was admitted to [facility] for higher level of care. NSGY consulted on admit for edema/hemicrani watch, however no surgical interventions recommended. MRI brain confirmed multifocal areas of acute infarct involving: L>R occipital lobes, L medial temporal lobe, bilateral thalami, L hippocampus and R hippocampus tail, R dorsal medulla, bilateral cerebellar hemispheres. TTE unremarkable. Suspect etiology of stroke likely secondary to substance use, Utox + cocaine.    Interval hx: Episodes of coughing and vomiting overnight with concern for aspiration event. TF held, repeat KUB unremarkable. Worsening leukocytosis today, possibly related to aspiration event although uncertain. Will test COVID/flu/RSV, collect blood cultures. Consider addition of vancomycin especially is patient becomes febrile or clinically unstable. LFT's slightly improved, elevated INR. Acute hepatitis  panel ordered, consider liver ultrasound.     Antithrombotics for secondary stroke prevention: Antiplatelets: Aspirin: 81 mg daily  Clopidogrel: 75 mg daily.     Statins for secondary stroke prevention and hyperlipidemia, if present: Statins: Atorvastatin- 40 mg daily    Aggressive risk factor modification: HTN, HLD, Substance use.     Rehab efforts: The patient has been evaluated by a stroke team provider and the therapy needs have been fully considered based off the presenting complaints and exam findings. The following therapy evaluations are needed: PT evaluate and treat, OT evaluate and treat, SLP evaluate and treat. High intensity therapy recommended. Pt failed barium swallow study. PEG tube in place  Diagnostics ordered/pending: Other: Modified barium swallow    VTE prophylaxis: Enoxaparin 40 mg SQ every 24 hours  Mechanical prophylaxis: Place SCDs    BP parameters: Infarct: SBP goal <180      Transaminitis  LFTs uptrending, tylenol updated to PRN.   Decreased dose of statin to atorvastatin 40 mg 11/17 given elevated INR 1.5.   Consider liver ultrasound  Hep C antigen reactive, ordering RNA quant, pending results     Lab Results   Component Value Date    ALT 41 11/21/2024    AST 29 11/21/2024    ALKPHOS 91 11/21/2024    BILITOT 0.3 11/21/2024     Monitor daily CMP, PT/INR    Agitation  -see anxiety and depression    HTN (hypertension)  Patients blood pressure range in the last 24 hours was: BP  Min: 86/51  Max: 212/97. The patient's inpatient anti-hypertensive regimen is listed below:    Current Antihypertensives  , 2 times daily, Oral  carvediloL tablet 6.25 mg, 2 times daily, Per NG tube    - BP is controlled, no changes needed to their regimen  -SBP goal < 180      Right upper lobe pneumonia  Extubated 11/10 in [facility] with persistent oxygen requirement. CXR remarkable for RUL consolidation. Respiratory culture positive for H flu and MSSA. MRSA screen by PCR positive. Was transitioned to cefazolin 11/12 then  cefepime 11/14 after sensitivities c/w h flu beta lactamase positive. Extended course even persistent or worsening leukocytosis, plan for total 7 day course (EED 11/21). 11/18/24 worsening leukocytosis with WBC up to 19.25 from 17.82. Viral panel negative.     -wean oxygen as tolerated, stable on RA  -Finished with course of cefepime    - Vanc d/pardeep  - F/U CBC: WBC 14.28  - F/U blood cultures: NGTD  - RIP negative    Debility  -Due to stroke  -Aggressive therapy  -PT/OT/SLP eval and treat    Polysubstance abuse  -Utox + cocaine  -Suspect etiology of stroke likely due to substance use  -Continue thiamine/folate/multivitamin supplementation  -Addiction psych consulted early on admission    Cytotoxic cerebral edema  -Area of cerebral edema identified when reviewing brain imaging involving bilateral posterior circulation territories due to acute infarcts, there is mild mass effect associated with it that has remained stable on follow up imaging.  -Admitted to [facility] for close neuro monitoring and observation  -NSGY consulted on admit, no surgical intervention recommended and NSGY s/o  - Stepped down to floor  -Continue frequent q4hr neuro checks as any acute changes or worsening neuro status .  -Obtain stat CTH for any new or worsening neuro changes and notify primary team immediately    Anxiety and depression  -Psychiatry consulted, appreciate recs  Continue lithium 300 mg TID (resumed 11/13), Prozac 60 mg daily, Seroquel 25 mg in the AM and 50 mg in the PM, Remeron 7.5 mg nightly.   PRN Seroquel 25 mg BID for non-redirectable agitation.    -daily EKG to monitor Qtc: Elevated to 535, talking to psych for possible medication adjustment  - serum lithium level on 11/18/24: 0.4         11/07/2024 Pt was agitated overnight and is on fentanyl. Currently intubated.On repeated painful stimuli has movements on LUE,LLE, RLE> RUE.The movement on right side is an improvement from yesterday.  11/11/2024 NAEON, remains on precedex for  agitation. Extubated yesterday (11/10), tolerating extubation thus far without complication. Moving extremities spontaneously although weakness noted in RUE>>RLE. Remains globally aphasic and agitated despite max precedex infusion. Respiratory cultures + H flu, on rocephin for abx. Also on bactrim for SSTI. Home lithium restarted today, continue seoquel and fluoxetine.  11/14/2024 NAEO, patient off precedex, BL wrist and torso restraints, NG tube with feeds running. Patient able to respond to questions and follow some commands. Oriented to self only. Stable for stepdown.   11/15/2024 Pt still in restraints. Per nurse tendency to pull at tubes and things.On restraints. Oriented to self. No other complaints in the am.  11/16/2024 remains in restraints this AM, reportedly agitated overnight with concerns from nursing. Psychiatry consulted for med rec given extensive psych history on lithium and multiple different psych meds on admission med rec/recent dispense history and concerns for worsening agitation. LFTs uptrending, tylenol updated to PRN. Consider decreased dose of statin if worsening.   11/17/2024 episodes of coughing and vomiting overnight with concern for aspiration event. TF held, repeat KUB unremarkable. Worsening leukocytosis today, possibly related to aspiration event although uncertain. Will test COVID/flu/RSV, collect blood cultures. Consider addition of vancomycin especially is patient becomes febrile or clinically unstable. LFT's slightly improved, elevated INR. Acute hepatitis panel ordered, consider liver ultrasound.   11/18/24 Viral panel negative for any respiratory infection. WBC trending up to 19.25 from 17.82. Currently on Vanc and cefepime. LFT downtrending to normal values. Plan for barium swallow study today, to assess for the continuation of NG tube feedings. Plan to likely move to hospital medicine service.   11/19/2024 Pt failed barium swallow study yesterday. IR consulted for PEG tube  placement. CT abdomen ordered. Hep C antigen reactive, pending RNA quant. WBC trending down to 16. 35 from 19.25. Blood cultures NGTD.   11/20/2024 NAOE, WBC stable at 16.36, Bcx NGTD. Ordering RIP. Currently on Vanc and cefepime. Scheduled for PEG placement today. LFT improving. Pending Hep C RNA quant.   11/21/2024 NAOE, NG tube removed. PEG tube in place and functioning. WBC improving now down to 14. 28. LFT wnl. RIP negative and all other infectious panel negative. Vanc d/pardeep. Finished course of cefepime today.   11/22/2024 NAOE, Peg tube functioning. Off restrains. WBC improving. Qtc elevated to 530's. Talked to Psych team, no change in medication. Pending rehab placement.     STROKE DOCUMENTATION   Acute Stroke Times   Last Known Normal Date: 11/05/24  Last Known Normal Time: 2200  Symptom Onset Date: 11/06/24 (unsure of timing)  Symptom Onset Time:  (sometime this morning.)  Unknown Symptom Onset Time: Unknown Time  Stroke Team Called Date: 11/06/24  Stroke Team Called Time: 0843  Stroke Team Arrival Date: 11/06/24  Stroke Team Arrival Time: 0845  CT Interpretation Time: 0859  Thrombolytic Therapy Recommended: No  CTA Interpretation Time: 0902  Thrombectomy Recommended: No    NIH Scale:  1a. Level of Consciousness: 0-->Alert, keenly responsive  1b. LOC Questions: 1-->Answers one question correctly  1c. LOC Commands: 2-->Performs neither task correctly  2. Best Gaze: 0-->Normal  3. Visual: 3-->Bilateral hemianopia (blind including cortical blindness)  4. Facial Palsy: 0-->Normal symmetrical movements  5a. Motor Arm, Left: 0-->No drift, limb holds 90 (or 45) degrees for full 10 secs  5b. Motor Arm, Right: 0-->No drift, limb holds 90 (or 45) degrees for full 10 secs  6a. Motor Leg, Left: 0-->No drift, leg holds 30 degree position for full 5 secs  6b. Motor Leg, Right: 0-->No drift, leg holds 30 degree position for full 5 secs  7. Limb Ataxia: 0-->Absent  8. Sensory: 0-->Normal, no sensory loss  9. Best Language:  1-->Mild-to-moderate aphasia, some obvious loss of fluency or facility of comprehension, without significant limitation on ideas expressed or form of expression. Reduction of speech and/or comprehension, however, makes conversation. . . (see row details)  10. Dysarthria: 0-->Normal  11. Extinction and Inattention (formerly Neglect): 0-->No abnormality  Total (NIH Stroke Scale): 7       Modified Ada Score: 0  Shailesh Coma Scale:15   ABCD2 Score:    [id number] Score:   HAS -BLED Score:   ICH Score:   Hunt & Argueta Classification:      Hemorrhagic change of an Ischemic Stroke: Does this patient have an ischemic stroke with hemorrhagic changes? No     Neurologic Chief Complaint: acute arterial ischemic stroke, multifocal, posterior circulation     Subjective:     Interval History: Patient is seen for follow-up neurological assessment and treatment recommendations: Please see hospital course    HPI, Past Medical, Family, and Social History remains the same as documented in the initial encounter.     Review of Systems   Unable to perform ROS: Mental status change     Scheduled Meds:   aspirin  81 mg Per NG tube Daily    atorvastatin  40 mg Per NG tube Daily    carvediloL  6.25 mg Per NG tube BID    clopidogreL  75 mg Per NG tube Daily    enoxparin  40 mg Subcutaneous Q24H (prophylaxis, 1700)    FLUoxetine  60 mg Per NG tube Daily    folic acid  1 mg Per NG tube Daily    LIDOcaine  1 patch Transdermal Q24H    lithium citrate 8 meq/5 ml  300 mg Per NG tube TID    melatonin  6 mg Per NG tube Nightly    methyl salicylate-menthol 15-10%   Topical (Top) TID    mirtazapine  7.5 mg Per NG tube QHS    multivitamin  1 tablet Per NG tube Daily    QUEtiapine  100 mg Per NG tube QHS    QUEtiapine  50 mg Per NG tube Daily    senna-docusate 8.6-50 mg  1 tablet Per NG tube Daily    thiamine  100 mg Per NG tube Daily     Continuous Infusions:  PRN Meds:  Current Facility-Administered Medications:     acetaminophen, 1,000 mg, Per NG  tube, BID PRN    albuterol-ipratropium, 3 mL, Nebulization, Q6H PRN    polyethylene glycol, 17 g, Per NG tube, BID PRN    QUEtiapine, 25 mg, Per NG tube, BID PRN    sodium chloride 0.9%, 10 mL, Intravenous, PRN    Objective:     Vital Signs (Most Recent):  Temp: 98 °F (36.7 °C) (11/22/24 1116)  Pulse: 91 (11/22/24 1116)  Resp: 16 (11/22/24 1116)  BP: 121/77 (11/22/24 1116)  SpO2: 96 % (11/22/24 1116)  BP Location: Right arm    Vital Signs Range (Last 24H):  Temp:  [97.6 °F (36.4 °C)-99.7 °F (37.6 °C)]   Pulse:  []   Resp:  [16-18]   BP: (121-161)/(73-95)   SpO2:  [93 %-100 %]   BP Location: Right arm       Physical Exam  Vitals and nursing note reviewed.   Constitutional:       Appearance: She is ill-appearing.   Pulmonary:      Effort: Pulmonary effort is normal. No respiratory distress.   Abdominal:      General: There is no distension.      Palpations: Abdomen is soft.   Musculoskeletal:      Cervical back: Normal range of motion and neck supple.      Comments: Appears with Right sided weakness. Unable to fully assess as pt was not cooperating fully.    Skin:     General: Skin is warm and dry.      Capillary Refill: Capillary refill takes 2 to 3 seconds.   Neurological:      General: No focal deficit present.      Mental Status: She is alert and easily aroused.      GCS: GCS eye subscore is 3. GCS verbal subscore is 3. GCS motor subscore is 5.      Cranial Nerves: Dysarthria present.      Motor: Weakness present.      Coordination: Coordination abnormal.      Gait: Gait abnormal.   Psychiatric:         Mood and Affect: Affect is labile.         Behavior: Behavior is slowed.         Cognition and Memory: Cognition is impaired. Memory is impaired.              Neurological Exam:   LOC: alert  Attention Span: poor    Laboratory:  CMP:   Recent Labs   Lab 11/22/24  0913   CALCIUM 10.8*   ALBUMIN 3.4*   PROT 8.1      K 3.7   CO2 25      BUN 23*   CREATININE 0.7   ALKPHOS 98   ALT 45*   AST 30    BILITOT 0.2     CBC:   Recent Labs   Lab 11/22/24  0913   WBC 13.60*   RBC 4.37   HGB 12.9   HCT 38.8   *   MCV 89   MCH 29.5   MCHC 33.2       Diagnostic Results     CTA STROKE MULTI-PHASE     Narrative     EXAMINATION:  CTA STROKE MULTI-PHASE; CT CERVICAL SPINE WITHOUT CONTRAST     CLINICAL HISTORY:  Neuro deficit, acute, stroke suspected;; Neck trauma, intoxicated or obtunded (Age >= 16y);     TECHNIQUE:  Axial CT images obtained throughout the region of the head and cervical spine before the administration of intravenous contrast.     CT angiogram was performed through the cervical and intracranial vasculature during the IV bolus administration of 75mL of Omnipaque 350.  Two additional phases through the intracranial vasculature via multiphase technique.     Multiplanar MPR and MIP reformats were performed.     CT source data was analyzed using artificial intelligence software for detection of a large vessel occlusions (LVO) in order to enable computer assisted triage notification and aid clinical stroke decision making.     COMPARISON:  CTA head and cervical spine 10/25/2015     FINDINGS:  Large region of hypoattenuation loss of gray-white differentiation in left occipital lobe in medial temporal lobe in keeping with an acute PCA distribution infarct.  Modest localized mass effect with some sulcal crowding.  Additional involvement of the left thalamus.  Similar appearance in the medial right occipital lobe, but smaller area of infarction.  Small left superior cerebellar infarct, also likely acute.  No evidence of recent or remote major vascular distribution infarct in the anterior circulation.  No acute parenchymal hemorrhage.  No midline shift.     The ventricles are normal in size without evidence of hydrocephalus.     No extra-axial blood or fluid collections.     The cranium appears intact.     Mastoid air cells and paranasal sinuses are essentially clear.     The vertebral bodies are normal in  height and morphology without evidence of an acute displaced fracture or focal osseous destructive process.     Normal sagittal alignment is preserved.  No spondylolisthesis.     Intervertebral disc heights are well maintained.  No evidence of a bony spinal canal stenosis.     Evaluation intraspinal contents demonstrates no evidence of mass or hematoma.     No acute abnormalities in the paraspinal soft tissue structures.     Emphysematous changes patchy ground-glass opacity in the partially visualized lung apices.        CTA:     The aortic arch demonstrates no significant stenosis at the major branch vessel origins.     There is a focal thrombus in the right subclavian artery extending into the right vertebral artery origin with severe stenosis.  Thrombus extending over proximally 1 cm.  The V1 and V2 segments of right vertebral artery are otherwise normal in caliber of slightly decreased in attenuation in comparison to the other cervical vasculature.  Abrupt occlusion of distal right vertebral artery about the level of the PICA origin approximate 2 cm segment of non enhancement.     Moderate focal stenosis at the left vertebral artery origin.  Left vertebral artery is otherwise normal in caliber.  The basilar artery is normal in caliber.     The right common carotid artery is normal in caliber.  No significant stenosis at the carotid bifurcation.The right internal carotid artery is normal in caliber.     The left common carotid artery is normal in caliber. No significant stenosis at the carotid bifurcation.The left internal carotid artery is normal in caliber.     Abrupt non enhancement in the P1 segment of the left PCA.  The proximal right PCA appears within normal limits.     The proximal MCAs and ACAs are unremarkable.     Findings were immediately discussed upon identification via telephone with the stroke service ([facility]) at approximately 09:20.        Impression     Multifocal acute posterior  circulation distribution infarcts as further discussed above, particularly involving essentially the entirety of the left PCA territory.  Modest mass effect without significant hemorrhage.     Focal thrombus with severe stenosis extending from the right subclavian artery into the proximal right vertebral artery.     Focal occlusion of the proximal intracranial segment of the right vertebral artery.     Focal occlusion of the proximal left PCA.     Moderate focal stenosis at the left vertebral artery origin.     Anterior circulation appears unremarkable.     No evidence of fracture or traumatic malalignment in the cervical spine.     Emphysematous changes and patchy ground-glass opacity in the partially visualized lung apices.     This report was flagged in Epic as abnormal.        Electronically signed by:Geo Ambrocio MD  Date:                                                11/06/2024  Time:                                                09:33   CT Cervical Spine Without Contrast     Narrative     EXAMINATION:  CTA STROKE MULTI-PHASE; CT CERVICAL SPINE WITHOUT CONTRAST     CLINICAL HISTORY:  Neuro deficit, acute, stroke suspected;; Neck trauma, intoxicated or obtunded (Age >= 16y);     TECHNIQUE:  Axial CT images obtained throughout the region of the head and cervical spine before the administration of intravenous contrast.     CT angiogram was performed through the cervical and intracranial vasculature during the IV bolus administration of 75mL of Omnipaque 350.  Two additional phases through the intracranial vasculature via multiphase technique.     Multiplanar MPR and MIP reformats were performed.     CT source data was analyzed using artificial intelligence software for detection of a large vessel occlusions (LVO) in order to enable computer assisted triage notification and aid clinical stroke decision making.     COMPARISON:  CTA head and cervical spine 10/25/2015     FINDINGS:  Large region of  hypoattenuation loss of gray-white differentiation in left occipital lobe in medial temporal lobe in keeping with an acute PCA distribution infarct.  Modest localized mass effect with some sulcal crowding.  Additional involvement of the left thalamus.  Similar appearance in the medial right occipital lobe, but smaller area of infarction.  Small left superior cerebellar infarct, also likely acute.  No evidence of recent or remote major vascular distribution infarct in the anterior circulation.  No acute parenchymal hemorrhage.  No midline shift.     The ventricles are normal in size without evidence of hydrocephalus.     No extra-axial blood or fluid collections.     The cranium appears intact.     Mastoid air cells and paranasal sinuses are essentially clear.     The vertebral bodies are normal in height and morphology without evidence of an acute displaced fracture or focal osseous destructive process.     Normal sagittal alignment is preserved.  No spondylolisthesis.     Intervertebral disc heights are well maintained.  No evidence of a bony spinal canal stenosis.     Evaluation intraspinal contents demonstrates no evidence of mass or hematoma.     No acute abnormalities in the paraspinal soft tissue structures.     Emphysematous changes patchy ground-glass opacity in the partially visualized lung apices.        CTA:     The aortic arch demonstrates no significant stenosis at the major branch vessel origins.     There is a focal thrombus in the right subclavian artery extending into the right vertebral artery origin with severe stenosis.  Thrombus extending over proximally 1 cm.  The V1 and V2 segments of right vertebral artery are otherwise normal in caliber of slightly decreased in attenuation in comparison to the other cervical vasculature.  Abrupt occlusion of distal right vertebral artery about the level of the PICA origin approximate 2 cm segment of non enhancement.     Moderate focal stenosis at the left  vertebral artery origin.  Left vertebral artery is otherwise normal in caliber.  The basilar artery is normal in caliber.     The right common carotid artery is normal in caliber.  No significant stenosis at the carotid bifurcation.The right internal carotid artery is normal in caliber.     The left common carotid artery is normal in caliber. No significant stenosis at the carotid bifurcation.The left internal carotid artery is normal in caliber.     Abrupt non enhancement in the P1 segment of the left PCA.  The proximal right PCA appears within normal limits.     The proximal MCAs and ACAs are unremarkable.     Findings were immediately discussed upon identification via telephone with the stroke service ([facility]) at approximately 09:20.        Impression     Multifocal acute posterior circulation distribution infarcts as further discussed above, particularly involving essentially the entirety of the left PCA territory.  Modest mass effect without significant hemorrhage.     Focal thrombus with severe stenosis extending from the right subclavian artery into the proximal right vertebral artery.     Focal occlusion of the proximal intracranial segment of the right vertebral artery.     Focal occlusion of the proximal left PCA.     Moderate focal stenosis at the left vertebral artery origin.     Anterior circulation appears unremarkable.     No evidence of fracture or traumatic malalignment in the cervical spine.     Emphysematous changes and patchy ground-glass opacity in the partially visualized lung apices.     This report was flagged in Epic as abnormal.        Electronically signed by:Geo Ambrocio MD  Date:                                                11/06/2024  Time:                                                09:33   CT Head Without Contrast     Narrative     EXAMINATION:  CT HEAD WITHOUT CONTRAST     CLINICAL HISTORY:  Stroke, follow up;     TECHNIQUE:  Low dose axial images were obtained  through the head.  Coronal and sagittal reformations were also performed. Contrast was not administered.     COMPARISON:  CT, MRI 11/06/2024     FINDINGS:  Evolving posterior circulation infarcts are identified as described on recent MR imaging again most notable within the left greater than right occipital lobes extending to the medial left temporal lobe and bilateral thalamus.  No hemorrhagic conversion with potentially minimal stable petechial hemorrhage within the medial left thalamus.  No midline shift or herniation with mild mass effect on the 3rd ventricle again noted.     No new acute territorial infarct.     The visualized sinuses and mastoid air cells are clear.        Impression     Expected evolutionary changes of the posterior circulation infarcts with no overt hemorrhagic conversion or significant midline shift/herniation.        Electronically signed by:Adrián Hobbs  Date:                                                11/09/2024  Time:                                                08:38            Results for orders placed or performed during the hospital encounter of 11/06/24 (from the past 2160 hours)   MRI Brain Without Contrast     Impression     Multiple posterior circulation infarcts are identified with involvement of the left greater than right occipital lobes and medial left temporal lobe, thalamus, right dorsal medulla, and minimally involving the cerebellum.  No midline shift with minimal mass effect on the 3rd ventricle.     Loss of flow void within the right intracranial vertebral artery in keeping with the findings on CTA.        Electronically signed by:Adrián Hobbs  Date:                                                11/06/2024  Time:                                                17:40      Cardiac Imaging   Results for orders placed during the hospital encounter of 11/06/24     Echo Saline Bubble? Yes; Ultrasound enhancing contrast? Yes     Interpretation Summary    Left  Ventricle: The left ventricle is normal in size. Normal wall thickness. There is normal systolic function with a visually estimated ejection fraction of 55 - 60%. Biplane (2D) method of discs ejection fraction is 56%. Global longitudinal strain is -18.0%. There is normal diastolic function.    Right Ventricle: Normal right ventricular cavity size. Wall thickness is normal. Systolic function is normal.    Tricuspid Valve: There is mild regurgitation.    Pulmonary Artery: The estimated pulmonary artery systolic pressure is at least 28 mmHg.    IVC/SVC: Patient is ventilated, cannot use IVC diameter to estimate right atrial pressure.     Gerhard De Jesus MD  [facility]  Department of Vascular Neurology   [facility])

## 2024-11-22 NOTE — SUBJECTIVE & OBJECTIVE
Neurologic Chief Complaint: acute arterial ischemic stroke, multifocal, posterior circulation     Subjective:     Interval History: Patient is seen for follow-up neurological assessment and treatment recommendations: Please see hospital course    HPI, Past Medical, Family, and Social History remains the same as documented in the initial encounter.     Review of Systems   Unable to perform ROS: Mental status change     Scheduled Meds:   aspirin  81 mg Per NG tube Daily    atorvastatin  40 mg Per NG tube Daily    carvediloL  6.25 mg Per NG tube BID    clopidogreL  75 mg Per NG tube Daily    enoxparin  40 mg Subcutaneous Q24H (prophylaxis, 1700)    FLUoxetine  60 mg Per NG tube Daily    folic acid  1 mg Per NG tube Daily    LIDOcaine  1 patch Transdermal Q24H    lithium citrate 8 meq/5 ml  300 mg Per NG tube TID    melatonin  6 mg Per NG tube Nightly    methyl salicylate-menthol 15-10%   Topical (Top) TID    mirtazapine  7.5 mg Per NG tube QHS    multivitamin  1 tablet Per NG tube Daily    QUEtiapine  100 mg Per NG tube QHS    QUEtiapine  50 mg Per NG tube Daily    senna-docusate 8.6-50 mg  1 tablet Per NG tube Daily    thiamine  100 mg Per NG tube Daily     Continuous Infusions:  PRN Meds:  Current Facility-Administered Medications:     acetaminophen, 1,000 mg, Per NG tube, BID PRN    albuterol-ipratropium, 3 mL, Nebulization, Q6H PRN    polyethylene glycol, 17 g, Per NG tube, BID PRN    QUEtiapine, 25 mg, Per NG tube, BID PRN    sodium chloride 0.9%, 10 mL, Intravenous, PRN    Objective:     Vital Signs (Most Recent):  Temp: 98 °F (36.7 °C) (11/22/24 1116)  Pulse: 91 (11/22/24 1116)  Resp: 16 (11/22/24 1116)  BP: 121/77 (11/22/24 1116)  SpO2: 96 % (11/22/24 1116)  BP Location: Right arm    Vital Signs Range (Last 24H):  Temp:  [97.6 °F (36.4 °C)-99.7 °F (37.6 °C)]   Pulse:  []   Resp:  [16-18]   BP: (121-161)/(73-95)   SpO2:  [93 %-100 %]   BP Location: Right arm       Physical Exam  Vitals and nursing note  reviewed.   Constitutional:       Appearance: She is ill-appearing.   Pulmonary:      Effort: Pulmonary effort is normal. No respiratory distress.   Abdominal:      General: There is no distension.      Palpations: Abdomen is soft.   Musculoskeletal:      Cervical back: Normal range of motion and neck supple.      Comments: Appears with Right sided weakness. Unable to fully assess as pt was not cooperating fully.    Skin:     General: Skin is warm and dry.      Capillary Refill: Capillary refill takes 2 to 3 seconds.   Neurological:      General: No focal deficit present.      Mental Status: She is alert and easily aroused.      GCS: GCS eye subscore is 3. GCS verbal subscore is 3. GCS motor subscore is 5.      Cranial Nerves: Dysarthria present.      Motor: Weakness present.      Coordination: Coordination abnormal.      Gait: Gait abnormal.   Psychiatric:         Mood and Affect: Affect is labile.         Behavior: Behavior is slowed.         Cognition and Memory: Cognition is impaired. Memory is impaired.              Neurological Exam:   LOC: alert  Attention Span: poor    Laboratory:  CMP:   Recent Labs   Lab 11/22/24  0913   CALCIUM 10.8*   ALBUMIN 3.4*   PROT 8.1      K 3.7   CO2 25      BUN 23*   CREATININE 0.7   ALKPHOS 98   ALT 45*   AST 30   BILITOT 0.2     CBC:   Recent Labs   Lab 11/22/24  0913   WBC 13.60*   RBC 4.37   HGB 12.9   HCT 38.8   *   MCV 89   MCH 29.5   MCHC 33.2       Diagnostic Results     CTA STROKE MULTI-PHASE     Narrative     EXAMINATION:  CTA STROKE MULTI-PHASE; CT CERVICAL SPINE WITHOUT CONTRAST     CLINICAL HISTORY:  Neuro deficit, acute, stroke suspected;; Neck trauma, intoxicated or obtunded (Age >= 16y);     TECHNIQUE:  Axial CT images obtained throughout the region of the head and cervical spine before the administration of intravenous contrast.     CT angiogram was performed through the cervical and intracranial vasculature during the IV bolus administration  of 75mL of Omnipaque 350.  Two additional phases through the intracranial vasculature via multiphase technique.     Multiplanar MPR and MIP reformats were performed.     CT source data was analyzed using artificial intelligence software for detection of a large vessel occlusions (LVO) in order to enable computer assisted triage notification and aid clinical stroke decision making.     COMPARISON:  CTA head and cervical spine 10/25/2015     FINDINGS:  Large region of hypoattenuation loss of gray-white differentiation in left occipital lobe in medial temporal lobe in keeping with an acute PCA distribution infarct.  Modest localized mass effect with some sulcal crowding.  Additional involvement of the left thalamus.  Similar appearance in the medial right occipital lobe, but smaller area of infarction.  Small left superior cerebellar infarct, also likely acute.  No evidence of recent or remote major vascular distribution infarct in the anterior circulation.  No acute parenchymal hemorrhage.  No midline shift.     The ventricles are normal in size without evidence of hydrocephalus.     No extra-axial blood or fluid collections.     The cranium appears intact.     Mastoid air cells and paranasal sinuses are essentially clear.     The vertebral bodies are normal in height and morphology without evidence of an acute displaced fracture or focal osseous destructive process.     Normal sagittal alignment is preserved.  No spondylolisthesis.     Intervertebral disc heights are well maintained.  No evidence of a bony spinal canal stenosis.     Evaluation intraspinal contents demonstrates no evidence of mass or hematoma.     No acute abnormalities in the paraspinal soft tissue structures.     Emphysematous changes patchy ground-glass opacity in the partially visualized lung apices.        CTA:     The aortic arch demonstrates no significant stenosis at the major branch vessel origins.     There is a focal thrombus in the right  subclavian artery extending into the right vertebral artery origin with severe stenosis.  Thrombus extending over proximally 1 cm.  The V1 and V2 segments of right vertebral artery are otherwise normal in caliber of slightly decreased in attenuation in comparison to the other cervical vasculature.  Abrupt occlusion of distal right vertebral artery about the level of the PICA origin approximate 2 cm segment of non enhancement.     Moderate focal stenosis at the left vertebral artery origin.  Left vertebral artery is otherwise normal in caliber.  The basilar artery is normal in caliber.     The right common carotid artery is normal in caliber.  No significant stenosis at the carotid bifurcation.The right internal carotid artery is normal in caliber.     The left common carotid artery is normal in caliber. No significant stenosis at the carotid bifurcation.The left internal carotid artery is normal in caliber.     Abrupt non enhancement in the P1 segment of the left PCA.  The proximal right PCA appears within normal limits.     The proximal MCAs and ACAs are unremarkable.     Findings were immediately discussed upon identification via telephone with the stroke service ([place]) at approximately 09:20.        Impression     Multifocal acute posterior circulation distribution infarcts as further discussed above, particularly involving essentially the entirety of the left PCA territory.  Modest mass effect without significant hemorrhage.     Focal thrombus with severe stenosis extending from the right subclavian artery into the proximal right vertebral artery.     Focal occlusion of the proximal intracranial segment of the right vertebral artery.     Focal occlusion of the proximal left PCA.     Moderate focal stenosis at the left vertebral artery origin.     Anterior circulation appears unremarkable.     No evidence of fracture or traumatic malalignment in the cervical spine.     Emphysematous changes and patchy  ground-glass opacity in the partially visualized lung apices.     This report was flagged in Epic as abnormal.        Electronically signed by:Geo Ambrocio MD  Date:                                                11/06/2024  Time:                                                09:33   CT Cervical Spine Without Contrast     Narrative     EXAMINATION:  CTA STROKE MULTI-PHASE; CT CERVICAL SPINE WITHOUT CONTRAST     CLINICAL HISTORY:  Neuro deficit, acute, stroke suspected;; Neck trauma, intoxicated or obtunded (Age >= 16y);     TECHNIQUE:  Axial CT images obtained throughout the region of the head and cervical spine before the administration of intravenous contrast.     CT angiogram was performed through the cervical and intracranial vasculature during the IV bolus administration of 75mL of Omnipaque 350.  Two additional phases through the intracranial vasculature via multiphase technique.     Multiplanar MPR and MIP reformats were performed.     CT source data was analyzed using artificial intelligence software for detection of a large vessel occlusions (LVO) in order to enable computer assisted triage notification and aid clinical stroke decision making.     COMPARISON:  CTA head and cervical spine 10/25/2015     FINDINGS:  Large region of hypoattenuation loss of gray-white differentiation in left occipital lobe in medial temporal lobe in keeping with an acute PCA distribution infarct.  Modest localized mass effect with some sulcal crowding.  Additional involvement of the left thalamus.  Similar appearance in the medial right occipital lobe, but smaller area of infarction.  Small left superior cerebellar infarct, also likely acute.  No evidence of recent or remote major vascular distribution infarct in the anterior circulation.  No acute parenchymal hemorrhage.  No midline shift.     The ventricles are normal in size without evidence of hydrocephalus.     No extra-axial blood or fluid collections.     The cranium  appears intact.     Mastoid air cells and paranasal sinuses are essentially clear.     The vertebral bodies are normal in height and morphology without evidence of an acute displaced fracture or focal osseous destructive process.     Normal sagittal alignment is preserved.  No spondylolisthesis.     Intervertebral disc heights are well maintained.  No evidence of a bony spinal canal stenosis.     Evaluation intraspinal contents demonstrates no evidence of mass or hematoma.     No acute abnormalities in the paraspinal soft tissue structures.     Emphysematous changes patchy ground-glass opacity in the partially visualized lung apices.        CTA:     The aortic arch demonstrates no significant stenosis at the major branch vessel origins.     There is a focal thrombus in the right subclavian artery extending into the right vertebral artery origin with severe stenosis.  Thrombus extending over proximally 1 cm.  The V1 and V2 segments of right vertebral artery are otherwise normal in caliber of slightly decreased in attenuation in comparison to the other cervical vasculature.  Abrupt occlusion of distal right vertebral artery about the level of the PICA origin approximate 2 cm segment of non enhancement.     Moderate focal stenosis at the left vertebral artery origin.  Left vertebral artery is otherwise normal in caliber.  The basilar artery is normal in caliber.     The right common carotid artery is normal in caliber.  No significant stenosis at the carotid bifurcation.The right internal carotid artery is normal in caliber.     The left common carotid artery is normal in caliber. No significant stenosis at the carotid bifurcation.The left internal carotid artery is normal in caliber.     Abrupt non enhancement in the P1 segment of the left PCA.  The proximal right PCA appears within normal limits.     The proximal MCAs and ACAs are unremarkable.     Findings were immediately discussed upon identification via telephone  with the stroke service ([place]) at approximately 09:20.        Impression     Multifocal acute posterior circulation distribution infarcts as further discussed above, particularly involving essentially the entirety of the left PCA territory.  Modest mass effect without significant hemorrhage.     Focal thrombus with severe stenosis extending from the right subclavian artery into the proximal right vertebral artery.     Focal occlusion of the proximal intracranial segment of the right vertebral artery.     Focal occlusion of the proximal left PCA.     Moderate focal stenosis at the left vertebral artery origin.     Anterior circulation appears unremarkable.     No evidence of fracture or traumatic malalignment in the cervical spine.     Emphysematous changes and patchy ground-glass opacity in the partially visualized lung apices.     This report was flagged in Epic as abnormal.        Electronically signed by:Geo Ambrocio MD  Date:                                                11/06/2024  Time:                                                09:33   CT Head Without Contrast     Narrative     EXAMINATION:  CT HEAD WITHOUT CONTRAST     CLINICAL HISTORY:  Stroke, follow up;     TECHNIQUE:  Low dose axial images were obtained through the head.  Coronal and sagittal reformations were also performed. Contrast was not administered.     COMPARISON:  CT, MRI 11/06/2024     FINDINGS:  Evolving posterior circulation infarcts are identified as described on recent MR imaging again most notable within the left greater than right occipital lobes extending to the medial left temporal lobe and bilateral thalamus.  No hemorrhagic conversion with potentially minimal stable petechial hemorrhage within the medial left thalamus.  No midline shift or herniation with mild mass effect on the 3rd ventricle again noted.     No new acute territorial infarct.     The visualized sinuses and mastoid air cells are clear.         Impression     Expected evolutionary changes of the posterior circulation infarcts with no overt hemorrhagic conversion or significant midline shift/herniation.        Electronically signed by:Adrián Hobbs  Date:                                                11/09/2024  Time:                                                08:38            Results for orders placed or performed during the hospital encounter of 11/06/24 (from the past 2160 hours)   MRI Brain Without Contrast     Impression     Multiple posterior circulation infarcts are identified with involvement of the left greater than right occipital lobes and medial left temporal lobe, thalamus, right dorsal medulla, and minimally involving the cerebellum.  No midline shift with minimal mass effect on the 3rd ventricle.     Loss of flow void within the right intracranial vertebral artery in keeping with the findings on CTA.        Electronically signed by:Adrián Hobbs  Date:                                                11/06/2024  Time:                                                17:40      Cardiac Imaging   Results for orders placed during the hospital encounter of 11/06/24     Echo Saline Bubble? Yes; Ultrasound enhancing contrast? Yes     Interpretation Summary    Left Ventricle: The left ventricle is normal in size. Normal wall thickness. There is normal systolic function with a visually estimated ejection fraction of 55 - 60%. Biplane (2D) method of discs ejection fraction is 56%. Global longitudinal strain is -18.0%. There is normal diastolic function.    Right Ventricle: Normal right ventricular cavity size. Wall thickness is normal. Systolic function is normal.    Tricuspid Valve: There is mild regurgitation.    Pulmonary Artery: The estimated pulmonary artery systolic pressure is at least 28 mmHg.    IVC/SVC: Patient is ventilated, cannot use IVC diameter to estimate right atrial pressure.

## 2024-11-22 NOTE — PROGRESS NOTES
Pt seen for wound care f/u; right groin abscess. Pt lying in bed, sleeping, no family at bedside. Wound assessed as below- wound has closed/healed and is pink intact skin. No further need for wound care. Pt is at risk for incontinent associated dermatitis; suggest a zinc barrier cream to buttocks at least daily and check groin area for any altered skin integrity. Wound care to sign off since wound is healed and no other wounds noted or documented.       BRITTANY SimmonsN, RN, CWON  [facility] Wound/Ostomy  11/22/24 11/22/24 1130   WOCN Assessment   WOCN Total Time (mins) 30   Visit Date 11/22/24   Visit Time 1130   Consult Type Follow Up   WOCN Speciality Wound   Wound other  (abscess)   Number of Wounds 0   Intervention assessed;chart review;coordination of care   Teaching on-going        Wound 11/06/24 0922 Abscess Right anterior Pelvis   Date First Assessed/Time First Assessed: 11/06/24 0922   Primary Wound Type: Abscess  Side: Right  Orientation: anterior  Location: Pelvis   Dressing Appearance Open to air   Drainage Amount None   Drainage Characteristics/Odor No odor   Appearance Intact;Pink;Dry;Smooth;Closed/resurfaced   Periwound Area Intact;Dry   Wound Edges Rolled/closed

## 2024-11-23 LAB
ALBUMIN SERPL BCP-MCNC: 3.3 G/DL (ref 3.5–5.2)
ALP SERPL-CCNC: 92 U/L (ref 40–150)
ALT SERPL W/O P-5'-P-CCNC: 45 U/L (ref 10–44)
ANION GAP SERPL CALC-SCNC: 11 MMOL/L (ref 8–16)
AST SERPL-CCNC: 28 U/L (ref 10–40)
BASOPHILS # BLD AUTO: 0.07 K/UL (ref 0–0.2)
BASOPHILS NFR BLD: 0.6 % (ref 0–1.9)
BILIRUB SERPL-MCNC: 0.2 MG/DL (ref 0.1–1)
BUN SERPL-MCNC: 23 MG/DL (ref 6–20)
CALCIUM SERPL-MCNC: 10.5 MG/DL (ref 8.7–10.5)
CHLORIDE SERPL-SCNC: 105 MMOL/L (ref 95–110)
CO2 SERPL-SCNC: 27 MMOL/L (ref 23–29)
CREAT SERPL-MCNC: 0.7 MG/DL (ref 0.5–1.4)
DIFFERENTIAL METHOD BLD: ABNORMAL
EOSINOPHIL # BLD AUTO: 0.5 K/UL (ref 0–0.5)
EOSINOPHIL NFR BLD: 3.8 % (ref 0–8)
ERYTHROCYTE [DISTWIDTH] IN BLOOD BY AUTOMATED COUNT: 15 % (ref 11.5–14.5)
EST. GFR  (NO RACE VARIABLE): >60 ML/MIN/1.73 M^2
GLUCOSE SERPL-MCNC: 142 MG/DL (ref 70–110)
HCT VFR BLD AUTO: 41.9 % (ref 37–48.5)
HGB BLD-MCNC: 13.1 G/DL (ref 12–16)
IMM GRANULOCYTES # BLD AUTO: 0.05 K/UL (ref 0–0.04)
IMM GRANULOCYTES NFR BLD AUTO: 0.4 % (ref 0–0.5)
LYMPHOCYTES # BLD AUTO: 2.2 K/UL (ref 1–4.8)
LYMPHOCYTES NFR BLD: 18.4 % (ref 18–48)
MAGNESIUM SERPL-MCNC: 2.5 MG/DL (ref 1.6–2.6)
MCH RBC QN AUTO: 28.9 PG (ref 27–31)
MCHC RBC AUTO-ENTMCNC: 31.3 G/DL (ref 32–36)
MCV RBC AUTO: 93 FL (ref 82–98)
MONOCYTES # BLD AUTO: 0.8 K/UL (ref 0.3–1)
MONOCYTES NFR BLD: 6.7 % (ref 4–15)
NEUTROPHILS # BLD AUTO: 8.3 K/UL (ref 1.8–7.7)
NEUTROPHILS NFR BLD: 70.1 % (ref 38–73)
NRBC BLD-RTO: 0 /100 WBC
PHOSPHATE SERPL-MCNC: 4.3 MG/DL (ref 2.7–4.5)
PLATELET # BLD AUTO: 790 K/UL (ref 150–450)
PMV BLD AUTO: 10.1 FL (ref 9.2–12.9)
POTASSIUM SERPL-SCNC: 3.6 MMOL/L (ref 3.5–5.1)
PROT SERPL-MCNC: 7.8 G/DL (ref 6–8.4)
RBC # BLD AUTO: 4.53 M/UL (ref 4–5.4)
SODIUM SERPL-SCNC: 143 MMOL/L (ref 136–145)
WBC # BLD AUTO: 11.87 K/UL (ref 3.9–12.7)

## 2024-11-23 PROCEDURE — 25000003 PHARM REV CODE 250

## 2024-11-23 PROCEDURE — 84100 ASSAY OF PHOSPHORUS: CPT

## 2024-11-23 PROCEDURE — 80053 COMPREHEN METABOLIC PANEL: CPT

## 2024-11-23 PROCEDURE — 99233 SBSQ HOSP IP/OBS HIGH 50: CPT | Mod: ,,, | Performed by: PSYCHIATRY & NEUROLOGY

## 2024-11-23 PROCEDURE — 25000003 PHARM REV CODE 250: Performed by: PHYSICIAN ASSISTANT

## 2024-11-23 PROCEDURE — 94761 N-INVAS EAR/PLS OXIMETRY MLT: CPT

## 2024-11-23 PROCEDURE — 25000003 PHARM REV CODE 250: Performed by: STUDENT IN AN ORGANIZED HEALTH CARE EDUCATION/TRAINING PROGRAM

## 2024-11-23 PROCEDURE — 11000001 HC ACUTE MED/SURG PRIVATE ROOM

## 2024-11-23 PROCEDURE — 83735 ASSAY OF MAGNESIUM: CPT

## 2024-11-23 PROCEDURE — 94668 MNPJ CHEST WALL SBSQ: CPT

## 2024-11-23 PROCEDURE — 99900035 HC TECH TIME PER 15 MIN (STAT)

## 2024-11-23 PROCEDURE — 63600175 PHARM REV CODE 636 W HCPCS

## 2024-11-23 PROCEDURE — 85025 COMPLETE CBC W/AUTO DIFF WBC: CPT

## 2024-11-23 PROCEDURE — 36415 COLL VENOUS BLD VENIPUNCTURE: CPT

## 2024-11-23 RX ADMIN — SENNOSIDES AND DOCUSATE SODIUM 1 TABLET: 50; 8.6 TABLET ORAL at 08:11

## 2024-11-23 RX ADMIN — THERA TABS 1 TABLET: TAB at 08:11

## 2024-11-23 RX ADMIN — LITHIUM 300 MG: 8 SOLUTION ORAL at 08:11

## 2024-11-23 RX ADMIN — MUSCLE RUB CREAM: 100; 150 CREAM TOPICAL at 08:11

## 2024-11-23 RX ADMIN — QUETIAPINE FUMARATE 50 MG: 25 TABLET ORAL at 08:11

## 2024-11-23 RX ADMIN — FLUOXETINE HYDROCHLORIDE 60 MG: 20 SOLUTION ORAL at 08:11

## 2024-11-23 RX ADMIN — FOLIC ACID 1 MG: 1 TABLET ORAL at 08:11

## 2024-11-23 RX ADMIN — ATORVASTATIN CALCIUM 40 MG: 40 TABLET, FILM COATED ORAL at 08:11

## 2024-11-23 RX ADMIN — MUSCLE RUB CREAM: 100; 150 CREAM TOPICAL at 02:11

## 2024-11-23 RX ADMIN — LITHIUM 300 MG: 8 SOLUTION ORAL at 02:11

## 2024-11-23 RX ADMIN — CLOPIDOGREL BISULFATE 75 MG: 75 TABLET ORAL at 08:11

## 2024-11-23 RX ADMIN — ASPIRIN 81 MG CHEWABLE TABLET 81 MG: 81 TABLET CHEWABLE at 08:11

## 2024-11-23 RX ADMIN — CARVEDILOL 6.25 MG: 6.25 TABLET, FILM COATED ORAL at 08:11

## 2024-11-23 RX ADMIN — MIRTAZAPINE 7.5 MG: 7.5 TABLET, FILM COATED ORAL at 08:11

## 2024-11-23 RX ADMIN — QUETIAPINE FUMARATE 100 MG: 100 TABLET ORAL at 08:11

## 2024-11-23 RX ADMIN — Medication 6 MG: at 08:11

## 2024-11-23 RX ADMIN — Medication 100 MG: at 08:11

## 2024-11-23 RX ADMIN — ENOXAPARIN SODIUM 40 MG: 40 INJECTION SUBCUTANEOUS at 05:11

## 2024-11-23 RX ADMIN — LIDOCAINE 5% 1 PATCH: 700 PATCH TOPICAL at 02:11

## 2024-11-23 NOTE — PLAN OF CARE
Problem: Adult Inpatient Plan of Care  Goal: Plan of Care Review  Outcome: Progressing  Goal: Patient-Specific Goal (Individualized)  Outcome: Progressing  Goal: Absence of Hospital-Acquired Illness or Injury  Outcome: Progressing  Goal: Optimal Comfort and Wellbeing  Outcome: Progressing  Goal: Readiness for Transition of Care  Outcome: Progressing     Problem: Infection  Goal: Absence of Infection Signs and Symptoms  Outcome: Progressing     Problem: Wound  Goal: Optimal Coping  Outcome: Progressing  Goal: Optimal Functional Ability  Outcome: Progressing  Goal: Absence of Infection Signs and Symptoms  Outcome: Progressing  Goal: Improved Oral Intake  Outcome: Progressing  Goal: Optimal Pain Control and Function  Outcome: Progressing  Goal: Skin Health and Integrity  Outcome: Progressing  Goal: Optimal Wound Healing  Outcome: Progressing     Problem: Stroke, Ischemic (Includes Transient Ischemic Attack)  Goal: Optimal Coping  Outcome: Progressing  Goal: Effective Bowel Elimination  Outcome: Progressing  Goal: Optimal Cerebral Tissue Perfusion  Outcome: Progressing  Goal: Optimal Cognitive Function  Outcome: Progressing  Goal: Improved Communication Skills  Outcome: Progressing  Goal: Optimal Functional Ability  Outcome: Progressing  Goal: Optimal Nutrition Intake  Outcome: Progressing  Goal: Effective Oxygenation and Ventilation  Outcome: Progressing  Goal: Improved Sensorimotor Function  Outcome: Progressing  Goal: Safe and Effective Swallow  Outcome: Progressing  Goal: Effective Urinary Elimination  Outcome: Progressing     Problem: Mechanical Ventilation Invasive  Goal: Effective Communication  Outcome: Progressing  Goal: Optimal Device Function  Outcome: Progressing  Goal: Mechanical Ventilation Liberation  Outcome: Progressing  Goal: Optimal Nutrition Delivery  Outcome: Progressing  Goal: Absence of Device-Related Skin and Tissue Injury  Outcome: Progressing  Goal: Absence of Ventilator-Induced Lung  Injury  Outcome: Progressing     Problem: Artificial Airway  Goal: Effective Communication  Outcome: Progressing  Goal: Optimal Device Function  Outcome: Progressing  Goal: Absence of Device-Related Skin or Tissue Injury  Outcome: Progressing     Problem: Fall Injury Risk  Goal: Absence of Fall and Fall-Related Injury  Outcome: Progressing     Problem: Restraint, Nonviolent  Goal: Absence of Harm or Injury  Outcome: Progressing     Problem: Skin Injury Risk Increased  Goal: Skin Health and Integrity  Outcome: Progressing     Problem: Pneumonia  Goal: Fluid Balance  Outcome: Progressing  Goal: Resolution of Infection Signs and Symptoms  Outcome: Progressing  Goal: Effective Oxygenation and Ventilation  Outcome: Progressing

## 2024-11-23 NOTE — PROGRESS NOTES
Neno Conley - Neurosurgery ([facility])  Vascular Neurology  Comprehensive Stroke Center  Progress Note    Assessment/Plan:     * Acute arterial ischemic stroke, multifocal, posterior circulation  Malathi Mcpherson is a 49 y.o. female with PMH of drug use, seizure, HTN, HLD that is admitted to [facility] for further evaluation and management of multifocal acute infarcts. She initially presented to [facility] ED 11/6 after being found lying face down at home that morning, per EMS was lethargic and aphasic and moving LUE/LLE/RLE but was not moving RUE. EMS also reported that they had seen her before in context of seizures. LKW 10pm 11/5. In ED was noted to have exam worsening, now with R sided plegia and global aphasia. NIHSS 22. Also noted to have O2 desat in 70s and ultimately was intubated for airway protection. CTA showed multifocal areas of hypodensity involving the posterior circulation suspicious for infarcts as well as focal occlusion of R vert and L PCA. Not candidate for acute stroke interventions, no TNK due to OOW and no IR due to stroke burden/risk for bleeding. She was admitted to [facility] for higher level of care. NSGY consulted on admit for edema/hemicrani watch, however no surgical interventions recommended. MRI brain confirmed multifocal areas of acute infarct involving: L>R occipital lobes, L medial temporal lobe, bilateral thalami, L hippocampus and R hippocampus tail, R dorsal medulla, bilateral cerebellar hemispheres. TTE unremarkable. Suspect etiology of stroke likely secondary to substance use, Utox + cocaine.    Interval hx: Episodes of coughing and vomiting overnight with concern for aspiration event. TF held, repeat KUB unremarkable. Worsening leukocytosis today, possibly related to aspiration event although uncertain. Will test COVID/flu/RSV, collect blood cultures. Consider addition of vancomycin especially is patient becomes febrile or clinically unstable. LFT's slightly improved, elevated INR. Acute hepatitis  panel ordered, consider liver ultrasound.     Antithrombotics for secondary stroke prevention: Antiplatelets: Aspirin: 81 mg daily  Clopidogrel: 75 mg daily.     Statins for secondary stroke prevention and hyperlipidemia, if present: Statins: Atorvastatin- 40 mg daily    Aggressive risk factor modification: HTN, HLD, Substance use.     Rehab efforts: The patient has been evaluated by a stroke team provider and the therapy needs have been fully considered based off the presenting complaints and exam findings. The following therapy evaluations are needed: PT evaluate and treat, OT evaluate and treat, SLP evaluate and treat. High intensity therapy recommended. Pt failed barium swallow study. PEG tube in place  Diagnostics ordered/pending: Other: Modified barium swallow    VTE prophylaxis: Enoxaparin 40 mg SQ every 24 hours  Mechanical prophylaxis: Place SCDs    BP parameters: Infarct: SBP goal <180      Transaminitis  LFTs uptrending, tylenol updated to PRN.   Decreased dose of statin to atorvastatin 40 mg 11/17 given elevated INR 1.5.   Consider liver ultrasound  Hep C antigen reactive, ordering RNA quant, pending results     Lab Results   Component Value Date    ALT 41 11/21/2024    AST 29 11/21/2024    ALKPHOS 91 11/21/2024    BILITOT 0.3 11/21/2024     Monitor daily CMP, PT/INR    Agitation  -see anxiety and depression    HTN (hypertension)  Patients blood pressure range in the last 24 hours was: BP  Min: 86/51  Max: 212/97. The patient's inpatient anti-hypertensive regimen is listed below:    Current Antihypertensives  , 2 times daily, Oral  carvediloL tablet 6.25 mg, 2 times daily, Per NG tube    - BP is controlled, no changes needed to their regimen  -SBP goal < 180      Right upper lobe pneumonia  Extubated 11/10 in [facility] with persistent oxygen requirement. CXR remarkable for RUL consolidation. Respiratory culture positive for H flu and MSSA. MRSA screen by PCR positive. Was transitioned to cefazolin 11/12 then  cefepime 11/14 after sensitivities c/w h flu beta lactamase positive. Extended course even persistent or worsening leukocytosis, plan for total 7 day course (EED 11/21). 11/18/24 worsening leukocytosis with WBC up to 19.25 from 17.82. Viral panel negative.     -wean oxygen as tolerated, stable on RA  -Finished with course of cefepime    - Vanc d/pardeep  - F/U CBC: WBC 14.28  - F/U blood cultures: NGTD  - RIP negative    Debility  -Due to stroke  -Aggressive therapy  -PT/OT/SLP eval and treat    Polysubstance abuse  -Utox + cocaine  -Suspect etiology of stroke likely due to substance use  -Continue thiamine/folate/multivitamin supplementation  -Addiction psych consulted early on admission    Cytotoxic cerebral edema  -Area of cerebral edema identified when reviewing brain imaging involving bilateral posterior circulation territories due to acute infarcts, there is mild mass effect associated with it that has remained stable on follow up imaging.  -Admitted to [facility] for close neuro monitoring and observation  -NSGY consulted on admit, no surgical intervention recommended and NSGY s/o  - Stepped down to floor  -Continue frequent q4hr neuro checks as any acute changes or worsening neuro status .  -Obtain stat CTH for any new or worsening neuro changes and notify primary team immediately    Anxiety and depression  -Psychiatry consulted, appreciate recs  Continue lithium 300 mg TID (resumed 11/13), Prozac 60 mg daily, Seroquel 25 mg in the AM and 50 mg in the PM, Remeron 7.5 mg nightly.   PRN Seroquel 25 mg BID for non-redirectable agitation.    -daily EKG to monitor Qtc: Elevated to 535, talking to psych for possible medication adjustment  - serum lithium level on 11/18/24: 0.4         11/07/2024 Pt was agitated overnight and is on fentanyl. Currently intubated.On repeated painful stimuli has movements on LUE,LLE, RLE> RUE.The movement on right side is an improvement from yesterday.  11/11/2024 NAEON, remains on precedex for  agitation. Extubated yesterday (11/10), tolerating extubation thus far without complication. Moving extremities spontaneously although weakness noted in RUE>>RLE. Remains globally aphasic and agitated despite max precedex infusion. Respiratory cultures + H flu, on rocephin for abx. Also on bactrim for SSTI. Home lithium restarted today, continue seoquel and fluoxetine.  11/14/2024 NAEO, patient off precedex, BL wrist and torso restraints, NG tube with feeds running. Patient able to respond to questions and follow some commands. Oriented to self only. Stable for stepdown.   11/15/2024 Pt still in restraints. Per nurse tendency to pull at tubes and things.On restraints. Oriented to self. No other complaints in the am.  11/16/2024 remains in restraints this AM, reportedly agitated overnight with concerns from nursing. Psychiatry consulted for med rec given extensive psych history on lithium and multiple different psych meds on admission med rec/recent dispense history and concerns for worsening agitation. LFTs uptrending, tylenol updated to PRN. Consider decreased dose of statin if worsening.   11/17/2024 episodes of coughing and vomiting overnight with concern for aspiration event. TF held, repeat KUB unremarkable. Worsening leukocytosis today, possibly related to aspiration event although uncertain. Will test COVID/flu/RSV, collect blood cultures. Consider addition of vancomycin especially is patient becomes febrile or clinically unstable. LFT's slightly improved, elevated INR. Acute hepatitis panel ordered, consider liver ultrasound.   11/18/24 Viral panel negative for any respiratory infection. WBC trending up to 19.25 from 17.82. Currently on Vanc and cefepime. LFT downtrending to normal values. Plan for barium swallow study today, to assess for the continuation of NG tube feedings. Plan to likely move to hospital medicine service.   11/19/2024 Pt failed barium swallow study yesterday. IR consulted for PEG tube  placement. CT abdomen ordered. Hep C antigen reactive, pending RNA quant. WBC trending down to 16. 35 from 19.25. Blood cultures NGTD.   11/20/2024 NAOE, WBC stable at 16.36, Bcx NGTD. Ordering RIP. Currently on Vanc and cefepime. Scheduled for PEG placement today. LFT improving. Pending Hep C RNA quant.   11/21/2024 NAOE, NG tube removed. PEG tube in place and functioning. WBC improving now down to 14. 28. LFT wnl. RIP negative and all other infectious panel negative. Vanc d/pardeep. Finished course of cefepime today.   11/22/2024 NAOE, Peg tube functioning. Off restrains. WBC improving. Qtc elevated to 530's. Talked to Psych team, no change in medication. Pending rehab placement.   11/23/2024 NAOE, NG tube off, off restrains. Binders on. WBC trending down. Pending rehab placement.       STROKE DOCUMENTATION   Acute Stroke Times   Last Known Normal Date: 11/05/24  Last Known Normal Time: 2200  Symptom Onset Date: 11/06/24 (unsure of timing)  Symptom Onset Time:  (sometime this morning.)  Unknown Symptom Onset Time: Unknown Time  Stroke Team Called Date: 11/06/24  Stroke Team Called Time: 0843  Stroke Team Arrival Date: 11/06/24  Stroke Team Arrival Time: 0845  CT Interpretation Time: 0859  Thrombolytic Therapy Recommended: No  CTA Interpretation Time: 0902  Thrombectomy Recommended: No    NIH Scale:  1a. Level of Consciousness: 0-->Alert, keenly responsive  1b. LOC Questions: 1-->Answers one question correctly  1c. LOC Commands: 2-->Performs neither task correctly  2. Best Gaze: 0-->Normal  3. Visual: 3-->Bilateral hemianopia (blind including cortical blindness)  4. Facial Palsy: 0-->Normal symmetrical movements  5a. Motor Arm, Left: 0-->No drift, limb holds 90 (or 45) degrees for full 10 secs  5b. Motor Arm, Right: 0-->No drift, limb holds 90 (or 45) degrees for full 10 secs  6a. Motor Leg, Left: 0-->No drift, leg holds 30 degree position for full 5 secs  6b. Motor Leg, Right: 0-->No drift, leg holds 30 degree  position for full 5 secs  7. Limb Ataxia: 0-->Absent  8. Sensory: 0-->Normal, no sensory loss  9. Best Language: 1-->Mild-to-moderate aphasia, some obvious loss of fluency or facility of comprehension, without significant limitation on ideas expressed or form of expression. Reduction of speech and/or comprehension, however, makes conversation. . . (see row details)  10. Dysarthria: 0-->Normal  11. Extinction and Inattention (formerly Neglect): 0-->No abnormality  Total (NIH Stroke Scale): 7       Modified Polk Score: 0  Shailesh Coma Scale:    ABCD2 Score:    [id number] Score:   HAS -BLED Score:   ICH Score:   Hunt & Argueta Classification:      Hemorrhagic change of an Ischemic Stroke: Does this patient have an ischemic stroke with hemorrhagic changes? No     Neurologic Chief Complaint: acute arterial ischemic stroke, multifocal, posterior circulation     Subjective:     Interval History: Patient is seen for follow-up neurological assessment and treatment recommendations: Please see hospital course    HPI, Past Medical, Family, and Social History remains the same as documented in the initial encounter.     Review of Systems   Unable to perform ROS: Mental status change     Scheduled Meds:   aspirin  81 mg Per NG tube Daily    atorvastatin  40 mg Per NG tube Daily    carvediloL  6.25 mg Per NG tube BID    clopidogreL  75 mg Per NG tube Daily    enoxparin  40 mg Subcutaneous Q24H (prophylaxis, 1700)    FLUoxetine  60 mg Per NG tube Daily    folic acid  1 mg Per NG tube Daily    LIDOcaine  1 patch Transdermal Q24H    lithium citrate 8 meq/5 ml  300 mg Per NG tube TID    melatonin  6 mg Per NG tube Nightly    methyl salicylate-menthol 15-10%   Topical (Top) TID    mirtazapine  7.5 mg Per NG tube QHS    multivitamin  1 tablet Per NG tube Daily    QUEtiapine  100 mg Per NG tube QHS    QUEtiapine  50 mg Per NG tube Daily    senna-docusate 8.6-50 mg  1 tablet Per NG tube Daily    thiamine  100 mg Per NG tube Daily      Continuous Infusions:  PRN Meds:  Current Facility-Administered Medications:     acetaminophen, 1,000 mg, Per NG tube, BID PRN    albuterol-ipratropium, 3 mL, Nebulization, Q6H PRN    polyethylene glycol, 17 g, Per NG tube, BID PRN    QUEtiapine, 25 mg, Per NG tube, BID PRN    sodium chloride 0.9%, 10 mL, Intravenous, PRN    Objective:     Vital Signs (Most Recent):  Temp: 97.2 °F (36.2 °C) (11/23/24 1223)  Pulse: 98 (11/23/24 1223)  Resp: 16 (11/23/24 1223)  BP: 128/70 (11/23/24 1223)  SpO2: (!) 92 % (11/23/24 1223)  BP Location: Left arm    Vital Signs Range (Last 24H):  Temp:  [97.2 °F (36.2 °C)-99.3 °F (37.4 °C)]   Pulse:  []   Resp:  [15-18]   BP: (128-149)/(67-80)   SpO2:  [92 %-98 %]   BP Location: Left arm       Physical Exam  Vitals and nursing note reviewed.   Constitutional:       Appearance: She is ill-appearing.   Pulmonary:      Effort: Pulmonary effort is normal. No respiratory distress.   Abdominal:      General: There is no distension.      Palpations: Abdomen is soft.   Musculoskeletal:      Cervical back: Normal range of motion and neck supple.      Comments: Appears with Right sided weakness. Unable to fully assess as pt was not cooperating fully.    Skin:     General: Skin is warm and dry.      Capillary Refill: Capillary refill takes 2 to 3 seconds.   Neurological:      General: No focal deficit present.      Mental Status: She is alert and easily aroused.      GCS: GCS eye subscore is 3. GCS verbal subscore is 3. GCS motor subscore is 5.      Cranial Nerves: Dysarthria present.      Motor: Weakness present.      Coordination: Coordination abnormal.      Gait: Gait abnormal.   Psychiatric:         Mood and Affect: Affect is labile.         Behavior: Behavior is slowed.         Cognition and Memory: Cognition is impaired. Memory is impaired.              Neurological Exam:   LOC: alert  Attention Span: poor    Laboratory:  CMP:   Recent Labs   Lab 11/23/24  0531   CALCIUM 10.5    ALBUMIN 3.3*   PROT 7.8      K 3.6   CO2 27      BUN 23*   CREATININE 0.7   ALKPHOS 92   ALT 45*   AST 28   BILITOT 0.2     CBC:   Recent Labs   Lab 11/23/24  0531   WBC 11.87   RBC 4.53   HGB 13.1   HCT 41.9   *   MCV 93   MCH 28.9   MCHC 31.3*       Diagnostic Results     Brain Imaging   CTA STROKE MULTI-PHASE     Narrative     EXAMINATION:  CTA STROKE MULTI-PHASE; CT CERVICAL SPINE WITHOUT CONTRAST     CLINICAL HISTORY:  Neuro deficit, acute, stroke suspected;; Neck trauma, intoxicated or obtunded (Age >= 16y);     TECHNIQUE:  Axial CT images obtained throughout the region of the head and cervical spine before the administration of intravenous contrast.     CT angiogram was performed through the cervical and intracranial vasculature during the IV bolus administration of 75mL of Omnipaque 350.  Two additional phases through the intracranial vasculature via multiphase technique.     Multiplanar MPR and MIP reformats were performed.     CT source data was analyzed using artificial intelligence software for detection of a large vessel occlusions (LVO) in order to enable computer assisted triage notification and aid clinical stroke decision making.     COMPARISON:  CTA head and cervical spine 10/25/2015     FINDINGS:  Large region of hypoattenuation loss of gray-white differentiation in left occipital lobe in medial temporal lobe in keeping with an acute PCA distribution infarct.  Modest localized mass effect with some sulcal crowding.  Additional involvement of the left thalamus.  Similar appearance in the medial right occipital lobe, but smaller area of infarction.  Small left superior cerebellar infarct, also likely acute.  No evidence of recent or remote major vascular distribution infarct in the anterior circulation.  No acute parenchymal hemorrhage.  No midline shift.     The ventricles are normal in size without evidence of hydrocephalus.     No extra-axial blood or fluid collections.      The cranium appears intact.     Mastoid air cells and paranasal sinuses are essentially clear.     The vertebral bodies are normal in height and morphology without evidence of an acute displaced fracture or focal osseous destructive process.     Normal sagittal alignment is preserved.  No spondylolisthesis.     Intervertebral disc heights are well maintained.  No evidence of a bony spinal canal stenosis.     Evaluation intraspinal contents demonstrates no evidence of mass or hematoma.     No acute abnormalities in the paraspinal soft tissue structures.     Emphysematous changes patchy ground-glass opacity in the partially visualized lung apices.        CTA:     The aortic arch demonstrates no significant stenosis at the major branch vessel origins.     There is a focal thrombus in the right subclavian artery extending into the right vertebral artery origin with severe stenosis.  Thrombus extending over proximally 1 cm.  The V1 and V2 segments of right vertebral artery are otherwise normal in caliber of slightly decreased in attenuation in comparison to the other cervical vasculature.  Abrupt occlusion of distal right vertebral artery about the level of the PICA origin approximate 2 cm segment of non enhancement.     Moderate focal stenosis at the left vertebral artery origin.  Left vertebral artery is otherwise normal in caliber.  The basilar artery is normal in caliber.     The right common carotid artery is normal in caliber.  No significant stenosis at the carotid bifurcation.The right internal carotid artery is normal in caliber.     The left common carotid artery is normal in caliber. No significant stenosis at the carotid bifurcation.The left internal carotid artery is normal in caliber.     Abrupt non enhancement in the P1 segment of the left PCA.  The proximal right PCA appears within normal limits.     The proximal MCAs and ACAs are unremarkable.     Findings were immediately discussed upon identification  via telephone with the stroke service ([facility]) at approximately 09:20.        Impression     Multifocal acute posterior circulation distribution infarcts as further discussed above, particularly involving essentially the entirety of the left PCA territory.  Modest mass effect without significant hemorrhage.     Focal thrombus with severe stenosis extending from the right subclavian artery into the proximal right vertebral artery.     Focal occlusion of the proximal intracranial segment of the right vertebral artery.     Focal occlusion of the proximal left PCA.     Moderate focal stenosis at the left vertebral artery origin.     Anterior circulation appears unremarkable.     No evidence of fracture or traumatic malalignment in the cervical spine.     Emphysematous changes and patchy ground-glass opacity in the partially visualized lung apices.     This report was flagged in Epic as abnormal.        Electronically signed by:Geo Ambrocio MD  Date:                                                11/06/2024  Time:                                                09:33   CT Cervical Spine Without Contrast     Narrative     EXAMINATION:  CTA STROKE MULTI-PHASE; CT CERVICAL SPINE WITHOUT CONTRAST     CLINICAL HISTORY:  Neuro deficit, acute, stroke suspected;; Neck trauma, intoxicated or obtunded (Age >= 16y);     TECHNIQUE:  Axial CT images obtained throughout the region of the head and cervical spine before the administration of intravenous contrast.     CT angiogram was performed through the cervical and intracranial vasculature during the IV bolus administration of 75mL of Omnipaque 350.  Two additional phases through the intracranial vasculature via multiphase technique.     Multiplanar MPR and MIP reformats were performed.     CT source data was analyzed using artificial intelligence software for detection of a large vessel occlusions (LVO) in order to enable computer assisted triage notification and aid  clinical stroke decision making.     COMPARISON:  CTA head and cervical spine 10/25/2015     FINDINGS:  Large region of hypoattenuation loss of gray-white differentiation in left occipital lobe in medial temporal lobe in keeping with an acute PCA distribution infarct.  Modest localized mass effect with some sulcal crowding.  Additional involvement of the left thalamus.  Similar appearance in the medial right occipital lobe, but smaller area of infarction.  Small left superior cerebellar infarct, also likely acute.  No evidence of recent or remote major vascular distribution infarct in the anterior circulation.  No acute parenchymal hemorrhage.  No midline shift.     The ventricles are normal in size without evidence of hydrocephalus.     No extra-axial blood or fluid collections.     The cranium appears intact.     Mastoid air cells and paranasal sinuses are essentially clear.     The vertebral bodies are normal in height and morphology without evidence of an acute displaced fracture or focal osseous destructive process.     Normal sagittal alignment is preserved.  No spondylolisthesis.     Intervertebral disc heights are well maintained.  No evidence of a bony spinal canal stenosis.     Evaluation intraspinal contents demonstrates no evidence of mass or hematoma.     No acute abnormalities in the paraspinal soft tissue structures.     Emphysematous changes patchy ground-glass opacity in the partially visualized lung apices.        CTA:     The aortic arch demonstrates no significant stenosis at the major branch vessel origins.     There is a focal thrombus in the right subclavian artery extending into the right vertebral artery origin with severe stenosis.  Thrombus extending over proximally 1 cm.  The V1 and V2 segments of right vertebral artery are otherwise normal in caliber of slightly decreased in attenuation in comparison to the other cervical vasculature.  Abrupt occlusion of distal right vertebral artery  about the level of the PICA origin approximate 2 cm segment of non enhancement.     Moderate focal stenosis at the left vertebral artery origin.  Left vertebral artery is otherwise normal in caliber.  The basilar artery is normal in caliber.     The right common carotid artery is normal in caliber.  No significant stenosis at the carotid bifurcation.The right internal carotid artery is normal in caliber.     The left common carotid artery is normal in caliber. No significant stenosis at the carotid bifurcation.The left internal carotid artery is normal in caliber.     Abrupt non enhancement in the P1 segment of the left PCA.  The proximal right PCA appears within normal limits.     The proximal MCAs and ACAs are unremarkable.     Findings were immediately discussed upon identification via telephone with the stroke service ([facility]) at approximately 09:20.        Impression     Multifocal acute posterior circulation distribution infarcts as further discussed above, particularly involving essentially the entirety of the left PCA territory.  Modest mass effect without significant hemorrhage.     Focal thrombus with severe stenosis extending from the right subclavian artery into the proximal right vertebral artery.     Focal occlusion of the proximal intracranial segment of the right vertebral artery.     Focal occlusion of the proximal left PCA.     Moderate focal stenosis at the left vertebral artery origin.     Anterior circulation appears unremarkable.     No evidence of fracture or traumatic malalignment in the cervical spine.     Emphysematous changes and patchy ground-glass opacity in the partially visualized lung apices.     This report was flagged in Epic as abnormal.        Electronically signed by:Geo Ambrocio MD  Date:                                                11/06/2024  Time:                                                09:33   CT Head Without Contrast     Narrative     EXAMINATION:  CT  HEAD WITHOUT CONTRAST     CLINICAL HISTORY:  Stroke, follow up;     TECHNIQUE:  Low dose axial images were obtained through the head.  Coronal and sagittal reformations were also performed. Contrast was not administered.     COMPARISON:  CT, MRI 11/06/2024     FINDINGS:  Evolving posterior circulation infarcts are identified as described on recent MR imaging again most notable within the left greater than right occipital lobes extending to the medial left temporal lobe and bilateral thalamus.  No hemorrhagic conversion with potentially minimal stable petechial hemorrhage within the medial left thalamus.  No midline shift or herniation with mild mass effect on the 3rd ventricle again noted.     No new acute territorial infarct.     The visualized sinuses and mastoid air cells are clear.        Impression     Expected evolutionary changes of the posterior circulation infarcts with no overt hemorrhagic conversion or significant midline shift/herniation.        Electronically signed by:Adrián Hobbs  Date:                                                11/09/2024  Time:                                                08:38            Results for orders placed or performed during the hospital encounter of 11/06/24 (from the past 2160 hours)   MRI Brain Without Contrast     Impression     Multiple posterior circulation infarcts are identified with involvement of the left greater than right occipital lobes and medial left temporal lobe, thalamus, right dorsal medulla, and minimally involving the cerebellum.  No midline shift with minimal mass effect on the 3rd ventricle.     Loss of flow void within the right intracranial vertebral artery in keeping with the findings on CTA.        Electronically signed by:Adrián Hobbs  Date:                                                11/06/2024  Time:                                                17:40      Cardiac Imaging   Results for orders placed during the hospital encounter  of 11/06/24     Echo Saline Bubble? Yes; Ultrasound enhancing contrast? Yes     Interpretation Summary    Left Ventricle: The left ventricle is normal in size. Normal wall thickness. There is normal systolic function with a visually estimated ejection fraction of 55 - 60%. Biplane (2D) method of discs ejection fraction is 56%. Global longitudinal strain is -18.0%. There is normal diastolic function.    Right Ventricle: Normal right ventricular cavity size. Wall thickness is normal. Systolic function is normal.    Tricuspid Valve: There is mild regurgitation.    Pulmonary Artery: The estimated pulmonary artery systolic pressure is at least 28 mmHg.    IVC/SVC: Patient is ventilated, cannot use IVC diameter to estimate right atrial pressure.     Gerhard De Jesus MD  [facility]  Department of Vascular Neurology   [facility])

## 2024-11-23 NOTE — SUBJECTIVE & OBJECTIVE
Neurologic Chief Complaint: acute arterial ischemic stroke, multifocal, posterior circulation     Subjective:     Interval History: Patient is seen for follow-up neurological assessment and treatment recommendations: Please see hospital course    HPI, Past Medical, Family, and Social History remains the same as documented in the initial encounter.     Review of Systems   Unable to perform ROS: Mental status change     Scheduled Meds:   aspirin  81 mg Per NG tube Daily    atorvastatin  40 mg Per NG tube Daily    carvediloL  6.25 mg Per NG tube BID    clopidogreL  75 mg Per NG tube Daily    enoxparin  40 mg Subcutaneous Q24H (prophylaxis, 1700)    FLUoxetine  60 mg Per NG tube Daily    folic acid  1 mg Per NG tube Daily    LIDOcaine  1 patch Transdermal Q24H    lithium citrate 8 meq/5 ml  300 mg Per NG tube TID    melatonin  6 mg Per NG tube Nightly    methyl salicylate-menthol 15-10%   Topical (Top) TID    mirtazapine  7.5 mg Per NG tube QHS    multivitamin  1 tablet Per NG tube Daily    QUEtiapine  100 mg Per NG tube QHS    QUEtiapine  50 mg Per NG tube Daily    senna-docusate 8.6-50 mg  1 tablet Per NG tube Daily    thiamine  100 mg Per NG tube Daily     Continuous Infusions:  PRN Meds:  Current Facility-Administered Medications:     acetaminophen, 1,000 mg, Per NG tube, BID PRN    albuterol-ipratropium, 3 mL, Nebulization, Q6H PRN    polyethylene glycol, 17 g, Per NG tube, BID PRN    QUEtiapine, 25 mg, Per NG tube, BID PRN    sodium chloride 0.9%, 10 mL, Intravenous, PRN    Objective:     Vital Signs (Most Recent):  Temp: 97.2 °F (36.2 °C) (11/23/24 1223)  Pulse: 98 (11/23/24 1223)  Resp: 16 (11/23/24 1223)  BP: 128/70 (11/23/24 1223)  SpO2: (!) 92 % (11/23/24 1223)  BP Location: Left arm    Vital Signs Range (Last 24H):  Temp:  [97.2 °F (36.2 °C)-99.3 °F (37.4 °C)]   Pulse:  []   Resp:  [15-18]   BP: (128-149)/(67-80)   SpO2:  [92 %-98 %]   BP Location: Left arm       Physical Exam  Vitals and nursing note  reviewed.   Constitutional:       Appearance: She is ill-appearing.   Pulmonary:      Effort: Pulmonary effort is normal. No respiratory distress.   Abdominal:      General: There is no distension.      Palpations: Abdomen is soft.   Musculoskeletal:      Cervical back: Normal range of motion and neck supple.      Comments: Appears with Right sided weakness. Unable to fully assess as pt was not cooperating fully.    Skin:     General: Skin is warm and dry.      Capillary Refill: Capillary refill takes 2 to 3 seconds.   Neurological:      General: No focal deficit present.      Mental Status: She is alert and easily aroused.      GCS: GCS eye subscore is 3. GCS verbal subscore is 3. GCS motor subscore is 5.      Cranial Nerves: Dysarthria present.      Motor: Weakness present.      Coordination: Coordination abnormal.      Gait: Gait abnormal.   Psychiatric:         Mood and Affect: Affect is labile.         Behavior: Behavior is slowed.         Cognition and Memory: Cognition is impaired. Memory is impaired.              Neurological Exam:   LOC: alert  Attention Span: poor    Laboratory:  CMP:   Recent Labs   Lab 11/23/24  0531   CALCIUM 10.5   ALBUMIN 3.3*   PROT 7.8      K 3.6   CO2 27      BUN 23*   CREATININE 0.7   ALKPHOS 92   ALT 45*   AST 28   BILITOT 0.2     CBC:   Recent Labs   Lab 11/23/24  0531   WBC 11.87   RBC 4.53   HGB 13.1   HCT 41.9   *   MCV 93   MCH 28.9   MCHC 31.3*       Diagnostic Results     Brain Imaging   CTA STROKE MULTI-PHASE     Narrative     EXAMINATION:  CTA STROKE MULTI-PHASE; CT CERVICAL SPINE WITHOUT CONTRAST     CLINICAL HISTORY:  Neuro deficit, acute, stroke suspected;; Neck trauma, intoxicated or obtunded (Age >= 16y);     TECHNIQUE:  Axial CT images obtained throughout the region of the head and cervical spine before the administration of intravenous contrast.     CT angiogram was performed through the cervical and intracranial vasculature during the IV bolus  administration of 75mL of Omnipaque 350.  Two additional phases through the intracranial vasculature via multiphase technique.     Multiplanar MPR and MIP reformats were performed.     CT source data was analyzed using artificial intelligence software for detection of a large vessel occlusions (LVO) in order to enable computer assisted triage notification and aid clinical stroke decision making.     COMPARISON:  CTA head and cervical spine 10/25/2015     FINDINGS:  Large region of hypoattenuation loss of gray-white differentiation in left occipital lobe in medial temporal lobe in keeping with an acute PCA distribution infarct.  Modest localized mass effect with some sulcal crowding.  Additional involvement of the left thalamus.  Similar appearance in the medial right occipital lobe, but smaller area of infarction.  Small left superior cerebellar infarct, also likely acute.  No evidence of recent or remote major vascular distribution infarct in the anterior circulation.  No acute parenchymal hemorrhage.  No midline shift.     The ventricles are normal in size without evidence of hydrocephalus.     No extra-axial blood or fluid collections.     The cranium appears intact.     Mastoid air cells and paranasal sinuses are essentially clear.     The vertebral bodies are normal in height and morphology without evidence of an acute displaced fracture or focal osseous destructive process.     Normal sagittal alignment is preserved.  No spondylolisthesis.     Intervertebral disc heights are well maintained.  No evidence of a bony spinal canal stenosis.     Evaluation intraspinal contents demonstrates no evidence of mass or hematoma.     No acute abnormalities in the paraspinal soft tissue structures.     Emphysematous changes patchy ground-glass opacity in the partially visualized lung apices.        CTA:     The aortic arch demonstrates no significant stenosis at the major branch vessel origins.     There is a focal thrombus  in the right subclavian artery extending into the right vertebral artery origin with severe stenosis.  Thrombus extending over proximally 1 cm.  The V1 and V2 segments of right vertebral artery are otherwise normal in caliber of slightly decreased in attenuation in comparison to the other cervical vasculature.  Abrupt occlusion of distal right vertebral artery about the level of the PICA origin approximate 2 cm segment of non enhancement.     Moderate focal stenosis at the left vertebral artery origin.  Left vertebral artery is otherwise normal in caliber.  The basilar artery is normal in caliber.     The right common carotid artery is normal in caliber.  No significant stenosis at the carotid bifurcation.The right internal carotid artery is normal in caliber.     The left common carotid artery is normal in caliber. No significant stenosis at the carotid bifurcation.The left internal carotid artery is normal in caliber.     Abrupt non enhancement in the P1 segment of the left PCA.  The proximal right PCA appears within normal limits.     The proximal MCAs and ACAs are unremarkable.     Findings were immediately discussed upon identification via telephone with the stroke service ([place]) at approximately 09:20.        Impression     Multifocal acute posterior circulation distribution infarcts as further discussed above, particularly involving essentially the entirety of the left PCA territory.  Modest mass effect without significant hemorrhage.     Focal thrombus with severe stenosis extending from the right subclavian artery into the proximal right vertebral artery.     Focal occlusion of the proximal intracranial segment of the right vertebral artery.     Focal occlusion of the proximal left PCA.     Moderate focal stenosis at the left vertebral artery origin.     Anterior circulation appears unremarkable.     No evidence of fracture or traumatic malalignment in the cervical spine.     Emphysematous  changes and patchy ground-glass opacity in the partially visualized lung apices.     This report was flagged in Epic as abnormal.        Electronically signed by:Geo Ambrocio MD  Date:                                                11/06/2024  Time:                                                09:33   CT Cervical Spine Without Contrast     Narrative     EXAMINATION:  CTA STROKE MULTI-PHASE; CT CERVICAL SPINE WITHOUT CONTRAST     CLINICAL HISTORY:  Neuro deficit, acute, stroke suspected;; Neck trauma, intoxicated or obtunded (Age >= 16y);     TECHNIQUE:  Axial CT images obtained throughout the region of the head and cervical spine before the administration of intravenous contrast.     CT angiogram was performed through the cervical and intracranial vasculature during the IV bolus administration of 75mL of Omnipaque 350.  Two additional phases through the intracranial vasculature via multiphase technique.     Multiplanar MPR and MIP reformats were performed.     CT source data was analyzed using artificial intelligence software for detection of a large vessel occlusions (LVO) in order to enable computer assisted triage notification and aid clinical stroke decision making.     COMPARISON:  CTA head and cervical spine 10/25/2015     FINDINGS:  Large region of hypoattenuation loss of gray-white differentiation in left occipital lobe in medial temporal lobe in keeping with an acute PCA distribution infarct.  Modest localized mass effect with some sulcal crowding.  Additional involvement of the left thalamus.  Similar appearance in the medial right occipital lobe, but smaller area of infarction.  Small left superior cerebellar infarct, also likely acute.  No evidence of recent or remote major vascular distribution infarct in the anterior circulation.  No acute parenchymal hemorrhage.  No midline shift.     The ventricles are normal in size without evidence of hydrocephalus.     No extra-axial blood or fluid  collections.     The cranium appears intact.     Mastoid air cells and paranasal sinuses are essentially clear.     The vertebral bodies are normal in height and morphology without evidence of an acute displaced fracture or focal osseous destructive process.     Normal sagittal alignment is preserved.  No spondylolisthesis.     Intervertebral disc heights are well maintained.  No evidence of a bony spinal canal stenosis.     Evaluation intraspinal contents demonstrates no evidence of mass or hematoma.     No acute abnormalities in the paraspinal soft tissue structures.     Emphysematous changes patchy ground-glass opacity in the partially visualized lung apices.        CTA:     The aortic arch demonstrates no significant stenosis at the major branch vessel origins.     There is a focal thrombus in the right subclavian artery extending into the right vertebral artery origin with severe stenosis.  Thrombus extending over proximally 1 cm.  The V1 and V2 segments of right vertebral artery are otherwise normal in caliber of slightly decreased in attenuation in comparison to the other cervical vasculature.  Abrupt occlusion of distal right vertebral artery about the level of the PICA origin approximate 2 cm segment of non enhancement.     Moderate focal stenosis at the left vertebral artery origin.  Left vertebral artery is otherwise normal in caliber.  The basilar artery is normal in caliber.     The right common carotid artery is normal in caliber.  No significant stenosis at the carotid bifurcation.The right internal carotid artery is normal in caliber.     The left common carotid artery is normal in caliber. No significant stenosis at the carotid bifurcation.The left internal carotid artery is normal in caliber.     Abrupt non enhancement in the P1 segment of the left PCA.  The proximal right PCA appears within normal limits.     The proximal MCAs and ACAs are unremarkable.     Findings were immediately discussed upon  identification via telephone with the stroke service ([place]) at approximately 09:20.        Impression     Multifocal acute posterior circulation distribution infarcts as further discussed above, particularly involving essentially the entirety of the left PCA territory.  Modest mass effect without significant hemorrhage.     Focal thrombus with severe stenosis extending from the right subclavian artery into the proximal right vertebral artery.     Focal occlusion of the proximal intracranial segment of the right vertebral artery.     Focal occlusion of the proximal left PCA.     Moderate focal stenosis at the left vertebral artery origin.     Anterior circulation appears unremarkable.     No evidence of fracture or traumatic malalignment in the cervical spine.     Emphysematous changes and patchy ground-glass opacity in the partially visualized lung apices.     This report was flagged in Epic as abnormal.        Electronically signed by:Geo Ambrocio MD  Date:                                                11/06/2024  Time:                                                09:33   CT Head Without Contrast     Narrative     EXAMINATION:  CT HEAD WITHOUT CONTRAST     CLINICAL HISTORY:  Stroke, follow up;     TECHNIQUE:  Low dose axial images were obtained through the head.  Coronal and sagittal reformations were also performed. Contrast was not administered.     COMPARISON:  CT, MRI 11/06/2024     FINDINGS:  Evolving posterior circulation infarcts are identified as described on recent MR imaging again most notable within the left greater than right occipital lobes extending to the medial left temporal lobe and bilateral thalamus.  No hemorrhagic conversion with potentially minimal stable petechial hemorrhage within the medial left thalamus.  No midline shift or herniation with mild mass effect on the 3rd ventricle again noted.     No new acute territorial infarct.     The visualized sinuses and mastoid air  cells are clear.        Impression     Expected evolutionary changes of the posterior circulation infarcts with no overt hemorrhagic conversion or significant midline shift/herniation.        Electronically signed by:Adrián Hobbs  Date:                                                11/09/2024  Time:                                                08:38            Results for orders placed or performed during the hospital encounter of 11/06/24 (from the past 2160 hours)   MRI Brain Without Contrast     Impression     Multiple posterior circulation infarcts are identified with involvement of the left greater than right occipital lobes and medial left temporal lobe, thalamus, right dorsal medulla, and minimally involving the cerebellum.  No midline shift with minimal mass effect on the 3rd ventricle.     Loss of flow void within the right intracranial vertebral artery in keeping with the findings on CTA.        Electronically signed by:Adrián Hobbs  Date:                                                11/06/2024  Time:                                                17:40      Cardiac Imaging   Results for orders placed during the hospital encounter of 11/06/24     Echo Saline Bubble? Yes; Ultrasound enhancing contrast? Yes     Interpretation Summary    Left Ventricle: The left ventricle is normal in size. Normal wall thickness. There is normal systolic function with a visually estimated ejection fraction of 55 - 60%. Biplane (2D) method of discs ejection fraction is 56%. Global longitudinal strain is -18.0%. There is normal diastolic function.    Right Ventricle: Normal right ventricular cavity size. Wall thickness is normal. Systolic function is normal.    Tricuspid Valve: There is mild regurgitation.    Pulmonary Artery: The estimated pulmonary artery systolic pressure is at least 28 mmHg.    IVC/SVC: Patient is ventilated, cannot use IVC diameter to estimate right atrial pressure.

## 2024-11-23 NOTE — NURSING
POC updated and reviewed with the patient at the bedside. Questions regarding POC were encouraged and addressed. VSS, see flowsheets. Patient is AOX 1, self, at this time. Tele maintained per order. Fall and safety precautions maintained, no signs of injury noted during shift. Patient repositioned independently/ with assistance in bed for comfort. Upon exiting room, patient's bed locked in low position, side rails up x 3, bed alarm on, with call light within reach. Instructed patient to call staff for mobility, verbalized understanding. Stroke book and education reviewed at the bedside, see education flowsheets for details. No acute signs of distress noted at this time. POC ongoing.

## 2024-11-23 NOTE — PLAN OF CARE
Problem: Adult Inpatient Plan of Care  Goal: Plan of Care Review  Outcome: Progressing     Problem: Adult Inpatient Plan of Care  Goal: Patient-Specific Goal (Individualized)  Outcome: Progressing     Problem: Adult Inpatient Plan of Care  Goal: Optimal Comfort and Wellbeing  Outcome: Progressing     Problem: Adult Inpatient Plan of Care  Goal: Readiness for Transition of Care  Outcome: Progressing     Problem: Wound  Goal: Optimal Coping  Outcome: Progressing     Problem: Wound  Goal: Optimal Functional Ability  Outcome: Progressing

## 2024-11-24 LAB
ALBUMIN SERPL BCP-MCNC: 3.5 G/DL (ref 3.5–5.2)
ALP SERPL-CCNC: 88 U/L (ref 40–150)
ALT SERPL W/O P-5'-P-CCNC: 40 U/L (ref 10–44)
ANION GAP SERPL CALC-SCNC: 13 MMOL/L (ref 8–16)
AST SERPL-CCNC: 26 U/L (ref 10–40)
BASOPHILS # BLD AUTO: 0.07 K/UL (ref 0–0.2)
BASOPHILS NFR BLD: 0.7 % (ref 0–1.9)
BILIRUB SERPL-MCNC: 0.2 MG/DL (ref 0.1–1)
BUN SERPL-MCNC: 23 MG/DL (ref 6–20)
CALCIUM SERPL-MCNC: 11 MG/DL (ref 8.7–10.5)
CHLORIDE SERPL-SCNC: 108 MMOL/L (ref 95–110)
CO2 SERPL-SCNC: 26 MMOL/L (ref 23–29)
CREAT SERPL-MCNC: 0.7 MG/DL (ref 0.5–1.4)
DIFFERENTIAL METHOD BLD: ABNORMAL
EOSINOPHIL # BLD AUTO: 0.2 K/UL (ref 0–0.5)
EOSINOPHIL NFR BLD: 2.4 % (ref 0–8)
ERYTHROCYTE [DISTWIDTH] IN BLOOD BY AUTOMATED COUNT: 15 % (ref 11.5–14.5)
EST. GFR  (NO RACE VARIABLE): >60 ML/MIN/1.73 M^2
GLUCOSE SERPL-MCNC: 149 MG/DL (ref 70–110)
HCT VFR BLD AUTO: 45.2 % (ref 37–48.5)
HGB BLD-MCNC: 13.8 G/DL (ref 12–16)
IMM GRANULOCYTES # BLD AUTO: 0.05 K/UL (ref 0–0.04)
IMM GRANULOCYTES NFR BLD AUTO: 0.5 % (ref 0–0.5)
LYMPHOCYTES # BLD AUTO: 1.5 K/UL (ref 1–4.8)
LYMPHOCYTES NFR BLD: 15.7 % (ref 18–48)
MAGNESIUM SERPL-MCNC: 2.4 MG/DL (ref 1.6–2.6)
MCH RBC QN AUTO: 28.9 PG (ref 27–31)
MCHC RBC AUTO-ENTMCNC: 30.5 G/DL (ref 32–36)
MCV RBC AUTO: 95 FL (ref 82–98)
MONOCYTES # BLD AUTO: 0.4 K/UL (ref 0.3–1)
MONOCYTES NFR BLD: 4.6 % (ref 4–15)
NEUTROPHILS # BLD AUTO: 7.3 K/UL (ref 1.8–7.7)
NEUTROPHILS NFR BLD: 76.1 % (ref 38–73)
NRBC BLD-RTO: 0 /100 WBC
OHS QRS DURATION: 70 MS
OHS QTC CALCULATION: 494 MS
PHOSPHATE SERPL-MCNC: 3.8 MG/DL (ref 2.7–4.5)
PLATELET # BLD AUTO: 808 K/UL (ref 150–450)
PMV BLD AUTO: 10.3 FL (ref 9.2–12.9)
POTASSIUM SERPL-SCNC: 4 MMOL/L (ref 3.5–5.1)
PROT SERPL-MCNC: 8.1 G/DL (ref 6–8.4)
RBC # BLD AUTO: 4.77 M/UL (ref 4–5.4)
SODIUM SERPL-SCNC: 147 MMOL/L (ref 136–145)
WBC # BLD AUTO: 9.56 K/UL (ref 3.9–12.7)

## 2024-11-24 PROCEDURE — 85025 COMPLETE CBC W/AUTO DIFF WBC: CPT

## 2024-11-24 PROCEDURE — 25000003 PHARM REV CODE 250: Performed by: STUDENT IN AN ORGANIZED HEALTH CARE EDUCATION/TRAINING PROGRAM

## 2024-11-24 PROCEDURE — 11000001 HC ACUTE MED/SURG PRIVATE ROOM

## 2024-11-24 PROCEDURE — 25000003 PHARM REV CODE 250: Performed by: PHYSICIAN ASSISTANT

## 2024-11-24 PROCEDURE — 93005 ELECTROCARDIOGRAM TRACING: CPT

## 2024-11-24 PROCEDURE — 25000003 PHARM REV CODE 250

## 2024-11-24 PROCEDURE — 83735 ASSAY OF MAGNESIUM: CPT

## 2024-11-24 PROCEDURE — 80053 COMPREHEN METABOLIC PANEL: CPT

## 2024-11-24 PROCEDURE — 84100 ASSAY OF PHOSPHORUS: CPT

## 2024-11-24 PROCEDURE — 93010 ELECTROCARDIOGRAM REPORT: CPT | Mod: ,,, | Performed by: INTERNAL MEDICINE

## 2024-11-24 PROCEDURE — 36415 COLL VENOUS BLD VENIPUNCTURE: CPT

## 2024-11-24 PROCEDURE — 63600175 PHARM REV CODE 636 W HCPCS

## 2024-11-24 PROCEDURE — 99233 SBSQ HOSP IP/OBS HIGH 50: CPT | Mod: ,,, | Performed by: PSYCHIATRY & NEUROLOGY

## 2024-11-24 RX ADMIN — FLUOXETINE HYDROCHLORIDE 60 MG: 20 SOLUTION ORAL at 08:11

## 2024-11-24 RX ADMIN — Medication 100 MG: at 08:11

## 2024-11-24 RX ADMIN — LITHIUM 300 MG: 8 SOLUTION ORAL at 03:11

## 2024-11-24 RX ADMIN — CARVEDILOL 6.25 MG: 6.25 TABLET, FILM COATED ORAL at 08:11

## 2024-11-24 RX ADMIN — LITHIUM 300 MG: 8 SOLUTION ORAL at 08:11

## 2024-11-24 RX ADMIN — QUETIAPINE FUMARATE 100 MG: 100 TABLET ORAL at 08:11

## 2024-11-24 RX ADMIN — FOLIC ACID 1 MG: 1 TABLET ORAL at 08:11

## 2024-11-24 RX ADMIN — ENOXAPARIN SODIUM 40 MG: 40 INJECTION SUBCUTANEOUS at 04:11

## 2024-11-24 RX ADMIN — MUSCLE RUB CREAM: 100; 150 CREAM TOPICAL at 08:11

## 2024-11-24 RX ADMIN — SENNOSIDES AND DOCUSATE SODIUM 1 TABLET: 50; 8.6 TABLET ORAL at 08:11

## 2024-11-24 RX ADMIN — QUETIAPINE FUMARATE 50 MG: 25 TABLET ORAL at 08:11

## 2024-11-24 RX ADMIN — MIRTAZAPINE 7.5 MG: 7.5 TABLET, FILM COATED ORAL at 08:11

## 2024-11-24 RX ADMIN — THERA TABS 1 TABLET: TAB at 08:11

## 2024-11-24 RX ADMIN — Medication 6 MG: at 08:11

## 2024-11-24 RX ADMIN — ATORVASTATIN CALCIUM 40 MG: 40 TABLET, FILM COATED ORAL at 08:11

## 2024-11-24 RX ADMIN — ASPIRIN 81 MG CHEWABLE TABLET 81 MG: 81 TABLET CHEWABLE at 08:11

## 2024-11-24 RX ADMIN — ACETAMINOPHEN 1000 MG: 500 TABLET ORAL at 08:11

## 2024-11-24 RX ADMIN — CLOPIDOGREL BISULFATE 75 MG: 75 TABLET ORAL at 08:11

## 2024-11-24 RX ADMIN — MUSCLE RUB CREAM: 100; 150 CREAM TOPICAL at 03:11

## 2024-11-24 NOTE — SUBJECTIVE & OBJECTIVE
Neurologic Chief Complaint: acute arterial ischemic stroke, multifocal, posterior circulation     Subjective:     Interval History: Patient is seen for follow-up neurological assessment and treatment recommendations: Please see hospital course    HPI, Past Medical, Family, and Social History remains the same as documented in the initial encounter.     Review of Systems   Unable to perform ROS: Mental status change     Scheduled Meds:   aspirin  81 mg Per NG tube Daily    atorvastatin  40 mg Per NG tube Daily    carvediloL  6.25 mg Per NG tube BID    clopidogreL  75 mg Per NG tube Daily    enoxparin  40 mg Subcutaneous Q24H (prophylaxis, 1700)    FLUoxetine  60 mg Per NG tube Daily    folic acid  1 mg Per NG tube Daily    LIDOcaine  1 patch Transdermal Q24H    lithium citrate 8 meq/5 ml  300 mg Per NG tube TID    melatonin  6 mg Per NG tube Nightly    methyl salicylate-menthol 15-10%   Topical (Top) TID    mirtazapine  7.5 mg Per NG tube QHS    multivitamin  1 tablet Per NG tube Daily    QUEtiapine  100 mg Per NG tube QHS    QUEtiapine  50 mg Per NG tube Daily    senna-docusate 8.6-50 mg  1 tablet Per NG tube Daily    thiamine  100 mg Per NG tube Daily     Continuous Infusions:  PRN Meds:  Current Facility-Administered Medications:     acetaminophen, 1,000 mg, Per NG tube, BID PRN    albuterol-ipratropium, 3 mL, Nebulization, Q6H PRN    polyethylene glycol, 17 g, Per NG tube, BID PRN    QUEtiapine, 25 mg, Per NG tube, BID PRN    sodium chloride 0.9%, 10 mL, Intravenous, PRN    Objective:     Vital Signs (Most Recent):  Temp: 97.5 °F (36.4 °C) (11/24/24 0707)  Pulse: 85 (11/24/24 0707)  Resp: 17 (11/24/24 0707)  BP: 134/78 (11/24/24 0707)  SpO2: 96 % (11/24/24 0707)  BP Location: Left arm    Vital Signs Range (Last 24H):  Temp:  [97.1 °F (36.2 °C)-98.1 °F (36.7 °C)]   Pulse:  []   Resp:  [16-20]   BP: (128-137)/(63-79)   SpO2:  [92 %-97 %]   BP Location: Left arm       Physical Exam  Vitals and nursing note  Refill request received for pen needles and lancets     According to Dr. Powell's note on 1/22/19  Lantus 40 units at night and NovoLog 1:5 ratio during the day     Follow up appointment 2/17/21 with Dr. Powell  Prescription sent     reviewed.   Constitutional:       Appearance: She is ill-appearing.   Pulmonary:      Effort: Pulmonary effort is normal. No respiratory distress.   Abdominal:      General: There is no distension.      Palpations: Abdomen is soft.   Musculoskeletal:      Cervical back: Normal range of motion and neck supple.      Comments: Appears with Right sided weakness. Unable to fully assess as pt was not cooperating fully.    Skin:     General: Skin is warm and dry.      Capillary Refill: Capillary refill takes 2 to 3 seconds.   Neurological:      General: No focal deficit present.      Mental Status: She is alert and easily aroused.      GCS: GCS eye subscore is 3. GCS verbal subscore is 3. GCS motor subscore is 5.      Cranial Nerves: Dysarthria present.      Motor: Weakness present.      Coordination: Coordination abnormal.      Gait: Gait abnormal.   Psychiatric:         Mood and Affect: Affect is labile.         Behavior: Behavior is slowed.         Cognition and Memory: Cognition is impaired. Memory is impaired.          Neurological Exam:   LOC: alert  Attention Span: poor    Laboratory:  CMP:   Recent Labs   Lab 11/24/24  0724   CALCIUM 11.0*   ALBUMIN 3.5   PROT 8.1   *   K 4.0   CO2 26      BUN 23*   CREATININE 0.7   ALKPHOS 88   ALT 40   AST 26   BILITOT 0.2     CBC:   Recent Labs   Lab 11/24/24  0724   WBC 9.56   RBC 4.77   HGB 13.8   HCT 45.2   *   MCV 95   MCH 28.9   MCHC 30.5*       Diagnostic Results     Brain Imaging   CTA STROKE MULTI-PHASE     Narrative     EXAMINATION:  CTA STROKE MULTI-PHASE; CT CERVICAL SPINE WITHOUT CONTRAST     CLINICAL HISTORY:  Neuro deficit, acute, stroke suspected;; Neck trauma, intoxicated or obtunded (Age >= 16y);     TECHNIQUE:  Axial CT images obtained throughout the region of the head and cervical spine before the administration of intravenous contrast.     CT angiogram was performed through the cervical and intracranial vasculature during the IV bolus  administration of 75mL of Omnipaque 350.  Two additional phases through the intracranial vasculature via multiphase technique.     Multiplanar MPR and MIP reformats were performed.     CT source data was analyzed using artificial intelligence software for detection of a large vessel occlusions (LVO) in order to enable computer assisted triage notification and aid clinical stroke decision making.     COMPARISON:  CTA head and cervical spine 10/25/2015     FINDINGS:  Large region of hypoattenuation loss of gray-white differentiation in left occipital lobe in medial temporal lobe in keeping with an acute PCA distribution infarct.  Modest localized mass effect with some sulcal crowding.  Additional involvement of the left thalamus.  Similar appearance in the medial right occipital lobe, but smaller area of infarction.  Small left superior cerebellar infarct, also likely acute.  No evidence of recent or remote major vascular distribution infarct in the anterior circulation.  No acute parenchymal hemorrhage.  No midline shift.     The ventricles are normal in size without evidence of hydrocephalus.     No extra-axial blood or fluid collections.     The cranium appears intact.     Mastoid air cells and paranasal sinuses are essentially clear.     The vertebral bodies are normal in height and morphology without evidence of an acute displaced fracture or focal osseous destructive process.     Normal sagittal alignment is preserved.  No spondylolisthesis.     Intervertebral disc heights are well maintained.  No evidence of a bony spinal canal stenosis.     Evaluation intraspinal contents demonstrates no evidence of mass or hematoma.     No acute abnormalities in the paraspinal soft tissue structures.     Emphysematous changes patchy ground-glass opacity in the partially visualized lung apices.        CTA:     The aortic arch demonstrates no significant stenosis at the major branch vessel origins.     There is a focal thrombus  in the right subclavian artery extending into the right vertebral artery origin with severe stenosis.  Thrombus extending over proximally 1 cm.  The V1 and V2 segments of right vertebral artery are otherwise normal in caliber of slightly decreased in attenuation in comparison to the other cervical vasculature.  Abrupt occlusion of distal right vertebral artery about the level of the PICA origin approximate 2 cm segment of non enhancement.     Moderate focal stenosis at the left vertebral artery origin.  Left vertebral artery is otherwise normal in caliber.  The basilar artery is normal in caliber.     The right common carotid artery is normal in caliber.  No significant stenosis at the carotid bifurcation.The right internal carotid artery is normal in caliber.     The left common carotid artery is normal in caliber. No significant stenosis at the carotid bifurcation.The left internal carotid artery is normal in caliber.     Abrupt non enhancement in the P1 segment of the left PCA.  The proximal right PCA appears within normal limits.     The proximal MCAs and ACAs are unremarkable.     Findings were immediately discussed upon identification via telephone with the stroke service ([facility]) at approximately 09:20.        Impression     Multifocal acute posterior circulation distribution infarcts as further discussed above, particularly involving essentially the entirety of the left PCA territory.  Modest mass effect without significant hemorrhage.     Focal thrombus with severe stenosis extending from the right subclavian artery into the proximal right vertebral artery.     Focal occlusion of the proximal intracranial segment of the right vertebral artery.     Focal occlusion of the proximal left PCA.     Moderate focal stenosis at the left vertebral artery origin.     Anterior circulation appears unremarkable.     No evidence of fracture or traumatic malalignment in the cervical spine.     Emphysematous  changes and patchy ground-glass opacity in the partially visualized lung apices.     This report was flagged in Epic as abnormal.        Electronically signed by:Geo Ambrocio MD  Date:                                                11/06/2024  Time:                                                09:33   CT Cervical Spine Without Contrast     Narrative     EXAMINATION:  CTA STROKE MULTI-PHASE; CT CERVICAL SPINE WITHOUT CONTRAST     CLINICAL HISTORY:  Neuro deficit, acute, stroke suspected;; Neck trauma, intoxicated or obtunded (Age >= 16y);     TECHNIQUE:  Axial CT images obtained throughout the region of the head and cervical spine before the administration of intravenous contrast.     CT angiogram was performed through the cervical and intracranial vasculature during the IV bolus administration of 75mL of Omnipaque 350.  Two additional phases through the intracranial vasculature via multiphase technique.     Multiplanar MPR and MIP reformats were performed.     CT source data was analyzed using artificial intelligence software for detection of a large vessel occlusions (LVO) in order to enable computer assisted triage notification and aid clinical stroke decision making.     COMPARISON:  CTA head and cervical spine 10/25/2015     FINDINGS:  Large region of hypoattenuation loss of gray-white differentiation in left occipital lobe in medial temporal lobe in keeping with an acute PCA distribution infarct.  Modest localized mass effect with some sulcal crowding.  Additional involvement of the left thalamus.  Similar appearance in the medial right occipital lobe, but smaller area of infarction.  Small left superior cerebellar infarct, also likely acute.  No evidence of recent or remote major vascular distribution infarct in the anterior circulation.  No acute parenchymal hemorrhage.  No midline shift.     The ventricles are normal in size without evidence of hydrocephalus.     No extra-axial blood or fluid  collections.     The cranium appears intact.     Mastoid air cells and paranasal sinuses are essentially clear.     The vertebral bodies are normal in height and morphology without evidence of an acute displaced fracture or focal osseous destructive process.     Normal sagittal alignment is preserved.  No spondylolisthesis.     Intervertebral disc heights are well maintained.  No evidence of a bony spinal canal stenosis.     Evaluation intraspinal contents demonstrates no evidence of mass or hematoma.     No acute abnormalities in the paraspinal soft tissue structures.     Emphysematous changes patchy ground-glass opacity in the partially visualized lung apices.        CTA:     The aortic arch demonstrates no significant stenosis at the major branch vessel origins.     There is a focal thrombus in the right subclavian artery extending into the right vertebral artery origin with severe stenosis.  Thrombus extending over proximally 1 cm.  The V1 and V2 segments of right vertebral artery are otherwise normal in caliber of slightly decreased in attenuation in comparison to the other cervical vasculature.  Abrupt occlusion of distal right vertebral artery about the level of the PICA origin approximate 2 cm segment of non enhancement.     Moderate focal stenosis at the left vertebral artery origin.  Left vertebral artery is otherwise normal in caliber.  The basilar artery is normal in caliber.     The right common carotid artery is normal in caliber.  No significant stenosis at the carotid bifurcation.The right internal carotid artery is normal in caliber.     The left common carotid artery is normal in caliber. No significant stenosis at the carotid bifurcation.The left internal carotid artery is normal in caliber.     Abrupt non enhancement in the P1 segment of the left PCA.  The proximal right PCA appears within normal limits.     The proximal MCAs and ACAs are unremarkable.     Findings were immediately discussed upon  identification via telephone with the stroke service ([place]) at approximately 09:20.        Impression     Multifocal acute posterior circulation distribution infarcts as further discussed above, particularly involving essentially the entirety of the left PCA territory.  Modest mass effect without significant hemorrhage.     Focal thrombus with severe stenosis extending from the right subclavian artery into the proximal right vertebral artery.     Focal occlusion of the proximal intracranial segment of the right vertebral artery.     Focal occlusion of the proximal left PCA.     Moderate focal stenosis at the left vertebral artery origin.     Anterior circulation appears unremarkable.     No evidence of fracture or traumatic malalignment in the cervical spine.     Emphysematous changes and patchy ground-glass opacity in the partially visualized lung apices.     This report was flagged in Epic as abnormal.        Electronically signed by:Geo Ambrocio MD  Date:                                                11/06/2024  Time:                                                09:33   CT Head Without Contrast     Narrative     EXAMINATION:  CT HEAD WITHOUT CONTRAST     CLINICAL HISTORY:  Stroke, follow up;     TECHNIQUE:  Low dose axial images were obtained through the head.  Coronal and sagittal reformations were also performed. Contrast was not administered.     COMPARISON:  CT, MRI 11/06/2024     FINDINGS:  Evolving posterior circulation infarcts are identified as described on recent MR imaging again most notable within the left greater than right occipital lobes extending to the medial left temporal lobe and bilateral thalamus.  No hemorrhagic conversion with potentially minimal stable petechial hemorrhage within the medial left thalamus.  No midline shift or herniation with mild mass effect on the 3rd ventricle again noted.     No new acute territorial infarct.     The visualized sinuses and mastoid air  cells are clear.        Impression     Expected evolutionary changes of the posterior circulation infarcts with no overt hemorrhagic conversion or significant midline shift/herniation.        Electronically signed by:Adrián Hobbs  Date:                                                11/09/2024  Time:                                                08:38            Results for orders placed or performed during the hospital encounter of 11/06/24 (from the past 2160 hours)   MRI Brain Without Contrast     Impression     Multiple posterior circulation infarcts are identified with involvement of the left greater than right occipital lobes and medial left temporal lobe, thalamus, right dorsal medulla, and minimally involving the cerebellum.  No midline shift with minimal mass effect on the 3rd ventricle.     Loss of flow void within the right intracranial vertebral artery in keeping with the findings on CTA.        Electronically signed by:Adrián Hobbs  Date:                                                11/06/2024  Time:                                                17:40      Cardiac Imaging   Results for orders placed during the hospital encounter of 11/06/24     Echo Saline Bubble? Yes; Ultrasound enhancing contrast? Yes     Interpretation Summary    Left Ventricle: The left ventricle is normal in size. Normal wall thickness. There is normal systolic function with a visually estimated ejection fraction of 55 - 60%. Biplane (2D) method of discs ejection fraction is 56%. Global longitudinal strain is -18.0%. There is normal diastolic function.    Right Ventricle: Normal right ventricular cavity size. Wall thickness is normal. Systolic function is normal.    Tricuspid Valve: There is mild regurgitation.    Pulmonary Artery: The estimated pulmonary artery systolic pressure is at least 28 mmHg.    IVC/SVC: Patient is ventilated, cannot use IVC diameter to estimate right atrial pressure.

## 2024-11-24 NOTE — PLAN OF CARE
Problem: Adult Inpatient Plan of Care  Goal: Plan of Care Review  Outcome: Progressing  Goal: Patient-Specific Goal (Individualized)  Outcome: Progressing  Goal: Absence of Hospital-Acquired Illness or Injury  Outcome: Progressing  Goal: Optimal Comfort and Wellbeing  Outcome: Progressing  Goal: Readiness for Transition of Care  Outcome: Progressing     Problem: Infection  Goal: Absence of Infection Signs and Symptoms  Outcome: Progressing     Problem: Wound  Goal: Optimal Coping  Outcome: Progressing  Goal: Optimal Functional Ability  Outcome: Progressing  Goal: Absence of Infection Signs and Symptoms  Outcome: Progressing  Goal: Improved Oral Intake  Outcome: Progressing  Goal: Optimal Pain Control and Function  Outcome: Progressing  Goal: Skin Health and Integrity  Outcome: Progressing  Goal: Optimal Wound Healing  Outcome: Progressing     Problem: Stroke, Ischemic (Includes Transient Ischemic Attack)  Goal: Optimal Coping  Outcome: Progressing  Goal: Effective Bowel Elimination  Outcome: Progressing  Goal: Optimal Cerebral Tissue Perfusion  Outcome: Progressing  Goal: Optimal Cognitive Function  Outcome: Progressing  Goal: Improved Communication Skills  Outcome: Progressing  Goal: Optimal Functional Ability  Outcome: Progressing  Goal: Optimal Nutrition Intake  Outcome: Progressing  Goal: Effective Oxygenation and Ventilation  Outcome: Progressing  Goal: Improved Sensorimotor Function  Outcome: Progressing  Goal: Safe and Effective Swallow  Outcome: Progressing  Goal: Effective Urinary Elimination  Outcome: Progressing     Problem: Mechanical Ventilation Invasive  Goal: Effective Communication  Outcome: Progressing  Goal: Optimal Device Function  Outcome: Progressing  Goal: Mechanical Ventilation Liberation  Outcome: Progressing  Goal: Optimal Nutrition Delivery  Outcome: Progressing  Goal: Absence of Device-Related Skin and Tissue Injury  Outcome: Progressing  Goal: Absence of Ventilator-Induced Lung  Injury  Outcome: Progressing     Problem: Artificial Airway  Goal: Effective Communication  Outcome: Progressing  Goal: Optimal Device Function  Outcome: Progressing  Goal: Absence of Device-Related Skin or Tissue Injury  Outcome: Progressing     Problem: Fall Injury Risk  Goal: Absence of Fall and Fall-Related Injury  Outcome: Progressing     Problem: Restraint, Nonviolent  Goal: Absence of Harm or Injury  Outcome: Progressing     Problem: Skin Injury Risk Increased  Goal: Skin Health and Integrity  Outcome: Progressing     Problem: Pneumonia  Goal: Fluid Balance  Outcome: Progressing  Goal: Resolution of Infection Signs and Symptoms  Outcome: Progressing  Goal: Effective Oxygenation and Ventilation  Outcome: Progressing     POC updated and reviewed with the patient at the bedside. Questions regarding POC were encouraged and addressed. VSS, see flowsheets. Patient is AOX 1 at this time. Tele maintained per order. Fall and safety precautions maintained, no signs of injury noted during shift. Patient repositioned with assistance in bed for comfort. Upon exiting room, patient's bed locked in low position, side rails up x 3, bed alarm on, with call light within reach. Instructed patient to call staff for mobility, verbalized understanding. Stroke book and education reviewed at the bedside, see education flowsheets for details. No acute signs of distress noted at this time. POC ongoing.

## 2024-11-24 NOTE — ASSESSMENT & PLAN NOTE
Malathi Mcpherson is a 49 y.o. female with PMH of drug use, seizure, HTN, HLD that is admitted to [facility] [place] for further evaluation and management of multifocal acute infarcts. She initially presented to [facility] [place] [facility] ED 11/6 after being found lying face down at home that morning, per EMS was lethargic and aphasic and moving LUE/LLE/RLE but was not moving RUE. EMS also reported that they had seen her before in context of seizures. LKW 10pm 11/5. In ED was noted to have exam worsening, now with R sided plegia and global aphasia. NIHSS 22. Also noted to have O2 desat in 70s and ultimately was intubated for airway protection. CTA showed multifocal areas of hypodensity involving the posterior circulation suspicious for infarcts as well as focal occlusion of R vert and L PCA. Not candidate for acute stroke interventions, no TNK due to OOW and no IR due to stroke burden/risk for bleeding. She was admitted to [facility] for higher level of care. NSGY consulted on admit for edema/hemicrani watch, however no surgical interventions recommended. MRI brain confirmed multifocal areas of acute infarct involving: L>R occipital lobes, L medial temporal lobe, bilateral thalami, L hippocampus and R hippocampus tail, R dorsal medulla, bilateral cerebellar hemispheres. TTE unremarkable. Suspect etiology of stroke likely secondary to substance use, Utox + cocaine.    Interval hx: Episodes of coughing and vomiting overnight with concern for aspiration event. TF held, repeat KUB unremarkable. Worsening leukocytosis today, possibly related to aspiration event although uncertain. Will test COVID/flu/RSV, collect blood cultures. Consider addition of vancomycin especially is patient becomes febrile or clinically unstable. LFT's slightly improved, elevated INR. Acute hepatitis panel ordered, consider liver ultrasound.     Antithrombotics for secondary stroke prevention: Antiplatelets: Aspirin: 81 mg daily  Clopidogrel: 75 mg daily.     Statins for secondary stroke  prevention and hyperlipidemia, if present: Statins: Atorvastatin- 40 mg daily    Aggressive risk factor modification: HTN, HLD, Substance use.     Rehab efforts: The patient has been evaluated by a stroke team provider and the therapy needs have been fully considered based off the presenting complaints and exam findings. The following therapy evaluations are needed: PT evaluate and treat, OT evaluate and treat, SLP evaluate and treat. High intensity therapy recommended. Pt failed barium swallow study. PEG tube in place  Diagnostics ordered/pending: None    VTE prophylaxis: Enoxaparin 40 mg SQ every 24 hours  Mechanical prophylaxis: Place SCDs    BP parameters: Infarct: SBP goal <180

## 2024-11-24 NOTE — PLAN OF CARE
Problem: Adult Inpatient Plan of Care  Goal: Plan of Care Review  Outcome: Not Progressing  Goal: Patient-Specific Goal (Individualized)  Outcome: Not Progressing  Goal: Absence of Hospital-Acquired Illness or Injury  Outcome: Not Progressing  Goal: Optimal Comfort and Wellbeing  Outcome: Not Progressing  Goal: Readiness for Transition of Care  Outcome: Not Progressing     Problem: Infection  Goal: Absence of Infection Signs and Symptoms  Outcome: Not Progressing     Problem: Wound  Goal: Optimal Coping  Outcome: Not Progressing  Goal: Optimal Functional Ability  Outcome: Not Progressing  Goal: Absence of Infection Signs and Symptoms  Outcome: Not Progressing  Goal: Improved Oral Intake  Outcome: Not Progressing  Goal: Optimal Pain Control and Function  Outcome: Not Progressing  Goal: Skin Health and Integrity  Outcome: Not Progressing  Goal: Optimal Wound Healing  Outcome: Not Progressing     Problem: Stroke, Ischemic (Includes Transient Ischemic Attack)  Goal: Optimal Coping  Outcome: Not Progressing  Goal: Effective Bowel Elimination  Outcome: Not Progressing  Goal: Optimal Cerebral Tissue Perfusion  Outcome: Not Progressing  Goal: Optimal Cognitive Function  Outcome: Not Progressing  Goal: Improved Communication Skills  Outcome: Not Progressing  Goal: Optimal Functional Ability  Outcome: Not Progressing  Goal: Optimal Nutrition Intake  Outcome: Not Progressing  Goal: Effective Oxygenation and Ventilation  Outcome: Not Progressing  Goal: Improved Sensorimotor Function  Outcome: Not Progressing  Goal: Safe and Effective Swallow  Outcome: Not Progressing  Goal: Effective Urinary Elimination  Outcome: Not Progressing     Problem: Mechanical Ventilation Invasive  Goal: Effective Communication  Outcome: Not Progressing  Goal: Optimal Device Function  Outcome: Not Progressing  Goal: Mechanical Ventilation Liberation  Outcome: Not Progressing  Goal: Optimal Nutrition Delivery  Outcome: Not Progressing  Goal: Absence  of Device-Related Skin and Tissue Injury  Outcome: Not Progressing  Goal: Absence of Ventilator-Induced Lung Injury  Outcome: Not Progressing     Problem: Artificial Airway  Goal: Effective Communication  Outcome: Not Progressing  Goal: Optimal Device Function  Outcome: Not Progressing  Goal: Absence of Device-Related Skin or Tissue Injury  Outcome: Not Progressing     Problem: Fall Injury Risk  Goal: Absence of Fall and Fall-Related Injury  Outcome: Not Progressing     Problem: Restraint, Nonviolent  Goal: Absence of Harm or Injury  Outcome: Not Progressing     Problem: Skin Injury Risk Increased  Goal: Skin Health and Integrity  Outcome: Not Progressing     Problem: Pneumonia  Goal: Fluid Balance  Outcome: Not Progressing  Goal: Resolution of Infection Signs and Symptoms  Outcome: Not Progressing  Goal: Effective Oxygenation and Ventilation  Outcome: Not Progressing         POC updated and reviewed with the patient at the bedside. Questions regarding POC were encouraged and addressed. VSS, see flowsheets. Patient is AOX 1 at this time. Tele maintained per order. NAEON. Fall and safety precautions maintained, no signs of injury noted during shift. Patient repositioned with assistance in bed for comfort. Upon exiting room, patient's bed locked in low position, side rails up x 3, bed alarm on, with call light within reach. Instructed patient to call staff for mobility, verbalized understanding. Stroke book and education reviewed at the bedside, see education flowsheets for details. No acute signs of distress noted at this time.

## 2024-11-24 NOTE — PROGRESS NOTES
Neno Conley - Neurosurgery ([facility])  Vascular Neurology  Comprehensive Stroke Center  Progress Note    Assessment/Plan:     * Acute arterial ischemic stroke, multifocal, posterior circulation  Malathi Mcpherson is a 49 y.o. female with PMH of drug use, seizure, HTN, HLD that is [facility] for further evaluation and management of multifocal acute infarcts. She [facility] 11/6 after being found lying face down at home that morning, per EMS was lethargic and aphasic and moving LUE/LLE/RLE but was not moving RUE. EMS also reported that they had seen her before in context of seizures. LKW 10pm 11/5. In ED was noted to have exam worsening, now with R sided plegia and global aphasia. NIHSS 22. Also noted to have O2 desat in 70s and ultimately was intubated for airway protection. CTA showed multifocal areas of hypodensity involving the posterior circulation suspicious for infarcts as well as focal occlusion of R vert and L PCA. Not candidate for acute stroke interventions, no TNK due to OOW and no IR due to stroke burden/risk for bleeding. She was admitted to [facility] for higher level of care. NSGY consulted on admit for edema/hemicrani watch, however no surgical interventions recommended. MRI brain confirmed multifocal areas of acute infarct involving: L>R occipital lobes, L medial temporal lobe, bilateral thalami, L hippocampus and R hippocampus tail, R dorsal medulla, bilateral cerebellar hemispheres. TTE unremarkable. Suspect etiology of stroke likely secondary to substance use, Utox + cocaine.    Interval hx: Episodes of coughing and vomiting overnight with concern for aspiration event. TF held, repeat KUB unremarkable. Worsening leukocytosis today, possibly related to aspiration event although uncertain. Will test COVID/flu/RSV, collect blood cultures. Consider addition of vancomycin especially is patient becomes febrile or clinically unstable. LFT's slightly improved, elevated INR. Acute hepatitis  panel ordered, consider liver ultrasound.     Antithrombotics for secondary stroke prevention: Antiplatelets: Aspirin: 81 mg daily  Clopidogrel: 75 mg daily.     Statins for secondary stroke prevention and hyperlipidemia, if present: Statins: Atorvastatin- 40 mg daily    Aggressive risk factor modification: HTN, HLD, Substance use.     Rehab efforts: The patient has been evaluated by a stroke team provider and the therapy needs have been fully considered based off the presenting complaints and exam findings. The following therapy evaluations are needed: PT evaluate and treat, OT evaluate and treat, SLP evaluate and treat. High intensity therapy recommended. Pt failed barium swallow study. PEG tube in place  Diagnostics ordered/pending: None    VTE prophylaxis: Enoxaparin 40 mg SQ every 24 hours  Mechanical prophylaxis: Place SCDs    BP parameters: Infarct: SBP goal <180      Transaminitis  LFTs uptrending, tylenol updated to PRN.   Decreased dose of statin to atorvastatin 40 mg 11/17 given elevated INR 1.5.   Consider liver ultrasound  Hep C antigen reactive, ordering RNA quant, pending results     Lab Results   Component Value Date    ALT 40 11/24/2024    AST 26 11/24/2024    ALKPHOS 88 11/24/2024    BILITOT 0.2 11/24/2024     Monitor daily CMP, PT/INR    Agitation  -see anxiety and depression    HTN (hypertension)  Patients blood pressure range in the last 24 hours was: BP  Min: 86/51  Max: 212/97. The patient's inpatient anti-hypertensive regimen is listed below:    Current Antihypertensives  , 2 times daily, Oral  carvediloL tablet 6.25 mg, 2 times daily, Per NG tube    - BP is controlled, no changes needed to their regimen  -SBP goal < 180      Right upper lobe pneumonia  Extubated 11/10 in [facility] with persistent oxygen requirement. CXR remarkable for RUL consolidation. Respiratory culture positive for H flu and MSSA. MRSA screen by PCR positive. Was transitioned to cefazolin 11/12 then cefepime 11/14 after  sensitivities c/w h flu beta lactamase positive. Extended course even persistent or worsening leukocytosis, plan for total 7 day course (EED 11/21). 11/18/24 worsening leukocytosis with WBC up to 19.25 from 17.82. Viral panel negative.     -wean oxygen as tolerated, stable on RA  -Finished with course of cefepime    - Vanc d/pardeep  - F/U CBC: WBC 14.28  - F/U blood cultures: NGTD  - RIP negative    Debility  -Due to stroke  -Aggressive therapy  -PT/OT/SLP eval and treat    Polysubstance abuse  -Utox + cocaine  -Suspect etiology of stroke likely due to substance use  -Continue thiamine/folate/multivitamin supplementation  -Addiction psych consulted early on admission    Cytotoxic cerebral edema  -Area of cerebral edema identified when reviewing brain imaging involving bilateral posterior circulation territories due to acute infarcts, there is mild mass effect associated with it that has remained stable on follow up imaging.  -Admitted to [facility] for close neuro monitoring and observation  -NSGY consulted on admit, no surgical intervention recommended and NSGY s/o  - Stepped down to floor  -Continue frequent q4hr neuro checks as any acute changes or worsening neuro status .  -Obtain stat CTH for any new or worsening neuro changes and notify primary team immediately    Anxiety and depression  -Psychiatry consulted, appreciate recs  Continue lithium 300 mg TID (resumed 11/13), Prozac 60 mg daily, Seroquel 25 mg in the AM and 50 mg in the PM, Remeron 7.5 mg nightly.   PRN Seroquel 25 mg BID for non-redirectable agitation.    -daily EKG to monitor Qtc: Elevated to 535, talking to psych for possible medication adjustment  - serum lithium level on 11/18/24: 0.4         11/07/2024 Pt was agitated overnight and is on fentanyl. Currently intubated.On repeated painful stimuli has movements on LUE,LLE, RLE> RUE.The movement on right side is an improvement from yesterday.  11/11/2024 NAEON, remains on precedex for agitation. Extubated  yesterday (11/10), tolerating extubation thus far without complication. Moving extremities spontaneously although weakness noted in RUE>>RLE. Remains globally aphasic and agitated despite max precedex infusion. Respiratory cultures + H flu, on rocephin for abx. Also on bactrim for SSTI. Home lithium restarted today, continue seoquel and fluoxetine.  11/14/2024 NAEO, patient off precedex, BL wrist and torso restraints, NG tube with feeds running. Patient able to respond to questions and follow some commands. Oriented to self only. Stable for stepdown.   11/15/2024 Pt still in restraints. Per nurse tendency to pull at tubes and things.On restraints. Oriented to self. No other complaints in the am.  11/16/2024 remains in restraints this AM, reportedly agitated overnight with concerns from nursing. Psychiatry consulted for med rec given extensive psych history on lithium and multiple different psych meds on admission med rec/recent dispense history and concerns for worsening agitation. LFTs uptrending, tylenol updated to PRN. Consider decreased dose of statin if worsening.   11/17/2024 episodes of coughing and vomiting overnight with concern for aspiration event. TF held, repeat KUB unremarkable. Worsening leukocytosis today, possibly related to aspiration event although uncertain. Will test COVID/flu/RSV, collect blood cultures. Consider addition of vancomycin especially is patient becomes febrile or clinically unstable. LFT's slightly improved, elevated INR. Acute hepatitis panel ordered, consider liver ultrasound.   11/18/24 Viral panel negative for any respiratory infection. WBC trending up to 19.25 from 17.82. Currently on Vanc and cefepime. LFT downtrending to normal values. Plan for barium swallow study today, to assess for the continuation of NG tube feedings. Plan to likely move to hospital medicine service.   11/19/2024 Pt failed barium swallow study yesterday. IR consulted for PEG tube placement. CT abdomen  ordered. Hep C antigen reactive, pending RNA quant. WBC trending down to 16. 35 from 19.25. Blood cultures NGTD.   11/20/2024 NAOE, WBC stable at 16.36, Bcx NGTD. Ordering RIP. Currently on Vanc and cefepime. Scheduled for PEG placement today. LFT improving. Pending Hep C RNA quant.   11/21/2024 NAOE, NG tube removed. PEG tube in place and functioning. WBC improving now down to 14. 28. LFT wnl. RIP negative and all other infectious panel negative. Vanc d/pardeep. Finished course of cefepime today.   11/22/2024 NAOE, Peg tube functioning. Off restrains. WBC improving. Qtc elevated to 530's. Talked to Psych team, no change in medication. Pending rehab placement.   11/23/2024 NAOE, NG tube off, off restrains. Binders on. WBC trending down. Pending rehab placement.   11/24/2024 NAOE, VSS. WBC WNL. Plt 808, . Pending rehab placement.     STROKE DOCUMENTATION   Acute Stroke Times   Last Known Normal Date: 11/05/24  Last Known Normal Time: 2200  Symptom Onset Date: 11/06/24 (unsure of timing)  Symptom Onset Time:  (sometime this morning.)  Unknown Symptom Onset Time: Unknown Time  Stroke Team Called Date: 11/06/24  Stroke Team Called Time: 0843  Stroke Team Arrival Date: 11/06/24  Stroke Team Arrival Time: 0845  CT Interpretation Time: 0859  Thrombolytic Therapy Recommended: No  CTA Interpretation Time: 0902  Thrombectomy Recommended: No    NIH Scale:  1a. Level of Consciousness: 1-->Not alert, but arousable by minor stimulation to obey, answer, or respond  1b. LOC Questions: 1-->Answers one question correctly  1c. LOC Commands: 2-->Performs neither task correctly  2. Best Gaze: 0-->Normal  3. Visual: 3-->Bilateral hemianopia (blind including cortical blindness)  4. Facial Palsy: 0-->Normal symmetrical movements  5a. Motor Arm, Left: 0-->No drift, limb holds 90 (or 45) degrees for full 10 secs  5b. Motor Arm, Right: 0-->No drift, limb holds 90 (or 45) degrees for full 10 secs  6a. Motor Leg, Left: 0-->No drift, leg  holds 30 degree position for full 5 secs  6b. Motor Leg, Right: 0-->No drift, leg holds 30 degree position for full 5 secs  7. Limb Ataxia: 0-->Absent  8. Sensory: 0-->Normal, no sensory loss  9. Best Language: 1-->Mild-to-moderate aphasia, some obvious loss of fluency or facility of comprehension, without significant limitation on ideas expressed or form of expression. Reduction of speech and/or comprehension, however, makes conversation. . . (see row details)  10. Dysarthria: 0-->Normal  11. Extinction and Inattention (formerly Neglect): 0-->No abnormality  Total (NIH Stroke Scale): 8       Modified Yonny Score: 0  Shailesh Coma Scale:    ABCD2 Score:    [id number] Score:   HAS -BLED Score:   ICH Score:   Hunt & Argueta Classification:      Hemorrhagic change of an Ischemic Stroke: Does this patient have an ischemic stroke with hemorrhagic changes? No     Neurologic Chief Complaint: acute arterial ischemic stroke, multifocal, posterior circulation     Subjective:     Interval History: Patient is seen for follow-up neurological assessment and treatment recommendations: Please see hospital course    HPI, Past Medical, Family, and Social History remains the same as documented in the initial encounter.     Review of Systems   Unable to perform ROS: Mental status change     Scheduled Meds:   aspirin  81 mg Per NG tube Daily    atorvastatin  40 mg Per NG tube Daily    carvediloL  6.25 mg Per NG tube BID    clopidogreL  75 mg Per NG tube Daily    enoxparin  40 mg Subcutaneous Q24H (prophylaxis, 1700)    FLUoxetine  60 mg Per NG tube Daily    folic acid  1 mg Per NG tube Daily    LIDOcaine  1 patch Transdermal Q24H    lithium citrate 8 meq/5 ml  300 mg Per NG tube TID    melatonin  6 mg Per NG tube Nightly    methyl salicylate-menthol 15-10%   Topical (Top) TID    mirtazapine  7.5 mg Per NG tube QHS    multivitamin  1 tablet Per NG tube Daily    QUEtiapine  100 mg Per NG tube QHS    QUEtiapine  50 mg Per NG tube Daily     senna-docusate 8.6-50 mg  1 tablet Per NG tube Daily    thiamine  100 mg Per NG tube Daily     Continuous Infusions:  PRN Meds:  Current Facility-Administered Medications:     acetaminophen, 1,000 mg, Per NG tube, BID PRN    albuterol-ipratropium, 3 mL, Nebulization, Q6H PRN    polyethylene glycol, 17 g, Per NG tube, BID PRN    QUEtiapine, 25 mg, Per NG tube, BID PRN    sodium chloride 0.9%, 10 mL, Intravenous, PRN    Objective:     Vital Signs (Most Recent):  Temp: 97.5 °F (36.4 °C) (11/24/24 0707)  Pulse: 85 (11/24/24 0707)  Resp: 17 (11/24/24 0707)  BP: 134/78 (11/24/24 0707)  SpO2: 96 % (11/24/24 0707)  BP Location: Left arm    Vital Signs Range (Last 24H):  Temp:  [97.1 °F (36.2 °C)-98.1 °F (36.7 °C)]   Pulse:  []   Resp:  [16-20]   BP: (128-137)/(63-79)   SpO2:  [92 %-97 %]   BP Location: Left arm       Physical Exam  Vitals and nursing note reviewed.   Constitutional:       Appearance: She is ill-appearing.   Pulmonary:      Effort: Pulmonary effort is normal. No respiratory distress.   Abdominal:      General: There is no distension.      Palpations: Abdomen is soft.   Musculoskeletal:      Cervical back: Normal range of motion and neck supple.      Comments: Appears with Right sided weakness. Unable to fully assess as pt was not cooperating fully.    Skin:     General: Skin is warm and dry.      Capillary Refill: Capillary refill takes 2 to 3 seconds.   Neurological:      General: No focal deficit present.      Mental Status: She is alert and easily aroused.      GCS: GCS eye subscore is 3. GCS verbal subscore is 3. GCS motor subscore is 5.      Cranial Nerves: Dysarthria present.      Motor: Weakness present.      Coordination: Coordination abnormal.      Gait: Gait abnormal.   Psychiatric:         Mood and Affect: Affect is labile.         Behavior: Behavior is slowed.         Cognition and Memory: Cognition is impaired. Memory is impaired.          Neurological Exam:   LOC: alert  Attention Span:  poor    Laboratory:  CMP:   Recent Labs   Lab 11/24/24  0724   CALCIUM 11.0*   ALBUMIN 3.5   PROT 8.1   *   K 4.0   CO2 26      BUN 23*   CREATININE 0.7   ALKPHOS 88   ALT 40   AST 26   BILITOT 0.2     CBC:   Recent Labs   Lab 11/24/24  0724   WBC 9.56   RBC 4.77   HGB 13.8   HCT 45.2   *   MCV 95   MCH 28.9   MCHC 30.5*       Diagnostic Results     Brain Imaging   CTA STROKE MULTI-PHASE     Narrative     EXAMINATION:  CTA STROKE MULTI-PHASE; CT CERVICAL SPINE WITHOUT CONTRAST     CLINICAL HISTORY:  Neuro deficit, acute, stroke suspected;; Neck trauma, intoxicated or obtunded (Age >= 16y);     TECHNIQUE:  Axial CT images obtained throughout the region of the head and cervical spine before the administration of intravenous contrast.     CT angiogram was performed through the cervical and intracranial vasculature during the IV bolus administration of 75mL of Omnipaque 350.  Two additional phases through the intracranial vasculature via multiphase technique.     Multiplanar MPR and MIP reformats were performed.     CT source data was analyzed using artificial intelligence software for detection of a large vessel occlusions (LVO) in order to enable computer assisted triage notification and aid clinical stroke decision making.     COMPARISON:  CTA head and cervical spine 10/25/2015     FINDINGS:  Large region of hypoattenuation loss of gray-white differentiation in left occipital lobe in medial temporal lobe in keeping with an acute PCA distribution infarct.  Modest localized mass effect with some sulcal crowding.  Additional involvement of the left thalamus.  Similar appearance in the medial right occipital lobe, but smaller area of infarction.  Small left superior cerebellar infarct, also likely acute.  No evidence of recent or remote major vascular distribution infarct in the anterior circulation.  No acute parenchymal hemorrhage.  No midline shift.     The ventricles are normal in size without  evidence of hydrocephalus.     No extra-axial blood or fluid collections.     The cranium appears intact.     Mastoid air cells and paranasal sinuses are essentially clear.     The vertebral bodies are normal in height and morphology without evidence of an acute displaced fracture or focal osseous destructive process.     Normal sagittal alignment is preserved.  No spondylolisthesis.     Intervertebral disc heights are well maintained.  No evidence of a bony spinal canal stenosis.     Evaluation intraspinal contents demonstrates no evidence of mass or hematoma.     No acute abnormalities in the paraspinal soft tissue structures.     Emphysematous changes patchy ground-glass opacity in the partially visualized lung apices.        CTA:     The aortic arch demonstrates no significant stenosis at the major branch vessel origins.     There is a focal thrombus in the right subclavian artery extending into the right vertebral artery origin with severe stenosis.  Thrombus extending over proximally 1 cm.  The V1 and V2 segments of right vertebral artery are otherwise normal in caliber of slightly decreased in attenuation in comparison to the other cervical vasculature.  Abrupt occlusion of distal right vertebral artery about the level of the PICA origin approximate 2 cm segment of non enhancement.     Moderate focal stenosis at the left vertebral artery origin.  Left vertebral artery is otherwise normal in caliber.  The basilar artery is normal in caliber.     The right common carotid artery is normal in caliber.  No significant stenosis at the carotid bifurcation.The right internal carotid artery is normal in caliber.     The left common carotid artery is normal in caliber. No significant stenosis at the carotid bifurcation.The left internal carotid artery is normal in caliber.     Abrupt non enhancement in the P1 segment of the left PCA.  The proximal right PCA appears within normal limits.     The proximal MCAs and ACAs  are unremarkable.     Findings were immediately discussed upon identification via telephone with the stroke service ([facility]) at approximately 09:20.        Impression     Multifocal acute posterior circulation distribution infarcts as further discussed above, particularly involving essentially the entirety of the left PCA territory.  Modest mass effect without significant hemorrhage.     Focal thrombus with severe stenosis extending from the right subclavian artery into the proximal right vertebral artery.     Focal occlusion of the proximal intracranial segment of the right vertebral artery.     Focal occlusion of the proximal left PCA.     Moderate focal stenosis at the left vertebral artery origin.     Anterior circulation appears unremarkable.     No evidence of fracture or traumatic malalignment in the cervical spine.     Emphysematous changes and patchy ground-glass opacity in the partially visualized lung apices.     This report was flagged in Epic as abnormal.        Electronically signed by:Geo Ambrocio MD  Date:                                                11/06/2024  Time:                                                09:33   CT Cervical Spine Without Contrast     Narrative     EXAMINATION:  CTA STROKE MULTI-PHASE; CT CERVICAL SPINE WITHOUT CONTRAST     CLINICAL HISTORY:  Neuro deficit, acute, stroke suspected;; Neck trauma, intoxicated or obtunded (Age >= 16y);     TECHNIQUE:  Axial CT images obtained throughout the region of the head and cervical spine before the administration of intravenous contrast.     CT angiogram was performed through the cervical and intracranial vasculature during the IV bolus administration of 75mL of Omnipaque 350.  Two additional phases through the intracranial vasculature via multiphase technique.     Multiplanar MPR and MIP reformats were performed.     CT source data was analyzed using artificial intelligence software for detection of a large vessel  occlusions (LVO) in order to enable computer assisted triage notification and aid clinical stroke decision making.     COMPARISON:  CTA head and cervical spine 10/25/2015     FINDINGS:  Large region of hypoattenuation loss of gray-white differentiation in left occipital lobe in medial temporal lobe in keeping with an acute PCA distribution infarct.  Modest localized mass effect with some sulcal crowding.  Additional involvement of the left thalamus.  Similar appearance in the medial right occipital lobe, but smaller area of infarction.  Small left superior cerebellar infarct, also likely acute.  No evidence of recent or remote major vascular distribution infarct in the anterior circulation.  No acute parenchymal hemorrhage.  No midline shift.     The ventricles are normal in size without evidence of hydrocephalus.     No extra-axial blood or fluid collections.     The cranium appears intact.     Mastoid air cells and paranasal sinuses are essentially clear.     The vertebral bodies are normal in height and morphology without evidence of an acute displaced fracture or focal osseous destructive process.     Normal sagittal alignment is preserved.  No spondylolisthesis.     Intervertebral disc heights are well maintained.  No evidence of a bony spinal canal stenosis.     Evaluation intraspinal contents demonstrates no evidence of mass or hematoma.     No acute abnormalities in the paraspinal soft tissue structures.     Emphysematous changes patchy ground-glass opacity in the partially visualized lung apices.        CTA:     The aortic arch demonstrates no significant stenosis at the major branch vessel origins.     There is a focal thrombus in the right subclavian artery extending into the right vertebral artery origin with severe stenosis.  Thrombus extending over proximally 1 cm.  The V1 and V2 segments of right vertebral artery are otherwise normal in caliber of slightly decreased in attenuation in comparison to the  other cervical vasculature.  Abrupt occlusion of distal right vertebral artery about the level of the PICA origin approximate 2 cm segment of non enhancement.     Moderate focal stenosis at the left vertebral artery origin.  Left vertebral artery is otherwise normal in caliber.  The basilar artery is normal in caliber.     The right common carotid artery is normal in caliber.  No significant stenosis at the carotid bifurcation.The right internal carotid artery is normal in caliber.     The left common carotid artery is normal in caliber. No significant stenosis at the carotid bifurcation.The left internal carotid artery is normal in caliber.     Abrupt non enhancement in the P1 segment of the left PCA.  The proximal right PCA appears within normal limits.     The proximal MCAs and ACAs are unremarkable.     Findings were immediately discussed upon identification via telephone with the stroke service ([place]) at approximately 09:20.        Impression     Multifocal acute posterior circulation distribution infarcts as further discussed above, particularly involving essentially the entirety of the left PCA territory.  Modest mass effect without significant hemorrhage.     Focal thrombus with severe stenosis extending from the right subclavian artery into the proximal right vertebral artery.     Focal occlusion of the proximal intracranial segment of the right vertebral artery.     Focal occlusion of the proximal left PCA.     Moderate focal stenosis at the left vertebral artery origin.     Anterior circulation appears unremarkable.     No evidence of fracture or traumatic malalignment in the cervical spine.     Emphysematous changes and patchy ground-glass opacity in the partially visualized lung apices.     This report was flagged in Epic as abnormal.        Electronically signed by:Geo Ambrocio MD  Date:                                                11/06/2024  Time:                                                 09:33   CT Head Without Contrast     Narrative     EXAMINATION:  CT HEAD WITHOUT CONTRAST     CLINICAL HISTORY:  Stroke, follow up;     TECHNIQUE:  Low dose axial images were obtained through the head.  Coronal and sagittal reformations were also performed. Contrast was not administered.     COMPARISON:  CT, MRI 11/06/2024     FINDINGS:  Evolving posterior circulation infarcts are identified as described on recent MR imaging again most notable within the left greater than right occipital lobes extending to the medial left temporal lobe and bilateral thalamus.  No hemorrhagic conversion with potentially minimal stable petechial hemorrhage within the medial left thalamus.  No midline shift or herniation with mild mass effect on the 3rd ventricle again noted.     No new acute territorial infarct.     The visualized sinuses and mastoid air cells are clear.        Impression     Expected evolutionary changes of the posterior circulation infarcts with no overt hemorrhagic conversion or significant midline shift/herniation.        Electronically signed by:Adrián Hobbs  Date:                                                11/09/2024  Time:                                                08:38            Results for orders placed or performed during the hospital encounter of 11/06/24 (from the past 2160 hours)   MRI Brain Without Contrast     Impression     Multiple posterior circulation infarcts are identified with involvement of the left greater than right occipital lobes and medial left temporal lobe, thalamus, right dorsal medulla, and minimally involving the cerebellum.  No midline shift with minimal mass effect on the 3rd ventricle.     Loss of flow void within the right intracranial vertebral artery in keeping with the findings on CTA.        Electronically signed by:Adrián Hobbs  Date:                                                11/06/2024  Time:                                                17:40       Cardiac Imaging   Results for orders placed during the hospital encounter of 11/06/24     Echo Saline Bubble? Yes; Ultrasound enhancing contrast? Yes     Interpretation Summary    Left Ventricle: The left ventricle is normal in size. Normal wall thickness. There is normal systolic function with a visually estimated ejection fraction of 55 - 60%. Biplane (2D) method of discs ejection fraction is 56%. Global longitudinal strain is -18.0%. There is normal diastolic function.    Right Ventricle: Normal right ventricular cavity size. Wall thickness is normal. Systolic function is normal.    Tricuspid Valve: There is mild regurgitation.    Pulmonary Artery: The estimated pulmonary artery systolic pressure is at least 28 mmHg.    IVC/SVC: Patient is ventilated, cannot use IVC diameter to estimate right atrial pressure.     Gerhard De Jesus MD  [facility]  Department of Vascular Neurology   [facility])

## 2024-11-24 NOTE — ASSESSMENT & PLAN NOTE
LFTs uptrending, tylenol updated to PRN.   Decreased dose of statin to atorvastatin 40 mg 11/17 given elevated INR 1.5.   Consider liver ultrasound  Hep C antigen reactive, ordering RNA quant, pending results     Lab Results   Component Value Date    ALT 40 11/24/2024    AST 26 11/24/2024    ALKPHOS 88 11/24/2024    BILITOT 0.2 11/24/2024     Monitor daily CMP, PT/INR

## 2024-11-25 PROBLEM — B37.0 ORAL CANDIDIASIS: Status: ACTIVE | Noted: 2024-11-25

## 2024-11-25 LAB
ALBUMIN SERPL BCP-MCNC: 3.4 G/DL (ref 3.5–5.2)
ALP SERPL-CCNC: 83 U/L (ref 40–150)
ALT SERPL W/O P-5'-P-CCNC: 33 U/L (ref 10–44)
ANION GAP SERPL CALC-SCNC: 9 MMOL/L (ref 8–16)
AST SERPL-CCNC: 22 U/L (ref 10–40)
BASOPHILS # BLD AUTO: 0.08 K/UL (ref 0–0.2)
BASOPHILS NFR BLD: 0.8 % (ref 0–1.9)
BILIRUB SERPL-MCNC: 0.2 MG/DL (ref 0.1–1)
BUN SERPL-MCNC: 23 MG/DL (ref 6–20)
CALCIUM SERPL-MCNC: 10.5 MG/DL (ref 8.7–10.5)
CHLORIDE SERPL-SCNC: 110 MMOL/L (ref 95–110)
CO2 SERPL-SCNC: 26 MMOL/L (ref 23–29)
CREAT SERPL-MCNC: 0.7 MG/DL (ref 0.5–1.4)
DIFFERENTIAL METHOD BLD: ABNORMAL
EOSINOPHIL # BLD AUTO: 0.3 K/UL (ref 0–0.5)
EOSINOPHIL NFR BLD: 3.1 % (ref 0–8)
ERYTHROCYTE [DISTWIDTH] IN BLOOD BY AUTOMATED COUNT: 15 % (ref 11.5–14.5)
EST. GFR  (NO RACE VARIABLE): >60 ML/MIN/1.73 M^2
GLUCOSE SERPL-MCNC: 148 MG/DL (ref 70–110)
HCT VFR BLD AUTO: 41.8 % (ref 37–48.5)
HGB BLD-MCNC: 13.3 G/DL (ref 12–16)
IMM GRANULOCYTES # BLD AUTO: 0.03 K/UL (ref 0–0.04)
IMM GRANULOCYTES NFR BLD AUTO: 0.3 % (ref 0–0.5)
LYMPHOCYTES # BLD AUTO: 2.1 K/UL (ref 1–4.8)
LYMPHOCYTES NFR BLD: 22.2 % (ref 18–48)
MAGNESIUM SERPL-MCNC: 2.4 MG/DL (ref 1.6–2.6)
MCH RBC QN AUTO: 29.2 PG (ref 27–31)
MCHC RBC AUTO-ENTMCNC: 31.8 G/DL (ref 32–36)
MCV RBC AUTO: 92 FL (ref 82–98)
MONOCYTES # BLD AUTO: 0.6 K/UL (ref 0.3–1)
MONOCYTES NFR BLD: 6 % (ref 4–15)
NEUTROPHILS # BLD AUTO: 6.5 K/UL (ref 1.8–7.7)
NEUTROPHILS NFR BLD: 67.6 % (ref 38–73)
NRBC BLD-RTO: 0 /100 WBC
OHS QRS DURATION: 60 MS
OHS QTC CALCULATION: 487 MS
PHOSPHATE SERPL-MCNC: 4 MG/DL (ref 2.7–4.5)
PLATELET # BLD AUTO: 774 K/UL (ref 150–450)
PMV BLD AUTO: 10.1 FL (ref 9.2–12.9)
POCT GLUCOSE: 158 MG/DL (ref 70–110)
POTASSIUM SERPL-SCNC: 3.9 MMOL/L (ref 3.5–5.1)
PROT SERPL-MCNC: 7.5 G/DL (ref 6–8.4)
RBC # BLD AUTO: 4.56 M/UL (ref 4–5.4)
SODIUM SERPL-SCNC: 145 MMOL/L (ref 136–145)
WBC # BLD AUTO: 9.65 K/UL (ref 3.9–12.7)

## 2024-11-25 PROCEDURE — 11000001 HC ACUTE MED/SURG PRIVATE ROOM

## 2024-11-25 PROCEDURE — 63600175 PHARM REV CODE 636 W HCPCS

## 2024-11-25 PROCEDURE — 84100 ASSAY OF PHOSPHORUS: CPT

## 2024-11-25 PROCEDURE — 36415 COLL VENOUS BLD VENIPUNCTURE: CPT

## 2024-11-25 PROCEDURE — 94668 MNPJ CHEST WALL SBSQ: CPT

## 2024-11-25 PROCEDURE — 99232 SBSQ HOSP IP/OBS MODERATE 35: CPT | Mod: ,,, | Performed by: NURSE PRACTITIONER

## 2024-11-25 PROCEDURE — 97112 NEUROMUSCULAR REEDUCATION: CPT

## 2024-11-25 PROCEDURE — 93005 ELECTROCARDIOGRAM TRACING: CPT

## 2024-11-25 PROCEDURE — 25000003 PHARM REV CODE 250

## 2024-11-25 PROCEDURE — 97535 SELF CARE MNGMENT TRAINING: CPT

## 2024-11-25 PROCEDURE — 99900035 HC TECH TIME PER 15 MIN (STAT)

## 2024-11-25 PROCEDURE — 25000003 PHARM REV CODE 250: Performed by: STUDENT IN AN ORGANIZED HEALTH CARE EDUCATION/TRAINING PROGRAM

## 2024-11-25 PROCEDURE — 83735 ASSAY OF MAGNESIUM: CPT

## 2024-11-25 PROCEDURE — 99233 SBSQ HOSP IP/OBS HIGH 50: CPT | Mod: ,,, | Performed by: PSYCHIATRY & NEUROLOGY

## 2024-11-25 PROCEDURE — 93010 ELECTROCARDIOGRAM REPORT: CPT | Mod: ,,, | Performed by: INTERNAL MEDICINE

## 2024-11-25 PROCEDURE — 85025 COMPLETE CBC W/AUTO DIFF WBC: CPT

## 2024-11-25 PROCEDURE — 92526 ORAL FUNCTION THERAPY: CPT

## 2024-11-25 PROCEDURE — 25000003 PHARM REV CODE 250: Performed by: PSYCHIATRY & NEUROLOGY

## 2024-11-25 PROCEDURE — 94761 N-INVAS EAR/PLS OXIMETRY MLT: CPT

## 2024-11-25 PROCEDURE — 80053 COMPREHEN METABOLIC PANEL: CPT

## 2024-11-25 RX ORDER — FOLIC ACID 1 MG/1
1 TABLET ORAL DAILY
Status: DISCONTINUED | OUTPATIENT
Start: 2024-11-26 | End: 2024-11-26 | Stop reason: HOSPADM

## 2024-11-25 RX ORDER — ACETAMINOPHEN 500 MG
1000 TABLET ORAL 2 TIMES DAILY PRN
Status: DISCONTINUED | OUTPATIENT
Start: 2024-11-25 | End: 2024-11-26 | Stop reason: HOSPADM

## 2024-11-25 RX ORDER — QUETIAPINE FUMARATE 100 MG/1
100 TABLET, FILM COATED ORAL NIGHTLY
Status: DISCONTINUED | OUTPATIENT
Start: 2024-11-25 | End: 2024-11-26 | Stop reason: HOSPADM

## 2024-11-25 RX ORDER — ATORVASTATIN CALCIUM 40 MG/1
40 TABLET, FILM COATED ORAL DAILY
Status: DISCONTINUED | OUTPATIENT
Start: 2024-11-26 | End: 2024-11-26 | Stop reason: HOSPADM

## 2024-11-25 RX ORDER — FLUOXETINE 20 MG/5ML
60 SOLUTION ORAL DAILY
Status: DISCONTINUED | OUTPATIENT
Start: 2024-11-26 | End: 2024-11-26 | Stop reason: HOSPADM

## 2024-11-25 RX ORDER — NAPROXEN SODIUM 220 MG/1
81 TABLET, FILM COATED ORAL DAILY
Status: DISCONTINUED | OUTPATIENT
Start: 2024-11-26 | End: 2024-11-26 | Stop reason: HOSPADM

## 2024-11-25 RX ORDER — FLUCONAZOLE 200 MG/1
200 TABLET ORAL ONCE
Status: COMPLETED | OUTPATIENT
Start: 2024-11-25 | End: 2024-11-25

## 2024-11-25 RX ORDER — TALC
6 POWDER (GRAM) TOPICAL NIGHTLY
Status: DISCONTINUED | OUTPATIENT
Start: 2024-11-25 | End: 2024-11-26 | Stop reason: HOSPADM

## 2024-11-25 RX ORDER — POLYETHYLENE GLYCOL 3350 17 G/17G
17 POWDER, FOR SOLUTION ORAL 2 TIMES DAILY PRN
Status: DISCONTINUED | OUTPATIENT
Start: 2024-11-25 | End: 2024-11-26 | Stop reason: HOSPADM

## 2024-11-25 RX ORDER — AMOXICILLIN 250 MG
1 CAPSULE ORAL DAILY
Status: DISCONTINUED | OUTPATIENT
Start: 2024-11-26 | End: 2024-11-26 | Stop reason: HOSPADM

## 2024-11-25 RX ORDER — QUETIAPINE FUMARATE 25 MG/1
25 TABLET, FILM COATED ORAL 2 TIMES DAILY PRN
Status: DISCONTINUED | OUTPATIENT
Start: 2024-11-25 | End: 2024-11-26 | Stop reason: HOSPADM

## 2024-11-25 RX ORDER — THIAMINE HCL 100 MG
100 TABLET ORAL DAILY
Status: DISCONTINUED | OUTPATIENT
Start: 2024-11-26 | End: 2024-11-26 | Stop reason: HOSPADM

## 2024-11-25 RX ORDER — FLUCONAZOLE 100 MG/1
100 TABLET ORAL DAILY
Status: DISCONTINUED | OUTPATIENT
Start: 2024-11-26 | End: 2024-11-26 | Stop reason: HOSPADM

## 2024-11-25 RX ORDER — MIRTAZAPINE 7.5 MG/1
7.5 TABLET, FILM COATED ORAL NIGHTLY
Status: DISCONTINUED | OUTPATIENT
Start: 2024-11-25 | End: 2024-11-26 | Stop reason: HOSPADM

## 2024-11-25 RX ORDER — QUETIAPINE FUMARATE 25 MG/1
50 TABLET, FILM COATED ORAL DAILY
Status: DISCONTINUED | OUTPATIENT
Start: 2024-11-26 | End: 2024-11-26 | Stop reason: HOSPADM

## 2024-11-25 RX ORDER — CLOPIDOGREL BISULFATE 75 MG/1
75 TABLET ORAL DAILY
Status: DISCONTINUED | OUTPATIENT
Start: 2024-11-26 | End: 2024-11-26 | Stop reason: HOSPADM

## 2024-11-25 RX ORDER — LITHIUM 8 MEQ/5ML
300 SOLUTION ORAL 3 TIMES DAILY
Status: DISCONTINUED | OUTPATIENT
Start: 2024-11-25 | End: 2024-11-26 | Stop reason: HOSPADM

## 2024-11-25 RX ORDER — CARVEDILOL 6.25 MG/1
6.25 TABLET ORAL 2 TIMES DAILY
Status: DISCONTINUED | OUTPATIENT
Start: 2024-11-25 | End: 2024-11-26 | Stop reason: HOSPADM

## 2024-11-25 RX ADMIN — CARVEDILOL 6.25 MG: 6.25 TABLET, FILM COATED ORAL at 08:11

## 2024-11-25 RX ADMIN — MUSCLE RUB CREAM: 100; 150 CREAM TOPICAL at 08:11

## 2024-11-25 RX ADMIN — LIDOCAINE 5% 1 PATCH: 700 PATCH TOPICAL at 02:11

## 2024-11-25 RX ADMIN — LITHIUM 300 MG: 8 SOLUTION ORAL at 08:11

## 2024-11-25 RX ADMIN — ENOXAPARIN SODIUM 40 MG: 40 INJECTION SUBCUTANEOUS at 04:11

## 2024-11-25 RX ADMIN — ASPIRIN 81 MG CHEWABLE TABLET 81 MG: 81 TABLET CHEWABLE at 08:11

## 2024-11-25 RX ADMIN — QUETIAPINE FUMARATE 100 MG: 100 TABLET ORAL at 08:11

## 2024-11-25 RX ADMIN — Medication 6 MG: at 08:11

## 2024-11-25 RX ADMIN — MIRTAZAPINE 7.5 MG: 7.5 TABLET, FILM COATED ORAL at 08:11

## 2024-11-25 RX ADMIN — QUETIAPINE FUMARATE 50 MG: 25 TABLET ORAL at 08:11

## 2024-11-25 RX ADMIN — Medication 100 MG: at 08:11

## 2024-11-25 RX ADMIN — SENNOSIDES AND DOCUSATE SODIUM 1 TABLET: 50; 8.6 TABLET ORAL at 08:11

## 2024-11-25 RX ADMIN — CLOPIDOGREL BISULFATE 75 MG: 75 TABLET ORAL at 08:11

## 2024-11-25 RX ADMIN — MUSCLE RUB CREAM: 100; 150 CREAM TOPICAL at 02:11

## 2024-11-25 RX ADMIN — POTASSIUM BICARBONATE 40 MEQ: 391 TABLET, EFFERVESCENT ORAL at 11:11

## 2024-11-25 RX ADMIN — THERA TABS 1 TABLET: TAB at 08:11

## 2024-11-25 RX ADMIN — FOLIC ACID 1 MG: 1 TABLET ORAL at 08:11

## 2024-11-25 RX ADMIN — FLUCONAZOLE 200 MG: 200 TABLET ORAL at 11:11

## 2024-11-25 RX ADMIN — FLUOXETINE HYDROCHLORIDE 60 MG: 20 SOLUTION ORAL at 08:11

## 2024-11-25 RX ADMIN — LITHIUM 300 MG: 8 SOLUTION ORAL at 02:11

## 2024-11-25 RX ADMIN — ATORVASTATIN CALCIUM 40 MG: 40 TABLET, FILM COATED ORAL at 08:11

## 2024-11-25 NOTE — SUBJECTIVE & OBJECTIVE
Neurologic Chief Complaint: acute arterial ischemic stroke, multifocal, posterior circulation     Subjective:     Interval History: Patient is seen for follow-up neurological assessment and treatment recommendations: Please see hospital course    HPI, Past Medical, Family, and Social History remains the same as documented in the initial encounter.     Review of Systems   Unable to perform ROS: Mental status change     Scheduled Meds:   [START ON 11/26/2024] aspirin  81 mg Per G Tube Daily    [START ON 11/26/2024] atorvastatin  40 mg Per G Tube Daily    carvediloL  6.25 mg Per G Tube BID    [START ON 11/26/2024] clopidogreL  75 mg Per G Tube Daily    enoxparin  40 mg Subcutaneous Q24H (prophylaxis, 1700)    [START ON 11/26/2024] fluconazole  100 mg Per G Tube Daily    fluconazole  200 mg Per G Tube Once    [START ON 11/26/2024] FLUoxetine  60 mg Per G Tube Daily    [START ON 11/26/2024] folic acid  1 mg Per G Tube Daily    LIDOcaine  1 patch Transdermal Q24H    lithium citrate 8 meq/5 ml  300 mg Per G Tube TID    melatonin  6 mg Per G Tube Nightly    methyl salicylate-menthol 15-10%   Topical (Top) TID    mirtazapine  7.5 mg Per G Tube QHS    [START ON 11/26/2024] multivitamin  1 tablet Per G Tube Daily    potassium bicarbonate  40 mEq Per G Tube Once    QUEtiapine  100 mg Per G Tube QHS    [START ON 11/26/2024] QUEtiapine  50 mg Per G Tube Daily    [START ON 11/26/2024] senna-docusate 8.6-50 mg  1 tablet Per G Tube Daily    [START ON 11/26/2024] thiamine  100 mg Per G Tube Daily     Continuous Infusions:  PRN Meds:  Current Facility-Administered Medications:     acetaminophen, 1,000 mg, Per G Tube, BID PRN    albuterol-ipratropium, 3 mL, Nebulization, Q6H PRN    polyethylene glycol, 17 g, Per G Tube, BID PRN    QUEtiapine, 25 mg, Per G Tube, BID PRN    sodium chloride 0.9%, 10 mL, Intravenous, PRN    Objective:     Vital Signs (Most Recent):  Temp: 99.1 °F (37.3 °C) (11/25/24 0716)  Pulse: 85 (11/25/24 1000)  Resp:  18 (11/25/24 1000)  BP: (!) 148/74 (11/25/24 0852)  SpO2: 98 % (11/25/24 1000)  BP Location: Left arm    Vital Signs Range (Last 24H):  Temp:  [97.7 °F (36.5 °C)-99.2 °F (37.3 °C)]   Pulse:  []   Resp:  [16-18]   BP: (130-151)/(70-80)   SpO2:  [92 %-100 %]   BP Location: Left arm       Physical Exam  Vitals and nursing note reviewed.   Pulmonary:      Effort: Pulmonary effort is normal. No respiratory distress.   Abdominal:      General: There is no distension.      Palpations: Abdomen is soft.   Musculoskeletal:      Cervical back: Normal range of motion and neck supple.      Comments: Appears with Right sided weakness. Unable to fully assess as pt was not cooperating fully.    Skin:     General: Skin is warm and dry.      Capillary Refill: Capillary refill takes 2 to 3 seconds.   Neurological:      General: No focal deficit present.      Mental Status: She is alert and easily aroused.      GCS: GCS eye subscore is 3. GCS verbal subscore is 3. GCS motor subscore is 5.      Cranial Nerves: Dysarthria present.      Motor: Weakness present.      Coordination: Coordination abnormal.      Gait: Gait abnormal.   Psychiatric:         Mood and Affect: Affect is labile.         Behavior: Behavior is slowed.         Cognition and Memory: Cognition is impaired. Memory is impaired.          Neurological Exam:   LOC: alert  Attention Span: poor    Laboratory:  CMP:   Recent Labs   Lab 11/25/24  0554   CALCIUM 10.5   ALBUMIN 3.4*   PROT 7.5      K 3.9   CO2 26      BUN 23*   CREATININE 0.7   ALKPHOS 83   ALT 33   AST 22   BILITOT 0.2     CBC:   Recent Labs   Lab 11/25/24  0554   WBC 9.65   RBC 4.56   HGB 13.3   HCT 41.8   *   MCV 92   MCH 29.2   MCHC 31.8*       Diagnostic Results     Brain Imaging       CTA STROKE MULTI-PHASE     Narrative     EXAMINATION:  CTA STROKE MULTI-PHASE; CT CERVICAL SPINE WITHOUT CONTRAST     CLINICAL HISTORY:  Neuro deficit, acute, stroke suspected;; Neck trauma, intoxicated  or obtunded (Age >= 16y);     TECHNIQUE:  Axial CT images obtained throughout the region of the head and cervical spine before the administration of intravenous contrast.     CT angiogram was performed through the cervical and intracranial vasculature during the IV bolus administration of 75mL of Omnipaque 350.  Two additional phases through the intracranial vasculature via multiphase technique.     Multiplanar MPR and MIP reformats were performed.     CT source data was analyzed using artificial intelligence software for detection of a large vessel occlusions (LVO) in order to enable computer assisted triage notification and aid clinical stroke decision making.     COMPARISON:  CTA head and cervical spine 10/25/2015     FINDINGS:  Large region of hypoattenuation loss of gray-white differentiation in left occipital lobe in medial temporal lobe in keeping with an acute PCA distribution infarct.  Modest localized mass effect with some sulcal crowding.  Additional involvement of the left thalamus.  Similar appearance in the medial right occipital lobe, but smaller area of infarction.  Small left superior cerebellar infarct, also likely acute.  No evidence of recent or remote major vascular distribution infarct in the anterior circulation.  No acute parenchymal hemorrhage.  No midline shift.     The ventricles are normal in size without evidence of hydrocephalus.     No extra-axial blood or fluid collections.     The cranium appears intact.     Mastoid air cells and paranasal sinuses are essentially clear.     The vertebral bodies are normal in height and morphology without evidence of an acute displaced fracture or focal osseous destructive process.     Normal sagittal alignment is preserved.  No spondylolisthesis.     Intervertebral disc heights are well maintained.  No evidence of a bony spinal canal stenosis.     Evaluation intraspinal contents demonstrates no evidence of mass or hematoma.     No acute abnormalities  in the paraspinal soft tissue structures.     Emphysematous changes patchy ground-glass opacity in the partially visualized lung apices.        CTA:     The aortic arch demonstrates no significant stenosis at the major branch vessel origins.     There is a focal thrombus in the right subclavian artery extending into the right vertebral artery origin with severe stenosis.  Thrombus extending over proximally 1 cm.  The V1 and V2 segments of right vertebral artery are otherwise normal in caliber of slightly decreased in attenuation in comparison to the other cervical vasculature.  Abrupt occlusion of distal right vertebral artery about the level of the PICA origin approximate 2 cm segment of non enhancement.     Moderate focal stenosis at the left vertebral artery origin.  Left vertebral artery is otherwise normal in caliber.  The basilar artery is normal in caliber.     The right common carotid artery is normal in caliber.  No significant stenosis at the carotid bifurcation.The right internal carotid artery is normal in caliber.     The left common carotid artery is normal in caliber. No significant stenosis at the carotid bifurcation.The left internal carotid artery is normal in caliber.     Abrupt non enhancement in the P1 segment of the left PCA.  The proximal right PCA appears within normal limits.     The proximal MCAs and ACAs are unremarkable.     Findings were immediately discussed upon identification via telephone with the stroke service ([place]) at approximately 09:20.        Impression     Multifocal acute posterior circulation distribution infarcts as further discussed above, particularly involving essentially the entirety of the left PCA territory.  Modest mass effect without significant hemorrhage.     Focal thrombus with severe stenosis extending from the right subclavian artery into the proximal right vertebral artery.     Focal occlusion of the proximal intracranial segment of the right  vertebral artery.     Focal occlusion of the proximal left PCA.     Moderate focal stenosis at the left vertebral artery origin.     Anterior circulation appears unremarkable.     No evidence of fracture or traumatic malalignment in the cervical spine.     Emphysematous changes and patchy ground-glass opacity in the partially visualized lung apices.     This report was flagged in Epic as abnormal.        Electronically signed by:Geo Ambrocio MD  Date:                                                11/06/2024  Time:                                                09:33   CT Cervical Spine Without Contrast     Narrative     EXAMINATION:  CTA STROKE MULTI-PHASE; CT CERVICAL SPINE WITHOUT CONTRAST     CLINICAL HISTORY:  Neuro deficit, acute, stroke suspected;; Neck trauma, intoxicated or obtunded (Age >= 16y);     TECHNIQUE:  Axial CT images obtained throughout the region of the head and cervical spine before the administration of intravenous contrast.     CT angiogram was performed through the cervical and intracranial vasculature during the IV bolus administration of 75mL of Omnipaque 350.  Two additional phases through the intracranial vasculature via multiphase technique.     Multiplanar MPR and MIP reformats were performed.     CT source data was analyzed using artificial intelligence software for detection of a large vessel occlusions (LVO) in order to enable computer assisted triage notification and aid clinical stroke decision making.     COMPARISON:  CTA head and cervical spine 10/25/2015     FINDINGS:  Large region of hypoattenuation loss of gray-white differentiation in left occipital lobe in medial temporal lobe in keeping with an acute PCA distribution infarct.  Modest localized mass effect with some sulcal crowding.  Additional involvement of the left thalamus.  Similar appearance in the medial right occipital lobe, but smaller area of infarction.  Small left superior cerebellar infarct, also likely  acute.  No evidence of recent or remote major vascular distribution infarct in the anterior circulation.  No acute parenchymal hemorrhage.  No midline shift.     The ventricles are normal in size without evidence of hydrocephalus.     No extra-axial blood or fluid collections.     The cranium appears intact.     Mastoid air cells and paranasal sinuses are essentially clear.     The vertebral bodies are normal in height and morphology without evidence of an acute displaced fracture or focal osseous destructive process.     Normal sagittal alignment is preserved.  No spondylolisthesis.     Intervertebral disc heights are well maintained.  No evidence of a bony spinal canal stenosis.     Evaluation intraspinal contents demonstrates no evidence of mass or hematoma.     No acute abnormalities in the paraspinal soft tissue structures.     Emphysematous changes patchy ground-glass opacity in the partially visualized lung apices.        CTA:     The aortic arch demonstrates no significant stenosis at the major branch vessel origins.     There is a focal thrombus in the right subclavian artery extending into the right vertebral artery origin with severe stenosis.  Thrombus extending over proximally 1 cm.  The V1 and V2 segments of right vertebral artery are otherwise normal in caliber of slightly decreased in attenuation in comparison to the other cervical vasculature.  Abrupt occlusion of distal right vertebral artery about the level of the PICA origin approximate 2 cm segment of non enhancement.     Moderate focal stenosis at the left vertebral artery origin.  Left vertebral artery is otherwise normal in caliber.  The basilar artery is normal in caliber.     The right common carotid artery is normal in caliber.  No significant stenosis at the carotid bifurcation.The right internal carotid artery is normal in caliber.     The left common carotid artery is normal in caliber. No significant stenosis at the carotid  bifurcation.The left internal carotid artery is normal in caliber.     Abrupt non enhancement in the P1 segment of the left PCA.  The proximal right PCA appears within normal limits.     The proximal MCAs and ACAs are unremarkable.     Findings were immediately discussed upon identification via telephone with the stroke service ([place]) at approximately 09:20.        Impression     Multifocal acute posterior circulation distribution infarcts as further discussed above, particularly involving essentially the entirety of the left PCA territory.  Modest mass effect without significant hemorrhage.     Focal thrombus with severe stenosis extending from the right subclavian artery into the proximal right vertebral artery.     Focal occlusion of the proximal intracranial segment of the right vertebral artery.     Focal occlusion of the proximal left PCA.     Moderate focal stenosis at the left vertebral artery origin.     Anterior circulation appears unremarkable.     No evidence of fracture or traumatic malalignment in the cervical spine.     Emphysematous changes and patchy ground-glass opacity in the partially visualized lung apices.     This report was flagged in Epic as abnormal.        Electronically signed by:Geo Ambrocio MD  Date:                                                11/06/2024  Time:                                                09:33   CT Head Without Contrast     Narrative     EXAMINATION:  CT HEAD WITHOUT CONTRAST     CLINICAL HISTORY:  Stroke, follow up;     TECHNIQUE:  Low dose axial images were obtained through the head.  Coronal and sagittal reformations were also performed. Contrast was not administered.     COMPARISON:  CT, MRI 11/06/2024     FINDINGS:  Evolving posterior circulation infarcts are identified as described on recent MR imaging again most notable within the left greater than right occipital lobes extending to the medial left temporal lobe and bilateral thalamus.  No  hemorrhagic conversion with potentially minimal stable petechial hemorrhage within the medial left thalamus.  No midline shift or herniation with mild mass effect on the 3rd ventricle again noted.     No new acute territorial infarct.     The visualized sinuses and mastoid air cells are clear.        Impression     Expected evolutionary changes of the posterior circulation infarcts with no overt hemorrhagic conversion or significant midline shift/herniation.        Electronically signed by:Adrián Hobbs  Date:                                                11/09/2024  Time:                                                08:38            Results for orders placed or performed during the hospital encounter of 11/06/24 (from the past 2160 hours)   MRI Brain Without Contrast     Impression     Multiple posterior circulation infarcts are identified with involvement of the left greater than right occipital lobes and medial left temporal lobe, thalamus, right dorsal medulla, and minimally involving the cerebellum.  No midline shift with minimal mass effect on the 3rd ventricle.     Loss of flow void within the right intracranial vertebral artery in keeping with the findings on CTA.        Electronically signed by:Adrián Hobbs  Date:                                                11/06/2024  Time:                                                17:40      Cardiac Imaging   Results for orders placed during the hospital encounter of 11/06/24     Echo Saline Bubble? Yes; Ultrasound enhancing contrast? Yes     Interpretation Summary    Left Ventricle: The left ventricle is normal in size. Normal wall thickness. There is normal systolic function with a visually estimated ejection fraction of 55 - 60%. Biplane (2D) method of discs ejection fraction is 56%. Global longitudinal strain is -18.0%. There is normal diastolic function.    Right Ventricle: Normal right ventricular cavity size. Wall thickness is normal. Systolic  function is normal.    Tricuspid Valve: There is mild regurgitation.    Pulmonary Artery: The estimated pulmonary artery systolic pressure is at least 28 mmHg.    IVC/SVC: Patient is ventilated, cannot use IVC diameter to estimate right atrial pressure.

## 2024-11-25 NOTE — ASSESSMENT & PLAN NOTE
LFTs uptrending, tylenol updated to PRN.   Decreased dose of statin to atorvastatin 40 mg 11/17 given elevated INR 1.5.   Consider liver ultrasound  Hep C antigen reactive, ordering RNA quant, pending results     Lab Results   Component Value Date    ALT 33 11/25/2024    AST 22 11/25/2024    ALKPHOS 83 11/25/2024    BILITOT 0.2 11/25/2024     Monitor daily CMP, PT/INR

## 2024-11-25 NOTE — SUBJECTIVE & OBJECTIVE
Interval History 11/25/2024:  Patient is seen for follow-up PM&R evaluation and recommendations:  Pr seen at bedside. Mother at bedside attentive to patient. Pt able to state name after repeated cues. Able to follow some commands regarding moving extremities after repeated cues.   HPI, Past Medical, Family, and Social History remains the same as documented in the initial encounter.    Scheduled Medications:    [START ON 11/26/2024] aspirin  81 mg Per G Tube Daily    [START ON 11/26/2024] atorvastatin  40 mg Per G Tube Daily    carvediloL  6.25 mg Per G Tube BID    [START ON 11/26/2024] clopidogreL  75 mg Per G Tube Daily    enoxparin  40 mg Subcutaneous Q24H (prophylaxis, 1700)    [START ON 11/26/2024] fluconazole  100 mg Per G Tube Daily    [START ON 11/26/2024] FLUoxetine  60 mg Per G Tube Daily    [START ON 11/26/2024] folic acid  1 mg Per G Tube Daily    LIDOcaine  1 patch Transdermal Q24H    lithium citrate 8 meq/5 ml  300 mg Per G Tube TID    melatonin  6 mg Per G Tube Nightly    methyl salicylate-menthol 15-10%   Topical (Top) TID    mirtazapine  7.5 mg Per G Tube QHS    [START ON 11/26/2024] multivitamin  1 tablet Per G Tube Daily    QUEtiapine  100 mg Per G Tube QHS    [START ON 11/26/2024] QUEtiapine  50 mg Per G Tube Daily    [START ON 11/26/2024] senna-docusate 8.6-50 mg  1 tablet Per G Tube Daily    [START ON 11/26/2024] thiamine  100 mg Per G Tube Daily       Diagnostic Results: Labs: Reviewed    PRN Medications:   Current Facility-Administered Medications:     acetaminophen, 1,000 mg, Per G Tube, BID PRN    albuterol-ipratropium, 3 mL, Nebulization, Q6H PRN    polyethylene glycol, 17 g, Per G Tube, BID PRN    QUEtiapine, 25 mg, Per G Tube, BID PRN    sodium chloride 0.9%, 10 mL, Intravenous, PRN    Review of Systems   Unable to perform ROS: Mental status change     Objective:     Vital Signs (Most Recent):  Temp: 99.1 °F (37.3 °C) (11/25/24 0716)  Pulse: 85 (11/25/24 1000)  Resp: 18 (11/25/24  1000)  BP: (!) 148/74 (11/25/24 0852)  SpO2: 98 % (11/25/24 1000)    Vital Signs (24h Range):  Temp:  [97.7 °F (36.5 °C)-99.2 °F (37.3 °C)] 99.1 °F (37.3 °C)  Pulse:  [] 85  Resp:  [16-18] 18  SpO2:  [92 %-100 %] 98 %  BP: (130-151)/(70-80) 148/74         Physical Exam  Vitals and nursing note reviewed.   Constitutional:       Appearance: She is ill-appearing.   HENT:      Head: Normocephalic and atraumatic.   Eyes:      Extraocular Movements: Extraocular movements intact.   Pulmonary:      Effort: Pulmonary effort is normal. No respiratory distress.   Abdominal:      General: There is no distension.      Palpations: Abdomen is soft.   Musculoskeletal:      Cervical back: Normal range of motion and neck supple.      Comments: Generalized weakness.    Skin:     General: Skin is warm and dry.      Capillary Refill: Capillary refill takes 2 to 3 seconds.      Coloration: Skin is pale.   Neurological:      Mental Status: She is easily aroused. She is confused.      GCS: GCS eye subscore is 3. GCS verbal subscore is 3. GCS motor subscore is 5.      Cranial Nerves: Dysarthria present.      Motor: Weakness present.      Coordination: Coordination abnormal. Heel to Shin Test abnormal. Impaired rapid alternating movements.      Gait: Gait abnormal.   Psychiatric:         Behavior: Behavior is cooperative.

## 2024-11-25 NOTE — PROGRESS NOTES
[facility] - Neurosurgery ([facility])  Physical Medicine & Rehab  Progress Note    Patient Name: Malathi Mcpherson  MRN: [id number]  Admission Date: 11/6/2024  Length of Stay: 19 days  Attending Physician: Ang Martinez MD    Subjective:     Principal Problem:Acute arterial ischemic stroke, multifocal, posterior circulation    Hospital Course:   PT- 11/22    Functional Mobility:  Bed Mobility:     Rolling Left:  total assistance  Rolling Right: total assistance  Scooting: total assistance  Supine to Sit: total assistance and of 2 persons  Sit to Supine: total assistance and of 2 persons  Balance:   Seated: maxA         OT- 11/22    Bed Mobility:    Patient completed Rolling/Turning to Right with maximal assistance  Patient completed Supine to Sit with maximal assistance  Patient completed Sit to Supine with maximal assistance      Functional Mobility/Transfers:  NT 2* lethargy     Activities of Daily Living:  Feeding:  NPO    Lower Body Dressing: total assistance while seated donning socks         PT- 11/20    Functional Mobility:     Bed Mobility:   Supine to Sit: total assistance of 2 persons;   Sit to supine: totalA fo 2 persons     Sitting Balance at Edge of Bed:  Assistance Level Required: Maximum Assistance and Total Assistance  Time: 10 min    OT- 11/20    Bed Mobility:    Patient completed Rolling/Turning to Left with  maximal assistance  Patient completed Rolling/Turning to Right with maximal assistance  Patient completed Supine to Sit with total assistance  Patient completed Sit to Supine with total assistance      Activities of Daily Living:  Grooming: total assistance while seated EOB        PT- 11/14    Functional Mobility:     Bed Mobility:  Supine to Sit: Moderate Assistance on L side of bed  Sit to Supine: Minimal Assistance  Pt self returned to supine but needed min(A) for B LE management  Rolling L: Minimal Assistance  Rolling R: Minimal Assistance  Scooting anteriorly to EOB to plant feet on floor:  Moderate Assistance     Transfers:   Sit to Stand Transfer: Activity did not occur - pt self returned to supine     Balance:  Static Sit:   Mod Assist at EOB    Interval History 11/25/2024:  Patient is seen for follow-up PM&R evaluation and recommendations:  Pr seen at bedside. Mother at bedside attentive to patient. Pt able to state name after repeated cues. Able to follow some commands regarding moving extremities after repeated cues.   HPI, Past Medical, Family, and Social History remains the same as documented in the initial encounter.    Scheduled Medications:    [START ON 11/26/2024] aspirin  81 mg Per G Tube Daily    [START ON 11/26/2024] atorvastatin  40 mg Per G Tube Daily    carvediloL  6.25 mg Per G Tube BID    [START ON 11/26/2024] clopidogreL  75 mg Per G Tube Daily    enoxparin  40 mg Subcutaneous Q24H (prophylaxis, 1700)    [START ON 11/26/2024] fluconazole  100 mg Per G Tube Daily    [START ON 11/26/2024] FLUoxetine  60 mg Per G Tube Daily    [START ON 11/26/2024] folic acid  1 mg Per G Tube Daily    LIDOcaine  1 patch Transdermal Q24H    lithium citrate 8 meq/5 ml  300 mg Per G Tube TID    melatonin  6 mg Per G Tube Nightly    methyl salicylate-menthol 15-10%   Topical (Top) TID    mirtazapine  7.5 mg Per G Tube QHS    [START ON 11/26/2024] multivitamin  1 tablet Per G Tube Daily    QUEtiapine  100 mg Per G Tube QHS    [START ON 11/26/2024] QUEtiapine  50 mg Per G Tube Daily    [START ON 11/26/2024] senna-docusate 8.6-50 mg  1 tablet Per G Tube Daily    [START ON 11/26/2024] thiamine  100 mg Per G Tube Daily       Diagnostic Results: Labs: Reviewed    PRN Medications:   Current Facility-Administered Medications:     acetaminophen, 1,000 mg, Per G Tube, BID PRN    albuterol-ipratropium, 3 mL, Nebulization, Q6H PRN    polyethylene glycol, 17 g, Per G Tube, BID PRN    QUEtiapine, 25 mg, Per G Tube, BID PRN    sodium chloride 0.9%, 10 mL, Intravenous, PRN    Review of Systems   Unable to perform ROS:  Mental status change     Objective:     Vital Signs (Most Recent):  Temp: 99.1 °F (37.3 °C) (11/25/24 0716)  Pulse: 85 (11/25/24 1000)  Resp: 18 (11/25/24 1000)  BP: (!) 148/74 (11/25/24 0852)  SpO2: 98 % (11/25/24 1000)    Vital Signs (24h Range):  Temp:  [97.7 °F (36.5 °C)-99.2 °F (37.3 °C)] 99.1 °F (37.3 °C)  Pulse:  [] 85  Resp:  [16-18] 18  SpO2:  [92 %-100 %] 98 %  BP: (130-151)/(70-80) 148/74         Physical Exam  Vitals and nursing note reviewed.   Constitutional:       Appearance: She is ill-appearing.   HENT:      Head: Normocephalic and atraumatic.   Eyes:      Extraocular Movements: Extraocular movements intact.   Pulmonary:      Effort: Pulmonary effort is normal. No respiratory distress.   Abdominal:      General: There is no distension.      Palpations: Abdomen is soft.   Musculoskeletal:      Cervical back: Normal range of motion and neck supple.      Comments: Generalized weakness.    Skin:     General: Skin is warm and dry.      Capillary Refill: Capillary refill takes 2 to 3 seconds.      Coloration: Skin is pale.   Neurological:      Mental Status: She is easily aroused. She is confused.      GCS: GCS eye subscore is 3. GCS verbal subscore is 3. GCS motor subscore is 5.      Cranial Nerves: Dysarthria present.      Motor: Weakness present.      Coordination: Coordination abnormal. Heel to Shin Test abnormal. Impaired rapid alternating movements.      Gait: Gait abnormal.   Psychiatric:         Behavior: Behavior is cooperative.               Assessment/Plan:      * Acute arterial ischemic stroke, multifocal, posterior circulation  -CTA showed multifocal areas of hypodensity involving the posterior circulation suspicious for infarcts as well as focal occlusion of R vert and L PCA.   -No TNK or IR intervention.  -Found to have Utox pos for cocaine.   -PT/OT rec for high intensity therapies. Will need SLP as well  -NPO and had NGT. Will need MBSS.  -In restraints. Will need to be off to  pursue rehab.   -11/21- Mittens present BUE. PEG in place. Not started on TF. Pt with incomprehensible speech. Did follow some commands. Spoke to mother. Will follow up on therapy progress, mental status improvement .   -11/25- Mittens off since 11/22. Pt not agitated. Follows some commands after repeated cues. Mother at bedside very attentive to pt.     Dysphagia  -S/P PEG placement 11/20.    Encephalopathy, metabolic  -Requiring restraints currently. Monitor.     Aphasia  -Will need aggressive SLP.     Reduced mobility  See hospital course for functional status.    Recommendations  -  Encourage mobility, OOB in chair, and early ambulation as appropriate  -  PT/OT evaluate and treat  -  Pain management  -  DVT prophylaxis if appropriate   -  Monitor for and prevent skin breakdown and pressure ulcers  Early mobility, repositioning/weight shifting every 20-30 minutes when sitting, turn patient every 2 hours, proper mattress/overlay and chair cushioning, pressure relief/heel protector boots     Right upper lobe pneumonia  -Completing Cefepime per chart review.  -Monitor resp status. Appears stable.     Debility  See hospital course for functional status.    Recommendations  -  Encourage mobility, OOB in chair, and early ambulation as appropriate  -  PT/OT evaluate and treat  -  Pain management  -  DVT prophylaxis if appropriate   -  Monitor for and prevent skin breakdown and pressure ulcers  Early mobility, repositioning/weight shifting every 20-30 minutes when sitting, turn patient every 2 hours, proper mattress/overlay and chair cushioning, pressure relief/heel protector boots      Polysubstance abuse  -Hx of Cocaine, Heroine, ETOH use.   -Addiction psych following.     PM&R Recommendation:     At this time, the PM&R team has reviewed this patient's ongoing medical case including inpatient diagnosis, medical history, clinical examination, labs, vitals, current social and functional history to provide the post-acute  recommendation as follows:     RECOMMENDATIONS: inpatient rehabilitation due to fair to good potential for improvement with therapies and need of physician oversight for management of ongoing active medical issues, once medically stable.       The patient will be admitted for comprehensive interdisciplinary inpatient rehabilitation to address the impairments due to her  medical diagnosis of Acute arterial ischemic stroke, multifocal, posterior circulation . The patient will benefit from an inpatient rehabilitation program to promote functional recovery, implement compensatory strategies and will undergo assessment for needs for durable medical equipment for safe discharge to the community. This patient will benefit from a coordinated interdisciplinary rehabilitation program services that require close monitoring and treatment with 24-hour rehabilitative nursing and physical/occupational/speech therapies for 3 hours/day for 5 days/week.His interdisciplinary program will be performed under the direction of a physiatrist.      We will continue to follow.       Chapis Escamilla NP  Department of Physical Medicine & Rehab   [facility]surgery [facility])

## 2024-11-25 NOTE — PT/OT/SLP PROGRESS
Physical Therapy Treatment    Patient Name:  Malathi Mcpherson   MRN:  [id number]    Recommendations:     Discharge Recommendations: High Intensity Therapy  Discharge Equipment Recommendations: bedside commode, bath bench, wheelchair  Barriers to discharge: None    Assessment:     Malathi Mcpherson is a 49 y.o. female admitted with a medical diagnosis of Acute arterial ischemic stroke, multifocal, posterior circulation.  She presents with the following impairments/functional limitations: impaired endurance, impaired balance, decreased lower extremity function, decreased upper extremity function, gait instability, decreased ROM, impaired coordination, impaired fine motor, decreased safety awareness, impaired sensation, impaired self care skills, impaired cognition, decreased coordination, impaired functional mobility, abnormal tone. Patient with no intelligible speech this session and following ~5% of commands, LUE/LE more than RUE/LE however activation still noted in R side. She was able to participate in several kicks at EOB and hand squeezes, no success at visual tracking this date. Patient was not as alert as previous session however mom did report that yesterday she was extremely lethargic and difficult to wake up, additionally patient had worked with SLP immediately prior to PT session today.    Rehab Prognosis: Good; patient would benefit from acute skilled PT services to address these deficits and reach maximum level of function.    Recent Surgery: * No surgery found *      Plan:     During this hospitalization, patient to be seen 4 x/week to address the identified rehab impairments via gait training, therapeutic activities, therapeutic exercises, neuromuscular re-education and progress toward the following goals:    Plan of Care Expires:  12/14/24    Subjective     Chief Complaint: not stated  Patient/Family Comments/goals: not stated  Pain/Comfort:  Pain Rating 1:  (unable to rate)      Objective:      Communicated with RN prior to session.  Patient found HOB elevated with bed alarm, PEG Tube, SCD upon PT entry to room. Mom in room.    General Precautions: Standard, fall, aspiration  Orthopedic Precautions: N/A  Braces: N/A  Respiratory Status: Room air     Cognition:   Orientation: x0  Affect: flat  Alertness: eyes open 85 % of session cueing required to fully open eyes  Gaze preference towards R  Insight: poor insight into deficits  Attention: requires redirection to task  Command Following: patient follows 5 % of 1-step commands LUE/LE > RUE/LE  Communication: no intelligible speech     Functional Mobility:  Bed Mobility:     Rolling Left:  total assistance  Rolling Right: total assistance  Scooting: total assistance  Supine to Sit: total assistance and of 2 persons  Sit to Supine: total assistance and of 2 persons  Balance:   Seated: maxA      AM-PAC 6 CLICK MOBILITY  Turning over in bed (including adjusting bedclothes, sheets and blankets)?: 2  Sitting down on and standing up from a chair with arms (e.g., wheelchair, bedside commode, etc.): 1  Moving from lying on back to sitting on the side of the bed?: 2  Moving to and from a bed to a chair (including a wheelchair)?: 1  Need to walk in hospital room?: 1  Climbing 3-5 steps with a railing?: 1  Basic Mobility Total Score: 8       Treatment & Education:  Mobility performed with assistance from rehab tech, under direct supervision and direction of therapist.   Cues during rolling and bed mobility for UE reaching and positioning on bed railings, LE management with rolling technique, cues for visual attention to UE for initiation. Assistance as above.   EOB balance with cues to engage core mm to decrease assistance needed and increase safety in sitting.   Seated AAROM to BLE including knee extension and ankle DF/PF with verbal and tactile cues to activate mm throughout ROM. Increased activation in LLE vs RLE.  Attempted visual tracking exercises with visual  and tactile cues for cervical motion to follow visual target.    Patient left HOB elevated with all lines intact, call button in reach, and mom present.    GOALS:   Multidisciplinary Problems       Physical Therapy Goals          Problem: Physical Therapy    Goal Priority Disciplines Outcome Interventions   Physical Therapy Goal     PT, PT/OT Progressing    Description: Goals to be met by: 24     Patient will increase functional independence with mobility by performin. Supine to sit with Contact Guard Assistance  2. Sit to supine with Contact Guard Assistance  3. Sit to stand transfer with Minimal Assistance  4. Bed to chair transfer with Minimal Assistance using LRAD as needed  5. Gait  x 50 feet with Minimal Assistance using LRAD as needed.   6. Lower extremity exercise program x10 reps per handout, with assistance as needed    DME Justifications (see above for complete DME recommendations)    Bedside Commode- Patient has a mobility limitation that significantly impairs their ability to participate in one or more mobility related activities of daily living, including toileting. This deficit can be resolved by using a bedside commode. Patient demonstrates mobility limitations that will cause them to be confined to one room at home without bathroom access for up to 30 days. Using a bedside commode will greatly improve the patient's ability to participate in MRADLs.     Hospital Bed ·       Patient requires a hospital bed for positioning of the body in ways that are not feasible with an ordinary bed. The patient requires special positioning for pain relief, limited mobility, and/or being unable to independently make changes in body position without the use of a hospital bed. Pillows and wedges will not be adequate for resolving these positional issues.                         Time Tracking:     PT Received On: 24  PT Start Time: 1042     PT Stop Time: 1105  PT Total Time (min): 23 min     Billable  Minutes: Neuromuscular Re-education 23 minutes    Treatment Type: Treatment  PT/PTA: PT     Number of PTA visits since last PT visit: 0     11/25/2024

## 2024-11-25 NOTE — ASSESSMENT & PLAN NOTE
RESOLVED    Extubated 11/10 in [facility] with persistent oxygen requirement. CXR remarkable for RUL consolidation. Respiratory culture positive for H flu and MSSA. MRSA screen by PCR positive. Was transitioned to cefazolin 11/12 then cefepime 11/14 after sensitivities c/w h flu beta lactamase positive. Extended course even persistent or worsening leukocytosis, plan for total 7 day course (EED 11/21). 11/18/24 worsening leukocytosis with WBC up to 19.25 from 17.82. Viral panel negative.     -wean oxygen as tolerated, stable on RA  -Finished with course of cefepime    - Vanc d/pardeep  - F/U CBC: WBC 14.28  - F/U blood cultures: NGTD  - RIP negative

## 2024-11-25 NOTE — PLAN OF CARE
[facility] - Neurosurgery (Hospital)  Discharge Reassessment    Primary Care Provider: Adrián Hahn MD    Expected Discharge Date: 11/27/2024    Reassessment (most recent)       Discharge Reassessment - 11/25/24 1346          Discharge Reassessment    Assessment Type Discharge Planning Reassessment (P)      Did the patient's condition or plan change since previous assessment? Yes (P)      Discharge Plan discussed with: Parent(s) (P)      Communicated LOUIE with patient/caregiver Yes (P)      Discharge Plan A Rehab (P)      Transition of Care Barriers Social;Substance Abuse (P)         Post-Acute Status    Post-Acute Authorization Placement (P)      Post-Acute Placement Status Pending payor review/awaiting authorization (if required) (P)      Discharge Delays Patient and Family Barriers (P)                  Patient is medically cleared for discharge to [facility].  Auth is pending with Medicaid.      Discharge Plan A and Plan B have been determined by review of patient's clinical status, future medical and therapeutic needs, and coverage/benefits for post-acute care in coordination with multidisciplinary team members.    Bernadine Gilman LMSW  [facility]  [phone]

## 2024-11-25 NOTE — PLAN OF CARE
A&O x1 this shift. VSS, see flowsheets. Tele maintained per order. Appears comfortable, Mother at bedside today. TF running at goal, tolerating well. Fall and safety precautions maintained, no signs of injury noted during shift. Patient repositioned frequently to prevent pressure injury. Keep skin clean/dry; incontinent of B/B. BM today. Stroke book and education reviewed at the bedside, see education flowsheets for details. No acute signs of distress noted at this time. Safety checks preformed, all needs met and POC ongoing.     Problem: Adult Inpatient Plan of Care  Goal: Optimal Comfort and Wellbeing  Outcome: Progressing     Problem: Stroke, Ischemic (Includes Transient Ischemic Attack)  Goal: Improved Communication Skills  Outcome: Progressing     Problem: Stroke, Ischemic (Includes Transient Ischemic Attack)  Goal: Optimal Nutrition Intake  Outcome: Progressing     Problem: Fall Injury Risk  Goal: Absence of Fall and Fall-Related Injury  Outcome: Progressing

## 2024-11-25 NOTE — PROGRESS NOTES
Neno Conley - Neurosurgery ([facility])  Vascular Neurology  Comprehensive Stroke Center  Progress Note    Assessment/Plan:     * Acute arterial ischemic stroke, multifocal, posterior circulation  Malathi Mcpherson is a 49 y.o. female with PMH of drug use, seizure, HTN, HLD that is [place] to [facility] for further evaluation and management of multifocal acute infarcts. She initially presented to [facility] ED 11/6 after being found lying face down at home that morning, per EMS was lethargic and aphasic and moving LUE/LLE/RLE but was not moving RUE. EMS also reported that they had seen her before in context of seizures. LKW 10pm 11/5. In ED was noted to have exam worsening, now with R sided plegia and global aphasia. NIHSS 22. Also noted to have O2 desat in 70s and ultimately was intubated for airway protection. CTA showed multifocal areas of hypodensity involving the posterior circulation suspicious for infarcts as well as focal occlusion of R vert and L PCA. Not candidate for acute stroke interventions, no TNK due to OOW and no IR due to stroke burden/risk for bleeding. She was admitted to [facility] for higher level of care. NSGY consulted on admit for edema/hemicrani watch, however no surgical interventions recommended. MRI brain confirmed multifocal areas of acute infarct involving: L>R occipital lobes, L medial temporal lobe, bilateral thalami, L hippocampus and R hippocampus tail, R dorsal medulla, bilateral cerebellar hemispheres. TTE unremarkable. Suspect etiology of stroke likely secondary to substance use, Utox + cocaine.    Interval hx: Episodes of coughing and vomiting overnight with concern for aspiration event. TF held, repeat KUB unremarkable. Worsening leukocytosis today, possibly related to aspiration event although uncertain. Will test COVID/flu/RSV, collect blood cultures. Consider addition of vancomycin especially is patient becomes febrile or clinically unstable. LFT's slightly improved, elevated INR. Acute hepatitis  panel ordered, consider liver ultrasound.     Antithrombotics for secondary stroke prevention: Antiplatelets: Aspirin: 81 mg daily  Clopidogrel: 75 mg daily.     Statins for secondary stroke prevention and hyperlipidemia, if present: Statins: Atorvastatin- 40 mg daily    Aggressive risk factor modification: HTN, HLD, Substance use.     Rehab efforts: The patient has been evaluated by a stroke team provider and the therapy needs have been fully considered based off the presenting complaints and exam findings. The following therapy evaluations are needed: PT evaluate and treat, OT evaluate and treat, SLP evaluate and treat. High intensity therapy recommended. Pt failed barium swallow study. PEG tube in place  Diagnostics ordered/pending: None    VTE prophylaxis: Enoxaparin 40 mg SQ every 24 hours  Mechanical prophylaxis: Place SCDs    BP parameters: Infarct: SBP goal <180      Oral candidiasis  - Started on fluconazole     Transaminitis  LFTs uptrending, tylenol updated to PRN.   Decreased dose of statin to atorvastatin 40 mg 11/17 given elevated INR 1.5.   Consider liver ultrasound  Hep C antigen reactive, ordering RNA quant, pending results     Lab Results   Component Value Date    ALT 33 11/25/2024    AST 22 11/25/2024    ALKPHOS 83 11/25/2024    BILITOT 0.2 11/25/2024     Monitor daily CMP, PT/INR    Agitation  -see anxiety and depression    HTN (hypertension)  Patients blood pressure range in the last 24 hours was: BP  Min: 86/51  Max: 212/97. The patient's inpatient anti-hypertensive regimen is listed below:    Current Antihypertensives  , 2 times daily, Oral  carvediloL tablet 6.25 mg, 2 times daily, Per NG tube    - BP is controlled, no changes needed to their regimen  -SBP goal < 180      Right upper lobe pneumonia  RESOLVED    Extubated 11/10 in [facility] with persistent oxygen requirement. CXR remarkable for RUL consolidation. Respiratory culture positive for H flu and MSSA. MRSA screen by PCR positive. Was  transitioned to cefazolin 11/12 then cefepime 11/14 after sensitivities c/w h flu beta lactamase positive. Extended course even persistent or worsening leukocytosis, plan for total 7 day course (EED 11/21). 11/18/24 worsening leukocytosis with WBC up to 19.25 from 17.82. Viral panel negative.     -wean oxygen as tolerated, stable on RA  -Finished with course of cefepime    - Vanc d/pardeep  - F/U CBC: WBC 14.28  - F/U blood cultures: NGTD  - RIP negative    Debility  -Due to stroke  -Aggressive therapy  -PT/OT/SLP eval and treat    Polysubstance abuse  -Utox + cocaine  -Suspect etiology of stroke likely due to substance use  -Continue thiamine/folate/multivitamin supplementation  -Addiction psych consulted early on admission    Cytotoxic cerebral edema  -Area of cerebral edema identified when reviewing brain imaging involving bilateral posterior circulation territories due to acute infarcts, there is mild mass effect associated with it that has remained stable on follow up imaging.  -Admitted to [facility] for close neuro monitoring and observation  -NSGY consulted on admit, no surgical intervention recommended and NSGY s/o  - Stepped down to floor  -Continue frequent q4hr neuro checks as any acute changes or worsening neuro status .  -Obtain stat CTH for any new or worsening neuro changes and notify primary team immediately    Anxiety and depression  -Psychiatry consulted, appreciate recs  Continue lithium 300 mg TID (resumed 11/13), Prozac 60 mg daily, Seroquel 25 mg in the AM and 50 mg in the PM, Remeron 7.5 mg nightly.   PRN Seroquel 25 mg BID for non-redirectable agitation.    -daily EKG to monitor Qtc: Elevated to 535, talking to psych for possible medication adjustment  - serum lithium level on 11/18/24: 0.4         11/07/2024 Pt was agitated overnight and is on fentanyl. Currently intubated.On repeated painful stimuli has movements on LUE,LLE, RLE> RUE.The movement on right side is an improvement from  yesterday.  11/11/2024 NAEON, remains on precedex for agitation. Extubated yesterday (11/10), tolerating extubation thus far without complication. Moving extremities spontaneously although weakness noted in RUE>>RLE. Remains globally aphasic and agitated despite max precedex infusion. Respiratory cultures + H flu, on rocephin for abx. Also on bactrim for SSTI. Home lithium restarted today, continue seoquel and fluoxetine.  11/14/2024 NAEO, patient off precedex, BL wrist and torso restraints, NG tube with feeds running. Patient able to respond to questions and follow some commands. Oriented to self only. Stable for stepdown.   11/15/2024 Pt still in restraints. Per nurse tendency to pull at tubes and things.On restraints. Oriented to self. No other complaints in the am.  11/16/2024 remains in restraints this AM, reportedly agitated overnight with concerns from nursing. Psychiatry consulted for med rec given extensive psych history on lithium and multiple different psych meds on admission med rec/recent dispense history and concerns for worsening agitation. LFTs uptrending, tylenol updated to PRN. Consider decreased dose of statin if worsening.   11/17/2024 episodes of coughing and vomiting overnight with concern for aspiration event. TF held, repeat KUB unremarkable. Worsening leukocytosis today, possibly related to aspiration event although uncertain. Will test COVID/flu/RSV, collect blood cultures. Consider addition of vancomycin especially is patient becomes febrile or clinically unstable. LFT's slightly improved, elevated INR. Acute hepatitis panel ordered, consider liver ultrasound.   11/18/24 Viral panel negative for any respiratory infection. WBC trending up to 19.25 from 17.82. Currently on Vanc and cefepime. LFT downtrending to normal values. Plan for barium swallow study today, to assess for the continuation of NG tube feedings. Plan to likely move to hospital medicine service.   11/19/2024 Pt failed barium  swallow study yesterday. IR consulted for PEG tube placement. CT abdomen ordered. Hep C antigen reactive, pending RNA quant. WBC trending down to 16. 35 from 19.25. Blood cultures NGTD.   11/20/2024 NAOE, WBC stable at 16.36, Bcx NGTD. Ordering RIP. Currently on Vanc and cefepime. Scheduled for PEG placement today. LFT improving. Pending Hep C RNA quant.   11/21/2024 NAOE, NG tube removed. PEG tube in place and functioning. WBC improving now down to 14. 28. LFT wnl. RIP negative and all other infectious panel negative. Vanc d/pardeep. Finished course of cefepime today.   11/22/2024 NAOE, Peg tube functioning. Off restrains. WBC improving. Qtc elevated to 530's. Talked to Psych team, no change in medication. Pending rehab placement.   11/23/2024 NAOE, NG tube off, off restrains. Binders on. WBC trending down. Pending rehab placement.   11/24/2024 NAOE, VSS. WBC WNL. Plt 808, . Pending rehab placement.   11/25/2024 NAOE, VSS WBC WNL Plt downtrending to 774. . Pending rehab placement. Medically stable for discharge    STROKE DOCUMENTATION   Acute Stroke Times   Last Known Normal Date: 11/05/24  Last Known Normal Time: 2200  Symptom Onset Date: 11/06/24 (unsure of timing)  Symptom Onset Time:  (sometime this morning.)  Unknown Symptom Onset Time: Unknown Time  Stroke Team Called Date: 11/06/24  Stroke Team Called Time: 0843  Stroke Team Arrival Date: 11/06/24  Stroke Team Arrival Time: 0845  CT Interpretation Time: 0859  Thrombolytic Therapy Recommended: No  CTA Interpretation Time: 0902  Thrombectomy Recommended: No    NIH Scale:  1a. Level of Consciousness: 1-->Not alert, but arousable by minor stimulation to obey, answer, or respond  1b. LOC Questions: 1-->Answers one question correctly  1c. LOC Commands: 2-->Performs neither task correctly  2. Best Gaze: 0-->Normal  3. Visual: 3-->Bilateral hemianopia (blind including cortical blindness)  4. Facial Palsy: 1-->Minor paralysis (flattened nasolabial fold,  asymmetry on smiling)  5a. Motor Arm, Left: 2-->Some effort against gravity, limb cannot get to or maintain (if cued) 90 (or 45) degrees, drifts down to bed, but has some effort against gravity  5b. Motor Arm, Right: 0-->No drift, limb holds 90 (or 45) degrees for full 10 secs  6a. Motor Leg, Left: 0-->No drift, leg holds 30 degree position for full 5 secs  6b. Motor Leg, Right: 0-->No drift, leg holds 30 degree position for full 5 secs  7. Limb Ataxia: 0-->Absent  8. Sensory: 0-->Normal, no sensory loss  9. Best Language: 1-->Mild-to-moderate aphasia, some obvious loss of fluency or facility of comprehension, without significant limitation on ideas expressed or form of expression. Reduction of speech and/or comprehension, however, makes conversation. . . (see row details)  10. Dysarthria: 1-->Mild-to-moderate dysarthria, patient slurs at least some words and, at worst, can be understood with some difficulty  11. Extinction and Inattention (formerly Neglect): 0-->No abnormality  Total (NIH Stroke Scale): 12       Modified Alpha Score: 0  [place] Coma Scale:    ABCD2 Score:    [id number] Score:   HAS -BLED Score:   ICH Score:   Hunt & Argueta Classification:      Hemorrhagic change of an Ischemic Stroke: Does this patient have an ischemic stroke with hemorrhagic changes? No     Neurologic Chief Complaint: acute arterial ischemic stroke, multifocal, posterior circulation     Subjective:     Interval History: Patient is seen for follow-up neurological assessment and treatment recommendations: Please see hospital course    HPI, Past Medical, Family, and Social History remains the same as documented in the initial encounter.     Review of Systems   Unable to perform ROS: Mental status change     Scheduled Meds:   [START ON 11/26/2024] aspirin  81 mg Per G Tube Daily    [START ON 11/26/2024] atorvastatin  40 mg Per G Tube Daily    carvediloL  6.25 mg Per G Tube BID    [START ON 11/26/2024] clopidogreL  75 mg Per G Tube  Daily    enoxparin  40 mg Subcutaneous Q24H (prophylaxis, 1700)    [START ON 11/26/2024] fluconazole  100 mg Per G Tube Daily    fluconazole  200 mg Per G Tube Once    [START ON 11/26/2024] FLUoxetine  60 mg Per G Tube Daily    [START ON 11/26/2024] folic acid  1 mg Per G Tube Daily    LIDOcaine  1 patch Transdermal Q24H    lithium citrate 8 meq/5 ml  300 mg Per G Tube TID    melatonin  6 mg Per G Tube Nightly    methyl salicylate-menthol 15-10%   Topical (Top) TID    mirtazapine  7.5 mg Per G Tube QHS    [START ON 11/26/2024] multivitamin  1 tablet Per G Tube Daily    potassium bicarbonate  40 mEq Per G Tube Once    QUEtiapine  100 mg Per G Tube QHS    [START ON 11/26/2024] QUEtiapine  50 mg Per G Tube Daily    [START ON 11/26/2024] senna-docusate 8.6-50 mg  1 tablet Per G Tube Daily    [START ON 11/26/2024] thiamine  100 mg Per G Tube Daily     Continuous Infusions:  PRN Meds:  Current Facility-Administered Medications:     acetaminophen, 1,000 mg, Per G Tube, BID PRN    albuterol-ipratropium, 3 mL, Nebulization, Q6H PRN    polyethylene glycol, 17 g, Per G Tube, BID PRN    QUEtiapine, 25 mg, Per G Tube, BID PRN    sodium chloride 0.9%, 10 mL, Intravenous, PRN    Objective:     Vital Signs (Most Recent):  Temp: 99.1 °F (37.3 °C) (11/25/24 0716)  Pulse: 85 (11/25/24 1000)  Resp: 18 (11/25/24 1000)  BP: (!) 148/74 (11/25/24 0852)  SpO2: 98 % (11/25/24 1000)  BP Location: Left arm    Vital Signs Range (Last 24H):  Temp:  [97.7 °F (36.5 °C)-99.2 °F (37.3 °C)]   Pulse:  []   Resp:  [16-18]   BP: (130-151)/(70-80)   SpO2:  [92 %-100 %]   BP Location: Left arm       Physical Exam  Vitals and nursing note reviewed.   Pulmonary:      Effort: Pulmonary effort is normal. No respiratory distress.   Abdominal:      General: There is no distension.      Palpations: Abdomen is soft.   Musculoskeletal:      Cervical back: Normal range of motion and neck supple.      Comments: Appears with Right sided weakness. Unable to  fully assess as pt was not cooperating fully.    Skin:     General: Skin is warm and dry.      Capillary Refill: Capillary refill takes 2 to 3 seconds.   Neurological:      General: No focal deficit present.      Mental Status: She is alert and easily aroused.      GCS: GCS eye subscore is 3. GCS verbal subscore is 3. GCS motor subscore is 5.      Cranial Nerves: Dysarthria present.      Motor: Weakness present.      Coordination: Coordination abnormal.      Gait: Gait abnormal.   Psychiatric:         Mood and Affect: Affect is labile.         Behavior: Behavior is slowed.         Cognition and Memory: Cognition is impaired. Memory is impaired.          Neurological Exam:   LOC: alert  Attention Span: poor    Laboratory:  CMP:   Recent Labs   Lab 11/25/24  0554   CALCIUM 10.5   ALBUMIN 3.4*   PROT 7.5      K 3.9   CO2 26      BUN 23*   CREATININE 0.7   ALKPHOS 83   ALT 33   AST 22   BILITOT 0.2     CBC:   Recent Labs   Lab 11/25/24  0554   WBC 9.65   RBC 4.56   HGB 13.3   HCT 41.8   *   MCV 92   MCH 29.2   MCHC 31.8*       Diagnostic Results     Brain Imaging       CTA STROKE MULTI-PHASE     Narrative     EXAMINATION:  CTA STROKE MULTI-PHASE; CT CERVICAL SPINE WITHOUT CONTRAST     CLINICAL HISTORY:  Neuro deficit, acute, stroke suspected;; Neck trauma, intoxicated or obtunded (Age >= 16y);     TECHNIQUE:  Axial CT images obtained throughout the region of the head and cervical spine before the administration of intravenous contrast.     CT angiogram was performed through the cervical and intracranial vasculature during the IV bolus administration of 75mL of Omnipaque 350.  Two additional phases through the intracranial vasculature via multiphase technique.     Multiplanar MPR and MIP reformats were performed.     CT source data was analyzed using artificial intelligence software for detection of a large vessel occlusions (LVO) in order to enable computer assisted triage notification and aid  clinical stroke decision making.     COMPARISON:  CTA head and cervical spine 10/25/2015     FINDINGS:  Large region of hypoattenuation loss of gray-white differentiation in left occipital lobe in medial temporal lobe in keeping with an acute PCA distribution infarct.  Modest localized mass effect with some sulcal crowding.  Additional involvement of the left thalamus.  Similar appearance in the medial right occipital lobe, but smaller area of infarction.  Small left superior cerebellar infarct, also likely acute.  No evidence of recent or remote major vascular distribution infarct in the anterior circulation.  No acute parenchymal hemorrhage.  No midline shift.     The ventricles are normal in size without evidence of hydrocephalus.     No extra-axial blood or fluid collections.     The cranium appears intact.     Mastoid air cells and paranasal sinuses are essentially clear.     The vertebral bodies are normal in height and morphology without evidence of an acute displaced fracture or focal osseous destructive process.     Normal sagittal alignment is preserved.  No spondylolisthesis.     Intervertebral disc heights are well maintained.  No evidence of a bony spinal canal stenosis.     Evaluation intraspinal contents demonstrates no evidence of mass or hematoma.     No acute abnormalities in the paraspinal soft tissue structures.     Emphysematous changes patchy ground-glass opacity in the partially visualized lung apices.        CTA:     The aortic arch demonstrates no significant stenosis at the major branch vessel origins.     There is a focal thrombus in the right subclavian artery extending into the right vertebral artery origin with severe stenosis.  Thrombus extending over proximally 1 cm.  The V1 and V2 segments of right vertebral artery are otherwise normal in caliber of slightly decreased in attenuation in comparison to the other cervical vasculature.  Abrupt occlusion of distal right vertebral artery  about the level of the PICA origin approximate 2 cm segment of non enhancement.     Moderate focal stenosis at the left vertebral artery origin.  Left vertebral artery is otherwise normal in caliber.  The basilar artery is normal in caliber.     The right common carotid artery is normal in caliber.  No significant stenosis at the carotid bifurcation.The right internal carotid artery is normal in caliber.     The left common carotid artery is normal in caliber. No significant stenosis at the carotid bifurcation.The left internal carotid artery is normal in caliber.     Abrupt non enhancement in the P1 segment of the left PCA.  The proximal right PCA appears within normal limits.     The proximal MCAs and ACAs are unremarkable.     Findings were immediately discussed upon identification via telephone with the stroke service ([facility]) at approximately 09:20.        Impression     Multifocal acute posterior circulation distribution infarcts as further discussed above, particularly involving essentially the entirety of the left PCA territory.  Modest mass effect without significant hemorrhage.     Focal thrombus with severe stenosis extending from the right subclavian artery into the proximal right vertebral artery.     Focal occlusion of the proximal intracranial segment of the right vertebral artery.     Focal occlusion of the proximal left PCA.     Moderate focal stenosis at the left vertebral artery origin.     Anterior circulation appears unremarkable.     No evidence of fracture or traumatic malalignment in the cervical spine.     Emphysematous changes and patchy ground-glass opacity in the partially visualized lung apices.     This report was flagged in Epic as abnormal.        Electronically signed by:Geo Ambrocio MD  Date:                                                11/06/2024  Time:                                                09:33   CT Cervical Spine Without Contrast     Narrative      EXAMINATION:  CTA STROKE MULTI-PHASE; CT CERVICAL SPINE WITHOUT CONTRAST     CLINICAL HISTORY:  Neuro deficit, acute, stroke suspected;; Neck trauma, intoxicated or obtunded (Age >= 16y);     TECHNIQUE:  Axial CT images obtained throughout the region of the head and cervical spine before the administration of intravenous contrast.     CT angiogram was performed through the cervical and intracranial vasculature during the IV bolus administration of 75mL of Omnipaque 350.  Two additional phases through the intracranial vasculature via multiphase technique.     Multiplanar MPR and MIP reformats were performed.     CT source data was analyzed using artificial intelligence software for detection of a large vessel occlusions (LVO) in order to enable computer assisted triage notification and aid clinical stroke decision making.     COMPARISON:  CTA head and cervical spine 10/25/2015     FINDINGS:  Large region of hypoattenuation loss of gray-white differentiation in left occipital lobe in medial temporal lobe in keeping with an acute PCA distribution infarct.  Modest localized mass effect with some sulcal crowding.  Additional involvement of the left thalamus.  Similar appearance in the medial right occipital lobe, but smaller area of infarction.  Small left superior cerebellar infarct, also likely acute.  No evidence of recent or remote major vascular distribution infarct in the anterior circulation.  No acute parenchymal hemorrhage.  No midline shift.     The ventricles are normal in size without evidence of hydrocephalus.     No extra-axial blood or fluid collections.     The cranium appears intact.     Mastoid air cells and paranasal sinuses are essentially clear.     The vertebral bodies are normal in height and morphology without evidence of an acute displaced fracture or focal osseous destructive process.     Normal sagittal alignment is preserved.  No spondylolisthesis.     Intervertebral disc heights are well  maintained.  No evidence of a bony spinal canal stenosis.     Evaluation intraspinal contents demonstrates no evidence of mass or hematoma.     No acute abnormalities in the paraspinal soft tissue structures.     Emphysematous changes patchy ground-glass opacity in the partially visualized lung apices.        CTA:     The aortic arch demonstrates no significant stenosis at the major branch vessel origins.     There is a focal thrombus in the right subclavian artery extending into the right vertebral artery origin with severe stenosis.  Thrombus extending over proximally 1 cm.  The V1 and V2 segments of right vertebral artery are otherwise normal in caliber of slightly decreased in attenuation in comparison to the other cervical vasculature.  Abrupt occlusion of distal right vertebral artery about the level of the PICA origin approximate 2 cm segment of non enhancement.     Moderate focal stenosis at the left vertebral artery origin.  Left vertebral artery is otherwise normal in caliber.  The basilar artery is normal in caliber.     The right common carotid artery is normal in caliber.  No significant stenosis at the carotid bifurcation.The right internal carotid artery is normal in caliber.     The left common carotid artery is normal in caliber. No significant stenosis at the carotid bifurcation.The left internal carotid artery is normal in caliber.     Abrupt non enhancement in the P1 segment of the left PCA.  The proximal right PCA appears within normal limits.     The proximal MCAs and ACAs are unremarkable.     Findings were immediately discussed upon identification via telephone with the stroke service ([facility]) at approximately 09:20.        Impression     Multifocal acute posterior circulation distribution infarcts as further discussed above, particularly involving essentially the entirety of the left PCA territory.  Modest mass effect without significant hemorrhage.     Focal thrombus with severe  stenosis extending from the right subclavian artery into the proximal right vertebral artery.     Focal occlusion of the proximal intracranial segment of the right vertebral artery.     Focal occlusion of the proximal left PCA.     Moderate focal stenosis at the left vertebral artery origin.     Anterior circulation appears unremarkable.     No evidence of fracture or traumatic malalignment in the cervical spine.     Emphysematous changes and patchy ground-glass opacity in the partially visualized lung apices.     This report was flagged in Epic as abnormal.        Electronically signed by:Geo Ambrocio MD  Date:                                                11/06/2024  Time:                                                09:33   CT Head Without Contrast     Narrative     EXAMINATION:  CT HEAD WITHOUT CONTRAST     CLINICAL HISTORY:  Stroke, follow up;     TECHNIQUE:  Low dose axial images were obtained through the head.  Coronal and sagittal reformations were also performed. Contrast was not administered.     COMPARISON:  CT, MRI 11/06/2024     FINDINGS:  Evolving posterior circulation infarcts are identified as described on recent MR imaging again most notable within the left greater than right occipital lobes extending to the medial left temporal lobe and bilateral thalamus.  No hemorrhagic conversion with potentially minimal stable petechial hemorrhage within the medial left thalamus.  No midline shift or herniation with mild mass effect on the 3rd ventricle again noted.     No new acute territorial infarct.     The visualized sinuses and mastoid air cells are clear.        Impression     Expected evolutionary changes of the posterior circulation infarcts with no overt hemorrhagic conversion or significant midline shift/herniation.        Electronically signed by:Adrián Hobbs  Date:                                                11/09/2024  Time:                                                08:38             Results for orders placed or performed during the hospital encounter of 11/06/24 (from the past 2160 hours)   MRI Brain Without Contrast     Impression     Multiple posterior circulation infarcts are identified with involvement of the left greater than right occipital lobes and medial left temporal lobe, thalamus, right dorsal medulla, and minimally involving the cerebellum.  No midline shift with minimal mass effect on the 3rd ventricle.     Loss of flow void within the right intracranial vertebral artery in keeping with the findings on CTA.        Electronically signed by:Adrián Hobbs  Date:                                                11/06/2024  Time:                                                17:40      Cardiac Imaging   Results for orders placed during the hospital encounter of 11/06/24     Echo Saline Bubble? Yes; Ultrasound enhancing contrast? Yes     Interpretation Summary    Left Ventricle: The left ventricle is normal in size. Normal wall thickness. There is normal systolic function with a visually estimated ejection fraction of 55 - 60%. Biplane (2D) method of discs ejection fraction is 56%. Global longitudinal strain is -18.0%. There is normal diastolic function.    Right Ventricle: Normal right ventricular cavity size. Wall thickness is normal. Systolic function is normal.    Tricuspid Valve: There is mild regurgitation.    Pulmonary Artery: The estimated pulmonary artery systolic pressure is at least 28 mmHg.    IVC/SVC: Patient is ventilated, cannot use IVC diameter to estimate right atrial pressure.     Gerhard De Jesus MD  [facility]  Department of Vascular Neurology   [facility] ([facility])

## 2024-11-25 NOTE — PT/OT/SLP PROGRESS
Speech Language Pathology Treatment    Patient Name:  Malathi Mcpherson   MRN:  [id number]  Admitting Diagnosis: Acute arterial ischemic stroke, multifocal, posterior circulation    Recommendations:     General Recommendations:  Dysphagia therapy   Diet recommendations:  NPO, Liquid Diet Level: NPO   Continue PEG tube feeds as primary source of nutrition/hydration/medication  Tsp of puree WITH SLP ONLY   Aspiration precautions:  Avoid talking while eating  Check for pocketing/oral residue  Continue alternate means of nutrition  Eliminate distractions   Feed only when awake/alert  Frequent oral care  HOB to 90 degrees  Small bites/sips  Strict aspiration precautions   General Precautions: Standard, aspiration, fall  Communication strategies:  provide increased time to answer and go to room if call light pushed    Assessment:     Malathi Mcpherson is a 49 y.o. female with an SLP diagnosis of Dysphagia.     MBSS 11/18: Impressions: Patient demonstrates moderate Oropharyngeal dysphagia. Overall, pt's cognitive deficits, combative nature, and refusal behaviors interfering with safety of swallow and largely limited study  this date. Pt inconsistent bolus control paired with delayed swallow initiation did result in resulted in aspiration of nectar thickened liquids .  Pt weakness and poor attention to feeding tasks further increase her aspiration risk of all consistencies over time/duration of a meal.  Recommend pt remain npo with continued alternate means of nutrition. Recommend repeating MBSS when pt less combative and improvement in overall cognitive presentation/command following. Speech to continue to closely monitor.      Subjective     Spoke with RN prior to session. Pt drowsy though awake with mother at bedside. No restraints in place.     Pain/Comfort:  Pain Rating 1:  (not reported/indicated)    Respiratory Status: Room air    Objective:     Has the patient been evaluated by SLP for swallowing?   Yes  Keep patient  NPO? Yes     Pt seen bedside for dysphagia therapy. Pt drowsy, inconsistently attending to clinician to multimodal stimulation, mumbling incoherent utterances throughout session. Pt did not orient to self or place despite choice of 2 and frequent prompting and repetitions and pt unreliably following 1 step commands following max prompting. and unreliably answering Y/N and only able to orient x 1 to self, first name only. Provided feed assist with 1/2 tsp trials of puree x 10 (vanilla pudding). Pt required prompting to elicit acceptance and extraction. Noted anterior loss from right corner, mild-mod oral residue post swallow, inconsistent timeliness of AP transit with subsequent delayed swallow trigger. She demonstrated cough/choking response on 2/10 trials seemingly improved with double swallow pattern, though pt not consistently implementing despite max prompting. Pt with increased drowsiness and poor acceptance of trials therefore additional trials deferred.  She remains at high risk for aspiration 2/2 cognition and recommend she continue with alternative means of nutrition at this time. Education provided re: role of SLP, diet recs, swallow precs, s/s aspiration and POC. Given poor intake and ongoing altered mentation across many SLP sessions, recommend decreased in frequency to 2 x/week.     Goals:   Multidisciplinary Problems       SLP Goals          Problem: SLP    Goal Priority Disciplines Outcome   SLP Goal     SLP Progressing   Description: Speech Language Pathology Goals  Goals expected to be met by 11/25    1. Pt will participate in ongoing swallow assessment to determine leats restrictive PO diet when appropriate.   2. Pt will complete cognitive-linguistic assessment to determine additional therapeutic needs.                                Plan:     Patient to be seen:  2 x/week   Plan of Care expires:  12/11/24  Plan of Care reviewed with:  patient, mother   SLP Follow-Up:  Yes       Discharge  recommendations:   (tbd)/ TBD  Barriers to Discharge:  None    Time Tracking:     SLP Treatment Date:   11/25/24  Speech Start Time:  1031  Speech Stop Time:  1044     Speech Total Time (min):  13 min    Billable Minutes: Treatment Swallowing Dysfunction 5 and Self Care/Home Management Training 8    11/25/2024

## 2024-11-25 NOTE — PLAN OF CARE
Problem: Adult Inpatient Plan of Care  Goal: Plan of Care Review  11/24/2024 2208 by Michelle Ying RN  Outcome: Progressing  11/24/2024 2208 by Michelle Ying RN  Outcome: Not Progressing  Goal: Patient-Specific Goal (Individualized)  11/24/2024 2208 by Michelle Ying RN  Outcome: Progressing  11/24/2024 2208 by Michelle Ying RN  Outcome: Not Progressing  Goal: Absence of Hospital-Acquired Illness or Injury  11/24/2024 2208 by Michelle Ying RN  Outcome: Progressing  11/24/2024 2208 by Michelle Ying RN  Outcome: Not Progressing  Goal: Optimal Comfort and Wellbeing  11/24/2024 2208 by Michelle Ying RN  Outcome: Progressing  11/24/2024 2208 by Michelle Ying RN  Outcome: Not Progressing  Goal: Readiness for Transition of Care  11/24/2024 2208 by Michelle Ying RN  Outcome: Progressing  11/24/2024 2208 by Michelle Ying RN  Outcome: Not Progressing     Problem: Infection  Goal: Absence of Infection Signs and Symptoms  11/24/2024 2208 by Michelle Ying RN  Outcome: Progressing  11/24/2024 2208 by Michelle Ying RN  Outcome: Not Progressing     Problem: Wound  Goal: Optimal Coping  11/24/2024 2208 by Michelle Ying RN  Outcome: Progressing  11/24/2024 2208 by Michelle Ying RN  Outcome: Not Progressing  Goal: Optimal Functional Ability  11/24/2024 2208 by Michelle Ying RN  Outcome: Progressing  11/24/2024 2208 by Michelle Ying RN  Outcome: Not Progressing  Goal: Absence of Infection Signs and Symptoms  11/24/2024 2208 by Michelle Ying RN  Outcome: Progressing  11/24/2024 2208 by Michelle Ying RN  Outcome: Not Progressing  Goal: Improved Oral Intake  11/24/2024 2208 by Michelle Ying RN  Outcome: Progressing  11/24/2024 2208 by Michelle Ying RN  Outcome: Not Progressing  Goal: Optimal Pain Control and Function  11/24/2024 2208 by Michelle Ying RN  Outcome: Progressing  11/24/2024 2208 by Michelle Ying,  RN  Outcome: Not Progressing  Goal: Skin Health and Integrity  11/24/2024 2208 by Michelle Ying RN  Outcome: Progressing  11/24/2024 2208 by Michelle Ying RN  Outcome: Not Progressing  Goal: Optimal Wound Healing  11/24/2024 2208 by Michelle Ying RN  Outcome: Progressing  11/24/2024 2208 by Michelle Ying RN  Outcome: Not Progressing     Problem: Stroke, Ischemic (Includes Transient Ischemic Attack)  Goal: Optimal Coping  11/24/2024 2208 by Michelle Ying RN  Outcome: Progressing  11/24/2024 2208 by Michelle Ying RN  Outcome: Not Progressing  Goal: Effective Bowel Elimination  11/24/2024 2208 by Michelle Ying RN  Outcome: Progressing  11/24/2024 2208 by Michelle Ying RN  Outcome: Not Progressing  Goal: Optimal Cerebral Tissue Perfusion  11/24/2024 2208 by Michelle Ying RN  Outcome: Progressing  11/24/2024 2208 by Michelle Ying RN  Outcome: Not Progressing  Goal: Optimal Cognitive Function  11/24/2024 2208 by Michelle Ying RN  Outcome: Progressing  11/24/2024 2208 by Michelle Ying RN  Outcome: Not Progressing  Goal: Improved Communication Skills  11/24/2024 2208 by Michelle Ying RN  Outcome: Progressing  11/24/2024 2208 by Michelle Ying RN  Outcome: Not Progressing  Goal: Optimal Functional Ability  11/24/2024 2208 by Michelle Ying RN  Outcome: Progressing  11/24/2024 2208 by Michelle Ying RN  Outcome: Not Progressing  Goal: Optimal Nutrition Intake  11/24/2024 2208 by Michelle Ying RN  Outcome: Progressing  11/24/2024 2208 by Michelle Ying RN  Outcome: Not Progressing  Goal: Effective Oxygenation and Ventilation  11/24/2024 2208 by Michelle Ying RN  Outcome: Progressing  11/24/2024 2208 by Michelle Ying RN  Outcome: Not Progressing  Goal: Improved Sensorimotor Function  11/24/2024 2208 by Michelle Ying RN  Outcome: Progressing  11/24/2024 2208 by Michelle Ying RN  Outcome: Not  Progressing  Goal: Safe and Effective Swallow  11/24/2024 2208 by Michelle Ying RN  Outcome: Progressing  11/24/2024 2208 by Michelle Ying RN  Outcome: Not Progressing  Goal: Effective Urinary Elimination  11/24/2024 2208 by Michelle Ying RN  Outcome: Progressing  11/24/2024 2208 by Michelle Ying RN  Outcome: Not Progressing     Problem: Mechanical Ventilation Invasive  Goal: Effective Communication  11/24/2024 2208 by Michelle Ying RN  Outcome: Progressing  11/24/2024 2208 by Michelle Ying RN  Outcome: Not Progressing  Goal: Optimal Device Function  11/24/2024 2208 by Michelle Ying RN  Outcome: Progressing  11/24/2024 2208 by Michelle Ying RN  Outcome: Not Progressing  Goal: Mechanical Ventilation Liberation  11/24/2024 2208 by Michelle Ying RN  Outcome: Progressing  11/24/2024 2208 by Michelle Ying RN  Outcome: Not Progressing  Goal: Optimal Nutrition Delivery  11/24/2024 2208 by Michelle Ying RN  Outcome: Progressing  11/24/2024 2208 by Michelle Ying RN  Outcome: Not Progressing  Goal: Absence of Device-Related Skin and Tissue Injury  11/24/2024 2208 by Michelle Ying RN  Outcome: Progressing  11/24/2024 2208 by Michelle Ying RN  Outcome: Not Progressing  Goal: Absence of Ventilator-Induced Lung Injury  11/24/2024 2208 by Michelle Ying RN  Outcome: Progressing  11/24/2024 2208 by Michelle Ying RN  Outcome: Not Progressing     Problem: Artificial Airway  Goal: Effective Communication  11/24/2024 2208 by Michelle Ying RN  Outcome: Progressing  11/24/2024 2208 by Michelle Ying RN  Outcome: Not Progressing  Goal: Optimal Device Function  11/24/2024 2208 by Michelle Ying RN  Outcome: Progressing  11/24/2024 2208 by Michelle Ying RN  Outcome: Not Progressing  Goal: Absence of Device-Related Skin or Tissue Injury  11/24/2024 2208 by Michelle Ying RN  Outcome: Progressing  11/24/2024 2208 by  Michelle Ying RN  Outcome: Not Progressing     Problem: Fall Injury Risk  Goal: Absence of Fall and Fall-Related Injury  11/24/2024 2208 by Michelle Ying RN  Outcome: Progressing  11/24/2024 2208 by Michelle Ying RN  Outcome: Not Progressing     Problem: Restraint, Nonviolent  Goal: Absence of Harm or Injury  11/24/2024 2208 by Michelle Ying RN  Outcome: Progressing  11/24/2024 2208 by Michelle Ying RN  Outcome: Not Progressing     Problem: Skin Injury Risk Increased  Goal: Skin Health and Integrity  11/24/2024 2208 by Michelle Ying RN  Outcome: Progressing  11/24/2024 2208 by Michelle Ying RN  Outcome: Not Progressing     Problem: Pneumonia  Goal: Fluid Balance  11/24/2024 2208 by Michelle Ying RN  Outcome: Progressing  11/24/2024 2208 by Michelle Ying RN  Outcome: Not Progressing  Goal: Resolution of Infection Signs and Symptoms  11/24/2024 2208 by Michelle Ying RN  Outcome: Progressing  11/24/2024 2208 by Michelle Ying RN  Outcome: Not Progressing  Goal: Effective Oxygenation and Ventilation  11/24/2024 2208 by Michelle Ying RN  Outcome: Progressing  11/24/2024 2208 by Michelle Ying RN  Outcome: Not Progressing

## 2024-11-25 NOTE — PT/OT/SLP PROGRESS
"Occupational Therapy   Treatment    Name: Malathi Mcpherson  MRN: [id number]  Admitting Diagnosis:  Acute arterial ischemic stroke, multifocal, posterior circulation       Recommendations:     Discharge Recommendations: Moderate Intensity Therapy  Discharge Equipment Recommendations:  bath bench, bedside commode  Barriers to discharge:  None    Assessment:     Malathi Mcpherson is a 49 y.o. female with a medical diagnosis of Acute arterial ischemic stroke, multifocal, posterior circulation.  She presents with performance deficits affecting function are impaired functional mobility, impaired self care skills, impaired sensation, impaired endurance, impaired balance, decreased upper extremity function, decreased lower extremity function, decreased coordination, visual deficits.     Rehab Prognosis:  Good; patient would benefit from acute skilled OT services to address these deficits and reach maximum level of function.       Plan:     Patient to be seen 4 x/week to address the above listed problems via neuromuscular re-education, therapeutic exercises, therapeutic activities, self-care/home management, cognitive retraining  Plan of Care Expires: 12/05/24  Plan of Care Reviewed with: patient    Subjective     Patient:  "Can I do it again?"--regarding sitting up  Pain/Comfort:  Pain Rating 1: 0/10  Pain Rating Post-Intervention 1: 0/10    Objective:     Communicated with: Nurse prior to session.  Patient found supine with bed alarm, telemetry, PureWick, PEG Tube, SCD, peripheral IV (avasys camera monitoring) upon OT entry to room.    General Precautions: Standard, aspiration, fall    Orthopedic Precautions:N/A  Braces: N/A  Respiratory Status: Room air     Occupational Performance:     Bed Mobility:    Patient completed Rolling/Turning to Left with  moderate assistance  Patient completed Supine to Sit with moderate assistance  Patient completed Sit to Supine with moderate assistance     Activities of Daily " Living:  Grooming: maximal assistance while seated EOB  Lower Body Dressing: total assistance while seated EOB    AMPAC 6 Click ADL: 9    Treatment & Education:  Daily orientation provided.  Patient following simple commands consistently throughout the session.  Addressed attention to the right while seated EOB.  Addressed thoracic and cervical extension while seated EOB.  Min-max assist required with postural control while seated EOB.         Patient left supine with all lines intact, call button in reach, and bed alarm on    GOALS:   Multidisciplinary Problems       Occupational Therapy Goals          Problem: Occupational Therapy    Goal Priority Disciplines Outcome Interventions   Occupational Therapy Goal     OT, PT/OT Progressing    Description: Goals set 11/11 with an expiration date, 12/5:  Patient will increase functional independence with ADLs by performing:    Patient will demonstrate rolling to the right with min assist.  Not met   Patient will demonstrate rolling to the left with min assist.   Not met  Patient will demonstrate supine -sit with min assist.   Not met  Patient will demonstrate squat pivot transfers with min assist.   Not met  Patient will demonstrate grooming while seated with min assist.   Not met  Patient will demonstrate upper body dressing with min assist while seated EOB.   Not met  Patient will demonstrate lower body dressing with mod assist while seated EOB.   Not met  Patient will demonstrate toileting with mod assist.   Not met  Patient's family / caregiver will demonstrate independence and safety with assisting patient with self-care skills and functional mobility.     Not met  Patient's family / caregiver will demonstrate independence with providing ROM and changes in bed positioning.   Not met                                 Time Tracking:     OT Date of Treatment: 11/25/24  OT Start Time: 0708  OT Stop Time: 0733  OT Total Time (min): 25 min    Billable Minutes:Self Care/Home  Management 15  Neuromuscular Re-education 10    OT/MADHURI: OT          11/25/2024

## 2024-11-26 VITALS
OXYGEN SATURATION: 100 % | HEART RATE: 78 BPM | TEMPERATURE: 99 F | SYSTOLIC BLOOD PRESSURE: 134 MMHG | HEIGHT: 68 IN | BODY MASS INDEX: 19.71 KG/M2 | RESPIRATION RATE: 18 BRPM | DIASTOLIC BLOOD PRESSURE: 79 MMHG | WEIGHT: 130.06 LBS

## 2024-11-26 PROBLEM — E87.0 HYPERNATREMIA: Status: ACTIVE | Noted: 2024-11-26

## 2024-11-26 LAB
ALBUMIN SERPL BCP-MCNC: 3.4 G/DL (ref 3.5–5.2)
ALP SERPL-CCNC: 80 U/L (ref 40–150)
ALT SERPL W/O P-5'-P-CCNC: 28 U/L (ref 10–44)
ANION GAP SERPL CALC-SCNC: 9 MMOL/L (ref 8–16)
AST SERPL-CCNC: 21 U/L (ref 10–40)
BASOPHILS # BLD AUTO: 0.08 K/UL (ref 0–0.2)
BASOPHILS NFR BLD: 0.7 % (ref 0–1.9)
BILIRUB SERPL-MCNC: 0.2 MG/DL (ref 0.1–1)
BUN SERPL-MCNC: 21 MG/DL (ref 6–20)
CALCIUM SERPL-MCNC: 10.3 MG/DL (ref 8.7–10.5)
CHLORIDE SERPL-SCNC: 110 MMOL/L (ref 95–110)
CO2 SERPL-SCNC: 26 MMOL/L (ref 23–29)
CREAT SERPL-MCNC: 0.7 MG/DL (ref 0.5–1.4)
DIFFERENTIAL METHOD BLD: ABNORMAL
EOSINOPHIL # BLD AUTO: 0.3 K/UL (ref 0–0.5)
EOSINOPHIL NFR BLD: 2.8 % (ref 0–8)
ERYTHROCYTE [DISTWIDTH] IN BLOOD BY AUTOMATED COUNT: 15.2 % (ref 11.5–14.5)
EST. GFR  (NO RACE VARIABLE): >60 ML/MIN/1.73 M^2
GLUCOSE SERPL-MCNC: 124 MG/DL (ref 70–110)
HCT VFR BLD AUTO: 40 % (ref 37–48.5)
HGB BLD-MCNC: 12.4 G/DL (ref 12–16)
IMM GRANULOCYTES # BLD AUTO: 0.05 K/UL (ref 0–0.04)
IMM GRANULOCYTES NFR BLD AUTO: 0.5 % (ref 0–0.5)
LYMPHOCYTES # BLD AUTO: 2 K/UL (ref 1–4.8)
LYMPHOCYTES NFR BLD: 17.6 % (ref 18–48)
MAGNESIUM SERPL-MCNC: 2.6 MG/DL (ref 1.6–2.6)
MCH RBC QN AUTO: 29 PG (ref 27–31)
MCHC RBC AUTO-ENTMCNC: 31 G/DL (ref 32–36)
MCV RBC AUTO: 94 FL (ref 82–98)
MONOCYTES # BLD AUTO: 0.7 K/UL (ref 0.3–1)
MONOCYTES NFR BLD: 6.4 % (ref 4–15)
NEUTROPHILS # BLD AUTO: 8 K/UL (ref 1.8–7.7)
NEUTROPHILS NFR BLD: 72 % (ref 38–73)
NRBC BLD-RTO: 0 /100 WBC
PHOSPHATE SERPL-MCNC: 4.5 MG/DL (ref 2.7–4.5)
PLATELET # BLD AUTO: 711 K/UL (ref 150–450)
PMV BLD AUTO: 10.3 FL (ref 9.2–12.9)
POTASSIUM SERPL-SCNC: 4.3 MMOL/L (ref 3.5–5.1)
PROT SERPL-MCNC: 7.3 G/DL (ref 6–8.4)
RBC # BLD AUTO: 4.27 M/UL (ref 4–5.4)
SODIUM SERPL-SCNC: 145 MMOL/L (ref 136–145)
WBC # BLD AUTO: 11.06 K/UL (ref 3.9–12.7)

## 2024-11-26 PROCEDURE — 83735 ASSAY OF MAGNESIUM: CPT

## 2024-11-26 PROCEDURE — 84100 ASSAY OF PHOSPHORUS: CPT

## 2024-11-26 PROCEDURE — 97112 NEUROMUSCULAR REEDUCATION: CPT

## 2024-11-26 PROCEDURE — 97535 SELF CARE MNGMENT TRAINING: CPT

## 2024-11-26 PROCEDURE — 36415 COLL VENOUS BLD VENIPUNCTURE: CPT

## 2024-11-26 PROCEDURE — 99233 SBSQ HOSP IP/OBS HIGH 50: CPT | Mod: ,,, | Performed by: PSYCHIATRY & NEUROLOGY

## 2024-11-26 PROCEDURE — 80053 COMPREHEN METABOLIC PANEL: CPT

## 2024-11-26 PROCEDURE — 85025 COMPLETE CBC W/AUTO DIFF WBC: CPT

## 2024-11-26 PROCEDURE — 97530 THERAPEUTIC ACTIVITIES: CPT

## 2024-11-26 PROCEDURE — 25000003 PHARM REV CODE 250: Performed by: PSYCHIATRY & NEUROLOGY

## 2024-11-26 PROCEDURE — 63700000 PHARM REV CODE 250 ALT 637 W/O HCPCS

## 2024-11-26 PROCEDURE — 99232 SBSQ HOSP IP/OBS MODERATE 35: CPT | Mod: ,,, | Performed by: NURSE PRACTITIONER

## 2024-11-26 RX ORDER — CLOPIDOGREL BISULFATE 75 MG/1
75 TABLET ORAL DAILY
Qty: 30 TABLET | Refills: 0 | Status: SHIPPED | OUTPATIENT
Start: 2024-11-27

## 2024-11-26 RX ORDER — NAPROXEN SODIUM 220 MG/1
81 TABLET, FILM COATED ORAL DAILY
Start: 2024-11-27 | End: 2025-11-27

## 2024-11-26 RX ORDER — LITHIUM 8 MEQ/5ML
300 SOLUTION ORAL 3 TIMES DAILY
Qty: 450 ML | Refills: 11 | Status: SHIPPED | OUTPATIENT
Start: 2024-11-26 | End: 2025-11-26

## 2024-11-26 RX ORDER — FLUCONAZOLE 100 MG/1
100 TABLET ORAL DAILY
Qty: 30 TABLET | Refills: 0 | Status: SHIPPED | OUTPATIENT
Start: 2024-11-26 | End: 2024-12-26

## 2024-11-26 RX ORDER — ATORVASTATIN CALCIUM 40 MG/1
40 TABLET, FILM COATED ORAL DAILY
Qty: 90 TABLET | Refills: 3 | Status: SHIPPED | OUTPATIENT
Start: 2024-11-27 | End: 2025-11-27

## 2024-11-26 RX ORDER — TALC
6 POWDER (GRAM) TOPICAL NIGHTLY
Start: 2024-11-26

## 2024-11-26 RX ADMIN — ASPIRIN 81 MG CHEWABLE TABLET 81 MG: 81 TABLET CHEWABLE at 10:11

## 2024-11-26 RX ADMIN — ATORVASTATIN CALCIUM 40 MG: 40 TABLET, FILM COATED ORAL at 10:11

## 2024-11-26 RX ADMIN — CLOPIDOGREL BISULFATE 75 MG: 75 TABLET ORAL at 10:11

## 2024-11-26 RX ADMIN — THERA TABS 1 TABLET: TAB at 10:11

## 2024-11-26 RX ADMIN — Medication 100 MG: at 10:11

## 2024-11-26 RX ADMIN — FOLIC ACID 1 MG: 1 TABLET ORAL at 10:11

## 2024-11-26 RX ADMIN — FLUCONAZOLE 100 MG: 100 TABLET ORAL at 09:11

## 2024-11-26 RX ADMIN — FLUOXETINE HYDROCHLORIDE 60 MG: 20 SOLUTION ORAL at 09:11

## 2024-11-26 RX ADMIN — CARVEDILOL 6.25 MG: 6.25 TABLET, FILM COATED ORAL at 10:11

## 2024-11-26 RX ADMIN — QUETIAPINE FUMARATE 50 MG: 25 TABLET ORAL at 09:11

## 2024-11-26 RX ADMIN — SENNOSIDES AND DOCUSATE SODIUM 1 TABLET: 50; 8.6 TABLET ORAL at 10:11

## 2024-11-26 RX ADMIN — LITHIUM 300 MG: 8 SOLUTION ORAL at 10:11

## 2024-11-26 RX ADMIN — MUSCLE RUB CREAM: 100; 150 CREAM TOPICAL at 09:11

## 2024-11-26 NOTE — PLAN OF CARE
[facility]    FACILITY TRANSFER ORDERS      Patient Name: Malathi Mcpherson  YOB: 1975    PCP: Adrián Hahn MD   PCP Address: [address] [place] [id number]  PCP Phone Number: [phone]  PCP Fax: [phone]    Encounter Date: 11/26/2024    Admit to: Inpatient Rehab    Vital Signs:  Routine    Diagnoses:   Active Hospital Problems    Diagnosis  POA    *Acute arterial ischemic stroke, multifocal, posterior circulation [I63.539]  Yes    Oral candidiasis [B37.0]  Unknown    Attention to gastrostomy [Z43.1]  Not Applicable     PEG in place      Hyponatremia [E87.1]  No     Monitor with daily cmp       Thrombocytosis [D75.839]  Yes    Dysphagia [R13.10]  Yes    Transaminitis [R74.01]  No    Tobacco dependency [F17.200]  Yes    Agitation [R45.1]  Yes    Hypokalemia [E87.6]  No     K 3.1 on 11/112  Replace  Monitor with daily cmp      Drug dependence [F19.20]  Yes     Uds + cocaine on admission  Bradley on cessation      Body mass index (BMI) less than 19 [Z68.1]  Not Applicable     underweight      Brain compression [G93.5]  Yes     mass effect on the 3rd ventricle   Required close monitoring in the icu with q1h neurochecks, and repeat imaging       Hypercoagulable state [D68.59]  Yes     hypercoagulopathy from cocaine use.   Bradley on cessation      Hemiplegia [G81.90]  Yes     Therapy to evaluate and treat       Reduced mobility [Z74.09]  Yes     Therapy to evaluate and treat       Sensory loss [R20.0]  Yes    Aphasia [R47.01]  Yes     Therapy to evaluate and treat       Acute hypoxemic respiratory failure [J96.01]  Yes     On admission noted to have O2 desat in 70s and ultimately was intubated and placed on ventilator       Encephalopathy, metabolic [G93.41]  Yes     AMS requiring restraints  Found to have Pneumonia and SSTI  Required antibiotics      Delirium [R41.0]  Yes    Hypomagnesemia [E83.42]  Yes     Replaced in the icu  Monitor with labs      HTN  (hypertension) [I10]  Yes    Right upper lobe pneumonia [J18.9]  Yes    Folliculitis [L73.9]  Yes    HyperCKemia [R74.8]  Yes    Polysubstance abuse [F19.10]  Yes    Debility [R53.81]  Yes    Goals of care, counseling/discussion [Z71.89]  Not Applicable    Lithium toxicity [T56.891A]  Yes    Cytotoxic cerebral edema [G93.6]  Yes    Anxiety and depression [F41.9, F32.A]  Yes      Resolved Hospital Problems    Diagnosis Date Resolved POA    On mechanically assisted ventilation [Z99.11] 11/15/2024 Not Applicable       Allergies:Review of patient's allergies indicates:  No Known Allergies    Diet:  NPO    Activities: Activity as tolerated    Goals of Care Treatment Preferences:  Code Status: Full Code          What is most important right now is to focus on curative/life-prolongation (regardless of treatment burdens).  Accordingly, we have decided that the best plan to meet the patient's goals includes continuing with treatment.      Nursing: Per facility protocol     Labs: CBC and CMP  every other day      CONSULTS:    Physical Therapy to evaluate and treat. , Occupational Therapy to evaluate and treat., and Speech Therapy to evaluate and treat for Language and Swallowing.      WOUND CARE ORDERS  None    Medications: Review discharge medications with patient and family and provide education.         Medication List        START taking these medications      aspirin 81 MG Chew  1 tablet (81 mg total) by Per G Tube route once daily.  Start taking on: November 27, 2024     atorvastatin 40 MG tablet  Commonly known as: LIPITOR  1 tablet (40 mg total) by Per G Tube route once daily.  Start taking on: November 27, 2024     clopidogreL 75 mg tablet  Commonly known as: PLAVIX  1 tablet (75 mg total) by Per G Tube route once daily.  Start taking on: November 27, 2024     fluconazole 100 MG tablet  Commonly known as: DIFLUCAN  1 tablet (100 mg total) by Per G Tube route once daily.     lithium citrate 8 meq/5 ml 8 mEq/5 mL  Soln  5 mLs (300 mg total) by Per G Tube route 3 (three) times daily.     melatonin 3 mg tablet  Commonly known as: MELATIN  2 tablets (6 mg total) by Per G Tube route nightly.            CONTINUE taking these medications      albuterol 2.5 mg /3 mL (0.083 %) nebulizer solution  Commonly known as: PROVENTIL  Take 2.5 mg by nebulization every 6 (six) hours as needed for Wheezing. Rescue     carvediloL 6.25 MG tablet  Commonly known as: COREG  Take 6.25 mg by mouth 2 (two) times daily.     FLUoxetine 20 MG capsule  Take 60 mg by mouth once daily.     mirtazapine 15 MG tablet  Commonly known as: REMERON  Take 7.5 mg by mouth every evening.     QUEtiapine 200 MG Tab  Commonly known as: SEROQUEL  Take 200 mg by mouth every evening.            STOP taking these medications      buPROPion 150 MG TB24 tablet  Commonly known as: WELLBUTRIN XL     fluticasone propionate 50 mcg/actuation nasal spray  Commonly known as: FLONASE     gabapentin 300 MG capsule  Commonly known as: NEURONTIN     hydrOXYzine 50 MG tablet  Commonly known as: ATARAX     hydrOXYzine pamoate 25 MG Cap  Commonly known as: VISTARIL     lactulose 10 gram/15 mL solution  Commonly known as: CHRONULAC     lithium 300 MG capsule  Commonly known as: ESKALITH                Immunizations Administered as of 11/26/2024       No immunizations on file.                _________________________________  Gerhard De Jesus MD  11/26/2024

## 2024-11-26 NOTE — PROGRESS NOTES
Saline lock and telemetry monitor were dc'd. Report was called to Prabhakar at [facility]. Pt awake,alert, confused. No distress noted, breathing unlabored. No distress noted,breathing unlabored. [facility] here to discharge pt to Rehab. All belongings were packed and sent with pt.

## 2024-11-26 NOTE — PT/OT/SLP PROGRESS
"Occupational Therapy   Treatment    Name: Malathi Mcpherson  MRN: [id number]  Admitting Diagnosis:  Acute arterial ischemic stroke, multifocal, posterior circulation       Recommendations:     Discharge Recommendations: High Intensity Therapy  Discharge Equipment Recommendations:  bath bench, bedside commode  Barriers to discharge:  None    Assessment:     Malathi Mcpherson is a 49 y.o. female with a medical diagnosis of Acute arterial ischemic stroke, multifocal, posterior circulation.  She presents with performance deficits affecting function are weakness, decreased upper extremity function, decreased lower extremity function, impaired balance, impaired endurance, impaired sensation, impaired self care skills, impaired functional mobility, decreased coordination, abnormal tone, impaired cognition.     Rehab Prognosis:  Good; patient would benefit from acute skilled OT services to address these deficits and reach maximum level of function.       Plan:     Patient to be seen 4 x/week to address the above listed problems via therapeutic activities, self-care/home management, cognitive retraining, sensory integration, therapeutic exercises, neuromuscular re-education  Plan of Care Expires: 12/05/24  Plan of Care Reviewed with: patient    Subjective   Patient:   "I want to sit up."    Pain/Comfort:  Pain Rating 1: 0/10  Pain Rating Post-Intervention 1: 0/10    Objective:     Communicated with: Nurse prior to session.  Patient found supine with bed alarm, PEG Tube, SCD upon OT entry to room.    General Precautions: Standard, aspiration, fall, NPO    Orthopedic Precautions:N/A  Braces: N/A  Respiratory Status: Room air     Occupational Performance:     Bed Mobility:    Patient completed Rolling/Turning to Right with minimum assistance  Patient completed Supine to Sit with maximal assistance  Patient completed Sit to Supine with maximal assistance     Activities of Daily Living:  Upper Body Dressing: maximal assistance " while seated EOB  Lower Body Dressing: total assistance while seated EOB    AMPAC 6 Click ADL: 9    Treatment & Education:  Daily orientation provided. Patient following simple commands consistently throughout the session. Addressed attention to the right while seated EOB. Addressed thoracic and cervical extension while seated EOB. Min-max assist required with postural control while seated EOB.     Patient left supine with all lines intact, call button in reach, and bed alarm on    GOALS:   Multidisciplinary Problems       Occupational Therapy Goals          Problem: Occupational Therapy    Goal Priority Disciplines Outcome Interventions   Occupational Therapy Goal     OT, PT/OT Progressing    Description: Goals set 11/11 with an expiration date, 12/5:  Patient will increase functional independence with ADLs by performing:    Patient will demonstrate rolling to the right with min assist.  Not met   Patient will demonstrate rolling to the left with min assist.   Not met  Patient will demonstrate supine -sit with min assist.   Not met  Patient will demonstrate squat pivot transfers with min assist.   Not met  Patient will demonstrate grooming while seated with min assist.   Not met  Patient will demonstrate upper body dressing with min assist while seated EOB.   Not met  Patient will demonstrate lower body dressing with mod assist while seated EOB.   Not met  Patient will demonstrate toileting with mod assist.   Not met  Patient's family / caregiver will demonstrate independence and safety with assisting patient with self-care skills and functional mobility.     Not met  Patient's family / caregiver will demonstrate independence with providing ROM and changes in bed positioning.   Not met                                 Time Tracking:     OT Date of Treatment: 11/26/24  OT Start Time: 0647  OT Stop Time: 0711  OT Total Time (min): 24 min    Billable Minutes:Self Care/Home Management 12  Neuromuscular Re-education  12    OT/MADHURI: OT          11/26/2024

## 2024-11-26 NOTE — PLAN OF CARE
Problem: Physical Therapy  Goal: Physical Therapy Goal  Description: Goals to be met by: 24     Patient will increase functional independence with mobility by performin. Supine to sit with Contact Guard Assistance  2. Sit to supine with Contact Guard Assistance  3. Sit to stand transfer with Minimal Assistance  4. Bed to chair transfer with Minimal Assistance using LRAD as needed  5. Gait  x 50 feet with Minimal Assistance using LRAD as needed.   6. Lower extremity exercise program x10 reps per handout, with assistance as needed    DME Justifications (see above for complete DME recommendations)    Bedside Commode- Patient has a mobility limitation that significantly impairs their ability to participate in one or more mobility related activities of daily living, including toileting. This deficit can be resolved by using a bedside commode. Patient demonstrates mobility limitations that will cause them to be confined to one room at home without bathroom access for up to 30 days. Using a bedside commode will greatly improve the patient's ability to participate in MRADLs.     Hospital Bed ·       Patient requires a hospital bed for positioning of the body in ways that are not feasible with an ordinary bed. The patient requires special positioning for pain relief, limited mobility, and/or being unable to independently make changes in body position without the use of a hospital bed. Pillows and wedges will not be adequate for resolving these positional issues.  Outcome: Progressing   Pt's goals remain appropriate and pt will continue to benefit from skilled PT services to work towards improved functional mobility including: bed mobility, transfers, and sitting balance. An Guzman PT  2024

## 2024-11-26 NOTE — PROGRESS NOTES
Neno Conley - Neurosurgery ([facility])  Vascular Neurology  Comprehensive Stroke Center  Progress Note    Assessment/Plan:     * Acute arterial ischemic stroke, multifocal, posterior circulation  Malathi Mcpherson is a 49 y.o. female with PMH of drug use, seizure, HTN, HLD that is admitted to [facility] for further evaluation and management of multifocal acute infarcts. She initially presented to [facility] ED 11/6 after being found lying face down at home that morning, per EMS was lethargic and aphasic and moving LUE/LLE/RLE but was not moving RUE. EMS also reported that they had seen her before in context of seizures. LKW 10pm 11/5. In ED was noted to have exam worsening, now with R sided plegia and global aphasia. NIHSS 22. Also noted to have O2 desat in 70s and ultimately was intubated for airway protection. CTA showed multifocal areas of hypodensity involving the posterior circulation suspicious for infarcts as well as focal occlusion of R vert and L PCA. Not candidate for acute stroke interventions, no TNK due to OOW and no IR due to stroke burden/risk for bleeding. She was admitted to [facility] for higher level of care. NSGY consulted on admit for edema/hemicrani watch, however no surgical interventions recommended. MRI brain confirmed multifocal areas of acute infarct involving: L>R occipital lobes, L medial temporal lobe, bilateral thalami, L hippocampus and R hippocampus tail, R dorsal medulla, bilateral cerebellar hemispheres. TTE unremarkable. Suspect etiology of stroke likely secondary to substance use, Utox + cocaine.    Interval hx: Episodes of coughing and vomiting overnight with concern for aspiration event. TF held, repeat KUB unremarkable. Worsening leukocytosis today, possibly related to aspiration event although uncertain. Will test COVID/flu/RSV, collect blood cultures. Consider addition of vancomycin especially is patient becomes febrile or clinically unstable. LFT's slightly improved, elevated INR. Acute hepatitis  panel ordered, consider liver ultrasound.     Antithrombotics for secondary stroke prevention: Antiplatelets: Aspirin: 81 mg daily  Clopidogrel: 75 mg daily.     Statins for secondary stroke prevention and hyperlipidemia, if present: Statins: Atorvastatin- 40 mg daily    Aggressive risk factor modification: HTN, HLD, Substance use.     Rehab efforts: The patient has been evaluated by a stroke team provider and the therapy needs have been fully considered based off the presenting complaints and exam findings. The following therapy evaluations are needed: PT evaluate and treat, OT evaluate and treat, SLP evaluate and treat. High intensity therapy recommended. Pt failed barium swallow study. PEG tube in place  Diagnostics ordered/pending: None    VTE prophylaxis: Enoxaparin 40 mg SQ every 24 hours  Mechanical prophylaxis: Place SCDs    BP parameters: Infarct: SBP goal <180      Thrush  - Started on fluconazole     Transaminitis  LFTs uptrending, tylenol updated to PRN.   Decreased dose of statin to atorvastatin 40 mg 11/17 given elevated INR 1.5.   Consider liver ultrasound  Hep C antigen reactive, ordering RNA quant, pending results     Lab Results   Component Value Date    ALT 33 11/25/2024    AST 22 11/25/2024    ALKPHOS 83 11/25/2024    BILITOT 0.2 11/25/2024     Monitor daily CMP, PT/INR    Agitation  -see anxiety and depression    HTN (hypertension)  Patients blood pressure range in the last 24 hours was: BP  Min: 86/51  Max: 212/97. The patient's inpatient anti-hypertensive regimen is listed below:    Current Antihypertensives  , 2 times daily, Oral  carvediloL tablet 6.25 mg, 2 times daily, Per NG tube    - BP is controlled, no changes needed to their regimen  -SBP goal < 180      Right upper lobe pneumonia  RESOLVED    Extubated 11/10 in [facility] with persistent oxygen requirement. CXR remarkable for RUL consolidation. Respiratory culture positive for H flu and MSSA. MRSA screen by PCR positive. Was transitioned to  cefazolin 11/12 then cefepime 11/14 after sensitivities c/w h flu beta lactamase positive. Extended course even persistent or worsening leukocytosis, plan for total 7 day course (EED 11/21). 11/18/24 worsening leukocytosis with WBC up to 19.25 from 17.82. Viral panel negative.     -wean oxygen as tolerated, stable on RA  -Finished with course of cefepime    - Vanc d/pardeep  - F/U CBC: WBC 14.28  - F/U blood cultures: NGTD  - RIP negative    Debility  -Due to stroke  -Aggressive therapy  -PT/OT/SLP eval and treat    Polysubstance abuse  -Utox + cocaine  -Suspect etiology of stroke likely due to substance use  -Continue thiamine/folate/multivitamin supplementation  -Addiction psych consulted early on admission    Cytotoxic cerebral edema  -Area of cerebral edema identified when reviewing brain imaging involving bilateral posterior circulation territories due to acute infarcts, there is mild mass effect associated with it that has remained stable on follow up imaging.  -Admitted to [facility] for close neuro monitoring and observation  -NSGY consulted on admit, no surgical intervention recommended and NSGY s/o  - Stepped down to floor  -Continue frequent q4hr neuro checks as any acute changes or worsening neuro status .  -Obtain stat CTH for any new or worsening neuro changes and notify primary team immediately    Anxiety and depression  -Psychiatry consulted, appreciate recs  Continue lithium 300 mg TID (resumed 11/13), Prozac 60 mg daily, Seroquel 25 mg in the AM and 50 mg in the PM, Remeron 7.5 mg nightly.   PRN Seroquel 25 mg BID for non-redirectable agitation.    -daily EKG to monitor Qtc: Elevated to 535, talking to psych for possible medication adjustment  - serum lithium level on 11/18/24: 0.4         11/07/2024 Pt was agitated overnight and is on fentanyl. Currently intubated.On repeated painful stimuli has movements on LUE,LLE, RLE> RUE.The movement on right side is an improvement from yesterday.  11/11/2024 JOSHUA  remains on precedex for agitation. Extubated yesterday (11/10), tolerating extubation thus far without complication. Moving extremities spontaneously although weakness noted in RUE>>RLE. Remains globally aphasic and agitated despite max precedex infusion. Respiratory cultures + H flu, on rocephin for abx. Also on bactrim for SSTI. Home lithium restarted today, continue seoquel and fluoxetine.  11/14/2024 NAEO, patient off precedex, BL wrist and torso restraints, NG tube with feeds running. Patient able to respond to questions and follow some commands. Oriented to self only. Stable for stepdown.   11/15/2024 Pt still in restraints. Per nurse tendency to pull at tubes and things.On restraints. Oriented to self. No other complaints in the am.  11/16/2024 remains in restraints this AM, reportedly agitated overnight with concerns from nursing. Psychiatry consulted for med rec given extensive psych history on lithium and multiple different psych meds on admission med rec/recent dispense history and concerns for worsening agitation. LFTs uptrending, tylenol updated to PRN. Consider decreased dose of statin if worsening.   11/17/2024 episodes of coughing and vomiting overnight with concern for aspiration event. TF held, repeat KUB unremarkable. Worsening leukocytosis today, possibly related to aspiration event although uncertain. Will test COVID/flu/RSV, collect blood cultures. Consider addition of vancomycin especially is patient becomes febrile or clinically unstable. LFT's slightly improved, elevated INR. Acute hepatitis panel ordered, consider liver ultrasound.   11/18/24 Viral panel negative for any respiratory infection. WBC trending up to 19.25 from 17.82. Currently on Vanc and cefepime. LFT downtrending to normal values. Plan for barium swallow study today, to assess for the continuation of NG tube feedings. Plan to likely move to hospital medicine service.   11/19/2024 Pt failed barium swallow study yesterday. IR  consulted for PEG tube placement. CT abdomen ordered. Hep C antigen reactive, pending RNA quant. WBC trending down to 16. 35 from 19.25. Blood cultures NGTD.   11/20/2024 NAOE, WBC stable at 16.36, Bcx NGTD. Ordering RIP. Currently on Vanc and cefepime. Scheduled for PEG placement today. LFT improving. Pending Hep C RNA quant.   11/21/2024 NAOE, NG tube removed. PEG tube in place and functioning. WBC improving now down to 14. 28. LFT wnl. RIP negative and all other infectious panel negative. Vanc d/pardeep. Finished course of cefepime today.   11/22/2024 NAOE, Peg tube functioning. Off restrains. WBC improving. Qtc elevated to 530's. Talked to Psych team, no change in medication. Pending rehab placement.   11/23/2024 NAOE, NG tube off, off restrains. Binders on. WBC trending down. Pending rehab placement.   11/24/2024 NAOE, VSS. WBC WNL. Plt 808, . Pending rehab placement.   11/25/2024 NAOE, VSS WBC WNL Plt downtrending to 774. . Pending rehab placement. Medically stable for discharge  11/26/2024 BRUNO, VSS. Patient stable and ready for discharge to inpatient rehab with vascular neurology and primary care follow up outpatient.    STROKE DOCUMENTATION   Acute Stroke Times   Last Known Normal Date: 11/05/24  Last Known Normal Time: 2200  Symptom Onset Date: 11/06/24 (unsure of timing)  Symptom Onset Time:  (sometime this morning.)  Unknown Symptom Onset Time: Unknown Time  Stroke Team Called Date: 11/06/24  Stroke Team Called Time: 0843  Stroke Team Arrival Date: 11/06/24  Stroke Team Arrival Time: 0845  CT Interpretation Time: 0859  Thrombolytic Therapy Recommended: No  CTA Interpretation Time: 0902  Thrombectomy Recommended: No    NIH Scale:  1a. Level of Consciousness: 1-->Not alert, but arousable by minor stimulation to obey, answer, or respond  1b. LOC Questions: 1-->Answers one question correctly  1c. LOC Commands: 2-->Performs neither task correctly  2. Best Gaze: 0-->Normal  3. Visual: 3-->Bilateral  hemianopia (blind including cortical blindness)  4. Facial Palsy: 0-->Normal symmetrical movements  5a. Motor Arm, Left: 2-->Some effort against gravity, limb cannot get to or maintain (if cued) 90 (or 45) degrees, drifts down to bed, but has some effort against gravity  5b. Motor Arm, Right: 0-->No drift, limb holds 90 (or 45) degrees for full 10 secs  6a. Motor Leg, Left: 0-->No drift, leg holds 30 degree position for full 5 secs  6b. Motor Leg, Right: 0-->No drift, leg holds 30 degree position for full 5 secs  7. Limb Ataxia: 0-->Absent  8. Sensory: 0-->Normal, no sensory loss  9. Best Language: 1-->Mild-to-moderate aphasia, some obvious loss of fluency or facility of comprehension, without significant limitation on ideas expressed or form of expression. Reduction of speech and/or comprehension, however, makes conversation. . . (see row details)  10. Dysarthria: 1-->Mild-to-moderate dysarthria, patient slurs at least some words and, at worst, can be understood with some difficulty  11. Extinction and Inattention (formerly Neglect): 0-->No abnormality  Total (NIH Stroke Scale): 11       Modified Yonny Score: 3  Shailesh Coma Scale:    ABCD2 Score:    [id number] Score:   HAS -BLED Score:   ICH Score:   Hunt & Argueta Classification:      Hemorrhagic change of an Ischemic Stroke: Does this patient have an ischemic stroke with hemorrhagic changes? No     Neurologic Chief Complaint:  acute arterial ischemic stroke, multifocal, posterior circulation     Subjective:     Interval History: Patient is seen for follow-up neurological assessment and treatment recommendations: Please see hospital course    HPI, Past Medical, Family, and Social History remains the same as documented in the initial encounter.     Review of Systems   Unable to perform ROS: Mental status change     Scheduled Meds:   aspirin  81 mg Per G Tube Daily    atorvastatin  40 mg Per G Tube Daily    carvediloL  6.25 mg Per G Tube BID    clopidogreL  75 mg  Per G Tube Daily    enoxparin  40 mg Subcutaneous Q24H (prophylaxis, 1700)    fluconazole  100 mg Per G Tube Daily    FLUoxetine  60 mg Per G Tube Daily    folic acid  1 mg Per G Tube Daily    LIDOcaine  1 patch Transdermal Q24H    lithium citrate 8 meq/5 ml  300 mg Per G Tube TID    melatonin  6 mg Per G Tube Nightly    methyl salicylate-menthol 15-10%   Topical (Top) TID    mirtazapine  7.5 mg Per G Tube QHS    multivitamin  1 tablet Per G Tube Daily    QUEtiapine  100 mg Per G Tube QHS    QUEtiapine  50 mg Per G Tube Daily    senna-docusate 8.6-50 mg  1 tablet Per G Tube Daily    thiamine  100 mg Per G Tube Daily     Continuous Infusions:  PRN Meds:  Current Facility-Administered Medications:     acetaminophen, 1,000 mg, Per G Tube, BID PRN    albuterol-ipratropium, 3 mL, Nebulization, Q6H PRN    polyethylene glycol, 17 g, Per G Tube, BID PRN    QUEtiapine, 25 mg, Per G Tube, BID PRN    sodium chloride 0.9%, 10 mL, Intravenous, PRN    Objective:     Vital Signs (Most Recent):  Temp: 98.9 °F (37.2 °C) (11/26/24 1156)  Pulse: 78 (11/26/24 1156)  Resp: 18 (11/26/24 1156)  BP: 134/79 (11/26/24 1156)  SpO2: 100 % (11/26/24 1156)  BP Location: Left arm    Vital Signs Range (Last 24H):  Temp:  [97.2 °F (36.2 °C)-99.5 °F (37.5 °C)]   Pulse:  [72-91]   Resp:  [16-20]   BP: (126-151)/(67-90)   SpO2:  [94 %-100 %]   BP Location: Left arm       Physical Exam  Vitals and nursing note reviewed.   Pulmonary:      Effort: Pulmonary effort is normal. No respiratory distress.   Abdominal:      General: There is no distension.      Palpations: Abdomen is soft.   Musculoskeletal:      Cervical back: Normal range of motion and neck supple.      Comments: Appears with Right sided weakness. Unable to fully assess as pt was not cooperating fully.    Skin:     General: Skin is warm and dry.      Capillary Refill: Capillary refill takes 2 to 3 seconds.   Neurological:      General: No focal deficit present.      Mental Status: She is  alert and easily aroused.      GCS: GCS eye subscore is 3. GCS verbal subscore is 3. GCS motor subscore is 5.      Cranial Nerves: Dysarthria present.      Motor: Weakness present.      Coordination: Coordination abnormal.      Gait: Gait abnormal.   Psychiatric:         Mood and Affect: Affect is labile.         Behavior: Behavior is slowed.         Cognition and Memory: Cognition is impaired. Memory is impaired.              Neurological Exam:   LOC: drowsy  Attention Span: poor    Laboratory:  CMP:   Recent Labs   Lab 11/26/24  0519   CALCIUM 10.3   ALBUMIN 3.4*   PROT 7.3      K 4.3   CO2 26      BUN 21*   CREATININE 0.7   ALKPHOS 80   ALT 28   AST 21   BILITOT 0.2     CBC:   Recent Labs   Lab 11/26/24  0519   WBC 11.06   RBC 4.27   HGB 12.4   HCT 40.0   *   MCV 94   MCH 29.0   MCHC 31.0*       Diagnostic Results     Brain Imaging         CTA STROKE MULTI-PHASE     Narrative     EXAMINATION:  CTA STROKE MULTI-PHASE; CT CERVICAL SPINE WITHOUT CONTRAST     CLINICAL HISTORY:  Neuro deficit, acute, stroke suspected;; Neck trauma, intoxicated or obtunded (Age >= 16y);     TECHNIQUE:  Axial CT images obtained throughout the region of the head and cervical spine before the administration of intravenous contrast.     CT angiogram was performed through the cervical and intracranial vasculature during the IV bolus administration of 75mL of Omnipaque 350.  Two additional phases through the intracranial vasculature via multiphase technique.     Multiplanar MPR and MIP reformats were performed.     CT source data was analyzed using artificial intelligence software for detection of a large vessel occlusions (LVO) in order to enable computer assisted triage notification and aid clinical stroke decision making.     COMPARISON:  CTA head and cervical spine 10/25/2015     FINDINGS:  Large region of hypoattenuation loss of gray-white differentiation in left occipital lobe in medial temporal lobe in keeping with  an acute PCA distribution infarct.  Modest localized mass effect with some sulcal crowding.  Additional involvement of the left thalamus.  Similar appearance in the medial right occipital lobe, but smaller area of infarction.  Small left superior cerebellar infarct, also likely acute.  No evidence of recent or remote major vascular distribution infarct in the anterior circulation.  No acute parenchymal hemorrhage.  No midline shift.     The ventricles are normal in size without evidence of hydrocephalus.     No extra-axial blood or fluid collections.     The cranium appears intact.     Mastoid air cells and paranasal sinuses are essentially clear.     The vertebral bodies are normal in height and morphology without evidence of an acute displaced fracture or focal osseous destructive process.     Normal sagittal alignment is preserved.  No spondylolisthesis.     Intervertebral disc heights are well maintained.  No evidence of a bony spinal canal stenosis.     Evaluation intraspinal contents demonstrates no evidence of mass or hematoma.     No acute abnormalities in the paraspinal soft tissue structures.     Emphysematous changes patchy ground-glass opacity in the partially visualized lung apices.        CTA:     The aortic arch demonstrates no significant stenosis at the major branch vessel origins.     There is a focal thrombus in the right subclavian artery extending into the right vertebral artery origin with severe stenosis.  Thrombus extending over proximally 1 cm.  The V1 and V2 segments of right vertebral artery are otherwise normal in caliber of slightly decreased in attenuation in comparison to the other cervical vasculature.  Abrupt occlusion of distal right vertebral artery about the level of the PICA origin approximate 2 cm segment of non enhancement.     Moderate focal stenosis at the left vertebral artery origin.  Left vertebral artery is otherwise normal in caliber.  The basilar artery is normal in  caliber.     The right common carotid artery is normal in caliber.  No significant stenosis at the carotid bifurcation.The right internal carotid artery is normal in caliber.     The left common carotid artery is normal in caliber. No significant stenosis at the carotid bifurcation.The left internal carotid artery is normal in caliber.     Abrupt non enhancement in the P1 segment of the left PCA.  The proximal right PCA appears within normal limits.     The proximal MCAs and ACAs are unremarkable.     Findings were immediately discussed upon identification via telephone with the stroke service ([facility]) at approximately 09:20.        Impression     Multifocal acute posterior circulation distribution infarcts as further discussed above, particularly involving essentially the entirety of the left PCA territory.  Modest mass effect without significant hemorrhage.     Focal thrombus with severe stenosis extending from the right subclavian artery into the proximal right vertebral artery.     Focal occlusion of the proximal intracranial segment of the right vertebral artery.     Focal occlusion of the proximal left PCA.     Moderate focal stenosis at the left vertebral artery origin.     Anterior circulation appears unremarkable.     No evidence of fracture or traumatic malalignment in the cervical spine.     Emphysematous changes and patchy ground-glass opacity in the partially visualized lung apices.     This report was flagged in Epic as abnormal.        Electronically signed by:Geo Ambrocio MD  Date:                                                11/06/2024  Time:                                                09:33   CT Cervical Spine Without Contrast     Narrative     EXAMINATION:  CTA STROKE MULTI-PHASE; CT CERVICAL SPINE WITHOUT CONTRAST     CLINICAL HISTORY:  Neuro deficit, acute, stroke suspected;; Neck trauma, intoxicated or obtunded (Age >= 16y);     TECHNIQUE:  Axial CT images obtained throughout  the region of the head and cervical spine before the administration of intravenous contrast.     CT angiogram was performed through the cervical and intracranial vasculature during the IV bolus administration of 75mL of Omnipaque 350.  Two additional phases through the intracranial vasculature via multiphase technique.     Multiplanar MPR and MIP reformats were performed.     CT source data was analyzed using artificial intelligence software for detection of a large vessel occlusions (LVO) in order to enable computer assisted triage notification and aid clinical stroke decision making.     COMPARISON:  CTA head and cervical spine 10/25/2015     FINDINGS:  Large region of hypoattenuation loss of gray-white differentiation in left occipital lobe in medial temporal lobe in keeping with an acute PCA distribution infarct.  Modest localized mass effect with some sulcal crowding.  Additional involvement of the left thalamus.  Similar appearance in the medial right occipital lobe, but smaller area of infarction.  Small left superior cerebellar infarct, also likely acute.  No evidence of recent or remote major vascular distribution infarct in the anterior circulation.  No acute parenchymal hemorrhage.  No midline shift.     The ventricles are normal in size without evidence of hydrocephalus.     No extra-axial blood or fluid collections.     The cranium appears intact.     Mastoid air cells and paranasal sinuses are essentially clear.     The vertebral bodies are normal in height and morphology without evidence of an acute displaced fracture or focal osseous destructive process.     Normal sagittal alignment is preserved.  No spondylolisthesis.     Intervertebral disc heights are well maintained.  No evidence of a bony spinal canal stenosis.     Evaluation intraspinal contents demonstrates no evidence of mass or hematoma.     No acute abnormalities in the paraspinal soft tissue structures.     Emphysematous changes patchy  ground-glass opacity in the partially visualized lung apices.        CTA:     The aortic arch demonstrates no significant stenosis at the major branch vessel origins.     There is a focal thrombus in the right subclavian artery extending into the right vertebral artery origin with severe stenosis.  Thrombus extending over proximally 1 cm.  The V1 and V2 segments of right vertebral artery are otherwise normal in caliber of slightly decreased in attenuation in comparison to the other cervical vasculature.  Abrupt occlusion of distal right vertebral artery about the level of the PICA origin approximate 2 cm segment of non enhancement.     Moderate focal stenosis at the left vertebral artery origin.  Left vertebral artery is otherwise normal in caliber.  The basilar artery is normal in caliber.     The right common carotid artery is normal in caliber.  No significant stenosis at the carotid bifurcation.The right internal carotid artery is normal in caliber.     The left common carotid artery is normal in caliber. No significant stenosis at the carotid bifurcation.The left internal carotid artery is normal in caliber.     Abrupt non enhancement in the P1 segment of the left PCA.  The proximal right PCA appears within normal limits.     The proximal MCAs and ACAs are unremarkable.     Findings were immediately discussed upon identification via telephone with the stroke service ([facility]) at approximately 09:20.        Impression     Multifocal acute posterior circulation distribution infarcts as further discussed above, particularly involving essentially the entirety of the left PCA territory.  Modest mass effect without significant hemorrhage.     Focal thrombus with severe stenosis extending from the right subclavian artery into the proximal right vertebral artery.     Focal occlusion of the proximal intracranial segment of the right vertebral artery.     Focal occlusion of the proximal left PCA.     Moderate  focal stenosis at the left vertebral artery origin.     Anterior circulation appears unremarkable.     No evidence of fracture or traumatic malalignment in the cervical spine.     Emphysematous changes and patchy ground-glass opacity in the partially visualized lung apices.     This report was flagged in Epic as abnormal.        Electronically signed by:Geo Ambrocio MD  Date:                                                11/06/2024  Time:                                                09:33   CT Head Without Contrast     Narrative     EXAMINATION:  CT HEAD WITHOUT CONTRAST     CLINICAL HISTORY:  Stroke, follow up;     TECHNIQUE:  Low dose axial images were obtained through the head.  Coronal and sagittal reformations were also performed. Contrast was not administered.     COMPARISON:  CT, MRI 11/06/2024     FINDINGS:  Evolving posterior circulation infarcts are identified as described on recent MR imaging again most notable within the left greater than right occipital lobes extending to the medial left temporal lobe and bilateral thalamus.  No hemorrhagic conversion with potentially minimal stable petechial hemorrhage within the medial left thalamus.  No midline shift or herniation with mild mass effect on the 3rd ventricle again noted.     No new acute territorial infarct.     The visualized sinuses and mastoid air cells are clear.        Impression     Expected evolutionary changes of the posterior circulation infarcts with no overt hemorrhagic conversion or significant midline shift/herniation.        Electronically signed by:Adrián Hobbs  Date:                                                11/09/2024  Time:                                                08:38            Results for orders placed or performed during the hospital encounter of 11/06/24 (from the past 2160 hours)   MRI Brain Without Contrast     Impression     Multiple posterior circulation infarcts are identified with involvement of the left  greater than right occipital lobes and medial left temporal lobe, thalamus, right dorsal medulla, and minimally involving the cerebellum.  No midline shift with minimal mass effect on the 3rd ventricle.     Loss of flow void within the right intracranial vertebral artery in keeping with the findings on CTA.        Electronically signed by:Adrián Hobbs  Date:                                                11/06/2024  Time:                                                17:40      Cardiac Imaging   Results for orders placed during the hospital encounter of 11/06/24     Echo Saline Bubble? Yes; Ultrasound enhancing contrast? Yes     Interpretation Summary    Left Ventricle: The left ventricle is normal in size. Normal wall thickness. There is normal systolic function with a visually estimated ejection fraction of 55 - 60%. Biplane (2D) method of discs ejection fraction is 56%. Global longitudinal strain is -18.0%. There is normal diastolic function.    Right Ventricle: Normal right ventricular cavity size. Wall thickness is normal. Systolic function is normal.    Tricuspid Valve: There is mild regurgitation.    Pulmonary Artery: The estimated pulmonary artery systolic pressure is at least 28 mmHg.    IVC/SVC: Patient is ventilated, cannot use IVC diameter to estimate right atrial pressure.     Gerhard De Jesus MD  [facility]  Department of Vascular Neurology   [facility])

## 2024-11-26 NOTE — ASSESSMENT & PLAN NOTE
-CTA showed multifocal areas of hypodensity involving the posterior circulation suspicious for infarcts as well as focal occlusion of R vert and L PCA.   -No TNK or IR intervention.  -Found to have Utox pos for cocaine.   -PT/OT rec for high intensity therapies. Will need SLP as well  -NPO and had NGT. Will need MBSS.  -In restraints. Will need to be off to pursue rehab.   -11/21- Mittens present BUE. PEG in place. Not started on TF. Pt with incomprehensible speech. Did follow some commands. Spoke to mother. Will follow up on therapy progress, mental status improvement .   -11/25- Mittens off since 11/22. Pt not agitated. Follows some commands after repeated cues. Mother at bedside very attentive to pt.   -11/26- Today, no one at bedside. Tele sitter in place. No agitation. Opens eyes to voice. Needed repeated cues to follow commands.

## 2024-11-26 NOTE — PLAN OF CARE
CHW met with patient/family at bedside. Patient experience rounding completed and reviewed the following.     Do you know your discharge plan? Yes or No,    Yes    If yes, what is the plan? (Home, Home Health, Rehab, SNF, LTAC, or Other)  Rehab    Have you discussed your needs and preferences with your SW/CM? Yes or No   Yes    If you are discharging home, do you have help at home? Yes or No   Rehab    Do you think you will need help additional at home at discharge? Yes or No  No    Do you currently have difficulty keeping up with bills, affording medicine or buying food? Yes or No    No    Assigned SW/CM notified of any patient/family needs or concerns.  [facility]    Appropriate resources provided to address patient's needs.  Yes        Batsheva Alicia CHW  Case Management   [phone]

## 2024-11-26 NOTE — DISCHARGE SUMMARY
Neno Conley - Neurosurgery ([facility])  Vascular Neurology  Comprehensive Stroke Center  Discharge Summary     Summary:     Admit Date: 11/6/2024  8:43 AM    Discharge Date and Time:  11/26/2024 1:38 PM    Attending Physician: No att. providers found     Discharge Provider: Gerhard De Jesus MD    History of Present Illness: Pt is a 49 year old with a hx of drug use, seizure, HTN, HLD  who [place] [facility] [place] and was stroke activated on 11/6/24.    Per EMS, she was found down by her mother today morning , the time of which is not clearly known. EMS saw her around 7: 50 am ( 11/6/24) when she was very lethargic and aphasic. She moved the Left sided extremities and Right leg to certain extent , but did not move her RUE at all. Per EMS, they have seen her before in the context of seizures.  Per conversation with mom, she found her face down in the morning( not sure of the timing), not moving and not responding to her.    After coming to the hospital, her exam worsened , with no movement noted on her RUE and RLE. She is aphasic and not following commands. She desated to O2 saturation of 70'S due to which she had to be intubated.       Her LKN is 10 pm yesterday ( 11/5/24) per mother.   She is not a TNK candidate as she is out of window and strokes in her CT. Images reviewed with stroke attending Dr Morris  Case reviewed with IR,deemed not a intervention candidate because of the extensive nature of infarcts and the risk of bleeding.    Hospital Course (synopsis of major diagnoses, care, treatment, and services provided during the course of the hospital stay): 11/07/2024 Pt was agitated overnight and is on fentanyl. Currently intubated.On repeated painful stimuli has movements on LUE,LLE, RLE> RUE.The movement on right side is an improvement from yesterday.  11/11/2024 NAEON, remains on precedex for agitation. Extubated yesterday (11/10), tolerating extubation thus far without complication. Moving extremities spontaneously  although weakness noted in RUE>>RLE. Remains globally aphasic and agitated despite max precedex infusion. Respiratory cultures + H flu, on rocephin for abx. Also on bactrim for SSTI. Home lithium restarted today, continue seoquel and fluoxetine.  11/14/2024 NAEO, patient off precedex, BL wrist and torso restraints, NG tube with feeds running. Patient able to respond to questions and follow some commands. Oriented to self only. Stable for stepdown.   11/15/2024 Pt still in restraints. Per nurse tendency to pull at tubes and things.On restraints. Oriented to self. No other complaints in the am.  11/16/2024 remains in restraints this AM, reportedly agitated overnight with concerns from nursing. Psychiatry consulted for med rec given extensive psych history on lithium and multiple different psych meds on admission med rec/recent dispense history and concerns for worsening agitation. LFTs uptrending, tylenol updated to PRN. Consider decreased dose of statin if worsening.   11/17/2024 episodes of coughing and vomiting overnight with concern for aspiration event. TF held, repeat KUB unremarkable. Worsening leukocytosis today, possibly related to aspiration event although uncertain. Will test COVID/flu/RSV, collect blood cultures. Consider addition of vancomycin especially is patient becomes febrile or clinically unstable. LFT's slightly improved, elevated INR. Acute hepatitis panel ordered, consider liver ultrasound.   11/18/24 Viral panel negative for any respiratory infection. WBC trending up to 19.25 from 17.82. Currently on Vanc and cefepime. LFT downtrending to normal values. Plan for barium swallow study today, to assess for the continuation of NG tube feedings. Plan to likely move to hospital medicine service.   11/19/2024 Pt failed barium swallow study yesterday. IR consulted for PEG tube placement. CT abdomen ordered. Hep C antigen reactive, pending RNA quant. WBC trending down to 16. 35 from 19.25. Blood cultures  NGTD.   11/20/2024 NAOE, WBC stable at 16.36, Bcx NGTD. Ordering RIP. Currently on Vanc and cefepime. Scheduled for PEG placement today. LFT improving. Pending Hep C RNA quant.   11/21/2024 NAOE, NG tube removed. PEG tube in place and functioning. WBC improving now down to 14. 28. LFT wnl. RIP negative and all other infectious panel negative. Vanc d/pardeep. Finished course of cefepime today.   11/22/2024 NAOE, Peg tube functioning. Off restrains. WBC improving. Qtc elevated to 530's. Talked to Psych team, no change in medication. Pending rehab placement.   11/23/2024 NAOE, NG tube off, off restrains. Binders on. WBC trending down. Pending rehab placement.   11/24/2024 NAOE, VSS. WBC WNL. Plt 808, . Pending rehab placement.   11/25/2024 NAOE, VSS WBC WNL Plt downtrending to 774. . Pending rehab placement. Medically stable for discharge  11/26/2024 NAOE, VSS. Patient stable and ready for discharge to inpatient rehab with vascular neurology and primary care follow up outpatient.    Goals of Care Treatment Preferences:  Code Status: Full Code          What is most important right now is to focus on curative/life-prolongation (regardless of treatment burdens).  Accordingly, we have decided that the best plan to meet the patient's goals includes continuing with treatment.      Stroke Etiology: Ischemic Other Causes Recreational Drug: Amphetamine    STROKE DOCUMENTATION   Acute Stroke Times   Last Known Normal Date: 11/05/24  Last Known Normal Time: 2200  Symptom Onset Date: 11/06/24 (unsure of timing)  Symptom Onset Time:  (sometime this morning.)  Unknown Symptom Onset Time: Unknown Time  Stroke Team Called Date: 11/06/24  Stroke Team Called Time: 0843  Stroke Team Arrival Date: 11/06/24  Stroke Team Arrival Time: 0845  CT Interpretation Time: 0859  Thrombolytic Therapy Recommended: No  CTA Interpretation Time: 0902  Thrombectomy Recommended: No     NIH Scale:  1a. Level of Consciousness: 1-->Not alert, but  arousable by minor stimulation to obey, answer, or respond  1b. LOC Questions: 1-->Answers one question correctly  1c. LOC Commands: 2-->Performs neither task correctly  2. Best Gaze: 0-->Normal  3. Visual: 3-->Bilateral hemianopia (blind including cortical blindness)  4. Facial Palsy: 0-->Normal symmetrical movements  5a. Motor Arm, Left: 2-->Some effort against gravity, limb cannot get to or maintain (if cued) 90 (or 45) degrees, drifts down to bed, but has some effort against gravity  5b. Motor Arm, Right: 0-->No drift, limb holds 90 (or 45) degrees for full 10 secs  6a. Motor Leg, Left: 0-->No drift, leg holds 30 degree position for full 5 secs  6b. Motor Leg, Right: 0-->No drift, leg holds 30 degree position for full 5 secs  7. Limb Ataxia: 0-->Absent  8. Sensory: 0-->Normal, no sensory loss  9. Best Language: 1-->Mild-to-moderate aphasia, some obvious loss of fluency or facility of comprehension, without significant limitation on ideas expressed or form of expression. Reduction of speech and/or comprehension, however, makes conversation. . . (see row details)  10. Dysarthria: 1-->Mild-to-moderate dysarthria, patient slurs at least some words and, at worst, can be understood with some difficulty  11. Extinction and Inattention (formerly Neglect): 0-->No abnormality  Total (NIH Stroke Scale): 11        Modified Yonny Score: 3  [place] Coma Scale:    ABCD2 Score:    [id number] Score:   HAS -BLED Score:   ICH Score:   Hunt & Argueta Classification:       Assessment/Plan:     Diagnostic Results:      Brain Imaging         CTA STROKE MULTI-PHASE     Narrative     EXAMINATION:  CTA STROKE MULTI-PHASE; CT CERVICAL SPINE WITHOUT CONTRAST     CLINICAL HISTORY:  Neuro deficit, acute, stroke suspected;; Neck trauma, intoxicated or obtunded (Age >= 16y);     TECHNIQUE:  Axial CT images obtained throughout the region of the head and cervical spine before the administration of intravenous contrast.     CT angiogram was  performed through the cervical and intracranial vasculature during the IV bolus administration of 75mL of Omnipaque 350.  Two additional phases through the intracranial vasculature via multiphase technique.     Multiplanar MPR and MIP reformats were performed.     CT source data was analyzed using artificial intelligence software for detection of a large vessel occlusions (LVO) in order to enable computer assisted triage notification and aid clinical stroke decision making.     COMPARISON:  CTA head and cervical spine 10/25/2015     FINDINGS:  Large region of hypoattenuation loss of gray-white differentiation in left occipital lobe in medial temporal lobe in keeping with an acute PCA distribution infarct.  Modest localized mass effect with some sulcal crowding.  Additional involvement of the left thalamus.  Similar appearance in the medial right occipital lobe, but smaller area of infarction.  Small left superior cerebellar infarct, also likely acute.  No evidence of recent or remote major vascular distribution infarct in the anterior circulation.  No acute parenchymal hemorrhage.  No midline shift.     The ventricles are normal in size without evidence of hydrocephalus.     No extra-axial blood or fluid collections.     The cranium appears intact.     Mastoid air cells and paranasal sinuses are essentially clear.     The vertebral bodies are normal in height and morphology without evidence of an acute displaced fracture or focal osseous destructive process.     Normal sagittal alignment is preserved.  No spondylolisthesis.     Intervertebral disc heights are well maintained.  No evidence of a bony spinal canal stenosis.     Evaluation intraspinal contents demonstrates no evidence of mass or hematoma.     No acute abnormalities in the paraspinal soft tissue structures.     Emphysematous changes patchy ground-glass opacity in the partially visualized lung apices.        CTA:     The aortic arch demonstrates no  significant stenosis at the major branch vessel origins.     There is a focal thrombus in the right subclavian artery extending into the right vertebral artery origin with severe stenosis.  Thrombus extending over proximally 1 cm.  The V1 and V2 segments of right vertebral artery are otherwise normal in caliber of slightly decreased in attenuation in comparison to the other cervical vasculature.  Abrupt occlusion of distal right vertebral artery about the level of the PICA origin approximate 2 cm segment of non enhancement.     Moderate focal stenosis at the left vertebral artery origin.  Left vertebral artery is otherwise normal in caliber.  The basilar artery is normal in caliber.     The right common carotid artery is normal in caliber.  No significant stenosis at the carotid bifurcation.The right internal carotid artery is normal in caliber.     The left common carotid artery is normal in caliber. No significant stenosis at the carotid bifurcation.The left internal carotid artery is normal in caliber.     Abrupt non enhancement in the P1 segment of the left PCA.  The proximal right PCA appears within normal limits.     The proximal MCAs and ACAs are unremarkable.     Findings were immediately discussed upon identification via telephone with the stroke service ([facility]) at approximately 09:20.        Impression     Multifocal acute posterior circulation distribution infarcts as further discussed above, particularly involving essentially the entirety of the left PCA territory.  Modest mass effect without significant hemorrhage.     Focal thrombus with severe stenosis extending from the right subclavian artery into the proximal right vertebral artery.     Focal occlusion of the proximal intracranial segment of the right vertebral artery.     Focal occlusion of the proximal left PCA.     Moderate focal stenosis at the left vertebral artery origin.     Anterior circulation appears unremarkable.     No  evidence of fracture or traumatic malalignment in the cervical spine.     Emphysematous changes and patchy ground-glass opacity in the partially visualized lung apices.     This report was flagged in Epic as abnormal.        Electronically signed by:Geo Ambrocio MD  Date:                                                11/06/2024  Time:                                                09:33   CT Cervical Spine Without Contrast     Narrative     EXAMINATION:  CTA STROKE MULTI-PHASE; CT CERVICAL SPINE WITHOUT CONTRAST     CLINICAL HISTORY:  Neuro deficit, acute, stroke suspected;; Neck trauma, intoxicated or obtunded (Age >= 16y);     TECHNIQUE:  Axial CT images obtained throughout the region of the head and cervical spine before the administration of intravenous contrast.     CT angiogram was performed through the cervical and intracranial vasculature during the IV bolus administration of 75mL of Omnipaque 350.  Two additional phases through the intracranial vasculature via multiphase technique.     Multiplanar MPR and MIP reformats were performed.     CT source data was analyzed using artificial intelligence software for detection of a large vessel occlusions (LVO) in order to enable computer assisted triage notification and aid clinical stroke decision making.     COMPARISON:  CTA head and cervical spine 10/25/2015     FINDINGS:  Large region of hypoattenuation loss of gray-white differentiation in left occipital lobe in medial temporal lobe in keeping with an acute PCA distribution infarct.  Modest localized mass effect with some sulcal crowding.  Additional involvement of the left thalamus.  Similar appearance in the medial right occipital lobe, but smaller area of infarction.  Small left superior cerebellar infarct, also likely acute.  No evidence of recent or remote major vascular distribution infarct in the anterior circulation.  No acute parenchymal hemorrhage.  No midline shift.     The ventricles are normal  in size without evidence of hydrocephalus.     No extra-axial blood or fluid collections.     The cranium appears intact.     Mastoid air cells and paranasal sinuses are essentially clear.     The vertebral bodies are normal in height and morphology without evidence of an acute displaced fracture or focal osseous destructive process.     Normal sagittal alignment is preserved.  No spondylolisthesis.     Intervertebral disc heights are well maintained.  No evidence of a bony spinal canal stenosis.     Evaluation intraspinal contents demonstrates no evidence of mass or hematoma.     No acute abnormalities in the paraspinal soft tissue structures.     Emphysematous changes patchy ground-glass opacity in the partially visualized lung apices.        CTA:     The aortic arch demonstrates no significant stenosis at the major branch vessel origins.     There is a focal thrombus in the right subclavian artery extending into the right vertebral artery origin with severe stenosis.  Thrombus extending over proximally 1 cm.  The V1 and V2 segments of right vertebral artery are otherwise normal in caliber of slightly decreased in attenuation in comparison to the other cervical vasculature.  Abrupt occlusion of distal right vertebral artery about the level of the PICA origin approximate 2 cm segment of non enhancement.     Moderate focal stenosis at the left vertebral artery origin.  Left vertebral artery is otherwise normal in caliber.  The basilar artery is normal in caliber.     The right common carotid artery is normal in caliber.  No significant stenosis at the carotid bifurcation.The right internal carotid artery is normal in caliber.     The left common carotid artery is normal in caliber. No significant stenosis at the carotid bifurcation.The left internal carotid artery is normal in caliber.     Abrupt non enhancement in the P1 segment of the left PCA.  The proximal right PCA appears within normal limits.     The proximal  MCAs and ACAs are unremarkable.     Findings were immediately discussed upon identification via telephone with the stroke service ([facility]) at approximately 09:20.        Impression     Multifocal acute posterior circulation distribution infarcts as further discussed above, particularly involving essentially the entirety of the left PCA territory.  Modest mass effect without significant hemorrhage.     Focal thrombus with severe stenosis extending from the right subclavian artery into the proximal right vertebral artery.     Focal occlusion of the proximal intracranial segment of the right vertebral artery.     Focal occlusion of the proximal left PCA.     Moderate focal stenosis at the left vertebral artery origin.     Anterior circulation appears unremarkable.     No evidence of fracture or traumatic malalignment in the cervical spine.     Emphysematous changes and patchy ground-glass opacity in the partially visualized lung apices.     This report was flagged in Epic as abnormal.        Electronically signed by:Geo Ambrocio MD  Date:                                                11/06/2024  Time:                                                09:33   CT Head Without Contrast     Narrative     EXAMINATION:  CT HEAD WITHOUT CONTRAST     CLINICAL HISTORY:  Stroke, follow up;     TECHNIQUE:  Low dose axial images were obtained through the head.  Coronal and sagittal reformations were also performed. Contrast was not administered.     COMPARISON:  CT, MRI 11/06/2024     FINDINGS:  Evolving posterior circulation infarcts are identified as described on recent MR imaging again most notable within the left greater than right occipital lobes extending to the medial left temporal lobe and bilateral thalamus.  No hemorrhagic conversion with potentially minimal stable petechial hemorrhage within the medial left thalamus.  No midline shift or herniation with mild mass effect on the 3rd ventricle again noted.     No  new acute territorial infarct.     The visualized sinuses and mastoid air cells are clear.        Impression     Expected evolutionary changes of the posterior circulation infarcts with no overt hemorrhagic conversion or significant midline shift/herniation.        Electronically signed by:Adrián Hobbs  Date:                                                11/09/2024  Time:                                                08:38            Results for orders placed or performed during the hospital encounter of 11/06/24 (from the past 2160 hours)   MRI Brain Without Contrast     Impression     Multiple posterior circulation infarcts are identified with involvement of the left greater than right occipital lobes and medial left temporal lobe, thalamus, right dorsal medulla, and minimally involving the cerebellum.  No midline shift with minimal mass effect on the 3rd ventricle.     Loss of flow void within the right intracranial vertebral artery in keeping with the findings on CTA.        Electronically signed by:Adrián Hobbs  Date:                                                11/06/2024  Time:                                                17:40      Cardiac Imaging   Results for orders placed during the hospital encounter of 11/06/24     Echo Saline Bubble? Yes; Ultrasound enhancing contrast? Yes     Interpretation Summary    Left Ventricle: The left ventricle is normal in size. Normal wall thickness. There is normal systolic function with a visually estimated ejection fraction of 55 - 60%. Biplane (2D) method of discs ejection fraction is 56%. Global longitudinal strain is -18.0%. There is normal diastolic function.    Right Ventricle: Normal right ventricular cavity size. Wall thickness is normal. Systolic function is normal.    Tricuspid Valve: There is mild regurgitation.    Pulmonary Artery: The estimated pulmonary artery systolic pressure is at least 28 mmHg.    IVC/SVC: Patient is ventilated, cannot use IVC  diameter to estimate right atrial pressure.     Interventions: None    Complications: None    Disposition: Rehab Facility    Final Active Diagnoses:    Diagnosis Date Noted POA    PRINCIPAL PROBLEM:  Acute arterial ischemic stroke, multifocal, posterior circulation [I63.539] 11/06/2024 Yes    Hypernatremia [E87.0] 11/26/2024 No    Thrush [B37.0] 11/25/2024 No    Attention to gastrostomy [Z43.1] 11/22/2024 Not Applicable    Hyponatremia [E87.1] 11/22/2024 No    Thrombocytosis [D75.839] 11/22/2024 Yes    Dysphagia [R13.10] 11/19/2024 Yes    Transaminitis [R74.01] 11/16/2024 No    Tobacco dependency [F17.200] 11/15/2024 Yes    Agitation [R45.1] 11/15/2024 Yes    Hypokalemia [E87.6] 11/15/2024 No    Drug dependence [F19.20] 11/15/2024 Yes    Body mass index (BMI) less than 19 [Z68.1] 11/15/2024 Not Applicable    Brain compression [G93.5] 11/15/2024 Yes    Hypercoagulable state [D68.59] 11/15/2024 Yes    Hemiplegia [G81.90] 11/15/2024 Yes    Reduced mobility [Z74.09] 11/15/2024 Yes    Sensory loss [R20.0] 11/15/2024 Yes    Aphasia [R47.01] 11/15/2024 Yes    Acute hypoxemic respiratory failure [J96.01] 11/15/2024 Yes    Encephalopathy, metabolic [G93.41] 11/15/2024 Yes    Delirium [R41.0] 11/15/2024 Yes    Hypomagnesemia [E83.42] 11/15/2024 Yes    HTN (hypertension) [I10] 11/14/2024 Yes    Right upper lobe pneumonia [J18.9] 11/11/2024 Yes    Folliculitis [L73.9] 11/09/2024 Yes    HyperCKemia [R74.8] 11/07/2024 Yes    Polysubstance abuse [F19.10] 11/07/2024 Yes    Debility [R53.81] 11/07/2024 Yes    Goals of care, counseling/discussion [Z71.89] 11/07/2024 Not Applicable    Lithium toxicity [T56.891A] 11/07/2024 Yes    Cytotoxic cerebral edema [G93.6] 11/06/2024 Yes    Anxiety and depression [F41.9, F32.A] 08/02/2016 Yes      Problems Resolved During this Admission:    Diagnosis Date Noted Date Resolved POA    On mechanically assisted ventilation [Z99.11] 11/07/2024 11/15/2024 Not Applicable     No new Assessment & Plan  notes have been filed under this hospital service since the last note was generated.  Service: Vascular Neurology      Recommendations:     Post-discharge complication risks: Falls, Pneumonia    Stroke Education given to: patient and family    Follow-up in Stroke Clinic in 4-6 weeks.     Discharge Plan:  Antithrombotics: Aspirin 81mg, Clopidogrel 75mg  Statin: Atorvastatin 40mg    Follow Up:   Follow-up Information       [facility] Follow up.    Specialty: Vascular Neurology  Why: clinic will contact you for follow up appt.  Contact information:  [address]  [facility] [address]  [phone]             [facility] Follow up.    Specialty: Inpatient Rehabilitation Facility  Why: discharge to [facility]  Contact information:  [address]  [facility] [address]  [phone]                           Patient Instructions:      Ambulatory referral/consult to Internal Medicine   Standing Status: Future   Referral Priority: Routine Referral Type: Consultation   Referral Reason: Specialty Services Required   Requested Specialty: Internal Medicine   Number of Visits Requested: 1     Ambulatory referral/consult to Vascular Neurology   Standing Status: Future   Referral Priority: Routine Referral Type: Consultation   Referral Reason: Specialty Services Required   Requested Specialty: Vascular Neurology   Number of Visits Requested: 1       Medications:  Reconciled Home Medications:      Medication List        START taking these medications      aspirin 81 MG Chew  1 tablet (81 mg total) by Per G Tube route once daily.  Start taking on: November 27, 2024     atorvastatin 40 MG tablet  Commonly known as: LIPITOR  1 tablet (40 mg total) by Per G Tube route once daily.  Start taking on: November 27, 2024     clopidogreL 75 mg tablet  Commonly known as: PLAVIX  1 tablet (75 mg total) by Per G Tube route once daily.  Start taking on: November 27, 2024      fluconazole 100 MG tablet  Commonly known as: DIFLUCAN  1 tablet (100 mg total) by Per G Tube route once daily.     lithium citrate 8 meq/5 ml 8 mEq/5 mL Soln  5 mLs (300 mg total) by Per G Tube route 3 (three) times daily.     melatonin 3 mg tablet  Commonly known as: MELATIN  2 tablets (6 mg total) by Per G Tube route nightly.            CONTINUE taking these medications      albuterol 2.5 mg /3 mL (0.083 %) nebulizer solution  Commonly known as: PROVENTIL  Take 2.5 mg by nebulization every 6 (six) hours as needed for Wheezing. Rescue     carvediloL 6.25 MG tablet  Commonly known as: COREG  Take 6.25 mg by mouth 2 (two) times daily.     FLUoxetine 20 MG capsule  Take 60 mg by mouth once daily.     mirtazapine 15 MG tablet  Commonly known as: REMERON  Take 7.5 mg by mouth every evening.     QUEtiapine 200 MG Tab  Commonly known as: SEROQUEL  Take 200 mg by mouth every evening.            STOP taking these medications      buPROPion 150 MG TB24 tablet  Commonly known as: WELLBUTRIN XL     fluticasone propionate 50 mcg/actuation nasal spray  Commonly known as: FLONASE     gabapentin 300 MG capsule  Commonly known as: NEURONTIN     hydrOXYzine 50 MG tablet  Commonly known as: ATARAX     hydrOXYzine pamoate 25 MG Cap  Commonly known as: VISTARIL     lactulose 10 gram/15 mL solution  Commonly known as: CHRONULAC     lithium 300 MG capsule  Commonly known as: TALHA De Jesus MD  [facility]  Department of Vascular Neurology   [facility] Neurosurgery [facility])

## 2024-11-26 NOTE — PROGRESS NOTES
[facility] - Neurosurgery ([facility])  Physical Medicine & Rehab  Progress Note    Patient Name: Malathi Mcpherson  MRN: [id number]  Admission Date: 11/6/2024  Length of Stay: 20 days  Attending Physician: Ang Martinez MD    Subjective:     Principal Problem:Acute arterial ischemic stroke, multifocal, posterior circulation    Hospital Course:   PT-11/25    Functional Mobility:  Bed Mobility:     Rolling Left:  total assistance  Rolling Right: total assistance  Scooting: total assistance  Supine to Sit: total assistance and of 2 persons  Sit to Supine: total assistance and of 2 persons  Balance:   Seated: maxA      OT-11/26      Bed Mobility:    Patient completed Rolling/Turning to Right with minimum assistance  Patient completed Supine to Sit with maximal assistance  Patient completed Sit to Supine with maximal assistance      Activities of Daily Living:  Upper Body Dressing: maximal assistance while seated EOB  Lower Body Dressing: total assistance while seate      PT- 11/22    Functional Mobility:  Bed Mobility:     Rolling Left:  total assistance  Rolling Right: total assistance  Scooting: total assistance  Supine to Sit: total assistance and of 2 persons  Sit to Supine: total assistance and of 2 persons  Balance:   Seated: maxA         OT- 11/22    Bed Mobility:    Patient completed Rolling/Turning to Right with maximal assistance  Patient completed Supine to Sit with maximal assistance  Patient completed Sit to Supine with maximal assistance      Functional Mobility/Transfers:  NT 2* lethargy     Activities of Daily Living:  Feeding:  NPO    Lower Body Dressing: total assistance while seated donning socks         PT- 11/20    Functional Mobility:     Bed Mobility:   Supine to Sit: total assistance of 2 persons;   Sit to supine: totalA fo 2 persons     Sitting Balance at Edge of Bed:  Assistance Level Required: Maximum Assistance and Total Assistance  Time: 10 min    OT- 11/20    Bed Mobility:    Patient completed  Rolling/Turning to Left with  maximal assistance  Patient completed Rolling/Turning to Right with maximal assistance  Patient completed Supine to Sit with total assistance  Patient completed Sit to Supine with total assistance      Activities of Daily Living:  Grooming: total assistance while seated EOB        PT- 11/14    Functional Mobility:     Bed Mobility:  Supine to Sit: Moderate Assistance on L side of bed  Sit to Supine: Minimal Assistance  Pt self returned to supine but needed min(A) for B LE management  Rolling L: Minimal Assistance  Rolling R: Minimal Assistance  Scooting anteriorly to EOB to plant feet on floor: Moderate Assistance     Transfers:   Sit to Stand Transfer: Activity did not occur - pt self returned to supine     Balance:  Static Sit:   Mod Assist at EOB    Interval History 11/26/2024:  Patient is seen for follow-up PM&R evaluation and recommendations: Pt seen at bedside. Repeated cues needed to have pt follow some commands. No agitation. No restraints. Camera sitter present.   HPI, Past Medical, Family, and Social History remains the same as documented in the initial encounter.    Scheduled Medications:    aspirin  81 mg Per G Tube Daily    atorvastatin  40 mg Per G Tube Daily    carvediloL  6.25 mg Per G Tube BID    clopidogreL  75 mg Per G Tube Daily    enoxparin  40 mg Subcutaneous Q24H (prophylaxis, 1700)    fluconazole  100 mg Per G Tube Daily    FLUoxetine  60 mg Per G Tube Daily    folic acid  1 mg Per G Tube Daily    LIDOcaine  1 patch Transdermal Q24H    lithium citrate 8 meq/5 ml  300 mg Per G Tube TID    melatonin  6 mg Per G Tube Nightly    methyl salicylate-menthol 15-10%   Topical (Top) TID    mirtazapine  7.5 mg Per G Tube QHS    multivitamin  1 tablet Per G Tube Daily    QUEtiapine  100 mg Per G Tube QHS    QUEtiapine  50 mg Per G Tube Daily    senna-docusate 8.6-50 mg  1 tablet Per G Tube Daily    thiamine  100 mg Per G Tube Daily       Diagnostic Results: Labs:  Reviewed    PRN Medications:   Current Facility-Administered Medications:     acetaminophen, 1,000 mg, Per G Tube, BID PRN    albuterol-ipratropium, 3 mL, Nebulization, Q6H PRN    polyethylene glycol, 17 g, Per G Tube, BID PRN    QUEtiapine, 25 mg, Per G Tube, BID PRN    sodium chloride 0.9%, 10 mL, Intravenous, PRN    Review of Systems   Unable to perform ROS: Mental status change     Objective:     Vital Signs (Most Recent):  Temp: 98.9 °F (37.2 °C) (11/26/24 1156)  Pulse: 78 (11/26/24 1156)  Resp: 18 (11/26/24 1156)  BP: 134/79 (11/26/24 1156)  SpO2: 100 % (11/26/24 1156)    Vital Signs (24h Range):  Temp:  [97.2 °F (36.2 °C)-99.5 °F (37.5 °C)] 98.9 °F (37.2 °C)  Pulse:  [72-91] 78  Resp:  [16-20] 18  SpO2:  [94 %-100 %] 100 %  BP: (126-151)/(67-90) 134/79         Physical Exam  Vitals and nursing note reviewed.   Constitutional:       Appearance: She is ill-appearing.   HENT:      Head: Atraumatic.   Pulmonary:      Effort: Pulmonary effort is normal. No respiratory distress.   Abdominal:      General: There is no distension.      Palpations: Abdomen is soft.   Musculoskeletal:      Cervical back: Normal range of motion and neck supple.      Comments: Pt able to follow some commands with repeated cues. BETTYE true ability to move extremities and pt is inconsistent with exam.    Skin:     General: Skin is warm and dry.      Coloration: Skin is pale.   Neurological:      General: No focal deficit present.      GCS: GCS eye subscore is 3. GCS verbal subscore is 3. GCS motor subscore is 5.      Cranial Nerves: Dysarthria present.      Motor: Weakness present.      Coordination: Coordination abnormal. Heel to Shin Test abnormal. Rapid alternating movements normal.      Gait: Gait abnormal.               Assessment/Plan:      * Acute arterial ischemic stroke, multifocal, posterior circulation  -CTA showed multifocal areas of hypodensity involving the posterior circulation suspicious for infarcts as well as focal  occlusion of R vert and L PCA.   -No TNK or IR intervention.  -Found to have Utox pos for cocaine.   -PT/OT rec for high intensity therapies. Will need SLP as well  -NPO and had NGT. Will need MBSS.  -In restraints. Will need to be off to pursue rehab.   -11/21- Mittens present BUE. PEG in place. Not started on TF. Pt with incomprehensible speech. Did follow some commands. Spoke to mother. Will follow up on therapy progress, mental status improvement .   -11/25- Mittens off since 11/22. Pt not agitated. Follows some commands after repeated cues. Mother at bedside very attentive to pt.   -11/26- Today, no one at bedside. Tele sitter in place. No agitation. Opens eyes to voice. Needed repeated cues to follow commands.     Dysphagia  -S/P PEG placement 11/20.    Encephalopathy, metabolic  -Requiring restraints currently. Monitor.     Aphasia  -Will need aggressive SLP.     Reduced mobility  See hospital course for functional status.    Recommendations  -  Encourage mobility, OOB in chair, and early ambulation as appropriate  -  PT/OT evaluate and treat  -  Pain management  -  DVT prophylaxis if appropriate   -  Monitor for and prevent skin breakdown and pressure ulcers  Early mobility, repositioning/weight shifting every 20-30 minutes when sitting, turn patient every 2 hours, proper mattress/overlay and chair cushioning, pressure relief/heel protector boots     Right upper lobe pneumonia  -Completing Cefepime per chart review.  -Monitor resp status. Appears stable.     Debility  See hospital course for functional status.    Recommendations  -  Encourage mobility, OOB in chair, and early ambulation as appropriate  -  PT/OT evaluate and treat  -  Pain management  -  DVT prophylaxis if appropriate   -  Monitor for and prevent skin breakdown and pressure ulcers  Early mobility, repositioning/weight shifting every 20-30 minutes when sitting, turn patient every 2 hours, proper mattress/overlay and chair cushioning, pressure  "relief/heel protector boots      Polysubstance abuse  -Hx of Cocaine, Heroine, ETOH use.   -Addiction psych following.     PM&R Recommendation:     At this time, the PM&R team has reviewed this patient's ongoing medical case including inpatient diagnosis, medical history, clinical examination, labs, vitals, current social and functional history to provide the post-acute recommendation as follows:     RECOMMENDATIONS: inpatient rehabilitation due to fair to good potential for improvement with therapies and need of physician oversight for management of ongoing active medical issues', "home with home health due to limited goals for inpatient therapy       The patient will be admitted for comprehensive interdisciplinary inpatient rehabilitation to address the impairments due to her medical diagnosis of  Acute arterial ischemic stroke, multifocal, posterior circulation . The patient will benefit from an inpatient rehabilitation program to promote functional recovery, implement compensatory strategies and will undergo assessment for needs for durable medical equipment for safe discharge to the community. This patient will benefit from a coordinated interdisciplinary rehabilitation program services that require close monitoring and treatment with 24-hour rehabilitative nursing and physical/occupational/speech therapies for 3 hours/day for 5 days/week. This interdisciplinary program will be performed under the direction of a physiatrist.        We will continue to follow.        Chapis Escamilla NP  Department of Physical Medicine & Rehab   [facility] Neurosurgery ([facility])    "

## 2024-11-26 NOTE — PT/OT/SLP PROGRESS
Physical Therapy Treatment/D/C Summary    Patient Name:  Malathi Mcpherson   MRN:  [id number]    Recommendations:     Discharge Recommendations: High Intensity Therapy  Discharge Equipment Recommendations: bath bench, bedside commode  Barriers to discharge: Inaccessible home and Decreased caregiver support    Assessment:     Malathi Mcpherson is a 49 y.o. female admitted with a medical diagnosis of Acute arterial ischemic stroke, multifocal, posterior circulation.  She presents with the following impairments/functional limitations: weakness, impaired endurance, impaired self care skills, impaired functional mobility, decreased safety awareness, decreased upper extremity function, decreased lower extremity function, impaired balance, impaired coordination .PT D/Barry due to pt being D/Barry to rehab for continued therapy  .    Recent Surgery: * No surgery found *      Plan:     PT D/Barry due to pt being D/Barry to rehab  Plan of Care Expires:  12/14/24    Subjective   Pt nonverbal during treatment  Pain/Comfort:  Pain Rating 1: 0/10 (pt did not appear to be in pain during treatment, pt non verbal during treatment)  Pain Rating Post-Intervention 1: 0/10      Objective:     Communicated with nurse prior to session.  Patient found HOB elevated with bed alarm, PEG Tube, SCD, telemetry, G/J tube upon PT entry to room.     General Precautions: Standard, aspiration, fall, NPO  Orthopedic Precautions: N/A  Braces: N/A  Respiratory Status: Room air     Functional Mobility:  Bed Mobility:     Rolling Right: maximal assistance  Supine to Sit: maximal assistance  Sit to Supine: maximal assistance  Transfers:     Sit to Stand:  maximal assistance and of 2 persons with hand-held assist    AM-PAC 6 CLICK MOBILITY  Turning over in bed (including adjusting bedclothes, sheets and blankets)?: 2  Sitting down on and standing up from a chair with arms (e.g., wheelchair, bedside commode, etc.): 2  Moving from lying on back to sitting on the side  of the bed?: 2  Moving to and from a bed to a chair (including a wheelchair)?: 1  Need to walk in hospital room?: 1  Climbing 3-5 steps with a railing?: 1  Basic Mobility Total Score: 9     Treatment & Education:   pt sat on the EOB ~ 18 min with CGA to moderate assist due to anterior/posterior and R sided LOB. Pt stood x 4 trials with HHA in L UE with max assist of 2  for ~ 10-20 sec each trial. Pt required manual cues to facilitate R knee and hip ext during standing. Pt was able to maintain sitting without assist for 5-10 sec x 4 times . Pt received verbal and manual cues for midline orientation and upright posture.     Patient left HOB elevated with all lines intact, call button in reach, bed alarm on, and nurse notified..    GOALS:   Multidisciplinary Problems       Physical Therapy Goals          Problem: Physical Therapy    Goal Priority Disciplines Outcome Interventions   Physical Therapy Goal     PT, PT/OT Progressing    Description: Goals to be met by: 24     Patient will increase functional independence with mobility by performin. Supine to sit with Contact Guard Assistance  2. Sit to supine with Contact Guard Assistance  3. Sit to stand transfer with Minimal Assistance  4. Bed to chair transfer with Minimal Assistance using LRAD as needed  5. Gait  x 50 feet with Minimal Assistance using LRAD as needed.   6. Lower extremity exercise program x10 reps per handout, with assistance as needed    DME Justifications (see above for complete DME recommendations)    Bedside Commode- Patient has a mobility limitation that significantly impairs their ability to participate in one or more mobility related activities of daily living, including toileting. This deficit can be resolved by using a bedside commode. Patient demonstrates mobility limitations that will cause them to be confined to one room at home without bathroom access for up to 30 days. Using a bedside commode will greatly improve the patient's  ability to participate in MRADLs.     Hospital Bed ·       Patient requires a hospital bed for positioning of the body in ways that are not feasible with an ordinary bed. The patient requires special positioning for pain relief, limited mobility, and/or being unable to independently make changes in body position without the use of a hospital bed. Pillows and wedges will not be adequate for resolving these positional issues.                         Time Tracking:     PT Received On: 11/26/24  PT Start Time: 1026     PT Stop Time: 1050  PT Total Time (min): 24 min     Billable Minutes: Therapeutic Activity 24    Treatment Type: Treatment  PT/PTA: PT     Number of PTA visits since last PT visit: 0     11/26/2024

## 2024-11-26 NOTE — CARE UPDATE
I have reviewed the chart of Malathi Mcpherson who is hospitalized for the following:    Active Hospital Problems    Diagnosis    *Acute arterial ischemic stroke, multifocal, posterior circulation    Hypernatremia    Thrush    Attention to gastrostomy     PEG in place      Hyponatremia     Monitor with daily cmp       Thrombocytosis    Dysphagia    Transaminitis    Tobacco dependency    Agitation    Hypokalemia     K 3.1 on 11/112  Replace  Monitor with daily cmp      Drug dependence     Uds + cocaine on admission  Marshall on cessation      Body mass index (BMI) less than 19     underweight      Brain compression     mass effect on the 3rd ventricle   Required close monitoring in the icu with q1h neurochecks, and repeat imaging       Hypercoagulable state     hypercoagulopathy from cocaine use.   Marshall on cessation      Hemiplegia     Therapy to evaluate and treat       Reduced mobility     Therapy to evaluate and treat       Sensory loss    Aphasia     Therapy to evaluate and treat       Acute hypoxemic respiratory failure     On admission noted to have O2 desat in 70s and ultimately was intubated and placed on ventilator       Encephalopathy, metabolic     AMS requiring restraints  Found to have Pneumonia and SSTI  Required antibiotics      Delirium    Hypomagnesemia     Replaced in the icu  Monitor with labs      HTN (hypertension)    Right upper lobe pneumonia    Folliculitis    HyperCKemia    Polysubstance abuse    Debility    Goals of care, counseling/discussion    Lithium toxicity    Cytotoxic cerebral edema    Anxiety and depression        Mari Markham NP  [facility]

## 2024-11-26 NOTE — PLAN OF CARE
[facility] - Neurosurgery (Hospital)  Discharge Final Note    Primary Care Provider: Adrián Hahn MD    Expected Discharge Date: 11/26/2024    Final Discharge Note (most recent)       Final Note - 11/26/24 1310          Final Note    Assessment Type Final Discharge Note (P)      Anticipated Discharge Disposition Rehab Facility (P)         Post-Acute Status    Post-Acute Authorization Placement (P)      Post-Acute Placement Status Set-up Complete/Auth obtained (P)                      Patient discharging to [facility].  RN provided with number for report and SW set up 1pm transport.  SW contacted patient's Mom [place] with info.      Bernadine Gilman LMSW  [facility]  [phone]    No future appointments.           Contact Info       [facility] VASCULAR [facility]UROLOGY   Specialty: Vascular Neurology    [address]  [place] [id number]   Phone: [phone]       Next Steps: Follow up    Instructions: clinic will contact you for follow up appt.    *[facility]   Specialty: Inpatient Rehabilitation Facility    [address]  [facility] [address]   Phone: [phone]       Next Steps: Follow up    Instructions: discharge to [facility]

## 2024-11-26 NOTE — SUBJECTIVE & OBJECTIVE
Neurologic Chief Complaint:  acute arterial ischemic stroke, multifocal, posterior circulation     Subjective:     Interval History: Patient is seen for follow-up neurological assessment and treatment recommendations: Please see hospital course    HPI, Past Medical, Family, and Social History remains the same as documented in the initial encounter.     Review of Systems   Unable to perform ROS: Mental status change     Scheduled Meds:   aspirin  81 mg Per G Tube Daily    atorvastatin  40 mg Per G Tube Daily    carvediloL  6.25 mg Per G Tube BID    clopidogreL  75 mg Per G Tube Daily    enoxparin  40 mg Subcutaneous Q24H (prophylaxis, 1700)    fluconazole  100 mg Per G Tube Daily    FLUoxetine  60 mg Per G Tube Daily    folic acid  1 mg Per G Tube Daily    LIDOcaine  1 patch Transdermal Q24H    lithium citrate 8 meq/5 ml  300 mg Per G Tube TID    melatonin  6 mg Per G Tube Nightly    methyl salicylate-menthol 15-10%   Topical (Top) TID    mirtazapine  7.5 mg Per G Tube QHS    multivitamin  1 tablet Per G Tube Daily    QUEtiapine  100 mg Per G Tube QHS    QUEtiapine  50 mg Per G Tube Daily    senna-docusate 8.6-50 mg  1 tablet Per G Tube Daily    thiamine  100 mg Per G Tube Daily     Continuous Infusions:  PRN Meds:  Current Facility-Administered Medications:     acetaminophen, 1,000 mg, Per G Tube, BID PRN    albuterol-ipratropium, 3 mL, Nebulization, Q6H PRN    polyethylene glycol, 17 g, Per G Tube, BID PRN    QUEtiapine, 25 mg, Per G Tube, BID PRN    sodium chloride 0.9%, 10 mL, Intravenous, PRN    Objective:     Vital Signs (Most Recent):  Temp: 98.9 °F (37.2 °C) (11/26/24 1156)  Pulse: 78 (11/26/24 1156)  Resp: 18 (11/26/24 1156)  BP: 134/79 (11/26/24 1156)  SpO2: 100 % (11/26/24 1156)  BP Location: Left arm    Vital Signs Range (Last 24H):  Temp:  [97.2 °F (36.2 °C)-99.5 °F (37.5 °C)]   Pulse:  [72-91]   Resp:  [16-20]   BP: (126-151)/(67-90)   SpO2:  [94 %-100 %]   BP Location: Left arm       Physical  Exam  Vitals and nursing note reviewed.   Pulmonary:      Effort: Pulmonary effort is normal. No respiratory distress.   Abdominal:      General: There is no distension.      Palpations: Abdomen is soft.   Musculoskeletal:      Cervical back: Normal range of motion and neck supple.      Comments: Appears with Right sided weakness. Unable to fully assess as pt was not cooperating fully.    Skin:     General: Skin is warm and dry.      Capillary Refill: Capillary refill takes 2 to 3 seconds.   Neurological:      General: No focal deficit present.      Mental Status: She is alert and easily aroused.      GCS: GCS eye subscore is 3. GCS verbal subscore is 3. GCS motor subscore is 5.      Cranial Nerves: Dysarthria present.      Motor: Weakness present.      Coordination: Coordination abnormal.      Gait: Gait abnormal.   Psychiatric:         Mood and Affect: Affect is labile.         Behavior: Behavior is slowed.         Cognition and Memory: Cognition is impaired. Memory is impaired.              Neurological Exam:   LOC: drowsy  Attention Span: poor    Laboratory:  CMP:   Recent Labs   Lab 11/26/24  0519   CALCIUM 10.3   ALBUMIN 3.4*   PROT 7.3      K 4.3   CO2 26      BUN 21*   CREATININE 0.7   ALKPHOS 80   ALT 28   AST 21   BILITOT 0.2     CBC:   Recent Labs   Lab 11/26/24  0519   WBC 11.06   RBC 4.27   HGB 12.4   HCT 40.0   *   MCV 94   MCH 29.0   MCHC 31.0*       Diagnostic Results     Brain Imaging         CTA STROKE MULTI-PHASE     Narrative     EXAMINATION:  CTA STROKE MULTI-PHASE; CT CERVICAL SPINE WITHOUT CONTRAST     CLINICAL HISTORY:  Neuro deficit, acute, stroke suspected;; Neck trauma, intoxicated or obtunded (Age >= 16y);     TECHNIQUE:  Axial CT images obtained throughout the region of the head and cervical spine before the administration of intravenous contrast.     CT angiogram was performed through the cervical and intracranial vasculature during the IV bolus administration of 75mL  of Omnipaque 350.  Two additional phases through the intracranial vasculature via multiphase technique.     Multiplanar MPR and MIP reformats were performed.     CT source data was analyzed using artificial intelligence software for detection of a large vessel occlusions (LVO) in order to enable computer assisted triage notification and aid clinical stroke decision making.     COMPARISON:  CTA head and cervical spine 10/25/2015     FINDINGS:  Large region of hypoattenuation loss of gray-white differentiation in left occipital lobe in medial temporal lobe in keeping with an acute PCA distribution infarct.  Modest localized mass effect with some sulcal crowding.  Additional involvement of the left thalamus.  Similar appearance in the medial right occipital lobe, but smaller area of infarction.  Small left superior cerebellar infarct, also likely acute.  No evidence of recent or remote major vascular distribution infarct in the anterior circulation.  No acute parenchymal hemorrhage.  No midline shift.     The ventricles are normal in size without evidence of hydrocephalus.     No extra-axial blood or fluid collections.     The cranium appears intact.     Mastoid air cells and paranasal sinuses are essentially clear.     The vertebral bodies are normal in height and morphology without evidence of an acute displaced fracture or focal osseous destructive process.     Normal sagittal alignment is preserved.  No spondylolisthesis.     Intervertebral disc heights are well maintained.  No evidence of a bony spinal canal stenosis.     Evaluation intraspinal contents demonstrates no evidence of mass or hematoma.     No acute abnormalities in the paraspinal soft tissue structures.     Emphysematous changes patchy ground-glass opacity in the partially visualized lung apices.        CTA:     The aortic arch demonstrates no significant stenosis at the major branch vessel origins.     There is a focal thrombus in the right  subclavian artery extending into the right vertebral artery origin with severe stenosis.  Thrombus extending over proximally 1 cm.  The V1 and V2 segments of right vertebral artery are otherwise normal in caliber of slightly decreased in attenuation in comparison to the other cervical vasculature.  Abrupt occlusion of distal right vertebral artery about the level of the PICA origin approximate 2 cm segment of non enhancement.     Moderate focal stenosis at the left vertebral artery origin.  Left vertebral artery is otherwise normal in caliber.  The basilar artery is normal in caliber.     The right common carotid artery is normal in caliber.  No significant stenosis at the carotid bifurcation.The right internal carotid artery is normal in caliber.     The left common carotid artery is normal in caliber. No significant stenosis at the carotid bifurcation.The left internal carotid artery is normal in caliber.     Abrupt non enhancement in the P1 segment of the left PCA.  The proximal right PCA appears within normal limits.     The proximal MCAs and ACAs are unremarkable.     Findings were immediately discussed upon identification via telephone with the stroke service ([facility]) at approximately 09:20.        Impression     Multifocal acute posterior circulation distribution infarcts as further discussed above, particularly involving essentially the entirety of the left PCA territory.  Modest mass effect without significant hemorrhage.     Focal thrombus with severe stenosis extending from the right subclavian artery into the proximal right vertebral artery.     Focal occlusion of the proximal intracranial segment of the right vertebral artery.     Focal occlusion of the proximal left PCA.     Moderate focal stenosis at the left vertebral artery origin.     Anterior circulation appears unremarkable.     No evidence of fracture or traumatic malalignment in the cervical spine.     Emphysematous changes and patchy  ground-glass opacity in the partially visualized lung apices.     This report was flagged in Epic as abnormal.        Electronically signed by:Geo Ambrocio MD  Date:                                                11/06/2024  Time:                                                09:33   CT Cervical Spine Without Contrast     Narrative     EXAMINATION:  CTA STROKE MULTI-PHASE; CT CERVICAL SPINE WITHOUT CONTRAST     CLINICAL HISTORY:  Neuro deficit, acute, stroke suspected;; Neck trauma, intoxicated or obtunded (Age >= 16y);     TECHNIQUE:  Axial CT images obtained throughout the region of the head and cervical spine before the administration of intravenous contrast.     CT angiogram was performed through the cervical and intracranial vasculature during the IV bolus administration of 75mL of Omnipaque 350.  Two additional phases through the intracranial vasculature via multiphase technique.     Multiplanar MPR and MIP reformats were performed.     CT source data was analyzed using artificial intelligence software for detection of a large vessel occlusions (LVO) in order to enable computer assisted triage notification and aid clinical stroke decision making.     COMPARISON:  CTA head and cervical spine 10/25/2015     FINDINGS:  Large region of hypoattenuation loss of gray-white differentiation in left occipital lobe in medial temporal lobe in keeping with an acute PCA distribution infarct.  Modest localized mass effect with some sulcal crowding.  Additional involvement of the left thalamus.  Similar appearance in the medial right occipital lobe, but smaller area of infarction.  Small left superior cerebellar infarct, also likely acute.  No evidence of recent or remote major vascular distribution infarct in the anterior circulation.  No acute parenchymal hemorrhage.  No midline shift.     The ventricles are normal in size without evidence of hydrocephalus.     No extra-axial blood or fluid collections.     The cranium  appears intact.     Mastoid air cells and paranasal sinuses are essentially clear.     The vertebral bodies are normal in height and morphology without evidence of an acute displaced fracture or focal osseous destructive process.     Normal sagittal alignment is preserved.  No spondylolisthesis.     Intervertebral disc heights are well maintained.  No evidence of a bony spinal canal stenosis.     Evaluation intraspinal contents demonstrates no evidence of mass or hematoma.     No acute abnormalities in the paraspinal soft tissue structures.     Emphysematous changes patchy ground-glass opacity in the partially visualized lung apices.        CTA:     The aortic arch demonstrates no significant stenosis at the major branch vessel origins.     There is a focal thrombus in the right subclavian artery extending into the right vertebral artery origin with severe stenosis.  Thrombus extending over proximally 1 cm.  The V1 and V2 segments of right vertebral artery are otherwise normal in caliber of slightly decreased in attenuation in comparison to the other cervical vasculature.  Abrupt occlusion of distal right vertebral artery about the level of the PICA origin approximate 2 cm segment of non enhancement.     Moderate focal stenosis at the left vertebral artery origin.  Left vertebral artery is otherwise normal in caliber.  The basilar artery is normal in caliber.     The right common carotid artery is normal in caliber.  No significant stenosis at the carotid bifurcation.The right internal carotid artery is normal in caliber.     The left common carotid artery is normal in caliber. No significant stenosis at the carotid bifurcation.The left internal carotid artery is normal in caliber.     Abrupt non enhancement in the P1 segment of the left PCA.  The proximal right PCA appears within normal limits.     The proximal MCAs and ACAs are unremarkable.     Findings were immediately discussed upon identification via telephone  with the stroke service ([facility]) at approximately 09:20.        Impression     Multifocal acute posterior circulation distribution infarcts as further discussed above, particularly involving essentially the entirety of the left PCA territory.  Modest mass effect without significant hemorrhage.     Focal thrombus with severe stenosis extending from the right subclavian artery into the proximal right vertebral artery.     Focal occlusion of the proximal intracranial segment of the right vertebral artery.     Focal occlusion of the proximal left PCA.     Moderate focal stenosis at the left vertebral artery origin.     Anterior circulation appears unremarkable.     No evidence of fracture or traumatic malalignment in the cervical spine.     Emphysematous changes and patchy ground-glass opacity in the partially visualized lung apices.     This report was flagged in Epic as abnormal.        Electronically signed by:Geo Ambrocio MD  Date:                                                11/06/2024  Time:                                                09:33   CT Head Without Contrast     Narrative     EXAMINATION:  CT HEAD WITHOUT CONTRAST     CLINICAL HISTORY:  Stroke, follow up;     TECHNIQUE:  Low dose axial images were obtained through the head.  Coronal and sagittal reformations were also performed. Contrast was not administered.     COMPARISON:  CT, MRI 11/06/2024     FINDINGS:  Evolving posterior circulation infarcts are identified as described on recent MR imaging again most notable within the left greater than right occipital lobes extending to the medial left temporal lobe and bilateral thalamus.  No hemorrhagic conversion with potentially minimal stable petechial hemorrhage within the medial left thalamus.  No midline shift or herniation with mild mass effect on the 3rd ventricle again noted.     No new acute territorial infarct.     The visualized sinuses and mastoid air cells are clear.         Impression     Expected evolutionary changes of the posterior circulation infarcts with no overt hemorrhagic conversion or significant midline shift/herniation.        Electronically signed by:Adrián Hobbs  Date:                                                11/09/2024  Time:                                                08:38            Results for orders placed or performed during the hospital encounter of 11/06/24 (from the past 2160 hours)   MRI Brain Without Contrast     Impression     Multiple posterior circulation infarcts are identified with involvement of the left greater than right occipital lobes and medial left temporal lobe, thalamus, right dorsal medulla, and minimally involving the cerebellum.  No midline shift with minimal mass effect on the 3rd ventricle.     Loss of flow void within the right intracranial vertebral artery in keeping with the findings on CTA.        Electronically signed by:Adrián Hobbs  Date:                                                11/06/2024  Time:                                                17:40      Cardiac Imaging   Results for orders placed during the hospital encounter of 11/06/24     Echo Saline Bubble? Yes; Ultrasound enhancing contrast? Yes     Interpretation Summary    Left Ventricle: The left ventricle is normal in size. Normal wall thickness. There is normal systolic function with a visually estimated ejection fraction of 55 - 60%. Biplane (2D) method of discs ejection fraction is 56%. Global longitudinal strain is -18.0%. There is normal diastolic function.    Right Ventricle: Normal right ventricular cavity size. Wall thickness is normal. Systolic function is normal.    Tricuspid Valve: There is mild regurgitation.    Pulmonary Artery: The estimated pulmonary artery systolic pressure is at least 28 mmHg.    IVC/SVC: Patient is ventilated, cannot use IVC diameter to estimate right atrial pressure.

## 2024-11-26 NOTE — SUBJECTIVE & OBJECTIVE
Interval History 11/26/2024:  Patient is seen for follow-up PM&R evaluation and recommendations: Pt seen at bedside. Repeated cues needed to have pt follow some commands. No agitation. No restraints. Camera sitter present.   HPI, Past Medical, Family, and Social History remains the same as documented in the initial encounter.    Scheduled Medications:    aspirin  81 mg Per G Tube Daily    atorvastatin  40 mg Per G Tube Daily    carvediloL  6.25 mg Per G Tube BID    clopidogreL  75 mg Per G Tube Daily    enoxparin  40 mg Subcutaneous Q24H (prophylaxis, 1700)    fluconazole  100 mg Per G Tube Daily    FLUoxetine  60 mg Per G Tube Daily    folic acid  1 mg Per G Tube Daily    LIDOcaine  1 patch Transdermal Q24H    lithium citrate 8 meq/5 ml  300 mg Per G Tube TID    melatonin  6 mg Per G Tube Nightly    methyl salicylate-menthol 15-10%   Topical (Top) TID    mirtazapine  7.5 mg Per G Tube QHS    multivitamin  1 tablet Per G Tube Daily    QUEtiapine  100 mg Per G Tube QHS    QUEtiapine  50 mg Per G Tube Daily    senna-docusate 8.6-50 mg  1 tablet Per G Tube Daily    thiamine  100 mg Per G Tube Daily       Diagnostic Results: Labs: Reviewed    PRN Medications:   Current Facility-Administered Medications:     acetaminophen, 1,000 mg, Per G Tube, BID PRN    albuterol-ipratropium, 3 mL, Nebulization, Q6H PRN    polyethylene glycol, 17 g, Per G Tube, BID PRN    QUEtiapine, 25 mg, Per G Tube, BID PRN    sodium chloride 0.9%, 10 mL, Intravenous, PRN    Review of Systems   Unable to perform ROS: Mental status change     Objective:     Vital Signs (Most Recent):  Temp: 98.9 °F (37.2 °C) (11/26/24 1156)  Pulse: 78 (11/26/24 1156)  Resp: 18 (11/26/24 1156)  BP: 134/79 (11/26/24 1156)  SpO2: 100 % (11/26/24 1156)    Vital Signs (24h Range):  Temp:  [97.2 °F (36.2 °C)-99.5 °F (37.5 °C)] 98.9 °F (37.2 °C)  Pulse:  [72-91] 78  Resp:  [16-20] 18  SpO2:  [94 %-100 %] 100 %  BP: (126-151)/(67-90) 134/79         Physical Exam  Vitals and  nursing note reviewed.   Constitutional:       Appearance: She is ill-appearing.   HENT:      Head: Atraumatic.   Pulmonary:      Effort: Pulmonary effort is normal. No respiratory distress.   Abdominal:      General: There is no distension.      Palpations: Abdomen is soft.   Musculoskeletal:      Cervical back: Normal range of motion and neck supple.      Comments: Pt able to follow some commands with repeated cues. BETTYE true ability to move extremities and pt is inconsistent with exam.    Skin:     General: Skin is warm and dry.      Coloration: Skin is pale.   Neurological:      General: No focal deficit present.      GCS: GCS eye subscore is 3. GCS verbal subscore is 3. GCS motor subscore is 5.      Cranial Nerves: Dysarthria present.      Motor: Weakness present.      Coordination: Coordination abnormal. Heel to Shin Test abnormal. Rapid alternating movements normal.      Gait: Gait abnormal.

## 2024-12-16 ENCOUNTER — TELEPHONE (OUTPATIENT)
Dept: NEUROLOGY | Facility: HOSPITAL | Age: 49
End: 2024-12-16
Payer: MEDICAID

## 2024-12-16 NOTE — PROGRESS NOTES
Spoke to patients mother. Pt remains in Rehab at [facility] and anticipating transfer to a facility in [place] once Medicaid approves according to mother. She states Naz is progressing slowly, seems to understand, speaks very little , cries often. Mother states a  is working with her to try for placement closer to home since it is important for her family to give continued emotional support. I provided emotional support and guidance, active listening. Pt is not apporopriate for [facility] at this time, however mother was given my contact information for any future needs or concerns

## 2024-12-17 ENCOUNTER — HOSPITAL ENCOUNTER (OUTPATIENT)
Dept: RADIOLOGY | Facility: HOSPITAL | Age: 49
Discharge: HOME | End: 2024-12-17
Attending: STUDENT IN AN ORGANIZED HEALTH CARE EDUCATION/TRAINING PROGRAM
Payer: MEDICAID

## 2024-12-17 PROCEDURE — 71045 X-RAY EXAM CHEST 1 VIEW: CPT | Mod: 26,,, | Performed by: RADIOLOGY

## 2024-12-30 PROBLEM — F14.20 COCAINE USE DISORDER, SEVERE, DEPENDENCE: Status: ACTIVE | Noted: 2024-12-30

## 2025-04-21 DIAGNOSIS — Z80.0 FAMILY HISTORY OF COLON CANCER: Primary | ICD-10-CM

## 2025-04-21 DIAGNOSIS — Z12.31 ENCOUNTER FOR SCREENING MAMMOGRAM FOR MALIGNANT NEOPLASM OF BREAST: Primary | ICD-10-CM

## 2025-04-21 DIAGNOSIS — Z12.31 OTHER SCREENING MAMMOGRAM: Primary | ICD-10-CM

## 2025-04-21 DIAGNOSIS — Z12.11 SCREENING FOR COLON CANCER: Primary | ICD-10-CM

## 2025-04-21 DIAGNOSIS — Z01.419 WELL WOMAN EXAM: Primary | ICD-10-CM

## 2025-04-21 DIAGNOSIS — Z12.4 PAPANICOLAOU SMEAR: Primary | ICD-10-CM

## 2025-05-01 ENCOUNTER — HOSPITAL ENCOUNTER (OUTPATIENT)
Dept: RADIOLOGY | Facility: HOSPITAL | Age: 50
Discharge: HOME | End: 2025-05-01
Attending: PHYSICIAN ASSISTANT
Payer: MEDICAID

## 2025-06-10 ENCOUNTER — OFFICE VISIT (OUTPATIENT)
Dept: OBSTETRICS AND GYNECOLOGY | Facility: CLINIC | Age: 50
End: 2025-06-10
Payer: MEDICAID

## 2025-06-10 VITALS — DIASTOLIC BLOOD PRESSURE: 71 MMHG | HEART RATE: 82 BPM | SYSTOLIC BLOOD PRESSURE: 100 MMHG

## 2025-06-10 DIAGNOSIS — Z01.419 WELL WOMAN EXAM: Primary | ICD-10-CM

## 2025-06-10 DIAGNOSIS — Z12.4 PAPANICOLAOU SMEAR: ICD-10-CM

## 2025-06-10 DIAGNOSIS — Z12.11 SCREEN FOR COLON CANCER: Primary | ICD-10-CM

## 2025-06-10 PROCEDURE — 99999 PR PBB SHADOW E&M-EST. PATIENT-LVL III: CPT | Mod: PBBFAC,,, | Performed by: STUDENT IN AN ORGANIZED HEALTH CARE EDUCATION/TRAINING PROGRAM

## 2025-06-10 PROCEDURE — 99213 OFFICE O/P EST LOW 20 MIN: CPT | Mod: PBBFAC,PO | Performed by: STUDENT IN AN ORGANIZED HEALTH CARE EDUCATION/TRAINING PROGRAM

## 2025-06-10 RX ORDER — SODIUM BICARBONATE 650 MG/1
650 TABLET ORAL 2 TIMES DAILY
COMMUNITY

## 2025-06-10 RX ORDER — DIVALPROEX SODIUM 250 MG/1
250 TABLET, FILM COATED, EXTENDED RELEASE ORAL DAILY
COMMUNITY

## 2025-06-10 RX ORDER — HYDROXYZINE PAMOATE 25 MG/1
25 CAPSULE ORAL 4 TIMES DAILY
COMMUNITY

## 2025-06-10 RX ORDER — AMOXICILLIN 250 MG
1 CAPSULE ORAL DAILY
COMMUNITY
Start: 2024-12-19

## 2025-06-10 RX ORDER — DEXTROMETHORPHAN HYDROBROMIDE AND QUINIDINE SULFATE 20; 10 MG/1; MG/1
1 CAPSULE, GELATIN COATED ORAL 2 TIMES DAILY
COMMUNITY
Start: 2025-06-03

## 2025-06-10 RX ORDER — LANOLIN ALCOHOL/MO/W.PET/CERES
100 CREAM (GRAM) TOPICAL DAILY
COMMUNITY

## 2025-06-10 NOTE — PROGRESS NOTES
History & Physical  Gynecology      SUBJECTIVE:     Chief Complaint: Well Woman       History of Present Illness:  50 y.o. female  here for annual GYN visit.   She describes her periods as regular with normal flow. She denies intermenstrual bleeding.   She denies vaginal itching, irritation, or discharge.  Patient is not sexually active after her stroke in 2024. Sexually active with men.  She smokes and denies alcohol or drug use, admits to history of drug use.  She lives in a nursing facility/group home? and reports feeling safe their.     Patient has a history of abnormal paps  Colpo: 2023 > PAP  - NILM/HPV +  Last MM2023 - BIRADS-2  Last Colonoscopy: 25    Her maternal grandmother and great aunt had breast cancer.     Review of patient's allergies indicates:  No Known Allergies    Past Medical History:   Diagnosis Date    Acute adjustment disorder with depressed mood     Aphasia S/P CVA     Bipolar 1 disorder     Cerebral infarction due to embolism of precerebral artery     Cutaneous abscess 2019    Depression     Difficulty walking     Enlarged liver 2017    Hemiparesis affecting right side as late effect of cerebrovascular accident     Hep C w/o coma, chronic     Hepatitis B     History of mental disorder 2017    History of substance abuse     Hypertension     Insomnia, unspecified     Muscle weakness (generalized)     Need for assistance with personal care     PBA (pseudobulbar affect)     Stroke     Unspecified general medical examination      Past Surgical History:   Procedure Laterality Date    COLONOSCOPY, SCREENING, HIGH RISK PATIENT N/A 2025    Procedure: COLONOSCOPY, SCREENING, HIGH RISK PATIENT;  Surgeon: Alexis Gallo MD;  Location: [facility];  Service: Gastroenterology;  Laterality: N/A;    TYMPANOPLASTY       OB History          4    Para   4    Term   4       0    AB   0    Living   4         SAB   0    IAB   0    Ectopic   0     Multiple   0    Live Births   0               No family history on file.  Social History[1]    Current Outpatient Medications   Medication Sig    acetaminophen (TYLENOL) 325 MG tablet Take 325 mg by mouth every 6 (six) hours as needed for Temperature greater than or Pain (>100).    BISACODYL RECT Place rectally every 72 hours as needed. Use after milk of mg if no bM within 24 hours    clopidogreL (PLAVIX) 75 mg tablet 1 tablet (75 mg total) by Per G Tube route once daily.    divalproex ER (DEPAKOTE ER) 250 MG 24 hr tablet Take 250 mg by mouth once daily.    FLUoxetine 20 MG capsule Take 60 mg by mouth once daily.    folic acid (FOLVITE) 1 MG tablet Take 1 mg by mouth once daily.    LIDOcaine (LIDODERM) 5 % Place 1 patch onto the skin every 24 hours. Remove & Discard patch within 12 hours or as directed by MD  Apply to lower back    lithium citrate 8 meq/5 ml 8 mEq/5 mL Soln 5 mLs (300 mg total) by Per G Tube route 3 (three) times daily.    magnesium hydroxide (MILK OF MAGNESIA ORAL) Take 30 mLs by mouth every 12 (twelve) hours as needed (prn constipation if no BM in 3 days).    melatonin (MELATIN) 3 mg tablet 2 tablets (6 mg total) by Per G Tube route nightly.    mirtazapine (REMERON) 15 MG tablet Take 7.5 mg by mouth every evening.    NUEDEXTA 20-10 mg per capsule Take 1 capsule by mouth 2 (two) times daily.    senna-docusate (PERICOLACE) 8.6-50 mg per tablet 1 tablet by Tube route once daily.    sodium bicarbonate 650 MG tablet Take 650 mg by mouth 2 (two) times daily.    albuterol (PROVENTIL) 2.5 mg /3 mL (0.083 %) nebulizer solution Take 2.5 mg by nebulization every 6 (six) hours as needed for Wheezing. Rescue    aspirin 81 MG Chew 1 tablet (81 mg total) by Per G Tube route once daily.    atorvastatin (LIPITOR) 40 MG tablet 1 tablet (40 mg total) by Per G Tube route once daily.    carvediloL (COREG) 6.25 MG tablet Take 6.25 mg by mouth 2 (two) times daily.    divalproex (DEPAKOTE) 250 MG EC tablet Take 250 mg  by mouth once daily.    hydrOXYzine pamoate (VISTARIL) 25 MG Cap Take 25 mg by mouth 4 (four) times daily.    polyethylene glycol (GLYCOLAX) 17 gram PwPk Take 17 g by mouth daily as needed.    QUEtiapine (SEROQUEL) 200 MG Tab Take 200 mg by mouth every evening.    thiamine 100 MG tablet Take 100 mg by mouth once daily.     No current facility-administered medications for this visit.       Review of Systems:  Review of Systems   Constitutional:  Negative for chills, fatigue and fever.   HENT:  Negative for congestion.    Eyes:  Negative for visual disturbance.   Respiratory:  Negative for cough and shortness of breath.    Cardiovascular:  Negative for chest pain and palpitations.   Gastrointestinal:  Negative for abdominal distention, abdominal pain, constipation, diarrhea, nausea and vomiting.   Genitourinary:  Negative for difficulty urinating, dysuria, hematuria, vaginal bleeding and vaginal discharge.   Skin:  Negative for rash.   Neurological:  Negative for dizziness, seizures, light-headedness and headaches.   Hematological:  Does not bruise/bleed easily.   Psychiatric/Behavioral:  Negative for dysphoric mood. The patient is not nervous/anxious.         OBJECTIVE:     Physical Exam:  Vitals:    06/10/25 1314   BP: 100/71   Pulse: 82      Physical Exam  Vitals and nursing note reviewed. Exam conducted with a chaperone present.   Constitutional:       General: She is not in acute distress.     Appearance: She is well-developed.   HENT:      Head: Normocephalic and atraumatic.   Eyes:      Pupils: Pupils are equal, round, and reactive to light.   Cardiovascular:      Rate and Rhythm: Normal rate and regular rhythm.   Pulmonary:      Effort: Pulmonary effort is normal. No respiratory distress.   Chest:   Breasts:     Right: Normal.      Left: Normal.   Abdominal:      General: There is no distension.      Palpations: Abdomen is soft. There is no mass.      Tenderness: There is no abdominal tenderness. There is no  guarding.   Genitourinary:     Comments: SSE: Normal external female genitalia, normal urethral meatus, normal vaginal rugae, normal vaginal mucosa, no vaginal blood in canal, normal physiologic discharge, normal cervix, no adnexal masses palpated on bimanual exam   Musculoskeletal:         General: Normal range of motion.      Cervical back: Normal range of motion and neck supple.   Skin:     General: Skin is warm and dry.   Neurological:      Mental Status: She is alert and oriented to person, place, and time.   Psychiatric:         Behavior: Behavior normal.         Thought Content: Thought content normal.         Judgment: Judgment normal.         ASSESSMENT/PLAN:     1. Well woman exam (Primary)  - Pap smear - History as above, co-testing today  - Mammogram - Scheduled for 8/2025  - Colonoscopy - Done yesterday  - Calcium and Vitamin D counseling  14 - 30 years old 1,300mg Ca/d; 600 IU vit D/d  31 - 50 years old 1,000 Ca/d; 600 IU vit D/d  51 - 70 year old: 1,200 Ca/d; 600 IU vit D/d  71+ years old 1,200 Ca/d; 800 IU vit D/d    Rosalinda Garcia M.D.  OB/GYN  [facility]         [1]   Social History  Tobacco Use    Smoking status: Former     Types: Cigarettes    Smokeless tobacco: Never   Substance Use Topics    Alcohol use: Not Currently    Drug use: No     Comment: in past since nov

## 2025-06-24 ENCOUNTER — RESULTS FOLLOW-UP (OUTPATIENT)
Dept: OBSTETRICS AND GYNECOLOGY | Facility: CLINIC | Age: 50
End: 2025-06-24

## 2025-08-07 ENCOUNTER — HOSPITAL ENCOUNTER (OUTPATIENT)
Dept: RADIOLOGY | Facility: HOSPITAL | Age: 50
Discharge: HOME | End: 2025-08-07
Attending: PHYSICIAN ASSISTANT
Payer: MEDICAID

## 2025-08-07 VITALS — HEIGHT: 68 IN | BODY MASS INDEX: 23.04 KG/M2 | WEIGHT: 152 LBS

## 2025-08-07 DIAGNOSIS — Z12.31 OTHER SCREENING MAMMOGRAM: ICD-10-CM

## 2025-08-07 PROCEDURE — 77063 BREAST TOMOSYNTHESIS BI: CPT | Mod: TC
